# Patient Record
Sex: FEMALE | Race: WHITE | Employment: OTHER | URBAN - METROPOLITAN AREA
[De-identification: names, ages, dates, MRNs, and addresses within clinical notes are randomized per-mention and may not be internally consistent; named-entity substitution may affect disease eponyms.]

---

## 2017-01-04 ENCOUNTER — OFFICE VISIT (OUTPATIENT)
Dept: CARDIOLOGY CLINIC | Age: 82
End: 2017-01-04

## 2017-01-04 VITALS
BODY MASS INDEX: 25.75 KG/M2 | DIASTOLIC BLOOD PRESSURE: 70 MMHG | WEIGHT: 150 LBS | SYSTOLIC BLOOD PRESSURE: 124 MMHG | HEART RATE: 70 BPM

## 2017-01-04 DIAGNOSIS — I42.9 CARDIOMYOPATHY (HCC): ICD-10-CM

## 2017-01-04 DIAGNOSIS — I25.10 CAD IN NATIVE ARTERY: ICD-10-CM

## 2017-01-04 DIAGNOSIS — I10 ESSENTIAL HYPERTENSION: ICD-10-CM

## 2017-01-04 DIAGNOSIS — I34.0 NON-RHEUMATIC MITRAL REGURGITATION: ICD-10-CM

## 2017-01-04 DIAGNOSIS — I50.22 CHRONIC SYSTOLIC CONGESTIVE HEART FAILURE (HCC): Primary | ICD-10-CM

## 2017-01-04 PROCEDURE — 99214 OFFICE O/P EST MOD 30 MIN: CPT | Performed by: INTERNAL MEDICINE

## 2017-01-04 RX ORDER — LOSARTAN POTASSIUM AND HYDROCHLOROTHIAZIDE 12.5; 5 MG/1; MG/1
TABLET ORAL
Qty: 30 TABLET | Refills: 4 | Status: SHIPPED | OUTPATIENT
Start: 2017-01-04 | End: 2017-04-04 | Stop reason: SDUPTHER

## 2017-01-10 ENCOUNTER — OFFICE VISIT (OUTPATIENT)
Dept: FAMILY MEDICINE CLINIC | Age: 82
End: 2017-01-10

## 2017-01-10 VITALS
WEIGHT: 155 LBS | SYSTOLIC BLOOD PRESSURE: 126 MMHG | HEIGHT: 64 IN | DIASTOLIC BLOOD PRESSURE: 80 MMHG | TEMPERATURE: 97.8 F | RESPIRATION RATE: 16 BRPM | HEART RATE: 68 BPM | OXYGEN SATURATION: 97 % | BODY MASS INDEX: 26.46 KG/M2

## 2017-01-10 DIAGNOSIS — N28.9 RENAL INSUFFICIENCY: ICD-10-CM

## 2017-01-10 DIAGNOSIS — F51.01 PRIMARY INSOMNIA: ICD-10-CM

## 2017-01-10 DIAGNOSIS — I10 ESSENTIAL HYPERTENSION: Primary | ICD-10-CM

## 2017-01-10 DIAGNOSIS — K21.9 GASTROESOPHAGEAL REFLUX DISEASE, ESOPHAGITIS PRESENCE NOT SPECIFIED: ICD-10-CM

## 2017-01-10 DIAGNOSIS — E78.5 HYPERLIPIDEMIA, UNSPECIFIED HYPERLIPIDEMIA TYPE: ICD-10-CM

## 2017-01-10 PROCEDURE — 99214 OFFICE O/P EST MOD 30 MIN: CPT | Performed by: FAMILY MEDICINE

## 2017-01-10 PROCEDURE — 36415 COLL VENOUS BLD VENIPUNCTURE: CPT | Performed by: FAMILY MEDICINE

## 2017-01-10 RX ORDER — ZOLPIDEM TARTRATE 5 MG/1
5 TABLET ORAL NIGHTLY PRN
Qty: 30 TABLET | Refills: 2 | Status: SHIPPED | OUTPATIENT
Start: 2017-01-10 | End: 2017-04-04 | Stop reason: SDUPTHER

## 2017-01-10 RX ORDER — HYDROCODONE BITARTRATE AND ACETAMINOPHEN 10; 325 MG/1; MG/1
1 TABLET ORAL EVERY 6 HOURS PRN
COMMUNITY
Start: 2017-01-03

## 2017-01-10 RX ORDER — ATORVASTATIN CALCIUM 80 MG/1
TABLET, FILM COATED ORAL
Qty: 30 TABLET | Refills: 5 | Status: SHIPPED | OUTPATIENT
Start: 2017-01-10 | End: 2017-04-04 | Stop reason: SDUPTHER

## 2017-01-10 RX ORDER — FAMOTIDINE 20 MG/1
20 TABLET, FILM COATED ORAL 2 TIMES DAILY
Qty: 60 TABLET | Refills: 5 | Status: SHIPPED | OUTPATIENT
Start: 2017-01-10 | End: 2017-04-04 | Stop reason: SDUPTHER

## 2017-01-11 ENCOUNTER — NURSE ONLY (OUTPATIENT)
Dept: FAMILY MEDICINE CLINIC | Age: 82
End: 2017-01-11

## 2017-01-11 DIAGNOSIS — Z01.89 VISIT FOR LABORATORY TEST: Primary | ICD-10-CM

## 2017-01-11 LAB
ALBUMIN SERPL-MCNC: 3.8 G/DL (ref 3.4–5)
ALP BLD-CCNC: 47 U/L (ref 40–129)
ALT SERPL-CCNC: 14 U/L (ref 10–40)
ANION GAP SERPL CALCULATED.3IONS-SCNC: 12 MMOL/L (ref 3–16)
AST SERPL-CCNC: 21 U/L (ref 15–37)
BILIRUB SERPL-MCNC: 0.3 MG/DL (ref 0–1)
BILIRUBIN DIRECT: <0.2 MG/DL (ref 0–0.3)
BILIRUBIN, INDIRECT: ABNORMAL MG/DL (ref 0–1)
BUN BLDV-MCNC: 19 MG/DL (ref 7–20)
CALCIUM SERPL-MCNC: 9.4 MG/DL (ref 8.3–10.6)
CHLORIDE BLD-SCNC: 102 MMOL/L (ref 99–110)
CHOLESTEROL, TOTAL: 139 MG/DL (ref 0–199)
CO2: 28 MMOL/L (ref 21–32)
CREAT SERPL-MCNC: 1.3 MG/DL (ref 0.6–1.2)
GFR AFRICAN AMERICAN: 48
GFR NON-AFRICAN AMERICAN: 39
GLUCOSE BLD-MCNC: 82 MG/DL (ref 70–99)
HDLC SERPL-MCNC: 76 MG/DL (ref 40–60)
LDL CHOLESTEROL CALCULATED: 38 MG/DL
POTASSIUM SERPL-SCNC: 4.6 MMOL/L (ref 3.5–5.1)
SODIUM BLD-SCNC: 142 MMOL/L (ref 136–145)
TOTAL PROTEIN: 6.3 G/DL (ref 6.4–8.2)
TRIGL SERPL-MCNC: 123 MG/DL (ref 0–150)
VLDLC SERPL CALC-MCNC: 25 MG/DL

## 2017-02-03 RX ORDER — RANITIDINE 150 MG/1
TABLET ORAL
Qty: 60 TABLET | Refills: 5 | Status: SHIPPED | OUTPATIENT
Start: 2017-02-03 | End: 2017-04-04

## 2017-03-17 RX ORDER — CARVEDILOL 12.5 MG/1
TABLET ORAL
Qty: 60 TABLET | Refills: 3 | Status: SHIPPED | OUTPATIENT
Start: 2017-03-17 | End: 2017-04-04 | Stop reason: SDUPTHER

## 2017-04-03 ENCOUNTER — TELEPHONE (OUTPATIENT)
Dept: FAMILY MEDICINE CLINIC | Age: 82
End: 2017-04-03

## 2017-04-04 ENCOUNTER — OFFICE VISIT (OUTPATIENT)
Dept: FAMILY MEDICINE CLINIC | Age: 82
End: 2017-04-04

## 2017-04-04 VITALS
HEART RATE: 70 BPM | WEIGHT: 155 LBS | OXYGEN SATURATION: 96 % | DIASTOLIC BLOOD PRESSURE: 54 MMHG | HEIGHT: 64 IN | BODY MASS INDEX: 26.46 KG/M2 | RESPIRATION RATE: 16 BRPM | SYSTOLIC BLOOD PRESSURE: 118 MMHG | TEMPERATURE: 97.5 F

## 2017-04-04 DIAGNOSIS — K21.9 GASTROESOPHAGEAL REFLUX DISEASE, ESOPHAGITIS PRESENCE NOT SPECIFIED: ICD-10-CM

## 2017-04-04 DIAGNOSIS — F51.01 PRIMARY INSOMNIA: Primary | ICD-10-CM

## 2017-04-04 DIAGNOSIS — E78.5 HYPERLIPIDEMIA, UNSPECIFIED HYPERLIPIDEMIA TYPE: ICD-10-CM

## 2017-04-04 DIAGNOSIS — I10 ESSENTIAL HYPERTENSION: ICD-10-CM

## 2017-04-04 PROCEDURE — 99214 OFFICE O/P EST MOD 30 MIN: CPT | Performed by: FAMILY MEDICINE

## 2017-04-04 PROCEDURE — 1090F PRES/ABSN URINE INCON ASSESS: CPT | Performed by: FAMILY MEDICINE

## 2017-04-04 PROCEDURE — 4040F PNEUMOC VAC/ADMIN/RCVD: CPT | Performed by: FAMILY MEDICINE

## 2017-04-04 PROCEDURE — 1123F ACP DISCUSS/DSCN MKR DOCD: CPT | Performed by: FAMILY MEDICINE

## 2017-04-04 PROCEDURE — 1036F TOBACCO NON-USER: CPT | Performed by: FAMILY MEDICINE

## 2017-04-04 PROCEDURE — G8599 NO ASA/ANTIPLAT THER USE RNG: HCPCS | Performed by: FAMILY MEDICINE

## 2017-04-04 PROCEDURE — G8427 DOCREV CUR MEDS BY ELIG CLIN: HCPCS | Performed by: FAMILY MEDICINE

## 2017-04-04 PROCEDURE — G8420 CALC BMI NORM PARAMETERS: HCPCS | Performed by: FAMILY MEDICINE

## 2017-04-04 PROCEDURE — G8400 PT W/DXA NO RESULTS DOC: HCPCS | Performed by: FAMILY MEDICINE

## 2017-04-04 RX ORDER — ATORVASTATIN CALCIUM 80 MG/1
TABLET, FILM COATED ORAL
Qty: 30 TABLET | Refills: 5 | Status: SHIPPED | OUTPATIENT
Start: 2017-04-04 | End: 2017-07-21 | Stop reason: SDUPTHER

## 2017-04-04 RX ORDER — FAMOTIDINE 20 MG/1
20 TABLET, FILM COATED ORAL 2 TIMES DAILY
Qty: 60 TABLET | Refills: 5 | Status: ON HOLD | OUTPATIENT
Start: 2017-04-04 | End: 2022-03-22 | Stop reason: HOSPADM

## 2017-04-04 RX ORDER — PROMETHAZINE HYDROCHLORIDE 25 MG/1
TABLET ORAL
Qty: 60 TABLET | Refills: 2 | Status: SHIPPED | OUTPATIENT
Start: 2017-04-04 | End: 2017-04-11

## 2017-04-04 RX ORDER — LOSARTAN POTASSIUM AND HYDROCHLOROTHIAZIDE 12.5; 5 MG/1; MG/1
TABLET ORAL
Qty: 30 TABLET | Refills: 5 | Status: SHIPPED | OUTPATIENT
Start: 2017-04-04 | End: 2017-07-21 | Stop reason: SDUPTHER

## 2017-04-04 RX ORDER — ZOLPIDEM TARTRATE 5 MG/1
5 TABLET ORAL NIGHTLY PRN
Qty: 30 TABLET | Refills: 2 | Status: SHIPPED | OUTPATIENT
Start: 2017-04-04 | End: 2017-06-29 | Stop reason: SDUPTHER

## 2017-04-04 RX ORDER — CARVEDILOL 12.5 MG/1
TABLET ORAL
Qty: 60 TABLET | Refills: 5 | Status: SHIPPED | OUTPATIENT
Start: 2017-04-04 | End: 2017-07-21 | Stop reason: SDUPTHER

## 2017-06-29 ENCOUNTER — OFFICE VISIT (OUTPATIENT)
Dept: FAMILY MEDICINE CLINIC | Age: 82
End: 2017-06-29

## 2017-06-29 VITALS
DIASTOLIC BLOOD PRESSURE: 68 MMHG | HEART RATE: 70 BPM | SYSTOLIC BLOOD PRESSURE: 124 MMHG | TEMPERATURE: 97.4 F | OXYGEN SATURATION: 97 % | HEIGHT: 64 IN | RESPIRATION RATE: 16 BRPM | BODY MASS INDEX: 24.41 KG/M2 | WEIGHT: 143 LBS

## 2017-06-29 DIAGNOSIS — F51.01 PRIMARY INSOMNIA: Primary | ICD-10-CM

## 2017-06-29 PROCEDURE — 4040F PNEUMOC VAC/ADMIN/RCVD: CPT | Performed by: FAMILY MEDICINE

## 2017-06-29 PROCEDURE — G8400 PT W/DXA NO RESULTS DOC: HCPCS | Performed by: FAMILY MEDICINE

## 2017-06-29 PROCEDURE — 99213 OFFICE O/P EST LOW 20 MIN: CPT | Performed by: FAMILY MEDICINE

## 2017-06-29 PROCEDURE — 1036F TOBACCO NON-USER: CPT | Performed by: FAMILY MEDICINE

## 2017-06-29 PROCEDURE — G8427 DOCREV CUR MEDS BY ELIG CLIN: HCPCS | Performed by: FAMILY MEDICINE

## 2017-06-29 PROCEDURE — 1090F PRES/ABSN URINE INCON ASSESS: CPT | Performed by: FAMILY MEDICINE

## 2017-06-29 PROCEDURE — 1123F ACP DISCUSS/DSCN MKR DOCD: CPT | Performed by: FAMILY MEDICINE

## 2017-06-29 PROCEDURE — G8420 CALC BMI NORM PARAMETERS: HCPCS | Performed by: FAMILY MEDICINE

## 2017-06-29 PROCEDURE — G8599 NO ASA/ANTIPLAT THER USE RNG: HCPCS | Performed by: FAMILY MEDICINE

## 2017-06-29 RX ORDER — LUBIPROSTONE 8 UG/1
CAPSULE, GELATIN COATED ORAL
COMMUNITY
Start: 2017-06-20 | End: 2018-06-19 | Stop reason: SDUPTHER

## 2017-06-29 RX ORDER — ZOLPIDEM TARTRATE 5 MG/1
5 TABLET ORAL NIGHTLY PRN
Qty: 30 TABLET | Refills: 2 | Status: SHIPPED | OUTPATIENT
Start: 2017-06-29 | End: 2017-07-25

## 2017-07-21 ENCOUNTER — OFFICE VISIT (OUTPATIENT)
Dept: CARDIOLOGY CLINIC | Age: 82
End: 2017-07-21

## 2017-07-21 VITALS
BODY MASS INDEX: 25.58 KG/M2 | SYSTOLIC BLOOD PRESSURE: 158 MMHG | WEIGHT: 149 LBS | HEART RATE: 72 BPM | DIASTOLIC BLOOD PRESSURE: 60 MMHG

## 2017-07-21 DIAGNOSIS — I42.9 CARDIOMYOPATHY (HCC): ICD-10-CM

## 2017-07-21 DIAGNOSIS — I25.10 CAD IN NATIVE ARTERY: ICD-10-CM

## 2017-07-21 DIAGNOSIS — I10 ESSENTIAL HYPERTENSION: ICD-10-CM

## 2017-07-21 DIAGNOSIS — I50.22 CHRONIC SYSTOLIC CONGESTIVE HEART FAILURE (HCC): Primary | ICD-10-CM

## 2017-07-21 DIAGNOSIS — I34.0 NON-RHEUMATIC MITRAL REGURGITATION: ICD-10-CM

## 2017-07-21 PROCEDURE — G8428 CUR MEDS NOT DOCUMENT: HCPCS | Performed by: INTERNAL MEDICINE

## 2017-07-21 PROCEDURE — G8419 CALC BMI OUT NRM PARAM NOF/U: HCPCS | Performed by: INTERNAL MEDICINE

## 2017-07-21 PROCEDURE — 1036F TOBACCO NON-USER: CPT | Performed by: INTERNAL MEDICINE

## 2017-07-21 PROCEDURE — G8400 PT W/DXA NO RESULTS DOC: HCPCS | Performed by: INTERNAL MEDICINE

## 2017-07-21 PROCEDURE — 99214 OFFICE O/P EST MOD 30 MIN: CPT | Performed by: INTERNAL MEDICINE

## 2017-07-21 PROCEDURE — 1123F ACP DISCUSS/DSCN MKR DOCD: CPT | Performed by: INTERNAL MEDICINE

## 2017-07-21 PROCEDURE — G8599 NO ASA/ANTIPLAT THER USE RNG: HCPCS | Performed by: INTERNAL MEDICINE

## 2017-07-21 PROCEDURE — 4040F PNEUMOC VAC/ADMIN/RCVD: CPT | Performed by: INTERNAL MEDICINE

## 2017-07-21 PROCEDURE — 1090F PRES/ABSN URINE INCON ASSESS: CPT | Performed by: INTERNAL MEDICINE

## 2017-07-21 RX ORDER — CARVEDILOL 12.5 MG/1
TABLET ORAL
Qty: 60 TABLET | Refills: 5 | Status: SHIPPED | OUTPATIENT
Start: 2017-07-21 | End: 2018-02-19 | Stop reason: SDUPTHER

## 2017-07-21 RX ORDER — LOSARTAN POTASSIUM AND HYDROCHLOROTHIAZIDE 12.5; 5 MG/1; MG/1
TABLET ORAL
Qty: 30 TABLET | Refills: 5 | Status: SHIPPED | OUTPATIENT
Start: 2017-07-21 | End: 2018-02-20 | Stop reason: SDUPTHER

## 2017-07-21 RX ORDER — ATORVASTATIN CALCIUM 80 MG/1
TABLET, FILM COATED ORAL
Qty: 30 TABLET | Refills: 5 | Status: SHIPPED | OUTPATIENT
Start: 2017-07-21 | End: 2018-03-09 | Stop reason: SDUPTHER

## 2017-07-25 ENCOUNTER — OFFICE VISIT (OUTPATIENT)
Dept: FAMILY MEDICINE CLINIC | Age: 82
End: 2017-07-25

## 2017-07-25 VITALS
OXYGEN SATURATION: 97 % | TEMPERATURE: 97.6 F | HEART RATE: 66 BPM | DIASTOLIC BLOOD PRESSURE: 74 MMHG | HEIGHT: 64 IN | WEIGHT: 149 LBS | BODY MASS INDEX: 25.44 KG/M2 | RESPIRATION RATE: 18 BRPM | SYSTOLIC BLOOD PRESSURE: 130 MMHG

## 2017-07-25 DIAGNOSIS — F51.01 PRIMARY INSOMNIA: ICD-10-CM

## 2017-07-25 DIAGNOSIS — J06.9 UPPER RESPIRATORY INFECTION, ACUTE: Primary | ICD-10-CM

## 2017-07-25 DIAGNOSIS — N28.9 RENAL INSUFFICIENCY: ICD-10-CM

## 2017-07-25 LAB
ANION GAP SERPL CALCULATED.3IONS-SCNC: 14 MMOL/L (ref 3–16)
BUN BLDV-MCNC: 29 MG/DL (ref 7–20)
CALCIUM SERPL-MCNC: 9 MG/DL (ref 8.3–10.6)
CHLORIDE BLD-SCNC: 95 MMOL/L (ref 99–110)
CO2: 28 MMOL/L (ref 21–32)
CREAT SERPL-MCNC: 1.5 MG/DL (ref 0.6–1.2)
GFR AFRICAN AMERICAN: 40
GFR NON-AFRICAN AMERICAN: 33
GLUCOSE BLD-MCNC: 81 MG/DL (ref 70–99)
POTASSIUM SERPL-SCNC: 3.8 MMOL/L (ref 3.5–5.1)
SODIUM BLD-SCNC: 137 MMOL/L (ref 136–145)

## 2017-07-25 PROCEDURE — G8427 DOCREV CUR MEDS BY ELIG CLIN: HCPCS | Performed by: FAMILY MEDICINE

## 2017-07-25 PROCEDURE — 1036F TOBACCO NON-USER: CPT | Performed by: FAMILY MEDICINE

## 2017-07-25 PROCEDURE — 1123F ACP DISCUSS/DSCN MKR DOCD: CPT | Performed by: FAMILY MEDICINE

## 2017-07-25 PROCEDURE — 99213 OFFICE O/P EST LOW 20 MIN: CPT | Performed by: FAMILY MEDICINE

## 2017-07-25 PROCEDURE — G8599 NO ASA/ANTIPLAT THER USE RNG: HCPCS | Performed by: FAMILY MEDICINE

## 2017-07-25 PROCEDURE — 36415 COLL VENOUS BLD VENIPUNCTURE: CPT | Performed by: FAMILY MEDICINE

## 2017-07-25 PROCEDURE — 4040F PNEUMOC VAC/ADMIN/RCVD: CPT | Performed by: FAMILY MEDICINE

## 2017-07-25 PROCEDURE — G8419 CALC BMI OUT NRM PARAM NOF/U: HCPCS | Performed by: FAMILY MEDICINE

## 2017-07-25 PROCEDURE — 1090F PRES/ABSN URINE INCON ASSESS: CPT | Performed by: FAMILY MEDICINE

## 2017-07-25 PROCEDURE — G8400 PT W/DXA NO RESULTS DOC: HCPCS | Performed by: FAMILY MEDICINE

## 2017-07-25 RX ORDER — AZITHROMYCIN 250 MG/1
TABLET, FILM COATED ORAL
Qty: 1 PACKET | Refills: 0 | Status: SHIPPED | OUTPATIENT
Start: 2017-07-25 | End: 2017-08-04

## 2017-07-25 RX ORDER — TRAZODONE HYDROCHLORIDE 100 MG/1
100 TABLET ORAL NIGHTLY
Qty: 30 TABLET | Refills: 5 | Status: SHIPPED | OUTPATIENT
Start: 2017-07-25 | End: 2017-08-02

## 2017-08-02 ENCOUNTER — OFFICE VISIT (OUTPATIENT)
Dept: FAMILY MEDICINE CLINIC | Age: 82
End: 2017-08-02

## 2017-08-02 VITALS
TEMPERATURE: 97.8 F | WEIGHT: 149 LBS | BODY MASS INDEX: 25.44 KG/M2 | HEIGHT: 64 IN | SYSTOLIC BLOOD PRESSURE: 120 MMHG | DIASTOLIC BLOOD PRESSURE: 60 MMHG | HEART RATE: 78 BPM | RESPIRATION RATE: 16 BRPM | OXYGEN SATURATION: 97 %

## 2017-08-02 DIAGNOSIS — J40 BRONCHITIS: Primary | ICD-10-CM

## 2017-08-02 DIAGNOSIS — F51.01 PRIMARY INSOMNIA: ICD-10-CM

## 2017-08-02 PROCEDURE — 99213 OFFICE O/P EST LOW 20 MIN: CPT | Performed by: FAMILY MEDICINE

## 2017-08-02 PROCEDURE — 4040F PNEUMOC VAC/ADMIN/RCVD: CPT | Performed by: FAMILY MEDICINE

## 2017-08-02 PROCEDURE — 1090F PRES/ABSN URINE INCON ASSESS: CPT | Performed by: FAMILY MEDICINE

## 2017-08-02 PROCEDURE — G8400 PT W/DXA NO RESULTS DOC: HCPCS | Performed by: FAMILY MEDICINE

## 2017-08-02 PROCEDURE — 1123F ACP DISCUSS/DSCN MKR DOCD: CPT | Performed by: FAMILY MEDICINE

## 2017-08-02 PROCEDURE — 1036F TOBACCO NON-USER: CPT | Performed by: FAMILY MEDICINE

## 2017-08-02 PROCEDURE — G8419 CALC BMI OUT NRM PARAM NOF/U: HCPCS | Performed by: FAMILY MEDICINE

## 2017-08-02 PROCEDURE — G8599 NO ASA/ANTIPLAT THER USE RNG: HCPCS | Performed by: FAMILY MEDICINE

## 2017-08-02 PROCEDURE — G8427 DOCREV CUR MEDS BY ELIG CLIN: HCPCS | Performed by: FAMILY MEDICINE

## 2017-08-02 RX ORDER — ZOLPIDEM TARTRATE 5 MG/1
5 TABLET ORAL NIGHTLY PRN
Qty: 30 TABLET | Refills: 2 | Status: SHIPPED | OUTPATIENT
Start: 2017-08-02 | End: 2017-10-23 | Stop reason: SDUPTHER

## 2017-08-02 RX ORDER — CIPROFLOXACIN 500 MG/1
500 TABLET, FILM COATED ORAL 2 TIMES DAILY
Qty: 20 TABLET | Refills: 0 | Status: SHIPPED | OUTPATIENT
Start: 2017-08-02 | End: 2017-08-12

## 2017-10-23 ENCOUNTER — OFFICE VISIT (OUTPATIENT)
Dept: FAMILY MEDICINE CLINIC | Age: 82
End: 2017-10-23

## 2017-10-23 VITALS
WEIGHT: 149 LBS | SYSTOLIC BLOOD PRESSURE: 130 MMHG | BODY MASS INDEX: 25.44 KG/M2 | HEIGHT: 64 IN | RESPIRATION RATE: 18 BRPM | DIASTOLIC BLOOD PRESSURE: 74 MMHG | TEMPERATURE: 98.2 F | OXYGEN SATURATION: 97 % | HEART RATE: 74 BPM

## 2017-10-23 DIAGNOSIS — M54.42 CHRONIC LEFT-SIDED LOW BACK PAIN WITH LEFT-SIDED SCIATICA: Primary | ICD-10-CM

## 2017-10-23 DIAGNOSIS — F51.01 PRIMARY INSOMNIA: ICD-10-CM

## 2017-10-23 DIAGNOSIS — G89.29 CHRONIC LEFT-SIDED LOW BACK PAIN WITH LEFT-SIDED SCIATICA: Primary | ICD-10-CM

## 2017-10-23 PROCEDURE — 1090F PRES/ABSN URINE INCON ASSESS: CPT | Performed by: FAMILY MEDICINE

## 2017-10-23 PROCEDURE — G8599 NO ASA/ANTIPLAT THER USE RNG: HCPCS | Performed by: FAMILY MEDICINE

## 2017-10-23 PROCEDURE — G8417 CALC BMI ABV UP PARAM F/U: HCPCS | Performed by: FAMILY MEDICINE

## 2017-10-23 PROCEDURE — G8400 PT W/DXA NO RESULTS DOC: HCPCS | Performed by: FAMILY MEDICINE

## 2017-10-23 PROCEDURE — 4040F PNEUMOC VAC/ADMIN/RCVD: CPT | Performed by: FAMILY MEDICINE

## 2017-10-23 PROCEDURE — G8428 CUR MEDS NOT DOCUMENT: HCPCS | Performed by: FAMILY MEDICINE

## 2017-10-23 PROCEDURE — 1036F TOBACCO NON-USER: CPT | Performed by: FAMILY MEDICINE

## 2017-10-23 PROCEDURE — G8484 FLU IMMUNIZE NO ADMIN: HCPCS | Performed by: FAMILY MEDICINE

## 2017-10-23 PROCEDURE — 99213 OFFICE O/P EST LOW 20 MIN: CPT | Performed by: FAMILY MEDICINE

## 2017-10-23 PROCEDURE — 1123F ACP DISCUSS/DSCN MKR DOCD: CPT | Performed by: FAMILY MEDICINE

## 2017-10-23 RX ORDER — METAXALONE 800 MG/1
800 TABLET ORAL 3 TIMES DAILY
Qty: 90 TABLET | Refills: 3 | Status: SHIPPED | OUTPATIENT
Start: 2017-10-23 | End: 2018-06-19 | Stop reason: ALTCHOICE

## 2017-10-23 RX ORDER — ZOLPIDEM TARTRATE 5 MG/1
5 TABLET ORAL NIGHTLY PRN
Qty: 30 TABLET | Refills: 2 | Status: SHIPPED | OUTPATIENT
Start: 2017-10-23 | End: 2018-06-19 | Stop reason: SDUPTHER

## 2017-10-23 NOTE — PROGRESS NOTES
SUBJECTIVE:  F/U Chronic low back pain. She is now seeing Dr. Fern Murcia for pain management but also desires a refill of Skelaxin for muscle spasm, also helps with the pain, with no side effects. F/U Chronic insomnia - desires refill Ambien, still helps with insomnia, with no side effects or daytime sedation. Current tobacco use: None. OBJECTIVE:  /74 (Site: Left Arm, Position: Sitting, Cuff Size: Medium Adult)   Pulse 74   Temp 98.2 °F (36.8 °C) (Oral)   Resp 18   Ht 5' 4\" (1.626 m)   Wt 149 lb (67.6 kg)   SpO2 97%   Breastfeeding? No   BMI 25.58 kg/m²     Normal mood and affect. Walks with cane. ASSESSMENT:  1. Chronic left-sided low back pain with left-sided sciatica    2. Primary insomnia        PLAN:  Orders Placed This Encounter   Medications    zolpidem (AMBIEN) 5 MG tablet     Sig: Take 1 tablet by mouth nightly as needed for Sleep     Dispense:  30 tablet     Refill:  2    metaxalone (SKELAXIN) 800 MG tablet     Sig: Take 1 tablet by mouth 3 times daily     Dispense:  90 tablet     Refill:  3       Controlled Substances Monitoring: Attestation: The Prescription Monitoring Report for this patient was reviewed today. Gisselle Luciano MD)  Documentation: Possible medication side effects, risk of tolerance and/or dependence, and alternative treatments discussed., No signs of potential drug abuse or diversion identified., Existing medication contract. Gisselle Luciano MD)    Insomnia and pain management discussed. 15 minutes were spent with patient . Greater than 50% continuity of care or counseling.

## 2017-10-25 ENCOUNTER — TELEPHONE (OUTPATIENT)
Dept: FAMILY MEDICINE CLINIC | Age: 82
End: 2017-10-25

## 2017-11-17 ENCOUNTER — TELEPHONE (OUTPATIENT)
Dept: FAMILY MEDICINE CLINIC | Age: 82
End: 2017-11-17

## 2018-02-19 RX ORDER — CARVEDILOL 12.5 MG/1
TABLET ORAL
Qty: 60 TABLET | Refills: 5 | Status: SHIPPED | OUTPATIENT
Start: 2018-02-19 | End: 2018-09-04 | Stop reason: SDUPTHER

## 2018-02-19 NOTE — TELEPHONE ENCOUNTER
Requested Prescriptions     Pending Prescriptions Disp Refills    carvedilol (COREG) 12.5 MG tablet [Pharmacy Med Name: Carvedilol Oral Tablet 12.5 MG] 60 tablet 5     Sig: TAKE 1 TABLET BY MOUTH TWO TIMES A DAY WITH MEALS         Last ov:7/21/17    Next ov:3/27/18    Last fill:7/21/17  \    Please sign in Dr Florina Gibson absence.

## 2018-02-20 ENCOUNTER — OFFICE VISIT (OUTPATIENT)
Dept: CARDIOLOGY CLINIC | Age: 83
End: 2018-02-20

## 2018-02-20 VITALS
BODY MASS INDEX: 24.55 KG/M2 | DIASTOLIC BLOOD PRESSURE: 78 MMHG | WEIGHT: 143 LBS | SYSTOLIC BLOOD PRESSURE: 170 MMHG | HEART RATE: 67 BPM

## 2018-02-20 DIAGNOSIS — I25.10 ATHEROSCLEROSIS OF NATIVE CORONARY ARTERY OF NATIVE HEART WITHOUT ANGINA PECTORIS: ICD-10-CM

## 2018-02-20 DIAGNOSIS — I10 ESSENTIAL HYPERTENSION: ICD-10-CM

## 2018-02-20 DIAGNOSIS — I11.9 MALIGNANT HYPERTENSIVE HEART DISEASE WITHOUT HEART FAILURE: Primary | ICD-10-CM

## 2018-02-20 PROCEDURE — G8484 FLU IMMUNIZE NO ADMIN: HCPCS | Performed by: NURSE PRACTITIONER

## 2018-02-20 PROCEDURE — G8599 NO ASA/ANTIPLAT THER USE RNG: HCPCS | Performed by: NURSE PRACTITIONER

## 2018-02-20 PROCEDURE — 1090F PRES/ABSN URINE INCON ASSESS: CPT | Performed by: NURSE PRACTITIONER

## 2018-02-20 PROCEDURE — G8400 PT W/DXA NO RESULTS DOC: HCPCS | Performed by: NURSE PRACTITIONER

## 2018-02-20 PROCEDURE — G8427 DOCREV CUR MEDS BY ELIG CLIN: HCPCS | Performed by: NURSE PRACTITIONER

## 2018-02-20 PROCEDURE — 1036F TOBACCO NON-USER: CPT | Performed by: NURSE PRACTITIONER

## 2018-02-20 PROCEDURE — 4040F PNEUMOC VAC/ADMIN/RCVD: CPT | Performed by: NURSE PRACTITIONER

## 2018-02-20 PROCEDURE — G8420 CALC BMI NORM PARAMETERS: HCPCS | Performed by: NURSE PRACTITIONER

## 2018-02-20 PROCEDURE — 99213 OFFICE O/P EST LOW 20 MIN: CPT | Performed by: NURSE PRACTITIONER

## 2018-02-20 PROCEDURE — 1123F ACP DISCUSS/DSCN MKR DOCD: CPT | Performed by: NURSE PRACTITIONER

## 2018-02-20 RX ORDER — LOSARTAN POTASSIUM AND HYDROCHLOROTHIAZIDE 12.5; 5 MG/1; MG/1
TABLET ORAL
Qty: 90 TABLET | Refills: 3 | Status: SHIPPED | OUTPATIENT
Start: 2018-02-20 | End: 2018-12-22 | Stop reason: SDUPTHER

## 2018-02-20 ASSESSMENT — ENCOUNTER SYMPTOMS
RESPIRATORY NEGATIVE: 1
GASTROINTESTINAL NEGATIVE: 1

## 2018-02-20 NOTE — PROGRESS NOTES
[facility]      Primary Care Doctor:  No primary care provider on file. Chief Complaint:  CAD    History of Present Illness:  Purvi Landry is a 80 y.o. female with PMH  CAD, HTN, HLD who presents today for an interval exam. Historical sx with first MI were fatigue, back pain, acid reflux. She c/o orthopnea that is at baseline for her, fatigue, and occasional dizziness. She is currently taking care of her  who is severely demented and whom she has brought in for an appt today. she denies chest pain, dyspnea, exertional chest pressure/discomfort, syncope. Cardiac Risk Factors  Family Hx:  Yes  Tobacco:  No  HTN:  Yes, elevated today  Lipids 17 LDL:38  goal< 100   HDL:  76 T   DM:  No  Metabolic Syndrome: No    Fasting BG>100  No HDL<40/50  No TG>150  No BP>130/85  Yes Abd Circ>35in/40in  No      EF: 50%    NYHA Class:   II   Class I   Patients with no limitation of activities; they suffer no symptoms from ordinary activities. Class II   Patients with slight, mild limitation of activity; they are comfortable with rest or with mild exertion. Class III   Patients with marked limitation of activity; they are comfortable only at rest.   Class IV   Patients who should be at complete rest, confined to bed or chair; any physical activity brings on discomfort and symptoms occur at rest.      Activity: at baseline  Can you walk 1-2 blocks or do a moderate amount of house/yard work? Yes          Past Medical History:   has a past medical history of Arthritis; Blood circulation, collateral; CAD (coronary artery disease); CHF (congestive heart failure) ([address] GERD (gastroesophageal reflux disease); Hyperlipidemia; Hypertension; Mitral valve prolapse; and Thyroid disease. Surgical History:   has a past surgical history that includes Hysterectomy; Thyroidectomy; and Colonoscopy. Social History:   reports that she has never smoked.  She has never used smokeless tobacco. She reports that she does not drink alcohol or use drugs. Family History:   Family History   Problem Relation Age of Onset    Heart Disease Mother     Heart Disease Father     Cancer Father     Cancer Sister     Diabetes Brother     Stroke Brother        Home Medications:  Prior to Admission medications    Medication Sig Start Date End Date Taking? Authorizing Provider   carvedilol (COREG) 12.5 MG tablet TAKE 1 TABLET BY MOUTH TWO TIMES A DAY WITH MEALS 2/19/18   Nuno Blackmon NP   zolpidem [facility]) 5 MG tablet Take 1 tablet by mouth nightly as needed for Sleep 10/23/17   Katia Nicole MD   metaxalone [facility]) 800 MG tablet Take 1 tablet by mouth 3 times daily 10/23/17   Katia Nicole MD   atorvastatin (LIPITOR) 80 MG tablet TAKE 1 TABLET BY MOUTH ONE TIME A DAY 7/21/17   Bairon Shen MD   losartan-hydrochlorothiazide [facility]) 50-12.5 MG per tablet TAKE 1 TABLET BY MOUTH ONE TIME A DAY 7/21/17   Bairon Shen MD   AMITIZA 8 MCG CAPS capsule  6/20/17   Historical Provider, MD   famotidine (PEPCID) 20 MG tablet Take 1 tablet by mouth 2 times daily 4/4/17   Katia Nicole MD   HYDROcodone-acetaminophen [facility]) 7.5-325 MG per tablet  1/3/17   Historical Provider, MD   Coenzyme Q10 (CO Q 10) 100 MG CAPS Take  by mouth daily. Historical Provider, MD   Cholecalciferol (VITAMIN D) 2000 UNITS CAPS capsule Take  by mouth. Historical Provider, MD   aspirin 81 MG chewable tablet Take 1 tablet by mouth daily. 1/16/14   Rosalinda Velasquez MD        Allergies:  Benazepril hcl; Feldene [piroxicam]; Hytrin [terazosin hcl]; Iv contrast [iodides]; Celebrex [celecoxib]; Cephalexin; Iodinated casein; Monopril [fosinopril sodium]; Protonix [pantoprazole sodium]; Quinapril hcl; Toprol xl [metoprolol succinate]; Ultracet [tramadol-acetaminophen]; and Ziac [bisoprolol-hydrochlorothiazide]     ROS:   Review of Systems   Constitutional: Negative. HENT: Negative. Respiratory: Negative.     Cardiovascular: Negative. Gastrointestinal: Negative. Neurological: Negative. Hematological: Negative. Psychiatric/Behavioral: Negative. Physical Examination:    Vitals:    02/20/18 1357   BP: (!) 170/78   Pulse: 67   Weight: 143 lb (64.9 kg)           Physical Exam   Constitutional: She is oriented to person, place, and time. She appears well-developed and well-nourished. HENT:   Head: Normocephalic and atraumatic. Eyes: Conjunctivae are normal. Pupils are equal, round, and reactive to light. Neck: Normal range of motion. Neck supple. Cardiovascular: Normal rate, normal heart sounds and intact distal pulses. Pulmonary/Chest: Effort normal and breath sounds normal.   Abdominal: Soft. Musculoskeletal: Normal range of motion. Neurological: She is alert and oriented to person, place, and time. Skin: Skin is warm and dry. Psychiatric: She has a normal mood and affect. Her behavior is normal. Judgment and thought content normal.   Vitals reviewed.       Lab Data:    CBC:   Lab Results   Component Value Date    WBC 5.8 06/03/2016    WBC 6.5 01/19/2016    WBC 5.9 10/05/2015    RBC 3.65 06/03/2016    RBC 3.97 01/19/2016    RBC 3.61 10/05/2015    HGB 11.2 06/03/2016    HGB 11.8 01/19/2016    HGB 10.9 10/05/2015    HCT 33.5 06/03/2016    HCT 36.6 01/19/2016    HCT 32.8 10/05/2015    MCV 91.7 06/03/2016    MCV 92.3 01/19/2016    MCV 90.8 10/05/2015    RDW 13.7 06/03/2016    RDW 13.7 01/19/2016    RDW 13.4 10/05/2015     06/03/2016     01/19/2016     10/05/2015     BMP:  Lab Results   Component Value Date     07/25/2017     01/11/2017     06/03/2016    K 3.8 07/25/2017    K 4.6 01/11/2017    K 4.2 06/03/2016    CL 95 07/25/2017     01/11/2017     06/03/2016    CO2 28 07/25/2017    CO2 28 01/11/2017    CO2 26 06/03/2016    PHOS 4.0 01/19/2016    PHOS 4.4 10/06/2015    PHOS 3.5 01/16/2014    BUN 29 07/25/2017    BUN 19 01/11/2017    BUN 20 06/03/2016

## 2018-02-20 NOTE — PATIENT INSTRUCTIONS
chest.  Sweating. Shortness of breath. Nausea or vomiting. Pain, pressure, or a strange feeling in the back, neck, jaw, or upper belly or in one or both shoulders or arms. Lightheadedness or sudden weakness. A fast or irregular heartbeat. If you have symptoms of a heart attack: After you call 911, the  may tell you to chew 1 adult-strength or 2 to 4 low-dose aspirin. Wait for an ambulance. Do not try to drive yourself. Follow-up care is a key part of your treatment and safety. Be sure to make and go to all appointments, and call your doctor if you are having problems. It's also a good idea to know your test results and keep a list of the medicines you take. Where can you learn more? Go to [email or web address]://[email or web address]. org and sign in to your [employer] account. Enter T174 in the [employer] box to learn more about \"Learning About Heart Failure Zones. \"     If you do not have an account, please click on the \"Sign Up Now\" link. Current as of: September 21, 2016  Content Version: 11.5  © 4645-0334 Healthwise, Incorporated. Care instructions adapted under license by [facility] ([facility]). If you have questions about a medical condition or this instruction, always ask your healthcare professional. [address] any warranty or liability for your use of this information.

## 2018-03-09 DIAGNOSIS — E78.5 HYPERLIPIDEMIA, UNSPECIFIED HYPERLIPIDEMIA TYPE: Primary | ICD-10-CM

## 2018-03-09 RX ORDER — ATORVASTATIN CALCIUM 80 MG/1
TABLET, FILM COATED ORAL
Qty: 90 TABLET | Refills: 3 | Status: SHIPPED | OUTPATIENT
Start: 2018-03-09 | End: 2019-03-07 | Stop reason: SDUPTHER

## 2018-03-29 ENCOUNTER — HOSPITAL ENCOUNTER (OUTPATIENT)
Dept: OTHER | Age: 83
Discharge: HOME | End: 2018-03-29
Attending: INTERNAL MEDICINE | Admitting: INTERNAL MEDICINE

## 2018-03-29 LAB
CHOLESTEROL, FASTING: 146 MG/DL (ref 0–199)
HDLC SERPL-MCNC: 65 MG/DL (ref 40–60)
LDL CHOLESTEROL CALCULATED: 46 MG/DL
TRIGLYCERIDE, FASTING: 173 MG/DL (ref 0–150)
VLDLC SERPL CALC-MCNC: 35 MG/DL

## 2018-06-19 ENCOUNTER — OFFICE VISIT (OUTPATIENT)
Dept: FAMILY MEDICINE CLINIC | Age: 83
End: 2018-06-19

## 2018-06-19 ENCOUNTER — TELEPHONE (OUTPATIENT)
Dept: FAMILY MEDICINE CLINIC | Age: 83
End: 2018-06-19

## 2018-06-19 VITALS
DIASTOLIC BLOOD PRESSURE: 88 MMHG | SYSTOLIC BLOOD PRESSURE: 130 MMHG | OXYGEN SATURATION: 98 % | HEART RATE: 65 BPM | BODY MASS INDEX: 24.07 KG/M2 | WEIGHT: 141 LBS | HEIGHT: 64 IN

## 2018-06-19 DIAGNOSIS — N18.9 CHRONIC KIDNEY DISEASE, UNSPECIFIED CKD STAGE: ICD-10-CM

## 2018-06-19 DIAGNOSIS — K59.09 CHRONIC CONSTIPATION: ICD-10-CM

## 2018-06-19 DIAGNOSIS — Z23 NEED FOR PNEUMOCOCCAL VACCINATION: ICD-10-CM

## 2018-06-19 DIAGNOSIS — I10 ESSENTIAL HYPERTENSION: Primary | ICD-10-CM

## 2018-06-19 DIAGNOSIS — E78.2 MIXED HYPERLIPIDEMIA: ICD-10-CM

## 2018-06-19 DIAGNOSIS — F51.01 PRIMARY INSOMNIA: ICD-10-CM

## 2018-06-19 DIAGNOSIS — Z78.0 ASYMPTOMATIC MENOPAUSE: ICD-10-CM

## 2018-06-19 PROCEDURE — 1123F ACP DISCUSS/DSCN MKR DOCD: CPT | Performed by: FAMILY MEDICINE

## 2018-06-19 PROCEDURE — G0009 ADMIN PNEUMOCOCCAL VACCINE: HCPCS | Performed by: FAMILY MEDICINE

## 2018-06-19 PROCEDURE — 36415 COLL VENOUS BLD VENIPUNCTURE: CPT | Performed by: FAMILY MEDICINE

## 2018-06-19 PROCEDURE — G8599 NO ASA/ANTIPLAT THER USE RNG: HCPCS | Performed by: FAMILY MEDICINE

## 2018-06-19 PROCEDURE — 1090F PRES/ABSN URINE INCON ASSESS: CPT | Performed by: FAMILY MEDICINE

## 2018-06-19 PROCEDURE — G8420 CALC BMI NORM PARAMETERS: HCPCS | Performed by: FAMILY MEDICINE

## 2018-06-19 PROCEDURE — G8427 DOCREV CUR MEDS BY ELIG CLIN: HCPCS | Performed by: FAMILY MEDICINE

## 2018-06-19 PROCEDURE — 99214 OFFICE O/P EST MOD 30 MIN: CPT | Performed by: FAMILY MEDICINE

## 2018-06-19 PROCEDURE — G8400 PT W/DXA NO RESULTS DOC: HCPCS | Performed by: FAMILY MEDICINE

## 2018-06-19 PROCEDURE — 90670 PCV13 VACCINE IM: CPT | Performed by: FAMILY MEDICINE

## 2018-06-19 PROCEDURE — 1036F TOBACCO NON-USER: CPT | Performed by: FAMILY MEDICINE

## 2018-06-19 PROCEDURE — 4040F PNEUMOC VAC/ADMIN/RCVD: CPT | Performed by: FAMILY MEDICINE

## 2018-06-19 RX ORDER — ZOLPIDEM TARTRATE 5 MG/1
5 TABLET ORAL NIGHTLY PRN
Qty: 30 TABLET | Refills: 2 | Status: SHIPPED | OUTPATIENT
Start: 2018-06-19 | End: 2018-06-22 | Stop reason: SDUPTHER

## 2018-06-19 RX ORDER — LUBIPROSTONE 8 UG/1
8 CAPSULE, GELATIN COATED ORAL 2 TIMES DAILY WITH MEALS
Qty: 30 CAPSULE | Refills: 5 | Status: SHIPPED | OUTPATIENT
Start: 2018-06-19 | End: 2018-06-19 | Stop reason: SDUPTHER

## 2018-06-19 RX ORDER — LUBIPROSTONE 8 UG/1
8 CAPSULE, GELATIN COATED ORAL 2 TIMES DAILY WITH MEALS
Qty: 60 CAPSULE | Refills: 5 | Status: SHIPPED | OUTPATIENT
Start: 2018-06-19 | End: 2020-03-05

## 2018-06-19 RX ORDER — ZOLPIDEM TARTRATE 5 MG/1
5 TABLET ORAL NIGHTLY PRN
Qty: 30 TABLET | Refills: 2 | Status: CANCELLED | OUTPATIENT
Start: 2018-06-19

## 2018-06-19 ASSESSMENT — PATIENT HEALTH QUESTIONNAIRE - PHQ9
1. LITTLE INTEREST OR PLEASURE IN DOING THINGS: 0
2. FEELING DOWN, DEPRESSED OR HOPELESS: 0
SUM OF ALL RESPONSES TO PHQ9 QUESTIONS 1 & 2: 0
SUM OF ALL RESPONSES TO PHQ QUESTIONS 1-9: 0

## 2018-06-19 ASSESSMENT — ENCOUNTER SYMPTOMS: CONSTIPATION: 1

## 2018-06-20 LAB
A/G RATIO: 1.9 (ref 1.1–2.2)
ALBUMIN SERPL-MCNC: 4.1 G/DL (ref 3.4–5)
ALP BLD-CCNC: 57 U/L (ref 40–129)
ALT SERPL-CCNC: 9 U/L (ref 10–40)
ANION GAP SERPL CALCULATED.3IONS-SCNC: 15 MMOL/L (ref 3–16)
AST SERPL-CCNC: 18 U/L (ref 15–37)
BASOPHILS ABSOLUTE: 0.1 K/UL (ref 0–0.2)
BASOPHILS RELATIVE PERCENT: 1.2 %
BILIRUB SERPL-MCNC: <0.2 MG/DL (ref 0–1)
BUN BLDV-MCNC: 19 MG/DL (ref 7–20)
CALCIUM SERPL-MCNC: 9.4 MG/DL (ref 8.3–10.6)
CHLORIDE BLD-SCNC: 99 MMOL/L (ref 99–110)
CO2: 26 MMOL/L (ref 21–32)
CREAT SERPL-MCNC: 1.1 MG/DL (ref 0.6–1.2)
EOSINOPHILS ABSOLUTE: 0.3 K/UL (ref 0–0.6)
EOSINOPHILS RELATIVE PERCENT: 4.5 %
GFR AFRICAN AMERICAN: 57
GFR NON-AFRICAN AMERICAN: 47
GLOBULIN: 2.2 G/DL
GLUCOSE BLD-MCNC: 92 MG/DL (ref 70–99)
HCT VFR BLD CALC: 32.1 % (ref 36–48)
HEMOGLOBIN: 10.8 G/DL (ref 12–16)
LYMPHOCYTES ABSOLUTE: 1.9 K/UL (ref 1–5.1)
LYMPHOCYTES RELATIVE PERCENT: 33 %
MCH RBC QN AUTO: 31 PG (ref 26–34)
MCHC RBC AUTO-ENTMCNC: 33.7 G/DL (ref 31–36)
MCV RBC AUTO: 92 FL (ref 80–100)
MONOCYTES ABSOLUTE: 0.4 K/UL (ref 0–1.3)
MONOCYTES RELATIVE PERCENT: 7.7 %
NEUTROPHILS ABSOLUTE: 3 K/UL (ref 1.7–7.7)
NEUTROPHILS RELATIVE PERCENT: 53.6 %
PDW BLD-RTO: 14.8 % (ref 12.4–15.4)
PLATELET # BLD: 208 K/UL (ref 135–450)
PMV BLD AUTO: 8.7 FL (ref 5–10.5)
POTASSIUM SERPL-SCNC: 4.6 MMOL/L (ref 3.5–5.1)
RBC # BLD: 3.49 M/UL (ref 4–5.2)
SODIUM BLD-SCNC: 140 MMOL/L (ref 136–145)
TOTAL PROTEIN: 6.3 G/DL (ref 6.4–8.2)
WBC # BLD: 5.7 K/UL (ref 4–11)

## 2018-06-22 DIAGNOSIS — F51.01 PRIMARY INSOMNIA: ICD-10-CM

## 2018-06-22 RX ORDER — ZOLPIDEM TARTRATE 5 MG/1
5 TABLET ORAL NIGHTLY PRN
Qty: 30 TABLET | Refills: 0 | Status: SHIPPED | OUTPATIENT
Start: 2018-06-22 | End: 2018-08-06 | Stop reason: SDUPTHER

## 2018-06-24 LAB
6-ACETYLMORPHINE: NOT DETECTED
7-AMINOCLONAZEPAM: NOT DETECTED
ALPHA-OH-ALPRAZOLAM: NOT DETECTED
ALPRAZOLAM: NOT DETECTED
AMPHETAMINE: NOT DETECTED
BARBITURATES: NOT DETECTED
BENZOYLECGONINE: NOT DETECTED
BUPRENORPHINE: NOT DETECTED
CARISOPRODOL: NOT DETECTED
CLONAZEPAM: NOT DETECTED
CODEINE: NOT DETECTED
CREATININE URINE: 67.4 MG/DL (ref 20–400)
DIAZEPAM: NOT DETECTED
DRUGS EXPECTED: NORMAL
EER PAIN MGT DRUG PANEL, HIGH RES/EMIT U: NORMAL
ETHYL GLUCURONIDE: NOT DETECTED
FENTANYL: NOT DETECTED
HYDROCODONE: PRESENT
HYDROMORPHONE: PRESENT
LORAZEPAM: NOT DETECTED
MARIJUANA METABOLITE: NOT DETECTED
MDA: NOT DETECTED
MDEA: NOT DETECTED
MDMA URINE: NOT DETECTED
MEPERIDINE: NOT DETECTED
METHADONE: NOT DETECTED
METHAMPHETAMINE: NOT DETECTED
METHYLPHENIDATE: NOT DETECTED
MIDAZOLAM: NOT DETECTED
MORPHINE: NOT DETECTED
NORBUPRENORPHINE, FREE: NOT DETECTED
NORDIAZEPAM: NOT DETECTED
NORFENTANYL: NOT DETECTED
NORHYDROCODONE, URINE: PRESENT
NOROXYCODONE: NOT DETECTED
NOROXYMORPHONE, URINE: NOT DETECTED
OXAZEPAM: NOT DETECTED
OXYCODONE: NOT DETECTED
OXYMORPHONE: NOT DETECTED
PAIN MANAGEMENT DRUG PANEL: NORMAL
PAIN MANAGEMENT DRUG PANEL: NORMAL
PCP: NOT DETECTED
PHENTERMINE: NOT DETECTED
PROPOXYPHENE: NOT DETECTED
TAPENTADOL, URINE: NOT DETECTED
TAPENTADOL-O-SULFATE, URINE: NOT DETECTED
TEMAZEPAM: NOT DETECTED
TRAMADOL: NOT DETECTED
ZOLPIDEM: NOT DETECTED

## 2018-07-30 ENCOUNTER — TELEPHONE (OUTPATIENT)
Dept: CARDIOLOGY CLINIC | Age: 83
End: 2018-07-30

## 2018-07-30 NOTE — TELEPHONE ENCOUNTER
Per Dr. Lissette Townsend ok for twilight as long as she's not having any new symptoms.  Pt informed

## 2018-07-30 NOTE — TELEPHONE ENCOUNTER
Patient needs to speak with  or Court Valadez about what she is allowed to take.  Was telling her he was going to put her in a \"twilight state\" she said she needs to know what is okay to take.  [phone]

## 2018-08-06 DIAGNOSIS — F51.01 PRIMARY INSOMNIA: ICD-10-CM

## 2018-08-07 RX ORDER — ZOLPIDEM TARTRATE 5 MG/1
5 TABLET ORAL NIGHTLY PRN
Qty: 30 TABLET | Refills: 0 | Status: SHIPPED | OUTPATIENT
Start: 2018-08-07 | End: 2018-10-24 | Stop reason: SDUPTHER

## 2018-08-20 ENCOUNTER — TELEPHONE (OUTPATIENT)
Dept: CARDIOLOGY CLINIC | Age: 83
End: 2018-08-20

## 2018-08-20 ENCOUNTER — OFFICE VISIT (OUTPATIENT)
Dept: FAMILY MEDICINE CLINIC | Age: 83
End: 2018-08-20

## 2018-08-20 VITALS
DIASTOLIC BLOOD PRESSURE: 70 MMHG | OXYGEN SATURATION: 98 % | BODY MASS INDEX: 24.72 KG/M2 | HEART RATE: 68 BPM | SYSTOLIC BLOOD PRESSURE: 124 MMHG | WEIGHT: 144 LBS

## 2018-08-20 DIAGNOSIS — Z01.818 PREOP EXAMINATION: Primary | ICD-10-CM

## 2018-08-20 PROCEDURE — G8427 DOCREV CUR MEDS BY ELIG CLIN: HCPCS | Performed by: FAMILY MEDICINE

## 2018-08-20 PROCEDURE — 1090F PRES/ABSN URINE INCON ASSESS: CPT | Performed by: FAMILY MEDICINE

## 2018-08-20 PROCEDURE — G8599 NO ASA/ANTIPLAT THER USE RNG: HCPCS | Performed by: FAMILY MEDICINE

## 2018-08-20 PROCEDURE — G8400 PT W/DXA NO RESULTS DOC: HCPCS | Performed by: FAMILY MEDICINE

## 2018-08-20 PROCEDURE — 4040F PNEUMOC VAC/ADMIN/RCVD: CPT | Performed by: FAMILY MEDICINE

## 2018-08-20 PROCEDURE — 99214 OFFICE O/P EST MOD 30 MIN: CPT | Performed by: FAMILY MEDICINE

## 2018-08-20 PROCEDURE — 1123F ACP DISCUSS/DSCN MKR DOCD: CPT | Performed by: FAMILY MEDICINE

## 2018-08-20 PROCEDURE — 1101F PT FALLS ASSESS-DOCD LE1/YR: CPT | Performed by: FAMILY MEDICINE

## 2018-08-20 PROCEDURE — G8420 CALC BMI NORM PARAMETERS: HCPCS | Performed by: FAMILY MEDICINE

## 2018-08-20 PROCEDURE — 1036F TOBACCO NON-USER: CPT | Performed by: FAMILY MEDICINE

## 2018-08-20 NOTE — PROGRESS NOTES
Preoperative Consultation      Bryan Becerra  YOB: 1935    Date of Service:  8/20/2018    Vitals:    08/20/18 1431   BP: 124/70   Site: Left Arm   Position: Sitting   Cuff Size: Medium Adult   Pulse: 68   SpO2: 98%   Weight: 144 lb (65.3 kg)      Wt Readings from Last 2 Encounters:   08/20/18 144 lb (65.3 kg)   06/19/18 141 lb (64 kg)     BP Readings from Last 3 Encounters:   08/20/18 124/70   06/19/18 130/88   02/20/18 (!) 170/78        Chief Complaint   Patient presents with    Pre-op Exam     Cataract right 8/27/18 Dr. Harley Rather     Allergies   Allergen Reactions    Benazepril Hcl Shortness Of Breath and Swelling    Feldene [Piroxicam] Shortness Of Breath    Hytrin [Terazosin Hcl] Shortness Of Breath    Iv Contrast [Iodides] Anaphylaxis    Celebrex [Celecoxib]      GI upset    Cephalexin      Nausea    Iodinated Casein     Monopril [Fosinopril Sodium] Other (See Comments)     Pt states makes her weak    Protonix [Pantoprazole Sodium] Other (See Comments)     Kidney failure      Quinapril Hcl     Toprol Xl [Metoprolol Succinate] Other (See Comments)     C/o leg weakness with this med    Ultracet [Tramadol-Acetaminophen]      Rash    Ziac [Bisoprolol-Hydrochlorothiazide] Other (See Comments)     Pt does not remember reaction to med ? Weak      Outpatient Prescriptions Marked as Taking for the 8/20/18 encounter (Office Visit) with [address] [place] [address], DO   Medication Sig Dispense Refill    zolpidem (AMBIEN) 5 MG tablet Take 1 tablet by mouth nightly as needed for Sleep for up to 30 days. . 30 tablet 0    AMITIZA 8 MCG CAPS capsule Take 1 capsule by mouth 2 times daily (with meals) 60 capsule 5    atorvastatin (LIPITOR) 80 MG tablet TAKE 1 TABLET BY MOUTH ONE TIME A DAY  90 tablet 3    losartan-hydrochlorothiazide (HYZAAR) 50-12.5 MG per tablet TAKE 1 TABLET BY MOUTH ONE TIME A DAY 90 tablet 3    carvedilol (COREG) 12.5 MG tablet TAKE 1 TABLET BY MOUTH TWO TIMES A DAY WITH MEALS 60 tablet 5  famotidine (PEPCID) 20 MG tablet Take 1 tablet by mouth 2 times daily (Patient taking differently: Take 20 mg by mouth 2 times daily as needed ) 60 tablet 5    HYDROcodone-acetaminophen (NORCO) 7.5-325 MG per tablet Take 1 tablet by mouth every 6 hours as needed. Conny Reges Coenzyme Q10 (CO Q 10) 100 MG CAPS Take  by mouth daily.  Cholecalciferol (VITAMIN D) 2000 UNITS CAPS capsule Take 2,000 Units by mouth daily       aspirin 81 MG chewable tablet Take 1 tablet by mouth daily. 30 tablet        This patient presents to the office today for a preoperative consultation at the request of surgeon, Dr. Juan Watkins, who plans on performing right and left cataract surgery on August 27 at [facility].  The current problem began 3 years ago, and symptoms have been worsening with time. Conservative therapy: N/A.     Planned anesthesia: Local   Known anesthesia problems: None   Bleeding risk: No recent or remote history of abnormal bleeding  Personal or FH of DVT/PE: No    Patient objection to receiving blood products: No    Patient Active Problem List   Diagnosis    Malignant hypertensive heart disease without heart failure    Other and unspecified angina pectoris    Coronary atherosclerosis of native coronary artery    Other chronic pulmonary heart diseases    Mitral valve disorder    Primary cardiomyopathy ([address]    Acute combined systolic and diastolic heart failure (HCC)    Chronic low back pain    Essential hypertension    AGUILA (acute kidney injury) ([address]    Renal insufficiency    Gout    Pain in joint, hand    Mixed hyperlipidemia    Primary insomnia    Closed stable burst fracture of first lumbar vertebra ([address]    GERD (gastroesophageal reflux disease)       Past Medical History:   Diagnosis Date    Arthritis     Blood circulation, collateral     CAD (coronary artery disease)     CHF (congestive heart failure) (HCC)     GERD (gastroesophageal reflux disease)     Hyperlipidemia     Hypertension     Mitral valve prolapse     Thyroid disease      Past Surgical History:   Procedure Laterality Date    COLONOSCOPY      HYSTERECTOMY      THYROIDECTOMY       Family History   Problem Relation Age of Onset    Heart Disease Mother     Heart Disease Father     Cancer Father     Cancer Sister     Diabetes Brother     Stroke Brother      Social History     Social History    Marital status:      Spouse name: Anderson Medel Number of children: N/A    Years of education: 15     Occupational History    retired      Social History Main Topics    Smoking status: Never Smoker    Smokeless tobacco: Never Used    Alcohol use No    Drug use: No    Sexual activity: No      Comment:  living with spouse. Other Topics Concern    Not on file     Social History Narrative    No narrative on file       Review of Systems  A comprehensive review of systems was negative except for what was noted in the HPI. Physical Exam   Constitutional: She is oriented to person, place, and time. She appears well-developed and well-nourished. No distress. HENT:   Head: Normocephalic and atraumatic. Mouth/Throat: Uvula is midline, oropharynx is clear and moist and mucous membranes are normal.   Eyes: Conjunctivae and EOM are normal. Pupils are equal, round, and reactive to light. Neck: Trachea normal and normal range of motion. Neck supple. No JVD present. No mass and no thyromegaly present. Cardiovascular: Normal rate, regular rhythm, normal heart sounds and intact distal pulses. Exam reveals no gallop and no friction rub. Systolic murmur heard. Pulmonary/Chest: Effort normal and breath sounds normal. No respiratory distress. She has no wheezes. She has no rales. Abdominal: Soft. Normal aorta and bowel sounds are normal. She exhibits no distension and no mass. There is no hepatosplenomegaly. No tenderness. Musculoskeletal: She exhibits no edema and no tenderness. prior to surgery and titrated to pulse < 70.  4. Deep vein thrombosis prophylaxis: regimen to be chosen by surgical team   5. CKD. Overall stable. Increase water hydration. 6.  Essential hypertension. Stable. Continue current medications. 7. No contraindications to planned surgery but patient needs to ask her cardiologist regarding stopping the aspirin.      Dr. Tino Campoverde

## 2018-08-21 LAB
A/G RATIO: 1.8 (ref 1.1–2.2)
ALBUMIN SERPL-MCNC: 4.3 G/DL (ref 3.4–5)
ALP BLD-CCNC: 55 U/L (ref 40–129)
ALT SERPL-CCNC: 12 U/L (ref 10–40)
ANION GAP SERPL CALCULATED.3IONS-SCNC: 15 MMOL/L (ref 3–16)
AST SERPL-CCNC: 20 U/L (ref 15–37)
BASOPHILS ABSOLUTE: 0.1 K/UL (ref 0–0.2)
BASOPHILS RELATIVE PERCENT: 1.2 %
BILIRUB SERPL-MCNC: <0.2 MG/DL (ref 0–1)
BUN BLDV-MCNC: 17 MG/DL (ref 7–20)
CALCIUM SERPL-MCNC: 9.6 MG/DL (ref 8.3–10.6)
CHLORIDE BLD-SCNC: 93 MMOL/L (ref 99–110)
CO2: 27 MMOL/L (ref 21–32)
CREAT SERPL-MCNC: 1.3 MG/DL (ref 0.6–1.2)
EOSINOPHILS ABSOLUTE: 0.2 K/UL (ref 0–0.6)
EOSINOPHILS RELATIVE PERCENT: 3.4 %
GFR AFRICAN AMERICAN: 47
GFR NON-AFRICAN AMERICAN: 39
GLOBULIN: 2.4 G/DL
GLUCOSE BLD-MCNC: 87 MG/DL (ref 70–99)
HCT VFR BLD CALC: 34.3 % (ref 36–48)
HEMOGLOBIN: 11.5 G/DL (ref 12–16)
LYMPHOCYTES ABSOLUTE: 1.7 K/UL (ref 1–5.1)
LYMPHOCYTES RELATIVE PERCENT: 29.8 %
MCH RBC QN AUTO: 30.3 PG (ref 26–34)
MCHC RBC AUTO-ENTMCNC: 33.5 G/DL (ref 31–36)
MCV RBC AUTO: 90.5 FL (ref 80–100)
MONOCYTES ABSOLUTE: 0.6 K/UL (ref 0–1.3)
MONOCYTES RELATIVE PERCENT: 9.9 %
NEUTROPHILS ABSOLUTE: 3.1 K/UL (ref 1.7–7.7)
NEUTROPHILS RELATIVE PERCENT: 55.7 %
PDW BLD-RTO: 13.8 % (ref 12.4–15.4)
PLATELET # BLD: 217 K/UL (ref 135–450)
PMV BLD AUTO: 9.1 FL (ref 5–10.5)
POTASSIUM SERPL-SCNC: 4.3 MMOL/L (ref 3.5–5.1)
RBC # BLD: 3.79 M/UL (ref 4–5.2)
SODIUM BLD-SCNC: 135 MMOL/L (ref 136–145)
TOTAL PROTEIN: 6.7 G/DL (ref 6.4–8.2)
WBC # BLD: 5.7 K/UL (ref 4–11)

## 2018-08-21 RX ORDER — PREDNISOLONE ACETATE 10 MG/ML
1 SUSPENSION/ DROPS OPHTHALMIC SEE ADMIN INSTRUCTIONS
Status: CANCELLED | OUTPATIENT
Start: 2018-08-27

## 2018-08-21 NOTE — PROGRESS NOTES
Spoke with office to verify with  that he wants eye gel given due to patient having allergy to piroxicam.Cindy Garcia

## 2018-08-25 NOTE — H&P
The H&P was reviewed, the patient was examined, and no change has occurred in the patient's condition since the H&P was completed.     Paradise Sylvester MD, PhD, FACS

## 2018-08-25 NOTE — OP NOTE
solutions placed on the eye. The eye was covered with a metal shield. The patient went to the PACU in excellent condition, having tolerated the procedure extremely well, and will follow up with me tomorrow for postop day 1 care.     Julieta Durbin MD, PhD, FACS

## 2018-08-26 ENCOUNTER — ANESTHESIA EVENT (OUTPATIENT)
Dept: OPERATING ROOM | Age: 83
End: 2018-08-26
Payer: MEDICARE

## 2018-08-26 NOTE — H&P
The H&P was reviewed, the patient was examined, and no change has occurred in the patient's condition since the H&P was completed.     Traci Leonardo MD, PhD, FACS

## 2018-08-27 ENCOUNTER — ANESTHESIA (OUTPATIENT)
Dept: OPERATING ROOM | Age: 83
End: 2018-08-27
Payer: MEDICARE

## 2018-08-27 ENCOUNTER — HOSPITAL ENCOUNTER (OUTPATIENT)
Age: 83
Setting detail: OUTPATIENT SURGERY
Discharge: HOME | End: 2018-08-27
Attending: OPHTHALMOLOGY | Admitting: OPHTHALMOLOGY
Payer: MEDICARE

## 2018-08-27 VITALS — DIASTOLIC BLOOD PRESSURE: 72 MMHG | OXYGEN SATURATION: 100 % | SYSTOLIC BLOOD PRESSURE: 152 MMHG

## 2018-08-27 VITALS
RESPIRATION RATE: 16 BRPM | HEIGHT: 63 IN | HEART RATE: 75 BPM | WEIGHT: 142 LBS | OXYGEN SATURATION: 97 % | BODY MASS INDEX: 25.16 KG/M2 | DIASTOLIC BLOOD PRESSURE: 84 MMHG | TEMPERATURE: 97.5 F | SYSTOLIC BLOOD PRESSURE: 180 MMHG

## 2018-08-27 PROCEDURE — 2500000003 HC RX 250 WO HCPCS: Performed by: ANESTHESIOLOGY

## 2018-08-27 PROCEDURE — 6370000000 HC RX 637 (ALT 250 FOR IP): Performed by: OPHTHALMOLOGY

## 2018-08-27 PROCEDURE — 7100000011 HC PHASE II RECOVERY - ADDTL 15 MIN: Performed by: OPHTHALMOLOGY

## 2018-08-27 PROCEDURE — 3700000001 HC ADD 15 MINUTES (ANESTHESIA): Performed by: OPHTHALMOLOGY

## 2018-08-27 PROCEDURE — 2580000003 HC RX 258

## 2018-08-27 PROCEDURE — 7100000010 HC PHASE II RECOVERY - FIRST 15 MIN: Performed by: OPHTHALMOLOGY

## 2018-08-27 PROCEDURE — 2709999900 HC NON-CHARGEABLE SUPPLY: Performed by: OPHTHALMOLOGY

## 2018-08-27 PROCEDURE — 2500000003 HC RX 250 WO HCPCS: Performed by: OPHTHALMOLOGY

## 2018-08-27 PROCEDURE — 6370000000 HC RX 637 (ALT 250 FOR IP)

## 2018-08-27 PROCEDURE — 3600000003 HC SURGERY LEVEL 3 BASE: Performed by: OPHTHALMOLOGY

## 2018-08-27 PROCEDURE — 3600000013 HC SURGERY LEVEL 3 ADDTL 15MIN: Performed by: OPHTHALMOLOGY

## 2018-08-27 PROCEDURE — V2632 POST CHMBR INTRAOCULAR LENS: HCPCS | Performed by: OPHTHALMOLOGY

## 2018-08-27 PROCEDURE — 3700000000 HC ANESTHESIA ATTENDED CARE: Performed by: OPHTHALMOLOGY

## 2018-08-27 PROCEDURE — 6360000002 HC RX W HCPCS: Performed by: NURSE ANESTHETIST, CERTIFIED REGISTERED

## 2018-08-27 DEVICE — ACRYSOF(R) IQ ASPHERIC IOL SP ACRYLIC FOLDABLELENS WULTRASERT(TM) DELIVERY SYSTEM UV WBLUE LIGHT FILTER. 13.0MM LENGTH 6.0MM ANTERIORASYMMETRIC BICONVEX OPTIC PLANAR HAPTICS.
Type: IMPLANTABLE DEVICE | Site: EYE | Status: FUNCTIONAL
Brand: ACRYSOF ULTRASERT

## 2018-08-27 RX ORDER — TETRACAINE HYDROCHLORIDE 5 MG/ML
SOLUTION OPHTHALMIC PRN
Status: DISCONTINUED | OUTPATIENT
Start: 2018-08-27 | End: 2018-08-27 | Stop reason: HOSPADM

## 2018-08-27 RX ORDER — SODIUM CHLORIDE, SODIUM LACTATE, POTASSIUM CHLORIDE, CALCIUM CHLORIDE 600; 310; 30; 20 MG/100ML; MG/100ML; MG/100ML; MG/100ML
INJECTION, SOLUTION INTRAVENOUS
Status: COMPLETED
Start: 2018-08-27 | End: 2018-08-27

## 2018-08-27 RX ORDER — MIDAZOLAM HYDROCHLORIDE 1 MG/ML
INJECTION INTRAMUSCULAR; INTRAVENOUS PRN
Status: DISCONTINUED | OUTPATIENT
Start: 2018-08-27 | End: 2018-08-27 | Stop reason: SDUPTHER

## 2018-08-27 RX ORDER — MOXIFLOXACIN 5 MG/ML
1 SOLUTION/ DROPS OPHTHALMIC SEE ADMIN INSTRUCTIONS
Status: DISCONTINUED | OUTPATIENT
Start: 2018-08-27 | End: 2018-08-27 | Stop reason: HOSPADM

## 2018-08-27 RX ORDER — ACETAMINOPHEN 650 MG
TABLET, EXTENDED RELEASE ORAL PRN
Status: DISCONTINUED | OUTPATIENT
Start: 2018-08-27 | End: 2018-08-27 | Stop reason: HOSPADM

## 2018-08-27 RX ORDER — LIDOCAINE HYDROCHLORIDE 10 MG/ML
1 INJECTION, SOLUTION EPIDURAL; INFILTRATION; INTRACAUDAL; PERINEURAL
Status: COMPLETED | OUTPATIENT
Start: 2018-08-27 | End: 2018-08-27

## 2018-08-27 RX ORDER — SODIUM CHLORIDE 0.9 % (FLUSH) 0.9 %
10 SYRINGE (ML) INJECTION PRN
Status: DISCONTINUED | OUTPATIENT
Start: 2018-08-27 | End: 2018-08-27 | Stop reason: HOSPADM

## 2018-08-27 RX ORDER — FENTANYL CITRATE 50 UG/ML
INJECTION, SOLUTION INTRAMUSCULAR; INTRAVENOUS PRN
Status: DISCONTINUED | OUTPATIENT
Start: 2018-08-27 | End: 2018-08-27 | Stop reason: SDUPTHER

## 2018-08-27 RX ORDER — CYCLOPENTOLATE HYDROCHLORIDE 10 MG/ML
1 SOLUTION/ DROPS OPHTHALMIC
Status: COMPLETED | OUTPATIENT
Start: 2018-08-27 | End: 2018-08-27

## 2018-08-27 RX ORDER — SODIUM CHLORIDE, SODIUM LACTATE, POTASSIUM CHLORIDE, CALCIUM CHLORIDE 600; 310; 30; 20 MG/100ML; MG/100ML; MG/100ML; MG/100ML
INJECTION, SOLUTION INTRAVENOUS CONTINUOUS
Status: DISCONTINUED | OUTPATIENT
Start: 2018-08-27 | End: 2018-08-27 | Stop reason: HOSPADM

## 2018-08-27 RX ORDER — PHENYLEPHRINE HCL 2.5 %
DROPS OPHTHALMIC (EYE)
Status: COMPLETED
Start: 2018-08-27 | End: 2018-08-27

## 2018-08-27 RX ORDER — SODIUM CHLORIDE, POTASSIUM CHLORIDE, CALCIUM CHLORIDE, MAGNESIUM CHLORIDE, SODIUM ACETATE, AND SODIUM CITRATE 6.4; .75; .48; .3; 3.9; 1.7 MG/ML; MG/ML; MG/ML; MG/ML; MG/ML; MG/ML
SOLUTION IRRIGATION PRN
Status: DISCONTINUED | OUTPATIENT
Start: 2018-08-27 | End: 2018-08-27 | Stop reason: HOSPADM

## 2018-08-27 RX ORDER — TROPICAMIDE 10 MG/ML
1 SOLUTION/ DROPS OPHTHALMIC
Status: COMPLETED | OUTPATIENT
Start: 2018-08-27 | End: 2018-08-27

## 2018-08-27 RX ORDER — PHENYLEPHRINE HCL 2.5 %
1 DROPS OPHTHALMIC (EYE)
Status: COMPLETED | OUTPATIENT
Start: 2018-08-27 | End: 2018-08-27

## 2018-08-27 RX ORDER — SODIUM CHLORIDE 0.9 % (FLUSH) 0.9 %
10 SYRINGE (ML) INJECTION EVERY 12 HOURS SCHEDULED
Status: DISCONTINUED | OUTPATIENT
Start: 2018-08-27 | End: 2018-08-27 | Stop reason: HOSPADM

## 2018-08-27 RX ORDER — MOXIFLOXACIN 5 MG/ML
SOLUTION/ DROPS OPHTHALMIC PRN
Status: DISCONTINUED | OUTPATIENT
Start: 2018-08-27 | End: 2018-08-27 | Stop reason: HOSPADM

## 2018-08-27 RX ADMIN — FENTANYL CITRATE 25 MCG: 50 INJECTION INTRAMUSCULAR; INTRAVENOUS at 08:32

## 2018-08-27 RX ADMIN — Medication 1 DROP: at 07:55

## 2018-08-27 RX ADMIN — CYCLOPENTOLATE HYDROCHLORIDE 1 DROP: 10 SOLUTION/ DROPS OPHTHALMIC at 08:03

## 2018-08-27 RX ADMIN — TROPICAMIDE 1 DROP: 10 SOLUTION/ DROPS OPHTHALMIC at 07:41

## 2018-08-27 RX ADMIN — PHENYLEPHRINE HYDROCHLORIDE 1 DROP: 25 SOLUTION/ DROPS OPHTHALMIC at 07:41

## 2018-08-27 RX ADMIN — CYCLOPENTOLATE HYDROCHLORIDE 1 DROP: 10 SOLUTION/ DROPS OPHTHALMIC at 07:50

## 2018-08-27 RX ADMIN — CYCLOPENTOLATE HYDROCHLORIDE 1 DROP: 10 SOLUTION/ DROPS OPHTHALMIC at 07:55

## 2018-08-27 RX ADMIN — TROPICAMIDE 1 DROP: 10 SOLUTION/ DROPS OPHTHALMIC at 07:50

## 2018-08-27 RX ADMIN — SODIUM CHLORIDE, SODIUM LACTATE, POTASSIUM CHLORIDE, CALCIUM CHLORIDE: 600; 310; 30; 20 INJECTION, SOLUTION INTRAVENOUS at 07:41

## 2018-08-27 RX ADMIN — Medication 1 DROP: at 07:41

## 2018-08-27 RX ADMIN — TROPICAMIDE 1 DROP: 10 SOLUTION/ DROPS OPHTHALMIC at 08:03

## 2018-08-27 RX ADMIN — Medication 1 DROP: at 08:03

## 2018-08-27 RX ADMIN — MIDAZOLAM 1 MG: 1 INJECTION INTRAMUSCULAR; INTRAVENOUS at 08:32

## 2018-08-27 RX ADMIN — CYCLOPENTOLATE HYDROCHLORIDE 1 DROP: 10 SOLUTION/ DROPS OPHTHALMIC at 07:41

## 2018-08-27 RX ADMIN — Medication 1 DROP: at 07:50

## 2018-08-27 RX ADMIN — LIDOCAINE HYDROCHLORIDE 1 ML: 10 INJECTION, SOLUTION EPIDURAL; INFILTRATION; INTRACAUDAL; PERINEURAL at 07:43

## 2018-08-27 RX ADMIN — SODIUM CHLORIDE, POTASSIUM CHLORIDE, SODIUM LACTATE AND CALCIUM CHLORIDE: 600; 310; 30; 20 INJECTION, SOLUTION INTRAVENOUS at 07:41

## 2018-08-27 RX ADMIN — TROPICAMIDE 1 DROP: 10 SOLUTION/ DROPS OPHTHALMIC at 07:55

## 2018-08-27 ASSESSMENT — PULMONARY FUNCTION TESTS
PIF_VALUE: 0

## 2018-08-27 ASSESSMENT — PAIN SCALES - GENERAL
PAINLEVEL_OUTOF10: 0
PAINLEVEL_OUTOF10: 0

## 2018-08-27 ASSESSMENT — PAIN - FUNCTIONAL ASSESSMENT: PAIN_FUNCTIONAL_ASSESSMENT: 0-10

## 2018-08-27 NOTE — ANESTHESIA PRE PROCEDURE
Department of Anesthesiology  Preprocedure Note       Name:  Romelle Cogan   Age:  80 y.o.  :  1935                                          MRN:  [id number]         Date:  2018      Surgeon: Joanna Garcia):  Carola Barriga MD    Procedure: Procedure(s):  PHACO EMULSIFICATION OF CATARACT WITH INTRAOCULAR LENS IMPLANT RIGHT EYE    Medications prior to admission:   Prior to Admission medications    Medication Sig Start Date End Date Taking? Authorizing Provider   zolpidem (AMBIEN) 5 MG tablet Take 1 tablet by mouth nightly as needed for Sleep for up to 30 days. . 18  Debra Shahid DO   AMITIZA 8 MCG CAPS capsule Take 1 capsule by mouth 2 times daily (with meals) 18   Debra Shahid DO   atorvastatin (LIPITOR) 80 MG tablet TAKE 1 TABLET BY MOUTH ONE TIME A DAY  3/9/18   DAVE Luis CNP   losartan-hydrochlorothiazide (HYZAAR) 50-12.5 MG per tablet TAKE 1 TABLET BY MOUTH ONE TIME A DAY 18   DAVE Luis CNP   carvedilol (COREG) 12.5 MG tablet TAKE 1 TABLET BY MOUTH TWO TIMES A DAY WITH MEALS 18   DAVE Luis CNP   famotidine (PEPCID) 20 MG tablet Take 1 tablet by mouth 2 times daily  Patient taking differently: Take 20 mg by mouth 2 times daily as needed  17   Gino Mtez MD   HYDROcodone-acetaminophen ([address]) 7.5-325 MG per tablet Take 1 tablet by mouth every 6 hours as needed. . 1/3/17   Historical Provider, MD   Coenzyme Q10 (CO Q 10) 100 MG CAPS Take  by mouth daily. Historical Provider, MD   Cholecalciferol (VITAMIN D) 2000 UNITS CAPS capsule Take 2,000 Units by mouth daily     Historical Provider, MD   aspirin 81 MG chewable tablet Take 1 tablet by mouth daily.  14   Tone Samayoa MD       Current medications:    Current Facility-Administered Medications   Medication Dose Route Frequency Provider Last Rate Last Dose    moxifloxacin (VIGAMOX) 0.5 % ophthalmic solution 1 drop  1 drop Right Eye See Admin Instructions Nan Smith MD        cyclopentolate (CYCLOGYL) 1 % ophthalmic solution 1 drop  1 drop Right Eye Q5 Min Nan Smith MD        tropicamide (MYDRIACYL) 1 % ophthalmic solution 1 drop  1 drop Right Eye Q5 Min Nan Smith MD        phenylephrine (MYDFRIN) 2.5 % ophthalmic solution 1 drop  1 drop Right Eye Q5 Min Nan Smith MD        epinephrine and lidocaine 2% PF in BSS injection (1 mL)   Intraocular Once Nan Smith MD        lactated ringers infusion   Intravenous Continuous Jacqueline Rojas MD        sodium chloride flush 0.9 % injection 10 mL  10 mL Intravenous 2 times per day Jacqueline Rojas MD        sodium chloride flush 0.9 % injection 10 mL  10 mL Intravenous PRN Jacqueline Rojas MD        lidocaine PF 1 % injection 1 mL  1 mL Intradermal Once PRN Jacqueline Rojas MD           Allergies: Allergies   Allergen Reactions    Benazepril Hcl Shortness Of Breath and Swelling    Feldene [Piroxicam] Shortness Of Breath    Hytrin [Terazosin Hcl] Shortness Of Breath    Iv Contrast [Iodides] Anaphylaxis    Celebrex [Celecoxib]      GI upset    Cephalexin      Nausea    Iodinated Casein     Monopril [Fosinopril Sodium] Other (See Comments)     Pt states makes her weak    Protonix [Pantoprazole Sodium] Other (See Comments)     Kidney failure      Quinapril Hcl     Toprol Xl [Metoprolol Succinate] Other (See Comments)     C/o leg weakness with this med    Ultracet [Tramadol-Acetaminophen]      Rash    Ziac [Bisoprolol-Hydrochlorothiazide] Other (See Comments)     Pt does not remember reaction to med ?  Weak        Problem List:    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    Other and unspecified angina pectoris I20.9    Coronary atherosclerosis of native coronary artery I25.10    Other chronic pulmonary heart diseases I27.89    Mitral valve disorder I05.9    Primary cardiomyopathy ([facility] [address] I42.8    Acute combined systolic and diastolic heart failure (HCC) I50.41    Chronic low back pain M54.5, G89.29    Essential hypertension I10    AGUILA (acute kidney injury) ([facility] [address] N17.9    Renal insufficiency N28.9    Gout M10.9    Pain in joint, hand M25.549    Mixed hyperlipidemia E78.2    Primary insomnia F51.01    Closed stable burst fracture of first lumbar vertebra ([facility]) S32.011A    GERD (gastroesophageal reflux disease) K21.9       Past Medical History:        Diagnosis Date    Arthritis     Blood circulation, collateral     CAD (coronary artery disease)     CHF (congestive heart failure) ([facility])     GERD (gastroesophageal reflux disease)     Hyperlipidemia     Hypertension     Mitral valve prolapse     Thyroid disease        Past Surgical History:        Procedure Laterality Date    COLONOSCOPY      HYSTERECTOMY      THYROIDECTOMY         Social History:    Social History   Substance Use Topics    Smoking status: Never Smoker    Smokeless tobacco: Never Used    Alcohol use No                                Counseling given: Not Answered      Vital Signs (Current):   Vitals:    08/21/18 1230   Weight: 144 lb (65.3 kg)   Height: 5' 3\" (1.6 m)                                              BP Readings from Last 3 Encounters:   08/20/18 124/70   06/19/18 130/88   02/20/18 (!) 170/78       NPO Status:                                                                                 BMI:   Wt Readings from Last 3 Encounters:   08/21/18 144 lb (65.3 kg)   08/20/18 144 lb (65.3 kg)   06/19/18 141 lb (64 kg)     Body mass index is 25.51 kg/m².     CBC:   Lab Results   Component Value Date    WBC 5.7 08/20/2018    RBC 3.79 08/20/2018    HGB 11.5 08/20/2018    HCT 34.3 08/20/2018    MCV 90.5 08/20/2018    RDW 13.8 08/20/2018     08/20/2018       CMP:   Lab Results   Component Value Date     08/20/2018    K 4.3 08/20/2018    CL 93 08/20/2018    CO2 27 08/20/2018    BUN 17 08/20/2018 CREATININE 1.3 08/20/2018    GFRAA 47 08/20/2018    AGRATIO 1.8 08/20/2018    LABGLOM 39 08/20/2018    GLUCOSE 87 08/20/2018    PROT 6.7 08/20/2018    CALCIUM 9.6 08/20/2018    BILITOT <0.2 08/20/2018    ALKPHOS 55 08/20/2018    AST 20 08/20/2018    ALT 12 08/20/2018       POC Tests: No results for input(s): POCGLU, POCNA, POCK, POCCL, POCBUN, POCHEMO, POCHCT in the last 72 hours. Coags:   Lab Results   Component Value Date    PROTIME 12.8 01/12/2014    INR 1.15 01/12/2014    APTT 28.4 01/12/2014       HCG (If Applicable): No results found for: PREGTESTUR, PREGSERUM, HCG, HCGQUANT     ABGs: No results found for: PHART, PO2ART, ZWH6XTW, HNQ5ZVK, BEART, L1ZXYLZN     Type & Screen (If Applicable):  No results found for: LABABO, [address]    Anesthesia Evaluation  Patient summary reviewed no history of anesthetic complications:   Airway: Mallampati: II  TM distance: >3 FB   Neck ROM: full  Mouth opening: > = 3 FB Dental: normal exam         Pulmonary:Negative Pulmonary ROS                              Cardiovascular:    (+) hypertension:, valvular problems/murmurs: MVP, angina:, CAD:, CHF:,                   Neuro/Psych:   Negative Neuro/Psych ROS              GI/Hepatic/Renal:   (+) GERD: well controlled, renal disease: CRI,      (-) liver disease       Endo/Other: Negative Endo/Other ROS       (-) diabetes mellitus               Abdominal:           Vascular: negative vascular ROS. Anesthesia Plan      MAC     ASA 3       Induction: intravenous. Anesthetic plan and risks discussed with patient. Plan discussed with CRNA. All questions answered and agrees with plan.         Charla Pennington MD   8/27/2018

## 2018-08-27 NOTE — PROGRESS NOTES
Reviewed pt's b/p and medical history with dr Yvonne Guerrier, he did not want to order any tests at this time ie ekg

## 2018-08-27 NOTE — PROGRESS NOTES
Pt is alert and oriented. Verbalized understanding of procedure, consent form signed, iv started, pt tolerated well. Will call family to bedside, call light within reach.    Eye drops started

## 2018-08-30 ENCOUNTER — ANESTHESIA EVENT (OUTPATIENT)
Dept: OPERATING ROOM | Age: 83
End: 2018-08-30
Payer: MEDICARE

## 2018-09-04 ENCOUNTER — OFFICE VISIT (OUTPATIENT)
Dept: CARDIOLOGY CLINIC | Age: 83
End: 2018-09-04

## 2018-09-04 ENCOUNTER — HOSPITAL ENCOUNTER (OUTPATIENT)
Age: 83
Setting detail: OUTPATIENT SURGERY
Discharge: HOME | End: 2018-09-04
Attending: OPHTHALMOLOGY | Admitting: OPHTHALMOLOGY
Payer: MEDICARE

## 2018-09-04 ENCOUNTER — ANESTHESIA (OUTPATIENT)
Dept: OPERATING ROOM | Age: 83
End: 2018-09-04
Payer: MEDICARE

## 2018-09-04 VITALS — DIASTOLIC BLOOD PRESSURE: 66 MMHG | SYSTOLIC BLOOD PRESSURE: 142 MMHG | OXYGEN SATURATION: 99 %

## 2018-09-04 VITALS
DIASTOLIC BLOOD PRESSURE: 90 MMHG | HEART RATE: 68 BPM | BODY MASS INDEX: 25.93 KG/M2 | WEIGHT: 146.4 LBS | SYSTOLIC BLOOD PRESSURE: 169 MMHG

## 2018-09-04 VITALS
TEMPERATURE: 98 F | HEART RATE: 54 BPM | OXYGEN SATURATION: 94 % | BODY MASS INDEX: 25.16 KG/M2 | DIASTOLIC BLOOD PRESSURE: 71 MMHG | SYSTOLIC BLOOD PRESSURE: 150 MMHG | RESPIRATION RATE: 14 BRPM | HEIGHT: 63 IN | WEIGHT: 142 LBS

## 2018-09-04 DIAGNOSIS — I42.9 PRIMARY CARDIOMYOPATHY (HCC): ICD-10-CM

## 2018-09-04 DIAGNOSIS — I11.9 MALIGNANT HYPERTENSIVE HEART DISEASE WITHOUT HEART FAILURE: ICD-10-CM

## 2018-09-04 DIAGNOSIS — I47.20 V-TACH: Primary | ICD-10-CM

## 2018-09-04 DIAGNOSIS — I25.10 ATHEROSCLEROSIS OF NATIVE CORONARY ARTERY OF NATIVE HEART WITHOUT ANGINA PECTORIS: ICD-10-CM

## 2018-09-04 DIAGNOSIS — R00.2 PALPITATIONS: ICD-10-CM

## 2018-09-04 DIAGNOSIS — I10 ESSENTIAL HYPERTENSION: ICD-10-CM

## 2018-09-04 DIAGNOSIS — I50.41 ACUTE COMBINED SYSTOLIC AND DIASTOLIC HEART FAILURE (HCC): ICD-10-CM

## 2018-09-04 LAB
ANION GAP SERPL CALCULATED.3IONS-SCNC: 11 MMOL/L (ref 3–16)
BUN BLDV-MCNC: 16 MG/DL (ref 7–20)
CALCIUM SERPL-MCNC: 9.6 MG/DL (ref 8.3–10.6)
CHLORIDE BLD-SCNC: 101 MMOL/L (ref 99–110)
CO2: 27 MMOL/L (ref 21–32)
CREAT SERPL-MCNC: 1.2 MG/DL (ref 0.6–1.2)
GFR AFRICAN AMERICAN: 52
GFR NON-AFRICAN AMERICAN: 43
GLUCOSE BLD-MCNC: 84 MG/DL (ref 70–99)
HCT VFR BLD CALC: 33.2 % (ref 36–48)
HEMOGLOBIN: 11.3 G/DL (ref 12–16)
MAGNESIUM: 1.8 MG/DL (ref 1.8–2.4)
MCH RBC QN AUTO: 31.2 PG (ref 26–34)
MCHC RBC AUTO-ENTMCNC: 34.1 G/DL (ref 31–36)
MCV RBC AUTO: 91.5 FL (ref 80–100)
PDW BLD-RTO: 13.3 % (ref 12.4–15.4)
PLATELET # BLD: 221 K/UL (ref 135–450)
PMV BLD AUTO: 8.7 FL (ref 5–10.5)
POTASSIUM SERPL-SCNC: 3.9 MMOL/L (ref 3.5–5.1)
PRO-BNP: 2510 PG/ML (ref 0–449)
RBC # BLD: 3.63 M/UL (ref 4–5.2)
SODIUM BLD-SCNC: 139 MMOL/L (ref 136–145)
WBC # BLD: 4.9 K/UL (ref 4–11)

## 2018-09-04 PROCEDURE — 6360000002 HC RX W HCPCS: Performed by: OPHTHALMOLOGY

## 2018-09-04 PROCEDURE — 1123F ACP DISCUSS/DSCN MKR DOCD: CPT | Performed by: NURSE PRACTITIONER

## 2018-09-04 PROCEDURE — 3600000003 HC SURGERY LEVEL 3 BASE: Performed by: OPHTHALMOLOGY

## 2018-09-04 PROCEDURE — G8427 DOCREV CUR MEDS BY ELIG CLIN: HCPCS | Performed by: NURSE PRACTITIONER

## 2018-09-04 PROCEDURE — 4040F PNEUMOC VAC/ADMIN/RCVD: CPT | Performed by: NURSE PRACTITIONER

## 2018-09-04 PROCEDURE — 2580000003 HC RX 258: Performed by: ANESTHESIOLOGY

## 2018-09-04 PROCEDURE — G8599 NO ASA/ANTIPLAT THER USE RNG: HCPCS | Performed by: NURSE PRACTITIONER

## 2018-09-04 PROCEDURE — 6370000000 HC RX 637 (ALT 250 FOR IP): Performed by: OPHTHALMOLOGY

## 2018-09-04 PROCEDURE — 2500000003 HC RX 250 WO HCPCS: Performed by: OPHTHALMOLOGY

## 2018-09-04 PROCEDURE — 1101F PT FALLS ASSESS-DOCD LE1/YR: CPT | Performed by: NURSE PRACTITIONER

## 2018-09-04 PROCEDURE — 2500000003 HC RX 250 WO HCPCS: Performed by: ANESTHESIOLOGY

## 2018-09-04 PROCEDURE — 99214 OFFICE O/P EST MOD 30 MIN: CPT | Performed by: NURSE PRACTITIONER

## 2018-09-04 PROCEDURE — 7100000011 HC PHASE II RECOVERY - ADDTL 15 MIN: Performed by: OPHTHALMOLOGY

## 2018-09-04 PROCEDURE — V2632 POST CHMBR INTRAOCULAR LENS: HCPCS | Performed by: OPHTHALMOLOGY

## 2018-09-04 PROCEDURE — G8400 PT W/DXA NO RESULTS DOC: HCPCS | Performed by: NURSE PRACTITIONER

## 2018-09-04 PROCEDURE — G8417 CALC BMI ABV UP PARAM F/U: HCPCS | Performed by: NURSE PRACTITIONER

## 2018-09-04 PROCEDURE — 2709999900 HC NON-CHARGEABLE SUPPLY: Performed by: OPHTHALMOLOGY

## 2018-09-04 PROCEDURE — 1090F PRES/ABSN URINE INCON ASSESS: CPT | Performed by: NURSE PRACTITIONER

## 2018-09-04 PROCEDURE — 3700000000 HC ANESTHESIA ATTENDED CARE: Performed by: OPHTHALMOLOGY

## 2018-09-04 PROCEDURE — 7100000010 HC PHASE II RECOVERY - FIRST 15 MIN: Performed by: OPHTHALMOLOGY

## 2018-09-04 PROCEDURE — 6360000002 HC RX W HCPCS: Performed by: NURSE ANESTHETIST, CERTIFIED REGISTERED

## 2018-09-04 PROCEDURE — 1036F TOBACCO NON-USER: CPT | Performed by: NURSE PRACTITIONER

## 2018-09-04 DEVICE — ACRYSOF(R) IQ ASPHERIC IOL SP ACRYLIC FOLDABLELENS WULTRASERT(TM) DELIVERY SYSTEM UV WBLUE LIGHT FILTER. 13.0MM LENGTH 6.0MM ANTERIORASYMMETRIC BICONVEX OPTIC PLANAR HAPTICS.
Type: IMPLANTABLE DEVICE | Site: EYE | Status: FUNCTIONAL
Brand: ACRYSOF ULTRASERT

## 2018-09-04 RX ORDER — MOXIFLOXACIN 5 MG/ML
1 SOLUTION/ DROPS OPHTHALMIC SEE ADMIN INSTRUCTIONS
Status: DISCONTINUED | OUTPATIENT
Start: 2018-09-04 | End: 2018-09-04 | Stop reason: HOSPADM

## 2018-09-04 RX ORDER — MOXIFLOXACIN 5 MG/ML
SOLUTION/ DROPS OPHTHALMIC PRN
Status: DISCONTINUED | OUTPATIENT
Start: 2018-09-04 | End: 2018-09-04 | Stop reason: HOSPADM

## 2018-09-04 RX ORDER — DIPHENHYDRAMINE HYDROCHLORIDE 50 MG/ML
12.5 INJECTION INTRAMUSCULAR; INTRAVENOUS
Status: DISCONTINUED | OUTPATIENT
Start: 2018-09-04 | End: 2018-09-04 | Stop reason: HOSPADM

## 2018-09-04 RX ORDER — MORPHINE SULFATE 10 MG/ML
1 INJECTION, SOLUTION INTRAMUSCULAR; INTRAVENOUS EVERY 5 MIN PRN
Status: DISCONTINUED | OUTPATIENT
Start: 2018-09-04 | End: 2018-09-04 | Stop reason: HOSPADM

## 2018-09-04 RX ORDER — MORPHINE SULFATE 10 MG/ML
2 INJECTION, SOLUTION INTRAMUSCULAR; INTRAVENOUS EVERY 5 MIN PRN
Status: DISCONTINUED | OUTPATIENT
Start: 2018-09-04 | End: 2018-09-04 | Stop reason: HOSPADM

## 2018-09-04 RX ORDER — ACETAMINOPHEN 650 MG
TABLET, EXTENDED RELEASE ORAL PRN
Status: DISCONTINUED | OUTPATIENT
Start: 2018-09-04 | End: 2018-09-04 | Stop reason: HOSPADM

## 2018-09-04 RX ORDER — SODIUM CHLORIDE, SODIUM LACTATE, POTASSIUM CHLORIDE, CALCIUM CHLORIDE 600; 310; 30; 20 MG/100ML; MG/100ML; MG/100ML; MG/100ML
INJECTION, SOLUTION INTRAVENOUS CONTINUOUS
Status: DISCONTINUED | OUTPATIENT
Start: 2018-09-04 | End: 2018-09-04 | Stop reason: HOSPADM

## 2018-09-04 RX ORDER — TROPICAMIDE 10 MG/ML
1 SOLUTION/ DROPS OPHTHALMIC
Status: COMPLETED | OUTPATIENT
Start: 2018-09-04 | End: 2018-09-04

## 2018-09-04 RX ORDER — MEPERIDINE HYDROCHLORIDE 25 MG/ML
12.5 INJECTION INTRAMUSCULAR; INTRAVENOUS; SUBCUTANEOUS EVERY 5 MIN PRN
Status: DISCONTINUED | OUTPATIENT
Start: 2018-09-04 | End: 2018-09-04 | Stop reason: HOSPADM

## 2018-09-04 RX ORDER — PHENYLEPHRINE HCL 2.5 %
1 DROPS OPHTHALMIC (EYE)
Status: COMPLETED | OUTPATIENT
Start: 2018-09-04 | End: 2018-09-04

## 2018-09-04 RX ORDER — PROMETHAZINE HYDROCHLORIDE 25 MG/ML
6.25 INJECTION, SOLUTION INTRAMUSCULAR; INTRAVENOUS
Status: DISCONTINUED | OUTPATIENT
Start: 2018-09-04 | End: 2018-09-04 | Stop reason: HOSPADM

## 2018-09-04 RX ORDER — FENTANYL CITRATE 50 UG/ML
INJECTION, SOLUTION INTRAMUSCULAR; INTRAVENOUS PRN
Status: DISCONTINUED | OUTPATIENT
Start: 2018-09-04 | End: 2018-09-04 | Stop reason: SDUPTHER

## 2018-09-04 RX ORDER — TETRACAINE HYDROCHLORIDE 5 MG/ML
SOLUTION OPHTHALMIC PRN
Status: DISCONTINUED | OUTPATIENT
Start: 2018-09-04 | End: 2018-09-04 | Stop reason: HOSPADM

## 2018-09-04 RX ORDER — SODIUM CHLORIDE 0.9 % (FLUSH) 0.9 %
10 SYRINGE (ML) INJECTION EVERY 12 HOURS SCHEDULED
Status: DISCONTINUED | OUTPATIENT
Start: 2018-09-04 | End: 2018-09-04 | Stop reason: HOSPADM

## 2018-09-04 RX ORDER — PREDNISOLONE ACETATE 10 MG/ML
1 SUSPENSION/ DROPS OPHTHALMIC SEE ADMIN INSTRUCTIONS
Status: DISCONTINUED | OUTPATIENT
Start: 2018-09-04 | End: 2018-09-04 | Stop reason: HOSPADM

## 2018-09-04 RX ORDER — OXYCODONE HYDROCHLORIDE AND ACETAMINOPHEN 5; 325 MG/1; MG/1
1 TABLET ORAL PRN
Status: DISCONTINUED | OUTPATIENT
Start: 2018-09-04 | End: 2018-09-04 | Stop reason: HOSPADM

## 2018-09-04 RX ORDER — SODIUM CHLORIDE, POTASSIUM CHLORIDE, CALCIUM CHLORIDE, MAGNESIUM CHLORIDE, SODIUM ACETATE, AND SODIUM CITRATE 6.4; .75; .48; .3; 3.9; 1.7 MG/ML; MG/ML; MG/ML; MG/ML; MG/ML; MG/ML
SOLUTION IRRIGATION PRN
Status: DISCONTINUED | OUTPATIENT
Start: 2018-09-04 | End: 2018-09-04 | Stop reason: HOSPADM

## 2018-09-04 RX ORDER — OXYCODONE HYDROCHLORIDE AND ACETAMINOPHEN 5; 325 MG/1; MG/1
2 TABLET ORAL PRN
Status: DISCONTINUED | OUTPATIENT
Start: 2018-09-04 | End: 2018-09-04 | Stop reason: HOSPADM

## 2018-09-04 RX ORDER — CARVEDILOL 12.5 MG/1
18.75 TABLET ORAL 2 TIMES DAILY
Qty: 60 TABLET | Refills: 5 | Status: SHIPPED | OUTPATIENT
Start: 2018-09-04 | End: 2018-11-28 | Stop reason: DRUGHIGH

## 2018-09-04 RX ORDER — HYDROMORPHONE HCL 110MG/55ML
0.5 PATIENT CONTROLLED ANALGESIA SYRINGE INTRAVENOUS EVERY 5 MIN PRN
Status: DISCONTINUED | OUTPATIENT
Start: 2018-09-04 | End: 2018-09-04 | Stop reason: HOSPADM

## 2018-09-04 RX ORDER — LIDOCAINE HYDROCHLORIDE 10 MG/ML
1 INJECTION, SOLUTION EPIDURAL; INFILTRATION; INTRACAUDAL; PERINEURAL
Status: DISCONTINUED | OUTPATIENT
Start: 2018-09-04 | End: 2018-09-04 | Stop reason: HOSPADM

## 2018-09-04 RX ORDER — NITROGLYCERIN 0.3 MG/1
TABLET SUBLINGUAL
Qty: 30 TABLET | Refills: 3 | Status: SHIPPED | OUTPATIENT
Start: 2018-09-04 | End: 2019-04-30 | Stop reason: CLARIF

## 2018-09-04 RX ORDER — MIDAZOLAM HYDROCHLORIDE 1 MG/ML
INJECTION INTRAMUSCULAR; INTRAVENOUS PRN
Status: DISCONTINUED | OUTPATIENT
Start: 2018-09-04 | End: 2018-09-04 | Stop reason: SDUPTHER

## 2018-09-04 RX ORDER — HYDRALAZINE HYDROCHLORIDE 20 MG/ML
5 INJECTION INTRAMUSCULAR; INTRAVENOUS EVERY 10 MIN PRN
Status: DISCONTINUED | OUTPATIENT
Start: 2018-09-04 | End: 2018-09-04 | Stop reason: HOSPADM

## 2018-09-04 RX ORDER — ONDANSETRON 2 MG/ML
4 INJECTION INTRAMUSCULAR; INTRAVENOUS
Status: DISCONTINUED | OUTPATIENT
Start: 2018-09-04 | End: 2018-09-04 | Stop reason: HOSPADM

## 2018-09-04 RX ORDER — LIDOCAINE HYDROCHLORIDE 10 MG/ML
0.3 INJECTION, SOLUTION EPIDURAL; INFILTRATION; INTRACAUDAL; PERINEURAL
Status: COMPLETED | OUTPATIENT
Start: 2018-09-04 | End: 2018-09-04

## 2018-09-04 RX ORDER — HYDROMORPHONE HCL 110MG/55ML
0.25 PATIENT CONTROLLED ANALGESIA SYRINGE INTRAVENOUS EVERY 5 MIN PRN
Status: DISCONTINUED | OUTPATIENT
Start: 2018-09-04 | End: 2018-09-04 | Stop reason: HOSPADM

## 2018-09-04 RX ORDER — CYCLOPENTOLATE HYDROCHLORIDE 10 MG/ML
1 SOLUTION/ DROPS OPHTHALMIC
Status: COMPLETED | OUTPATIENT
Start: 2018-09-04 | End: 2018-09-04

## 2018-09-04 RX ORDER — CYCLOPENTOLATE HYDROCHLORIDE 10 MG/ML
1 SOLUTION/ DROPS OPHTHALMIC PRN
Status: DISCONTINUED | OUTPATIENT
Start: 2018-09-04 | End: 2018-09-04 | Stop reason: HOSPADM

## 2018-09-04 RX ORDER — SODIUM CHLORIDE 0.9 % (FLUSH) 0.9 %
10 SYRINGE (ML) INJECTION PRN
Status: DISCONTINUED | OUTPATIENT
Start: 2018-09-04 | End: 2018-09-04 | Stop reason: HOSPADM

## 2018-09-04 RX ORDER — TROPICAMIDE 10 MG/ML
1 SOLUTION/ DROPS OPHTHALMIC PRN
Status: DISCONTINUED | OUTPATIENT
Start: 2018-09-04 | End: 2018-09-04 | Stop reason: HOSPADM

## 2018-09-04 RX ORDER — PHENYLEPHRINE HCL 2.5 %
1 DROPS OPHTHALMIC (EYE) PRN
Status: DISCONTINUED | OUTPATIENT
Start: 2018-09-04 | End: 2018-09-04 | Stop reason: HOSPADM

## 2018-09-04 RX ADMIN — CYCLOPENTOLATE HYDROCHLORIDE 1 DROP: 10 SOLUTION/ DROPS OPHTHALMIC at 06:43

## 2018-09-04 RX ADMIN — LIDOCAINE HYDROCHLORIDE 0.3 ML: 10 INJECTION, SOLUTION EPIDURAL; INFILTRATION; INTRACAUDAL; PERINEURAL at 06:31

## 2018-09-04 RX ADMIN — CYCLOPENTOLATE HYDROCHLORIDE 1 DROP: 10 SOLUTION/ DROPS OPHTHALMIC at 06:16

## 2018-09-04 RX ADMIN — TROPICAMIDE 1 DROP: 10 SOLUTION/ DROPS OPHTHALMIC at 06:16

## 2018-09-04 RX ADMIN — TROPICAMIDE 1 DROP: 10 SOLUTION/ DROPS OPHTHALMIC at 06:44

## 2018-09-04 RX ADMIN — CYCLOPENTOLATE HYDROCHLORIDE 1 DROP: 10 SOLUTION/ DROPS OPHTHALMIC at 06:26

## 2018-09-04 RX ADMIN — PHENYLEPHRINE HYDROCHLORIDE 1 DROP: 25 SOLUTION/ DROPS OPHTHALMIC at 06:43

## 2018-09-04 RX ADMIN — CYCLOPENTOLATE HYDROCHLORIDE 1 DROP: 10 SOLUTION/ DROPS OPHTHALMIC at 06:32

## 2018-09-04 RX ADMIN — SODIUM CHLORIDE, POTASSIUM CHLORIDE, SODIUM LACTATE AND CALCIUM CHLORIDE: 600; 310; 30; 20 INJECTION, SOLUTION INTRAVENOUS at 07:32

## 2018-09-04 RX ADMIN — PHENYLEPHRINE HYDROCHLORIDE 1 DROP: 25 SOLUTION/ DROPS OPHTHALMIC at 06:32

## 2018-09-04 RX ADMIN — TROPICAMIDE 1 DROP: 10 SOLUTION/ DROPS OPHTHALMIC at 06:32

## 2018-09-04 RX ADMIN — SODIUM CHLORIDE, POTASSIUM CHLORIDE, SODIUM LACTATE AND CALCIUM CHLORIDE: 600; 310; 30; 20 INJECTION, SOLUTION INTRAVENOUS at 06:31

## 2018-09-04 RX ADMIN — PHENYLEPHRINE HYDROCHLORIDE 1 DROP: 25 SOLUTION/ DROPS OPHTHALMIC at 06:26

## 2018-09-04 RX ADMIN — TROPICAMIDE 1 DROP: 10 SOLUTION/ DROPS OPHTHALMIC at 06:27

## 2018-09-04 RX ADMIN — MIDAZOLAM 1 MG: 1 INJECTION INTRAMUSCULAR; INTRAVENOUS at 07:34

## 2018-09-04 RX ADMIN — PHENYLEPHRINE HYDROCHLORIDE 1 DROP: 25 SOLUTION/ DROPS OPHTHALMIC at 06:16

## 2018-09-04 RX ADMIN — FENTANYL CITRATE 25 MCG: 50 INJECTION INTRAMUSCULAR; INTRAVENOUS at 07:34

## 2018-09-04 ASSESSMENT — ENCOUNTER SYMPTOMS
GASTROINTESTINAL NEGATIVE: 1
RESPIRATORY NEGATIVE: 1

## 2018-09-04 ASSESSMENT — PULMONARY FUNCTION TESTS
PIF_VALUE: 0

## 2018-09-04 NOTE — PROGRESS NOTES
[address]      Primary Care Doctor:  Eduard Aragon DO    Chief Complaint:  Darvin Bonner    History of Present Illness:  Adelita Loya is a 80 y.o. female with PMH  CAD, HTN, HLD who presents today from outpt surgery She had  cataract surgery today was told to f/u with cardio because she had a run of Darvin Bonner. She denies loss of consciousness or any sx but does c/o palpitations for the past month. She also c/o continued fatigue, occasional chest discomfort and dizziness but not assoc with palpitations. She denies dyspnea, exertional chest pressure/discomfort, syncope. EKG today without acute changes, SR with frequent PVC's     Cardiac Risk Factors  Family Hx:  Yes  Tobacco:  No  HTN:  Yes, elevated today  Lipids 3/29/18 LDL:46  goal< 100   HDL: 65 T   DM:  No  Metabolic Syndrome: No    Fasting BG>100  No HDL<40/50  No TG>150  yes BP>130/85  Yes Abd Circ>35in/40in  No    EF: 50%    NYHA Class:   II   Class I   Patients with no limitation of activities; they suffer no symptoms from ordinary activities. Class II   Patients with slight, mild limitation of activity; they are comfortable with rest or with mild exertion. Class III   Patients with marked limitation of activity; they are comfortable only at rest.   Class IV   Patients who should be at complete rest, confined to bed or chair; any physical activity brings on discomfort and symptoms occur at rest.      Activity: at baseline  Can you walk 1-2 blocks or do a moderate amount of house/yard work? Yes        Past Medical History:   has a past medical history of Arthritis; Blood circulation, collateral; CAD (coronary artery disease); CHF (congestive heart failure) ([address] GERD (gastroesophageal reflux disease); Hyperlipidemia; Hypertension; Mitral valve prolapse; Myocardial infarct, old; and Thyroid disease. Surgical History:   has a past surgical history that includes Hysterectomy;  Thyroidectomy; Colonoscopy; other surgical history (2018); pr  06/19/2018     06/03/2016     BMP:  Lab Results   Component Value Date     08/20/2018     06/19/2018     07/25/2017    K 4.3 08/20/2018    K 4.6 06/19/2018    K 3.8 07/25/2017    CL 93 08/20/2018    CL 99 06/19/2018    CL 95 07/25/2017    CO2 27 08/20/2018    CO2 26 06/19/2018    CO2 28 07/25/2017    PHOS 4.0 01/19/2016    PHOS 4.4 10/06/2015    PHOS 3.5 01/16/2014    BUN 17 08/20/2018    BUN 19 06/19/2018    BUN 29 07/25/2017    CREATININE 1.3 08/20/2018    CREATININE 1.1 06/19/2018    CREATININE 1.5 07/25/2017     BNP: No results found for: PROBNP     Cardiac Imaging: Echo 3/20/14  Normal left ventricle size and wall thickness. Normal left ventricular   systolic function with an estimated ejection fraction of 50%. No regional   wall motion abnormalities are seen. There is reversal of E/A inflow   velocities across the mitral valve suggesting impaired left ventricular   relaxation. Frequent isolated PVC's noted. The left atrium appears dilated. Moderate mitral regurgitation. Trace aortic insufficiency. Mild pulmonic and mild-moderate tricuspid regurgitation. RVSP 30 mmHg+JVP. Increase in mitral regurgitation from prior echo on 09/11/12. Pt Education: The patient has received education on the following topics: dietary guidelines, heart medications, the importance of physical activity, symptom management and reduction of cardiac risk factors. 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 Medications: increase carvedilol to 1 and 1/2 tabs twice a day  2. Labs: today  3. Referrals: echo, ST  4.  Lifestyle Recommendations: cut out processed foods and eat a plant based diet with lean sources of protein, exercise at least 3 times a week for at least 20 minutes. Add a fish oil supplement if you are not taking one. Monitor blood pressure  5. Follow up: with Dr. Maribel Guidry          I appreciate the opportunity of cooperating in the care of this individual.    Bernadette Pinto CNP, 9/4/2018, 10:06 AM    QUALITY MEASURES  1. Tobacco Cessation Counseling: NA  2. Retake of BP if >140/90:   Yes  3. Documentation to PCP/referring for new patient:  Sent to PCP at close of office visit  4. CAD patient on anti-platelet: Yes  5. CAD patient on STATIN therapy:  Yes  6. Patient with CHF and aFib on anticoagulation:  NA   7. Patient Education:  Yes   8. BB for LVSD :  NA   9. ACE/ARB for LVSD:  NA   10.  Left Ventricular Ejection Fraction (LVEF) Assessment:  Yes

## 2018-09-04 NOTE — PATIENT INSTRUCTIONS
Plan:   1. Medications: increase carvedilol to 1 and 1/2 tabs twice a day  2. Labs: today  3. Referrals: none  4. Lifestyle Recommendations: cut out processed foods and eat a plant based diet with lean sources of protein, exercise at least 3 times a week for at least 20 minutes. Add a fish oil supplement if you are not taking one. Monitor blood pressure  5.  Follow up: with Dr. Leonora Zepeda

## 2018-09-04 NOTE — ANESTHESIA PRE PROCEDURE
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    Other and unspecified angina pectoris I20.9    Coronary atherosclerosis of native coronary artery I25.10    Other chronic pulmonary heart diseases I27.89    Mitral valve disorder I05.9    Primary cardiomyopathy ([facility] [address] I42.8    Acute combined systolic and diastolic heart failure (HCC) I50.41    Chronic low back pain M54.5, G89.29    Essential hypertension I10    AGUILA (acute kidney injury) ([facility] [address] N17.9    Renal insufficiency N28.9    Gout M10.9    Pain in joint, hand M25.549    Mixed hyperlipidemia E78.2    Primary insomnia F51.01    Closed stable burst fracture of first lumbar vertebra ([facility] [address] S32.011A    GERD (gastroesophageal reflux disease) K21.9       Past Medical History:        Diagnosis Date    Arthritis     Blood circulation, collateral     CAD (coronary artery disease)     CHF (congestive heart failure) (HCC)     GERD (gastroesophageal reflux disease)     Hyperlipidemia     Hypertension     Mitral valve prolapse     Myocardial infarct, old     Thyroid disease        Past Surgical History:        Procedure Laterality Date    COLONOSCOPY      HYSTERECTOMY      OTHER SURGICAL HISTORY  08/27/2018    phacoemulsification of cataract with intraocular lens implant right eye    NV OFFICE/OUTPT VISIT,PROCEDURE ONLY Right 8/27/2018    PHACO EMULSIFICATION OF CATARACT WITH INTRAOCULAR LENS IMPLANT RIGHT EYE performed by Son Frank MD at [address] [place] [address]          Social History:    Social History   Substance Use Topics    Smoking status: Never Smoker    Smokeless tobacco: Never Used    Alcohol use No                                Counseling given: Not Answered      Vital Signs (Current):   Vitals:    08/29/18 1423 09/04/18 0613   BP:  (!) 163/97   Pulse:  65   Resp:  14   Temp:  97.4 °F (36.3 °C)   TempSrc:  Temporal   SpO2:  93%   Weight: 142 lb (64.4 kg) Cardiovascular:    (+) hypertension: severe, past MI: no interval change and > 6 months, CAD:, CHF:,     (-)  angina                Neuro/Psych:   Negative Neuro/Psych ROS              GI/Hepatic/Renal:   (+) GERD: well controlled,           Endo/Other:    (+) : arthritis: OA., .                 Abdominal:           Vascular: negative vascular ROS. Pre-Operative Diagnosis: CATARACT LEFT EYE    80 y.o.   BMI:  Body mass index is 25.15 kg/m². Vitals:    08/29/18 1423 09/04/18 0613   BP:  (!) 163/97   Pulse:  65   Resp:  14   Temp:  97.4 °F (36.3 °C)   TempSrc:  Temporal   SpO2:  93%   Weight: 142 lb (64.4 kg)    Height: 5' 3\" (1.6 m)        Allergies   Allergen Reactions    Benazepril Hcl Shortness Of Breath and Swelling    Feldene [Piroxicam] Shortness Of Breath    Hytrin [Terazosin Hcl] Shortness Of Breath    Iv Contrast [Iodides] Anaphylaxis    Celebrex [Celecoxib]      GI upset    Cephalexin      Nausea    Iodinated Casein     Monopril [Fosinopril Sodium] Other (See Comments)     Pt states makes her weak    Protonix [Pantoprazole Sodium] Other (See Comments)     Kidney failure      Quinapril Hcl     Toprol Xl [Metoprolol Succinate] Other (See Comments)     C/o leg weakness with this med    Ultracet [Tramadol-Acetaminophen]      Rash    Ziac [Bisoprolol-Hydrochlorothiazide] Other (See Comments)     Pt does not remember reaction to med ?  Weak        Social History   Substance Use Topics    Smoking status: Never Smoker    Smokeless tobacco: Never Used    Alcohol use No       LABS:    CBC  Lab Results   Component Value Date/Time    WBC 5.7 08/20/2018 03:02 PM    HGB 11.5 (L) 08/20/2018 03:02 PM    HCT 34.3 (L) 08/20/2018 03:02 PM     08/20/2018 03:02 PM     RENAL  Lab Results   Component Value Date/Time     (L) 08/20/2018 03:02 PM    K 4.3 08/20/2018 03:02 PM    CL 93 (L) 08/20/2018 03:02 PM    CO2 27 08/20/2018 03:02 PM    BUN 17 08/20/2018 03:02 PM CREATININE 1.3 (H) 08/20/2018 03:02 PM    GLUCOSE 87 08/20/2018 03:02 PM     COAGS  Lab Results   Component Value Date/Time    PROTIME 12.8 01/12/2014 10:50 PM    INR 1.15 01/12/2014 10:50 PM    APTT 28.4 01/12/2014 10:50 PM        Anesthesia Plan      MAC     ASA 3     (Risks, benefits and alternatives of MAC anesthesia discussed with pt. Questions answered. Willing to proceed.)  Induction: intravenous. Anesthetic plan and risks discussed with patient.                       Ranjeet Hendrickson MD   9/4/2018

## 2018-09-05 ENCOUNTER — TELEPHONE (OUTPATIENT)
Dept: CARDIOLOGY CLINIC | Age: 83
End: 2018-09-05

## 2018-09-05 RX ORDER — SPIRONOLACTONE 25 MG/1
12.5 TABLET ORAL DAILY
Qty: 30 TABLET | Refills: 3 | Status: SHIPPED | OUTPATIENT
Start: 2018-09-05 | End: 2019-04-30 | Stop reason: SDUPTHER

## 2018-10-01 ENCOUNTER — TELEPHONE (OUTPATIENT)
Dept: CARDIOLOGY CLINIC | Age: 83
End: 2018-10-01

## 2018-10-04 ENCOUNTER — HOSPITAL ENCOUNTER (OUTPATIENT)
Dept: CARDIOLOGY | Age: 83
Discharge: HOME | End: 2018-10-04
Payer: MEDICARE

## 2018-10-04 DIAGNOSIS — I25.10 ATHEROSCLEROSIS OF NATIVE CORONARY ARTERY OF NATIVE HEART WITHOUT ANGINA PECTORIS: ICD-10-CM

## 2018-10-04 LAB
LV EF: 38 %
LV EF: 47 %
LVEF MODALITY: NORMAL
LVEF MODALITY: NORMAL

## 2018-10-04 PROCEDURE — 78452 HT MUSCLE IMAGE SPECT MULT: CPT

## 2018-10-04 PROCEDURE — A9502 TC99M TETROFOSMIN: HCPCS | Performed by: NURSE PRACTITIONER

## 2018-10-04 PROCEDURE — 3430000000 HC RX DIAGNOSTIC RADIOPHARMACEUTICAL: Performed by: NURSE PRACTITIONER

## 2018-10-04 PROCEDURE — 6360000002 HC RX W HCPCS: Performed by: NURSE PRACTITIONER

## 2018-10-04 PROCEDURE — 93017 CV STRESS TEST TRACING ONLY: CPT

## 2018-10-04 PROCEDURE — 93306 TTE W/DOPPLER COMPLETE: CPT

## 2018-10-04 RX ADMIN — TETROFOSMIN 10.6 MILLICURIE: 0.23 INJECTION, POWDER, LYOPHILIZED, FOR SOLUTION INTRAVENOUS at 10:25

## 2018-10-04 RX ADMIN — REGADENOSON 0.4 MG: 0.08 INJECTION, SOLUTION INTRAVENOUS at 11:40

## 2018-10-04 RX ADMIN — TETROFOSMIN 33.4 MILLICURIE: 0.23 INJECTION, POWDER, LYOPHILIZED, FOR SOLUTION INTRAVENOUS at 11:45

## 2018-10-23 ENCOUNTER — OFFICE VISIT (OUTPATIENT)
Dept: CARDIOLOGY CLINIC | Age: 83
End: 2018-10-23
Payer: MEDICARE

## 2018-10-23 VITALS
HEART RATE: 60 BPM | WEIGHT: 146 LBS | DIASTOLIC BLOOD PRESSURE: 80 MMHG | BODY MASS INDEX: 25.86 KG/M2 | SYSTOLIC BLOOD PRESSURE: 138 MMHG

## 2018-10-23 DIAGNOSIS — I47.20 VT (VENTRICULAR TACHYCARDIA): ICD-10-CM

## 2018-10-23 DIAGNOSIS — R94.39 ABNORMAL MYOCARDIAL PERFUSION STUDY: Primary | ICD-10-CM

## 2018-10-23 DIAGNOSIS — I20.9 OTHER AND UNSPECIFIED ANGINA PECTORIS: ICD-10-CM

## 2018-10-23 DIAGNOSIS — I34.0 NON-RHEUMATIC MITRAL REGURGITATION: ICD-10-CM

## 2018-10-23 DIAGNOSIS — I10 ESSENTIAL HYPERTENSION: ICD-10-CM

## 2018-10-23 DIAGNOSIS — I50.22 CHRONIC SYSTOLIC CONGESTIVE HEART FAILURE (HCC): ICD-10-CM

## 2018-10-23 DIAGNOSIS — I42.0 DILATED CARDIOMYOPATHY (HCC): ICD-10-CM

## 2018-10-23 DIAGNOSIS — I25.10 CAD IN NATIVE ARTERY: ICD-10-CM

## 2018-10-23 PROCEDURE — G8484 FLU IMMUNIZE NO ADMIN: HCPCS | Performed by: INTERNAL MEDICINE

## 2018-10-23 PROCEDURE — 1036F TOBACCO NON-USER: CPT | Performed by: INTERNAL MEDICINE

## 2018-10-23 PROCEDURE — G8400 PT W/DXA NO RESULTS DOC: HCPCS | Performed by: INTERNAL MEDICINE

## 2018-10-23 PROCEDURE — G8599 NO ASA/ANTIPLAT THER USE RNG: HCPCS | Performed by: INTERNAL MEDICINE

## 2018-10-23 PROCEDURE — G8427 DOCREV CUR MEDS BY ELIG CLIN: HCPCS | Performed by: INTERNAL MEDICINE

## 2018-10-23 PROCEDURE — 1090F PRES/ABSN URINE INCON ASSESS: CPT | Performed by: INTERNAL MEDICINE

## 2018-10-23 PROCEDURE — 1123F ACP DISCUSS/DSCN MKR DOCD: CPT | Performed by: INTERNAL MEDICINE

## 2018-10-23 PROCEDURE — G8417 CALC BMI ABV UP PARAM F/U: HCPCS | Performed by: INTERNAL MEDICINE

## 2018-10-23 PROCEDURE — 1101F PT FALLS ASSESS-DOCD LE1/YR: CPT | Performed by: INTERNAL MEDICINE

## 2018-10-23 PROCEDURE — 99214 OFFICE O/P EST MOD 30 MIN: CPT | Performed by: INTERNAL MEDICINE

## 2018-10-23 PROCEDURE — 4040F PNEUMOC VAC/ADMIN/RCVD: CPT | Performed by: INTERNAL MEDICINE

## 2018-10-23 NOTE — PROGRESS NOTES
does not remember reaction to med ? Weak         Social History     Social History    Marital status:      Spouse name: Marianne Vega Number of children: N/A    Years of education: 15     Occupational History    retired      Social History Main Topics    Smoking status: Never Smoker    Smokeless tobacco: Never Used    Alcohol use No    Drug use: No    Sexual activity: No      Comment:  living with spouse. Other Topics Concern    Not on file     Social History Narrative    No narrative on file        Patient has a family history includes Cancer in her father and sister; Diabetes in her brother; Heart Disease in her father and mother; Stroke in her brother. Patient  has a past medical history of Arthritis; Blood circulation, collateral; CAD (coronary artery disease); CHF (congestive heart failure) ([facility] [address] GERD (gastroesophageal reflux disease); Hyperlipidemia; Hypertension; Mitral valve prolapse; Myocardial infarct, old; and Thyroid disease. Current Outpatient Prescriptions   Medication Sig Dispense Refill    spironolactone (ALDACTONE) 25 MG tablet Take 0.5 tablets by mouth daily 30 tablet 3    carvedilol (COREG) 12.5 MG tablet Take 1.5 tablets by mouth 2 times daily 60 tablet 5    nitroGLYCERIN (NITROSTAT) 0.3 MG SL tablet up to max of 3 total doses. If no relief after 1 dose, call 911. 30 tablet 3    atorvastatin (LIPITOR) 80 MG tablet TAKE 1 TABLET BY MOUTH ONE TIME A DAY  90 tablet 3    losartan-hydrochlorothiazide (HYZAAR) 50-12.5 MG per tablet TAKE 1 TABLET BY MOUTH ONE TIME A DAY 90 tablet 3    famotidine (PEPCID) 20 MG tablet Take 1 tablet by mouth 2 times daily (Patient taking differently: Take 20 mg by mouth 2 times daily as needed ) 60 tablet 5    HYDROcodone-acetaminophen (NORCO) 7.5-325 MG per tablet Take 1 tablet by mouth every 6 hours as needed. Kurtis November Coenzyme Q10 (CO Q 10) 100 MG CAPS Take  by mouth daily.       Cholecalciferol (VITAMIN D) 2000 UNITS CAPS capsule

## 2018-10-24 ENCOUNTER — TELEPHONE (OUTPATIENT)
Dept: CARDIOLOGY CLINIC | Age: 83
End: 2018-10-24

## 2018-11-05 ENCOUNTER — TELEPHONE (OUTPATIENT)
Dept: CARDIOLOGY CLINIC | Age: 83
End: 2018-11-05

## 2018-11-05 RX ORDER — PREDNISONE 20 MG/1
20 TABLET ORAL
COMMUNITY
End: 2018-11-05 | Stop reason: SDUPTHER

## 2018-11-05 RX ORDER — PREDNISONE 20 MG/1
20 TABLET ORAL 2 TIMES DAILY
Qty: 2 TABLET | Refills: 0 | Status: ON HOLD | OUTPATIENT
Start: 2018-11-05 | End: 2018-11-07 | Stop reason: HOSPADM

## 2018-11-06 ENCOUNTER — HOSPITAL ENCOUNTER (INPATIENT)
Dept: CARDIAC CATH/INVASIVE PROCEDURES | Age: 83
LOS: 1 days | Discharge: HOME | DRG: 249 | End: 2018-11-07
Attending: INTERNAL MEDICINE | Admitting: INTERNAL MEDICINE
Payer: MEDICARE

## 2018-11-06 DIAGNOSIS — R94.39 ABNORMAL MYOCARDIAL PERFUSION STUDY: ICD-10-CM

## 2018-11-06 DIAGNOSIS — I25.5 ISCHEMIC CARDIOMYOPATHY: ICD-10-CM

## 2018-11-06 PROBLEM — I42.9 CARDIOMYOPATHY (HCC): Status: ACTIVE | Noted: 2018-11-06

## 2018-11-06 LAB
A/G RATIO: 1.4 (ref 1.1–2.2)
ALBUMIN SERPL-MCNC: 3.6 G/DL (ref 3.4–5)
ALBUMIN SERPL-MCNC: 4.2 G/DL (ref 3.4–5)
ALP BLD-CCNC: 57 U/L (ref 40–129)
ALT SERPL-CCNC: 10 U/L (ref 10–40)
ANION GAP SERPL CALCULATED.3IONS-SCNC: 14 MMOL/L (ref 3–16)
ANION GAP SERPL CALCULATED.3IONS-SCNC: 14 MMOL/L (ref 3–16)
AST SERPL-CCNC: 21 U/L (ref 15–37)
BILIRUB SERPL-MCNC: 0.3 MG/DL (ref 0–1)
BUN BLDV-MCNC: 20 MG/DL (ref 7–20)
BUN BLDV-MCNC: 22 MG/DL (ref 7–20)
CALCIUM SERPL-MCNC: 9.5 MG/DL (ref 8.3–10.6)
CALCIUM SERPL-MCNC: 9.9 MG/DL (ref 8.3–10.6)
CHLORIDE BLD-SCNC: 99 MMOL/L (ref 99–110)
CHLORIDE BLD-SCNC: 99 MMOL/L (ref 99–110)
CO2: 22 MMOL/L (ref 21–32)
CO2: 25 MMOL/L (ref 21–32)
CREAT SERPL-MCNC: 1.2 MG/DL (ref 0.6–1.2)
CREAT SERPL-MCNC: 1.3 MG/DL (ref 0.6–1.2)
EKG ATRIAL RATE: 63 BPM
EKG DIAGNOSIS: NORMAL
EKG P AXIS: 42 DEGREES
EKG P-R INTERVAL: 180 MS
EKG Q-T INTERVAL: 476 MS
EKG QRS DURATION: 120 MS
EKG QTC CALCULATION (BAZETT): 487 MS
EKG R AXIS: -5 DEGREES
EKG T AXIS: 69 DEGREES
EKG VENTRICULAR RATE: 63 BPM
GFR AFRICAN AMERICAN: 47
GFR AFRICAN AMERICAN: 52
GFR NON-AFRICAN AMERICAN: 39
GFR NON-AFRICAN AMERICAN: 43
GLOBULIN: 3 G/DL
GLUCOSE BLD-MCNC: 123 MG/DL (ref 70–99)
GLUCOSE BLD-MCNC: 158 MG/DL (ref 70–99)
HCT VFR BLD CALC: 35.7 % (ref 36–48)
HEMOGLOBIN: 11.7 G/DL (ref 12–16)
INR BLD: 0.98 (ref 0.86–1.14)
LEFT VENTRICULAR EJECTION FRACTION MODE: NORMAL
LV EF: 40 %
MAGNESIUM: 1.8 MG/DL (ref 1.8–2.4)
MCH RBC QN AUTO: 29.9 PG (ref 26–34)
MCHC RBC AUTO-ENTMCNC: 32.9 G/DL (ref 31–36)
MCV RBC AUTO: 91 FL (ref 80–100)
PDW BLD-RTO: 13.4 % (ref 12.4–15.4)
PHOSPHORUS: 4.2 MG/DL (ref 2.5–4.9)
PLATELET # BLD: 228 K/UL (ref 135–450)
PMV BLD AUTO: 8.4 FL (ref 5–10.5)
POC ACT LR: 188 SEC (ref 113–149)
POC ACT LR: 269 SEC (ref 113–149)
POTASSIUM SERPL-SCNC: 3.8 MMOL/L (ref 3.5–5.1)
POTASSIUM SERPL-SCNC: 4.5 MMOL/L (ref 3.5–5.1)
PROTHROMBIN TIME: 11.2 SEC (ref 9.8–13)
RBC # BLD: 3.92 M/UL (ref 4–5.2)
SODIUM BLD-SCNC: 135 MMOL/L (ref 136–145)
SODIUM BLD-SCNC: 138 MMOL/L (ref 136–145)
TOTAL PROTEIN: 7.2 G/DL (ref 6.4–8.2)
WBC # BLD: 3.6 K/UL (ref 4–11)

## 2018-11-06 PROCEDURE — 6360000002 HC RX W HCPCS: Performed by: INTERNAL MEDICINE

## 2018-11-06 PROCEDURE — C1876 STENT, NON-COA/NON-COV W/DEL: HCPCS

## 2018-11-06 PROCEDURE — 99024 POSTOP FOLLOW-UP VISIT: CPT | Performed by: INTERNAL MEDICINE

## 2018-11-06 PROCEDURE — 1200000000 HC SEMI PRIVATE

## 2018-11-06 PROCEDURE — 85610 PROTHROMBIN TIME: CPT

## 2018-11-06 PROCEDURE — 85027 COMPLETE CBC AUTOMATED: CPT

## 2018-11-06 PROCEDURE — 6360000004 HC RX CONTRAST MEDICATION: Performed by: INTERNAL MEDICINE

## 2018-11-06 PROCEDURE — 6370000000 HC RX 637 (ALT 250 FOR IP): Performed by: INTERNAL MEDICINE

## 2018-11-06 PROCEDURE — C1887 CATHETER, GUIDING: HCPCS

## 2018-11-06 PROCEDURE — 93005 ELECTROCARDIOGRAM TRACING: CPT | Performed by: INTERNAL MEDICINE

## 2018-11-06 PROCEDURE — C1894 INTRO/SHEATH, NON-LASER: HCPCS

## 2018-11-06 PROCEDURE — 83735 ASSAY OF MAGNESIUM: CPT

## 2018-11-06 PROCEDURE — 02703DZ DILATION OF CORONARY ARTERY, ONE ARTERY WITH INTRALUMINAL DEVICE, PERCUTANEOUS APPROACH: ICD-10-PCS | Performed by: INTERNAL MEDICINE

## 2018-11-06 PROCEDURE — B2111ZZ FLUOROSCOPY OF MULTIPLE CORONARY ARTERIES USING LOW OSMOLAR CONTRAST: ICD-10-PCS | Performed by: INTERNAL MEDICINE

## 2018-11-06 PROCEDURE — 92928 PRQ TCAT PLMT NTRAC ST 1 LES: CPT

## 2018-11-06 PROCEDURE — 93010 ELECTROCARDIOGRAM REPORT: CPT | Performed by: INTERNAL MEDICINE

## 2018-11-06 PROCEDURE — B2151ZZ FLUOROSCOPY OF LEFT HEART USING LOW OSMOLAR CONTRAST: ICD-10-PCS | Performed by: INTERNAL MEDICINE

## 2018-11-06 PROCEDURE — 85347 COAGULATION TIME ACTIVATED: CPT

## 2018-11-06 PROCEDURE — 36415 COLL VENOUS BLD VENIPUNCTURE: CPT

## 2018-11-06 PROCEDURE — 92928 PRQ TCAT PLMT NTRAC ST 1 LES: CPT | Performed by: INTERNAL MEDICINE

## 2018-11-06 PROCEDURE — 2709999900 HC NON-CHARGEABLE SUPPLY

## 2018-11-06 PROCEDURE — 99153 MOD SED SAME PHYS/QHP EA: CPT

## 2018-11-06 PROCEDURE — 6370000000 HC RX 637 (ALT 250 FOR IP)

## 2018-11-06 PROCEDURE — 93458 L HRT ARTERY/VENTRICLE ANGIO: CPT | Performed by: INTERNAL MEDICINE

## 2018-11-06 PROCEDURE — 80053 COMPREHEN METABOLIC PANEL: CPT

## 2018-11-06 PROCEDURE — 2580000003 HC RX 258: Performed by: INTERNAL MEDICINE

## 2018-11-06 PROCEDURE — 6360000002 HC RX W HCPCS

## 2018-11-06 PROCEDURE — C1769 GUIDE WIRE: HCPCS

## 2018-11-06 PROCEDURE — 93458 L HRT ARTERY/VENTRICLE ANGIO: CPT

## 2018-11-06 PROCEDURE — 4A023N7 MEASUREMENT OF CARDIAC SAMPLING AND PRESSURE, LEFT HEART, PERCUTANEOUS APPROACH: ICD-10-PCS | Performed by: INTERNAL MEDICINE

## 2018-11-06 PROCEDURE — 99152 MOD SED SAME PHYS/QHP 5/>YRS: CPT

## 2018-11-06 RX ORDER — ASPIRIN 81 MG/1
81 TABLET, CHEWABLE ORAL DAILY
Status: DISCONTINUED | OUTPATIENT
Start: 2018-11-07 | End: 2018-11-07 | Stop reason: HOSPADM

## 2018-11-06 RX ORDER — HYDROCODONE BITARTRATE AND ACETAMINOPHEN 5; 325 MG/1; MG/1
2 TABLET ORAL EVERY 6 HOURS PRN
Status: DISCONTINUED | OUTPATIENT
Start: 2018-11-06 | End: 2018-11-07 | Stop reason: HOSPADM

## 2018-11-06 RX ORDER — ZOLPIDEM TARTRATE 5 MG/1
5 TABLET ORAL NIGHTLY PRN
Status: DISCONTINUED | OUTPATIENT
Start: 2018-11-06 | End: 2018-11-07 | Stop reason: HOSPADM

## 2018-11-06 RX ORDER — NITROGLYCERIN 0.4 MG/1
0.4 TABLET SUBLINGUAL EVERY 5 MIN PRN
Status: DISCONTINUED | OUTPATIENT
Start: 2018-11-06 | End: 2018-11-07 | Stop reason: HOSPADM

## 2018-11-06 RX ORDER — ATORVASTATIN CALCIUM 80 MG/1
80 TABLET, FILM COATED ORAL DAILY
Status: DISCONTINUED | OUTPATIENT
Start: 2018-11-06 | End: 2018-11-07 | Stop reason: HOSPADM

## 2018-11-06 RX ORDER — LOSARTAN POTASSIUM AND HYDROCHLOROTHIAZIDE 12.5; 5 MG/1; MG/1
1 TABLET ORAL DAILY
Status: DISCONTINUED | OUTPATIENT
Start: 2018-11-06 | End: 2018-11-07 | Stop reason: HOSPADM

## 2018-11-06 RX ORDER — SODIUM CHLORIDE 9 MG/ML
INJECTION, SOLUTION INTRAVENOUS CONTINUOUS
Status: ACTIVE | OUTPATIENT
Start: 2018-11-06 | End: 2018-11-06

## 2018-11-06 RX ORDER — SPIRONOLACTONE 25 MG/1
12.5 TABLET ORAL DAILY
Status: DISCONTINUED | OUTPATIENT
Start: 2018-11-06 | End: 2018-11-07 | Stop reason: HOSPADM

## 2018-11-06 RX ORDER — SODIUM CHLORIDE 9 MG/ML
INJECTION, SOLUTION INTRAVENOUS CONTINUOUS
Status: DISCONTINUED | OUTPATIENT
Start: 2018-11-06 | End: 2018-11-06 | Stop reason: DRUGHIGH

## 2018-11-06 RX ORDER — ASPIRIN 325 MG
325 TABLET ORAL ONCE
Status: DISCONTINUED | OUTPATIENT
Start: 2018-11-06 | End: 2018-11-07 | Stop reason: HOSPADM

## 2018-11-06 RX ORDER — HYDROCODONE BITARTRATE AND ACETAMINOPHEN 7.5; 325 MG/1; MG/1
1 TABLET ORAL EVERY 6 HOURS PRN
Status: DISCONTINUED | OUTPATIENT
Start: 2018-11-06 | End: 2018-11-06

## 2018-11-06 RX ORDER — SODIUM CHLORIDE 0.9 % (FLUSH) 0.9 %
10 SYRINGE (ML) INJECTION PRN
Status: DISCONTINUED | OUTPATIENT
Start: 2018-11-06 | End: 2018-11-07 | Stop reason: HOSPADM

## 2018-11-06 RX ORDER — SODIUM CHLORIDE 0.9 % (FLUSH) 0.9 %
10 SYRINGE (ML) INJECTION EVERY 12 HOURS SCHEDULED
Status: DISCONTINUED | OUTPATIENT
Start: 2018-11-06 | End: 2018-11-07 | Stop reason: HOSPADM

## 2018-11-06 RX ORDER — LUBIPROSTONE 8 UG/1
8 CAPSULE, GELATIN COATED ORAL 2 TIMES DAILY WITH MEALS
Status: DISCONTINUED | OUTPATIENT
Start: 2018-11-06 | End: 2018-11-07 | Stop reason: HOSPADM

## 2018-11-06 RX ORDER — CLOPIDOGREL BISULFATE 75 MG/1
75 TABLET ORAL DAILY
Status: DISCONTINUED | OUTPATIENT
Start: 2018-11-07 | End: 2018-11-07 | Stop reason: HOSPADM

## 2018-11-06 RX ORDER — EPTIFIBATIDE 0.75 MG/ML
2 INJECTION, SOLUTION INTRAVENOUS CONTINUOUS
Status: DISCONTINUED | OUTPATIENT
Start: 2018-11-06 | End: 2018-11-06

## 2018-11-06 RX ORDER — PREDNISONE 20 MG/1
20 TABLET ORAL 2 TIMES DAILY
Status: DISCONTINUED | OUTPATIENT
Start: 2018-11-06 | End: 2018-11-06

## 2018-11-06 RX ORDER — EPTIFIBATIDE 0.75 MG/ML
2 INJECTION, SOLUTION INTRAVENOUS CONTINUOUS
Status: DISPENSED | OUTPATIENT
Start: 2018-11-06 | End: 2018-11-06

## 2018-11-06 RX ORDER — ACETAMINOPHEN 325 MG/1
650 TABLET ORAL EVERY 4 HOURS PRN
Status: DISCONTINUED | OUTPATIENT
Start: 2018-11-06 | End: 2018-11-07 | Stop reason: HOSPADM

## 2018-11-06 RX ADMIN — HYDROCODONE BITARTRATE AND ACETAMINOPHEN 2 TABLET: 5; 325 TABLET ORAL at 23:11

## 2018-11-06 RX ADMIN — HYDROCODONE BITARTRATE AND ACETAMINOPHEN 1 TABLET: 7.5; 325 TABLET ORAL at 11:44

## 2018-11-06 RX ADMIN — CARVEDILOL 18.75 MG: 12.5 TABLET, FILM COATED ORAL at 21:52

## 2018-11-06 RX ADMIN — HYDROCODONE BITARTRATE AND ACETAMINOPHEN 2 TABLET: 5; 325 TABLET ORAL at 17:11

## 2018-11-06 RX ADMIN — LOSARTAN POTASSIUM AND HYDROCHLOROTHIAZIDE 1 TABLET: 12.5; 5 TABLET ORAL at 12:05

## 2018-11-06 RX ADMIN — Medication 10 ML: at 21:53

## 2018-11-06 RX ADMIN — IOPAMIDOL 115 ML: 755 INJECTION, SOLUTION INTRAVENOUS at 09:19

## 2018-11-06 RX ADMIN — SPIRONOLACTONE 12.5 MG: 25 TABLET ORAL at 17:04

## 2018-11-06 RX ADMIN — EPTIFIBATIDE 2 MCG/KG/MIN: 0.75 INJECTION, SOLUTION INTRAVENOUS at 17:04

## 2018-11-06 RX ADMIN — ATORVASTATIN CALCIUM 80 MG: 80 TABLET, FILM COATED ORAL at 21:52

## 2018-11-06 ASSESSMENT — PAIN DESCRIPTION - DESCRIPTORS
DESCRIPTORS: CONSTANT

## 2018-11-06 ASSESSMENT — PAIN SCALES - GENERAL
PAINLEVEL_OUTOF10: 7
PAINLEVEL_OUTOF10: 7
PAINLEVEL_OUTOF10: 9
PAINLEVEL_OUTOF10: 8
PAINLEVEL_OUTOF10: 7

## 2018-11-06 ASSESSMENT — PAIN DESCRIPTION - FREQUENCY
FREQUENCY: CONTINUOUS

## 2018-11-06 ASSESSMENT — PAIN DESCRIPTION - PAIN TYPE
TYPE: CHRONIC PAIN

## 2018-11-06 ASSESSMENT — PAIN DESCRIPTION - PROGRESSION
CLINICAL_PROGRESSION: NOT CHANGED
CLINICAL_PROGRESSION: NOT CHANGED

## 2018-11-06 ASSESSMENT — PAIN DESCRIPTION - ONSET
ONSET: ON-GOING

## 2018-11-06 ASSESSMENT — PAIN DESCRIPTION - LOCATION
LOCATION: BACK

## 2018-11-06 ASSESSMENT — PAIN DESCRIPTION - ORIENTATION
ORIENTATION: MID;LEFT

## 2018-11-06 NOTE — CONSULTS
HYDROcodone-acetaminophen (NORCO) 7.5-325 MG per tablet Take 1 tablet by mouth every 6 hours as needed. .        Coenzyme Q10 (CO Q 10) 100 MG CAPS Take  by mouth daily.        Cholecalciferol (VITAMIN D) 2000 UNITS CAPS capsule Take 2,000 Units by mouth daily         aspirin 81 MG chewable tablet Take 1 tablet by mouth daily. 30 tablet      AMITIZA 8 MCG CAPS capsule Take 1 capsule by mouth 2 times daily (with meals) 60 capsule 5      No current facility-administered medications for this visit.             Vitals  Weight: 146 lb (66.2 kg)      Vitals:     10/23/18 1508   BP: 138/80   Pulse: 60                     Review of Systems   Constitutional: Negative for activity change, appetite change and fatigue. Respiratory: Negative for cough, choking, chest tightness and shortness of breath. Cardiovascular: Negative for chest pain, palpitations and leg swelling. Denies PND or orthopnea. No tachycardia or syncope. Neurological: Negative for dizziness, syncope and light-headedness. Psychiatric/Behavioral: Negative for behavioral problems, confusion and agitation. Other systems reviewed negative as done.      Objective:   Physical Exam   Constitutional: She is oriented to person, place, and time. She appears well-developed and well-nourished. No distress. HENT:   Head: Normocephalic and atraumatic. Eyes: Conjunctivae and EOM are normal. Right eye exhibits no discharge. Left eye exhibits no discharge. Neck: Normal range of motion. No JVD present. Cardiovascular: Normal rate, regular rhythm, S1 normal, S2 normal and normal heart sounds. Exam reveals no gallop. No murmur heard. Pulses:       Radial pulses are 2+ on the right side, and 2+ on the left side. Pulmonary/Chest: Effort normal and breath sounds normal. No respiratory distress. She has no wheezes. She has no rales. Abdominal: Soft. Bowel sounds are normal. There is no tenderness. Musculoskeletal: Normal range of motion.  She exhibits no edema. Neurological: She is alert and oriented to person, place, and time. Skin: Skin is warm and dry. Psychiatric: She has a normal mood and affect. Her behavior is normal. Thought content normal.         Assessment:     Diagnosis Orders   1. Abnormal myocardial perfusion study      2. Chronic systolic congestive heart failure (HCC)      3. Dilated cardiomyopathy (HCC)      4. CAD in native artery      5. Essential hypertension      6. Non-rheumatic mitral regurgitation      7. VT (ventricular tachycardia) ([facility])                             Plan:   CV stable. No angina. Remains compensated. Some palps. myoview abn. Echo showing LV dysfunction and WMA. Reviewed previous records and testing including myoview  and echo 10/18. Have recommended cath. Risk and bene explained. Had contrast reaction. Will premedicate prednisone 40 mg night prior.

## 2018-11-06 NOTE — CONSULTS
Brief Pre-Op Note/Sedation Assessment      Mitch Chopra  1935  Room/bed info not found  [id number]  8:47 AM    Planned Procedure: Cardiac Catheterization Procedure    Post Procedure Plan: Return to same level of care    Consent: I have discussed with the patient and/or the patient representative the indication, alternatives, and the possible risks and/or complications of the planned procedure and the anesthesia methods. The patient and/or patient representative appear to understand and agree to proceed. Chief Complaint: Dyspnea      Indications for the Procedure:   CAD Presentation:  Cardiomyopathy  Anginal Classification within 2 weeks:  No symptoms  NYHA Heart Failure Class within 2 weeks: No symptoms      Anti- Anginal Meds within 2 weeks:   ANTI-ANGINAL MEDS: Yes: Beta Blockers and Aspirin      Stress or Imaging Studies Performed:  Stress Test with SPECT Result: Positive:  anteroseptal Risk/Extent of Ischemia:  Intermediate    Vital Signs:  Temp 98 °F (36.7 °C) (Oral)   Ht 5' 1\" (1.549 m)   Wt 142 lb (64.4 kg)   BMI 26.83 kg/m²     Allergies: Allergies   Allergen Reactions    Benazepril Hcl Shortness Of Breath and Swelling    Feldene [Piroxicam] Shortness Of Breath    Hytrin [Terazosin Hcl] Shortness Of Breath    Iv Contrast [Iodides] Anaphylaxis    Celebrex [Celecoxib]      GI upset    Cephalexin      Nausea    Iodinated Casein     Monopril [Fosinopril Sodium] Other (See Comments)     Pt states makes her weak    Protonix [Pantoprazole Sodium] Other (See Comments)     Kidney failure      Quinapril Hcl     Toprol Xl [Metoprolol Succinate] Other (See Comments)     C/o leg weakness with this med    Ultracet [Tramadol-Acetaminophen]      Rash    Ziac [Bisoprolol-Hydrochlorothiazide] Other (See Comments)     Pt does not remember reaction to med ?  Weak        Past Medical History:  Past Medical History:   Diagnosis Date    Arthritis     Blood circulation, collateral     CAD (coronary

## 2018-11-06 NOTE — PROCEDURES
cineangiography of both left and right  coronaries as well as left heart catheterization and left  ventriculography was performed using 5-Rwandan JL4 and JR4 and pigtail. It was noted that she had a critical lesion in her LAD. Because of her  VT, her exertional chest pain as well as cardiomyopathy, it was felt she  would benefit from intervention. A 6-[place]an sheath was exchanged over a  guidewire for the existing 5-Rwandan sheath. A 50 units/kg of heparin  was given intravenously. Integrilin was bolused, and then drip was  initiated. ACT was monitored throughout the procedure and adjusted  appropriately. A 0.014 BMW guidewire was placed on the LAD under  fluoroscopic guidance without difficulty. A 3.0 x 12 mm Vision stent  (bare metal stent) was used to primarily stent the vessel. After  careful positioning, it was deployed at 14 atmospheres for 30 seconds. This did achieve an excellent angiographic result. The patient remained  stable throughout the procedure. She had no evidence of respiratory  distress. No hives, no rash, no itching. She had no chest discomfort,  and she was hemodynamically stable throughout the procedure. No  complications encountered. RESULTS:  HEMODYNAMICS:  Left ventricular end-diastolic pressure equals 20. There  is no significant gradient across the aortic valve by pullback post  cineangiography. LEFT VENTRICULOGRAM:  Left ventriculogram demonstrates global  hypokinesis. Of note,  there was no area of akinesis. The anterior  wall did contract, but again was hypokinetic. Estimated ejection  fraction 40%. LEFT MAIN:  Left main is a short vessel that gave almost immediate rise  to LAD and circumflex. It is free of significant obstructive disease. LEFT ANTERIOR DESCENDING:  The LAD courses to and wraps around the apex. It is a large vessel. It gives off multiple small diagonal branches. In the proximal portion of vessel, there is a ruptured plaque.   There

## 2018-11-07 VITALS
SYSTOLIC BLOOD PRESSURE: 149 MMHG | WEIGHT: 142 LBS | TEMPERATURE: 98.1 F | RESPIRATION RATE: 16 BRPM | DIASTOLIC BLOOD PRESSURE: 68 MMHG | BODY MASS INDEX: 26.81 KG/M2 | OXYGEN SATURATION: 98 % | HEART RATE: 63 BPM | HEIGHT: 61 IN

## 2018-11-07 LAB
ANION GAP SERPL CALCULATED.3IONS-SCNC: 12 MMOL/L (ref 3–16)
BUN BLDV-MCNC: 29 MG/DL (ref 7–20)
CALCIUM SERPL-MCNC: 9.3 MG/DL (ref 8.3–10.6)
CHLORIDE BLD-SCNC: 101 MMOL/L (ref 99–110)
CO2: 24 MMOL/L (ref 21–32)
CREAT SERPL-MCNC: 1.3 MG/DL (ref 0.6–1.2)
EKG ATRIAL RATE: 72 BPM
EKG DIAGNOSIS: NORMAL
EKG P AXIS: 44 DEGREES
EKG P-R INTERVAL: 172 MS
EKG Q-T INTERVAL: 436 MS
EKG QRS DURATION: 126 MS
EKG QTC CALCULATION (BAZETT): 477 MS
EKG R AXIS: 14 DEGREES
EKG T AXIS: 83 DEGREES
EKG VENTRICULAR RATE: 72 BPM
GFR AFRICAN AMERICAN: 47
GFR NON-AFRICAN AMERICAN: 39
GLUCOSE BLD-MCNC: 144 MG/DL (ref 70–99)
POTASSIUM REFLEX MAGNESIUM: 3.7 MMOL/L (ref 3.5–5.1)
SODIUM BLD-SCNC: 137 MMOL/L (ref 136–145)

## 2018-11-07 PROCEDURE — 6370000000 HC RX 637 (ALT 250 FOR IP): Performed by: INTERNAL MEDICINE

## 2018-11-07 PROCEDURE — 2580000003 HC RX 258: Performed by: INTERNAL MEDICINE

## 2018-11-07 PROCEDURE — 6360000002 HC RX W HCPCS: Performed by: INTERNAL MEDICINE

## 2018-11-07 PROCEDURE — G0008 ADMIN INFLUENZA VIRUS VAC: HCPCS | Performed by: INTERNAL MEDICINE

## 2018-11-07 PROCEDURE — G0238 OTH RESP PROC, INDIV: HCPCS

## 2018-11-07 PROCEDURE — 80048 BASIC METABOLIC PNL TOTAL CA: CPT

## 2018-11-07 PROCEDURE — 93005 ELECTROCARDIOGRAM TRACING: CPT | Performed by: INTERNAL MEDICINE

## 2018-11-07 PROCEDURE — 99238 HOSP IP/OBS DSCHRG MGMT 30/<: CPT | Performed by: INTERNAL MEDICINE

## 2018-11-07 PROCEDURE — 36415 COLL VENOUS BLD VENIPUNCTURE: CPT

## 2018-11-07 PROCEDURE — 90686 IIV4 VACC NO PRSV 0.5 ML IM: CPT | Performed by: INTERNAL MEDICINE

## 2018-11-07 RX ORDER — CLOPIDOGREL BISULFATE 75 MG/1
75 TABLET ORAL DAILY
Qty: 30 TABLET | Refills: 3 | Status: SHIPPED | OUTPATIENT
Start: 2018-11-08 | End: 2019-02-25 | Stop reason: SDUPTHER

## 2018-11-07 RX ADMIN — Medication 10 ML: at 08:05

## 2018-11-07 RX ADMIN — CLOPIDOGREL BISULFATE 75 MG: 75 TABLET ORAL at 08:05

## 2018-11-07 RX ADMIN — ASPIRIN 81 MG 81 MG: 81 TABLET ORAL at 08:04

## 2018-11-07 RX ADMIN — CHOLECALCIFEROL TAB 25 MCG (1000 UNIT) 2000 UNITS: 25 TAB at 08:05

## 2018-11-07 RX ADMIN — LOSARTAN POTASSIUM AND HYDROCHLOROTHIAZIDE 1 TABLET: 12.5; 5 TABLET ORAL at 08:04

## 2018-11-07 RX ADMIN — INFLUENZA A VIRUS A/MICHIGAN/45/2015 X-275 (H1N1) ANTIGEN (FORMALDEHYDE INACTIVATED), INFLUENZA A VIRUS A/SINGAPORE/INFIMH-16-0019/2016 IVR-186 (H3N2) ANTIGEN (FORMALDEHYDE INACTIVATED), INFLUENZA B VIRUS B/PHUKET/3073/2013 ANTIGEN (FORMALDEHYDE INACTIVATED), AND INFLUENZA B VIRUS B/MARYLAND/15/2016 BX-69A ANTIGEN (FORMALDEHYDE INACTIVATED) 0.5 ML: 15; 15; 15; 15 INJECTION, SUSPENSION INTRAMUSCULAR at 12:18

## 2018-11-07 RX ADMIN — ZOLPIDEM TARTRATE 5 MG: 5 TABLET ORAL at 01:05

## 2018-11-07 RX ADMIN — SPIRONOLACTONE 12.5 MG: 25 TABLET ORAL at 08:05

## 2018-11-07 RX ADMIN — CARVEDILOL 18.75 MG: 12.5 TABLET, FILM COATED ORAL at 08:04

## 2018-11-07 RX ADMIN — HYDROCODONE BITARTRATE AND ACETAMINOPHEN 2 TABLET: 5; 325 TABLET ORAL at 08:05

## 2018-11-07 ASSESSMENT — PAIN DESCRIPTION - PROGRESSION
CLINICAL_PROGRESSION: NOT CHANGED
CLINICAL_PROGRESSION: GRADUALLY WORSENING
CLINICAL_PROGRESSION: NOT CHANGED
CLINICAL_PROGRESSION: NOT CHANGED

## 2018-11-07 ASSESSMENT — PAIN DESCRIPTION - ONSET: ONSET: ON-GOING

## 2018-11-07 ASSESSMENT — PAIN DESCRIPTION - PAIN TYPE: TYPE: CHRONIC PAIN

## 2018-11-07 ASSESSMENT — PAIN SCALES - GENERAL
PAINLEVEL_OUTOF10: 7
PAINLEVEL_OUTOF10: 7
PAINLEVEL_OUTOF10: 0

## 2018-11-07 ASSESSMENT — PAIN DESCRIPTION - DESCRIPTORS: DESCRIPTORS: ACHING

## 2018-11-07 ASSESSMENT — PAIN DESCRIPTION - LOCATION: LOCATION: BACK

## 2018-11-07 ASSESSMENT — PAIN DESCRIPTION - FREQUENCY: FREQUENCY: CONTINUOUS

## 2018-11-07 ASSESSMENT — PAIN DESCRIPTION - ORIENTATION: ORIENTATION: MID;LEFT

## 2018-11-07 NOTE — PROGRESS NOTES
Patient off bedrest now after 12 hours, walked to the bathroom well. Groin site looks good, no redness, swelling, pain. Patient ambulated back to bed and groin site intact.

## 2018-11-07 NOTE — PROGRESS NOTES
[address] Discharge Note      Patient ID: Carolyn Montano 80 y.o. 1935    Admission Date: 11/6/2018     Discharge Date:      Reason for Admission:  Principal Diagnosis: abn myoview,cardiomyopathy,VT  Secondary Diagnosis: HTN,Lipids,Old MI,CHF    Procedures:   Cath: LV with EF 40%. 80% LAD,  Successful primary stent LAD. See op report. Brief Summary of Patient's Course:  Admitted for OP cath. Premedicated night prior and before procedure of dye allergy. Cath showing EF 40% and 80% LAD. Successful primary stent LAD. Had brief, asx NS VT that night but only PVC since. Doing well day of discharge. Groin stable. Discharge Instructions: Activity Limitations:  No heavy lifting. Diet:  cardiac    Follow Up Instructions:   To office in: in 2-3 weeks  Instructed Re: F/U PTCA/Arrhythmia    Discharge Medications: ASA/Plavix,coreg,hyzaar,                                   Attending Physician:   Yenifer Calvo MD, 11/7/2018, 11:20 AM

## 2018-11-07 NOTE — PROGRESS NOTES
Patient discharge home from PCU accompanied by family members. AVS reviewed using teach-back, IV removed, tele box disconnected, and all personal belongings returned. No complications.  Electronically signed by Dallas Cole RN on 11/7/2018 at 12:30 PM

## 2018-11-08 ENCOUNTER — TELEPHONE (OUTPATIENT)
Dept: CARDIOLOGY CLINIC | Age: 83
End: 2018-11-08

## 2018-11-08 LAB — POC ACT LR: 170 SEC (ref 113–149)

## 2018-11-28 ENCOUNTER — OFFICE VISIT (OUTPATIENT)
Dept: CARDIOLOGY CLINIC | Age: 83
End: 2018-11-28
Payer: MEDICARE

## 2018-11-28 VITALS
HEART RATE: 64 BPM | OXYGEN SATURATION: 95 % | DIASTOLIC BLOOD PRESSURE: 72 MMHG | SYSTOLIC BLOOD PRESSURE: 120 MMHG | WEIGHT: 146 LBS | BODY MASS INDEX: 27.56 KG/M2 | HEIGHT: 61 IN | RESPIRATION RATE: 16 BRPM

## 2018-11-28 DIAGNOSIS — I34.0 NON-RHEUMATIC MITRAL REGURGITATION: ICD-10-CM

## 2018-11-28 DIAGNOSIS — I10 ESSENTIAL HYPERTENSION: ICD-10-CM

## 2018-11-28 DIAGNOSIS — I50.22 CHRONIC SYSTOLIC CONGESTIVE HEART FAILURE (HCC): Primary | ICD-10-CM

## 2018-11-28 DIAGNOSIS — Z98.61 HISTORY OF PTCA: ICD-10-CM

## 2018-11-28 DIAGNOSIS — I47.20 VT (VENTRICULAR TACHYCARDIA): ICD-10-CM

## 2018-11-28 DIAGNOSIS — I42.0 DILATED CARDIOMYOPATHY (HCC): ICD-10-CM

## 2018-11-28 DIAGNOSIS — I25.10 CAD IN NATIVE ARTERY: ICD-10-CM

## 2018-11-28 PROCEDURE — 1036F TOBACCO NON-USER: CPT | Performed by: INTERNAL MEDICINE

## 2018-11-28 PROCEDURE — 1101F PT FALLS ASSESS-DOCD LE1/YR: CPT | Performed by: INTERNAL MEDICINE

## 2018-11-28 PROCEDURE — 1111F DSCHRG MED/CURRENT MED MERGE: CPT | Performed by: INTERNAL MEDICINE

## 2018-11-28 PROCEDURE — G8598 ASA/ANTIPLAT THER USED: HCPCS | Performed by: INTERNAL MEDICINE

## 2018-11-28 PROCEDURE — 99214 OFFICE O/P EST MOD 30 MIN: CPT | Performed by: INTERNAL MEDICINE

## 2018-11-28 PROCEDURE — 1090F PRES/ABSN URINE INCON ASSESS: CPT | Performed by: INTERNAL MEDICINE

## 2018-11-28 PROCEDURE — 1123F ACP DISCUSS/DSCN MKR DOCD: CPT | Performed by: INTERNAL MEDICINE

## 2018-11-28 PROCEDURE — G8482 FLU IMMUNIZE ORDER/ADMIN: HCPCS | Performed by: INTERNAL MEDICINE

## 2018-11-28 PROCEDURE — G8427 DOCREV CUR MEDS BY ELIG CLIN: HCPCS | Performed by: INTERNAL MEDICINE

## 2018-11-28 PROCEDURE — G8417 CALC BMI ABV UP PARAM F/U: HCPCS | Performed by: INTERNAL MEDICINE

## 2018-11-28 PROCEDURE — 4040F PNEUMOC VAC/ADMIN/RCVD: CPT | Performed by: INTERNAL MEDICINE

## 2018-11-28 PROCEDURE — G8400 PT W/DXA NO RESULTS DOC: HCPCS | Performed by: INTERNAL MEDICINE

## 2018-11-28 RX ORDER — NITROGLYCERIN 0.4 MG/1
TABLET SUBLINGUAL
Refills: 3 | COMMUNITY
Start: 2018-09-06 | End: 2019-04-30 | Stop reason: SDUPTHER

## 2018-11-28 NOTE — PROGRESS NOTES
distress. She has no wheezes. She has no rales. Abdominal: Soft. Bowel sounds are normal. There is no tenderness. Musculoskeletal: Normal range of motion. She exhibits no edema. Neurological: She is alert and oriented to person, place, and time. Skin: Skin is warm and dry. Psychiatric: She has a normal mood and affect. Her behavior is normal. Thought content normal.       Assessment:       Diagnosis Orders   1. Chronic systolic congestive heart failure ([address]     2. Dilated cardiomyopathy ([address]     3. CAD in native artery     4. History of PTCA     5. VT (ventricular tachycardia) ([address]     6. Essential hypertension     7. Non-rheumatic mitral regurgitation               Plan:      CV stable. No angina. Remains compensated. Some palps. Continue coreg but change to 25 mg bid (she is on 18.75)  Will have holter to quantitate Ventricular ectopy. Consider EP eval if still significant though sx much better. Reviewed previous records and testing including myoview, echo 10/18 and cath 11/18. Follow up 6-8 wks.

## 2018-11-29 RX ORDER — CARVEDILOL 25 MG/1
25 TABLET ORAL 2 TIMES DAILY
Qty: 180 TABLET | Refills: 3 | Status: SHIPPED | OUTPATIENT
Start: 2018-11-29 | End: 2020-01-02

## 2018-12-22 DIAGNOSIS — I10 ESSENTIAL HYPERTENSION: ICD-10-CM

## 2019-01-03 RX ORDER — LOSARTAN POTASSIUM AND HYDROCHLOROTHIAZIDE 12.5; 5 MG/1; MG/1
TABLET ORAL
Qty: 90 TABLET | Refills: 3 | Status: SHIPPED | OUTPATIENT
Start: 2019-01-03 | End: 2020-01-14

## 2019-01-14 ENCOUNTER — OFFICE VISIT (OUTPATIENT)
Dept: CARDIOLOGY CLINIC | Age: 84
End: 2019-01-14
Payer: MEDICARE

## 2019-01-14 VITALS
SYSTOLIC BLOOD PRESSURE: 134 MMHG | DIASTOLIC BLOOD PRESSURE: 80 MMHG | WEIGHT: 143 LBS | BODY MASS INDEX: 27.02 KG/M2 | HEART RATE: 72 BPM

## 2019-01-14 DIAGNOSIS — I50.22 CHRONIC SYSTOLIC CONGESTIVE HEART FAILURE (HCC): Primary | ICD-10-CM

## 2019-01-14 DIAGNOSIS — Z98.61 HISTORY OF PTCA: ICD-10-CM

## 2019-01-14 DIAGNOSIS — I42.0 DILATED CARDIOMYOPATHY (HCC): ICD-10-CM

## 2019-01-14 DIAGNOSIS — I25.10 CAD IN NATIVE ARTERY: ICD-10-CM

## 2019-01-14 DIAGNOSIS — I47.20 VT (VENTRICULAR TACHYCARDIA): ICD-10-CM

## 2019-01-14 DIAGNOSIS — I10 ESSENTIAL HYPERTENSION: ICD-10-CM

## 2019-01-14 DIAGNOSIS — I34.0 NON-RHEUMATIC MITRAL REGURGITATION: ICD-10-CM

## 2019-01-14 DIAGNOSIS — I25.5 ISCHEMIC CARDIOMYOPATHY: ICD-10-CM

## 2019-01-14 PROCEDURE — G8417 CALC BMI ABV UP PARAM F/U: HCPCS | Performed by: INTERNAL MEDICINE

## 2019-01-14 PROCEDURE — 1123F ACP DISCUSS/DSCN MKR DOCD: CPT | Performed by: INTERNAL MEDICINE

## 2019-01-14 PROCEDURE — 1090F PRES/ABSN URINE INCON ASSESS: CPT | Performed by: INTERNAL MEDICINE

## 2019-01-14 PROCEDURE — G8400 PT W/DXA NO RESULTS DOC: HCPCS | Performed by: INTERNAL MEDICINE

## 2019-01-14 PROCEDURE — 1036F TOBACCO NON-USER: CPT | Performed by: INTERNAL MEDICINE

## 2019-01-14 PROCEDURE — 99214 OFFICE O/P EST MOD 30 MIN: CPT | Performed by: INTERNAL MEDICINE

## 2019-01-14 PROCEDURE — 4040F PNEUMOC VAC/ADMIN/RCVD: CPT | Performed by: INTERNAL MEDICINE

## 2019-01-14 PROCEDURE — G8427 DOCREV CUR MEDS BY ELIG CLIN: HCPCS | Performed by: INTERNAL MEDICINE

## 2019-01-14 PROCEDURE — 1101F PT FALLS ASSESS-DOCD LE1/YR: CPT | Performed by: INTERNAL MEDICINE

## 2019-01-14 PROCEDURE — G8482 FLU IMMUNIZE ORDER/ADMIN: HCPCS | Performed by: INTERNAL MEDICINE

## 2019-01-14 PROCEDURE — G8598 ASA/ANTIPLAT THER USED: HCPCS | Performed by: INTERNAL MEDICINE

## 2019-01-22 PROCEDURE — 93228 REMOTE 30 DAY ECG REV/REPORT: CPT | Performed by: INTERNAL MEDICINE

## 2019-01-23 DIAGNOSIS — R00.2 PALPITATIONS: ICD-10-CM

## 2019-02-04 ENCOUNTER — OFFICE VISIT (OUTPATIENT)
Dept: FAMILY MEDICINE CLINIC | Age: 84
End: 2019-02-04
Payer: MEDICARE

## 2019-02-04 VITALS
OXYGEN SATURATION: 97 % | TEMPERATURE: 97.8 F | DIASTOLIC BLOOD PRESSURE: 74 MMHG | WEIGHT: 145.6 LBS | BODY MASS INDEX: 25.8 KG/M2 | HEART RATE: 67 BPM | SYSTOLIC BLOOD PRESSURE: 130 MMHG | HEIGHT: 63 IN

## 2019-02-04 DIAGNOSIS — Z78.0 ASYMPTOMATIC MENOPAUSAL STATE: ICD-10-CM

## 2019-02-04 DIAGNOSIS — Z00.00 ROUTINE GENERAL MEDICAL EXAMINATION AT A HEALTH CARE FACILITY: ICD-10-CM

## 2019-02-04 PROCEDURE — 4040F PNEUMOC VAC/ADMIN/RCVD: CPT | Performed by: INTERNAL MEDICINE

## 2019-02-04 PROCEDURE — G8482 FLU IMMUNIZE ORDER/ADMIN: HCPCS | Performed by: INTERNAL MEDICINE

## 2019-02-04 PROCEDURE — G8598 ASA/ANTIPLAT THER USED: HCPCS | Performed by: INTERNAL MEDICINE

## 2019-02-04 PROCEDURE — G0439 PPPS, SUBSEQ VISIT: HCPCS | Performed by: INTERNAL MEDICINE

## 2019-02-04 ASSESSMENT — PATIENT HEALTH QUESTIONNAIRE - PHQ9
SUM OF ALL RESPONSES TO PHQ QUESTIONS 1-9: 0
SUM OF ALL RESPONSES TO PHQ QUESTIONS 1-9: 0

## 2019-02-04 ASSESSMENT — LIFESTYLE VARIABLES: HOW OFTEN DO YOU HAVE A DRINK CONTAINING ALCOHOL: 0

## 2019-02-04 ASSESSMENT — ANXIETY QUESTIONNAIRES: GAD7 TOTAL SCORE: 0

## 2019-02-28 RX ORDER — CLOPIDOGREL BISULFATE 75 MG/1
TABLET ORAL
Qty: 90 TABLET | Refills: 1 | Status: SHIPPED | OUTPATIENT
Start: 2019-02-28 | End: 2019-06-07 | Stop reason: SDUPTHER

## 2019-03-08 ENCOUNTER — TELEPHONE (OUTPATIENT)
Dept: CARDIOLOGY CLINIC | Age: 84
End: 2019-03-08

## 2019-03-08 RX ORDER — ATORVASTATIN CALCIUM 80 MG/1
TABLET, FILM COATED ORAL
Qty: 58 TABLET | Refills: 2 | Status: SHIPPED | OUTPATIENT
Start: 2019-03-08 | End: 2019-12-10 | Stop reason: SDUPTHER

## 2019-04-30 ENCOUNTER — OFFICE VISIT (OUTPATIENT)
Dept: CARDIOLOGY CLINIC | Age: 84
End: 2019-04-30
Payer: MEDICARE

## 2019-04-30 VITALS
BODY MASS INDEX: 26.22 KG/M2 | WEIGHT: 148 LBS | SYSTOLIC BLOOD PRESSURE: 140 MMHG | HEART RATE: 60 BPM | DIASTOLIC BLOOD PRESSURE: 80 MMHG

## 2019-04-30 DIAGNOSIS — I42.0 DILATED CARDIOMYOPATHY (HCC): ICD-10-CM

## 2019-04-30 DIAGNOSIS — I25.10 CAD IN NATIVE ARTERY: ICD-10-CM

## 2019-04-30 DIAGNOSIS — I47.20 VT (VENTRICULAR TACHYCARDIA): ICD-10-CM

## 2019-04-30 DIAGNOSIS — I10 ESSENTIAL HYPERTENSION: ICD-10-CM

## 2019-04-30 DIAGNOSIS — Z98.61 HISTORY OF PTCA: ICD-10-CM

## 2019-04-30 DIAGNOSIS — I34.0 NON-RHEUMATIC MITRAL REGURGITATION: ICD-10-CM

## 2019-04-30 DIAGNOSIS — I50.22 CHRONIC SYSTOLIC CONGESTIVE HEART FAILURE (HCC): Primary | ICD-10-CM

## 2019-04-30 PROCEDURE — G8417 CALC BMI ABV UP PARAM F/U: HCPCS | Performed by: INTERNAL MEDICINE

## 2019-04-30 PROCEDURE — 1036F TOBACCO NON-USER: CPT | Performed by: INTERNAL MEDICINE

## 2019-04-30 PROCEDURE — 1090F PRES/ABSN URINE INCON ASSESS: CPT | Performed by: INTERNAL MEDICINE

## 2019-04-30 PROCEDURE — 4040F PNEUMOC VAC/ADMIN/RCVD: CPT | Performed by: INTERNAL MEDICINE

## 2019-04-30 PROCEDURE — 1123F ACP DISCUSS/DSCN MKR DOCD: CPT | Performed by: INTERNAL MEDICINE

## 2019-04-30 PROCEDURE — G8400 PT W/DXA NO RESULTS DOC: HCPCS | Performed by: INTERNAL MEDICINE

## 2019-04-30 PROCEDURE — G8427 DOCREV CUR MEDS BY ELIG CLIN: HCPCS | Performed by: INTERNAL MEDICINE

## 2019-04-30 PROCEDURE — G8598 ASA/ANTIPLAT THER USED: HCPCS | Performed by: INTERNAL MEDICINE

## 2019-04-30 PROCEDURE — 99214 OFFICE O/P EST MOD 30 MIN: CPT | Performed by: INTERNAL MEDICINE

## 2019-04-30 RX ORDER — SPIRONOLACTONE 25 MG/1
12.5 TABLET ORAL DAILY
Qty: 30 TABLET | Refills: 3 | Status: SHIPPED | OUTPATIENT
Start: 2019-04-30 | End: 2020-01-02

## 2019-04-30 RX ORDER — NITROGLYCERIN 0.4 MG/1
0.4 TABLET SUBLINGUAL EVERY 5 MIN PRN
Qty: 25 TABLET | Refills: 3 | Status: SHIPPED | OUTPATIENT
Start: 2019-04-30 | End: 2021-02-08 | Stop reason: SDUPTHER

## 2019-04-30 NOTE — PROGRESS NOTES
Subjective:      Patient ID: Hemant Lopez is a 80 y.o. female. HPI:  Ms. Lawrence Pollock is here today for a 6 month follow up CHF/cardiomyopathy/CAD/HTN/MR/VT. Notes palpitation remains reasonable. No tachycardia. No syncope. Now some light headedness. No chest pain/sob. No pnd or orthopnea. No edema.         Past Medical History:   Diagnosis Date    Arthritis     Blood circulation, collateral     CAD (coronary artery disease)     CHF (congestive heart failure) (HCC)     GERD (gastroesophageal reflux disease)     History of heart artery stent 12/2018    Hyperlipidemia     Hypertension     Mitral valve prolapse     Myocardial infarct, old     Thyroid disease      Past Surgical History:   Procedure Laterality Date    COLONOSCOPY      EYE SURGERY Left 09/04/2018    [facility]O EMULSIFICATION OF CATARACT WITH INTRAOCULAR LENS IMPLANT LEFT EYE     HYSTERECTOMY      OTHER SURGICAL HISTORY  08/27/2018    phacoemulsification of cataract with intraocular lens implant right eye    [facility] OFFICE/OUTPT VISIT,PROCEDURE ONLY Right 8/27/2018    [facility]O EMULSIFICATION OF CATARACT WITH INTRAOCULAR LENS IMPLANT RIGHT EYE performed by Nikolay Greenwood MD at [address] [facility] OFFICE/OUTPT [address] [place] [address] Left 9/4/2018    [facility]ACO EMULSIFICATION OF CATARACT WITH INTRAOCULAR LENS IMPLANT LEFT EYE performed by Nikolay Greenwood MD at [facility]   Allergen Reactions    Benazepril Hcl Shortness Of Breath and Swelling    Feldene [Piroxicam] Shortness Of Breath    Hytrin [Terazosin Hcl] Shortness Of Breath    Iv Contrast [Iodides] Anaphylaxis    Celebrex [Celecoxib]      GI upset    Cephalexin      Nausea    Iodinated Casein     Monopril [Fosinopril Sodium] Other (See Comments)     Pt states makes her weak    Protonix [Pantoprazole Sodium] Other (See Comments)     Kidney failure      Quinapril Hcl     Toprol Xl [Metoprolol Succinate] Other (See Comments)     C/o leg weakness with this med   [facility] Ultracet [Tramadol-Acetaminophen]      Rash    Ziac [Bisoprolol-Hydrochlorothiazide] Other (See Comments)     Pt does not remember reaction to med ? Weak         Social History     Socioeconomic History    Marital status:      Spouse name: William Vera Number of children: Not on file    Years of education: 15    Highest education level: Not on file   Occupational History    Occupation: retired   Social Needs    Financial resource strain: Not on file    Food insecurity:     Worry: Not on file     Inability: Not on file   [employer] needs:     Medical: Not on file     Non-medical: Not on file   Tobacco Use    Smoking status: Never Smoker    Smokeless tobacco: Never Used   Substance and Sexual Activity    Alcohol use: No     Alcohol/week: 0.0 oz    Drug use: No    Sexual activity: Never     Comment:  living with spouse. Lifestyle    Physical activity:     Days per week: Not on file     Minutes per session: Not on file    Stress: Not on file   Relationships    Social connections:     Talks on phone: Not on file     Gets together: Not on file     Attends [employer] service: Not on file     Active member of club or organization: Not on file     Attends meetings of clubs or organizations: Not on file     Relationship status: Not on file    Intimate partner violence:     Fear of current or ex partner: Not on file     Emotionally abused: Not on file     Physically abused: Not on file     Forced sexual activity: Not on file   Other Topics Concern    Not on file   Social History Narrative    Not on file        Patient has a family history includes Cancer in her father and sister; Diabetes in her brother; Heart Disease in her father and mother; Stroke in her brother.     Patient  has a past medical history of Arthritis, Blood circulation, collateral, CAD (coronary artery disease), CHF (congestive heart failure) ([facility] [address] GERD (gastroesophageal reflux disease), History of heart artery stent, Hyperlipidemia, Hypertension, Mitral valve prolapse, Myocardial infarct, old, and Thyroid disease. Current Outpatient Medications   Medication Sig Dispense Refill    atorvastatin (LIPITOR) 80 MG tablet TAKE 1 TABLET BY MOUTH ONE TIME A DAY  58 tablet 2    clopidogrel (PLAVIX) 75 MG tablet TAKE 1 TABLET BY MOUTH ONE TIME A DAY  90 tablet 1    losartan-hydrochlorothiazide (HYZAAR) 50-12.5 MG per tablet TAKE 1 TABLET BY MOUTH ONE TIME A DAY  90 tablet 3    carvedilol (COREG) 25 MG tablet Take 1 tablet by mouth 2 times daily 180 tablet 3    nitroGLYCERIN (NITROSTAT) 0.4 MG SL tablet PLACE ONE TABLET UNDER TONGUE as needed FOR CHEST PAIN, MAY REPEAT EVERY 5 MINUTES, max of 3 DOSES. if no relief after 1 dose CALL 911  3    vitamin D (CHOLECALCIFEROL) 1000 UNIT TABS tablet Take 2 tablets by mouth daily 60 tablet 3    spironolactone (ALDACTONE) 25 MG tablet Take 0.5 tablets by mouth daily 30 tablet 3    AMITIZA 8 MCG CAPS capsule Take 1 capsule by mouth 2 times daily (with meals) 60 capsule 5    famotidine (PEPCID) 20 MG tablet Take 1 tablet by mouth 2 times daily (Patient taking differently: Take 20 mg by mouth 2 times daily as needed ) 60 tablet 5    HYDROcodone-acetaminophen (NORCO)  MG per tablet Take 1 tablet by mouth every 6 hours as needed. Nikolas Horvath Coenzyme Q10 (CO Q 10) 100 MG CAPS Take  by mouth daily.  aspirin 81 MG chewable tablet Take 1 tablet by mouth daily. 30 tablet      No current facility-administered medications for this visit. Vitals:    04/30/19 1359   BP: (!) 140/80   Pulse: 60     Wt 148    Review of Systems   Constitutional: Negative for activity change, appetite change and fatigue. Respiratory: Negative for cough, choking, chest tightness and shortness of breath. Cardiovascular: Negative for chest pain, palpitations and leg swelling. Denies PND or orthopnea. No tachycardia or syncope. Neurological: Negative for dizziness, syncope and light-headedness. Psychiatric/Behavioral: Negative for behavioral problems, confusion and agitation. Other systems reviewed negative as done. Objective:   Physical Exam   Constitutional: She is oriented to person, place, and time. She appears well-developed and well-nourished. No distress. HENT:   Head: Normocephalic and atraumatic. Eyes: Conjunctivae and EOM are normal. Right eye exhibits no discharge. Left eye exhibits no discharge. Neck: Normal range of motion. No JVD present. Cardiovascular: Normal rate, regular rhythm, S1 normal, S2 normal and normal heart sounds. Exam reveals no gallop. No murmur heard. Pulses:       Radial pulses are 2+ on the right side, and 2+ on the left side. Pulmonary/Chest: Effort normal and breath sounds normal. No respiratory distress. She has no wheezes. She has no rales. Abdominal: Soft. Bowel sounds are normal. There is no tenderness. Musculoskeletal: Normal range of motion. She exhibits no edema. Neurological: She is alert and oriented to person, place, and time. Skin: Skin is warm and dry. Psychiatric: She has a normal mood and affect. Her behavior is normal. Thought content normal.       Assessment:       Diagnosis Orders   1. Chronic systolic congestive heart failure ([address]     2. Dilated cardiomyopathy ([address]     3. CAD in native artery     4. History of PTCA     5. VT (ventricular tachycardia) ([address]     6. Essential hypertension     7. Non-rheumatic mitral regurgitation               Plan:      CV stable. Palps better. No angina/CP-stable. Remains compensated. Some palps. Continue coreg 25 mg bid  Reviewed previous records and testing including myoview, echo 10/18, cath 11/18 and holter 1/19, showing vent ectopy of 7%. Does not watch salt. Advised to avoid salt. Follow up 3 months.

## 2019-05-06 ENCOUNTER — OFFICE VISIT (OUTPATIENT)
Dept: FAMILY MEDICINE CLINIC | Age: 84
End: 2019-05-06
Payer: MEDICARE

## 2019-05-06 VITALS
DIASTOLIC BLOOD PRESSURE: 82 MMHG | OXYGEN SATURATION: 98 % | WEIGHT: 147 LBS | TEMPERATURE: 97.8 F | RESPIRATION RATE: 20 BRPM | HEART RATE: 68 BPM | BODY MASS INDEX: 26.04 KG/M2 | SYSTOLIC BLOOD PRESSURE: 134 MMHG

## 2019-05-06 DIAGNOSIS — I10 ESSENTIAL HYPERTENSION: Primary | ICD-10-CM

## 2019-05-06 DIAGNOSIS — Z79.899 CHRONIC USE OF BENZODIAZEPINE FOR THERAPEUTIC PURPOSE: ICD-10-CM

## 2019-05-06 DIAGNOSIS — F51.01 PRIMARY INSOMNIA: ICD-10-CM

## 2019-05-06 DIAGNOSIS — M72.2 PLANTAR FASCIITIS: ICD-10-CM

## 2019-05-06 DIAGNOSIS — E78.2 MIXED HYPERLIPIDEMIA: ICD-10-CM

## 2019-05-06 DIAGNOSIS — I20.9 OTHER AND UNSPECIFIED ANGINA PECTORIS: ICD-10-CM

## 2019-05-06 DIAGNOSIS — N18.30 CHRONIC KIDNEY DISEASE, STAGE III (MODERATE) (HCC): ICD-10-CM

## 2019-05-06 LAB
A/G RATIO: 1.6 (ref 1.1–2.2)
ALBUMIN SERPL-MCNC: 4.1 G/DL (ref 3.4–5)
ALP BLD-CCNC: 74 U/L (ref 40–129)
ALT SERPL-CCNC: 11 U/L (ref 10–40)
ANION GAP SERPL CALCULATED.3IONS-SCNC: 16 MMOL/L (ref 3–16)
AST SERPL-CCNC: 18 U/L (ref 15–37)
BASOPHILS ABSOLUTE: 0.1 K/UL (ref 0–0.2)
BASOPHILS RELATIVE PERCENT: 1 %
BILIRUB SERPL-MCNC: 0.3 MG/DL (ref 0–1)
BUN BLDV-MCNC: 26 MG/DL (ref 7–20)
CALCIUM SERPL-MCNC: 9.3 MG/DL (ref 8.3–10.6)
CHLORIDE BLD-SCNC: 95 MMOL/L (ref 99–110)
CO2: 26 MMOL/L (ref 21–32)
CREAT SERPL-MCNC: 1.6 MG/DL (ref 0.6–1.2)
EOSINOPHILS ABSOLUTE: 0.3 K/UL (ref 0–0.6)
EOSINOPHILS RELATIVE PERCENT: 3.7 %
GFR AFRICAN AMERICAN: 37
GFR NON-AFRICAN AMERICAN: 31
GLOBULIN: 2.5 G/DL
GLUCOSE BLD-MCNC: 75 MG/DL (ref 70–99)
HCT VFR BLD CALC: 32.2 % (ref 36–48)
HEMOGLOBIN: 10.7 G/DL (ref 12–16)
LYMPHOCYTES ABSOLUTE: 1.6 K/UL (ref 1–5.1)
LYMPHOCYTES RELATIVE PERCENT: 20.3 %
MCH RBC QN AUTO: 30.4 PG (ref 26–34)
MCHC RBC AUTO-ENTMCNC: 33.1 G/DL (ref 31–36)
MCV RBC AUTO: 91.8 FL (ref 80–100)
MONOCYTES ABSOLUTE: 0.8 K/UL (ref 0–1.3)
MONOCYTES RELATIVE PERCENT: 10.7 %
NEUTROPHILS ABSOLUTE: 5 K/UL (ref 1.7–7.7)
NEUTROPHILS RELATIVE PERCENT: 64.3 %
PDW BLD-RTO: 14.3 % (ref 12.4–15.4)
PLATELET # BLD: 249 K/UL (ref 135–450)
PMV BLD AUTO: 9.1 FL (ref 5–10.5)
POTASSIUM SERPL-SCNC: 4.9 MMOL/L (ref 3.5–5.1)
RBC # BLD: 3.51 M/UL (ref 4–5.2)
SODIUM BLD-SCNC: 137 MMOL/L (ref 136–145)
TOTAL PROTEIN: 6.6 G/DL (ref 6.4–8.2)
WBC # BLD: 7.8 K/UL (ref 4–11)

## 2019-05-06 PROCEDURE — G8427 DOCREV CUR MEDS BY ELIG CLIN: HCPCS | Performed by: FAMILY MEDICINE

## 2019-05-06 PROCEDURE — G8598 ASA/ANTIPLAT THER USED: HCPCS | Performed by: FAMILY MEDICINE

## 2019-05-06 PROCEDURE — 1123F ACP DISCUSS/DSCN MKR DOCD: CPT | Performed by: FAMILY MEDICINE

## 2019-05-06 PROCEDURE — 99214 OFFICE O/P EST MOD 30 MIN: CPT | Performed by: FAMILY MEDICINE

## 2019-05-06 PROCEDURE — 1090F PRES/ABSN URINE INCON ASSESS: CPT | Performed by: FAMILY MEDICINE

## 2019-05-06 PROCEDURE — G8400 PT W/DXA NO RESULTS DOC: HCPCS | Performed by: FAMILY MEDICINE

## 2019-05-06 PROCEDURE — 4040F PNEUMOC VAC/ADMIN/RCVD: CPT | Performed by: FAMILY MEDICINE

## 2019-05-06 PROCEDURE — G8417 CALC BMI ABV UP PARAM F/U: HCPCS | Performed by: FAMILY MEDICINE

## 2019-05-06 PROCEDURE — 1036F TOBACCO NON-USER: CPT | Performed by: FAMILY MEDICINE

## 2019-05-06 RX ORDER — ZOLPIDEM TARTRATE 5 MG/1
5 TABLET ORAL NIGHTLY PRN
Qty: 30 TABLET | Refills: 2 | Status: SHIPPED | OUTPATIENT
Start: 2019-05-06 | End: 2019-11-21 | Stop reason: SDUPTHER

## 2019-05-06 RX ORDER — ZOLPIDEM TARTRATE 5 MG/1
5 TABLET ORAL NIGHTLY PRN
Qty: 30 TABLET | Refills: 2 | Status: SHIPPED | OUTPATIENT
Start: 2019-05-06 | End: 2019-05-06 | Stop reason: SDUPTHER

## 2019-05-06 NOTE — PROGRESS NOTES
Curt So is a 80 y.o. female    Chief Complaint   Patient presents with    Hypertension    Hyperlipidemia    Foot Pain    Insomnia       HPI:    Hypertension   This is a chronic problem. The current episode started more than 1 year ago. The problem is unchanged. The problem is controlled. Pertinent negatives include no chest pain. Past treatments include angiotensin blockers, diuretics and beta blockers. The current treatment provides significant improvement. There are no compliance problems. Hyperlipidemia   This is a chronic problem. The current episode started more than 1 year ago. The problem is controlled. Recent lipid tests were reviewed and are low. Pertinent negatives include no chest pain or myalgias. Current antihyperlipidemic treatment includes statins. The current treatment provides significant improvement of lipids. There are no compliance problems. Risk factors for coronary artery disease include hypertension. Foot Pain   This is a new problem. The current episode started 1 to 4 weeks ago. The problem occurs intermittently. The problem has been unchanged. Pertinent negatives include no chest pain or myalgias. The symptoms are aggravated by walking. She has tried rest and ice for the symptoms. The treatment provided significant relief. Primary insomnia. This is a chronic condition. She has trouble going and staying asleep. She typically takes 15 Ambien tablets per month. It works well. She does have chronic pain. Angina is stable. No chest pain. CKD is stable. ROS:    Review of Systems   Cardiovascular: Negative for chest pain and leg swelling. Musculoskeletal: Negative for myalgias. /82   Pulse 68   Temp 97.8 °F (36.6 °C) (Oral)   Resp 20   Wt 147 lb (66.7 kg)   SpO2 98%   BMI 26.04 kg/m²     Physical Exam:    Physical Exam   Constitutional: She appears well-developed. No distress.    Musculoskeletal: She exhibits tenderness (on the bottom of the foot anterior to heel). Skin: She is not diaphoretic. Psychiatric: She has a normal mood and affect. Her behavior is normal.       Current Outpatient Medications   Medication Sig Dispense Refill    zolpidem (AMBIEN) 5 MG tablet Take 1 tablet by mouth nightly as needed for Sleep for up to 180 days. 30 tablet 2    nitroGLYCERIN (NITROSTAT) 0.4 MG SL tablet Place 1 tablet under the tongue every 5 minutes as needed for Chest pain up to max of 3 total doses. If no relief after 1 dose, call 911. 25 tablet 3    spironolactone (ALDACTONE) 25 MG tablet Take 0.5 tablets by mouth daily 30 tablet 3    atorvastatin (LIPITOR) 80 MG tablet TAKE 1 TABLET BY MOUTH ONE TIME A DAY  58 tablet 2    clopidogrel (PLAVIX) 75 MG tablet TAKE 1 TABLET BY MOUTH ONE TIME A DAY  90 tablet 1    losartan-hydrochlorothiazide (HYZAAR) 50-12.5 MG per tablet TAKE 1 TABLET BY MOUTH ONE TIME A DAY  90 tablet 3    carvedilol (COREG) 25 MG tablet Take 1 tablet by mouth 2 times daily 180 tablet 3    vitamin D (CHOLECALCIFEROL) 1000 UNIT TABS tablet Take 2 tablets by mouth daily 60 tablet 3    AMITIZA 8 MCG CAPS capsule Take 1 capsule by mouth 2 times daily (with meals) 60 capsule 5    famotidine (PEPCID) 20 MG tablet Take 1 tablet by mouth 2 times daily (Patient taking differently: Take 20 mg by mouth 2 times daily as needed ) 60 tablet 5    HYDROcodone-acetaminophen (NORCO)  MG per tablet Take 1 tablet by mouth every 6 hours as needed. Nikolas Hook Coenzyme Q10 (CO Q 10) 100 MG CAPS Take  by mouth daily.  aspirin 81 MG chewable tablet Take 1 tablet by mouth daily. 30 tablet      No current facility-administered medications for this visit. Assessment:    1. Essential hypertension    2. Mixed hyperlipidemia    3. Plantar fasciitis    4. Other and unspecified angina pectoris    5. Chronic kidney disease, stage III (moderate) (HCC)    6. Primary insomnia        Plan:    1. Essential hypertension  Stable. Continue current medications. - CBC Auto Differential  - Comprehensive Metabolic Panel    2. Mixed hyperlipidemia  Stable. Continue current medications. - CBC Auto Differential  - Comprehensive Metabolic Panel    3. Plantar fasciitis  Likely cause of symptoms. Try ice and exercises. 4. Other and unspecified angina pectoris    5. Chronic kidney disease, stage III (moderate) ([facility])    6. Primary insomnia  Stable. Continue current medications. - Drug Panel-PM-HI Res-UR Interp-A  - zolpidem (AMBIEN) 5 MG tablet; Take 1 tablet by mouth nightly as needed for Sleep for up to 180 days. Dispense: 30 tablet; Refill: 2    Quality & Risk Score Accuracy    Visit Dx:  I20.9 - Other and unspecified angina pectoris  Assessment and plan:  Stable based upon symptoms and exam. Continue current treatment plan and follow up at least yearly. Visit Dx:  N18.3 - Chronic kidney disease, stage III (moderate) ([facility] [address]  Assessment and plan:  Stable based upon symptoms and exam. Continue current treatment plan and follow up at least yearly. Last edited 05/06/19 17:20 EDT by Jhon Sheth, DO             Return in about 6 months (around 11/6/2019) for medicare annual (htn, hld, insomnia) copay.

## 2019-05-06 NOTE — PATIENT INSTRUCTIONS
Patient Education        Plantar Fasciitis: Exercises  Your Care Instructions  Here are some examples of typical rehabilitation exercises for your condition. Start each exercise slowly. Ease off the exercise if you start to have pain. Your doctor or physical therapist will tell you when you can start these exercises and which ones will work best for you. How to do the exercises  Towel stretch    1. Sit with your legs extended and knees straight. 2. Place a towel around your foot just under the toes. 3. Hold each end of the towel in each hand, with your hands above your knees. 4. Pull back with the towel so that your foot stretches toward you. 5. Hold the position for at least 15 to 30 seconds. 6. Repeat 2 to 4 times a session, up to 5 sessions a day. Calf stretch    1. Stand facing a wall with your hands on the wall at about eye level. Put the leg you want to stretch about a step behind your other leg. 2. Keeping your back heel on the floor, bend your front knee until you feel a stretch in the back leg. 3. Hold the stretch for 15 to 30 seconds. Repeat 2 to 4 times. Plantar fascia and calf stretch    1. Stand on a step as shown above. Be sure to hold on to the banister. 2. Slowly let your heels down over the edge of the step as you relax your calf muscles. You should feel a gentle stretch across the bottom of your foot and up the back of your leg to your knee. 3. Hold the stretch about 15 to 30 seconds, and then tighten your calf muscle a little to bring your heel back up to the level of the step. Repeat 2 to 4 times. Towel curls    1. While sitting, place your foot on a towel on the floor and scrunch the towel toward you with your toes. 2. Then, also using your toes, push the towel away from you. [place] pickups    1. Put marbles on the floor next to a cup.  2. Using your toes, try to lift the marbles up from the floor and put them in the cup.     Follow-up care is a key part of your treatment and safety. Be sure to make and go to all appointments, and call your doctor if you are having problems. It's also a good idea to know your test results and keep a list of the medicines you take. Where can you learn more? Go to [email or web address] org and sign in to your [employer] account. Enter W632 in the [employer] box to learn more about \"Plantar Fasciitis: Exercises. \"     If you do not have an account, please click on the \"Sign Up Now\" link. Current as of: September 20, 2018  Content Version: 11.9  © 7231-9238 [employer]. Care instructions adapted under license by [facility] ([facility]). If you have questions about a medical condition or this instruction, always ask your healthcare professional. [address] any warranty or liability for your use of this information.

## 2019-05-07 ENCOUNTER — TELEPHONE (OUTPATIENT)
Dept: FAMILY MEDICINE CLINIC | Age: 84
End: 2019-05-07

## 2019-05-07 DIAGNOSIS — I50.41 ACUTE COMBINED SYSTOLIC AND DIASTOLIC HEART FAILURE (HCC): Primary | ICD-10-CM

## 2019-05-10 LAB
6-ACETYLMORPHINE: NOT DETECTED
7-AMINOCLONAZEPAM: NOT DETECTED
ALPHA-OH-ALPRAZOLAM: NOT DETECTED
ALPRAZOLAM: NOT DETECTED
AMPHETAMINE: NOT DETECTED
BARBITURATES: NOT DETECTED
BENZOYLECGONINE: NOT DETECTED
BUPRENORPHINE: NOT DETECTED
CARISOPRODOL: NOT DETECTED
CLONAZEPAM: NOT DETECTED
CODEINE: NOT DETECTED
CREATININE URINE: 78.2 MG/DL (ref 20–400)
DIAZEPAM: NOT DETECTED
DRUGS EXPECTED: NORMAL
EER PAIN MGT DRUG PANEL, HIGH RES/EMIT U: NORMAL
ETHYL GLUCURONIDE: NOT DETECTED
FENTANYL: NOT DETECTED
HYDROCODONE: PRESENT
HYDROMORPHONE: NOT DETECTED
LORAZEPAM: NOT DETECTED
MARIJUANA METABOLITE: NOT DETECTED
MDA: NOT DETECTED
MDEA: NOT DETECTED
MDMA URINE: NOT DETECTED
MEPERIDINE: NOT DETECTED
METHADONE: NOT DETECTED
METHAMPHETAMINE: NOT DETECTED
METHYLPHENIDATE: NOT DETECTED
MIDAZOLAM: NOT DETECTED
MORPHINE: NOT DETECTED
NORBUPRENORPHINE, FREE: NOT DETECTED
NORDIAZEPAM: NOT DETECTED
NORFENTANYL: NOT DETECTED
NORHYDROCODONE, URINE: PRESENT
NOROXYCODONE: NOT DETECTED
NOROXYMORPHONE, URINE: NOT DETECTED
OXAZEPAM: NOT DETECTED
OXYCODONE: NOT DETECTED
OXYMORPHONE: NOT DETECTED
PAIN MANAGEMENT DRUG PANEL: NORMAL
PAIN MANAGEMENT DRUG PANEL: NORMAL
PCP: NOT DETECTED
PHENTERMINE: NOT DETECTED
PROPOXYPHENE: NOT DETECTED
TAPENTADOL, URINE: NOT DETECTED
TAPENTADOL-O-SULFATE, URINE: NOT DETECTED
TEMAZEPAM: NOT DETECTED
TRAMADOL: NOT DETECTED
ZOLPIDEM: NOT DETECTED

## 2019-06-07 RX ORDER — CLOPIDOGREL BISULFATE 75 MG/1
TABLET ORAL
Qty: 90 TABLET | Refills: 1 | Status: SHIPPED | OUTPATIENT
Start: 2019-06-07 | End: 2020-02-24

## 2019-06-07 NOTE — TELEPHONE ENCOUNTER
Requested Prescriptions     Pending Prescriptions Disp Refills    clopidogrel (PLAVIX) 75 MG tablet [Pharmacy Med Name: Clopidogrel Bisulfate Oral Tablet 75 MG] 90 tablet 1     Sig: TAKE 1 TABLET BY MOUTH ONE TIME A DAY         Last ov:4/30/19    Next ov:7/30/19    Last fill:2/28/19    Last labs:5/6/19    Please sign in Dr Juan Manuel Vieira absence.

## 2019-07-01 ENCOUNTER — NURSE TRIAGE (OUTPATIENT)
Dept: OTHER | Facility: CLINIC | Age: 84
End: 2019-07-01

## 2019-07-02 ENCOUNTER — OFFICE VISIT (OUTPATIENT)
Dept: FAMILY MEDICINE CLINIC | Age: 84
End: 2019-07-02
Payer: MEDICARE

## 2019-07-02 ENCOUNTER — HOSPITAL ENCOUNTER (OUTPATIENT)
Age: 84
Discharge: HOME | End: 2019-07-02
Payer: MEDICARE

## 2019-07-02 ENCOUNTER — HOSPITAL ENCOUNTER (OUTPATIENT)
Dept: GENERAL RADIOLOGY | Age: 84
Discharge: HOME | End: 2019-07-02
Payer: MEDICARE

## 2019-07-02 VITALS
SYSTOLIC BLOOD PRESSURE: 132 MMHG | TEMPERATURE: 98.4 F | HEART RATE: 68 BPM | RESPIRATION RATE: 21 BRPM | DIASTOLIC BLOOD PRESSURE: 72 MMHG | OXYGEN SATURATION: 98 %

## 2019-07-02 DIAGNOSIS — R05.9 COUGH: ICD-10-CM

## 2019-07-02 DIAGNOSIS — R05.9 COUGH: Primary | ICD-10-CM

## 2019-07-02 PROBLEM — H25.043 POSTERIOR SUBCAPSULAR POLAR AGE-RELATED CATARACT OF BOTH EYES: Status: ACTIVE | Noted: 2019-07-02

## 2019-07-02 PROBLEM — H43.813 PVD (POSTERIOR VITREOUS DETACHMENT), BOTH EYES: Status: ACTIVE | Noted: 2019-07-02

## 2019-07-02 PROCEDURE — 1090F PRES/ABSN URINE INCON ASSESS: CPT | Performed by: FAMILY MEDICINE

## 2019-07-02 PROCEDURE — 4040F PNEUMOC VAC/ADMIN/RCVD: CPT | Performed by: FAMILY MEDICINE

## 2019-07-02 PROCEDURE — 1036F TOBACCO NON-USER: CPT | Performed by: FAMILY MEDICINE

## 2019-07-02 PROCEDURE — 1123F ACP DISCUSS/DSCN MKR DOCD: CPT | Performed by: FAMILY MEDICINE

## 2019-07-02 PROCEDURE — G8427 DOCREV CUR MEDS BY ELIG CLIN: HCPCS | Performed by: FAMILY MEDICINE

## 2019-07-02 PROCEDURE — G8598 ASA/ANTIPLAT THER USED: HCPCS | Performed by: FAMILY MEDICINE

## 2019-07-02 PROCEDURE — 99213 OFFICE O/P EST LOW 20 MIN: CPT | Performed by: FAMILY MEDICINE

## 2019-07-02 PROCEDURE — G8417 CALC BMI ABV UP PARAM F/U: HCPCS | Performed by: FAMILY MEDICINE

## 2019-07-02 PROCEDURE — G8400 PT W/DXA NO RESULTS DOC: HCPCS | Performed by: FAMILY MEDICINE

## 2019-07-02 PROCEDURE — 71046 X-RAY EXAM CHEST 2 VIEWS: CPT

## 2019-07-02 RX ORDER — FLUTICASONE PROPIONATE 50 MCG
2 SPRAY, SUSPENSION (ML) NASAL DAILY
Qty: 1 BOTTLE | Refills: 5 | Status: SHIPPED | OUTPATIENT
Start: 2019-07-02

## 2019-07-02 RX ORDER — BENZONATATE 200 MG/1
200 CAPSULE ORAL 3 TIMES DAILY PRN
Qty: 30 CAPSULE | Refills: 1 | Status: SHIPPED | OUTPATIENT
Start: 2019-07-02 | End: 2020-03-05

## 2019-07-02 ASSESSMENT — ENCOUNTER SYMPTOMS
WHEEZING: 0
COUGH: 1

## 2019-07-02 NOTE — PROGRESS NOTES
Cindy Galvez is a 80 y.o. female    Chief Complaint   Patient presents with    Cough     productive x 2 months    Congestion       HPI:    Cough   This is a new problem. The current episode started more than 1 month ago. The problem has been unchanged. The cough is productive of sputum. Pertinent negatives include no wheezing. Nothing aggravates the symptoms. She has tried OTC cough suppressant for the symptoms. The treatment provided no relief. ROS:    Review of Systems   Respiratory: Positive for cough. Negative for wheezing. /72   Pulse 68   Temp 98.4 °F (36.9 °C) (Oral)   Resp 21   SpO2 98%     Physical Exam:    Physical Exam   Constitutional: She appears well-developed and well-nourished. HENT:   Head: Normocephalic. Nose: No rhinorrhea. Mouth/Throat: No posterior oropharyngeal edema or posterior oropharyngeal erythema. Cardiovascular: Normal rate and regular rhythm. No murmur heard. Pulmonary/Chest: Effort normal and breath sounds normal. No respiratory distress. Abdominal: Soft. Bowel sounds are normal.   Neurological: She is alert. Current Outpatient Medications   Medication Sig Dispense Refill    fluticasone (FLONASE) 50 MCG/ACT nasal spray 2 sprays by Each Nostril route daily 1 Bottle 5    benzonatate (TESSALON) 200 MG capsule Take 1 capsule by mouth 3 times daily as needed for Cough 30 capsule 1    clopidogrel (PLAVIX) 75 MG tablet TAKE 1 TABLET BY MOUTH ONE TIME A DAY  90 tablet 1    Handicap Placard MISC by Does not apply route Length: 5 years 1 each 0    zolpidem (AMBIEN) 5 MG tablet Take 1 tablet by mouth nightly as needed for Sleep for up to 180 days. 30 tablet 2    nitroGLYCERIN (NITROSTAT) 0.4 MG SL tablet Place 1 tablet under the tongue every 5 minutes as needed for Chest pain up to max of 3 total doses.  If no relief after 1 dose, call 911. 25 tablet 3    spironolactone (ALDACTONE) 25 MG tablet Take 0.5 tablets by mouth daily 30 tablet 3   

## 2019-07-30 ENCOUNTER — OFFICE VISIT (OUTPATIENT)
Dept: CARDIOLOGY CLINIC | Age: 84
End: 2019-07-30
Payer: MEDICARE

## 2019-07-30 VITALS
DIASTOLIC BLOOD PRESSURE: 72 MMHG | BODY MASS INDEX: 25.69 KG/M2 | WEIGHT: 145 LBS | SYSTOLIC BLOOD PRESSURE: 128 MMHG | HEART RATE: 74 BPM

## 2019-07-30 DIAGNOSIS — I34.0 NON-RHEUMATIC MITRAL REGURGITATION: ICD-10-CM

## 2019-07-30 DIAGNOSIS — I10 ESSENTIAL HYPERTENSION: ICD-10-CM

## 2019-07-30 DIAGNOSIS — I50.22 CHRONIC SYSTOLIC CONGESTIVE HEART FAILURE (HCC): Primary | ICD-10-CM

## 2019-07-30 DIAGNOSIS — Z98.61 HISTORY OF PTCA: ICD-10-CM

## 2019-07-30 DIAGNOSIS — I47.20 VT (VENTRICULAR TACHYCARDIA): ICD-10-CM

## 2019-07-30 DIAGNOSIS — I42.0 DILATED CARDIOMYOPATHY (HCC): ICD-10-CM

## 2019-07-30 DIAGNOSIS — I25.10 CAD IN NATIVE ARTERY: ICD-10-CM

## 2019-07-30 PROCEDURE — G8400 PT W/DXA NO RESULTS DOC: HCPCS | Performed by: INTERNAL MEDICINE

## 2019-07-30 PROCEDURE — G8598 ASA/ANTIPLAT THER USED: HCPCS | Performed by: INTERNAL MEDICINE

## 2019-07-30 PROCEDURE — 1036F TOBACCO NON-USER: CPT | Performed by: INTERNAL MEDICINE

## 2019-07-30 PROCEDURE — G8417 CALC BMI ABV UP PARAM F/U: HCPCS | Performed by: INTERNAL MEDICINE

## 2019-07-30 PROCEDURE — 4040F PNEUMOC VAC/ADMIN/RCVD: CPT | Performed by: INTERNAL MEDICINE

## 2019-07-30 PROCEDURE — 1123F ACP DISCUSS/DSCN MKR DOCD: CPT | Performed by: INTERNAL MEDICINE

## 2019-07-30 PROCEDURE — 99214 OFFICE O/P EST MOD 30 MIN: CPT | Performed by: INTERNAL MEDICINE

## 2019-07-30 PROCEDURE — 1090F PRES/ABSN URINE INCON ASSESS: CPT | Performed by: INTERNAL MEDICINE

## 2019-07-30 PROCEDURE — G8427 DOCREV CUR MEDS BY ELIG CLIN: HCPCS | Performed by: INTERNAL MEDICINE

## 2019-07-30 NOTE — PROGRESS NOTES
this med    Ultracet [Tramadol-Acetaminophen]      Rash    Ziac [Bisoprolol-Hydrochlorothiazide] Other (See Comments)     Pt does not remember reaction to med ? Weak         Social History     Socioeconomic History    Marital status:      Spouse name: Chris Kitchung Number of children: Not on file    Years of education: 15    Highest education level: Not on file   Occupational History    Occupation: retired   Social Needs    Financial resource strain: Not on file    Food insecurity:     Worry: Not on file     Inability: Not on file   Zuldi needs:     Medical: Not on file     Non-medical: Not on file   Tobacco Use    Smoking status: Never Smoker    Smokeless tobacco: Never Used   Substance and Sexual Activity    Alcohol use: No     Alcohol/week: 0.0 standard drinks    Drug use: No    Sexual activity: Never     Comment:  living with spouse. Lifestyle    Physical activity:     Days per week: Not on file     Minutes per session: Not on file    Stress: Not on file   Relationships    Social connections:     Talks on phone: Not on file     Gets together: Not on file     Attends Hinduism service: Not on file     Active member of club or organization: Not on file     Attends meetings of clubs or organizations: Not on file     Relationship status: Not on file    Intimate partner violence:     Fear of current or ex partner: Not on file     Emotionally abused: Not on file     Physically abused: Not on file     Forced sexual activity: Not on file   Other Topics Concern    Not on file   Social History Narrative    Not on file        Patient has a family history includes Cancer in her father and sister; Diabetes in her brother; Heart Disease in her father and mother; Stroke in her brother.     Patient  has a past medical history of Arthritis, Blood circulation, collateral, CAD (coronary artery disease), CHF (congestive heart failure) ([facility] [address] GERD (gastroesophageal reflux disease), History

## 2019-10-02 ENCOUNTER — TELEPHONE (OUTPATIENT)
Dept: FAMILY MEDICINE CLINIC | Age: 84
End: 2019-10-02

## 2019-11-20 ENCOUNTER — TELEPHONE (OUTPATIENT)
Dept: FAMILY MEDICINE CLINIC | Age: 84
End: 2019-11-20

## 2019-11-20 DIAGNOSIS — F51.01 PRIMARY INSOMNIA: ICD-10-CM

## 2019-11-21 RX ORDER — ZOLPIDEM TARTRATE 5 MG/1
5 TABLET ORAL NIGHTLY PRN
Qty: 30 TABLET | Refills: 2 | Status: SHIPPED | OUTPATIENT
Start: 2019-11-21 | End: 2020-04-28 | Stop reason: SDUPTHER

## 2019-12-02 ENCOUNTER — OFFICE VISIT (OUTPATIENT)
Dept: CARDIOLOGY CLINIC | Age: 84
End: 2019-12-02
Payer: MEDICARE

## 2019-12-02 VITALS
BODY MASS INDEX: 24.62 KG/M2 | HEART RATE: 68 BPM | SYSTOLIC BLOOD PRESSURE: 140 MMHG | WEIGHT: 139 LBS | DIASTOLIC BLOOD PRESSURE: 70 MMHG

## 2019-12-02 DIAGNOSIS — I47.20 VT (VENTRICULAR TACHYCARDIA): ICD-10-CM

## 2019-12-02 DIAGNOSIS — I42.0 DILATED CARDIOMYOPATHY (HCC): ICD-10-CM

## 2019-12-02 DIAGNOSIS — Z98.61 HISTORY OF PTCA: ICD-10-CM

## 2019-12-02 DIAGNOSIS — I25.10 CAD IN NATIVE ARTERY: ICD-10-CM

## 2019-12-02 DIAGNOSIS — I34.0 NONRHEUMATIC MITRAL VALVE REGURGITATION: ICD-10-CM

## 2019-12-02 DIAGNOSIS — I10 ESSENTIAL HYPERTENSION: ICD-10-CM

## 2019-12-02 DIAGNOSIS — I50.32 CHRONIC DIASTOLIC CONGESTIVE HEART FAILURE (HCC): Primary | ICD-10-CM

## 2019-12-02 PROCEDURE — G8428 CUR MEDS NOT DOCUMENT: HCPCS | Performed by: INTERNAL MEDICINE

## 2019-12-02 PROCEDURE — G8420 CALC BMI NORM PARAMETERS: HCPCS | Performed by: INTERNAL MEDICINE

## 2019-12-02 PROCEDURE — G8598 ASA/ANTIPLAT THER USED: HCPCS | Performed by: INTERNAL MEDICINE

## 2019-12-02 PROCEDURE — G8484 FLU IMMUNIZE NO ADMIN: HCPCS | Performed by: INTERNAL MEDICINE

## 2019-12-02 PROCEDURE — 1090F PRES/ABSN URINE INCON ASSESS: CPT | Performed by: INTERNAL MEDICINE

## 2019-12-02 PROCEDURE — G8400 PT W/DXA NO RESULTS DOC: HCPCS | Performed by: INTERNAL MEDICINE

## 2019-12-02 PROCEDURE — 99214 OFFICE O/P EST MOD 30 MIN: CPT | Performed by: INTERNAL MEDICINE

## 2019-12-02 PROCEDURE — 4040F PNEUMOC VAC/ADMIN/RCVD: CPT | Performed by: INTERNAL MEDICINE

## 2019-12-02 PROCEDURE — 1123F ACP DISCUSS/DSCN MKR DOCD: CPT | Performed by: INTERNAL MEDICINE

## 2019-12-02 PROCEDURE — 1036F TOBACCO NON-USER: CPT | Performed by: INTERNAL MEDICINE

## 2019-12-11 RX ORDER — ATORVASTATIN CALCIUM 80 MG/1
TABLET, FILM COATED ORAL
Qty: 90 TABLET | Refills: 3 | Status: SHIPPED | OUTPATIENT
Start: 2019-12-11 | End: 2020-12-21

## 2019-12-30 NOTE — TELEPHONE ENCOUNTER
Requested Prescriptions     Pending Prescriptions Disp Refills    spironolactone (ALDACTONE) 25 MG tablet [Pharmacy Med Name: Spironolactone Oral Tablet 25 MG] 15 tablet 2     Sig: TAKE 1/2 TABLET BY MOUTH ONE TIME A DAY    carvedilol (COREG) 25 MG tablet [Pharmacy Med Name: Carvedilol Oral Tablet 25 MG] 180 tablet 2     Sig: TAKE 1 TABLET BY MOUTH TWO TIMES A DAY     Last Office Visit: 12/2/19    Next Office Visit: 6/8/20

## 2020-01-02 RX ORDER — SPIRONOLACTONE 25 MG/1
TABLET ORAL
Qty: 15 TABLET | Refills: 2 | Status: SHIPPED | OUTPATIENT
Start: 2020-01-02 | End: 2020-03-30

## 2020-01-02 RX ORDER — CARVEDILOL 25 MG/1
TABLET ORAL
Qty: 180 TABLET | Refills: 2 | Status: SHIPPED | OUTPATIENT
Start: 2020-01-02 | End: 2020-10-01

## 2020-01-14 RX ORDER — LOSARTAN POTASSIUM AND HYDROCHLOROTHIAZIDE 12.5; 5 MG/1; MG/1
TABLET ORAL
Qty: 90 TABLET | Refills: 3 | Status: SHIPPED | OUTPATIENT
Start: 2020-01-14 | End: 2020-03-05

## 2020-02-26 RX ORDER — CLOPIDOGREL BISULFATE 75 MG/1
TABLET ORAL
Qty: 90 TABLET | Refills: 3 | Status: SHIPPED | OUTPATIENT
Start: 2020-02-26 | End: 2021-03-05

## 2020-03-05 ENCOUNTER — OFFICE VISIT (OUTPATIENT)
Dept: FAMILY MEDICINE CLINIC | Age: 85
End: 2020-03-05
Payer: MEDICARE

## 2020-03-05 VITALS
HEART RATE: 67 BPM | HEIGHT: 60 IN | BODY MASS INDEX: 27.84 KG/M2 | OXYGEN SATURATION: 97 % | DIASTOLIC BLOOD PRESSURE: 64 MMHG | WEIGHT: 141.8 LBS | SYSTOLIC BLOOD PRESSURE: 130 MMHG | RESPIRATION RATE: 14 BRPM

## 2020-03-05 LAB
A/G RATIO: 1.8 (ref 1.1–2.2)
ALBUMIN SERPL-MCNC: 3.9 G/DL (ref 3.4–5)
ALP BLD-CCNC: 58 U/L (ref 40–129)
ALT SERPL-CCNC: 7 U/L (ref 10–40)
ANION GAP SERPL CALCULATED.3IONS-SCNC: 13 MMOL/L (ref 3–16)
AST SERPL-CCNC: 17 U/L (ref 15–37)
BASOPHILS ABSOLUTE: 0.1 K/UL (ref 0–0.2)
BASOPHILS RELATIVE PERCENT: 1.2 %
BILIRUB SERPL-MCNC: <0.2 MG/DL (ref 0–1)
BUN BLDV-MCNC: 25 MG/DL (ref 7–20)
CALCIUM SERPL-MCNC: 9 MG/DL (ref 8.3–10.6)
CHLORIDE BLD-SCNC: 99 MMOL/L (ref 99–110)
CHOLESTEROL, TOTAL: 131 MG/DL (ref 0–199)
CO2: 25 MMOL/L (ref 21–32)
CREAT SERPL-MCNC: 1.7 MG/DL (ref 0.6–1.2)
EOSINOPHILS ABSOLUTE: 0.3 K/UL (ref 0–0.6)
EOSINOPHILS RELATIVE PERCENT: 4.5 %
GFR AFRICAN AMERICAN: 35
GFR NON-AFRICAN AMERICAN: 29
GLOBULIN: 2.2 G/DL
GLUCOSE BLD-MCNC: 68 MG/DL (ref 70–99)
HCT VFR BLD CALC: 31.5 % (ref 36–48)
HDLC SERPL-MCNC: 57 MG/DL (ref 40–60)
HEMOGLOBIN: 10.4 G/DL (ref 12–16)
LDL CHOLESTEROL CALCULATED: 54 MG/DL
LYMPHOCYTES ABSOLUTE: 1.7 K/UL (ref 1–5.1)
LYMPHOCYTES RELATIVE PERCENT: 28.5 %
MCH RBC QN AUTO: 30.7 PG (ref 26–34)
MCHC RBC AUTO-ENTMCNC: 32.9 G/DL (ref 31–36)
MCV RBC AUTO: 93.2 FL (ref 80–100)
MONOCYTES ABSOLUTE: 0.6 K/UL (ref 0–1.3)
MONOCYTES RELATIVE PERCENT: 9.8 %
NEUTROPHILS ABSOLUTE: 3.3 K/UL (ref 1.7–7.7)
NEUTROPHILS RELATIVE PERCENT: 56 %
PDW BLD-RTO: 13.4 % (ref 12.4–15.4)
PLATELET # BLD: 207 K/UL (ref 135–450)
PMV BLD AUTO: 9.1 FL (ref 5–10.5)
POTASSIUM SERPL-SCNC: 4.5 MMOL/L (ref 3.5–5.1)
RBC # BLD: 3.38 M/UL (ref 4–5.2)
SODIUM BLD-SCNC: 137 MMOL/L (ref 136–145)
TOTAL PROTEIN: 6.1 G/DL (ref 6.4–8.2)
TRIGL SERPL-MCNC: 99 MG/DL (ref 0–150)
TSH REFLEX: 1.9 UIU/ML (ref 0.27–4.2)
VLDLC SERPL CALC-MCNC: 20 MG/DL
WBC # BLD: 5.9 K/UL (ref 4–11)

## 2020-03-05 PROCEDURE — G8427 DOCREV CUR MEDS BY ELIG CLIN: HCPCS | Performed by: FAMILY MEDICINE

## 2020-03-05 PROCEDURE — 1123F ACP DISCUSS/DSCN MKR DOCD: CPT | Performed by: FAMILY MEDICINE

## 2020-03-05 PROCEDURE — G8417 CALC BMI ABV UP PARAM F/U: HCPCS | Performed by: FAMILY MEDICINE

## 2020-03-05 PROCEDURE — 1090F PRES/ABSN URINE INCON ASSESS: CPT | Performed by: FAMILY MEDICINE

## 2020-03-05 PROCEDURE — G8484 FLU IMMUNIZE NO ADMIN: HCPCS | Performed by: FAMILY MEDICINE

## 2020-03-05 PROCEDURE — 99214 OFFICE O/P EST MOD 30 MIN: CPT | Performed by: FAMILY MEDICINE

## 2020-03-05 PROCEDURE — G8400 PT W/DXA NO RESULTS DOC: HCPCS | Performed by: FAMILY MEDICINE

## 2020-03-05 PROCEDURE — 4040F PNEUMOC VAC/ADMIN/RCVD: CPT | Performed by: FAMILY MEDICINE

## 2020-03-05 PROCEDURE — G0439 PPPS, SUBSEQ VISIT: HCPCS | Performed by: FAMILY MEDICINE

## 2020-03-05 PROCEDURE — 1036F TOBACCO NON-USER: CPT | Performed by: FAMILY MEDICINE

## 2020-03-05 RX ORDER — LOSARTAN POTASSIUM 50 MG/1
TABLET ORAL
COMMUNITY
Start: 2020-01-15 | End: 2020-10-09 | Stop reason: SDUPTHER

## 2020-03-05 RX ORDER — HYDROCHLOROTHIAZIDE 12.5 MG/1
TABLET ORAL
COMMUNITY
Start: 2020-01-15 | End: 2020-10-12

## 2020-03-05 ASSESSMENT — PATIENT HEALTH QUESTIONNAIRE - PHQ9
SUM OF ALL RESPONSES TO PHQ QUESTIONS 1-9: 0
SUM OF ALL RESPONSES TO PHQ QUESTIONS 1-9: 0

## 2020-03-05 ASSESSMENT — LIFESTYLE VARIABLES: HOW OFTEN DO YOU HAVE A DRINK CONTAINING ALCOHOL: 0

## 2020-03-05 NOTE — PATIENT INSTRUCTIONS
Personalized Preventive Plan for Veto Miller - 3/5/2020  Medicare offers a range of preventive health benefits. Some of the tests and screenings are paid in full while other may be subject to a deductible, co-insurance, and/or copay. Some of these benefits include a comprehensive review of your medical history including lifestyle, illnesses that may run in your family, and various assessments and screenings as appropriate. After reviewing your medical record and screening and assessments performed today your provider may have ordered immunizations, labs, imaging, and/or referrals for you. A list of these orders (if applicable) as well as your Preventive Care list are included within your After Visit Summary for your review. Other Preventive Recommendations:    · A preventive eye exam performed by an eye specialist is recommended every 1-2 years to screen for glaucoma; cataracts, macular degeneration, and other eye disorders. · A preventive dental visit is recommended every 6 months. · Try to get at least 150 minutes of exercise per week or 10,000 steps per day on a pedometer . · Order or download the FREE \"Exercise & Physical Activity: Your Everyday Guide\" from [employer]. Call 4-764.650.9439 or search [employer] online. · You need 3379-6980 mg of calcium and 5346-6805 IU of vitamin D per day. It is possible to meet your calcium requirement with diet alone, but a vitamin D supplement is usually necessary to meet this goal.  · When exposed to the sun, use a sunscreen that protects against both UVA and UVB radiation with an SPF of 30 or greater. Reapply every 2 to 3 hours or after sweating, drying off with a towel, or swimming. · Always wear a seat belt when traveling in a car. Always wear a helmet when riding a bicycle or motorcycle.     Preventing Osteoporosis: After Your Visit  Your Care Instructions  Osteoporosis means the bones are weak and thin enough that they can break easily. The older you are, the more likely you are to get osteoporosis. But with plenty of calcium, vitamin D, and exercise, you can help prevent osteoporosis. The preteen and teen years are a key time for bone building. With the help of calcium, vitamin D, and exercise in those early years and beyond, the bones reach their peak density and strength by age 27. After age 27, your bones naturally start to thin and weaken. The stronger your bones are at around age 27, the lower your risk for osteoporosis. But no matter what your age and risk are, your bones still need calcium, vitamin D, and exercise to stay strong. Also avoid smoking, and limit alcohol. Smoking and heavy alcohol use can make your bones thinner. Talk to your doctor about any special risks you might have, such as having a close relative with osteoporosis or taking a medicine that can weaken bones. Your doctor can tell you the best ways to protect your bones from thinning. Follow-up care is a key part of your treatment and safety. Be sure to make and go to all appointments, and call your doctor if you are having problems. It's also a good idea to know your test results and keep a list of the medicines you take. How can you care for yourself at home? Get enough calcium and vitamin D. The [place] [employer] recommends adults younger than age 46 need 1,000 mg of calcium and 600 IU of vitamin D each day. Women ages 46 to 79 need 1,200 mg of calcium and 600 IU of vitamin D each day. Men ages 46 to 79 need 1,000 mg of calcium and 600 IU of vitamin D each day. Adults 71 and older need 1,200 mg of calcium and 800 IU of vitamin D each day. Eat foods rich in calcium, like yogurt, cheese, milk, and dark green vegetables. Eat foods rich in vitamin D, like eggs, fatty fish, cereal, and fortified milk. Get some sunshine. Your body uses sunshine to make its own vitamin D.  The safest time to be out in the sun is before 10 a.m. or after 3 p.m. Avoid getting sunburned. Sunburn can increase your risk of skin cancer. Talk to your doctor about taking a calcium plus vitamin D supplement. Ask about what type of calcium is right for you, and how much to take at a time. Adults ages 23 to 48 should not get more than 2,500 mg of calcium and 4,000 IU of vitamin D each day, whether it is from supplements and/or food. Adults ages 46 and older should not get more than 2,000 mg of calcium and 4,000 IU of vitamin D each day from supplements and/or food. Get regular bone-building exercise. Weight-bearing and resistance exercises keep bones healthy by working the muscles and bones against gravity. Start out at an exercise level that feels right for you. Add a little at a time until you can do the following:  Do 30 minutes of weight-bearing exercise on most days of the week. Walking, jogging, stair climbing, and dancing are good choices. Do resistance exercises with weights or elastic bands 2 to 3 days a week. Limit alcohol. Drink no more than 1 alcohol drink a day if you are a woman. Drink no more than 2 alcohol drinks a day if you are a man. Do not smoke. Smoking can make bones thin faster. If you need help quitting, talk to your doctor about stop-smoking programs and medicines. These can increase your chances of quitting for good. When should you call for help? Watch closely for changes in your health, and be sure to contact your doctor if:  You need help with a healthy eating plan. You need help with an exercise plan    © 8072-1428 [employer]. Care instructions adapted under license by [facility]. This care instruction is for use with your licensed healthcare professional. If you have questions about a medical condition or this instruction, always ask your healthcare professional. [place] [address] any warranty or liability for your use of this information. Content Version: 9.4.76957;  Last Revised: June 20, impair your mental function. For example, vitamin B12 deficiency can cause a range of symptoms, including confusion. But, what if your nutritional needs are being met? Can herbs and supplements still offer a benefit? Researchers have investigated a range of natural remedies, such as ginkgo , ginseng , and the supplement phosphatidylserine (PS). So far, though, the evidence is inconsistent as to whether these products can improve memory or thinking. If you are interested in taking herbs and supplements, talk to your doctor first because they may interact with other medicines that you are taking. Exercise Regularly    Among the many benefits of regular exercise are increased blood flow to the brain and decreased risk of certain diseases that can interfere with memory function. One study found that even moderate exercise has a beneficial effect. Examples of \"moderate\" exercise include:   Playing 18 holes of golf once a week, without a cart   Playing tennis twice a week   Walking one mile per day   Manage Stress    It can be tough to remember what is important when your mind is cluttered. Make time for relaxation. Choose activities that calm you down, and make it routine. Manage Chronic Conditions    Side effects of high blood pressure , diabetes, and heart disease can interfere with mental function. Many of the lifestyle steps discussed here can help manage these conditions. Strive to eat a healthy diet, exercise regularly, get stress under control, and follow your doctor's advice for your condition. Minimize Medications    Talk to your doctor about the medicines that you take. Some may be unnecessary. Also, healthy lifestyle habits may lower the need for certain drugs. Last Reviewed: April 2010 Franklin Parks MD   Updated: 4/13/2010   ·   Personalized Preventive Plan for Margie Dodd - 3/5/2020  Medicare offers a range of preventive health benefits.  Some of the tests and screenings are paid in full while

## 2020-03-05 NOTE — PROGRESS NOTES
Jeannette Wellington is a 80 y.o. female    Chief Complaint   Patient presents with    Medicare AWV    Hypertension    Hyperlipidemia    Insomnia       HPI:    Hypertension   This is a chronic problem. The current episode started more than 1 year ago. The problem is unchanged. The problem is controlled. Risk factors for coronary artery disease include post-menopausal state. Past treatments include angiotensin blockers, diuretics and beta blockers. The current treatment provides significant improvement. There are no compliance problems. Hypertensive end-organ damage includes CAD/MI. Hyperlipidemia   This is a chronic problem. The current episode started more than 1 year ago. The problem is controlled. She has no history of diabetes. Pertinent negatives include no myalgias. Current antihyperlipidemic treatment includes statins. The current treatment provides moderate improvement of lipids. There are no compliance problems. Risk factors for coronary artery disease include hypertension and post-menopausal.     Primary insomnia. This is a chronic condition. She has trouble going and staying asleep. She typically takes 15 Ambien tablets per month. It works well. She does have chronic pain.     Angina is stable. No chest pain. CKD is stable. ROS:    Review of Systems   Musculoskeletal: Negative for myalgias. Psychiatric/Behavioral: Positive for sleep disturbance. /64   Pulse 67   Resp 14   Ht 5' (1.524 m)   Wt 141 lb 12.8 oz (64.3 kg)   SpO2 97%   BMI 27.69 kg/m²     Physical Exam:    Physical Exam  Constitutional:       General: She is not in acute distress. Appearance: Normal appearance. She is not ill-appearing. Neurological:      Mental Status: She is alert. Psychiatric:         Mood and Affect: Mood normal.         Behavior: Behavior normal.         Thought Content:  Thought content normal.         Current Outpatient Medications   Medication Sig Dispense Refill    hydrochlorothiazide MOUTH ONE TIME A DAY   José Zeng MD   carvedilol (COREG) 25 MG tablet TAKE 1 TABLET BY MOUTH TWO TIMES A DAY   José Zeng MD   atorvastatin (LIPITOR) 80 MG tablet TAKE 1 TABLET BY MOUTH ONE TIME A DAY   José Zeng MD   zolpidem (AMBIEN) 5 MG tablet Take 1 tablet by mouth nightly as needed for Sleep for up to 180 days. Debra Shahid DO   fluticasone (FLONASE) 50 MCG/ACT nasal spray 2 sprays by Each Nostril route daily  Debra Shahid, DO   Handicap Placard MISC by Does not apply route Length: 5 years  Debra Shahid DO   nitroGLYCERIN (NITROSTAT) 0.4 MG SL tablet Place 1 tablet under the tongue every 5 minutes as needed for Chest pain up to max of 3 total doses. If no relief after 1 dose, call 911. José Zeng MD   vitamin D (CHOLECALCIFEROL) 1000 UNIT TABS tablet Take 2 tablets by mouth daily  José Zeng MD   famotidine (PEPCID) 20 MG tablet Take 1 tablet by mouth 2 times daily  Patient taking differently: Take 20 mg by mouth 2 times daily as needed   Maurice Gauthier MD   HYDROcodone-acetaminophen (NORCO)  MG per tablet Take 1 tablet by mouth every 6 hours as needed. Caroline Sam Historical Provider, MD   Coenzyme Q10 (CO Q 10) 100 MG CAPS Take  by mouth daily. Historical Provider, MD   aspirin 81 MG chewable tablet Take 1 tablet by mouth daily.   Manny Maxwell MD         Past Medical History:   Diagnosis Date    Arthritis     Blood circulation, collateral     CAD (coronary artery disease)     CHF (congestive heart failure) (HCC)     GERD (gastroesophageal reflux disease)     History of heart artery stent 12/2018    Hyperlipidemia     Hypertension     Mitral valve prolapse     Myocardial infarct, old     Thyroid disease        Past Surgical History:   Procedure Laterality Date    COLONOSCOPY      EYE SURGERY Left 09/04/2018    PHACO EMULSIFICATION OF CATARACT WITH INTRAOCULAR LENS IMPLANT LEFT EYE     HYSTERECTOMY      OTHER SURGICAL HISTORY  08/27/2018 minutes 2-3 times per week?: (!) No  Have you lost any weight without trying in the past 3 months?: No  Do you eat fewer than 2 meals per day?: No  Have you seen a dentist within the past year?: (!) No  Body mass index is 27.69 kg/m². Health Habits/Nutrition Interventions:  · Inadequate physical activity:  educational materials provided to promote increased physical activity  · Dental exam overdue:  patient declines dental evaluation    ADL:  ADLs  In the past 7 days, did you need help from others to perform any of the following everyday activities? Eating, dressing, grooming, bathing, toileting, or walking/balance?: (!) Walking/Balance  In the past 7 days, did you need help from others to take care of any of the following? Laundry, housekeeping, banking/finances, shopping, telephone use, food preparation, transportation, or taking medications?: (!) Laundry  ADL Interventions:  · Patient declines any further evaluation/treatment for this issue daughter and granddaughter help.     Personalized Preventive Plan   Current Health Maintenance Status  Immunization History   Administered Date(s) Administered    Influenza Vaccine, unspecified formulation 12/09/2016    Influenza Virus Vaccine 10/13/2013, 10/02/2015    Influenza, Quadv, 6 mo and older, IM, PF (Flulaval, Fluarix) 11/07/2018    Pneumococcal Conjugate 13-valent ([id number]) 06/19/2018    Pneumococcal Polysaccharide ([id number]) 01/15/2014        Health Maintenance   Topic Date Due    DTaP/Tdap/Td vaccine (1 - Tdap) 11/10/1946    Shingles Vaccine (1 of 2) 11/10/1985    DEXA (modify frequency per FRAX score)  11/10/2000    Lipid screen  03/29/2019    Annual Wellness Visit (AWV)  05/29/2019    Potassium monitoring  05/06/2020    Creatinine monitoring  05/06/2020    Flu vaccine  Completed    Pneumococcal 65+ years Vaccine  Completed    Hepatitis A vaccine  Aged Out    Hepatitis B vaccine  Aged Out    Hib vaccine  Aged Out    Meningococcal (ACWY) vaccine  Aged Out       Denies dexa scan today. Recommendations for Preventive Services Due: see orders and patient instructions/AVS.  . Recommended screening schedule for the next 5-10 years is provided to the patient in written form: see Patient Instructions/AVS.    Kenton GAUTHIER LPN, [birth year], performed the documented evaluation under the direct supervision of the attending physician. This encounter was performed under Yousif fernando DOs, direct supervision, 3/5/2020.

## 2020-03-25 PROBLEM — R94.39 ABNORMAL MYOCARDIAL PERFUSION STUDY: Status: RESOLVED | Noted: 2018-11-06 | Resolved: 2020-03-24

## 2020-04-02 RX ORDER — SPIRONOLACTONE 25 MG/1
TABLET ORAL
Qty: 45 TABLET | Refills: 3 | Status: SHIPPED | OUTPATIENT
Start: 2020-04-02 | End: 2020-04-10

## 2020-04-28 ENCOUNTER — TELEPHONE (OUTPATIENT)
Dept: FAMILY MEDICINE CLINIC | Age: 85
End: 2020-04-28

## 2020-04-28 RX ORDER — ZOLPIDEM TARTRATE 5 MG/1
5 TABLET ORAL NIGHTLY PRN
Qty: 30 TABLET | Refills: 2 | Status: SHIPPED | OUTPATIENT
Start: 2020-04-28 | End: 2020-09-10

## 2020-04-28 NOTE — TELEPHONE ENCOUNTER
Called to r/s pt's appt in Sept.  She said she needs a refill on her zolpidem. [facility].    [phone]

## 2020-08-31 ENCOUNTER — APPOINTMENT (OUTPATIENT)
Dept: GENERAL RADIOLOGY | Age: 85
DRG: 689 | End: 2020-08-31
Payer: MEDICARE

## 2020-08-31 ENCOUNTER — HOSPITAL ENCOUNTER (INPATIENT)
Age: 85
LOS: 2 days | Discharge: HOME | DRG: 689 | End: 2020-09-02
Attending: EMERGENCY MEDICINE | Admitting: PEDIATRICS
Payer: MEDICARE

## 2020-08-31 ENCOUNTER — APPOINTMENT (OUTPATIENT)
Dept: CT IMAGING | Age: 85
DRG: 689 | End: 2020-08-31
Payer: MEDICARE

## 2020-08-31 PROBLEM — N39.0 UTI (URINARY TRACT INFECTION): Status: ACTIVE | Noted: 2020-08-31

## 2020-08-31 PROBLEM — R41.0 CONFUSION: Status: ACTIVE | Noted: 2020-08-31

## 2020-08-31 LAB
A/G RATIO: 1 (ref 1.1–2.2)
ALBUMIN SERPL-MCNC: 3.6 G/DL (ref 3.4–5)
ALP BLD-CCNC: 89 U/L (ref 40–129)
ALT SERPL-CCNC: 15 U/L (ref 10–40)
ANION GAP SERPL CALCULATED.3IONS-SCNC: 16 MMOL/L (ref 3–16)
ANISOCYTOSIS: ABNORMAL
AST SERPL-CCNC: 33 U/L (ref 15–37)
BACTERIA: ABNORMAL /HPF
BANDED NEUTROPHILS RELATIVE PERCENT: 42 % (ref 0–7)
BASOPHILS ABSOLUTE: 0 K/UL (ref 0–0.2)
BASOPHILS RELATIVE PERCENT: 0 %
BILIRUB SERPL-MCNC: 0.6 MG/DL (ref 0–1)
BILIRUBIN URINE: NEGATIVE
BLOOD, URINE: ABNORMAL
BUN BLDV-MCNC: 44 MG/DL (ref 7–20)
CALCIUM SERPL-MCNC: 9.6 MG/DL (ref 8.3–10.6)
CHLORIDE BLD-SCNC: 96 MMOL/L (ref 99–110)
CLARITY: ABNORMAL
CO2: 19 MMOL/L (ref 21–32)
COLOR: ABNORMAL
CREAT SERPL-MCNC: 2.2 MG/DL (ref 0.6–1.2)
EOSINOPHILS ABSOLUTE: 0 K/UL (ref 0–0.6)
EOSINOPHILS RELATIVE PERCENT: 0 %
EPITHELIAL CELLS, UA: ABNORMAL /HPF (ref 0–5)
GFR AFRICAN AMERICAN: 26
GFR NON-AFRICAN AMERICAN: 21
GLOBULIN: 3.5 G/DL
GLUCOSE BLD-MCNC: 129 MG/DL (ref 70–99)
GLUCOSE URINE: NEGATIVE MG/DL
HCT VFR BLD CALC: 31.8 % (ref 36–48)
HEMOGLOBIN: 10.4 G/DL (ref 12–16)
KETONES, URINE: NEGATIVE MG/DL
LEUKOCYTE ESTERASE, URINE: ABNORMAL
LYMPHOCYTES ABSOLUTE: 0.8 K/UL (ref 1–5.1)
LYMPHOCYTES RELATIVE PERCENT: 6 %
MCH RBC QN AUTO: 30.4 PG (ref 26–34)
MCHC RBC AUTO-ENTMCNC: 32.8 G/DL (ref 31–36)
MCV RBC AUTO: 92.7 FL (ref 80–100)
MICROSCOPIC EXAMINATION: YES
MONOCYTES ABSOLUTE: 0.6 K/UL (ref 0–1.3)
MONOCYTES RELATIVE PERCENT: 5 %
NEUTROPHILS ABSOLUTE: 11.4 K/UL (ref 1.7–7.7)
NEUTROPHILS RELATIVE PERCENT: 47 %
NITRITE, URINE: POSITIVE
PDW BLD-RTO: 13.9 % (ref 12.4–15.4)
PH UA: 6 (ref 5–8)
PLATELET # BLD: 187 K/UL (ref 135–450)
PLATELET SLIDE REVIEW: ADEQUATE
PMV BLD AUTO: 9.5 FL (ref 5–10.5)
POIKILOCYTES: ABNORMAL
POLYCHROMASIA: ABNORMAL
POTASSIUM REFLEX MAGNESIUM: 4.6 MMOL/L (ref 3.5–5.1)
PROTEIN UA: 100 MG/DL
RBC # BLD: 3.43 M/UL (ref 4–5.2)
RBC UA: ABNORMAL /HPF (ref 0–4)
SLIDE REVIEW: ABNORMAL
SODIUM BLD-SCNC: 131 MMOL/L (ref 136–145)
SPECIFIC GRAVITY UA: 1.02 (ref 1–1.03)
TOTAL PROTEIN: 7.1 G/DL (ref 6.4–8.2)
URINE REFLEX TO CULTURE: YES
URINE TYPE: ABNORMAL
UROBILINOGEN, URINE: 1 E.U./DL
WBC # BLD: 12.8 K/UL (ref 4–11)
WBC UA: >100 /HPF (ref 0–5)

## 2020-08-31 PROCEDURE — 87186 SC STD MICRODIL/AGAR DIL: CPT

## 2020-08-31 PROCEDURE — 71045 X-RAY EXAM CHEST 1 VIEW: CPT

## 2020-08-31 PROCEDURE — 99285 EMERGENCY DEPT VISIT HI MDM: CPT

## 2020-08-31 PROCEDURE — 70450 CT HEAD/BRAIN W/O DYE: CPT

## 2020-08-31 PROCEDURE — 1200000000 HC SEMI PRIVATE

## 2020-08-31 PROCEDURE — 6360000002 HC RX W HCPCS

## 2020-08-31 PROCEDURE — 87086 URINE CULTURE/COLONY COUNT: CPT

## 2020-08-31 PROCEDURE — 87077 CULTURE AEROBIC IDENTIFY: CPT

## 2020-08-31 PROCEDURE — 96365 THER/PROPH/DIAG IV INF INIT: CPT

## 2020-08-31 PROCEDURE — 93005 ELECTROCARDIOGRAM TRACING: CPT | Performed by: EMERGENCY MEDICINE

## 2020-08-31 PROCEDURE — 85025 COMPLETE CBC W/AUTO DIFF WBC: CPT

## 2020-08-31 PROCEDURE — 80053 COMPREHEN METABOLIC PANEL: CPT

## 2020-08-31 PROCEDURE — 6360000002 HC RX W HCPCS: Performed by: PHYSICIAN ASSISTANT

## 2020-08-31 PROCEDURE — 2580000003 HC RX 258: Performed by: PHYSICIAN ASSISTANT

## 2020-08-31 PROCEDURE — 81001 URINALYSIS AUTO W/SCOPE: CPT

## 2020-08-31 RX ORDER — SODIUM CHLORIDE 9 MG/ML
1000 INJECTION, SOLUTION INTRAVENOUS CONTINUOUS
Status: DISCONTINUED | OUTPATIENT
Start: 2020-08-31 | End: 2020-09-02 | Stop reason: HOSPADM

## 2020-08-31 RX ORDER — ONDANSETRON 2 MG/ML
INJECTION INTRAMUSCULAR; INTRAVENOUS
Status: COMPLETED
Start: 2020-08-31 | End: 2020-08-31

## 2020-08-31 RX ORDER — MORPHINE SULFATE 2 MG/ML
2 INJECTION, SOLUTION INTRAMUSCULAR; INTRAVENOUS ONCE
Status: COMPLETED | OUTPATIENT
Start: 2020-08-31 | End: 2020-08-31

## 2020-08-31 RX ORDER — ONDANSETRON 2 MG/ML
4 INJECTION INTRAMUSCULAR; INTRAVENOUS ONCE
Status: COMPLETED | OUTPATIENT
Start: 2020-08-31 | End: 2020-08-31

## 2020-08-31 RX ORDER — LEVOFLOXACIN 5 MG/ML
500 INJECTION, SOLUTION INTRAVENOUS ONCE
Status: COMPLETED | OUTPATIENT
Start: 2020-08-31 | End: 2020-08-31

## 2020-08-31 RX ADMIN — SODIUM CHLORIDE 1000 ML: 9 INJECTION, SOLUTION INTRAVENOUS at 21:23

## 2020-08-31 RX ADMIN — ONDANSETRON 4 MG: 2 INJECTION INTRAMUSCULAR; INTRAVENOUS at 23:45

## 2020-08-31 RX ADMIN — LEVOFLOXACIN 500 MG: 5 INJECTION, SOLUTION INTRAVENOUS at 21:34

## 2020-08-31 RX ADMIN — MORPHINE SULFATE 2 MG: 2 INJECTION, SOLUTION INTRAMUSCULAR; INTRAVENOUS at 23:33

## 2020-08-31 ASSESSMENT — ENCOUNTER SYMPTOMS
EYE REDNESS: 0
BACK PAIN: 0
ABDOMINAL PAIN: 0
COUGH: 0
CHEST TIGHTNESS: 0
NAUSEA: 1
CONSTIPATION: 0
SHORTNESS OF BREATH: 0
RHINORRHEA: 0
DIARRHEA: 0
EYE PAIN: 0
SORE THROAT: 0
FACIAL SWELLING: 0
VOMITING: 1

## 2020-08-31 ASSESSMENT — PAIN SCALES - GENERAL: PAINLEVEL_OUTOF10: 10

## 2020-08-31 NOTE — ED NOTES
Bed: 01  Expected date: 8/31/20  Expected time:   Means of arrival:   Comments:  [facility] [id number]  08/31/20 2357

## 2020-08-31 NOTE — ED NOTES

## 2020-09-01 LAB
ANION GAP SERPL CALCULATED.3IONS-SCNC: 13 MMOL/L (ref 3–16)
ANISOCYTOSIS: ABNORMAL
BANDED NEUTROPHILS RELATIVE PERCENT: 54 % (ref 0–7)
BASOPHILS ABSOLUTE: 0 K/UL (ref 0–0.2)
BASOPHILS RELATIVE PERCENT: 0 %
BUN BLDV-MCNC: 48 MG/DL (ref 7–20)
CALCIUM SERPL-MCNC: 8.7 MG/DL (ref 8.3–10.6)
CHLORIDE BLD-SCNC: 99 MMOL/L (ref 99–110)
CO2: 19 MMOL/L (ref 21–32)
CREAT SERPL-MCNC: 2.2 MG/DL (ref 0.6–1.2)
EKG ATRIAL RATE: 71 BPM
EKG DIAGNOSIS: NORMAL
EKG P AXIS: 48 DEGREES
EKG P-R INTERVAL: 132 MS
EKG Q-T INTERVAL: 404 MS
EKG QRS DURATION: 114 MS
EKG QTC CALCULATION (BAZETT): 439 MS
EKG R AXIS: 21 DEGREES
EKG T AXIS: 62 DEGREES
EKG VENTRICULAR RATE: 71 BPM
EOSINOPHILS ABSOLUTE: 0 K/UL (ref 0–0.6)
EOSINOPHILS RELATIVE PERCENT: 0 %
GFR AFRICAN AMERICAN: 26
GFR NON-AFRICAN AMERICAN: 21
GLUCOSE BLD-MCNC: 112 MG/DL (ref 70–99)
HCT VFR BLD CALC: 27.9 % (ref 36–48)
HEMOGLOBIN: 9.2 G/DL (ref 12–16)
LYMPHOCYTES ABSOLUTE: 0.7 K/UL (ref 1–5.1)
LYMPHOCYTES RELATIVE PERCENT: 5 %
MCH RBC QN AUTO: 30.8 PG (ref 26–34)
MCHC RBC AUTO-ENTMCNC: 33 G/DL (ref 31–36)
MCV RBC AUTO: 93.5 FL (ref 80–100)
MONOCYTES ABSOLUTE: 0.7 K/UL (ref 0–1.3)
MONOCYTES RELATIVE PERCENT: 5 %
NEUTROPHILS ABSOLUTE: 12.3 K/UL (ref 1.7–7.7)
NEUTROPHILS RELATIVE PERCENT: 36 %
PDW BLD-RTO: 13.5 % (ref 12.4–15.4)
PLATELET # BLD: 159 K/UL (ref 135–450)
PLATELET SLIDE REVIEW: ADEQUATE
PMV BLD AUTO: 8.5 FL (ref 5–10.5)
POIKILOCYTES: ABNORMAL
POLYCHROMASIA: ABNORMAL
POTASSIUM REFLEX MAGNESIUM: 4.2 MMOL/L (ref 3.5–5.1)
RBC # BLD: 2.99 M/UL (ref 4–5.2)
SLIDE REVIEW: ABNORMAL
SODIUM BLD-SCNC: 131 MMOL/L (ref 136–145)
WBC # BLD: 13.7 K/UL (ref 4–11)

## 2020-09-01 PROCEDURE — 97530 THERAPEUTIC ACTIVITIES: CPT

## 2020-09-01 PROCEDURE — 6360000002 HC RX W HCPCS: Performed by: PEDIATRICS

## 2020-09-01 PROCEDURE — 97165 OT EVAL LOW COMPLEX 30 MIN: CPT

## 2020-09-01 PROCEDURE — 93010 ELECTROCARDIOGRAM REPORT: CPT | Performed by: INTERNAL MEDICINE

## 2020-09-01 PROCEDURE — 1200000000 HC SEMI PRIVATE

## 2020-09-01 PROCEDURE — 2580000003 HC RX 258: Performed by: PHYSICIAN ASSISTANT

## 2020-09-01 PROCEDURE — 36415 COLL VENOUS BLD VENIPUNCTURE: CPT

## 2020-09-01 PROCEDURE — 97161 PT EVAL LOW COMPLEX 20 MIN: CPT

## 2020-09-01 PROCEDURE — 97116 GAIT TRAINING THERAPY: CPT

## 2020-09-01 PROCEDURE — 85025 COMPLETE CBC W/AUTO DIFF WBC: CPT

## 2020-09-01 PROCEDURE — 6370000000 HC RX 637 (ALT 250 FOR IP): Performed by: PEDIATRICS

## 2020-09-01 PROCEDURE — 80048 BASIC METABOLIC PNL TOTAL CA: CPT

## 2020-09-01 RX ORDER — SODIUM CHLORIDE 0.9 % (FLUSH) 0.9 %
10 SYRINGE (ML) INJECTION EVERY 12 HOURS SCHEDULED
Status: DISCONTINUED | OUTPATIENT
Start: 2020-09-01 | End: 2020-09-02 | Stop reason: HOSPADM

## 2020-09-01 RX ORDER — ACETAMINOPHEN 325 MG/1
650 TABLET ORAL EVERY 6 HOURS PRN
Status: DISCONTINUED | OUTPATIENT
Start: 2020-09-01 | End: 2020-09-02 | Stop reason: HOSPADM

## 2020-09-01 RX ORDER — ACETAMINOPHEN 650 MG/1
650 SUPPOSITORY RECTAL EVERY 6 HOURS PRN
Status: DISCONTINUED | OUTPATIENT
Start: 2020-09-01 | End: 2020-09-02 | Stop reason: HOSPADM

## 2020-09-01 RX ORDER — FAMOTIDINE 20 MG/1
20 TABLET, FILM COATED ORAL 2 TIMES DAILY
Status: DISCONTINUED | OUTPATIENT
Start: 2020-09-01 | End: 2020-09-02 | Stop reason: HOSPADM

## 2020-09-01 RX ORDER — POLYETHYLENE GLYCOL 3350 17 G/17G
17 POWDER, FOR SOLUTION ORAL DAILY PRN
Status: DISCONTINUED | OUTPATIENT
Start: 2020-09-01 | End: 2020-09-02 | Stop reason: HOSPADM

## 2020-09-01 RX ORDER — LEVOFLOXACIN 5 MG/ML
250 INJECTION, SOLUTION INTRAVENOUS DAILY
Status: DISCONTINUED | OUTPATIENT
Start: 2020-09-02 | End: 2020-09-01

## 2020-09-01 RX ORDER — HYDROCODONE BITARTRATE AND ACETAMINOPHEN 10; 325 MG/1; MG/1
1 TABLET ORAL EVERY 6 HOURS PRN
Status: DISCONTINUED | OUTPATIENT
Start: 2020-09-01 | End: 2020-09-02 | Stop reason: HOSPADM

## 2020-09-01 RX ORDER — LEVOFLOXACIN 5 MG/ML
250 INJECTION, SOLUTION INTRAVENOUS EVERY 24 HOURS
Status: DISCONTINUED | OUTPATIENT
Start: 2020-09-01 | End: 2020-09-01

## 2020-09-01 RX ORDER — ONDANSETRON 2 MG/ML
4 INJECTION INTRAMUSCULAR; INTRAVENOUS EVERY 6 HOURS PRN
Status: DISCONTINUED | OUTPATIENT
Start: 2020-09-01 | End: 2020-09-02 | Stop reason: HOSPADM

## 2020-09-01 RX ORDER — HEPARIN SODIUM 5000 [USP'U]/ML
5000 INJECTION, SOLUTION INTRAVENOUS; SUBCUTANEOUS EVERY 8 HOURS SCHEDULED
Status: DISCONTINUED | OUTPATIENT
Start: 2020-09-01 | End: 2020-09-02 | Stop reason: HOSPADM

## 2020-09-01 RX ORDER — ASPIRIN 81 MG/1
81 TABLET, CHEWABLE ORAL DAILY
Status: DISCONTINUED | OUTPATIENT
Start: 2020-09-01 | End: 2020-09-02 | Stop reason: HOSPADM

## 2020-09-01 RX ORDER — CLOPIDOGREL BISULFATE 75 MG/1
75 TABLET ORAL DAILY
Status: DISCONTINUED | OUTPATIENT
Start: 2020-09-01 | End: 2020-09-02 | Stop reason: HOSPADM

## 2020-09-01 RX ORDER — LEVOFLOXACIN 5 MG/ML
500 INJECTION, SOLUTION INTRAVENOUS EVERY 24 HOURS
Status: DISCONTINUED | OUTPATIENT
Start: 2020-09-01 | End: 2020-09-01 | Stop reason: DRUGHIGH

## 2020-09-01 RX ORDER — CARVEDILOL 25 MG/1
25 TABLET ORAL 2 TIMES DAILY
Status: DISCONTINUED | OUTPATIENT
Start: 2020-09-01 | End: 2020-09-02 | Stop reason: HOSPADM

## 2020-09-01 RX ORDER — ATORVASTATIN CALCIUM 80 MG/1
80 TABLET, FILM COATED ORAL DAILY
Status: DISCONTINUED | OUTPATIENT
Start: 2020-09-01 | End: 2020-09-02 | Stop reason: HOSPADM

## 2020-09-01 RX ORDER — ZOLPIDEM TARTRATE 5 MG/1
5 TABLET ORAL NIGHTLY PRN
Status: DISCONTINUED | OUTPATIENT
Start: 2020-09-01 | End: 2020-09-02 | Stop reason: HOSPADM

## 2020-09-01 RX ORDER — SODIUM CHLORIDE 0.9 % (FLUSH) 0.9 %
10 SYRINGE (ML) INJECTION PRN
Status: DISCONTINUED | OUTPATIENT
Start: 2020-09-01 | End: 2020-09-02 | Stop reason: HOSPADM

## 2020-09-01 RX ORDER — PROMETHAZINE HYDROCHLORIDE 25 MG/1
12.5 TABLET ORAL EVERY 6 HOURS PRN
Status: DISCONTINUED | OUTPATIENT
Start: 2020-09-01 | End: 2020-09-02 | Stop reason: HOSPADM

## 2020-09-01 RX ORDER — LEVOFLOXACIN 5 MG/ML
250 INJECTION, SOLUTION INTRAVENOUS
Status: DISCONTINUED | OUTPATIENT
Start: 2020-09-02 | End: 2020-09-02 | Stop reason: HOSPADM

## 2020-09-01 RX ADMIN — HEPARIN SODIUM 5000 UNITS: 5000 INJECTION INTRAVENOUS; SUBCUTANEOUS at 07:03

## 2020-09-01 RX ADMIN — CARVEDILOL 25 MG: 25 TABLET, FILM COATED ORAL at 09:23

## 2020-09-01 RX ADMIN — ATORVASTATIN CALCIUM 80 MG: 80 TABLET, FILM COATED ORAL at 09:23

## 2020-09-01 RX ADMIN — CLOPIDOGREL BISULFATE 75 MG: 75 TABLET ORAL at 09:23

## 2020-09-01 RX ADMIN — ASPIRIN 81 MG: 81 TABLET, CHEWABLE ORAL at 09:23

## 2020-09-01 RX ADMIN — HEPARIN SODIUM 5000 UNITS: 5000 INJECTION INTRAVENOUS; SUBCUTANEOUS at 00:38

## 2020-09-01 RX ADMIN — FAMOTIDINE 20 MG: 20 TABLET, FILM COATED ORAL at 20:46

## 2020-09-01 RX ADMIN — FAMOTIDINE 20 MG: 20 TABLET, FILM COATED ORAL at 09:23

## 2020-09-01 RX ADMIN — FAMOTIDINE 20 MG: 20 TABLET, FILM COATED ORAL at 00:38

## 2020-09-01 RX ADMIN — SODIUM CHLORIDE 1000 ML: 9 INJECTION, SOLUTION INTRAVENOUS at 14:47

## 2020-09-01 RX ADMIN — HEPARIN SODIUM 5000 UNITS: 5000 INJECTION INTRAVENOUS; SUBCUTANEOUS at 14:47

## 2020-09-01 RX ADMIN — HYDROCODONE BITARTRATE AND ACETAMINOPHEN 1 TABLET: 10; 325 TABLET ORAL at 18:18

## 2020-09-01 RX ADMIN — HEPARIN SODIUM 5000 UNITS: 5000 INJECTION INTRAVENOUS; SUBCUTANEOUS at 20:46

## 2020-09-01 RX ADMIN — CARVEDILOL 25 MG: 25 TABLET, FILM COATED ORAL at 00:37

## 2020-09-01 ASSESSMENT — PAIN SCALES - GENERAL
PAINLEVEL_OUTOF10: 9
PAINLEVEL_OUTOF10: 0
PAINLEVEL_OUTOF10: 9

## 2020-09-01 ASSESSMENT — PAIN DESCRIPTION - PAIN TYPE: TYPE: CHRONIC PAIN

## 2020-09-01 ASSESSMENT — PAIN DESCRIPTION - LOCATION: LOCATION: BACK

## 2020-09-01 NOTE — ED PROVIDER NOTES
for pallor and rash. Neurological: Negative for dizziness, numbness and headaches. Psychiatric/Behavioral: Positive for confusion. Negative for agitation and behavioral problems. Positives and Pertinent negatives as per HPI. Except as noted above in the ROS, all other systems were reviewed and negative. PAST MEDICAL HISTORY     Past Medical History:   Diagnosis Date    Arthritis     Blood circulation, collateral     CAD (coronary artery disease)     CHF (congestive heart failure) (HCC)     GERD (gastroesophageal reflux disease)     History of heart artery stent 12/2018    Hyperlipidemia     Hypertension     Mitral valve prolapse     Myocardial infarct, old     Thyroid disease          SURGICAL HISTORY     Past Surgical History:   Procedure Laterality Date    COLONOSCOPY      EYE SURGERY Left 09/04/2018    PHACO EMULSIFICATION OF CATARACT WITH INTRAOCULAR LENS IMPLANT LEFT EYE     HYSTERECTOMY      OTHER SURGICAL HISTORY  08/27/2018    phacoemulsification of cataract with intraocular lens implant right eye    OR OFFICE/OUTPT VISIT,PROCEDURE ONLY Right 8/27/2018    PHACO EMULSIFICATION OF CATARACT WITH INTRAOCULAR LENS IMPLANT RIGHT EYE performed by Ton Schwartz MD at [address] [facility] OFFICE/OUTPT VISIT,PROCEDURE ONLY Left 9/4/2018    PHACO EMULSIFICATION OF CATARACT WITH INTRAOCULAR LENS IMPLANT LEFT EYE performed by Ton Schwartz MD at [address] [place]       Previous Medications    ASPIRIN 81 MG CHEWABLE TABLET    Take 1 tablet by mouth daily. ATORVASTATIN (LIPITOR) 80 MG TABLET    TAKE 1 TABLET BY MOUTH ONE TIME A DAY     CARVEDILOL (COREG) 25 MG TABLET    TAKE 1 TABLET BY MOUTH TWO TIMES A DAY     CLOPIDOGREL (PLAVIX) 75 MG TABLET    TAKE 1 TABLET BY MOUTH ONE TIME A DAY     COENZYME Q10 (CO Q 10) 100 MG CAPS    Take  by mouth daily.     FAMOTIDINE (PEPCID) 20 MG TABLET    Take 1 tablet by mouth 2 times daily    FLUTICASONE (FLONASE) 50 MCG/ACT NASAL SPRAY    2 sprays by Each Nostril route daily    HANDICAP PLACARD MISC    by Does not apply route Length: 5 years    HYDROCHLOROTHIAZIDE (HYDRODIURIL) 12.5 MG TABLET        HYDROCODONE-ACETAMINOPHEN (NORCO)  MG PER TABLET    Take 1 tablet by mouth every 6 hours as needed. Rene Kahn LOSARTAN (COZAAR) 50 MG TABLET        NITROGLYCERIN (NITROSTAT) 0.4 MG SL TABLET    Place 1 tablet under the tongue every 5 minutes as needed for Chest pain up to max of 3 total doses. If no relief after 1 dose, call 911. SPIRONOLACTONE (ALDACTONE) 25 MG TABLET    TAKE 1/2 TABLET BY MOUTH ONE TIME A DAY     VITAMIN D (CHOLECALCIFEROL) 1000 UNIT TABS TABLET    Take 2 tablets by mouth daily    ZOLPIDEM (AMBIEN) 5 MG TABLET    Take 1 tablet by mouth nightly as needed for Sleep for up to 180 days. ALLERGIES     Benazepril hcl; Feldene [piroxicam]; Hytrin [terazosin hcl]; Iv contrast [iodides]; Acetaminophen; Celebrex [celecoxib]; Cephalexin; Hydrochlorothiazide; Iodinated casein; Monopril [fosinopril sodium]; Protonix [pantoprazole sodium]; Quinapril hcl; Toprol xl [metoprolol succinate];  Ultracet [tramadol-acetaminophen]; and Ziac [bisoprolol-hydrochlorothiazide]    FAMILYHISTORY       Family History   Problem Relation Age of Onset    Heart Disease Mother     Heart Disease Father     Cancer Father     Cancer Sister     Diabetes Brother     Stroke Brother           SOCIAL HISTORY       Social History     Tobacco Use    Smoking status: Never Smoker    Smokeless tobacco: Never Used   Substance Use Topics    Alcohol use: No     Alcohol/week: 0.0 standard drinks    Drug use: No       SCREENINGS             PHYSICAL EXAM    (up to 7 for level 4, 8 or more for level 5)     ED Triage Vitals   BP Temp Temp Source Pulse Resp SpO2 Height Weight   08/31/20 1920 08/31/20 1920 08/31/20 1920 08/31/20 1920 08/31/20 1920 08/31/20 2027 -- 08/31/20 1916   (!) 166/68 98.1 °F (36.7 °C) Oral 73 14 98 %  140 lb (63.5 kg) Physical Exam  Vitals signs and nursing note reviewed. Constitutional:       Appearance: She is normal weight. HENT:      Head: Normocephalic and atraumatic. Eyes:      General: No scleral icterus. Extraocular Movements: Extraocular movements intact. Neck:      Musculoskeletal: Normal range of motion and neck supple. Cardiovascular:      Rate and Rhythm: Normal rate and regular rhythm. Heart sounds: Normal heart sounds. No murmur. Pulmonary:      Effort: Pulmonary effort is normal. No respiratory distress. Breath sounds: Normal breath sounds. Abdominal:      General: Bowel sounds are normal. There is no distension. Palpations: Abdomen is soft. Musculoskeletal: Normal range of motion. General: No swelling. Skin:     General: Skin is warm and dry. Coloration: Skin is not cyanotic. Findings: No erythema. Neurological:      Mental Status: She is alert and oriented to person, place, and time. Mental status is at baseline. GCS: GCS eye subscore is 4. GCS verbal subscore is 5. GCS motor subscore is 6. Cranial Nerves: No cranial nerve deficit. Sensory: No sensory deficit. Coordination: Romberg sign negative.    Psychiatric:         Mood and Affect: Mood normal.         Speech: Speech normal.         Behavior: Behavior normal.         DIAGNOSTIC RESULTS   LABS:    Labs Reviewed   CBC WITH AUTO DIFFERENTIAL - Abnormal; Notable for the following components:       Result Value    WBC 12.8 (*)     RBC 3.43 (*)     Hemoglobin 10.4 (*)     Hematocrit 31.8 (*)     Neutrophils Absolute 11.4 (*)     Lymphocytes Absolute 0.8 (*)     Bands Relative 42 (*)     Anisocytosis Occasional (*)     Polychromasia Occasional (*)     Poikilocytes Occasional (*)     All other components within normal limits    Narrative:     Performed at:  [facility]) [address], [address] [place] [address]   Phone [phone] Result   Persistent mild cardiomegaly. No acute pulmonary process. Ct Head Wo Contrast    Result Date: 8/31/2020  EXAMINATION: CT OF THE HEAD WITHOUT CONTRAST  8/31/2020 8:47 pm TECHNIQUE: CT of the head was performed without the administration of intravenous contrast. Dose modulation, iterative reconstruction, and/or weight based adjustment of the mA/kV was utilized to reduce the radiation dose to as low as reasonably achievable. COMPARISON: None. HISTORY: ORDERING SYSTEM PROVIDED HISTORY: AMS TECHNOLOGIST PROVIDED HISTORY: If patient is on cardiac monitor and/or pulse ox, they may be taken off cardiac monitor and pulse ox, left on O2 if currently on. All monitors reattached when patient returns to room. Has a \"code stroke\" or \"stroke alert\" been called? ->No Reason for exam:->AMS Reason for Exam: diarrhea  ams Acuity: Unknown Type of Exam: Unknown Relevant Medical/Surgical History: prolapsed heart valve FINDINGS: BRAIN/VENTRICLES: There is no acute intracranial hemorrhage, mass effect or midline shift. No abnormal extra-axial fluid collection. The gray-white differentiation is maintained without evidence of an acute infarct. There is prominence of the ventricles and sulci due to global parenchymal volume loss. There are nonspecific areas of hypoattenuation within the periventricular and subcortical white matter, which likely represent chronic microvascular ischemic change. ORBITS: The visualized portion of the orbits demonstrate no acute abnormality. SINUSES: The visualized paranasal sinuses and mastoid air cells demonstrate no acute abnormality. SOFT TISSUES/SKULL: No acute abnormality of the visualized skull or soft tissues. No acute intracranial abnormality.      Xr Chest Portable    Result Date: 8/31/2020  EXAMINATION: ONE XRAY VIEW OF THE CHEST 8/31/2020 7:59 pm COMPARISON: 07/02/2019 HISTORY: ORDERING SYSTEM PROVIDED HISTORY: AMS TECHNOLOGIST PROVIDED HISTORY: Reason for exam:->AMS Reason for Exam: AMS Acuity: Acute Type of Exam: Initial Relevant Medical/Surgical History: SEE EPIC FINDINGS: The cardiac silhouette remains mildly enlarged. Tiny calcified granuloma is seen in the right lung base. Lungs are otherwise grossly clear. Costophrenic angles are sharp. There is no pneumothorax. Degenerative changes and levoscoliosis of the thoracolumbar spine are noted. Persistent mild cardiomegaly. No acute pulmonary process. PROCEDURES   Unless otherwise noted below, none     Procedures    CRITICAL CARE TIME   N/A    CONSULTS:  IP CONSULT TO HOSPITALIST      EMERGENCY DEPARTMENT COURSE and DIFFERENTIAL DIAGNOSIS/MDM:   Vitals:    Vitals:    08/31/20 1920 08/31/20 2027 08/31/20 2139 08/31/20 2140   BP: (!) 166/68 (!) 151/49 (!) 139/57    Pulse: 73 88 85 89   Resp: 14 20 22 21   Temp: 98.1 °F (36.7 °C)      TempSrc: Oral      SpO2:  98%     Weight:           Patient was given the following medications:  Medications   0.9 % sodium chloride infusion (1,000 mLs Intravenous New Bag 8/31/20 2123)   levoFLOXacin (LEVAQUIN) 500 MG/100ML infusion 500 mg (500 mg Intravenous New Bag 8/31/20 2134)           Davonte Herr is an 80 y.o. female who presented for confusion after vomiting several times over the past few days. She was more oriented and did know who she was, where she was, and who the current president is. CT head and CXR are reassuring. However, she does have a Nitrate + UTI with left shift on CBC. She is also dehydrated with AGUILA. , mild hyponatremia. I believe she warrants inpatient workup and treatment. I discussed with hospitalist and they will admit to hospitalist service. She was started on and IV NS at 75/hr. Due to keflex allergy she was started on IV levaquin . FINAL IMPRESSION      1. Acute cystitis with hematuria    2. AGUILA (acute kidney injury) ([facility] [address]    3. Bandemia    4. Acute encephalopathy    5.  Hypokalemia          DISPOSITION/PLAN   DISPOSITION Decision To Admit 08/31/2020 09:31:07 PM      PATIENT REFERREDTO:  No follow-up provider specified.     DISCHARGE MEDICATIONS:  New Prescriptions    No medications on file       DISCONTINUED MEDICATIONS:  Discontinued Medications    No medications on file              (Please note that portions of this note were completed with a voice recognition program.  Efforts were made to edit the dictations but occasionally words are mis-transcribed.)    Genia Sidhu PA-C (electronically signed)            Wilfredo Cai PA-C  08/31/20 0174

## 2020-09-01 NOTE — PROGRESS NOTES
Peripheral Pulses: +2 palpable, equal bilaterally       Labs:   Recent Labs     08/31/20 1920 09/01/20  0713   WBC 12.8* 13.7*   HGB 10.4* 9.2*   HCT 31.8* 27.9*    159     Recent Labs     08/31/20 1920 09/01/20  0713   * 131*   K 4.6 4.2   CL 96* 99   CO2 19* 19*   BUN 44* 48*   CREATININE 2.2* 2.2*   CALCIUM 9.6 8.7     Recent Labs     08/31/20 1920   AST 33   ALT 15   BILITOT 0.6   ALKPHOS 89     No results for input(s): INR in the last 72 hours. No results for input(s): Scarlet Hodgkins in the last 72 hours. Urinalysis:      Lab Results   Component Value Date    NITRU POSITIVE 08/31/2020    WBCUA >100 08/31/2020    BACTERIA 4+ 08/31/2020    RBCUA see below 08/31/2020    BLOODU MODERATE 08/31/2020    SPECGRAV 1.020 08/31/2020    GLUCOSEU Negative 08/31/2020       Consults:    IP CONSULT TO HOSPITALIST      Assessment/Plan:    Active Hospital Problems    Diagnosis    AGUILA (acute kidney injury) ([facility] [address] [N17.9]     Priority: High    UTI (urinary tract infection) [N39.0]    Confusion [R41.0]       UTI - admission U/A c/w acute cystitis. Started on empiric Levaquin in ED on 31 August pending Cx results. Changed to DAILY dosing. ARF/CKD - baseline stage 3 w/out elevated BUN/Cr ratio but hx c/w pre-renal azotemia. Given IVF hydration and follow serial labs. Reviewed and documented as above. HypoNatremia - etiology clinically unable to determine but likely hypovolemic. Follow serial labs on IVF. Reviewed and documented as above. Anemia - etiology clinically unable to determine, w/out evidence of active bleeding/hemolysis. Asymptomatic w/out indication for transfusion. Follow serial labs. Reviewed and documented as above. Encephalopathy - acute metabolic, 2nd to above. Will continue to follow clinical response w/ supportive care PRN. HTN/CAD - w/ known CAD but no evidence of active signs/sxs of ischemia/failure.  Currently controlled on home meds w/ vitals reviewed and documented as above. CHF - chronic systolic failure w/ reduced EF 35-40% by Echo dated October 2018. No evidence of acute decompensation. Continue current medical mgt. Chronic back pain treated with as needed [address] - admitting to inpatient [facility] anticipate greater than 2 midnight stay for medical necessity     DVT Prophylaxis: SQ Heparin  Diet: DIET GENERAL;  Code Status: Full Code      PT/OT Eval Status: ordered and pending.      Dispo - Likely to home Wed/Thurs 2/3 Sept pending clinical course and PT/OT eval/recs    Hank Disla MD         Surrogate: Daughter, Nazario Tijerina, [phone]

## 2020-09-01 NOTE — H&P
Hospital Medicine History & Physical      PCP: Herlinda Jackson DO    Date of Admission: 8/31/2020    Date of Service: Pt seen/examined on 8/31/2020 at [facility]    Chief Complaint: Confusion      History Of Present Illness:    80 y.o. female with medical history including heart disease, heart failure, Hypertension presents with patient's daughter who called EMS after finding her confused at home. On interview with patient's daughter at bedside is reported to be improved with her mental status. The patient states that she has had dysuria and urinary frequency for the past day or so. She states she has been eating and drinking less due to general malaise. She denies fevers or chills. She denies flank pain. Past Medical History:        Diagnosis Date    Arthritis     Blood circulation, collateral     CAD (coronary artery disease)     CHF (congestive heart failure) (HCC)     Confusion 8/31/2020    GERD (gastroesophageal reflux disease)     History of heart artery stent 12/2018    Hyperlipidemia     Hypertension     Mitral valve prolapse     Myocardial infarct, old     Thyroid disease        Past Surgical History:        Procedure Laterality Date    COLONOSCOPY      EYE SURGERY Left 09/04/2018    PHACO EMULSIFICATION OF CATARACT WITH INTRAOCULAR LENS IMPLANT LEFT EYE     HYSTERECTOMY      OTHER SURGICAL HISTORY  08/27/2018    phacoemulsification of cataract with intraocular lens implant right eye    CO OFFICE/OUTPT VISIT,PROCEDURE ONLY Right 8/27/2018    PHACO EMULSIFICATION OF CATARACT WITH INTRAOCULAR LENS IMPLANT RIGHT EYE performed by Theron Carlos MD at [address] [facility] OFFICE/OUTPT [facility] Left 9/4/2018    PHACO EMULSIFICATION OF CATARACT WITH INTRAOCULAR LENS IMPLANT LEFT EYE performed by Theron Carlos MD at [address] [place] [address]         Medications Prior to Admission:   Prior to Admission medications    Medication Sig Start Date End Date Taking? Authorizing Provider   zolpidem (AMBIEN) 5 MG tablet Take 1 tablet by mouth nightly as needed for Sleep for up to 180 days. 4/28/20 10/25/20  Debra Shahid DO   spironolactone (ALDACTONE) 25 MG tablet TAKE 1/2 TABLET BY MOUTH ONE TIME A DAY  4/10/20   DAVE Paul CNP   hydrochlorothiazide (HYDRODIURIL) 12.5 MG tablet  1/15/20   Historical Provider, MD   losartan (COZAAR) 50 MG tablet  1/15/20   Historical Provider, MD   clopidogrel (PLAVIX) 75 MG tablet TAKE 1 TABLET BY MOUTH ONE TIME A DAY  2/26/20   Ximena Naik MD   carvedilol (COREG) 25 MG tablet TAKE 1 TABLET BY MOUTH TWO TIMES A DAY  1/2/20   Ximena Naik MD   atorvastatin (LIPITOR) 80 MG tablet TAKE 1 TABLET BY MOUTH ONE TIME A DAY  12/11/19   Ximena Naik MD   fluticasone [facility]) 50 MCG/ACT nasal spray 2 sprays by Each Nostril route daily 7/2/19   Debra Shahid DO   Handicap Placard MISC by Does not apply route Length: 5 years 5/7/19   Debra Shahid DO   nitroGLYCERIN (NITROSTAT) 0.4 MG SL tablet Place 1 tablet under the tongue every 5 minutes as needed for Chest pain up to max of 3 total doses. If no relief after 1 dose, call 911. 4/30/19   Ximena Naik MD   vitamin D (CHOLECALCIFEROL) 1000 UNIT TABS tablet Take 2 tablets by mouth daily 11/8/18   Ximena Naik MD   famotidine (PEPCID) 20 MG tablet Take 1 tablet by mouth 2 times daily  Patient taking differently: Take 20 mg by mouth 2 times daily as needed  4/4/17   Cathy Merlos MD   HYDROcodone-acetaminophen [facility])  MG per tablet Take 1 tablet by mouth every 6 hours as needed. . 1/3/17   Historical Provider, MD   Coenzyme Q10 (CO Q 10) 100 MG CAPS Take  by mouth daily. Historical Provider, MD   aspirin 81 MG chewable tablet Take 1 tablet by mouth daily. 1/16/14   Geraldo Sylvester MD       Allergies:  Benazepril hcl; Feldene [piroxicam]; Hytrin [terazosin hcl]; Iv contrast [iodides]; Acetaminophen; Celebrex [celecoxib]; Cephalexin; Hydrochlorothiazide;  Iodinated 10.4*   HCT 31.8*        Recent Labs     08/31/20 1920   *   K 4.6   CL 96*   CO2 19*   BUN 44*   CREATININE 2.2*   CALCIUM 9.6     Recent Labs     08/31/20 1920   AST 33   ALT 15   BILITOT 0.6   ALKPHOS 89     No results for input(s): INR in the last 72 hours. No results for input(s): Monique Raymon in the last 72 hours. Urinalysis:      Lab Results   Component Value Date    NITRU POSITIVE 08/31/2020    WBCUA >100 08/31/2020    BACTERIA 4+ 08/31/2020    RBCUA see below 08/31/2020    BLOODU MODERATE 08/31/2020    SPECGRAV 1.020 08/31/2020    GLUCOSEU Negative 08/31/2020       EKG:    I have reviewed the EKG with the following interpretation: Not available for review    Radiology:     CT HEAD WO CONTRAST   Final Result   No acute intracranial abnormality. XR CHEST PORTABLE   Final Result   Persistent mild cardiomegaly. No acute pulmonary process. ASSESSMENT  1. Urinary tract infection. Leukocytosis, positive nitrite and leukocyte. No previous cultures for review. 2. Confusion. Likely secondary to infection improved with fluids  3. Acute on chronic kidney disease. Current creatinine 12.2 with GFR of 21. Baseline is approximately 1.7 with GFR of 29. Likely prerenal secondary to poor p.o. intake  4. Mild hyponatremia with sodium of 131  5. Chronic normocytic anemia stable at 10.4  6. Coronary artery disease on aspirin, Plavix, Coreg, atorvastatin. No evidence of ischemia  7. Heart failure with reduced ejection fraction, 35 to 40% in October 2018. Moderate MR. No evidence of exacerbation. Continue beta-blocker, spironolactone, losartan. 8. Hypertension adequately controlled  9. Chronic back pain treated with as needed Norco 10/3/2025    PLAN  1. Admit for observation and IV antibiotics. Levofloxacin to be used given Keflex allergy. 2. IV hydration and repeat BMP for evaluation of renal function and mild hyponatremia  3.  Continue home medications for chronic comorbid's    DVT Prophylaxis: Heparin subcutaneous  Diet: Regular  Code Status: Full code  Surrogate: Daughter, Larisa Rachel, [phone]    Dispo - admitting to inpatient [facility] anticipate greater than 2 midnight stay for medical necessity        Deandra Broderick MD    Thank you Drew Bañuelos DO for the opportunity to be involved in this patient's care. If you have any questions or concerns please feel free to contact me at [phone].

## 2020-09-01 NOTE — CARE COORDINATION
CASE MANAGEMENT INITIAL ASSESSMENT      Reviewed chart and completed assessment with: Humera Kinney, pt daughter  Explained Case Management role/services. Primary contact information: Javier Cruz [phone] and Alessia July [phone], children    Admit date/status: 8/31/2020  Diagnosis: UTI  Is this a Readmission?: No  Insurance: Medicare  Precert required for SNF -  N        3 night stay required - waived    Living arrangements, Adls, care needs, prior to admission: Pt lives w/ Jeff, w/dementia, and Rissa Box, son who is caretaker. Pt is IPTA, son drives to appointments. Home is a ranch w/2 1\" steps that have been modified for the pt and spouse. Transportation: private    [address] at home: Walker__Cane_x_RTS__ BSC__Shower Chair_x_  02__ HHN__ CPAP__  BiPap__  Hospital Bed__ W/C___ Other__________    Services in the home and/or outpatient, prior to admission: none      PT/OT recs: home w/24 hr asst    Hospital Exemption Notification (HEN):  Not initiated at this time    Barriers to discharge: none    Plan/comments: Family plans to take pt home when she medically ready for discharge;  Family is quite close and there is always someone there with pt and spouse. Spouse has dementia at baseline and son, Rissa Box lives there full time. Family will transport home and are open to home PT/OT should it be needed. DCP will follow for needs should they arise.   Xiang Benz RN      ECOC on chart for MD signature

## 2020-09-01 NOTE — PROGRESS NOTES
Comprehensive Nutrition Assessment    Type and Reason for Visit:  Initial, Positive Nutrition Screen(weight loss, decreased appetite)    Nutrition Recommendations/Plan:   1. Continue general diet as tolerated  2. Offer Ensure with meals due to history of weight loss and decreased po intake  3. Will monitor nutritional adequacy, nutrition-related labs, weights, BMs, and clinical progress     Nutrition Assessment:  Patient admitted for acute kidney injury, hyponatremic possibly from hypovolemia per MD.  Normal saline at 75 ml/hr. Currently on a general diet. No po recorded yet. Patient resting quietly at this time. Will continue to monitor. Malnutrition Assessment:  Malnutrition Status: At risk for malnutrition (Comment)      Estimated Daily Nutrient Needs:  Energy (kcal):  9364-6982; Weight Used for Energy Requirements:  Current(62.6 kg)     Protein (g):  82-94; Weight Used for Protein Requirements:  (1.3-1.5)        Fluid (ml/day):   ; Weight Used for Fluid Requirements:    per MD     Nutrition Related Findings:  BM on 8/31/20      Wounds:  None       Current Nutrition Therapies:    DIET GENERAL;  Dietary Nutrition Supplements: Standard High Calorie Oral Supplement    Anthropometric Measures:  · Height: 5' 2\" (157.5 cm)  · Current Body Weight: 138 lb (62.6 kg)    · Ideal Body Weight: 110 lbs; % Ideal Body Weight     · BMI: 25.2  · BMI Categories: Overweight (BMI 25.0-29. 9)       Nutrition Diagnosis:   · Increased nutrient needs related to increase demand for energy/nutrients as evidenced by weight loss(hyponatremic; reported decreased po intake prior to admission)      Nutrition Interventions:   Food and/or Nutrient Delivery:  Continue Current Diet, Start Oral Nutrition Supplement  Coordination of Nutrition Care:  Continued Inpatient Monitoring    Goals:  Patient will eat 50% or greater of meals and supplements.        Nutrition Monitoring and Evaluation:   Food/Nutrient Intake Outcomes:  Food and

## 2020-09-01 NOTE — PROGRESS NOTES
Occupational Therapy   Occupational Therapy Initial Assessment/Treatment   Date: 2020   Patient Name: Tisha Case  MRN: [id number]     : 1935    Date of Service: 2020    Discharge Recommendations:  24 hour supervision or assist       Assessment   Performance deficits / Impairments: Decreased functional mobility ; Decreased endurance;Decreased ADL status; Decreased strength  Assessment: Pt 79 yo female functioning with deficits in the areas listed above following increased confusion at home. Pt lives at home with family and reports IND PLOF. Pt is currently at a SBA-CGA level for functional mobility/transfers. Pt would benefit from skilled OT services while in acute care to return to baseline functioning. Prognosis: Good  Decision Making: Low Complexity  OT Education: OT Role;Plan of Care;ADL Adaptive Strategies  Patient Education: disease specific: importance of OOB activity  REQUIRES OT FOLLOW UP: Yes  Activity Tolerance  Activity Tolerance: Patient Tolerated treatment well  Safety Devices  Safety Devices in place: Yes  Type of devices: Call light within reach; Chair alarm in place;Gait belt;Nurse notified; Left in chair           Patient Diagnosis(es): The primary encounter diagnosis was Acute cystitis with hematuria. Diagnoses of AGUILA (acute kidney injury) ([address] Bandemia, Acute encephalopathy, and Hypokalemia were also pertinent to this visit. has a past medical history of Arthritis, Blood circulation, collateral, CAD (coronary artery disease), CHF (congestive heart failure) ([address] Confusion, GERD (gastroesophageal reflux disease), History of heart artery stent, Hyperlipidemia, Hypertension, Mitral valve prolapse, Myocardial infarct, old, and Thyroid disease. has a past surgical history that includes Hysterectomy;  Thyroidectomy; Colonoscopy; other surgical history (2018); pr office/outpt visit,procedure only (Right, 2018); eye surgery (Left, 2018); and pr office/outpt visit,procedure only (Left, 9/4/2018). Restrictions  Restrictions/Precautions  Restrictions/Precautions: General Precautions, Fall Risk    Subjective   General  Chart Reviewed: Yes  Patient assessed for rehabilitation services?: Yes  Family / Caregiver Present: No  Referring Practitioner: Hank Disla MD  Diagnosis: confusion  Subjective  Subjective: Pt agreeable to therapy  General Comment  Comments: RN approved therapy   Pain: denies      Social/Functional History  Social/Functional History  Lives With: Spouse(son)  Type of Home: House  Home Layout: One level  Home Access: Stairs to enter with rails  Entrance Stairs - Number of Steps: 4 platform  Bathroom Shower/Tub: Tub/Shower unit, Shower chair with back  Bathroom Toilet: Standard  Bathroom Equipment: Grab bars in [address]: Cane, Rolling walker  ADL Assistance: Independent  Homemaking Responsibilities: No  Ambulation Assistance: Independent(cane)  Transfer Assistance: Independent  Active : Yes       Objective   Vision: Within Functional Limits  Hearing: Within functional limits    Orientation  Overall Orientation Status: Within Functional Limits  Observation/Palpation  Posture: Fair  Balance  Sitting Balance: Stand by assistance  Standing Balance: Contact guard assistance(no AD)  Functional Mobility  Functional - Mobility Device: No device(gait belt)  Activity: Other(around bed to chair)  Assist Level: Contact guard assistance  ADL  Feeding: Setup  Tone RUE  RUE Tone: Normotonic  Tone LUE  LUE Tone: Normotonic  Coordination  Movements Are Fluid And Coordinated: Yes     Bed mobility  Supine to Sit: Supervision  Sit to Supine: Unable to assess(up in chair)  Transfers  Sit to stand: Stand by assistance  Stand to sit: Stand by assistance     Cognition  Overall Cognitive Status: Exceptions  Arousal/Alertness: Appropriate responses to stimuli  Following Commands:  Follows multistep commands with repitition  Problem Solving: Assistance

## 2020-09-01 NOTE — PROGRESS NOTES
Physical Therapy    Facility/Department: [place] [id number] - MED SURG/ORTHO  Initial Assessment    NAME: Dwaine Castro  : 1935  MRN: [id number]    Date of Service: 2020    Discharge Recommendations:  24 hour supervision or assist, Home with Home health PT   PT Equipment Recommendations  Equipment Needed: No    Assessment   Body structures, Functions, Activity limitations: Decreased functional mobility ; Decreased balance;Decreased endurance;Decreased strength  Assessment: Pt is 79 yo female who presents with diagnosis of UTI. Pt indep to mod I with mobility at home. Grossly CGA for gait training within room this date. Pt would benefit from continued skilled therapy to address deficits including BLE strength, activity tolerance, and balance. Recommend home with initial 24-hr supervision and home PT at d/c. Treatment Diagnosis: impaired functional mobility  Specific instructions for Next Treatment: progress mobility as tolerated  Prognosis: Good  Decision Making: Low Complexity  PT Education: Goals; General Safety;Gait Training;PT Role;Disease Specific Education;Plan of Care; Functional Mobility Training;Transfer Training  Patient Education: Pt educated on importance of OOB mobility to improve strength and activity tolerance -- pt verbalized understanding  Barriers to Learning: none  REQUIRES PT FOLLOW UP: Yes  Activity Tolerance  Activity Tolerance: Patient Tolerated treatment well       Patient Diagnosis(es): The primary encounter diagnosis was Acute cystitis with hematuria. Diagnoses of AGUILA (acute kidney injury) ([address] Bandemia, Acute encephalopathy, and Hypokalemia were also pertinent to this visit.      has a past medical history of Arthritis, Blood circulation, collateral, CAD (coronary artery disease), CHF (congestive heart failure) ([address] Confusion, GERD (gastroesophageal reflux disease), History of heart artery stent, Hyperlipidemia, Hypertension, Mitral valve prolapse, Myocardial infarct, old, and Thyroid disease. has a past surgical history that includes Hysterectomy; Thyroidectomy; Colonoscopy; other surgical history (08/27/2018); pr office/outpt visit,procedure only (Right, 8/27/2018); eye surgery (Left, 09/04/2018); and pr office/outpt visit,procedure only (Left, 9/4/2018).     Restrictions  Restrictions/Precautions  Restrictions/Precautions: General Precautions, Fall Risk  Vision/Hearing  Vision: Within Functional Limits  Hearing: Within functional limits     Subjective  General  Chart Reviewed: Yes  Patient assessed for rehabilitation services?: Yes  Response To Previous Treatment: Not applicable  Family / Caregiver Present: No  Referring Practitioner: Dr. Morro Monteiro MD  Referral Date : 09/01/20  Diagnosis: UTI  Follows Commands: Within Functional Limits  General Comment  Comments: Pt resting in bed on approach; RN cleared pt for therapy  Subjective  Subjective: Pt agreeable to therapy, wanting to sit up in chair to eat breakfast  Pain Screening  Patient Currently in Pain: Denies       Orientation  Orientation  Overall Orientation Status: Within Functional Limits  Social/Functional History  Social/Functional History  Lives With: Spouse(son)  Type of Home: House  Home Layout: One level  Home Access: Stairs to enter with rails  Entrance Stairs - Number of Steps: 4 platform  Bathroom Shower/Tub: Tub/Shower unit, Shower chair with back  Bathroom Toilet: Standard  Bathroom Equipment: Grab bars in [address]: Cane, Rolling walker  ADL Assistance: Independent  Homemaking Responsibilities: No  Ambulation Assistance: Independent(cane)  Transfer Assistance: Independent  Active : Yes    Objective     RLE AROM: WFL  LLE AROM : WFL  Strength RLE  Comment: Grossly 4/5 throughout  Strength LLE  Comment: Grossly 4/5 throughout     Sensation  Overall Sensation Status: WFL     Bed mobility  Supine to Sit: Supervision  Sit to Supine: Unable to assess(pt up in chair at end of session)     Transfers  Sit to Stand: Stand by assistance  Stand to sit: Stand by assistance     Ambulation  Ambulation?: Yes  Ambulation 1  Surface: level tile  Device: No Device  Assistance: Contact guard assistance  Quality of Gait: Increased M/L sway. Mild unsteadiness with no overt LOB. Narrow AURELIO  Gait Deviations: Decreased step length;Decreased step height;Decreased arm swing  Distance: 15 ft  Comments: pt declined further gait training d/t wanting to eat breakfast     Balance  Sitting - Static: Good  Sitting - Dynamic: Good  Standing - Static: Fair;+  Standing - Dynamic: [address]  Times per week: 3-5x/wk  Times per day: Daily  Specific instructions for Next Treatment: progress mobility as tolerated  Current Treatment Recommendations: Strengthening, Neuromuscular Re-education, Home Exercise Program, Safety Education & Training, Balance Training, Endurance Training, Patient/Caregiver Education & Training, Functional Mobility Training, Transfer Training, Gait Training, Stair training  Safety Devices  Type of devices: All fall risk precautions in place, Call light within reach, Chair alarm in place, Gait belt, Patient at risk for falls, Nurse notified, Left in chair                                   AM-PAC Score  AM-PAC Inpatient Mobility Raw Score : 21 (09/01/20 1433)  AM-PAC Inpatient T-Scale Score : 50.25 (09/01/20 1433)  Mobility Inpatient CMS 0-100% Score: 28.97 (09/01/20 1433)  Mobility Inpatient CMS G-Code Modifier : Aryan Reina (09/01/20 1433)          Goals  Short term goals  Time Frame for Short term goals: 1 week (9/08) unless otherwise specified  Short term goal 1: Pt will be indep with bed mobility. Short term goal 2: Pt will be supervision for transfers with LRAD. Short term goal 3: Pt will ambulate 150 ft with supervision and LRAD. Short term goal 4: Pt will negotiate 4 stairs with supervision. Short term goal 5: 9/05: Pt will participate in 12-15 reps of BLE exercises to promote strength and activity tolerance.   Patient Goals   Patient goals : \"to go home\"       Therapy Time   Individual Concurrent Group Co-treatment   Time In 0910         Time Out 0930         Minutes 20         Timed Code Treatment Minutes: [facility], PT, DPT #[id number]  If pt is unable to be seen after this session, please let this note serve as discharge summary. Please see case management note for discharge disposition. Thank you.

## 2020-09-01 NOTE — PROGRESS NOTES
4 Eyes Skin Assessment     The patient is being assess for   Admission    I agree that 2 RN's have performed a thorough Head to Toe Skin Assessment on the patient. ALL assessment sites listed below have been assessed. Areas assessed by both nurses:   [x]   Head, Face, and Ears   [x]   Shoulders, Back, and Chest, Abdomen  [x]   Arms, Elbows, and Hands   [x]   Coccyx, Sacrum, and Ischium  [x]   Legs, Feet, and Heels            **SHARE this note so that the co-signing nurse is able to place an eSignature**    Co-signer eSignature: Electronically signed by Kris Patel RN on 9/1/20 at 3:26 AM EDT    Does the Patient have Skin Breakdown?   No          Michel Prevention initiated:  Yes   Wound Care Orders initiated:  NA      [facility] nurse consulted for Pressure Injury (Stage 3,4, Unstageable, DTI, NWPT, Complex wounds)and New or Established Ostomies:  NA      Primary Nurse eSignature: Electronically signed by Mila Dial RN on 9/1/20 at 2:58 AM EDT

## 2020-09-02 VITALS
TEMPERATURE: 97.9 F | DIASTOLIC BLOOD PRESSURE: 57 MMHG | OXYGEN SATURATION: 93 % | WEIGHT: 138 LBS | RESPIRATION RATE: 14 BRPM | SYSTOLIC BLOOD PRESSURE: 121 MMHG | BODY MASS INDEX: 25.4 KG/M2 | HEART RATE: 62 BPM | HEIGHT: 62 IN

## 2020-09-02 LAB
ALBUMIN SERPL-MCNC: 2.7 G/DL (ref 3.4–5)
ANION GAP SERPL CALCULATED.3IONS-SCNC: 13 MMOL/L (ref 3–16)
BUN BLDV-MCNC: 53 MG/DL (ref 7–20)
CALCIUM SERPL-MCNC: 8.5 MG/DL (ref 8.3–10.6)
CHLORIDE BLD-SCNC: 99 MMOL/L (ref 99–110)
CO2: 19 MMOL/L (ref 21–32)
CREAT SERPL-MCNC: 2.5 MG/DL (ref 0.6–1.2)
GFR AFRICAN AMERICAN: 22
GFR NON-AFRICAN AMERICAN: 18
GLUCOSE BLD-MCNC: 125 MG/DL (ref 70–99)
HCT VFR BLD CALC: 27.4 % (ref 36–48)
HEMOGLOBIN: 9 G/DL (ref 12–16)
MCH RBC QN AUTO: 30.5 PG (ref 26–34)
MCHC RBC AUTO-ENTMCNC: 32.9 G/DL (ref 31–36)
MCV RBC AUTO: 92.6 FL (ref 80–100)
ORGANISM: ABNORMAL
PDW BLD-RTO: 14.3 % (ref 12.4–15.4)
PHOSPHORUS: 2.2 MG/DL (ref 2.5–4.9)
PLATELET # BLD: 156 K/UL (ref 135–450)
PMV BLD AUTO: 8.8 FL (ref 5–10.5)
POTASSIUM SERPL-SCNC: 3.9 MMOL/L (ref 3.5–5.1)
RBC # BLD: 2.96 M/UL (ref 4–5.2)
SODIUM BLD-SCNC: 131 MMOL/L (ref 136–145)
URINE CULTURE, ROUTINE: ABNORMAL
WBC # BLD: 10.2 K/UL (ref 4–11)

## 2020-09-02 PROCEDURE — 80069 RENAL FUNCTION PANEL: CPT

## 2020-09-02 PROCEDURE — 85027 COMPLETE CBC AUTOMATED: CPT

## 2020-09-02 PROCEDURE — 2580000003 HC RX 258: Performed by: PHYSICIAN ASSISTANT

## 2020-09-02 PROCEDURE — 6360000002 HC RX W HCPCS: Performed by: PEDIATRICS

## 2020-09-02 PROCEDURE — 2580000003 HC RX 258: Performed by: PEDIATRICS

## 2020-09-02 PROCEDURE — 6370000000 HC RX 637 (ALT 250 FOR IP): Performed by: PEDIATRICS

## 2020-09-02 PROCEDURE — 36415 COLL VENOUS BLD VENIPUNCTURE: CPT

## 2020-09-02 RX ORDER — LEVOFLOXACIN 250 MG/1
250 TABLET ORAL DAILY
Qty: 5 TABLET | Refills: 0 | Status: SHIPPED | OUTPATIENT
Start: 2020-09-02 | End: 2020-09-07

## 2020-09-02 RX ADMIN — HYDROCODONE BITARTRATE AND ACETAMINOPHEN 1 TABLET: 10; 325 TABLET ORAL at 06:19

## 2020-09-02 RX ADMIN — CARVEDILOL 25 MG: 25 TABLET, FILM COATED ORAL at 08:29

## 2020-09-02 RX ADMIN — CLOPIDOGREL BISULFATE 75 MG: 75 TABLET ORAL at 08:30

## 2020-09-02 RX ADMIN — ASPIRIN 81 MG: 81 TABLET, CHEWABLE ORAL at 08:29

## 2020-09-02 RX ADMIN — HYDROCODONE BITARTRATE AND ACETAMINOPHEN 1 TABLET: 10; 325 TABLET ORAL at 00:14

## 2020-09-02 RX ADMIN — SODIUM CHLORIDE 1000 ML: 9 INJECTION, SOLUTION INTRAVENOUS at 00:15

## 2020-09-02 RX ADMIN — HEPARIN SODIUM 5000 UNITS: 5000 INJECTION INTRAVENOUS; SUBCUTANEOUS at 06:19

## 2020-09-02 RX ADMIN — FAMOTIDINE 20 MG: 20 TABLET, FILM COATED ORAL at 08:30

## 2020-09-02 RX ADMIN — Medication 10 ML: at 08:29

## 2020-09-02 RX ADMIN — ATORVASTATIN CALCIUM 80 MG: 80 TABLET, FILM COATED ORAL at 08:29

## 2020-09-02 ASSESSMENT — PAIN SCALES - GENERAL
PAINLEVEL_OUTOF10: 7
PAINLEVEL_OUTOF10: 8

## 2020-09-02 NOTE — PROGRESS NOTES
Alert and oriented, thought content appropriate, normal insight  Capillary Refill: Brisk,< 3 seconds   Peripheral Pulses: +2 palpable, equal bilaterally       Labs:   Recent Labs     08/31/20 1920 09/01/20  0713   WBC 12.8* 13.7*   HGB 10.4* 9.2*   HCT 31.8* 27.9*    159     Recent Labs     08/31/20 1920 09/01/20  0713   * 131*   K 4.6 4.2   CL 96* 99   CO2 19* 19*   BUN 44* 48*   CREATININE 2.2* 2.2*   CALCIUM 9.6 8.7     Recent Labs     08/31/20 1920   AST 33   ALT 15   BILITOT 0.6   ALKPHOS 89     No results for input(s): INR in the last 72 hours. No results for input(s): Charlynandae Kurtker in the last 72 hours. Urinalysis:      Lab Results   Component Value Date    NITRU POSITIVE 08/31/2020    WBCUA >100 08/31/2020    BACTERIA 4+ 08/31/2020    RBCUA see below 08/31/2020    BLOODU MODERATE 08/31/2020    SPECGRAV 1.020 08/31/2020    GLUCOSEU Negative 08/31/2020       Consults:    IP CONSULT TO HOSPITALIST      Assessment/Plan:    Active Hospital Problems    Diagnosis    AGUILA (acute kidney injury) ([facility] [N17.9]     Priority: High    UTI (urinary tract infection) [N39.0]    Confusion [R41.0]       UTI - admission U/A c/w acute cystitis. Started on empiric Levaquin in ED on 31 August pending Cx results - demnstrating >100K nearly pan-sensitive E Coli. Changed to DAILY dosing. ARF/CKD - baseline stage 3 w/out elevated BUN/Cr ratio but hx c/w pre-renal azotemia. Given IVF hydration and followed serial labs. Reviewed and documented as above. HypoNatremia - etiology clinically unable to determine but likely hypovolemic. Follow serial labs on IVF, now d/c'd and taking adequate PO. Reviewed and documented as above. Anemia - etiology clinically unable to determine, w/out evidence of active bleeding/hemolysis. Stable and asymptomatic w/out indication for transfusion. Follow serial labs. Reviewed and documented as above. Encephalopathy - acute metabolic, 2nd to above - resolved.   Will

## 2020-09-02 NOTE — CARE COORDINATION
[facility]    DC order noted, all docs needed have been faxed to [facility] for home care services.     Home care to see patient within 24-48 hrs    Michelle Looney RN, BSN CTN  [facility] [phone]

## 2020-09-02 NOTE — CARE COORDINATION
[facility]    Referral received from  to follow for home care services. I will follow for needs, and speak with patient to verify demos.     Nazia Carranza RN, BSN CTN  [facility] [phone]

## 2020-09-02 NOTE — DISCHARGE INSTR - COC
Continuity of Care Form    Patient Name: Candice Lange   :  1935  MRN:  [id number]    Admit date:  2020  Discharge date:  2020    Code Status Order: Full Code   Advance Directives:   [address] Directive Type of Healthcare Directive Copy in [address] Agent's Name Healthcare Agent's Phone Number    20 0023  Yes, patient has an advance directive for healthcare treatment  --  --  --  --  --          Admitting Physician:  Emily Trimble MD  PCP: Alina Parkinson DO    Discharging Nurse: [facility] Unit/Room#: 1433/1524-48  Discharging Unit Phone Number: ***    Emergency Contact:   Extended Emergency Contact Information  Primary Emergency Contact: Vic Dykes  Address: [address] [place] [address] [address] [facility] Phone: [phone]  Work Phone: [phone]  Mobile Phone: [phone]  Relation: Spouse  Secondary Emergency Contact: Hernando Rayo [address] Phone: [phone]  Mobile Phone: [phone]  Relation: Child    Past Surgical History:  Past Surgical History:   Procedure Laterality Date    COLONOSCOPY      EYE SURGERY Left 2018    PHACO EMULSIFICATION OF CATARACT WITH INTRAOCULAR LENS IMPLANT LEFT EYE     HYSTERECTOMY      OTHER SURGICAL HISTORY  2018    phacoemulsification of cataract with intraocular lens implant right eye    [place] OFFICE/OUTPT VISIT,PROCEDURE ONLY Right 2018    PHACO EMULSIFICATION OF CATARACT WITH INTRAOCULAR LENS IMPLANT RIGHT EYE performed by Divine Garcia MD at [address] [facility] OFFICE/OUTPT [facility] Left 2018    PHACO EMULSIFICATION OF CATARACT WITH INTRAOCULAR LENS IMPLANT LEFT EYE performed by Divine Garcia MD at [address] [place] [address]         Immunization History:   Immunization History   Administered Date(s) Administered    Influenza Vaccine, unspecified formulation 12/09/2016    Influenza Virus Vaccine 10/13/2013, 10/02/2015    Influenza, Michael Carmen, 6 mo and older, IM, PF (Flulaval, Fluarix) 11/07/2018    Pneumococcal Conjugate 13-valent ([id number]) 06/19/2018    Pneumococcal Polysaccharide ([id number]) 01/15/2014       Active Problems:  Patient Active Problem List   Diagnosis Code    Other and unspecified angina pectoris I20.9    Coronary atherosclerosis of native coronary artery I25.10    Other chronic pulmonary heart diseases I27.89    Mitral valve disorder I05.9    Acute combined systolic and diastolic heart failure (HCC) I50.41    Chronic low back pain M54.5, G89.29    Essential hypertension I10    AGUILA (acute kidney injury) ([facility] N17.9    Renal insufficiency N28.9    Gout M10.9    Pain in joint, hand M25.549    Mixed hyperlipidemia E78.2    Primary insomnia F51.01    Closed stable burst fracture of first lumbar vertebra ([facility]) S32.011A    GERD (gastroesophageal reflux disease) K21.9    Palpitations R00.2    V-tach ([facility]) I47.2    Abnormal myocardial perfusion study R94.39    Ischemic cardiomyopathy I25.5    Cardiomyopathy ([facility]) I42.9    PVD (posterior vitreous detachment), both eyes H43.813    Posterior subcapsular polar age-related cataract of both eyes H25.043    UTI (urinary tract infection) N39.0    Confusion R41.0       Isolation/Infection:   Isolation          No Isolation        Patient Infection Status     None to display          Nurse Assessment:  Last Vital Signs: BP (!) 121/57   Pulse 62   Temp 97.9 °F (36.6 °C) (Oral)   Resp 14   Ht 5' 2\" (1.575 m)   Wt 138 lb (62.6 kg)   SpO2 93%   BMI 25.24 kg/m²     Last documented pain score (0-10 scale): Pain Level: 7  Last Weight:   Wt Readings from Last 1 Encounters:   09/01/20 138 lb (62.6 kg)     Mental Status:  oriented and alert    IV Access:  - None    Nursing Mobility/ADLs:  Walking   Independent  Transfer  Independent  Bathing  Independent  Dressing  Independent  Toileting & insurance for potential AL, [place], LTC, and Medicaid options   Palliative Care referral within 5 days of hospital discharge   PCP Visit scheduled within 3 - 7 days of hospital discharge [address] (If patient is agreeable and meets guidelines)      Patient's personal belongings (please select all that are sent with patient):  None    RN SIGNATURE:  Electronically signed by Argelia Gomez RN on 9/2/20 at 2:14 PM EDT    CASE MANAGEMENT/SOCIAL WORK SECTION    Inpatient Status Date: ***    Readmission Risk Assessment Score:  Readmission Risk              Risk of Unplanned Readmission:        18           Discharging to Facility/ [address]   [address] [facility]   [address] [place] [id number]      / signature: Electronically signed by Amos Lara RN on 9/2/20 at 10:30 AM EDT    PHYSICIAN SECTION    Prognosis: Good    Condition at Discharge: Stable    Rehab Potential (if transferring to Rehab): Good    Recommended Labs or Other Treatments After Discharge: None    Physician Certification: I certify the above information and transfer of Brody Sanchez  is necessary for the continuing treatment of the diagnosis listed and that she requires Home Care for less 30 days.      Update Admission H&P: No change in H&P    PHYSICIAN SIGNATURE:  Electronically signed by Bo Gilliland MD on 9/2/20 at 2:26 PM EDT

## 2020-09-02 NOTE — CARE COORDINATION
ZURDO notes therapy recs for home PT/OT. Met with pt at bedside and she is in agreement. Spoke to daughter Helen Paredes and updated her. No agency preference, OK for referral to [facility] [employer]. Spoke to Suze Ko with [facility] and spoke to Dr. Dunia Pool to [facility]. orders placed. No other needs identified. Pt will return back home with current support system in place.  Matthew Obregon RN

## 2020-09-03 ENCOUNTER — CARE COORDINATION (OUTPATIENT)
Dept: CASE MANAGEMENT | Age: 85
End: 2020-09-03

## 2020-09-03 PROCEDURE — 1111F DSCHRG MED/CURRENT MED MERGE: CPT | Performed by: FAMILY MEDICINE

## 2020-09-03 NOTE — DISCHARGE SUMMARY
Hospital Medicine Discharge Summary    Patient ID: Davonte Herr      Patient's PCP: Leandrew Dakins, DO    Admit Date: 8/31/2020     Discharge Date: 9/2/2020      Admitting Physician: Wendy Matthews MD     Discharge Physician: Kenji Huitron MD     Discharge Diagnoses: Active Hospital Problems    Diagnosis    AGUILA (acute kidney injury) ([facility] [N17.9]     Priority: High    UTI (urinary tract infection) [N39.0]    Confusion [R41.0]       The patient was seen and examined on day of discharge and this discharge summary is in conjunction with any daily progress note from day of discharge. Hospital Course:       UTI - admission U/A c/w acute cystitis. Started on empiric Levaquin in ED on 31 August pending Cx results. Changed to DAILY dosing.     ARF/CKD - baseline stage 3 w/out elevated BUN/Cr ratio but hx c/w pre-renal azotemia. Given IVF hydration and followed serial labs.      HypoNatremia - etiology clinically unable to determine but likely hypovolemic. Followed serial labs on IVF.       Anemia - etiology clinically unable to determine, w/out evidence of active bleeding/hemolysis. Stable and asymptomatic w/out indication for transfusion.      Encephalopathy - acute metabolic, 2nd to above. Will continue to follow clinical response w/ supportive care PRN.      HTN/CAD - w/ known CAD but no evidence of active signs/sxs of ischemia/failure. Currently controlled on home meds      CHF - chronic systolic failure w/ reduced EF 35-40% by Echo dated October 2018. No evidence of acute decompensation. Continue current medical mgt.      Chronic back pain treated with as needed [place]          Labs:  For convenience and continuity at follow-up the following most recent labs are provided:      CBC:    Lab Results   Component Value Date    WBC 10.2 09/02/2020    HGB 9.0 09/02/2020    HCT 27.4 09/02/2020     09/02/2020       Renal:    Lab Results   Component Value Date     09/02/2020    K 3.9 after 1 dose, call 911., Disp-25 tablet, R-3Normal      vitamin D (CHOLECALCIFEROL) 1000 UNIT TABS tablet Take 2 tablets by mouth daily, Disp-60 tablet, R-3Normal      famotidine (PEPCID) 20 MG tablet Take 1 tablet by mouth 2 times daily, Disp-60 tablet, R-5Normal      HYDROcodone-acetaminophen (NORCO)  MG per tablet Take 1 tablet by mouth every 6 hours as needed. Berto Whitlock [facility]      Coenzyme Q10 (CO Q 10) 100 MG CAPS Take  by mouth daily. aspirin 81 MG chewable tablet Take 1 tablet by mouth daily. , Disp-30 tablet             Time Spent on discharge is more than 30 minutes in the examination, evaluation, counseling and review of medications and discharge plan. Signed:    Hank Disla MD   9/3/2020      Thank you Odette Galvan DO for the opportunity to be involved in this patient's care. If you have any questions or concerns please feel free to contact me at [phone].

## 2020-09-03 NOTE — CARE COORDINATION
[facility] Transitions Initial Follow Up Call    Call within 2 business days of discharge: Yes    Patient: Tisha Case Patient : 1935   MRN: [id number]  Reason for Admission: Acute cystitis  Discharge Date: 20 RARS: Readmission Risk Score: 18      Last Discharge [facility]       Complaint Diagnosis Description Type Department Provider    20 Altered Mental Status Acute cystitis with hematuria . .. ED to Hosp-Admission (Discharged) ([facility] Innocent) Colt Vidal MD; Santosh Snider. .. Spoke with: patient's daughter     Facility: [facility]     Patient's daughter answered the phone and stated it would be better to try patient later this afternoon because patient wasn't feeling very well right now. CTN contacted [facility] and spoke with liaison TIAN Arreola who [facility] and VM was left for patient to call back. CTN contacted patient back who stated she is doing good. Patient denied any fevers, weakness, or difficulty with urination. Patient did report some dizziness at times upon standing. CTN encouraged patient to go from sitting to standing slowly and if continues to contact her doctor. CTN also encouraged patient to drink plenty of fluids. CTN reviewed all medications with patient and she reported she is taking all as prescribed. Patient has ap with PCP on 9/10. CTN provided patient with [facility] number. Denies any acute needs at present time. Agreeable to f/u calls. Educated on the use of urgent care or physicians 24 hr access line if assistance is needed after hours & that they can always contact their home care provider to request a nurse visit even if it isn't their regularly scheduled day for their nurse to visit.          Care Transitions 24 Hour Call    Care Transitions Interventions         Follow Up  Future Appointments   Date Time Provider Jerome Mahmood   9/10/2020  3:00 PM Debra Shahid DO [facility]   2020  1:15 PM Keke Beach MD Gia Ron, RN

## 2020-09-09 NOTE — PROGRESS NOTES
Physician Progress Note      PATIENT:               Catracho Edwards  CSN #:                  [id number]  :                       1935  ADMIT DATE:       2020 7:14 PM  [address] DATE:        2020 1:40 PM  RESPONDING  PROVIDER #:        Kranthi Wylie MD          QUERY TEXT:    Pt admitted with UTI. If possible, please document in the progress notes and   discharge summary if you are evaluating and /or treating any of the following: The medical record reflects the following:  Risk Factors: UTI  Clinical Indicators: AGUILA, RR 14-24 on , metabolic encephalopathy,   WBC 12.8  Treatment: Levaquin inpatient and continued at discharge, serial labs,   supportive care    Thank you,  Timmy Rizo RN, CDS  [phone]  Options provided:  -- No Sepsis, localized infection only  -- Sepsis, present on admission  -- Sepsis, now resolved  -- Sepsis was ruled out  -- Other - I will add my own diagnosis  -- Disagree - Not applicable / Not valid  -- Disagree - Clinically unable to determine / Unknown  -- Refer to Clinical Documentation Reviewer    PROVIDER RESPONSE TEXT:    This patient has localized infection only, patient is not septic.     Query created by: Roni Mcclain on 2020 11:41 AM      Electronically signed by:  Kranthi Wylie MD 2020 11:49 AM

## 2020-09-10 RX ORDER — ZOLPIDEM TARTRATE 5 MG/1
5 TABLET ORAL NIGHTLY PRN
Qty: 30 TABLET | Refills: 2 | Status: SHIPPED | OUTPATIENT
Start: 2020-09-10 | End: 2021-01-29

## 2020-09-11 ENCOUNTER — CARE COORDINATION (OUTPATIENT)
Dept: CASE MANAGEMENT | Age: 85
End: 2020-09-11

## 2020-09-14 ENCOUNTER — OFFICE VISIT (OUTPATIENT)
Dept: CARDIOLOGY CLINIC | Age: 85
End: 2020-09-14
Payer: MEDICARE

## 2020-09-14 VITALS
TEMPERATURE: 96.9 F | BODY MASS INDEX: 26.06 KG/M2 | WEIGHT: 138 LBS | SYSTOLIC BLOOD PRESSURE: 135 MMHG | HEIGHT: 61 IN | HEART RATE: 97 BPM | DIASTOLIC BLOOD PRESSURE: 78 MMHG

## 2020-09-14 PROCEDURE — 1123F ACP DISCUSS/DSCN MKR DOCD: CPT | Performed by: INTERNAL MEDICINE

## 2020-09-14 PROCEDURE — G8427 DOCREV CUR MEDS BY ELIG CLIN: HCPCS | Performed by: INTERNAL MEDICINE

## 2020-09-14 PROCEDURE — 1111F DSCHRG MED/CURRENT MED MERGE: CPT | Performed by: INTERNAL MEDICINE

## 2020-09-14 PROCEDURE — G8417 CALC BMI ABV UP PARAM F/U: HCPCS | Performed by: INTERNAL MEDICINE

## 2020-09-14 PROCEDURE — 99214 OFFICE O/P EST MOD 30 MIN: CPT | Performed by: INTERNAL MEDICINE

## 2020-09-14 PROCEDURE — 1036F TOBACCO NON-USER: CPT | Performed by: INTERNAL MEDICINE

## 2020-09-14 PROCEDURE — 4040F PNEUMOC VAC/ADMIN/RCVD: CPT | Performed by: INTERNAL MEDICINE

## 2020-09-14 PROCEDURE — 1090F PRES/ABSN URINE INCON ASSESS: CPT | Performed by: INTERNAL MEDICINE

## 2020-09-14 PROCEDURE — G8400 PT W/DXA NO RESULTS DOC: HCPCS | Performed by: INTERNAL MEDICINE

## 2020-09-14 NOTE — PROGRESS NOTES
Subjective:      Patient ID: Minh Lantigua is a 80 y.o. female. HPI:  Ms. Pilo Mayes is here today for  follow up CHF/cardiomyopathy/CAD/HTN/MR/VT. Hosp with kidney issues. Has been off HCTZ. No sob/edema. No further palp. No tachycardia. No syncope. No chest pain. No pnd or orthopnea. Bp good. Edema in feet. Watches salt.  Following with renal.        Past Medical History:   Diagnosis Date    Arthritis     Blood circulation, collateral     CAD (coronary artery disease)     [facility]     Confusion 8/31/2020    GERD (gastroesophageal reflux disease)     History of heart artery stent 12/2018    Hyperlipidemia     Hypertension     Mitral valve prolapse     Myocardial infarct, old     Thyroid disease      Past Surgical History:   Procedure Laterality Date    COLONOSCOPY      EYE SURGERY Left 09/04/2018    PHACO EMULSIFICATION OF CATARACT WITH INTRAOCULAR LENS IMPLANT LEFT EYE     HYSTERECTOMY      OTHER SURGICAL HISTORY  08/27/2018    phacoemulsification of cataract with intraocular lens implant right eye    [place] OFFICE/OUTPT VISIT,PROCEDURE ONLY Right 8/27/2018    PHACO EMULSIFICATION OF CATARACT WITH INTRAOCULAR LENS IMPLANT RIGHT EYE performed by Ace Duran MD at [address] OFFICE/OUTPT [address] Left 9/4/2018    PHACO EMULSIFICATION OF CATARACT WITH INTRAOCULAR LENS IMPLANT LEFT EYE performed by Ace Duran MD at [address] THYROIDECTOMY           Allergies   Allergen Reactions    Benazepril Hcl Shortness Of Breath and Swelling    Feldene [Piroxicam] Shortness Of Breath    Hytrin [Terazosin Hcl] Shortness Of Breath    Iv Contrast [Iodides] Anaphylaxis    Acetaminophen     Celebrex [Celecoxib]      GI upset    Cephalexin      Nausea    Hydrochlorothiazide     Iodinated Casein     Monopril [Fosinopril Sodium] Other (See Comments)     Pt states makes her weak    Protonix [Pantoprazole Sodium] Other (See Comments)     Kidney failure      choking, chest tightness and shortness of breath. Cardiovascular: Negative for chest pain, palpitations and leg swelling. Denies PND or orthopnea. No tachycardia or syncope. Neurological: Negative for dizziness, syncope and light-headedness. Psychiatric/Behavioral: Negative for behavioral problems, confusion and agitation. All other systems reviewed negative as done. Objective:   Physical Exam   Constitutional: She is oriented to person, place, and time. She appears well-developed and well-nourished. No distress. HENT:   Head: Normocephalic and atraumatic. Eyes: Conjunctivae and EOM are normal. Right eye exhibits no discharge. Left eye exhibits no discharge. Neck: Normal range of motion. No JVD present. Cardiovascular: Normal rate, regular rhythm, S1 normal, S2 normal and normal heart sounds. Exam reveals no gallop. No murmur heard. Pulses:       Radial pulses are 2+ on the right side, and 2+ on the left side. Pulmonary/Chest: Effort normal and breath sounds normal. No respiratory distress. She has no wheezes. She has no rales. Abdominal: Soft. Bowel sounds are normal. There is no tenderness. Musculoskeletal: Normal range of motion. She exhibits no edema. Neurological: She is alert and oriented to person, place, and time. Skin: Skin is warm and dry. Psychiatric: She has a normal mood and affect. Her behavior is normal. Thought content normal.       Assessment:       Diagnosis Orders   1. Acute on chronic systolic congestive heart failure ([address]     2. Dilated cardiomyopathy ([address]     3. CAD in native artery     4. History of PTCA     5. VT (ventricular tachycardia) ([address]     6. Essential hypertension     7. Nonrheumatic mitral valve regurgitation               Plan:      CV stable. Edema stable. palps controlled. No angina/CP-stable. Remains compensated. No  palps. Continue coreg 25 mg bid  BP ok. Continue off HCTZ due to CKD.  Should follow with renal.  Reviewed previous records and testing including myoview, echo 10/18, cath 11/18 and holter 1/19, showing vent ectopy of 7%. Tries to watch salt. Follow up 6 months.

## 2020-09-17 ENCOUNTER — CARE COORDINATION (OUTPATIENT)
Dept: CASE MANAGEMENT | Age: 85
End: 2020-09-17

## 2020-09-25 ENCOUNTER — CARE COORDINATION (OUTPATIENT)
Dept: CASE MANAGEMENT | Age: 85
End: 2020-09-25

## 2020-09-25 NOTE — CARE COORDINATION
[facility] Transitions Follow Up Call    2020    Patient: Richardson Cooks  Patient : 1935   MRN: [id number]  Reason for Admission: Acute cystitis hematuria  Discharge Date: 20 RARS: Readmission Risk Score: 18         Spoke with: Trev Hickey with patient who reported she is doing ok. Patient stated she continues to have intermittent nausea and thinks it may be related to heartburn. Patient stated she will reach out to her doctor to discuss if she should take a stronger heartburn medicine. Patient denied any further issues or concerns at this time and was agreeable to follow up calls. CTN advised patient of use of urgent care or physicians 24 hr access line if assistance is needed after hours. Care Transitions Subsequent and Final Call    Subsequent and Final Calls  Do you have any ongoing symptoms?:  No  Have your medications changed?:  No  Do you have any questions related to your medications?:  No  Do you currently have any active services?:  Yes  Are you currently active with any services?:  Home Health  Do you have any needs or concerns that I can assist you with?:  No  Identified Barriers:  None  Care Transitions Interventions  Other Interventions:             Follow Up  Future Appointments   Date Time Provider Jerome Mahmood   3/15/2021  1:00 PM MD Willa Ho RN

## 2020-09-30 PROBLEM — N39.0 UTI (URINARY TRACT INFECTION): Status: RESOLVED | Noted: 2020-08-31 | Resolved: 2020-09-30

## 2020-10-01 NOTE — TELEPHONE ENCOUNTER
Requested Prescriptions     Pending Prescriptions Disp Refills    carvedilol (COREG) 25 MG tablet [Pharmacy Med Name: CARVEDILOL 25 MG TABLET] 180 tablet 3     Sig: TAKE 1 TABLET BY MOUTH TWICE A DAY                Last Office Visit: 9/14/2020     Next Office Visit: 3/15/2021

## 2020-10-02 ENCOUNTER — CARE COORDINATION (OUTPATIENT)
Dept: CASE MANAGEMENT | Age: 85
End: 2020-10-02

## 2020-10-02 RX ORDER — CARVEDILOL 25 MG/1
TABLET ORAL
Qty: 180 TABLET | Refills: 3 | Status: SHIPPED | OUTPATIENT
Start: 2020-10-02 | End: 2021-10-18

## 2020-10-02 NOTE — CARE COORDINATION
[facility] Transitions Follow Up Call    10/2/2020    Patient: Radha Spring  Patient : 1935   MRN: [id number]  Reason for Admission: Acute Cystitis Hematuria   Discharge Date: 20 RARS: Readmission Risk Score: 18         Spoke with: Trev Hickey with patient who reported she is doing the same. Patient stated she still has the nausea on and off and is taking phenergan to help. Patient in the process of getting established with a new PCP and has a recommendation. Patient reported she still has some bladder spasms but nothing as bad as before. Patient reported she is drinking plenty of fluids and denied any further issues at this time. CTN offered information for a visiting physician and provided to patient. Patient denied any further needs or concerns at this time. CTN advised patient of use of urgent care or physicians 24 hr access line if assistance is needed after hours. Also advised that they can always contact their home care provider to request a nurse visit even if it isn't their regularly scheduled day for their nurse to visit. Care Transitions Subsequent and Final Call    Subsequent and Final Calls  Do you have any ongoing symptoms?:  No  Have your medications changed?:  No  Do you have any questions related to your medications?:  No  Do you currently have any active services?:  Yes  Are you currently active with any services?:  Home Health  Do you have any needs or concerns that I can assist you with?:  No  Identified Barriers:  None  Care Transitions Interventions  Other Interventions:             Follow Up  Future Appointments   Date Time Provider Jerome Mahmood   3/15/2021  1:00 PM MD Willa Sanchez [facility] SYDNEY [place] RN

## 2020-10-09 ENCOUNTER — TELEPHONE (OUTPATIENT)
Dept: CARDIOLOGY CLINIC | Age: 85
End: 2020-10-09

## 2020-10-09 RX ORDER — LOSARTAN POTASSIUM 50 MG/1
50 TABLET ORAL DAILY
Qty: 30 TABLET | Refills: 3 | Status: SHIPPED | OUTPATIENT
Start: 2020-10-09 | End: 2021-01-27

## 2020-10-12 RX ORDER — HYDROCHLOROTHIAZIDE 12.5 MG/1
TABLET ORAL
Qty: 90 TABLET | Refills: 1 | Status: SHIPPED | OUTPATIENT
Start: 2020-10-12 | End: 2021-06-15 | Stop reason: SDUPTHER

## 2020-11-18 ENCOUNTER — TELEPHONE (OUTPATIENT)
Dept: FAMILY MEDICINE CLINIC | Age: 85
End: 2020-11-18

## 2020-11-18 NOTE — TELEPHONE ENCOUNTER
Pt's granddaughter, Nadeenta Specking, calling reporting concern for pt exposure. Pt's daughter was visiting pt last week, left Monday. Daughter was tested yesterday for Covid and was positive. Daughter's s/s began Sunday. Pt began with sinus congestion Sunday, worsening. ST, myalgia, and fatigue developed yesterday. Afebrile. Unsure of cough. States she is not SOB. Granddaughter is concerned. Dr. Florence Ramirez, please advise.

## 2020-11-25 ENCOUNTER — OFFICE VISIT (OUTPATIENT)
Dept: PRIMARY CARE CLINIC | Age: 85
End: 2020-11-25
Payer: MEDICARE

## 2020-11-25 PROCEDURE — G8428 CUR MEDS NOT DOCUMENT: HCPCS | Performed by: NURSE PRACTITIONER

## 2020-11-25 PROCEDURE — G8417 CALC BMI ABV UP PARAM F/U: HCPCS | Performed by: NURSE PRACTITIONER

## 2020-11-25 PROCEDURE — 99211 OFF/OP EST MAY X REQ PHY/QHP: CPT | Performed by: NURSE PRACTITIONER

## 2020-11-25 NOTE — PATIENT INSTRUCTIONS

## 2020-11-28 LAB — SARS-COV-2, NAA: DETECTED

## 2020-11-30 ENCOUNTER — TELEPHONE (OUTPATIENT)
Dept: FAMILY MEDICINE CLINIC | Age: 85
End: 2020-11-30

## 2020-11-30 RX ORDER — ONDANSETRON 4 MG/1
4 TABLET, FILM COATED ORAL 3 TIMES DAILY PRN
Qty: 15 TABLET | Refills: 0 | Status: SHIPPED | OUTPATIENT
Start: 2020-11-30 | End: 2022-03-07 | Stop reason: SDUPTHER

## 2020-11-30 NOTE — TELEPHONE ENCOUNTER
----- Message from [facility] sent at 11/27/2020  1:09 PM EST -----  Subject: Message to Provider    QUESTIONS  Information for Provider? Patient is waiting for test result. Patient is   still having nausea and is requesting something to help with discomfort.   ---------------------------------------------------------------------------  --------------  CALL BACK INFO  What is the best way for the office to contact you? OK to leave message on   voicemail  Preferred Call Back Phone Number? [phone]  ---------------------------------------------------------------------------  --------------  SCRIPT ANSWERS  Relationship to Patient?  Self

## 2020-11-30 NOTE — TELEPHONE ENCOUNTER
Pt. Reporting that her CoVid test has come back positive. Her main complaint is nausea and she is asking for anti nausea meds. Slight cough and congestion. Pt. Aware to quarantine, rest and drink fluids. To report to ER is SOB or Chest pain.

## 2020-11-30 NOTE — TELEPHONE ENCOUNTER
zofran sent in for nausea. Your test came back detected/positive for Covid-19. What happens if I have a positive test?   If you have symptoms:   Isolate until all three of these things are true: 1) your symptoms are better, 2) it has been 10 days since you first felt sick, and 3) you have had no fever for at least 24 hours without using medicine that lowers fever. Drink plenty of fluids and eat when you can. You may take medicine for pain or fever if you need to. Rest as much as you can. If you do not have symptoms:   Stay home for 10 days after the date you were tested. If you develop symptoms during those 10 days, stay home until all three of these things are true: 1) your symptoms are better, 2) it has been 10 days since you first felt sick, and 3) you have had no fever for at least 24 hours without using medicine that lowers fever.       Follow care instructions from your doctor or other healthcare provider. Seek emergency medical care immediately if you have trouble breathing, persistent pain or pressure in the chest, new confusion, inability to wake or stay awake, or bluish lips or face. Someone from the health department (case investigator or contact tracer) may reach out to you to check on your health and ask about other people you have been around or where you've spent time while you may have been able to spread COVID-19 to others. This person's role is strictly to map the virus to help identify people who may have been exposed to the virus and prevent its spread. The local health department will also provide guidance on how to stay safely at home to avoid spreading illness. Detected results can happen for months and you are not considered contagious. No retest is necessary.

## 2020-12-21 RX ORDER — ATORVASTATIN CALCIUM 80 MG/1
TABLET, FILM COATED ORAL
Qty: 90 TABLET | Refills: 3 | Status: SHIPPED | OUTPATIENT
Start: 2020-12-21 | End: 2021-10-13

## 2020-12-21 NOTE — TELEPHONE ENCOUNTER
Requested Prescriptions     Pending Prescriptions Disp Refills    atorvastatin (LIPITOR) 80 MG tablet [Pharmacy Med Name: ATORVASTATIN 80 MG TABLET] 90 tablet 3     Sig: TAKE 1 TABLET BY MOUTH ONE TIME A DAY          Last Office Visit: 9/14/2020     Next Office Visit: 3/15/2021

## 2021-01-22 NOTE — TELEPHONE ENCOUNTER
Requested Prescriptions     Pending Prescriptions Disp Refills    losartan (COZAAR) 50 MG tablet [Pharmacy Med Name: LOSARTAN POTASSIUM 50 MG TAB] 30 tablet 3     Sig: TAKE 1 TABLET BY MOUTH EVERY DAY          Number: 30    Refills: 3    Last Office Visit: 9/14/2020     Next Office Visit: 3/15/2021

## 2021-01-27 RX ORDER — LOSARTAN POTASSIUM 50 MG/1
TABLET ORAL
Qty: 30 TABLET | Refills: 3 | Status: SHIPPED | OUTPATIENT
Start: 2021-01-27 | End: 2021-06-14

## 2021-01-28 DIAGNOSIS — F51.01 PRIMARY INSOMNIA: ICD-10-CM

## 2021-01-28 NOTE — TELEPHONE ENCOUNTER
.  Last office visit 9/10/2020     Last written 9- 30 with 2      Next office visit scheduled Visit date not found    Requested Prescriptions     Pending Prescriptions Disp Refills    zolpidem (AMBIEN) 5 MG tablet [Pharmacy Med Name: ZOLPIDEM TARTRATE 5 MG TABLET] 30 tablet 2     Sig: TAKE 1 TABLET BY MOUTH NIGHTLY AS NEEDED FOR SLEEP FOR UP  DAYS.

## 2021-01-29 RX ORDER — ZOLPIDEM TARTRATE 5 MG/1
5 TABLET ORAL NIGHTLY PRN
Qty: 30 TABLET | Refills: 2 | Status: SHIPPED | OUTPATIENT
Start: 2021-01-29 | End: 2021-08-20

## 2021-02-08 ENCOUNTER — TELEPHONE (OUTPATIENT)
Dept: CARDIOLOGY CLINIC | Age: 86
End: 2021-02-08

## 2021-02-08 RX ORDER — NITROGLYCERIN 0.4 MG/1
0.4 TABLET SUBLINGUAL EVERY 5 MIN PRN
Qty: 25 TABLET | Refills: 3 | Status: SHIPPED | OUTPATIENT
Start: 2021-02-08 | End: 2022-04-25

## 2021-02-08 NOTE — TELEPHONE ENCOUNTER
Patient calling wanting a refill on nitroGLYCERIN (NITROSTAT) 0.4 MG SL tablet to be sent to [facility]. Labs done 9. 1.20, 9.2.20 last OV 9.14.20 next OV 3.17.21

## 2021-03-05 RX ORDER — CLOPIDOGREL BISULFATE 75 MG/1
TABLET ORAL
Qty: 90 TABLET | Refills: 1 | Status: SHIPPED | OUTPATIENT
Start: 2021-03-05 | End: 2021-08-13

## 2021-03-24 ENCOUNTER — OFFICE VISIT (OUTPATIENT)
Dept: CARDIOLOGY CLINIC | Age: 86
End: 2021-03-24
Payer: MEDICARE

## 2021-03-24 VITALS
SYSTOLIC BLOOD PRESSURE: 90 MMHG | WEIGHT: 129 LBS | DIASTOLIC BLOOD PRESSURE: 60 MMHG | HEART RATE: 60 BPM | BODY MASS INDEX: 24.37 KG/M2 | TEMPERATURE: 98 F

## 2021-03-24 DIAGNOSIS — I10 ESSENTIAL HYPERTENSION: ICD-10-CM

## 2021-03-24 DIAGNOSIS — I47.20 VT (VENTRICULAR TACHYCARDIA): ICD-10-CM

## 2021-03-24 DIAGNOSIS — I42.0 DILATED CARDIOMYOPATHY (HCC): ICD-10-CM

## 2021-03-24 DIAGNOSIS — I25.10 CAD IN NATIVE ARTERY: ICD-10-CM

## 2021-03-24 DIAGNOSIS — I50.22 CHRONIC SYSTOLIC CONGESTIVE HEART FAILURE (HCC): Primary | ICD-10-CM

## 2021-03-24 DIAGNOSIS — Z98.61 HISTORY OF PTCA: ICD-10-CM

## 2021-03-24 DIAGNOSIS — I34.0 NONRHEUMATIC MITRAL VALVE REGURGITATION: ICD-10-CM

## 2021-03-24 PROCEDURE — 1036F TOBACCO NON-USER: CPT | Performed by: INTERNAL MEDICINE

## 2021-03-24 PROCEDURE — 1090F PRES/ABSN URINE INCON ASSESS: CPT | Performed by: INTERNAL MEDICINE

## 2021-03-24 PROCEDURE — G8400 PT W/DXA NO RESULTS DOC: HCPCS | Performed by: INTERNAL MEDICINE

## 2021-03-24 PROCEDURE — 1123F ACP DISCUSS/DSCN MKR DOCD: CPT | Performed by: INTERNAL MEDICINE

## 2021-03-24 PROCEDURE — G8484 FLU IMMUNIZE NO ADMIN: HCPCS | Performed by: INTERNAL MEDICINE

## 2021-03-24 PROCEDURE — G8420 CALC BMI NORM PARAMETERS: HCPCS | Performed by: INTERNAL MEDICINE

## 2021-03-24 PROCEDURE — G8427 DOCREV CUR MEDS BY ELIG CLIN: HCPCS | Performed by: INTERNAL MEDICINE

## 2021-03-24 PROCEDURE — 4040F PNEUMOC VAC/ADMIN/RCVD: CPT | Performed by: INTERNAL MEDICINE

## 2021-03-24 PROCEDURE — 99214 OFFICE O/P EST MOD 30 MIN: CPT | Performed by: INTERNAL MEDICINE

## 2021-03-24 NOTE — PROGRESS NOTES
Subjective:      Patient ID: Mercy Mcdaniels is a 80 y.o. female. HPI:  Ms. Marv Cervantes is here today for  follow up CHF/cardiomyopathy/CAD/HTN/MR/VT. Had palps early month. Better now. No sob/edema. .  No tachycardia. No syncope. No chest pain. No pnd or orthopnea. Bp good. Edema good. . Watches salt.  Following with renal.        Past Medical History:   Diagnosis Date    Arthritis     Blood circulation, collateral     CAD (coronary artery disease)     [facility]     Confusion 8/31/2020    GERD (gastroesophageal reflux disease)     History of heart artery stent 12/2018    Hyperlipidemia     Hypertension     Mitral valve prolapse     Myocardial infarct, old     Thyroid disease      Past Surgical History:   Procedure Laterality Date    COLONOSCOPY      EYE SURGERY Left 09/04/2018    PHACO EMULSIFICATION OF CATARACT WITH INTRAOCULAR LENS IMPLANT LEFT EYE     HYSTERECTOMY      OTHER SURGICAL HISTORY  08/27/2018    phacoemulsification of cataract with intraocular lens implant right eye    [place] OFFICE/OUTPT VISIT,PROCEDURE ONLY Right 8/27/2018    PHACO EMULSIFICATION OF CATARACT WITH INTRAOCULAR LENS IMPLANT RIGHT EYE performed by Zander Moreira MD at [address] OFFICE/OUTPT VISIT,PROCEDURE ONLY Left 9/4/2018    PHACO EMULSIFICATION OF CATARACT WITH INTRAOCULAR LENS IMPLANT LEFT EYE performed by Zander Moreira MD at [address] THYROIDECTOMY           Allergies   Allergen Reactions    Benazepril Hcl Shortness Of Breath and Swelling    Feldene [Piroxicam] Shortness Of Breath    Hytrin [Terazosin Hcl] Shortness Of Breath    Iv Contrast [Iodides] Anaphylaxis    Acetaminophen     Celebrex [Celecoxib]      GI upset    Cephalexin      Nausea    Hydrochlorothiazide     Iodinated Casein     Monopril [Fosinopril Sodium] Other (See Comments)     Pt states makes her weak    Protonix [Pantoprazole Sodium] Other (See Comments)     Kidney failure      Quinapril Hcl     Toprol Xl [Metoprolol Succinate] Other (See Comments)     C/o leg weakness with this med    Ultracet [Tramadol-Acetaminophen]      Rash    Ziac [Bisoprolol-Hydrochlorothiazide] Other (See Comments)     Pt does not remember reaction to med ? Weak         Social History     Socioeconomic History    Marital status:      Spouse name: Janice Rader Number of children: Not on file    Years of education: 15    Highest education level: Not on file   Occupational History    Occupation: retired   Social Needs    Financial resource strain: Not on file    Food insecurity     Worry: Not on file     Inability: Not on file   [employer] needs     Medical: Not on file     Non-medical: Not on file   Tobacco Use    Smoking status: Never Smoker    Smokeless tobacco: Never Used   Substance and Sexual Activity    Alcohol use: No     Alcohol/week: 0.0 standard drinks    Drug use: No    Sexual activity: Never     Comment:  living with spouse. Lifestyle    Physical activity     Days per week: Not on file     Minutes per session: Not on file    Stress: Not on file   Relationships    Social connections     Talks on phone: Not on file     Gets together: Not on file     Attends Latter-day service: Not on file     Active member of club or organization: Not on file     Attends meetings of clubs or organizations: Not on file     Relationship status: Not on file    Intimate partner violence     Fear of current or ex partner: Not on file     Emotionally abused: Not on file     Physically abused: Not on file     Forced sexual activity: Not on file   Other Topics Concern    Not on file   Social History Narrative    Not on file        Patient has a family history includes Cancer in her father and sister; Diabetes in her brother; Heart Disease in her father and mother; Stroke in her brother.     Patient  has a past medical history of Arthritis, Blood circulation, collateral, CAD (coronary artery disease), [facility]F [facility]congestive heart failure) ([facility] Confusion, GERD (gastroesophageal reflux disease), History of heart artery stent, Hyperlipidemia, Hypertension, Mitral valve prolapse, Myocardial infarct, old, and Thyroid disease. Current Outpatient Medications   Medication Sig Dispense Refill    clopidogrel (PLAVIX) 75 MG tablet TAKE 1 TABLET BY MOUTH DAILY 90 tablet 1    nitroGLYCERIN (NITROSTAT) 0.4 MG SL tablet Place 1 tablet under the tongue every 5 minutes as needed for Chest pain up to max of 3 total doses. If no relief after 1 dose, call 911. 25 tablet 3    zolpidem (AMBIEN) 5 MG tablet TAKE 1 TABLET BY MOUTH NIGHTLY AS NEEDED FOR SLEEP FOR UP  DAYS. 30 tablet 2    losartan (COZAAR) 50 MG tablet TAKE 1 TABLET BY MOUTH EVERY DAY 30 tablet 3    atorvastatin (LIPITOR) 80 MG tablet TAKE 1 TABLET BY MOUTH ONE TIME A DAY 90 tablet 3    ondansetron (ZOFRAN) 4 MG tablet Take 1 tablet by mouth 3 times daily as needed for Nausea or Vomiting 15 tablet 0    hydroCHLOROthiazide (HYDRODIURIL) 12.5 MG tablet TAKE 1 TABLET BY MOUTH ONE TIME A DAY' 90 tablet 1    carvedilol (COREG) 25 MG tablet TAKE 1 TABLET BY MOUTH TWICE A  tablet 3    fluticasone (FLONASE) 50 MCG/ACT nasal spray 2 sprays by Each Nostril route daily 1 Bottle 5    Handicap Placard MISC by Does not apply route Length: 5 years 1 each 0    vitamin D (CHOLECALCIFEROL) 1000 UNIT TABS tablet Take 2 tablets by mouth daily 60 tablet 3    famotidine (PEPCID) 20 MG tablet Take 1 tablet by mouth 2 times daily (Patient taking differently: Take 20 mg by mouth 2 times daily as needed ) 60 tablet 5    HYDROcodone-acetaminophen (NORCO)  MG per tablet Take 1 tablet by mouth every 6 hours as needed. Juancarlos Forth Coenzyme Q10 (CO Q 10) 100 MG CAPS Take  by mouth daily.  aspirin 81 MG chewable tablet Take 1 tablet by mouth daily. 30 tablet      No current facility-administered medications for this visit.          Vitals:    03/24/21 1428   BP: 90/60   Pulse: 60   Temp: 98 °F (36.7 °C)       Wt 129    Review of Systems   Constitutional: Negative for activity change, appetite change. Some fatigue. Respiratory: Negative for cough, choking, chest tightness and shortness of breath. Cardiovascular: Negative for chest pain, palpitations and leg swelling. Denies PND or orthopnea. No tachycardia or syncope. Neurological: Negative for dizziness, syncope and light-headedness. Psychiatric/Behavioral: Negative for behavioral problems, confusion and agitation. All other systems reviewed negative as done. Objective:   Physical Exam   Constitutional: She is oriented to person, place, and time. She appears well-developed and well-nourished. No distress. HENT:   Head: Normocephalic and atraumatic. Eyes: Conjunctivae and EOM are normal. Right eye exhibits no discharge. Left eye exhibits no discharge. Neck: Normal range of motion. No JVD present. Cardiovascular: Normal rate, regular rhythm, S1 normal, S2 normal and normal heart sounds. Exam reveals no gallop. No murmur heard. Pulses:       Radial pulses are 2+ on the right side, and 2+ on the left side. Pulmonary/Chest: Effort normal and breath sounds normal. No respiratory distress. She has no wheezes. She has no rales. Abdominal: Soft. Bowel sounds are normal. There is no tenderness. Musculoskeletal: Normal range of motion. She exhibits no edema. Neurological: She is alert and oriented to person, place, and time. Skin: Skin is warm and dry. Psychiatric: She has a normal mood and affect. Her behavior is normal. Thought content normal.       Assessment:       Diagnosis Orders   1. Chronic systolic congestive heart failure ([address]     2. Dilated cardiomyopathy ([address]     3. CAD in native artery     4. History of PTCA     5. VT (ventricular tachycardia) ([address]     6. Essential hypertension     7. Nonrheumatic mitral valve regurgitation               Plan:      CV stable. Edema stable. palps controlled. No angina/CP-stable. Remains compensated. No  palps. Continue coreg 25 mg bid  BP ok. Off cozaar due to CKD. Renal following. Reviewed previous records and testing including myoview, echo 10/18, cath 11/18 and holter 1/19, showing vent ectopy of 7%. Tries to watch salt. Follow up 6 months.

## 2021-06-14 NOTE — TELEPHONE ENCOUNTER
Requested Prescriptions     Pending Prescriptions Disp Refills    losartan (COZAAR) 50 MG tablet [Pharmacy Med Name: LOSARTAN POTASSIUM 50 MG TAB] 90 tablet 3     Sig: TAKE 1 TABLET BY MOUTH EVERY DAY                Last Office Visit: 3/24/2021     Next Office Visit: 9/29/2021

## 2021-06-15 RX ORDER — HYDROCHLOROTHIAZIDE 12.5 MG/1
TABLET ORAL
Qty: 90 TABLET | Refills: 3 | Status: ON HOLD | OUTPATIENT
Start: 2021-06-15 | End: 2022-03-22 | Stop reason: HOSPADM

## 2021-06-16 RX ORDER — LOSARTAN POTASSIUM 50 MG/1
TABLET ORAL
Qty: 90 TABLET | Refills: 3 | Status: SHIPPED | OUTPATIENT
Start: 2021-06-16 | End: 2022-06-06

## 2021-08-13 RX ORDER — CLOPIDOGREL BISULFATE 75 MG/1
TABLET ORAL
Qty: 90 TABLET | Refills: 3 | Status: ON HOLD | OUTPATIENT
Start: 2021-08-13 | End: 2022-03-22 | Stop reason: HOSPADM

## 2021-08-13 NOTE — TELEPHONE ENCOUNTER
Requested Prescriptions     Pending Prescriptions Disp Refills    clopidogrel (PLAVIX) 75 MG tablet [Pharmacy Med Name: CLOPIDOGREL 75 MG TABLET] 90 tablet 3     Sig: TAKE 1 TABLET BY MOUTH DAILY                Last Office Visit: 3/24/2021     Next Office Visit: 9/29/2021

## 2021-08-20 ENCOUNTER — OFFICE VISIT (OUTPATIENT)
Dept: FAMILY MEDICINE CLINIC | Age: 86
End: 2021-08-20
Payer: MEDICARE

## 2021-08-20 VITALS
WEIGHT: 132 LBS | BODY MASS INDEX: 24.94 KG/M2 | DIASTOLIC BLOOD PRESSURE: 70 MMHG | SYSTOLIC BLOOD PRESSURE: 90 MMHG | HEART RATE: 72 BPM | OXYGEN SATURATION: 97 %

## 2021-08-20 DIAGNOSIS — I10 ESSENTIAL HYPERTENSION: Primary | ICD-10-CM

## 2021-08-20 DIAGNOSIS — Z91.81 AT HIGH RISK FOR FALLS: ICD-10-CM

## 2021-08-20 DIAGNOSIS — I20.9 ANGINA PECTORIS, UNSPECIFIED (HCC): ICD-10-CM

## 2021-08-20 DIAGNOSIS — E78.2 MIXED HYPERLIPIDEMIA: ICD-10-CM

## 2021-08-20 DIAGNOSIS — F51.01 PRIMARY INSOMNIA: ICD-10-CM

## 2021-08-20 DIAGNOSIS — N18.4 CHRONIC KIDNEY DISEASE, STAGE IV (SEVERE) (HCC): ICD-10-CM

## 2021-08-20 PROCEDURE — 4040F PNEUMOC VAC/ADMIN/RCVD: CPT | Performed by: FAMILY MEDICINE

## 2021-08-20 PROCEDURE — G8420 CALC BMI NORM PARAMETERS: HCPCS | Performed by: FAMILY MEDICINE

## 2021-08-20 PROCEDURE — 99214 OFFICE O/P EST MOD 30 MIN: CPT | Performed by: FAMILY MEDICINE

## 2021-08-20 PROCEDURE — 1123F ACP DISCUSS/DSCN MKR DOCD: CPT | Performed by: FAMILY MEDICINE

## 2021-08-20 PROCEDURE — G8400 PT W/DXA NO RESULTS DOC: HCPCS | Performed by: FAMILY MEDICINE

## 2021-08-20 PROCEDURE — G8428 CUR MEDS NOT DOCUMENT: HCPCS | Performed by: FAMILY MEDICINE

## 2021-08-20 PROCEDURE — 1090F PRES/ABSN URINE INCON ASSESS: CPT | Performed by: FAMILY MEDICINE

## 2021-08-20 PROCEDURE — 1036F TOBACCO NON-USER: CPT | Performed by: FAMILY MEDICINE

## 2021-08-20 SDOH — ECONOMIC STABILITY: FOOD INSECURITY: WITHIN THE PAST 12 MONTHS, YOU WORRIED THAT YOUR FOOD WOULD RUN OUT BEFORE YOU GOT MONEY TO BUY MORE.: NEVER TRUE

## 2021-08-20 SDOH — ECONOMIC STABILITY: FOOD INSECURITY: WITHIN THE PAST 12 MONTHS, THE FOOD YOU BOUGHT JUST DIDN'T LAST AND YOU DIDN'T HAVE MONEY TO GET MORE.: NEVER TRUE

## 2021-08-20 ASSESSMENT — PATIENT HEALTH QUESTIONNAIRE - PHQ9
SUM OF ALL RESPONSES TO PHQ QUESTIONS 1-9: 0
SUM OF ALL RESPONSES TO PHQ QUESTIONS 1-9: 0
2. FEELING DOWN, DEPRESSED OR HOPELESS: 0
SUM OF ALL RESPONSES TO PHQ QUESTIONS 1-9: 0
SUM OF ALL RESPONSES TO PHQ9 QUESTIONS 1 & 2: 0
1. LITTLE INTEREST OR PLEASURE IN DOING THINGS: 0

## 2021-08-20 ASSESSMENT — SOCIAL DETERMINANTS OF HEALTH (SDOH): HOW HARD IS IT FOR YOU TO PAY FOR THE VERY BASICS LIKE FOOD, HOUSING, MEDICAL CARE, AND HEATING?: SOMEWHAT HARD

## 2021-08-20 NOTE — PROGRESS NOTES
Wyatt Velasco is a 80 y.o. female    Chief Complaint   Patient presents with    Hypertension    Hyperlipidemia    Insomnia       HPI:    Hypertension  This is a chronic problem. The current episode started more than 1 year ago. The problem is unchanged. The problem is controlled. Pertinent negatives include no chest pain. Past treatments include angiotensin blockers, diuretics and beta blockers. The current treatment provides significant improvement. There are no compliance problems. Hyperlipidemia  This is a chronic problem. The current episode started more than 1 year ago. The problem is controlled. Recent lipid tests were reviewed and are low. Pertinent negatives include no chest pain or myalgias. Current antihyperlipidemic treatment includes statins. The current treatment provides significant improvement of lipids. There are no compliance problems. Risk factors for coronary artery disease include hypertension. Primary insomnia. This is a chronic condition. She has trouble going and staying asleep. She typically takes 15 Ambien tablets per month. It works well. She does have chronic pain. Angina is stable. No chest pain. CKD is stable. ROS:    Review of Systems   Cardiovascular: Negative for chest pain and leg swelling. Musculoskeletal: Negative for myalgias. BP 90/70   Pulse 72   Wt 132 lb (59.9 kg)   SpO2 97%   BMI 24.94 kg/m²     Physical Exam:    Physical Exam  Constitutional:       General: She is not in acute distress. Appearance: Normal appearance. She is well-developed and normal weight. She is not ill-appearing, toxic-appearing or diaphoretic. Neurological:      Mental Status: She is alert. Psychiatric:         Mood and Affect: Mood normal.         Behavior: Behavior normal.         Thought Content:  Thought content normal.         Current Outpatient Medications   Medication Sig Dispense Refill    zolpidem (AMBIEN) 5 MG tablet TAKE 1 TABLET BY MOUTH NIGHTLY AS NEEDED FOR SLEEP FOR UP  DAYS. 30 tablet 2    clopidogrel (PLAVIX) 75 MG tablet TAKE 1 TABLET BY MOUTH DAILY 90 tablet 3    losartan (COZAAR) 50 MG tablet TAKE 1 TABLET BY MOUTH EVERY DAY 90 tablet 3    hydroCHLOROthiazide (HYDRODIURIL) 12.5 MG tablet TAKE 1 TABLET BY MOUTH ONE TIME A DAY' 90 tablet 3    nitroGLYCERIN (NITROSTAT) 0.4 MG SL tablet Place 1 tablet under the tongue every 5 minutes as needed for Chest pain up to max of 3 total doses. If no relief after 1 dose, call 911. 25 tablet 3    atorvastatin (LIPITOR) 80 MG tablet TAKE 1 TABLET BY MOUTH ONE TIME A DAY 90 tablet 3    ondansetron (ZOFRAN) 4 MG tablet Take 1 tablet by mouth 3 times daily as needed for Nausea or Vomiting 15 tablet 0    carvedilol (COREG) 25 MG tablet TAKE 1 TABLET BY MOUTH TWICE A  tablet 3    fluticasone (FLONASE) 50 MCG/ACT nasal spray 2 sprays by Each Nostril route daily 1 Bottle 5    Handicap Placard MISC by Does not apply route Length: 5 years 1 each 0    vitamin D (CHOLECALCIFEROL) 1000 UNIT TABS tablet Take 2 tablets by mouth daily 60 tablet 3    famotidine (PEPCID) 20 MG tablet Take 1 tablet by mouth 2 times daily (Patient taking differently: Take 20 mg by mouth 2 times daily as needed ) 60 tablet 5    HYDROcodone-acetaminophen (NORCO)  MG per tablet Take 1 tablet by mouth every 6 hours as needed. [place] Coenzyme Q10 (CO Q 10) 100 MG CAPS Take  by mouth daily.  aspirin 81 MG chewable tablet Take 1 tablet by mouth daily. 30 tablet      No current facility-administered medications for this visit. Assessment:    1. Essential hypertension    2. Mixed hyperlipidemia    3. Primary insomnia    4. Chronic kidney disease, stage IV (severe) ([address]    5. Angina pectoris, unspecified ([address]    6. At high risk for falls        Plan:    1. Essential hypertension  Stable. Continue current medications. It runs normally at home per patient.   - CBC Auto Differential  - Comprehensive Metabolic Panel    2. Mixed hyperlipidemia  Stable. Continue current medications. - CBC Auto Differential  - Comprehensive Metabolic Panel    3. Primary insomnia  Stable. Continue current medications. 4. Other and unspecified angina pectoris    5. Chronic kidney disease, stage IV (severe) (HCC)        Return in about 6 months (around 2/20/2022) for Hypertension, Hyperlipidemia, insomnia + AWV. On the basis of positive falls risk screening, assessment and plan is as follows: patient declines any further evaluation/treatment for increased falls risk.

## 2021-09-29 ENCOUNTER — OFFICE VISIT (OUTPATIENT)
Dept: CARDIOLOGY CLINIC | Age: 86
End: 2021-09-29
Payer: MEDICARE

## 2021-09-29 VITALS
DIASTOLIC BLOOD PRESSURE: 60 MMHG | BODY MASS INDEX: 25.13 KG/M2 | SYSTOLIC BLOOD PRESSURE: 122 MMHG | HEART RATE: 76 BPM | WEIGHT: 133 LBS

## 2021-09-29 DIAGNOSIS — I47.20 VT (VENTRICULAR TACHYCARDIA): ICD-10-CM

## 2021-09-29 DIAGNOSIS — Z98.61 HISTORY OF PTCA: ICD-10-CM

## 2021-09-29 DIAGNOSIS — I25.10 CAD IN NATIVE ARTERY: ICD-10-CM

## 2021-09-29 DIAGNOSIS — I34.0 NONRHEUMATIC MITRAL VALVE REGURGITATION: ICD-10-CM

## 2021-09-29 DIAGNOSIS — I10 ESSENTIAL HYPERTENSION: ICD-10-CM

## 2021-09-29 DIAGNOSIS — I50.22 CHRONIC SYSTOLIC CONGESTIVE HEART FAILURE (HCC): Primary | ICD-10-CM

## 2021-09-29 DIAGNOSIS — I42.0 DILATED CARDIOMYOPATHY (HCC): ICD-10-CM

## 2021-09-29 PROCEDURE — 1036F TOBACCO NON-USER: CPT | Performed by: INTERNAL MEDICINE

## 2021-09-29 PROCEDURE — 1123F ACP DISCUSS/DSCN MKR DOCD: CPT | Performed by: INTERNAL MEDICINE

## 2021-09-29 PROCEDURE — 4040F PNEUMOC VAC/ADMIN/RCVD: CPT | Performed by: INTERNAL MEDICINE

## 2021-09-29 PROCEDURE — 1090F PRES/ABSN URINE INCON ASSESS: CPT | Performed by: INTERNAL MEDICINE

## 2021-09-29 PROCEDURE — G8417 CALC BMI ABV UP PARAM F/U: HCPCS | Performed by: INTERNAL MEDICINE

## 2021-09-29 PROCEDURE — 99214 OFFICE O/P EST MOD 30 MIN: CPT | Performed by: INTERNAL MEDICINE

## 2021-09-29 PROCEDURE — G8428 CUR MEDS NOT DOCUMENT: HCPCS | Performed by: INTERNAL MEDICINE

## 2021-09-29 PROCEDURE — G8400 PT W/DXA NO RESULTS DOC: HCPCS | Performed by: INTERNAL MEDICINE

## 2021-09-29 RX ORDER — LANOLIN ALCOHOL/MO/W.PET/CERES
1000 CREAM (GRAM) TOPICAL DAILY
COMMUNITY

## 2021-09-29 NOTE — PROGRESS NOTES
Subjective:      Patient ID: Gonzalez Tavarez is a 80 y.o. female. HPI:  Ms. Jessica Regalado is here today for  follow up CHF/cardiomyopathy/CAD/HTN/MR/VT. Fatigue but no other complaints. Having problems with kidneys. Had palps. Some increase. No sob/edema. .  No tachycardia. No syncope. No chest pain. No pnd or orthopnea. Bp good. Edema good. Watches salt.  Following with renal.        Past Medical History:   Diagnosis Date    Arthritis     Blood circulation, collateral     CAD (coronary artery disease)     CHF (congestive heart failure) (HCC)     Confusion 8/31/2020    GERD (gastroesophageal reflux disease)     History of heart artery stent 12/2018    Hyperlipidemia     Hypertension     Mitral valve prolapse     Myocardial infarct, old     Thyroid disease      Past Surgical History:   Procedure Laterality Date    COLONOSCOPY      EYE SURGERY Left 09/04/2018    [facility] EMULSIFICATION OF CATARACT WITH INTRAOCULAR LENS IMPLANT LEFT EYE     HYSTERECTOMY      OTHER SURGICAL HISTORY  08/27/2018    phacoemulsification of cataract with intraocular lens implant right eye    [place] OFFICE/OUTPT VISIT,PROCEDURE ONLY Right 8/27/2018    [facility] EMULSIFICATION OF CATARACT WITH INTRAOCULAR LENS IMPLANT RIGHT EYE performed by Quincy Fernando MD at [address] OFFICE/OUTPT VISIT,PROCEDURE ONLY Left 9/4/2018    [facility] EMULSIFICATION OF CATARACT WITH INTRAOCULAR LENS IMPLANT LEFT EYE performed by Quincy Fernando MD at [address] THYROIDECTOMY           Allergies   Allergen Reactions    Benazepril Hcl Shortness Of Breath and Swelling    Feldene [Piroxicam] Shortness Of Breath    Hytrin [Terazosin Hcl] Shortness Of Breath    Iv Contrast [Iodides] Anaphylaxis    Acetaminophen     Celebrex [Celecoxib]      GI upset    Cephalexin      Nausea    Hydrochlorothiazide     Iodinated Casein     Monopril [Fosinopril Sodium] Other (See Comments)     Pt states makes her weak    Protonix [Pantoprazole Sodium] Other (See Comments)     Kidney failure      Quinapril Hcl     Toprol Xl [Metoprolol Succinate] Other (See Comments)     C/o leg weakness with this med    Ultracet [Tramadol-Acetaminophen]      Rash    Ziac [Bisoprolol-Hydrochlorothiazide] Other (See Comments)     Pt does not remember reaction to med ? Weak         Social History     Socioeconomic History    Marital status:      Spouse name: Jessica Garner Number of children: Not on file    Years of education: 15    Highest education level: Not on file   Occupational History    Occupation: retired   Tobacco Use    Smoking status: Never Smoker    Smokeless tobacco: Never Used   Vaping Use    Vaping Use: Never used   Substance and Sexual Activity    Alcohol use: No     Alcohol/week: 0.0 standard drinks    Drug use: No    Sexual activity: Never     Comment:  living with spouse. Other Topics Concern    Not on file   Social History Narrative    Not on file     Social Determinants of Health     Financial Resource Strain: Medium Risk    Difficulty of Paying Living Expenses: Somewhat hard   Food Insecurity: No Food Insecurity    Worried About Running Out of Food in the Last Year: Never true    Ramona of Food in the Last Year: Never true   Transportation Needs:     Lack of Transportation (Medical):      Lack of Transportation (Non-Medical):    Physical Activity:     Days of Exercise per Week:     Minutes of Exercise per Session:    Stress:     Feeling of Stress :    Social Connections:     Frequency of Communication with Friends and Family:     Frequency of Social Gatherings with Friends and Family:     Attends Orthodoxy Services:     Active Member of Clubs or Organizations:     Attends Club or Organization Meetings:     Marital Status:    Intimate Partner Violence:     Fear of Current or Ex-Partner:     Emotionally Abused:     Physically Abused:     Sexually Abused:         Patient has a family history includes Cancer in her father and sister; Diabetes in her brother; Heart Disease in her father and mother; Stroke in her brother. Patient  has a past medical history of Arthritis, Blood circulation, collateral, CAD (coronary artery disease), CHF (congestive heart failure) ([address] Confusion, GERD (gastroesophageal reflux disease), History of heart artery stent, Hyperlipidemia, Hypertension, Mitral valve prolapse, Myocardial infarct, old, and Thyroid disease. Current Outpatient Medications   Medication Sig Dispense Refill    zolpidem (AMBIEN) 5 MG tablet TAKE 1 TABLET BY MOUTH NIGHTLY AS NEEDED FOR SLEEP FOR UP  DAYS. 30 tablet 2    clopidogrel (PLAVIX) 75 MG tablet TAKE 1 TABLET BY MOUTH DAILY 90 tablet 3    losartan (COZAAR) 50 MG tablet TAKE 1 TABLET BY MOUTH EVERY DAY 90 tablet 3    hydroCHLOROthiazide (HYDRODIURIL) 12.5 MG tablet TAKE 1 TABLET BY MOUTH ONE TIME A DAY' 90 tablet 3    nitroGLYCERIN (NITROSTAT) 0.4 MG SL tablet Place 1 tablet under the tongue every 5 minutes as needed for Chest pain up to max of 3 total doses. If no relief after 1 dose, call 911. 25 tablet 3    atorvastatin (LIPITOR) 80 MG tablet TAKE 1 TABLET BY MOUTH ONE TIME A DAY 90 tablet 3    ondansetron (ZOFRAN) 4 MG tablet Take 1 tablet by mouth 3 times daily as needed for Nausea or Vomiting 15 tablet 0    carvedilol (COREG) 25 MG tablet TAKE 1 TABLET BY MOUTH TWICE A  tablet 3    fluticasone (FLONASE) 50 MCG/ACT nasal spray 2 sprays by Each Nostril route daily 1 Bottle 5    Handicap Placard MISC by Does not apply route Length: 5 years 1 each 0    vitamin D (CHOLECALCIFEROL) 1000 UNIT TABS tablet Take 2 tablets by mouth daily 60 tablet 3    famotidine (PEPCID) 20 MG tablet Take 1 tablet by mouth 2 times daily (Patient taking differently: Take 20 mg by mouth 2 times daily as needed ) 60 tablet 5    HYDROcodone-acetaminophen (NORCO)  MG per tablet Take 1 tablet by mouth every 6 hours as needed.  [place] Coenzyme Q10 (CO Q 10) 100 MG CAPS Take  by mouth daily.  aspirin 81 MG chewable tablet Take 1 tablet by mouth daily. 30 tablet      No current facility-administered medications for this visit. Vitals:    09/29/21 1431   BP: 122/60   Pulse: 76     Wt 133    Review of Systems   Constitutional: Negative for activity change, appetite change. Some fatigue. Respiratory: Negative for cough, choking, chest tightness and shortness of breath. Cardiovascular: Negative for chest pain, palpitations and leg swelling. Denies PND or orthopnea. No tachycardia or syncope. Neurological: Negative for dizziness, syncope and light-headedness. Psychiatric/Behavioral: Negative for behavioral problems, confusion and agitation. All other systems reviewed negative as done. Objective:   Physical Exam   Constitutional: She is oriented to person, place, and time. She appears well-developed and well-nourished. No distress. HENT:   Head: Normocephalic and atraumatic. Eyes: Conjunctivae and EOM are normal. Right eye exhibits no discharge. Left eye exhibits no discharge. Neck: Normal range of motion. No JVD present. Cardiovascular: Normal rate, regular rhythm, S1 normal, S2 normal and normal heart sounds. Exam reveals no gallop. No murmur heard. Pulses:       Radial pulses are 2+ on the right side, and 2+ on the left side. Pulmonary/Chest: Effort normal and breath sounds normal. No respiratory distress. She has no wheezes. She has no rales. Abdominal: Soft. Bowel sounds are normal. There is no tenderness. Musculoskeletal: Normal range of motion. She exhibits no edema. Neurological: She is alert and oriented to person, place, and time. Skin: Skin is warm and dry. Psychiatric: She has a normal mood and affect. Her behavior is normal. Thought content normal.       Assessment:       Diagnosis Orders   1. Chronic systolic congestive heart failure ([address]     2. Dilated cardiomyopathy ([address]     3. CAD in native artery     4. History of PTCA     5. VT (ventricular tachycardia) ([address]     6. Essential hypertension     7. Nonrheumatic mitral valve regurgitation             Plan:      CV stable. Edema stable. palps controlled. No angina/CP-stable. Remains compensated. Continue coreg/losartan/HCTZ for CHF. No  palps. Continue coreg 25 mg bid and losartan 50 mg qd  BP ok. Renal following. Reviewed previous records and testing including myoview, echo 10/18, cath 11/18 and holter 1/19, showing vent ectopy of 7%. Tries to watch salt. Follow up 6 months.

## 2021-10-13 RX ORDER — ATORVASTATIN CALCIUM 80 MG/1
TABLET, FILM COATED ORAL
Qty: 90 TABLET | Refills: 3 | Status: SHIPPED | OUTPATIENT
Start: 2021-10-13 | End: 2022-07-19

## 2021-10-13 NOTE — TELEPHONE ENCOUNTER
Requested Prescriptions     Pending Prescriptions Disp Refills    atorvastatin (LIPITOR) 80 MG tablet [Pharmacy Med Name: ATORVASTATIN 80 MG TABLET] 90 tablet 3     Sig: TAKE 1 TABLET BY MOUTH ONE TIME A DAY          Number: 90    Refills: 3    Last Office Visit: 9/29/2021     Next Office Visit: 3/30/2022     Last Labs: [phone]

## 2021-10-17 DIAGNOSIS — I42.0 DILATED CARDIOMYOPATHY (HCC): ICD-10-CM

## 2021-10-17 DIAGNOSIS — I10 ESSENTIAL HYPERTENSION: ICD-10-CM

## 2021-10-17 DIAGNOSIS — I50.22 CHRONIC SYSTOLIC CONGESTIVE HEART FAILURE (HCC): ICD-10-CM

## 2021-10-17 DIAGNOSIS — I25.10 CAD IN NATIVE ARTERY: ICD-10-CM

## 2021-10-17 DIAGNOSIS — I34.0 NON-RHEUMATIC MITRAL REGURGITATION: ICD-10-CM

## 2021-10-17 DIAGNOSIS — Z98.61 HISTORY OF PTCA: ICD-10-CM

## 2021-10-17 DIAGNOSIS — I47.20 VT (VENTRICULAR TACHYCARDIA): ICD-10-CM

## 2021-10-18 NOTE — TELEPHONE ENCOUNTER
Requested Prescriptions     Pending Prescriptions Disp Refills    carvedilol (COREG) 25 MG tablet [Pharmacy Med Name: CARVEDILOL 25 MG TABLET] 180 tablet 3     Sig: TAKE 1 TABLET BY MOUTH TWICE A DAY                Last Office Visit: 9/29/2021     Next Office Visit: 3/30/2022
Sirisha Holloway

## 2021-10-20 RX ORDER — CARVEDILOL 25 MG/1
TABLET ORAL
Qty: 180 TABLET | Refills: 3 | Status: ON HOLD | OUTPATIENT
Start: 2021-10-20 | End: 2022-03-22 | Stop reason: HOSPADM

## 2021-11-20 ENCOUNTER — APPOINTMENT (OUTPATIENT)
Dept: CT IMAGING | Age: 86
End: 2021-11-20
Payer: MEDICARE

## 2021-11-20 ENCOUNTER — HOSPITAL ENCOUNTER (EMERGENCY)
Age: 86
Discharge: HOME | End: 2021-11-20
Payer: MEDICARE

## 2021-11-20 VITALS
RESPIRATION RATE: 16 BRPM | DIASTOLIC BLOOD PRESSURE: 80 MMHG | SYSTOLIC BLOOD PRESSURE: 131 MMHG | HEIGHT: 62 IN | HEART RATE: 73 BPM | BODY MASS INDEX: 23.74 KG/M2 | OXYGEN SATURATION: 96 % | TEMPERATURE: 96.8 F | WEIGHT: 129 LBS
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
## 2021-11-20 DIAGNOSIS — S00.03XA CONTUSION OF SCALP, INITIAL ENCOUNTER: ICD-10-CM

## 2021-11-20 DIAGNOSIS — S01.01XA LACERATION OF SCALP, INITIAL ENCOUNTER: Primary | ICD-10-CM

## 2021-11-20 LAB
A/G RATIO: 1.4 (ref 1.1–2.2)
ALBUMIN SERPL-MCNC: 3.7 G/DL (ref 3.4–5)
ALP BLD-CCNC: 69 U/L (ref 40–129)
ALT SERPL-CCNC: 8 U/L (ref 10–40)
ANION GAP SERPL CALCULATED.3IONS-SCNC: 8 MMOL/L (ref 3–16)
AST SERPL-CCNC: 16 U/L (ref 15–37)
BASOPHILS ABSOLUTE: 0.1 K/UL (ref 0–0.2)
BASOPHILS RELATIVE PERCENT: 0.9 %
BILIRUB SERPL-MCNC: <0.2 MG/DL (ref 0–1)
BUN BLDV-MCNC: 25 MG/DL (ref 7–20)
CALCIUM SERPL-MCNC: 9.2 MG/DL (ref 8.3–10.6)
CHLORIDE BLD-SCNC: 101 MMOL/L (ref 99–110)
CO2: 29 MMOL/L (ref 21–32)
CREAT SERPL-MCNC: 1.7 MG/DL (ref 0.6–1.2)
EOSINOPHILS ABSOLUTE: 0.3 K/UL (ref 0–0.6)
EOSINOPHILS RELATIVE PERCENT: 4.6 %
GFR AFRICAN AMERICAN: 34
GFR NON-AFRICAN AMERICAN: 28
GLUCOSE BLD-MCNC: 94 MG/DL (ref 70–99)
HCT VFR BLD CALC: 29.8 % (ref 36–48)
HEMOGLOBIN: 10 G/DL (ref 12–16)
INR BLD: 1.02 (ref 0.88–1.12)
LYMPHOCYTES ABSOLUTE: 1.5 K/UL (ref 1–5.1)
LYMPHOCYTES RELATIVE PERCENT: 25.4 %
MCH RBC QN AUTO: 31.2 PG (ref 26–34)
MCHC RBC AUTO-ENTMCNC: 33.7 G/DL (ref 31–36)
MCV RBC AUTO: 92.5 FL (ref 80–100)
MONOCYTES ABSOLUTE: 0.5 K/UL (ref 0–1.3)
MONOCYTES RELATIVE PERCENT: 8 %
NEUTROPHILS ABSOLUTE: 3.7 K/UL (ref 1.7–7.7)
NEUTROPHILS RELATIVE PERCENT: 61.1 %
PDW BLD-RTO: 13.7 % (ref 12.4–15.4)
PLATELET # BLD: 177 K/UL (ref 135–450)
PMV BLD AUTO: 8.6 FL (ref 5–10.5)
POTASSIUM REFLEX MAGNESIUM: 4.3 MMOL/L (ref 3.5–5.1)
PROTHROMBIN TIME: 11.5 SEC (ref 9.9–12.7)
RBC # BLD: 3.22 M/UL (ref 4–5.2)
SODIUM BLD-SCNC: 138 MMOL/L (ref 136–145)
TOTAL PROTEIN: 6.4 G/DL (ref 6.4–8.2)
WBC # BLD: 6.1 K/UL (ref 4–11)

## 2021-11-20 PROCEDURE — 80053 COMPREHEN METABOLIC PANEL: CPT

## 2021-11-20 PROCEDURE — 72125 CT NECK SPINE W/O DYE: CPT

## 2021-11-20 PROCEDURE — 90471 IMMUNIZATION ADMIN: CPT | Performed by: PHYSICIAN ASSISTANT

## 2021-11-20 PROCEDURE — 99283 EMERGENCY DEPT VISIT LOW MDM: CPT

## 2021-11-20 PROCEDURE — 90714 TD VACC NO PRESV 7 YRS+ IM: CPT | Performed by: PHYSICIAN ASSISTANT

## 2021-11-20 PROCEDURE — 6360000002 HC RX W HCPCS: Performed by: PHYSICIAN ASSISTANT

## 2021-11-20 PROCEDURE — 85025 COMPLETE CBC W/AUTO DIFF WBC: CPT

## 2021-11-20 PROCEDURE — 85610 PROTHROMBIN TIME: CPT

## 2021-11-20 PROCEDURE — 72131 CT LUMBAR SPINE W/O DYE: CPT

## 2021-11-20 PROCEDURE — 70450 CT HEAD/BRAIN W/O DYE: CPT

## 2021-11-20 RX ORDER — BACITRACIN ZINC AND POLYMYXIN B SULFATE 500; 1000 [USP'U]/G; [USP'U]/G
OINTMENT TOPICAL ONCE
Status: DISCONTINUED | OUTPATIENT
Start: 2021-11-20 | End: 2021-11-20 | Stop reason: HOSPADM

## 2021-11-20 RX ADMIN — CLOSTRIDIUM TETANI TOXOID ANTIGEN (FORMALDEHYDE INACTIVATED) AND CORYNEBACTERIUM DIPHTHERIAE TOXOID ANTIGEN (FORMALDEHYDE INACTIVATED) 0.5 ML: 5; 2 INJECTION, SUSPENSION INTRAMUSCULAR at 16:33

## 2021-11-20 ASSESSMENT — ENCOUNTER SYMPTOMS
BACK PAIN: 1
NAUSEA: 0
VOMITING: 0
SHORTNESS OF BREATH: 0
ABDOMINAL PAIN: 0

## 2021-11-20 ASSESSMENT — PAIN SCALES - GENERAL: PAINLEVEL_OUTOF10: 5

## 2021-11-20 ASSESSMENT — PAIN DESCRIPTION - PAIN TYPE: TYPE: ACUTE PAIN

## 2021-11-20 ASSESSMENT — PAIN DESCRIPTION - DESCRIPTORS: DESCRIPTORS: SORE

## 2021-11-20 ASSESSMENT — PAIN DESCRIPTION - LOCATION: LOCATION: HEAD

## 2021-11-20 NOTE — ED PROVIDER NOTES
level 4, 10 or more for level 5)     Review of Systems   Constitutional: Negative for fever. Eyes: Negative for visual disturbance. Respiratory: Negative for shortness of breath. Cardiovascular: Negative for chest pain. Gastrointestinal: Negative for abdominal pain, nausea and vomiting. Musculoskeletal: Positive for back pain. Negative for neck pain. Skin: Positive for wound. Neurological: Positive for headaches. Negative for dizziness, loss of consciousness, syncope, light-headedness and numbness. Psychiatric/Behavioral: Negative for confusion. All other systems reviewed and are negative. Positives and Pertinent negatives as per HPI. PAST MEDICAL HISTORY     Past Medical History:   Diagnosis Date    Arthritis     Blood circulation, collateral     CAD (coronary artery disease)     CHF (congestive heart failure) (HCC)     Confusion 8/31/2020    GERD (gastroesophageal reflux disease)     History of heart artery stent 12/2018    Hyperlipidemia     Hypertension     Mitral valve prolapse     Myocardial infarct, old     Thyroid disease          SURGICAL HISTORY     Past Surgical History:   Procedure Laterality Date    COLONOSCOPY      EYE SURGERY Left 09/04/2018    PHACO EMULSIFICATION OF CATARACT WITH INTRAOCULAR LENS IMPLANT LEFT EYE     HYSTERECTOMY      OTHER SURGICAL HISTORY  08/27/2018    phacoemulsification of cataract with intraocular lens implant right eye    [place] OFFICE/OUTPT VISIT,PROCEDURE ONLY Right 8/27/2018    PHACO EMULSIFICATION OF CATARACT WITH INTRAOCULAR LENS IMPLANT RIGHT EYE performed by Oneyda Stockton MD at [address] [facility] OFFICE/OUTPT VISIT,PROCEDURE ONLY Left 9/4/2018    PHACO EMULSIFICATION OF CATARACT WITH INTRAOCULAR LENS IMPLANT LEFT EYE performed by Oneyda Stockton MD at [address]       Previous Medications    ASPIRIN 81 MG CHEWABLE TABLET    Take 1 tablet by mouth daily.     ATORVASTATIN (LIPITOR) 80 MG TABLET    TAKE 1 TABLET BY MOUTH ONE TIME A DAY    CARVEDILOL (COREG) 25 MG TABLET    TAKE 1 TABLET BY MOUTH TWICE A DAY    CLOPIDOGREL (PLAVIX) 75 MG TABLET    TAKE 1 TABLET BY MOUTH DAILY    COENZYME Q10 (CO Q 10) 100 MG CAPS    Take  by mouth daily. FAMOTIDINE (PEPCID) 20 MG TABLET    Take 1 tablet by mouth 2 times daily    FLUTICASONE (FLONASE) 50 MCG/ACT NASAL SPRAY    2 sprays by Each Nostril route daily    HANDICAP PLACARD MISC    by Does not apply route Length: 5 years    HYDROCHLOROTHIAZIDE (HYDRODIURIL) 12.5 MG TABLET    TAKE 1 TABLET BY MOUTH ONE TIME A DAY'    HYDROCODONE-ACETAMINOPHEN (NORCO)  MG PER TABLET    Take 1 tablet by mouth every 6 hours as needed. Pinky Seltzer LOSARTAN (COZAAR) 50 MG TABLET    TAKE 1 TABLET BY MOUTH EVERY DAY    NITROGLYCERIN (NITROSTAT) 0.4 MG SL TABLET    Place 1 tablet under the tongue every 5 minutes as needed for Chest pain up to max of 3 total doses. If no relief after 1 dose, call 911. ONDANSETRON (ZOFRAN) 4 MG TABLET    Take 1 tablet by mouth 3 times daily as needed for Nausea or Vomiting    VITAMIN B-12 (CYANOCOBALAMIN) 1000 MCG TABLET    Take 1,000 mcg by mouth daily    VITAMIN D (CHOLECALCIFEROL) 1000 UNIT TABS TABLET    Take 2 tablets by mouth daily    ZOLPIDEM (AMBIEN) 5 MG TABLET    TAKE 1 TABLET BY MOUTH NIGHTLY AS NEEDED FOR SLEEP FOR UP  DAYS.          ALLERGIES     Benazepril hcl, Feldene [piroxicam], Hytrin [terazosin hcl], Iv contrast [iodides], Acetaminophen, Celebrex [celecoxib], Cephalexin, Hydrochlorothiazide, Iodinated casein, Monopril [fosinopril sodium], Protonix [pantoprazole sodium], Quinapril hcl, Toprol xl [metoprolol succinate], Ultracet [tramadol-acetaminophen], and Ziac [bisoprolol-hydrochlorothiazide]    FAMILYHISTORY       Family History   Problem Relation Age of Onset    Heart Disease Mother     Heart Disease Father     Cancer Father     Cancer Sister     Diabetes Brother     Stroke Brother           SOCIAL HISTORY nerve deficit. Sensory: Sensation is intact. No sensory deficit. Motor: Motor function is intact. No abnormal muscle tone. Coordination: Coordination normal.   Psychiatric:         Behavior: Behavior normal. Behavior is cooperative.          DIAGNOSTIC RESULTS   LABS:    Labs Reviewed   CBC WITH AUTO DIFFERENTIAL - Abnormal; Notable for the following components:       Result Value    RBC 3.22 (*)     Hemoglobin 10.0 (*)     Hematocrit 29.8 (*)     All other components within normal limits    Narrative:     Performed at:  [place] [address],  [facility]   Phone [phone]   COMPREHENSIVE METABOLIC PANEL W/ REFLEX TO MG FOR LOW K - Abnormal; Notable for the following components:    BUN 25 (*)     CREATININE 1.7 (*)     GFR Non- 28 (*)     GFR  34 (*)     ALT 8 (*)     All other components within normal limits    Narrative:     Performed at:  [place] [address],  [facility]   Phone [phone]   PROTIME-INR    Narrative:     Performed at:  [place] [address],  [facility]   Phone [phone]     Results for orders placed or performed during the hospital encounter of 11/20/21   CBC Auto Differential   Result Value Ref Range    WBC 6.1 4.0 - 11.0 K/uL    RBC 3.22 (L) 4.00 - 5.20 M/uL    Hemoglobin 10.0 (L) 12.0 - 16.0 g/dL    Hematocrit 29.8 (L) 36.0 - 48.0 %    MCV 92.5 80.0 - 100.0 fL    MCH 31.2 26.0 - 34.0 pg    MCHC 33.7 31.0 - 36.0 g/dL    RDW 13.7 12.4 - 15.4 %    Platelets 095 353 - 994 K/uL    MPV 8.6 5.0 - 10.5 fL    Neutrophils % 61.1 %    Lymphocytes % 25.4 %    Monocytes % 8.0 %    Eosinophils % 4.6 %    Basophils % 0.9 %    Neutrophils Absolute 3.7 1.7 - 7.7 K/uL    Lymphocytes Absolute 1.5 1.0 - 5.1 K/uL    Monocytes Absolute 0.5 0.0 - 1.3 K/uL    Eosinophils Absolute 0.3 0.0 - 0.6 K/uL Basophils Absolute 0.1 0.0 - 0.2 K/uL   Comprehensive Metabolic Panel w/ Reflex to MG   Result Value Ref Range    Sodium 138 136 - 145 mmol/L    Potassium reflex Magnesium 4.3 3.5 - 5.1 mmol/L    Chloride 101 99 - 110 mmol/L    CO2 29 21 - 32 mmol/L    Anion Gap 8 3 - 16    Glucose 94 70 - 99 mg/dL    BUN 25 (H) 7 - 20 mg/dL    CREATININE 1.7 (H) 0.6 - 1.2 mg/dL    GFR Non-African American 28 (A) >60    GFR  34 (A) >60    Calcium 9.2 8.3 - 10.6 mg/dL    Total Protein 6.4 6.4 - 8.2 g/dL    Albumin 3.7 3.4 - 5.0 g/dL    Albumin/Globulin Ratio 1.4 1.1 - 2.2    Total Bilirubin <0.2 0.0 - 1.0 mg/dL    Alkaline Phosphatase 69 40 - 129 U/L    ALT 8 (L) 10 - 40 U/L    AST 16 15 - 37 U/L   Protime-INR   Result Value Ref Range    Protime 11.5 9.9 - 12.7 sec    INR 1.02 0.88 - 1.12       All other labs were within normal range or not returned as of this dictation. EKG: All EKG's are interpreted by the Emergency Department Physician in the absence of a cardiologist.  Please see their note for interpretation of EKG. RADIOLOGY:   Non-plain film images such as CT, Ultrasound and MRI are read by the radiologist. Plain radiographic images are visualized andpreliminarily interpreted by the  ED Provider with the below findings:        Interpretation [facility] Radiologist below, if available at the time of this note:    [facility]   Final Result   1. No definite acute lumbar traumatic abnormality. 2. Chronic T12 and L1 compression fractures. 3. Bones appear osteoporotic. 4. Mild-to-moderate severity degenerative changes lumbar spine without   definite lumbar spinal canal stenosis evident. 5.  Mild bilaterally symmetrical SI joint osteoarthritis. CT CERVICAL SPINE WO CONTRAST   Final Result   No acute abnormality of the cervical spine. CT HEAD WO CONTRAST   Final Result   No acute intracranial abnormality. Chronic small-vessel white matter ischemic changes.            CT HEAD WO CONTRAST    Result Date: 11/20/2021  EXAMINATION: CT OF THE HEAD WITHOUT CONTRAST  11/20/2021 5:32 pm TECHNIQUE: CT of the head was performed without the administration of intravenous contrast. Dose modulation, iterative reconstruction, and/or weight based adjustment of the mA/kV was utilized to reduce the radiation dose to as low as reasonably achievable. COMPARISON: 08/31/2020. HISTORY: ORDERING SYSTEM PROVIDED HISTORY: trauma TECHNOLOGIST PROVIDED HISTORY: Has a \"code stroke\" or \"stroke alert\" been called? ->No Reason for exam:->trauma Decision Support Exception - unselect if not a suspected or confirmed emergency medical condition->Emergency Medical Condition (MA) Reason for Exam: head trauma Acuity: Acute Type of Exam: Initial Additional signs and symptoms: head trama, fall FINDINGS: BRAIN/VENTRICLES: There is no acute intracranial hemorrhage, mass effect or midline shift. No abnormal extra-axial fluid collection. The gray-white differentiation is maintained without evidence of an acute infarct. There is no evidence of hydrocephalus. Mild generalized prominence of the ventricular and cisternal spaces. Decreased attenuation in the periventricular and subcortical white matter most consistent with small vessel ischemic change. Intracranial atherosclerotic vascular calcification. ORBITS: The visualized portion of the orbits demonstrate no acute abnormality. SINUSES: The visualized paranasal sinuses and mastoid air cells demonstrate no acute abnormality. SOFT TISSUES/SKULL:  Scalp contusion near the vertex to the left of midline. No acute calvarial abnormality. No acute intracranial abnormality. Chronic small-vessel white matter ischemic changes.      CT CERVICAL SPINE WO CONTRAST    Result Date: 11/20/2021  EXAMINATION: CT OF THE CERVICAL SPINE WITHOUT CONTRAST 11/20/2021 5:33 pm TECHNIQUE: CT of the cervical spine was performed without the administration of intravenous contrast. Multiplanar reformatted images are provided for review. Dose modulation, iterative reconstruction, and/or weight based adjustment of the mA/kV was utilized to reduce the radiation dose to as low as reasonably achievable. COMPARISON: None. HISTORY: ORDERING SYSTEM PROVIDED HISTORY: trauma TECHNOLOGIST PROVIDED HISTORY: Reason for exam:->trauma Decision Support Exception - unselect if not a suspected or confirmed emergency medical condition->Emergency Medical Condition (MA) Reason for Exam: trauma, neck pain Acuity: Acute Type of Exam: Initial Mechanism of Injury: neck pain FINDINGS: BONES/ALIGNMENT: There is no acute fracture or traumatic malalignment. DEGENERATIVE CHANGES: No significant degenerative changes. SOFT TISSUES: There is no prevertebral soft tissue swelling. No acute abnormality of the cervical spine. CT LUMBAR SPINE WO CONTRAST    Result Date: 11/20/2021  EXAMINATION: CT OF THE LUMBAR SPINE WITHOUT CONTRAST  11/20/2021 TECHNIQUE: CT of the lumbar spine was performed without the administration of intravenous contrast. Multiplanar reformatted images are provided for review. Adjustment of mA and/or kV according to patient size was utilized. Dose modulation, iterative reconstruction, and/or weight based adjustment of the mA/kV was utilized to reduce the radiation dose to as low as reasonably achievable. COMPARISON: CT lumbar spine 09/30/2015 HISTORY: ORDERING SYSTEM PROVIDED HISTORY: FALLING TECHNOLOGIST PROVIDED HISTORY: Reason for exam:-> trauma Decision Support Exception - unselect if not a suspected or confirmed emergency medical condition->Emergency Medical Condition (MA) Reason for Exam: Pt fell, c/o pain Acuity: Acute Type of Exam: Initial Previously described L1 compression fracture. FINDINGS: BONES/ALIGNMENT: There is normal alignment of the spine. Stable L1 compression fracture. Chronic mild compression inferior endplate C13 is not present 2015. Bone mineralization appears abnormally low.   The other vertebral body heights are maintained. No osseous destructive lesion is seen. DEGENERATIVE CHANGES: Diffuse mild-to-moderate severity degenerative changes of the lumbar spine. No acquired lumbar spinal canal stenosis evident. Mild lumbar sigmoid scoliosis, leftward curvature upper lumbar spine and rightward curvature lower lumbar spine. Mild bilaterally symmetrical SI joint osteoarthritis. SOFT TISSUES/RETROPERITONEUM: No paraspinal mass is seen. 1. No definite acute lumbar traumatic abnormality. 2. Chronic T12 and L1 compression fractures. 3. Bones appear osteoporotic. 4. Mild-to-moderate severity degenerative changes lumbar spine without definite lumbar spinal canal stenosis evident. 5.  Mild bilaterally symmetrical SI joint osteoarthritis. PROCEDURES   Unless otherwise noted below, none     Lac Repair    Date/Time: 11/20/2021 6:33 PM  Performed by: Jloanta Mccarthy PA-C  Authorized by: Jolanta Mccarthy PA-C     Consent:     Consent obtained:  Verbal    Consent given by:  Patient    Risks discussed:  Pain and infection  Universal protocol:     Procedure explained and questions answered to patient or proxy's satisfaction: yes      Patient identity confirmed:  Verbally with patient  Anesthesia (see MAR for exact dosages):      Anesthesia method:  Local infiltration    Local anesthetic:  Lidocaine 1% WITH epi (4cc)  Laceration details:     Location:  Scalp    Scalp location:  Crown    Length (cm):  5    Depth (mm):  4  Repair type:     Repair type:  Simple  Pre-procedure details:     Preparation:  Patient was prepped and draped in usual sterile fashion and imaging obtained to evaluate for foreign bodies  Exploration:     Wound exploration: entire depth of wound probed and visualized      Wound extent: no foreign bodies/material noted, no underlying fracture noted and no vascular damage noted      Contaminated: no    Treatment:     Area cleansed with:  Hibiclens and saline    Amount of cleaning: Standard  Skin repair:     Repair method:  Staples (3-0 vicryl x3)    Number of staples:  11  Approximation:     Approximation:  Close  Post-procedure details:     Dressing:  Antibiotic ointment    Patient tolerance of procedure: Tolerated well, no immediate complications        CRITICAL CARE TIME   N/A    CONSULTS:  None      EMERGENCY DEPARTMENT COURSE and DIFFERENTIAL DIAGNOSIS/MDM:   Vitals:    Vitals:    11/20/21 1605 11/20/21 1900   BP: (!) 152/83 131/80   Pulse: 69 73   Resp: 16 16   Temp: 96.8 °F (36 °C)    TempSrc: Oral    SpO2: 96%    Weight: 129 lb (58.5 kg)    Height: 5' 2\" (1.575 m)        Patient was given thefollowing medications:  Medications   bacitracin-polymyxin b (POLYSPORIN) ointment (has no administration in time range)   tetanus & diphtheria toxoids (adult) 5-2 LFU injection 0.5 mL (0.5 mLs IntraMUSCular Given 11/20/21 1633)       ED course  Patient presented to the ER for evaluation after head injury she stumbled lost her balance fell back hitting her head on the table and then fell onto the floor on her bottom. Presented due to wound to her scalp that continues to bleed. She is on Plavix. No loss of consciousness. This was mechanical fall. No syncope or near syncope. Pressure dressing was immediately applied upon arrival with hemostasis. CT scans ordered of brain, cervical and lumbar spine. Basic labs will be obtained. Patient is stable. 7:08 PM EST  CT brain no acute intracranial abnormality. No hemorrhage. No fracture. Stable chronic ischemic white matter changes. Cervical spine no fracture. Lumbar spine no acute fracture, has old T12-L1 compression fractures. Basic labs checked blood counts are stable hemoglobin 10.0, previous was 10.2 on 8/20/2021. Chronic kidney disease creatinine 1.7 previous was 2.0 on 8/21/2021. Wound was anesthetized with 1% lidocaine with epinephrine. Wound cleaned with Hibiclens and saline.   Three 3-0 Vicryl sutures placed and 11 staples placed to close the skin. Patient tolerated well. No active bleeding. Polysporin ointment will be applied. Head injury instructions were given. Family will be with her tonight. She has contusion to the scalp. Advise use ice packs. Tylenol as needed. Discussed strict return precautions return to emergency department for any worsening symptoms worsening headache, if she develops dizziness, vomiting or any change in mental status or behavior return to the ER for evaluation. Patient and family understand and agree. I estimate there is LOW risk for SKULL FRACTURE, SUBARACHNOID HEMORRHAGE, INTRACRANIAL HEMORRHAGE, CERVICAL SPINE INJURY, SUBDURAL OR EPIDURAL HEMATOMA,  thus I consider the discharge disposition reasonable. FINAL IMPRESSION      1. Laceration of scalp, initial encounter    2. Contusion of scalp, initial encounter    3.  Fall, initial encounter          DISPOSITION/PLAN   DISPOSITION     PATIENT REFERREDTO:  [address] [place] [address] Drive, DO  [facility] Corey Sidney [address] [facility] [address]  [phone]    In 2 days  For wound re-check    [facility]  [address] [facility] [address]  [phone]  In 7 days  for staple removal    [facility]  [address] [facility]  [phone]    If symptoms worsen, worsening headache, any vomiting, dizziness or change in mental status return immediately to the ER for evaluation      DISCHARGE MEDICATIONS:  New Prescriptions    No medications on file       DISCONTINUED MEDICATIONS:  Discontinued Medications    No medications on file              (Please note that portions ofthis note were completed with a voice recognition program.  Efforts were made to edit the dictations but occasionally words are mis-transcribed.)    Gabrielle Rucker PA-C (electronically signed)            Nydia Funk PA-C  11/20/21 1912

## 2021-11-20 NOTE — ED NOTES
Bleeding controlled. surgifoam placed to head and wrapped. IV placed.  Tetanus injection given to left deltiod     Lew Puckett RN  11/20/21 3352

## 2022-01-19 DIAGNOSIS — I10 ESSENTIAL HYPERTENSION: Primary | ICD-10-CM

## 2022-01-19 DIAGNOSIS — E78.2 MIXED HYPERLIPIDEMIA: ICD-10-CM

## 2022-01-19 DIAGNOSIS — R00.2 PALPITATIONS: ICD-10-CM

## 2022-01-19 NOTE — TELEPHONE ENCOUNTER
The patient called requesting to be put back on Lasix because she is swollen.      [facility] [address] [facility]      Last Office Visit: 09/29/21    Next Office Visit: 03/30/22    Last Refill: unknown     Last Labs: 11/20/21

## 2022-01-20 RX ORDER — FUROSEMIDE 20 MG/1
20 TABLET ORAL DAILY
COMMUNITY
End: 2022-01-20 | Stop reason: SDUPTHER

## 2022-01-20 RX ORDER — FUROSEMIDE 20 MG/1
20 TABLET ORAL DAILY
Qty: 30 TABLET | Refills: 5 | Status: ON HOLD | OUTPATIENT
Start: 2022-01-20 | End: 2022-03-22 | Stop reason: SDUPTHER

## 2022-01-25 ENCOUNTER — HOSPITAL ENCOUNTER (OUTPATIENT)
Age: 87
Discharge: HOME | End: 2022-01-25
Payer: MEDICARE

## 2022-01-25 DIAGNOSIS — I10 ESSENTIAL HYPERTENSION: ICD-10-CM

## 2022-01-25 DIAGNOSIS — E78.2 MIXED HYPERLIPIDEMIA: ICD-10-CM

## 2022-01-25 DIAGNOSIS — R00.2 PALPITATIONS: ICD-10-CM

## 2022-01-25 PROCEDURE — 80048 BASIC METABOLIC PNL TOTAL CA: CPT

## 2022-01-25 PROCEDURE — 36415 COLL VENOUS BLD VENIPUNCTURE: CPT

## 2022-01-26 LAB
ANION GAP SERPL CALCULATED.3IONS-SCNC: 14 MMOL/L (ref 3–16)
BUN BLDV-MCNC: 22 MG/DL (ref 7–20)
CALCIUM SERPL-MCNC: 9 MG/DL (ref 8.3–10.6)
CHLORIDE BLD-SCNC: 96 MMOL/L (ref 99–110)
CO2: 24 MMOL/L (ref 21–32)
CREAT SERPL-MCNC: 1.5 MG/DL (ref 0.6–1.2)
GFR AFRICAN AMERICAN: 40
GFR NON-AFRICAN AMERICAN: 33
GLUCOSE BLD-MCNC: 97 MG/DL (ref 70–99)
POTASSIUM SERPL-SCNC: 3.8 MMOL/L (ref 3.5–5.1)
SODIUM BLD-SCNC: 134 MMOL/L (ref 136–145)

## 2022-03-07 ENCOUNTER — OFFICE VISIT (OUTPATIENT)
Dept: FAMILY MEDICINE CLINIC | Age: 87
End: 2022-03-07
Payer: MEDICARE

## 2022-03-07 VITALS
DIASTOLIC BLOOD PRESSURE: 70 MMHG | OXYGEN SATURATION: 97 % | HEART RATE: 81 BPM | BODY MASS INDEX: 23.59 KG/M2 | SYSTOLIC BLOOD PRESSURE: 130 MMHG | WEIGHT: 129 LBS

## 2022-03-07 DIAGNOSIS — I47.20 VT (VENTRICULAR TACHYCARDIA): ICD-10-CM

## 2022-03-07 DIAGNOSIS — N18.4 CHRONIC KIDNEY DISEASE, STAGE IV (SEVERE) (HCC): ICD-10-CM

## 2022-03-07 DIAGNOSIS — I42.0 DILATED CARDIOMYOPATHY (HCC): ICD-10-CM

## 2022-03-07 DIAGNOSIS — I10 ESSENTIAL HYPERTENSION: Primary | ICD-10-CM

## 2022-03-07 DIAGNOSIS — E78.2 MIXED HYPERLIPIDEMIA: ICD-10-CM

## 2022-03-07 DIAGNOSIS — F51.01 PRIMARY INSOMNIA: ICD-10-CM

## 2022-03-07 DIAGNOSIS — E55.9 VITAMIN D DEFICIENCY: ICD-10-CM

## 2022-03-07 DIAGNOSIS — I20.9 ANGINA PECTORIS, UNSPECIFIED (HCC): ICD-10-CM

## 2022-03-07 PROCEDURE — 1036F TOBACCO NON-USER: CPT | Performed by: FAMILY MEDICINE

## 2022-03-07 PROCEDURE — 1123F ACP DISCUSS/DSCN MKR DOCD: CPT | Performed by: FAMILY MEDICINE

## 2022-03-07 PROCEDURE — 4040F PNEUMOC VAC/ADMIN/RCVD: CPT | Performed by: FAMILY MEDICINE

## 2022-03-07 PROCEDURE — 1090F PRES/ABSN URINE INCON ASSESS: CPT | Performed by: FAMILY MEDICINE

## 2022-03-07 PROCEDURE — G8420 CALC BMI NORM PARAMETERS: HCPCS | Performed by: FAMILY MEDICINE

## 2022-03-07 PROCEDURE — G8428 CUR MEDS NOT DOCUMENT: HCPCS | Performed by: FAMILY MEDICINE

## 2022-03-07 PROCEDURE — G8484 FLU IMMUNIZE NO ADMIN: HCPCS | Performed by: FAMILY MEDICINE

## 2022-03-07 PROCEDURE — 99214 OFFICE O/P EST MOD 30 MIN: CPT | Performed by: FAMILY MEDICINE

## 2022-03-07 RX ORDER — ZOLPIDEM TARTRATE 5 MG/1
5 TABLET ORAL NIGHTLY PRN
Qty: 30 TABLET | Refills: 2 | Status: SHIPPED | OUTPATIENT
Start: 2022-03-07 | End: 2022-09-03

## 2022-03-07 RX ORDER — ONDANSETRON 4 MG/1
4 TABLET, FILM COATED ORAL 3 TIMES DAILY PRN
Qty: 30 TABLET | Refills: 3 | Status: SHIPPED | OUTPATIENT
Start: 2022-03-07 | End: 2022-06-30 | Stop reason: SDUPTHER

## 2022-03-07 NOTE — PROGRESS NOTES
Anhsul Boyd is a 80 y.o. female    Chief Complaint   Patient presents with    Hypertension    Hyperlipidemia    Insomnia       HPI:    Hypertension  This is a chronic problem. The current episode started more than 1 year ago. The problem is unchanged. The problem is controlled. Pertinent negatives include no chest pain. Past treatments include angiotensin blockers, diuretics and beta blockers. The current treatment provides significant improvement. There are no compliance problems. Hyperlipidemia  This is a chronic problem. The current episode started more than 1 year ago. The problem is controlled. Recent lipid tests were reviewed and are low. Pertinent negatives include no chest pain or myalgias. Current antihyperlipidemic treatment includes statins. The current treatment provides significant improvement of lipids. There are no compliance problems. Risk factors for coronary artery disease include hypertension. Primary insomnia. This is a chronic condition. She has trouble going and staying asleep. She typically takes 15 Ambien tablets per month. It works well. She does have chronic pain. Angina is stable. No chest pain. CKD is stable. ROS:    Review of Systems   Cardiovascular: Negative for chest pain and leg swelling. Musculoskeletal: Negative for myalgias. /70   Pulse 81   Wt 129 lb (58.5 kg)   SpO2 97%   BMI 23.59 kg/m²     Physical Exam:    Physical Exam  Constitutional:       General: She is not in acute distress. Appearance: Normal appearance. She is well-developed and normal weight. She is not ill-appearing, toxic-appearing or diaphoretic. Neurological:      Mental Status: She is alert. Psychiatric:         Mood and Affect: Mood normal.         Behavior: Behavior normal.         Thought Content:  Thought content normal.         Current Outpatient Medications   Medication Sig Dispense Refill    ondansetron (ZOFRAN) 4 MG tablet Take 1 tablet by mouth 3 times daily as needed for Nausea or Vomiting 30 tablet 3    zolpidem (AMBIEN) 5 MG tablet Take 1 tablet by mouth nightly as needed for Sleep for up to 180 days. 30 tablet 2    furosemide (LASIX) 20 MG tablet Take 1 tablet by mouth daily 30 tablet 5    carvedilol (COREG) 25 MG tablet TAKE 1 TABLET BY MOUTH TWICE A  tablet 3    atorvastatin (LIPITOR) 80 MG tablet TAKE 1 TABLET BY MOUTH ONE TIME A DAY 90 tablet 3    vitamin B-12 (CYANOCOBALAMIN) 1000 MCG tablet Take 1,000 mcg by mouth daily      clopidogrel (PLAVIX) 75 MG tablet TAKE 1 TABLET BY MOUTH DAILY 90 tablet 3    losartan (COZAAR) 50 MG tablet TAKE 1 TABLET BY MOUTH EVERY DAY 90 tablet 3    hydroCHLOROthiazide (HYDRODIURIL) 12.5 MG tablet TAKE 1 TABLET BY MOUTH ONE TIME A DAY' 90 tablet 3    nitroGLYCERIN (NITROSTAT) 0.4 MG SL tablet Place 1 tablet under the tongue every 5 minutes as needed for Chest pain up to max of 3 total doses. If no relief after 1 dose, call 911. 25 tablet 3    fluticasone (FLONASE) 50 MCG/ACT nasal spray 2 sprays by Each Nostril route daily 1 Bottle 5    Handicap Placard MISC by Does not apply route Length: 5 years 1 each 0    vitamin D (CHOLECALCIFEROL) 1000 UNIT TABS tablet Take 2 tablets by mouth daily 60 tablet 3    famotidine (PEPCID) 20 MG tablet Take 1 tablet by mouth 2 times daily (Patient taking differently: Take 20 mg by mouth 2 times daily as needed ) 60 tablet 5    HYDROcodone-acetaminophen (NORCO)  MG per tablet Take 1 tablet by mouth every 6 hours as needed. Renell Duran Coenzyme Q10 (CO Q 10) 100 MG CAPS Take  by mouth daily.  aspirin 81 MG chewable tablet Take 1 tablet by mouth daily. 30 tablet      No current facility-administered medications for this visit. Assessment:    1. Essential hypertension    2. Mixed hyperlipidemia    3. Primary insomnia    4. Dilated cardiomyopathy ([address]    5. Chronic kidney disease, stage IV (severe) ([address]    6. VT (ventricular tachycardia) ([address]    7.  Angina pectoris, unspecified ([facility]    8. Vitamin D deficiency        Plan:    1. Essential hypertension  Stable. Continue current medications. - CBC Auto Differential  - Comprehensive Metabolic Panel    2. Mixed hyperlipidemia  Stable. Continue current medications. - CBC Auto Differential  - Comprehensive Metabolic Panel    3. Primary insomnia  Stable. Continue current medications. 4. Angina pectoris, unspecified     5. Chronic kidney disease, stage IV (severe) (HCC)    Dilated cardiomyopathy, CKD, VT and angina pectoris are stable. She sees cardiology. Return in about 6 months (around 9/7/2022) for Hypertension, Hyperlipidemia, insomnia.

## 2022-03-10 LAB
6-ACETYLMORPHINE: NOT DETECTED
7-AMINOCLONAZEPAM: NOT DETECTED
ALPHA-OH-ALPRAZOLAM: NOT DETECTED
ALPHA-OH-MIDAZOLAM, URINE: NOT DETECTED
ALPRAZOLAM: NOT DETECTED
AMPHETAMINE: NOT DETECTED
BARBITURATES: NOT DETECTED
BENZOYLECGONINE: NOT DETECTED
BUPRENORPHINE: NOT DETECTED
CARISOPRODOL: NOT DETECTED
CLONAZEPAM: NOT DETECTED
CODEINE: NOT DETECTED
CREATININE URINE: 95.9 MG/DL (ref 20–400)
DIAZEPAM: NOT DETECTED
DRUGS EXPECTED: NORMAL
EER PAIN MGT DRUG PANEL, HIGH RES/EMIT U: NORMAL
ETHYL GLUCURONIDE: NOT DETECTED
FENTANYL: NOT DETECTED
GABAPENTIN: NOT DETECTED
HYDROCODONE: PRESENT
HYDROMORPHONE: PRESENT
LORAZEPAM: NOT DETECTED
MARIJUANA METABOLITE: NOT DETECTED
MDA: NOT DETECTED
MDEA: NOT DETECTED
MDMA URINE: NOT DETECTED
MEPERIDINE: NOT DETECTED
METHADONE: NOT DETECTED
METHAMPHETAMINE: NOT DETECTED
METHYLPHENIDATE: NOT DETECTED
MIDAZOLAM: NOT DETECTED
MORPHINE: NOT DETECTED
NALOXONE: NOT DETECTED
NORBUPRENORPHINE, FREE: NOT DETECTED
NORDIAZEPAM: NOT DETECTED
NORFENTANYL: NOT DETECTED
NORHYDROCODONE, URINE: PRESENT
NOROXYCODONE: NOT DETECTED
NOROXYMORPHONE, URINE: NOT DETECTED
OXAZEPAM: NOT DETECTED
OXYCODONE: NOT DETECTED
OXYMORPHONE: NOT DETECTED
PAIN MANAGEMENT DRUG PANEL: NORMAL
PAIN MANAGEMENT DRUG PANEL: NORMAL
PCP: NOT DETECTED
PHENTERMINE: NOT DETECTED
PREGABALIN: NOT DETECTED
TAPENTADOL, URINE: NOT DETECTED
TAPENTADOL-O-SULFATE, URINE: NOT DETECTED
TEMAZEPAM: NOT DETECTED
TRAMADOL: NOT DETECTED
ZOLPIDEM: NOT DETECTED

## 2022-03-17 ENCOUNTER — NURSE TRIAGE (OUTPATIENT)
Dept: OTHER | Facility: CLINIC | Age: 87
End: 2022-03-17

## 2022-03-17 ENCOUNTER — TELEPHONE (OUTPATIENT)
Dept: FAMILY MEDICINE CLINIC | Age: 87
End: 2022-03-17

## 2022-03-17 DIAGNOSIS — H54.7 LOSS OF VISION: Primary | ICD-10-CM

## 2022-03-17 NOTE — TELEPHONE ENCOUNTER
Loss of vision is something ideally she should see an ophthalmologist for. I placed an urgent referral to Dr. Ming Angeles upstairs. Can someone call them to see if they can get her in today or tomorrow? If she has any signs of a stroke such as weakness on one side of the body, numbness or tingling on one side of the body or slurred speech/facial droop, then she should be in the ER. We do not have any ophthalmoscope's to look deep in the eye so I do not think a visit at the primary care office would help her much.

## 2022-03-17 NOTE — TELEPHONE ENCOUNTER
Received call from [facility] at [facility] with Red Flag Complaint. Subjective: Caller states \" I noticed some blurring yesterday, then the right eye got more blurry, today when I cover my L eye I cant see out of the right one and I havent, it is also swollen and bloodshot \"     Current Symptoms: Loss of vision, blood shot, swelling, sinus feel full     Onset: 2 days ago; worsening    Associated Symptoms: NA    Pain Severity: Denies pain but 'uncomfortable\"     Temperature: Denies, but feels warm and cold     What has been tried: Nothing    LMP: NA Pregnant: NA    Recommended disposition: Go to ED/UCC Now (Or to Office with PCP Approval)    Care advice provided, patient verbalizes understanding; denies any other questions or concerns; instructed to call back for any new or worsening symptoms. Writer provided warm transfer to [facility] at [address] [place] [address] for Vision Changes     Attention Provider: Thank you for allowing me to participate in the care of your patient. The patient was connected to triage in response to information provided to the ECC/PSC. Please do not respond through this encounter as the response is not directed to a shared pool.       Reason for Disposition   Blurred vision or visual changes and present now and sudden onset or new (e.g., minutes, hours, days) (Exception: seeing floaters / black specks OR previously diagnosed migraine headaches with same symptoms)    Protocols used: VISION LOSS OR CHANGE-ADULT-OH

## 2022-03-17 NOTE — TELEPHONE ENCOUNTER
Please see nurse triage. Pt needs to be seen for blurred vision ASAP. Please advise on where to schedule. There are openings tomorrow but I dont think this can safely wait.  Pt does not have an ophthalmologist. Please advise Yes

## 2022-03-18 ENCOUNTER — APPOINTMENT (OUTPATIENT)
Dept: MRI IMAGING | Age: 87
DRG: 040 | End: 2022-03-18
Attending: INTERNAL MEDICINE
Payer: MEDICARE

## 2022-03-18 ENCOUNTER — HOSPITAL ENCOUNTER (EMERGENCY)
Age: 87
Discharge: ANOTHER HOSPITAL | End: 2022-03-18
Attending: EMERGENCY MEDICINE
Payer: MEDICARE

## 2022-03-18 ENCOUNTER — HOSPITAL ENCOUNTER (INPATIENT)
Age: 87
LOS: 4 days | Discharge: HOME | DRG: 040 | End: 2022-03-22
Attending: INTERNAL MEDICINE | Admitting: INTERNAL MEDICINE
Payer: MEDICARE

## 2022-03-18 ENCOUNTER — APPOINTMENT (OUTPATIENT)
Dept: CT IMAGING | Age: 87
End: 2022-03-18
Payer: MEDICARE

## 2022-03-18 ENCOUNTER — APPOINTMENT (OUTPATIENT)
Dept: GENERAL RADIOLOGY | Age: 87
End: 2022-03-18
Payer: MEDICARE

## 2022-03-18 VITALS
WEIGHT: 129 LBS | HEART RATE: 71 BPM | SYSTOLIC BLOOD PRESSURE: 155 MMHG | HEIGHT: 62 IN | DIASTOLIC BLOOD PRESSURE: 93 MMHG | BODY MASS INDEX: 23.74 KG/M2 | RESPIRATION RATE: 13 BRPM | TEMPERATURE: 96.7 F | OXYGEN SATURATION: 96 %

## 2022-03-18 DIAGNOSIS — R77.8 ELEVATED TROPONIN: ICD-10-CM

## 2022-03-18 DIAGNOSIS — H53.131 SUDDEN VISUAL LOSS OF RIGHT EYE: Primary | ICD-10-CM

## 2022-03-18 PROBLEM — H53.40 VISUAL FIELD DEFECT: Status: ACTIVE | Noted: 2022-03-18

## 2022-03-18 LAB
A/G RATIO: 1.3 (ref 1.1–2.2)
ALBUMIN SERPL-MCNC: 3.9 G/DL (ref 3.4–5)
ALP BLD-CCNC: 66 U/L (ref 40–129)
ALT SERPL-CCNC: 9 U/L (ref 10–40)
ANION GAP SERPL CALCULATED.3IONS-SCNC: 9 MMOL/L (ref 3–16)
AST SERPL-CCNC: 25 U/L (ref 15–37)
BASOPHILS ABSOLUTE: 0.1 K/UL (ref 0–0.2)
BASOPHILS RELATIVE PERCENT: 1.4 %
BILIRUB SERPL-MCNC: 0.3 MG/DL (ref 0–1)
BUN BLDV-MCNC: 20 MG/DL (ref 7–20)
CALCIUM SERPL-MCNC: 9.5 MG/DL (ref 8.3–10.6)
CHLORIDE BLD-SCNC: 98 MMOL/L (ref 99–110)
CHP ED QC CHECK: YES
CO2: 27 MMOL/L (ref 21–32)
CREAT SERPL-MCNC: 1.5 MG/DL (ref 0.6–1.2)
EKG ATRIAL RATE: 68 BPM
EKG DIAGNOSIS: NORMAL
EKG P AXIS: 36 DEGREES
EKG P-R INTERVAL: 188 MS
EKG Q-T INTERVAL: 444 MS
EKG QRS DURATION: 130 MS
EKG QTC CALCULATION (BAZETT): 472 MS
EKG R AXIS: 3 DEGREES
EKG T AXIS: 72 DEGREES
EKG VENTRICULAR RATE: 68 BPM
EOSINOPHILS ABSOLUTE: 0.1 K/UL (ref 0–0.6)
EOSINOPHILS RELATIVE PERCENT: 1.3 %
GFR AFRICAN AMERICAN: 40
GFR NON-AFRICAN AMERICAN: 33
GLUCOSE BLD-MCNC: 112 MG/DL (ref 70–99)
GLUCOSE BLD-MCNC: 116 MG/DL
GLUCOSE BLD-MCNC: 116 MG/DL (ref 70–99)
HCT VFR BLD CALC: 31.8 % (ref 36–48)
HEMOGLOBIN: 10.7 G/DL (ref 12–16)
INFLUENZA A: NOT DETECTED
INFLUENZA B: NOT DETECTED
INR BLD: 1.02 (ref 0.88–1.12)
LYMPHOCYTES ABSOLUTE: 1.4 K/UL (ref 1–5.1)
LYMPHOCYTES RELATIVE PERCENT: 24.3 %
MCH RBC QN AUTO: 29.7 PG (ref 26–34)
MCHC RBC AUTO-ENTMCNC: 33.7 G/DL (ref 31–36)
MCV RBC AUTO: 88.1 FL (ref 80–100)
MONOCYTES ABSOLUTE: 0.4 K/UL (ref 0–1.3)
MONOCYTES RELATIVE PERCENT: 6.9 %
NEUTROPHILS ABSOLUTE: 3.8 K/UL (ref 1.7–7.7)
NEUTROPHILS RELATIVE PERCENT: 66.1 %
PDW BLD-RTO: 15.4 % (ref 12.4–15.4)
PERFORMED ON: ABNORMAL
PLATELET # BLD: 223 K/UL (ref 135–450)
PMV BLD AUTO: 8.2 FL (ref 5–10.5)
POTASSIUM REFLEX MAGNESIUM: 3.7 MMOL/L (ref 3.5–5.1)
PROTHROMBIN TIME: 11.5 SEC (ref 9.9–12.7)
RBC # BLD: 3.61 M/UL (ref 4–5.2)
SARS-COV-2 RNA, RT PCR: NOT DETECTED
SEDIMENTATION RATE, ERYTHROCYTE: 39 MM/HR (ref 0–30)
SODIUM BLD-SCNC: 134 MMOL/L (ref 136–145)
TOTAL PROTEIN: 7 G/DL (ref 6.4–8.2)
TROPONIN: 1.04 NG/ML
TROPONIN: 1.16 NG/ML
WBC # BLD: 5.8 K/UL (ref 4–11)

## 2022-03-18 PROCEDURE — 99285 EMERGENCY DEPT VISIT HI MDM: CPT

## 2022-03-18 PROCEDURE — 6370000000 HC RX 637 (ALT 250 FOR IP): Performed by: PHYSICIAN ASSISTANT

## 2022-03-18 PROCEDURE — 6360000002 HC RX W HCPCS: Performed by: STUDENT IN AN ORGANIZED HEALTH CARE EDUCATION/TRAINING PROGRAM

## 2022-03-18 PROCEDURE — 70551 MRI BRAIN STEM W/O DYE: CPT

## 2022-03-18 PROCEDURE — 84484 ASSAY OF TROPONIN QUANT: CPT

## 2022-03-18 PROCEDURE — 1200000000 HC SEMI PRIVATE

## 2022-03-18 PROCEDURE — 93005 ELECTROCARDIOGRAM TRACING: CPT | Performed by: PHYSICIAN ASSISTANT

## 2022-03-18 PROCEDURE — 85025 COMPLETE CBC W/AUTO DIFF WBC: CPT

## 2022-03-18 PROCEDURE — 87636 SARSCOV2 & INF A&B AMP PRB: CPT

## 2022-03-18 PROCEDURE — 93010 ELECTROCARDIOGRAM REPORT: CPT | Performed by: INTERNAL MEDICINE

## 2022-03-18 PROCEDURE — 85652 RBC SED RATE AUTOMATED: CPT

## 2022-03-18 PROCEDURE — 70450 CT HEAD/BRAIN W/O DYE: CPT

## 2022-03-18 PROCEDURE — 70547 MR ANGIOGRAPHY NECK W/O DYE: CPT

## 2022-03-18 PROCEDURE — 36415 COLL VENOUS BLD VENIPUNCTURE: CPT

## 2022-03-18 PROCEDURE — 71045 X-RAY EXAM CHEST 1 VIEW: CPT

## 2022-03-18 PROCEDURE — 6370000000 HC RX 637 (ALT 250 FOR IP): Performed by: STUDENT IN AN ORGANIZED HEALTH CARE EDUCATION/TRAINING PROGRAM

## 2022-03-18 PROCEDURE — 86140 C-REACTIVE PROTEIN: CPT

## 2022-03-18 PROCEDURE — 80053 COMPREHEN METABOLIC PANEL: CPT

## 2022-03-18 PROCEDURE — 85610 PROTHROMBIN TIME: CPT

## 2022-03-18 RX ORDER — POTASSIUM CHLORIDE 20 MEQ/1
40 TABLET, EXTENDED RELEASE ORAL ONCE
Status: COMPLETED | OUTPATIENT
Start: 2022-03-18 | End: 2022-03-18

## 2022-03-18 RX ORDER — ASPIRIN 81 MG/1
81 TABLET ORAL DAILY
Status: DISCONTINUED | OUTPATIENT
Start: 2022-03-19 | End: 2022-03-22 | Stop reason: HOSPADM

## 2022-03-18 RX ORDER — FUROSEMIDE 20 MG/1
20 TABLET ORAL DAILY
Status: DISCONTINUED | OUTPATIENT
Start: 2022-03-19 | End: 2022-03-22 | Stop reason: HOSPADM

## 2022-03-18 RX ORDER — ASPIRIN 300 MG/1
300 SUPPOSITORY RECTAL DAILY
Status: DISCONTINUED | OUTPATIENT
Start: 2022-03-19 | End: 2022-03-22

## 2022-03-18 RX ORDER — ATORVASTATIN CALCIUM 80 MG/1
80 TABLET, FILM COATED ORAL NIGHTLY
Status: DISCONTINUED | OUTPATIENT
Start: 2022-03-18 | End: 2022-03-22 | Stop reason: HOSPADM

## 2022-03-18 RX ORDER — ONDANSETRON 4 MG/1
4 TABLET, ORALLY DISINTEGRATING ORAL EVERY 8 HOURS PRN
Status: DISCONTINUED | OUTPATIENT
Start: 2022-03-18 | End: 2022-03-22 | Stop reason: HOSPADM

## 2022-03-18 RX ORDER — ASPIRIN 325 MG
325 TABLET ORAL ONCE
Status: COMPLETED | OUTPATIENT
Start: 2022-03-18 | End: 2022-03-18

## 2022-03-18 RX ORDER — HYDROCODONE BITARTRATE AND ACETAMINOPHEN 10; 325 MG/1; MG/1
1 TABLET ORAL EVERY 6 HOURS PRN
Status: DISCONTINUED | OUTPATIENT
Start: 2022-03-18 | End: 2022-03-22 | Stop reason: HOSPADM

## 2022-03-18 RX ORDER — LABETALOL 20 MG/4 ML (5 MG/ML) INTRAVENOUS SYRINGE
10 EVERY 10 MIN PRN
Status: DISCONTINUED | OUTPATIENT
Start: 2022-03-18 | End: 2022-03-19

## 2022-03-18 RX ORDER — POLYETHYLENE GLYCOL 3350 17 G/17G
17 POWDER, FOR SOLUTION ORAL DAILY PRN
Status: DISCONTINUED | OUTPATIENT
Start: 2022-03-18 | End: 2022-03-22 | Stop reason: HOSPADM

## 2022-03-18 RX ORDER — ONDANSETRON 2 MG/ML
4 INJECTION INTRAMUSCULAR; INTRAVENOUS EVERY 6 HOURS PRN
Status: DISCONTINUED | OUTPATIENT
Start: 2022-03-18 | End: 2022-03-22 | Stop reason: HOSPADM

## 2022-03-18 RX ORDER — HEPARIN SODIUM 5000 [USP'U]/ML
5000 INJECTION, SOLUTION INTRAVENOUS; SUBCUTANEOUS EVERY 8 HOURS SCHEDULED
Status: DISCONTINUED | OUTPATIENT
Start: 2022-03-18 | End: 2022-03-19

## 2022-03-18 RX ADMIN — POTASSIUM CHLORIDE 40 MEQ: 1500 TABLET, EXTENDED RELEASE ORAL at 23:03

## 2022-03-18 RX ADMIN — HEPARIN SODIUM 5000 UNITS: 5000 INJECTION INTRAVENOUS; SUBCUTANEOUS at 23:03

## 2022-03-18 RX ADMIN — HYDROCODONE BITARTRATE AND ACETAMINOPHEN 1 TABLET: 10; 325 TABLET ORAL at 23:04

## 2022-03-18 RX ADMIN — ASPIRIN 325 MG: 325 TABLET ORAL at 15:30

## 2022-03-18 RX ADMIN — ATORVASTATIN CALCIUM 80 MG: 80 TABLET, FILM COATED ORAL at 23:03

## 2022-03-18 ASSESSMENT — ENCOUNTER SYMPTOMS
CHOKING: 0
VOMITING: 0
SINUS PAIN: 0
COUGH: 0
SHORTNESS OF BREATH: 0
SINUS PRESSURE: 0
CHEST TIGHTNESS: 0
EYE REDNESS: 0
CONSTIPATION: 0
ABDOMINAL PAIN: 0
VOMITING: 0
COUGH: 0
SHORTNESS OF BREATH: 0
NAUSEA: 0
CONSTIPATION: 0
DIARRHEA: 0
SORE THROAT: 0
NAUSEA: 0
EYE DISCHARGE: 0
DIARRHEA: 0
RHINORRHEA: 0
ABDOMINAL PAIN: 0

## 2022-03-18 ASSESSMENT — PAIN SCALES - GENERAL
PAINLEVEL_OUTOF10: 8
PAINLEVEL_OUTOF10: 8
PAINLEVEL_OUTOF10: 6

## 2022-03-18 ASSESSMENT — PAIN DESCRIPTION - PAIN TYPE: TYPE: CHRONIC PAIN

## 2022-03-18 ASSESSMENT — PAIN DESCRIPTION - LOCATION: LOCATION: BACK

## 2022-03-18 NOTE — ED NOTES
[address] [place]) start new case. Dx: Elevated troponin, Anopsia  Requested: [facility]/Cardiology Dr. Cherelle Nguyen. Neeta Marley  03/18/22 7410    1726 - Dr. Abdelrahman Velazquez called back via [employer]  03/18/22 [phone] - Dr. Edith Lo called [facility] via [facility]  Transport Needs: Medic - no oxygen, + cardiac monitor, hep lock.      Neeta Marley  03/18/22 4980

## 2022-03-18 NOTE — TELEPHONE ENCOUNTER
Pt calling in again stating Dr. Gurvinder braun had never called.    I reached out to Dr. Gurvinder branu today and they will reach out to pt to get more information to schedule

## 2022-03-18 NOTE — ED PROVIDER NOTES
Psychiatric/Behavioral: Negative. All other systems reviewed and are negative. Positivesand Pertinent negatives as per HPI. Except as noted above in the ROS, all other systems were reviewed and negative. PAST MEDICAL HISTORY     Past Medical History:   Diagnosis Date    Arthritis     Blood circulation, collateral     CAD (coronary artery disease)     CHF (congestive heart failure) (HCC)     Confusion 8/31/2020    GERD (gastroesophageal reflux disease)     History of heart artery stent 12/2018    Hyperlipidemia     Hypertension     Mitral valve prolapse     Myocardial infarct, old     Thyroid disease          SURGICAL HISTORY       Past Surgical History:   Procedure Laterality Date    COLONOSCOPY      EYE SURGERY Left 09/04/2018    PHACO EMULSIFICATION OF CATARACT WITH INTRAOCULAR LENS IMPLANT LEFT EYE     HYSTERECTOMY      OTHER SURGICAL HISTORY  08/27/2018    phacoemulsification of cataract with intraocular lens implant right eye    ME OFFICE/OUTPT VISIT,PROCEDURE ONLY Right 8/27/2018    PHACO EMULSIFICATION OF CATARACT WITH INTRAOCULAR LENS IMPLANT RIGHT EYE performed by Oniel Serrano MD at [address] [facility] OFFICE/OUTPT [facility] Left 9/4/2018    PHACO EMULSIFICATION OF CATARACT WITH INTRAOCULAR LENS IMPLANT LEFT EYE performed by Oniel Serrano MD at [facility]       Previous Medications    ASPIRIN 81 MG CHEWABLE TABLET    Take 1 tablet by mouth daily. ATORVASTATIN (LIPITOR) 80 MG TABLET    TAKE 1 TABLET BY MOUTH ONE TIME A DAY    CARVEDILOL (COREG) 25 MG TABLET    TAKE 1 TABLET BY MOUTH TWICE A DAY    CLOPIDOGREL (PLAVIX) 75 MG TABLET    TAKE 1 TABLET BY MOUTH DAILY    COENZYME Q10 (CO Q 10) 100 MG CAPS    Take  by mouth daily.     FAMOTIDINE (PEPCID) 20 MG TABLET    Take 1 tablet by mouth 2 times daily    FLUTICASONE (FLONASE) 50 MCG/ACT NASAL SPRAY    2 sprays by Each Nostril route daily    FUROSEMIDE (LASIX) 20 MG TABLET Take 1 tablet by mouth daily    HANDICAP PLACARD MISC    by Does not apply route Length: 5 years    HYDROCHLOROTHIAZIDE (HYDRODIURIL) 12.5 MG TABLET    TAKE 1 TABLET BY MOUTH ONE TIME A DAY'    HYDROCODONE-ACETAMINOPHEN (NORCO)  MG PER TABLET    Take 1 tablet by mouth every 6 hours as needed. Mary Posada LOSARTAN (COZAAR) 50 MG TABLET    TAKE 1 TABLET BY MOUTH EVERY DAY    NITROGLYCERIN (NITROSTAT) 0.4 MG SL TABLET    Place 1 tablet under the tongue every 5 minutes as needed for Chest pain up to max of 3 total doses. If no relief after 1 dose, call 911. ONDANSETRON (ZOFRAN) 4 MG TABLET    Take 1 tablet by mouth 3 times daily as needed for Nausea or Vomiting    VITAMIN B-12 (CYANOCOBALAMIN) 1000 MCG TABLET    Take 1,000 mcg by mouth daily    VITAMIN D (CHOLECALCIFEROL) 1000 UNIT TABS TABLET    Take 2 tablets by mouth daily    ZOLPIDEM (AMBIEN) 5 MG TABLET    Take 1 tablet by mouth nightly as needed for Sleep for up to 180 days.          ALLERGIES     Benazepril hcl, Feldene [piroxicam], Hytrin [terazosin hcl], Iv contrast [iodides], Acetaminophen, Celebrex [celecoxib], Cephalexin, Hydrochlorothiazide, Iodinated casein, Monopril [fosinopril sodium], Protonix [pantoprazole sodium], Quinapril hcl, Toprol xl [metoprolol succinate], Ultracet [tramadol-acetaminophen], and Ziac [bisoprolol-hydrochlorothiazide]    FAMILY HISTORY       Family History   Problem Relation Age of Onset    Heart Disease Mother     Heart Disease Father     Cancer Father     Cancer Sister     Diabetes Brother     Stroke Brother          SOCIAL HISTORY       Social History     Socioeconomic History    Marital status:      Spouse name: Lc Brunson Number of children: Not on file    Years of education: 15    Highest education level: Not on file   Occupational History    Occupation: retired   Tobacco Use    Smoking status: Never Smoker    Smokeless tobacco: Never Used   Vaping Use    Vaping Use: Never used   Substance and Sexual Activity    Alcohol use: No     Alcohol/week: 0.0 standard drinks    Drug use: No    Sexual activity: Never     Comment:  living with spouse. Other Topics Concern    Not on file   Social History Narrative    Not on file     Social Determinants of Health     Financial Resource Strain: Medium Risk    Difficulty of Paying Living Expenses: Somewhat hard   Food Insecurity: No Food Insecurity    Worried About Running Out of Food in the Last Year: Never true    Ramona of Food in the Last Year: Never true   Transportation Needs:     Lack of Transportation (Medical): Not on file    Lack of Transportation (Non-Medical): Not on file   Physical Activity:     Days of Exercise per Week: Not on file    Minutes of Exercise per Session: Not on file   Stress:     Feeling of Stress : Not on file   Social Connections:     Frequency of Communication with Friends and Family: Not on file    Frequency of Social Gatherings with Friends and Family: Not on file    Attends Protestant Services: Not on file    Active Member of [address] [place] [address] or Organizations: Not on file    Attends Club or Organization Meetings: Not on file    Marital Status: Not on file   Intimate Partner Violence:     Fear of Current or Ex-Partner: Not on file    Emotionally Abused: Not on file    Physically Abused: Not on file    Sexually Abused: Not on file   Housing Stability:     Unable to Pay for Housing in the Last Year: Not on file    Number of [place] in the Last Year: Not on file    Unstable Housing in the Last Year: Not on file       SCREENINGS     NIH Score  NIH Stroke Scale  Interval: Baseline  Level of Consciousness (1a): Alert  LOC Questions (1b): Answers both correctly  LOC Commands (1c): Performs both tasks correctly  Best Gaze (2): Normal  Visual (3): No visual loss  Facial Palsy (4): Normal symmetrical movement  Motor Arm, Left (5a): No drift  Motor Arm, Right (5b):  No drift  Motor Leg, Left (6a): No drift  Motor Leg, Right (6b): No drift  Limb Ataxia (7): Absent  Best Language (9): No aphasia  Dysarthria (10): Normal  Extinction and Inattention (11): No abnormality    Gl[place]ow [place] Coma Scale  Eye Opening: Spontaneous  Best Verbal Response: Oriented  Best Motor Response: Obeys commands  [place] Coma Scale Score: 15    [facility]     Heart Score         PHYSICAL EXAM    (up to 7 for level 4, 8 ormore for level 5)     ED Triage Vitals [03/18/22 1342]   BP Temp Temp Source Pulse Resp SpO2 Height Weight   (!) 145/86 96.7 °F (35.9 °C) Oral 69 16 97 % 5' 2\" (1.575 m) 129 lb (58.5 kg)       Physical Exam  Vitals and nursing note reviewed. Constitutional:       Appearance: She is well-developed. She is not diaphoretic. HENT:      Head: Normocephalic. Nose: Nose normal.      Mouth/Throat:      Pharynx: No oropharyngeal exudate. Eyes:      General:         Right eye: No discharge. Left eye: No discharge. Conjunctiva/sclera: Conjunctivae normal.      Pupils: Pupils are equal, round, and reactive to light. Cardiovascular:      Rate and Rhythm: Normal rate and regular rhythm. Heart sounds: Normal heart sounds. No murmur heard. No friction rub. No gallop. Pulmonary:      Effort: Pulmonary effort is normal. No respiratory distress. Breath sounds: Normal breath sounds. No wheezing. Abdominal:      General: Bowel sounds are normal. There is no distension. Palpations: Abdomen is soft. Tenderness: There is no abdominal tenderness. Musculoskeletal:         General: Normal range of motion. Cervical back: Normal range of motion. Skin:     General: Skin is warm and dry. Neurological:      Mental Status: She is alert.    Psychiatric:         Behavior: Behavior normal.         DIAGNOSTIC RESULTS   LABS:    Labs Reviewed   CBC WITH AUTO DIFFERENTIAL - Abnormal; Notable for the following components:       Result Value    RBC 3.61 (*)     Hemoglobin 10.7 (*)     Hematocrit 31.8 (*)     All other components within normal limits   COMPREHENSIVE METABOLIC PANEL W/ REFLEX TO MG FOR LOW K - Abnormal; Notable for the following components:    Sodium 134 (*)     Chloride 98 (*)     Glucose 112 (*)     CREATININE 1.5 (*)     GFR Non- 33 (*)     GFR  40 (*)     ALT 9 (*)     All other components within normal limits    Narrative:     Miguel Haro  [facility] tel. [phone],  Chemistry results called to and read back by Blake Epley RN, 03/18/2022  14:44, by [facility]   TROPONIN - Abnormal; Notable for the following components:    Troponin 1.16 (*)     All other components within normal limits    Narrative:     [address] [phone],  Chemistry results called to and read back by Blake Epley RN, 03/18/2022  14:44, by Aixa Ta - Abnormal; Notable for the following components:    Sed Rate 39 (*)     All other components within normal limits   TROPONIN - Abnormal; Notable for the following components:    Troponin 1.04 (*)     All other components within normal limits    Narrative:     [address] [phone],  Chemistry results called to and read back by Blake Epley RN, 03/18/2022  16:54, by ILYA   POCT GLUCOSE - Abnormal; Notable for the following components:    POC Glucose 116 (*)     All other components within normal limits   POCT GLUCOSE - Normal   COVID-19 & INFLUENZA COMBO   PROTIME-INR       All other labs were within normal range or notreturned as of this dictation. EKG: All EKG's are interpreted by the Emergency Department Physician who either signs or Co-signs this chart in the absence of a cardiologist.  Please see their note for interpretation of EKG. RADIOLOGY:         Interpretation per the Radiologist below, if available at the time of this note:    CT HEAD WO CONTRAST   Final Result   No acute intracranial abnormality. XR CHEST PORTABLE   Final Result   No acute abnormality.              CRITICAL CARE TIME   Total critical care time today provided was 40 minutes. This excludes seperately billable procedures and family discussion time. Provided for anopsia, elevated troponin requiring intervention with concern for potential decompensation. CONSULTS:  [address] [place] TO CHANGE BASE FLUIDS  IP CONSULT TO HOSPITALIST  IP CONSULT TO CARDIOLOGY      EMERGENCY DEPARTMENT COURSE and DIFFERENTIAL DIAGNOSIS/MDM:   Vitals:    Vitals:    03/18/22 1342 03/18/22 1415 03/18/22 1430   BP: (!) 145/86 (!) 151/80 (!) 151/68   Pulse: 69 75 67   Resp: 16 17 20   Temp: 96.7 °F (35.9 °C)     TempSrc: Oral     SpO2: 97% 95% 95%   Weight: 129 lb (58.5 kg)     Height: 5' 2\" (1.575 m)         Patient was given the following medications:  Medications   aspirin tablet 325 mg (325 mg Oral Given 3/18/22 1530)         Afebrile, stable, patient presents to the ED for evaluation. Seen in conjunction with attending ED provider who agrees with assessment and plan. Patients SPO2 is 95% on room air they are not hypoxic. ED Course as of 03/18/22 1750   Fri Mar 18, 2022   1456 Radiology called and informed me patient is refusing CT with contrast [AR]   1611 Stent to LAD 2018; CM with ef 40% [MP]   4342 Accepted by Dr. Ras Lara, hospitalist at [facility] [AR]      ED Course User Index  [AR] Keyanna Meneses PA-C  [MP] [employer], DO       Labs evaluated reveal no leukocytosis acute on chronic anemia with a hemoglobin of 10.7 hematocrit of 31.8. Sodium of 134 chloride of 98. Glucose of 112 creatinine of 1.5 GFR of 33. Elevated ESR of 39. Patient's troponin is elevated at 1.16, EKG shows NSR with LBBB. Pt denies any chest pain. Provided with ASA. Negative Covid negative influenza. CXR shows no acute findings. CT of the head without contrast shows no acute abn. CTA is refused due to contrast allergy and patient advised she will not be convinced to have imaging.    Discussion about admission for serial troponins MRI to rule out CVA initially patient does not

## 2022-03-18 NOTE — PROGRESS NOTES
Cardiology consult received. EKG reviewed. No STEMI. Recommend evaluation for stroke given vision loss. Check echo with contrast to evaluate for LV thrombus.

## 2022-03-18 NOTE — ED NOTES
Transfer center called to give room number 6326  Report can be called to [phone]   Transfer center called to give ETA 2030 with [facility]   Myna Lennox  03/18/22 [address]  03/18/22 2007

## 2022-03-19 ENCOUNTER — APPOINTMENT (OUTPATIENT)
Dept: MRI IMAGING | Age: 87
DRG: 040 | End: 2022-03-19
Attending: INTERNAL MEDICINE
Payer: MEDICARE

## 2022-03-19 PROBLEM — I63.9 STROKE OF UNKNOWN ETIOLOGY (HCC): Status: ACTIVE | Noted: 2022-03-19

## 2022-03-19 LAB
ANION GAP SERPL CALCULATED.3IONS-SCNC: 13 MMOL/L (ref 3–16)
BASOPHILS ABSOLUTE: 0.1 K/UL (ref 0–0.2)
BASOPHILS RELATIVE PERCENT: 1.3 %
BUN BLDV-MCNC: 18 MG/DL (ref 7–20)
C-REACTIVE PROTEIN: <3 MG/L (ref 0–5.1)
CALCIUM SERPL-MCNC: 9.4 MG/DL (ref 8.3–10.6)
CHLORIDE BLD-SCNC: 96 MMOL/L (ref 99–110)
CHOLESTEROL, FASTING: 133 MG/DL (ref 0–199)
CHOLESTEROL, TOTAL: 150 MG/DL (ref 0–199)
CO2: 25 MMOL/L (ref 21–32)
CREAT SERPL-MCNC: 1.4 MG/DL (ref 0.6–1.2)
EOSINOPHILS ABSOLUTE: 0.1 K/UL (ref 0–0.6)
EOSINOPHILS RELATIVE PERCENT: 2 %
GFR AFRICAN AMERICAN: 43
GFR NON-AFRICAN AMERICAN: 36
GLUCOSE BLD-MCNC: 119 MG/DL (ref 70–99)
HCT VFR BLD CALC: 29.5 % (ref 36–48)
HDLC SERPL-MCNC: 75 MG/DL (ref 40–60)
HDLC SERPL-MCNC: 84 MG/DL (ref 40–60)
HEMOGLOBIN: 9.8 G/DL (ref 12–16)
LDL CHOLESTEROL CALCULATED: 46 MG/DL
LDL CHOLESTEROL CALCULATED: 53 MG/DL
LV EF: 28 %
LVEF MODALITY: NORMAL
LYMPHOCYTES ABSOLUTE: 1.6 K/UL (ref 1–5.1)
LYMPHOCYTES RELATIVE PERCENT: 25.6 %
MAGNESIUM: 1.7 MG/DL (ref 1.8–2.4)
MCH RBC QN AUTO: 29.5 PG (ref 26–34)
MCHC RBC AUTO-ENTMCNC: 33.1 G/DL (ref 31–36)
MCV RBC AUTO: 89.1 FL (ref 80–100)
MONOCYTES ABSOLUTE: 0.5 K/UL (ref 0–1.3)
MONOCYTES RELATIVE PERCENT: 8.2 %
NEUTROPHILS ABSOLUTE: 3.8 K/UL (ref 1.7–7.7)
NEUTROPHILS RELATIVE PERCENT: 62.9 %
PDW BLD-RTO: 15.4 % (ref 12.4–15.4)
PLATELET # BLD: 211 K/UL (ref 135–450)
PMV BLD AUTO: 9.1 FL (ref 5–10.5)
POTASSIUM SERPL-SCNC: 3.9 MMOL/L (ref 3.5–5.1)
RBC # BLD: 3.31 M/UL (ref 4–5.2)
SODIUM BLD-SCNC: 134 MMOL/L (ref 136–145)
TRIGL SERPL-MCNC: 65 MG/DL (ref 0–150)
TRIGLYCERIDE, FASTING: 62 MG/DL (ref 0–150)
TROPONIN: 0.93 NG/ML
TROPONIN: 1.03 NG/ML
VLDLC SERPL CALC-MCNC: 12 MG/DL
VLDLC SERPL CALC-MCNC: 13 MG/DL
WBC # BLD: 6.1 K/UL (ref 4–11)

## 2022-03-19 PROCEDURE — 83735 ASSAY OF MAGNESIUM: CPT

## 2022-03-19 PROCEDURE — 6360000004 HC RX CONTRAST MEDICATION: Performed by: INTERNAL MEDICINE

## 2022-03-19 PROCEDURE — 84484 ASSAY OF TROPONIN QUANT: CPT

## 2022-03-19 PROCEDURE — 6360000002 HC RX W HCPCS: Performed by: STUDENT IN AN ORGANIZED HEALTH CARE EDUCATION/TRAINING PROGRAM

## 2022-03-19 PROCEDURE — 6370000000 HC RX 637 (ALT 250 FOR IP): Performed by: STUDENT IN AN ORGANIZED HEALTH CARE EDUCATION/TRAINING PROGRAM

## 2022-03-19 PROCEDURE — 92610 EVALUATE SWALLOWING FUNCTION: CPT

## 2022-03-19 PROCEDURE — C8929 TTE W OR WO FOL WCON,DOPPLER: HCPCS

## 2022-03-19 PROCEDURE — 70544 MR ANGIOGRAPHY HEAD W/O DYE: CPT

## 2022-03-19 PROCEDURE — APPNB60 APP NON BILLABLE TIME 46-60 MINS: Performed by: NURSE PRACTITIONER

## 2022-03-19 PROCEDURE — 80061 LIPID PANEL: CPT

## 2022-03-19 PROCEDURE — 83036 HEMOGLOBIN GLYCOSYLATED A1C: CPT

## 2022-03-19 PROCEDURE — 80048 BASIC METABOLIC PNL TOTAL CA: CPT

## 2022-03-19 PROCEDURE — 6370000000 HC RX 637 (ALT 250 FOR IP): Performed by: INTERNAL MEDICINE

## 2022-03-19 PROCEDURE — 99223 1ST HOSP IP/OBS HIGH 75: CPT | Performed by: INTERNAL MEDICINE

## 2022-03-19 PROCEDURE — 85025 COMPLETE CBC W/AUTO DIFF WBC: CPT

## 2022-03-19 PROCEDURE — 1200000000 HC SEMI PRIVATE

## 2022-03-19 PROCEDURE — 36415 COLL VENOUS BLD VENIPUNCTURE: CPT

## 2022-03-19 RX ORDER — CLOPIDOGREL BISULFATE 75 MG/1
75 TABLET ORAL DAILY
Status: DISCONTINUED | OUTPATIENT
Start: 2022-03-19 | End: 2022-03-22

## 2022-03-19 RX ORDER — MAGNESIUM SULFATE IN WATER 40 MG/ML
2000 INJECTION, SOLUTION INTRAVENOUS ONCE
Status: COMPLETED | OUTPATIENT
Start: 2022-03-19 | End: 2022-03-19

## 2022-03-19 RX ORDER — CARVEDILOL 6.25 MG/1
6.25 TABLET ORAL 2 TIMES DAILY WITH MEALS
Status: DISCONTINUED | OUTPATIENT
Start: 2022-03-19 | End: 2022-03-20

## 2022-03-19 RX ADMIN — FUROSEMIDE 20 MG: 20 TABLET ORAL at 10:37

## 2022-03-19 RX ADMIN — PERFLUTREN 1.65 MG: 6.52 INJECTION, SUSPENSION INTRAVENOUS at 11:44

## 2022-03-19 RX ADMIN — ASPIRIN 81 MG: 81 TABLET, COATED ORAL at 10:37

## 2022-03-19 RX ADMIN — CLOPIDOGREL BISULFATE 75 MG: 75 TABLET ORAL at 10:37

## 2022-03-19 RX ADMIN — MAGNESIUM SULFATE HEPTAHYDRATE 2000 MG: 2 INJECTION, SOLUTION INTRAVENOUS at 10:42

## 2022-03-19 RX ADMIN — HEPARIN SODIUM 5000 UNITS: 5000 INJECTION INTRAVENOUS; SUBCUTANEOUS at 05:06

## 2022-03-19 RX ADMIN — HYDROCODONE BITARTRATE AND ACETAMINOPHEN 1 TABLET: 10; 325 TABLET ORAL at 05:15

## 2022-03-19 RX ADMIN — ATORVASTATIN CALCIUM 80 MG: 80 TABLET, FILM COATED ORAL at 21:55

## 2022-03-19 RX ADMIN — HYDROCODONE BITARTRATE AND ACETAMINOPHEN 1 TABLET: 10; 325 TABLET ORAL at 17:46

## 2022-03-19 RX ADMIN — CARVEDILOL 6.25 MG: 6.25 TABLET, FILM COATED ORAL at 17:47

## 2022-03-19 RX ADMIN — HYDROCODONE BITARTRATE AND ACETAMINOPHEN 1 TABLET: 10; 325 TABLET ORAL at 11:23

## 2022-03-19 RX ADMIN — CARVEDILOL 6.25 MG: 6.25 TABLET, FILM COATED ORAL at 11:34

## 2022-03-19 ASSESSMENT — PAIN SCALES - GENERAL
PAINLEVEL_OUTOF10: 5
PAINLEVEL_OUTOF10: 6
PAINLEVEL_OUTOF10: 0
PAINLEVEL_OUTOF10: 9
PAINLEVEL_OUTOF10: 8
PAINLEVEL_OUTOF10: 2
PAINLEVEL_OUTOF10: 0

## 2022-03-19 ASSESSMENT — PAIN DESCRIPTION - FREQUENCY
FREQUENCY: CONTINUOUS

## 2022-03-19 ASSESSMENT — PAIN DESCRIPTION - LOCATION
LOCATION: BACK

## 2022-03-19 ASSESSMENT — PAIN DESCRIPTION - PROGRESSION
CLINICAL_PROGRESSION: NOT CHANGED
CLINICAL_PROGRESSION: NOT CHANGED

## 2022-03-19 ASSESSMENT — PAIN DESCRIPTION - PAIN TYPE
TYPE: CHRONIC PAIN

## 2022-03-19 ASSESSMENT — PAIN - FUNCTIONAL ASSESSMENT
PAIN_FUNCTIONAL_ASSESSMENT: ACTIVITIES ARE NOT PREVENTED

## 2022-03-19 ASSESSMENT — PAIN DESCRIPTION - ORIENTATION
ORIENTATION: UPPER

## 2022-03-19 ASSESSMENT — PAIN DESCRIPTION - ONSET
ONSET: ON-GOING
ONSET: ON-GOING

## 2022-03-19 ASSESSMENT — PAIN DESCRIPTION - DIRECTION
RADIATING_TOWARDS: BUTTOCK

## 2022-03-19 ASSESSMENT — PAIN DESCRIPTION - DESCRIPTORS
DESCRIPTORS: ACHING
DESCRIPTORS: ACHING;CONSTANT
DESCRIPTORS: ACHING

## 2022-03-19 NOTE — PROGRESS NOTES
4 Eyes Admission Assessment     I agree as the admission nurse that 2 RN's have performed a thorough Head to Toe Skin Assessment on the patient. ALL assessment sites listed below have been assessed on admission. Areas assessed by both nurses:   [x]   Head, Face, and Ears   [x]   Shoulders, Back, and Chest  [x]   Arms, Elbows, and Hands   [x]   Coccyx, Sacrum, and Ischium  [x]   Legs, Feet, and Heels        Does the Patient have Skin Breakdown?   No         Michel Prevention initiated:  Yes   Wound Care Orders initiated:  NA      [facility] nurse consulted for Pressure Injury (Stage 3,4, Unstageable, DTI, NWPT, and Complex wounds) or Michel score 18 or lower:  NA      Nurse 1 eSignature: Electronically signed by Carrie Bates RN on 3/19/22 at 5:53 AM EDT    SHARE this note so that the co-signing nurse is able to place an eSignature    Nurse 2 eSignature: Electronically signed by Nolvia Meyers RN on 3/19/22 at 6:04 AM EDT

## 2022-03-19 NOTE — PROGRESS NOTES
Progress Note    Admit Date: 3/18/2022  Diet: ADULT DIET; Easy to Chew; Low Fat/Low Chol/High Fiber/JAYNA    CC: Vision loss    Interval history:   NAEON. Pt was seen at bedside today AM, resting comfortably. Does not have any new complaints. Vision returned to baseline today AM   Pt lives at home with her  and son, feels like she is falling a lot. Ambulates with cane. Reports that she has occasional palpitations, no hx of afib, previously on cardiac monitor. Denies F/C, N/V, CP, SOB, HA, vision changes, weakness in arms and feet. VSS  Trop downtrending  Cr 1.5    HPI: 81 yo F PMH CAD s/p stent, HTN, HLD, CKD4, Vtach, HFrEF, MVP who presented to [facility] ED for vision loss. Pt states that she woke up yesterday with partial vision loss of R eye that has been intermittent since. States the deficit was in upper visual field of R eye. Currently she can see but it is still somewhat blurry in R eye upper field. Noted that she had a mild achey temporal headache as well which has now resolved. States that this is not new for her and she gets similar headaches related to here TMJ. She called her ophthalmologist this morning who recommended she go to the ED. Pt denies any chest pain, SOB, weakness, fever, chills, recent sicknesses. She has been compliant with all her medications.      Upon arival, pt was hypertensive 170s/80s. ESR 39, troponin elevated to 1.16 and trending down. CXR negative. EKG without new ischemic changes. COVID and flu negative. Pt has hx of anaphylactic shock with contrast. She was transferred to [facility] for further neurological workup.     Medications:     Scheduled Meds:   clopidogrel  75 mg Oral Daily    magnesium sulfate  2,000 mg IntraVENous Once    atorvastatin  80 mg Oral Nightly    aspirin  81 mg Oral Daily    Or    aspirin  300 mg Rectal Daily    heparin (porcine)  5,000 Units SubCUTAneous 3 times per day    furosemide  20 mg Oral Daily     Continuous Infusions:  PRN Meds:ondansetron **OR** ondansetron, polyethylene glycol, perflutren lipid microspheres, HYDROcodone-acetaminophen    Objective:   Vitals:   T-max:  Patient Vitals for the past 8 hrs:   BP Temp Temp src Pulse Resp SpO2   03/19/22 0828 (!) 142/81 97.3 °F (36.3 °C) Oral 68 15 93 %   03/19/22 0400 131/82 97.1 °F (36.2 °C) Oral 70 18 --       Intake/Output Summary (Last 24 hours) at 3/19/2022 1017  Last data filed at 3/19/2022 0400  Gross per 24 hour   Intake --   Output 500 ml   Net -500 ml     Review of Systems  As above    Physical Exam  Constitutional:       Appearance: Normal appearance. HENT:      Head: Normocephalic and atraumatic. Comments: R temporal region without tenderness to palpation  Cardiovascular:      Rate and Rhythm: Normal rate and regular rhythm. Pulmonary:      Effort: Pulmonary effort is normal.      Breath sounds: No wheezing, rhonchi or rales. Abdominal:      Palpations: Abdomen is soft. Tenderness: There is no abdominal tenderness. There is no guarding or rebound. Musculoskeletal:         General: No swelling. Cervical back: Neck supple. Skin:     General: Skin is warm and dry. Neurological:      Mental Status: She is alert and oriented to person, place, and time. Sensory: No sensory deficit. Motor: No weakness.      LABS:    CBC:   Recent Labs     03/18/22  1401 03/19/22  0215   WBC 5.8 6.1   HGB 10.7* 9.8*   HCT 31.8* 29.5*    211   MCV 88.1 89.1     Renal:    Recent Labs     03/18/22  1357 03/18/22  1401 03/19/22  0215   NA  --  134* 134*   K  --  3.7 3.9   CL  --  98* 96*   CO2  --  27 25   BUN  --  20 18   CREATININE  --  1.5* 1.4*   GLUCOSE 116 112* 119*   CALCIUM  --  9.5 9.4   MG  --   --  1.70*   ANIONGAP  --  9 13     Hepatic:   Recent Labs     03/18/22  1401   AST 25   ALT 9*   BILITOT 0.3   PROT 7.0   LABALBU 3.9   ALKPHOS 66     Troponin:   Recent Labs     03/18/22  1626 03/18/22  2330 03/19/22  0212   TROPONINI 1.04* 1.03* 0.93*     BNP: No results for input(s): BNP in the last 72 hours. Lipids: No results for input(s): CHOL, HDL in the last 72 hours. Invalid input(s): LDLCALCU, TRIGLYCERIDE  ABGs:  No results for input(s): PHART, GUW7QIB, PO2ART, LNK9XVV, BEART, THGBART, M5FIPQJP, UNA2SYE in the last 72 hours. INR:   Recent Labs     03/18/22  1401   INR 1.02     Lactate: No results for input(s): LACTATE in the last 72 hours. Cultures:  -----------------------------------------------------------------  RAD:   MRA NECK WO CONTRAST   Final Result      1. 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 No acute hemorrhage. 2.  Moderate cerebral atrophy and moderate chronic small vessel ischemic disease. 3.  No significant stenosis in the extracranial vertebral or carotid arteries on noncontrast MRA neck. Note that MRA head was not performed. MRI brain without contrast   Final Result      1. 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 No acute hemorrhage. 2.  Moderate cerebral atrophy and moderate chronic small vessel ischemic disease. 3.  No significant stenosis in the extracranial vertebral or carotid arteries on noncontrast MRA neck. Note that MRA head was not performed. Assessment/Plan:     Suspected TIA   Intermittent R vision disturbance. CT head without contrast negative. Pt is allergic to contrast  - GCA r/o with minimally elevated ESR and CRP  - MRI head with scattered punctate areas of acute ischemia in the bilateral cerebral hemispheres, left basal ganglia, and right cerebellum, likely embolic  - MRA neck without significant stenosis   - Neurology consulted  - continue ASA, plavix  and statin  - echo with bubble     Chronic issues  CAD s/p stent- continue ASA and plavix. Last stent in Nov 2018. Will hold betablocker in the context of permissive htn  Cardiology consulted  HFrEF, Vtach, MVP- follows with Dr. Carla Pace.  Holding home lasix  HTN- holding home BP meds  CKD4- Cr baseline 1.5  Chronic Anemia- Hb at baseline 10     Will discuss with attending physician Dr. Radha Becker     Code Status: Full code  FEN: cardiac diet  PPX: subq heparin  DISPO: Linda Villegas MD, PGY-2  03/19/22  10:17 AM    This patient has been staffed and discussed with Kellee Alegria MD.

## 2022-03-19 NOTE — CONSULTS
Clinical Pharmacy Consult Note    80 y.o. female admitted with TIA and stroke-like symptoms. Pharmacy has been asked by Dr. Kimmy Pollack to adjust all drips to normal saline as appropriate based on compatibility to avoid fluid shifts since D5 is osmotically active. The following intermittent IV drips/infusions have been adjusted to saline:  None at this time    Please be aware that patient has D5W ordered as part of hypoglycemia orderset. Total IV fluid delivered to patient over last 24h: will assess tomorrow    RPh will follow daily to ensure all new IVPBs + drips are in NS. Please call with questions.   Kitty Pratt PharmD, BCPS  Wireless: [id number]   3/19/2022 10:39 AM

## 2022-03-19 NOTE — PROGRESS NOTES
Physician Progress Note      John Marshall  CSN #:                  [id number]  :                       1935  ADMIT DATE:       3/18/2022 9:37 PM  [address] DATE:  RESPONDING  PROVIDER #:        Leona Gamez MD          QUERY TEXT:    Pt admitted with suspected TIA. 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 If possible,   please document in progress notes and discharge summary if you are evaluating   and /or treating any of the following: The medical record reflects the following:  Risk Factors: 80 y.o. 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     Query created by: Azalea Call on 3/19/2022 5:47 PM      Electronically signed by:  Leona Gamez MD 3/19/2022 6:12 PM

## 2022-03-19 NOTE — CONSULTS
[facility]   Cardiac Electrophysiology Consultation   Date: 3/19/2022  Admit Date:  3/18/2022  Reason for Consultation: ?  Stroke  Consult Requesting Physician: Rachel Du MD     No chief complaint on file. HPI: Elvi Gary is a 80 y.o. the past medical history significant for CAD, status post PCI 2018 by Dr. Jackie Beltran, hypertension, hyperlipidemia, CKD stage IV, hiatal mitral continue to PVCs, cardiomyopathy with LV ejection fraction of 35 to 40%, was admitted to the hospitals with the strokelike symptoms. She have partial visual loss on the right eye. Currently, she states that her vision on the right side is much better. Her brain imaging showed multiple cerebral emboli and involvement of the cerebellum and thalamus consistent with embolic stroke. She was noted to be hypertensive. Troponin is positive.       Past Medical History:   Diagnosis Date    Arthritis     Blood circulation, collateral     CAD (coronary artery disease)     CHF (congestive heart failure) (HCC)     Confusion 8/31/2020    GERD (gastroesophageal reflux disease)     History of heart artery stent 12/2018    Hyperlipidemia     Hypertension     Mitral valve prolapse     Myocardial infarct, old     Thyroid disease         Past Surgical History:   Procedure Laterality Date    COLONOSCOPY      EYE SURGERY Left 09/04/2018    PHACO EMULSIFICATION OF CATARACT WITH INTRAOCULAR LENS IMPLANT LEFT EYE     HYSTERECTOMY      OTHER SURGICAL HISTORY  08/27/2018    phacoemulsification of cataract with intraocular lens implant right eye    [place] OFFICE/OUTPT VISIT,PROCEDURE ONLY Right 8/27/2018    PHACO EMULSIFICATION OF CATARACT WITH INTRAOCULAR LENS IMPLANT RIGHT EYE performed by Justin Moss MD at [address] [facility] OFFICE/OUTPT [address] [facility] Left 9/4/2018    PHACO EMULSIFICATION OF CATARACT WITH INTRAOCULAR LENS IMPLANT LEFT EYE performed by Justin Moss MD at [address] [facility]  Reactions    Benazepril Hcl Shortness Of Breath and Swelling    Feldene [Piroxicam] Shortness Of Breath    Hytrin [Terazosin Hcl] Shortness Of Breath    Iv Contrast [Iodides] Anaphylaxis    Acetaminophen     Celebrex [Celecoxib]      GI upset    Cephalexin      Nausea    Hydrochlorothiazide     Iodinated Casein     Monopril [Fosinopril Sodium] Other (See Comments)     Pt states makes her weak    Protonix [Pantoprazole Sodium] Other (See Comments)     Kidney failure      Quinapril Hcl     Toprol Xl [Metoprolol Succinate] Other (See Comments)     C/o leg weakness with this med    Ultracet [Tramadol-Acetaminophen]      Rash    Ziac [Bisoprolol-Hydrochlorothiazide] Other (See Comments)     Pt does not remember reaction to med ? Weak        Social History:  Reviewed. reports that she has never smoked. She has never used smokeless tobacco. She reports that she does not drink alcohol and does not use drugs. Family History:  Reviewed. family history includes Cancer in her father and sister; Diabetes in her brother; Heart Disease in her father and mother; Stroke in her brother. No premature CAD. Review of System:  All other systems reviewed except for that noted above. Pertinent negatives and positives are:     Objective      · General: negative for fever, chills   · Ophthalmic ROS: negative for - eye pain or loss of vision  · ENT ROS: negative for - headaches, sore throat   · Respiratory: negative for - cough, sputum  · Cardiovascular: Reviewed in HPI  · Gastrointestinal: negative for - abdominal pain, diarrhea, N/V  · Hematology: negative for - bleeding, blood clots, bruising or jaundice  · Genito-Urinary:  negative for - Dysuria or incontinence  · Musculoskeletal: negative for - Joint swelling, muscle pain  · Neurological: negative for - confusion, dizziness, headaches   · Psychiatric: No anxiety, no depression.   · Dermatological: negative for - rash    Physical Examination:  Vitals:    03/19/22 Date    PROTIME 11.5 03/18/2022    PROTIME 11.5 11/20/2021    PROTIME 11.2 11/06/2018    INR 1.02 03/18/2022    INR 1.02 11/20/2021    INR 0.98 11/06/2018     Lab Results   Component Value Date    CHOL 129 08/20/2021    HDL 63 08/20/2021    TRIG 104 08/20/2021       Diagnostic and imaging results reviewed. ECG: Normal sinus rhythm, left bundle branch block, diffuse ST segment changes; secondary to baseline left bundle branch block, they are difficult interpret. Echo: pending      I independently reviewed the ECG and telemetry.      Scheduled Meds:   clopidogrel  75 mg Oral Daily    magnesium sulfate  2,000 mg IntraVENous Once    atorvastatin  80 mg Oral Nightly    aspirin  81 mg Oral Daily    Or    aspirin  300 mg Rectal Daily    heparin (porcine)  5,000 Units SubCUTAneous 3 times per day    furosemide  20 mg Oral Daily     Continuous Infusions:  PRN Meds:.ondansetron **OR** ondansetron, polyethylene glycol, perflutren lipid microspheres, HYDROcodone-acetaminophen     Assessment:   Patient Active Problem List    Diagnosis Date Noted    AGUILA (acute kidney injury) ([address] 01/13/2014    Stroke of unknown etiology ([address] 03/19/2022    Visual field defect 03/18/2022    Chronic kidney disease, stage IV (severe) ([address] 08/20/2021    Confusion 08/31/2020    PVD (posterior vitreous detachment), both eyes 07/02/2019    Posterior subcapsular polar age-related cataract of both eyes 07/02/2019    Abnormal myocardial perfusion study 11/06/2018    Ischemic cardiomyopathy 11/06/2018    Cardiomyopathy ([address] 11/06/2018    Palpitations 09/04/2018    V-tach ([address] 09/04/2018    GERD (gastroesophageal reflux disease) 01/10/2017    Closed stable burst fracture of first lumbar vertebra ([address] 09/30/2015    Primary insomnia 11/24/2014    Mixed hyperlipidemia 10/29/2014    Gout 04/18/2014    Pain in joint, hand 04/18/2014    Renal insufficiency 01/22/2014    Essential hypertension 04/26/2013    Chronic low back pain 02/22/2013    Other and unspecified angina pectoris 06/21/2011    Coronary atherosclerosis of native coronary artery 06/21/2011    Other chronic pulmonary heart diseases 06/21/2011    Mitral valve disorder 06/21/2011    Acute combined systolic and diastolic heart failure ([facility] 06/21/2011      Active Hospital Problems    Diagnosis Date Noted    Stroke of unknown etiology ([facility] [I63.9] 03/19/2022    Visual field defect [H53.40] 03/18/2022     Recommendation (s):  1. Stroke like symptoms  2. Type II NSTEMI (troponin downtrending now)   3. CAD, status post PCI in 2018  4. Ischemic cardiomyopathy, LV ejection fraction 40%, last known    She presented with stroke like symptoms  Her CT is consistent with embolic stroke  She does have positive troponin, down-trending; no associated chest pain; secondary to baseline left bundle, EKG is not helpful  Let us get a transthoracic echocardiogram  No heparinization secondary to acute embolic stroke  Continue on aspirin, statins, Plavix  Restarted her home beta-blocker, Coreg, at a lower dose  Continue to monitor in telemetry  Would be benefited by long-term cardiac monitoring    Thank you for allowing me to participate in the care of Annabelle Pollack . If you have any questions/comments, please do not hesitate to contact us. Maryan Campbell MD   Cardiac Electrophysiology  [address]    For any EP related issues after 5 PM, contact [facility] on call cardiology through .

## 2022-03-19 NOTE — PLAN OF CARE
Problem: Pain:  Goal: Pain level will decrease  Description: Pain level will decrease  Outcome: Ongoing     Problem: Pain:  Goal: Control of acute pain  Description: Control of acute pain  Outcome: Ongoing     Problem: Pain:  Goal: Control of chronic pain  Description: Control of chronic pain  Outcome: Ongoing     Patient with c/o chronic pain to back, requested and received PRN Norco 10/325  twice this shift see eMar. Reports PRN pain medication effective for pain control. Patient resting in bed at this time.  Able to make needs known, call light remains within reach

## 2022-03-19 NOTE — CONSULTS
Neurology / Neurocritical Care Consult Note    Philly Ward MD is requesting this consult. Reason for Consult: stroke  Admission Chief Complaint: vision loss  History of Present Illness     Aliyah Herrera is a 80 y.o. y/o female with a history of CHF, CAD (on DAPT with history of MINH) who presents with acute vision changes found to have multifocal ischemic stroke. Her symptoms began on 3/17 in the morning and have improved but not resolved. She has never had this happen before. She denies any associated symptoms. She has been on DAPT for the last several years following coronary stent placement. Her troponins have been elevated this admission but are trending down. No history of DVT/PE. REVIEW OF SYSTEMS:   Constitutional- No weight loss or fevers   Eyes- No diplopia. No photophobia. Ears/nose/throat- No dysphagia. No Dysarthria   Cardiovascular- No palpitations. No chest pain   Respiratory- No dyspnea. No Cough   Gastrointestinal- No Abdominal pain. No Vomiting. Genitourinary- No incontinence. No urinary retention   Musculoskeletal- No myalgia. No arthralgia   Skin- No rash. No easy bruising. Psychiatric- No depression. No anxiety   Endocrine- No diabetes. No thyroid issues. Hematologic- No bleeding difficulty.  No fatigue   Neurologic- No weakness, no aphasia     Past Medical, Surgical, Family, and Social History   PAST MEDICAL HISTORY:  Past Medical History:   Diagnosis Date    Arthritis     Blood circulation, collateral     CAD (coronary artery disease)     CHF (congestive heart failure) (HCC)     Confusion 8/31/2020    GERD (gastroesophageal reflux disease)     History of heart artery stent 12/2018    Hyperlipidemia     Hypertension     Mitral valve prolapse     Myocardial infarct, old     Thyroid disease      SURGICAL HISTORY:  Past Surgical History:   Procedure Laterality Date    COLONOSCOPY      EYE SURGERY Left 09/04/2018    PHACO EMULSIFICATION OF CATARACT WITH INTRAOCULAR LENS IMPLANT LEFT EYE     HYSTERECTOMY      OTHER SURGICAL HISTORY  08/27/2018    phacoemulsification of cataract with intraocular lens implant right eye    [place] OFFICE/OUTPT VISIT,PROCEDURE ONLY Right 8/27/2018    PHACO EMULSIFICATION OF CATARACT WITH INTRAOCULAR LENS IMPLANT RIGHT EYE performed by Latonia Knapp MD at [address] OFFICE/OUTPT [address] [facility] [address] Left 9/4/2018    PHACO EMULSIFICATION OF CATARACT WITH INTRAOCULAR LENS IMPLANT LEFT EYE performed by Latonia Knapp MD at [facility]:  Family history non-contributory  Family History   Problem Relation Age of Onset    Heart Disease Mother     Heart Disease Father     Cancer Father     Cancer Sister     Diabetes Brother     Stroke Brother      Social History     Tobacco History     Smoking Status  Never Smoker    Smokeless Tobacco Use  Never Used          Alcohol History     Alcohol Use Status  No          Drug Use     Drug Use Status  No          Sexual Activity     Sexually Active  Never Comment   living with spouse. Allergies & Outpatient Medications   ALLERGIES:  Allergies   Allergen Reactions    Benazepril Hcl Shortness Of Breath and Swelling    Feldene [Piroxicam] Shortness Of Breath    Hytrin [Terazosin Hcl] Shortness Of Breath    Iv Contrast [Iodides] Anaphylaxis    Acetaminophen     Celebrex [Celecoxib]      GI upset    Cephalexin      Nausea    Hydrochlorothiazide     Iodinated Casein     Monopril [Fosinopril Sodium] Other (See Comments)     Pt states makes her weak    Protonix [Pantoprazole Sodium] Other (See Comments)     Kidney failure      Quinapril Hcl     Toprol Xl [Metoprolol Succinate] Other (See Comments)     C/o leg weakness with this med    Ultracet [Tramadol-Acetaminophen]      Rash    Ziac [Bisoprolol-Hydrochlorothiazide] Other (See Comments)     Pt does not remember reaction to med ?  Weak      HOME MEDICATIONS:  Current Discharge Medication List      CONTINUE these medications which have NOT CHANGED    Details   ondansetron (ZOFRAN) 4 MG tablet Take 1 tablet by mouth 3 times daily as needed for Nausea or Vomiting  Qty: 30 tablet, Refills: 3      zolpidem (AMBIEN) 5 MG tablet Take 1 tablet by mouth nightly as needed for Sleep for up to 180 days. Qty: 30 tablet, Refills: 2    Comments: This request is for a new prescription for a controlled substance as required by Federal/State law. DX Code Needed  . Associated Diagnoses: Primary insomnia      furosemide (LASIX) 20 MG tablet Take 1 tablet by mouth daily  Qty: 30 tablet, Refills: 5      carvedilol (COREG) 25 MG tablet TAKE 1 TABLET BY MOUTH TWICE A DAY  Qty: 180 tablet, Refills: 3    Associated Diagnoses: Chronic systolic congestive heart failure ([facility] Dilated cardiomyopathy ([facility] CAD in native artery; History of PTCA; VT (ventricular tachycardia) ([facility] Essential hypertension; Non-rheumatic mitral regurgitation      atorvastatin (LIPITOR) 80 MG tablet TAKE 1 TABLET BY MOUTH ONE TIME A DAY  Qty: 90 tablet, Refills: 3      vitamin B-12 (CYANOCOBALAMIN) 1000 MCG tablet Take 1,000 mcg by mouth daily      clopidogrel (PLAVIX) 75 MG tablet TAKE 1 TABLET BY MOUTH DAILY  Qty: 90 tablet, Refills: 3      losartan (COZAAR) 50 MG tablet TAKE 1 TABLET BY MOUTH EVERY DAY  Qty: 90 tablet, Refills: 3      hydroCHLOROthiazide (HYDRODIURIL) 12.5 MG tablet TAKE 1 TABLET BY MOUTH ONE TIME A DAY'  Qty: 90 tablet, Refills: 3      nitroGLYCERIN (NITROSTAT) 0.4 MG SL tablet Place 1 tablet under the tongue every 5 minutes as needed for Chest pain up to max of 3 total doses. If no relief after 1 dose, call 911.   Qty: 25 tablet, Refills: 3      fluticasone (FLONASE) 50 MCG/ACT nasal spray 2 sprays by Each Nostril route daily  Qty: 1 Bottle, Refills: 5    Associated Diagnoses: Cough      Handicap Placard MISC by Does not apply route Length: 5 years  Qty: 1 each, Refills: 0    Associated Diagnoses: Acute combined systolic and diastolic heart failure (HCC)      vitamin D (CHOLECALCIFEROL) 1000 UNIT TABS tablet Take 2 tablets by mouth daily  Qty: 60 tablet, Refills: 3      famotidine (PEPCID) 20 MG tablet Take 1 tablet by mouth 2 times daily  Qty: 60 tablet, Refills: 5      HYDROcodone-acetaminophen (NORCO)  MG per tablet Take 1 tablet by mouth every 6 hours as needed. .      Coenzyme Q10 (CO Q 10) 100 MG CAPS Take  by mouth daily. aspirin 81 MG chewable tablet Take 1 tablet by mouth daily. Qty: 30 tablet               Physical Exam   PHYSICAL EXAM:  Vitals:    03/19/22 0000 03/19/22 0400 03/19/22 0828 03/19/22 1327   BP: (!) 141/64 131/82 (!) 142/81 115/76   Pulse: 66 70 68 67   Resp: 16 18 15 17   Temp: 98.4 °F (36.9 °C) 97.1 °F (36.2 °C) 97.3 °F (36.3 °C) 97 °F (36.1 °C)   TempSrc: Oral Oral Oral Oral   SpO2: 94%  93%    Weight:       Height:             General: Alert, no distress, well-nourished  Neurologic  Mental status:   orientation to person, place, time, situation   Attention intact as able to attend well to the exam     Language fluent in conversation   Comprehension intact; follows simple commands    Cranial nerves:   CN2: left inferior quadrantanopsia   CN 3,4,6: Pupils equal and reactive to light, extraocular muscles intact  CN5: Facial sensation symmetric   CN7: Face symmetric without dysarthria  CN8: Hearing symmetric to spoken voice  CN9: Palate elevated symmetrically  CN11: Traps full strength on shoulder shrug  CN12: Tongue midline with protrusion    Motor Exam: good and symmetric strength throughout    Deep tendon reflexes: +2 and symmetric    Sensory: light touch intact and symmetric in all 4 extremities.   No sensory extinction on bilateral simultaneous stimulation  Cerebellar/coordination: finger nose finger normal without ataxia  Tone: normal in all 4 extremities  Gait: deferred for safety     OTHER SYSTEMS:  Cardiovascular: Warm, appears well perfused   Respiratory: Easy, non-labored respiratory pattern   Abdominal: Abdomen is without distention   Extremities: Upper and lower extremities are atraumatic in appearance without deformity. No swelling or erythema. Diagnostic Testing Results   IMAGES:  Images personally reviewed and agree w/ radiology interpretation. MRI Brain w/o Contrast and MR-A neck  1.  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 No acute hemorrhage. 2.  Moderate cerebral atrophy and moderate chronic small vessel ischemic disease. 3.  No significant stenosis in the extracranial vertebral or carotid arteries on noncontrast MRA neck. Note that MRA head was not performed. ECHO with Bubble:   Pending    LABS:  All results below personally reviewed. Pertinent positives & negatives are addressed in Impression & Recommendations below.      LABS   Metabolic Panel Recent Labs     03/18/22  1357 03/18/22  1401 03/19/22  0215   NA  --  134* 134*   K  --  3.7 3.9   CL  --  98* 96*   CO2  --  27 25   BUN  --  20 18   CREATININE  --  1.5* 1.4*   GLUCOSE 116 112* 119*   CALCIUM  --  9.5 9.4   LABALBU  --  3.9  --    MG  --   --  1.70*   ALKPHOS  --  66  --    ALT  --  9*  --    AST  --  25  --       CBC / Coags Recent Labs     03/18/22  1401 03/19/22  0215   WBC 5.8 6.1   RBC 3.61* 3.31*   HGB 10.7* 9.8*   HCT 31.8* 29.5*    211   INR 1.02  --       Other Recent Labs     03/18/22  1508   COVID19 NOT DETECTED        CURRENT SCHEDULED MEDICATIONS   Inpatient Medications     clopidogrel, 75 mg, Oral, Daily    carvedilol, 6.25 mg, Oral, BID WC    atorvastatin, 80 mg, Oral, Nightly    aspirin, 81 mg, Oral, Daily **OR** aspirin, 300 mg, Rectal, Daily    heparin (porcine), 5,000 Units, SubCUTAneous, 3 times per day    furosemide, 20 mg, Oral, Daily     IMPRESSION & RECOMMENDATIONS     IMPRESSION:  Ms. Andi Arroyo is an 80year old lady with a history of CAD (on DAPT), HTN, HLD, and CHF (with EF 35-40%) who presents with two days of vision changes found to have multifocal ischemic stroke. Suspect occult paroxysmal atrial fibrillation as stroke source but would not anticoagulate until this is confirmed. RECOMMENDATIONS:  - MR-A head (ordered)  - Monitor for atrial fibrillation. On telemetry. EP following. If a need to anticoagulate arises (for example, a diagnosis of atrial fibrillation), ok from neurologic perspective to anticoagulate but would NOT use triple therapy (DAPT + anticoagulant). Continuing a single antiplatelet in this instance would be okay  - She will need clearance from OT before she can drive again given left inferior quadrantanopsia, discussed with patient and her family at bedside.   - Continue asa and Plavix for now given remote MINH.   - F/U echocardiogram.  - Long term cardiac monitoring if no need to anticoagulate is discovered this admission.   - F/U with neurology as an outpatient    DAVE Miller - CNP   Neurology & Neurocritical Care   Neurology Line: [phone]  Perfect[employer]: [facility] Neurology & Neuro Critical [facility] NPs  3/19/2022 1:48 PM    I spent 60 minutes in the care of this patient. Over 50% of that time was in face-to-face counseling regarding disease process, diagnostic testing, preventative measures, and answering patient and family questions.

## 2022-03-19 NOTE — H&P
Internal Medicine  PGY-1  History & Physical      CC vision loss    History Obtained From:  Patient, emr    HISTORY OF PRESENT ILLNESS:  Monica Molina is an 81 yo F PMH CAD s/p stent, HTN, HLD, CKD4, Vtach, HFrEF, MVP who presented to [facility] ED for vision loss. Pt states that she woke up yesterday with partial vision loss of R eye that has been intermittent since. States the deficit was in upper visual field of R eye. Currently she can see but it is still somewhat blurry in R eye upper field. Noted that she had a mild achey temporal headache as well which has now resolved. States that this is not new for her and she gets similar headaches related to here TMJ. She called her ophthalmologist this morning who recommended she go to the ED. Pt denies any chest pain, SOB, weakness, fever, chills, recent sicknesses. She has been compliant with all her medications. Upon arival, pt was hypertensive 170s/80s. ESR 39, troponin elevated to 1.16 and trending down. CXR negative. EKG without new ischemic changes. COVID and flu negative. Pt has hx of anaphylactic shock with contrast. She was transferred to [facility] for further neurological workup.     Past Medical History:        Diagnosis Date    Arthritis     Blood circulation, collateral     CAD (coronary artery disease)     CHF (congestive heart failure) (HCC)     Confusion 8/31/2020    GERD (gastroesophageal reflux disease)     History of heart artery stent 12/2018    Hyperlipidemia     Hypertension     Mitral valve prolapse     Myocardial infarct, old     Thyroid disease    ·     Past Surgical History:        Procedure Laterality Date    COLONOSCOPY      EYE SURGERY Left 09/04/2018    PHACO EMULSIFICATION OF CATARACT WITH INTRAOCULAR LENS IMPLANT LEFT EYE     HYSTERECTOMY      OTHER SURGICAL HISTORY  08/27/2018    phacoemulsification of cataract with intraocular lens implant right eye    RI OFFICE/OUTPT VISIT,PROCEDURE ONLY Right 8/27/2018    PHACO EMULSIFICATION OF CATARACT WITH INTRAOCULAR LENS IMPLANT RIGHT EYE performed by Tamara Rivero MD at [facility] OFFICE/OUTPT [address] Left 9/4/2018    PHACO EMULSIFICATION OF CATARACT WITH INTRAOCULAR LENS IMPLANT LEFT EYE performed by Tamara Rivero MD at [address] [place] [address]     ·     Medications Priorto Admission:    · Medications Prior to Admission: ondansetron (ZOFRAN) 4 MG tablet, Take 1 tablet by mouth 3 times daily as needed for Nausea or Vomiting  · zolpidem (AMBIEN) 5 MG tablet, Take 1 tablet by mouth nightly as needed for Sleep for up to 180 days. · furosemide (LASIX) 20 MG tablet, Take 1 tablet by mouth daily  · carvedilol (COREG) 25 MG tablet, TAKE 1 TABLET BY MOUTH TWICE A DAY  · atorvastatin (LIPITOR) 80 MG tablet, TAKE 1 TABLET BY MOUTH ONE TIME A DAY  · vitamin B-12 (CYANOCOBALAMIN) 1000 MCG tablet, Take 1,000 mcg by mouth daily  · clopidogrel (PLAVIX) 75 MG tablet, TAKE 1 TABLET BY MOUTH DAILY  · losartan (COZAAR) 50 MG tablet, TAKE 1 TABLET BY MOUTH EVERY DAY  · hydroCHLOROthiazide (HYDRODIURIL) 12.5 MG tablet, TAKE 1 TABLET BY MOUTH ONE TIME A DAY'  · nitroGLYCERIN (NITROSTAT) 0.4 MG SL tablet, Place 1 tablet under the tongue every 5 minutes as needed for Chest pain up to max of 3 total doses. If no relief after 1 dose, call 911. · fluticasone (FLONASE) 50 MCG/ACT nasal spray, 2 sprays by Each Nostril route daily (Patient not taking: Reported on 3/18/2022)  · Handicap Placard MISC, by Does not apply route Length: 5 years  · vitamin D (CHOLECALCIFEROL) 1000 UNIT TABS tablet, Take 2 tablets by mouth daily  · famotidine (PEPCID) 20 MG tablet, Take 1 tablet by mouth 2 times daily (Patient taking differently: Take 20 mg by mouth 2 times daily as needed )  · HYDROcodone-acetaminophen (NORCO)  MG per tablet, Take 1 tablet by mouth every 6 hours as needed. .  · Coenzyme Q10 (CO Q 10) 100 MG CAPS, Take  by mouth daily.   · aspirin 81 MG chewable tablet, Take 1 tablet by mouth daily. Allergies:  Benazepril hcl, Feldene [piroxicam], Hytrin [terazosin hcl], Iv contrast [iodides], Acetaminophen, Celebrex [celecoxib], Cephalexin, Hydrochlorothiazide, Iodinated casein, Monopril [fosinopril sodium], Protonix [pantoprazole sodium], Quinapril hcl, Toprol xl [metoprolol succinate], Ultracet [tramadol-acetaminophen], and Ziac [bisoprolol-hydrochlorothiazide]    Social History:   · TOBACCO:   reports that she has never smoked. She has never used smokeless tobacco.  · ETOH:   reports no history of alcohol use. · DRUGS : none  · Patient currently lives at home with   ·   Family History:       Problem Relation Age of Onset    Heart Disease Mother     Heart Disease Father     Cancer Father     Cancer Sister     Diabetes Brother     Stroke Brother    ·     Review of Systems   Constitutional: Negative for chills, fatigue and fever. Respiratory: Negative for cough, choking and shortness of breath. Cardiovascular: Negative for chest pain, palpitations and leg swelling. Gastrointestinal: Negative for abdominal pain, constipation, diarrhea, nausea and vomiting. Genitourinary: Negative for dysuria and frequency. Neurological: Negative for light-headedness and headaches. Psychiatric/Behavioral: Negative for agitation and confusion. ROS: A 10 point review of systems was conducted, significant findings as noted in HPI. Physical Exam  Constitutional:       Appearance: Normal appearance. HENT:      Head: Normocephalic and atraumatic. Comments: R temporal region without tenderness to palpation  Cardiovascular:      Rate and Rhythm: Normal rate and regular rhythm. Pulmonary:      Effort: Pulmonary effort is normal.      Breath sounds: No wheezing, rhonchi or rales. Abdominal:      Palpations: Abdomen is soft. Tenderness: There is no abdominal tenderness. There is no guarding or rebound. Musculoskeletal:         General: No swelling.       Cervical back: Neck supple. Skin:     General: Skin is warm and dry. Neurological:      Mental Status: She is alert and oriented to person, place, and time. Sensory: No sensory deficit. Motor: No weakness. Comments: R eye upper visual field impairment       Physical exam:       Vitals:    03/18/22 2157   BP: (!) 172/89   Pulse: 70   Resp: 16   Temp: 97.3 °F (36.3 °C)   SpO2: 97%       DATA:    Labs:  CBC:   Recent Labs     03/18/22  1401   WBC 5.8   HGB 10.7*   HCT 31.8*          BMP:   Recent Labs     03/18/22  1357 03/18/22  1401   NA  --  134*   K  --  3.7   CL  --  98*   CO2  --  27   BUN  --  20   CREATININE  --  1.5*   GLUCOSE 116 112*     LFT's:   Recent Labs     03/18/22  1401   AST 25   ALT 9*   BILITOT 0.3   ALKPHOS 66     Troponin:   Recent Labs     03/18/22  1401 03/18/22  1626   TROPONINI 1.16* 1.04*     BNP:No results for input(s): BNP in the last 72 hours. ABGs: No results for input(s): PHART, DBD0BHL, PO2ART in the last 72 hours. INR:   Recent Labs     03/18/22  1401   INR 1.02       U/A:No results for input(s): NITRITE, COLORU, PHUR, LABCAST, WBCUA, RBCUA, MUCUS, TRICHOMONAS, YEAST, BACTERIA, CLARITYU, SPECGRAV, LEUKOCYTESUR, UROBILINOGEN, BILIRUBINUR, BLOODU, GLUCOSEU, AMORPHOUS in the last 72 hours. Invalid input(s): [place] [address]    MRI brain without contrast    (Results Pending)   MRA NECK WO CONTRAST    (Results Pending)   VL DUP CAROTID BILATERAL    (Results Pending)       Cardiac cath 11/6/2018  IMPRESSION:  1.  LV dysfunction consistent with cardiomyopathy. 2.  Ventricular tachycardia. 3.  Abnormal Myoview. 4.  LV dysfunction with estimated ejection fraction of 40% with global  hypokinesis. 5.  Successful primary stent to LAD. Echo 10/4/2018   Summary   Compared to last echo on 3/20/2014, 5/20/2016, EF has decreased. Normal left ventricle size and wall thickness. The left ventricular systolic function is moderately reduced with an   ejection fraction of 35-40%.    There is hypokinesis of the basal inferior, basal inferolateral and basal   inferoseptal walls. Left ventricular diastolic filling pressure are elevated. Frequent premature beats throughout the study. Moderate posterior mitral annulus calcification. Mild mitral stenosis. Moderate mitral regurgitation. The left atrium is severely dilated. Aortic valve appears sclerotic but opens adequately. Trace aortic regurgitation. ASSESSMENT AND PLAN:    Suspected TIA   Intermittent R vision disturbance. CT head without contrast negative. Pt is allergic to contrast  - differential includes GCA with elevated ESR. Check CRP and consider temporal biopsy   - continue ASA  and statin  - neuro consulted  - prn labetalol to keep SBP < 160  - MRI head neck  - carotid US  - echo with bubble    Chronic issues  CAD s/p stent- continue ASA, holding plavix for now pending imaging  HFrEF, Vtach, MVP- follows with Dr. Dustin Quevedo.  Holding home lasix  HTN- holding home BP meds  CKD4- Cr baseline 1.5  Chronic Anemia- Hb at baseline 10    Will discuss with attending physician Dr. Annabel Bamberger    Code Status: Full code  FEN: NPO  PPX: subq heparin  DISPO: Verito Marsh DO  3/18/2022,  10:10 PM

## 2022-03-19 NOTE — PROGRESS NOTES
Speech Language Pathology  Facility/Department: [address]   CLINICAL BEDSIDE SWALLOW EVALUATION  Speech, language, cognitive screen  Discharge     NAME: Teressa Randhawa  : 1935  MRN: [id number]    ADMISSION DATE: 3/18/2022  ADMITTING DIAGNOSIS: has Other and unspecified angina pectoris; Coronary atherosclerosis of native coronary artery; Other chronic pulmonary heart diseases; Mitral valve disorder; Acute combined systolic and diastolic heart failure ([address] Chronic low back pain; Essential hypertension; AGUILA (acute kidney injury) ([address] Renal insufficiency; Gout; Pain in joint, hand; Mixed hyperlipidemia; Primary insomnia; Closed stable burst fracture of first lumbar vertebra (HCC); GERD (gastroesophageal reflux disease); Palpitations; V-tach ([address] Abnormal myocardial perfusion study; Ischemic cardiomyopathy; Cardiomyopathy ([address] PVD (posterior vitreous detachment), both eyes; Posterior subcapsular polar age-related cataract of both eyes; Confusion; Chronic kidney disease, stage IV (severe) ([address] Visual field defect; and Stroke of unknown etiology ([address] on their problem list.  ONSET DATE: 3/18/22    Recent Chest Xray 3/18/22  Impression   No acute abnormality. MRI brain 3/19/22  Impression       1.  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 No acute hemorrhage. 2.  Moderate cerebral atrophy and moderate chronic small vessel ischemic disease. 3.  No significant stenosis in the extracranial vertebral or carotid arteries on noncontrast MRA neck.  Note that MRA head was not performed.             Date of Eval: 3/19/2022  Evaluating Therapist: Roshni Lyle, SLP    Current Diet level:  Current Diet :  (Easy to Chew)  Current Liquid Diet : Thin      Primary Complaint  Patient Complaint: pt is without complaints    Pain:  Denied pain     HISTORY OF PRESENT ILLNESS (Location/Symptom, Timing/Onset, Context/Setting, Quality, Duration, Modifying Factors, Severity)  Note limiting factors.      Malik Mendez is a 80 y.o. female LKW yesterday on waking up. Pt complains of partial loss of vision, to her right eye, waxing and weining symptoms. She saw ophthalmology this am, advised to come to ED. Denies weakness or numbness or tingling. Denies CP or SOB. Reason for Referral  Malik Mendez was referred for a bedside swallow evaluation to assess the efficiency of her swallow function, identify signs and symptoms of aspiration and make recommendations regarding safe dietary consistencies, effective compensatory strategies, and safe eating environment. Impression  Pt was alert and oriented x3. Speech is clear with no instances of word finding difficulty. Pt able to follow commands and respond to questions appropriately. ROM of oral structures was [place]. Pt had no difficulty closing lips around spoon or drawing liquid up a straw. Pt had mild difficulty with mastication with solids due to limited dentition. Pt reports eating soft foods at home. There was no anterior spillage or oral residue noted with any consistency. Pt demonstrated no s/s aspiration with any consistency presented. Pt able to initiate swallow without difficulty with laryngeal movement observed. Vocal quality remained clear throughout. No coughing, throat clearing or choking observed with any consistency. Pt consumed 3oz water uninterrupted by cup/straw without difficulty. Dysphagia Diagnosis: Swallow function appears grossly intact  Dysphagia Outcome Severity Scale: Level 6: Within functional limits/Modified independence     Treatment Plan  Requires SLP Intervention: No  Duration/Frequency of Treatment: n/a  D/C Recommendations: To be determined       Recommended Diet and Intervention  Diet Solids Recommendation: Easy to Chew  Liquid Consistency Recommendation:  Thin  Recommended Form of Meds: PO Therapeutic Interventions: Patient/Family education    Compensatory Swallowing Strategies  Compensatory Swallowing Strategies: Upright as possible for all oral intake;Remain upright for 30-45 minutes after meals    Treatment/Goals   n/a    General  Chart Reviewed: Yes  Behavior/Cognition: Alert; Cooperative;Pleasant mood  Respiratory Status: Room air  Communication Observation: Functional  Follows Directions: Complex  Dentition: Some missing teeth  Patient Positioning: Upright in bed  Baseline Vocal Quality: Normal  Volitional Cough: Strong  Prior Dysphagia History: no history of dysphagia  Consistencies Administered: Reg solid; Dysphagia Pureed (Dysphagia I); Thin - cup; Thin - straw; Ice Chips           Vision/Hearing  Vision  Vision: Impaired  Vision Exceptions: Wears glasses at all times  Hearing  Hearing: Within functional limits    Oral Motor Deficits  Oral/Motor  Oral Motor: Within functional limits    Oral Phase Dysfunction  Oral Phase  Oral Phase: WFL     Indicators of Pharyngeal Phase Dysfunction   Pharyngeal Phase  Pharyngeal Phase: WFL    Prognosis  Prognosis  Prognosis for safe diet advancement: excellent  Individuals consulted  Consulted and agree with results and recommendations: Patient; Family member;RN  Family member consulted: daughter    Education  Patient /family Education:The pt and daughter were  educated to purpose of the visit, anatomy and physiology of the swallow, concerns for aspiration, swallowing strategies, diet recommendations, plan to discharge from Speech Therapy and were encouraged to alert staff if difficulty with swallowing or communication emerges.   Both stated comprehension and agreement     Patient Education Response: Verbalizes understanding  Safety Devices in place: Yes  Type of devices: Call light within reach       Therapy Time  SLP Individual Minutes  Time In: 0740  Time Out: 0801  Minutes: 21            Pt's goal:pt denied difficulty with communication and swallowing so did not state goal       Plan:  Communication and swallowing appear to be [facility]. Pt discharged from Speech Therapy services  Recommended diet: Easy to Chew diet with thin liquids   Total treatment time:21  Pt's discharge plan:to home   Discharge Plan: No further follow up indicated unless difficulty with swallowing or communication emerge, then please re-refer  Discussed with RN, Ahmet Grimes   Needs within reach.        Loren Schaumann, [place], [facility]- [address] [place] [address]  Pg # [phone]  This document will serve as a discharge summary if pt discharge before next treatment   session

## 2022-03-20 LAB
ANION GAP SERPL CALCULATED.3IONS-SCNC: 13 MMOL/L (ref 3–16)
BASOPHILS ABSOLUTE: 0.1 K/UL (ref 0–0.2)
BASOPHILS RELATIVE PERCENT: 1.4 %
BUN BLDV-MCNC: 21 MG/DL (ref 7–20)
CALCIUM SERPL-MCNC: 9.4 MG/DL (ref 8.3–10.6)
CHLORIDE BLD-SCNC: 97 MMOL/L (ref 99–110)
CO2: 25 MMOL/L (ref 21–32)
CREAT SERPL-MCNC: 1.5 MG/DL (ref 0.6–1.2)
EOSINOPHILS ABSOLUTE: 0.2 K/UL (ref 0–0.6)
EOSINOPHILS RELATIVE PERCENT: 3.1 %
ESTIMATED AVERAGE GLUCOSE: 111.2 MG/DL
GFR AFRICAN AMERICAN: 40
GFR NON-AFRICAN AMERICAN: 33
GLUCOSE BLD-MCNC: 95 MG/DL (ref 70–99)
HBA1C MFR BLD: 5.5 %
HCT VFR BLD CALC: 32 % (ref 36–48)
HEMOGLOBIN: 10.5 G/DL (ref 12–16)
LYMPHOCYTES ABSOLUTE: 2.6 K/UL (ref 1–5.1)
LYMPHOCYTES RELATIVE PERCENT: 31.6 %
MAGNESIUM: 1.9 MG/DL (ref 1.8–2.4)
MCH RBC QN AUTO: 29.1 PG (ref 26–34)
MCHC RBC AUTO-ENTMCNC: 32.7 G/DL (ref 31–36)
MCV RBC AUTO: 89.2 FL (ref 80–100)
MONOCYTES ABSOLUTE: 0.8 K/UL (ref 0–1.3)
MONOCYTES RELATIVE PERCENT: 9.4 %
NEUTROPHILS ABSOLUTE: 4.4 K/UL (ref 1.7–7.7)
NEUTROPHILS RELATIVE PERCENT: 54.5 %
PDW BLD-RTO: 15.6 % (ref 12.4–15.4)
PLATELET # BLD: 234 K/UL (ref 135–450)
PMV BLD AUTO: 9.2 FL (ref 5–10.5)
POTASSIUM SERPL-SCNC: 3.9 MMOL/L (ref 3.5–5.1)
RBC # BLD: 3.59 M/UL (ref 4–5.2)
SODIUM BLD-SCNC: 135 MMOL/L (ref 136–145)
WBC # BLD: 8.1 K/UL (ref 4–11)

## 2022-03-20 PROCEDURE — 6370000000 HC RX 637 (ALT 250 FOR IP): Performed by: STUDENT IN AN ORGANIZED HEALTH CARE EDUCATION/TRAINING PROGRAM

## 2022-03-20 PROCEDURE — 97165 OT EVAL LOW COMPLEX 30 MIN: CPT

## 2022-03-20 PROCEDURE — 97530 THERAPEUTIC ACTIVITIES: CPT

## 2022-03-20 PROCEDURE — 80048 BASIC METABOLIC PNL TOTAL CA: CPT

## 2022-03-20 PROCEDURE — 97535 SELF CARE MNGMENT TRAINING: CPT

## 2022-03-20 PROCEDURE — 97161 PT EVAL LOW COMPLEX 20 MIN: CPT

## 2022-03-20 PROCEDURE — 1200000000 HC SEMI PRIVATE

## 2022-03-20 PROCEDURE — 83735 ASSAY OF MAGNESIUM: CPT

## 2022-03-20 PROCEDURE — 36415 COLL VENOUS BLD VENIPUNCTURE: CPT

## 2022-03-20 PROCEDURE — 97116 GAIT TRAINING THERAPY: CPT

## 2022-03-20 PROCEDURE — 99232 SBSQ HOSP IP/OBS MODERATE 35: CPT | Performed by: NURSE PRACTITIONER

## 2022-03-20 PROCEDURE — 99233 SBSQ HOSP IP/OBS HIGH 50: CPT | Performed by: INTERNAL MEDICINE

## 2022-03-20 PROCEDURE — 85025 COMPLETE CBC W/AUTO DIFF WBC: CPT

## 2022-03-20 PROCEDURE — 6370000000 HC RX 637 (ALT 250 FOR IP): Performed by: INTERNAL MEDICINE

## 2022-03-20 RX ORDER — SENNA PLUS 8.6 MG/1
4 TABLET ORAL 2 TIMES DAILY
Status: DISCONTINUED | OUTPATIENT
Start: 2022-03-20 | End: 2022-03-22 | Stop reason: HOSPADM

## 2022-03-20 RX ORDER — CARVEDILOL 12.5 MG/1
12.5 TABLET ORAL 2 TIMES DAILY WITH MEALS
Status: DISCONTINUED | OUTPATIENT
Start: 2022-03-20 | End: 2022-03-22 | Stop reason: HOSPADM

## 2022-03-20 RX ORDER — SENNA PLUS 8.6 MG/1
2 TABLET ORAL 2 TIMES DAILY
Status: DISCONTINUED | OUTPATIENT
Start: 2022-03-20 | End: 2022-03-20

## 2022-03-20 RX ORDER — SENNA PLUS 8.6 MG/1
1 TABLET ORAL ONCE
Status: COMPLETED | OUTPATIENT
Start: 2022-03-20 | End: 2022-03-20

## 2022-03-20 RX ORDER — SENNA PLUS 8.6 MG/1
2 TABLET ORAL ONCE
Status: COMPLETED | OUTPATIENT
Start: 2022-03-20 | End: 2022-03-20

## 2022-03-20 RX ADMIN — CLOPIDOGREL BISULFATE 75 MG: 75 TABLET ORAL at 08:58

## 2022-03-20 RX ADMIN — HYDROCODONE BITARTRATE AND ACETAMINOPHEN 1 TABLET: 10; 325 TABLET ORAL at 08:58

## 2022-03-20 RX ADMIN — HYDROCODONE BITARTRATE AND ACETAMINOPHEN 1 TABLET: 10; 325 TABLET ORAL at 00:10

## 2022-03-20 RX ADMIN — ASPIRIN 81 MG: 81 TABLET, COATED ORAL at 08:58

## 2022-03-20 RX ADMIN — HYDROCODONE BITARTRATE AND ACETAMINOPHEN 1 TABLET: 10; 325 TABLET ORAL at 23:44

## 2022-03-20 RX ADMIN — CARVEDILOL 6.25 MG: 6.25 TABLET, FILM COATED ORAL at 08:58

## 2022-03-20 RX ADMIN — CARVEDILOL 12.5 MG: 12.5 TABLET, FILM COATED ORAL at 16:58

## 2022-03-20 RX ADMIN — SENNOSIDES 17.2 MG: 8.6 TABLET, COATED ORAL at 12:57

## 2022-03-20 RX ADMIN — HYDROCODONE BITARTRATE AND ACETAMINOPHEN 1 TABLET: 10; 325 TABLET ORAL at 16:58

## 2022-03-20 RX ADMIN — FUROSEMIDE 20 MG: 20 TABLET ORAL at 08:58

## 2022-03-20 RX ADMIN — SENNOSIDES 17.2 MG: 8.6 TABLET, COATED ORAL at 16:58

## 2022-03-20 RX ADMIN — SENNOSIDES 34.4 MG: 8.6 TABLET, COATED ORAL at 23:44

## 2022-03-20 RX ADMIN — ATORVASTATIN CALCIUM 80 MG: 80 TABLET, FILM COATED ORAL at 23:44

## 2022-03-20 RX ADMIN — SENNOSIDES 8.6 MG: 8.6 TABLET, COATED ORAL at 04:17

## 2022-03-20 ASSESSMENT — PAIN DESCRIPTION - DIRECTION
RADIATING_TOWARDS: BUTTOCK

## 2022-03-20 ASSESSMENT — PAIN DESCRIPTION - DESCRIPTORS
DESCRIPTORS: ACHING;CONSTANT
DESCRIPTORS: ACHING;CONSTANT
DESCRIPTORS: ACHING
DESCRIPTORS: ACHING;CONSTANT
DESCRIPTORS: ACHING;CONSTANT
DESCRIPTORS: CONSTANT;ACHING

## 2022-03-20 ASSESSMENT — PAIN DESCRIPTION - LOCATION
LOCATION: BACK

## 2022-03-20 ASSESSMENT — PAIN DESCRIPTION - ORIENTATION
ORIENTATION: UPPER

## 2022-03-20 ASSESSMENT — PAIN DESCRIPTION - ONSET
ONSET: ON-GOING

## 2022-03-20 ASSESSMENT — PAIN DESCRIPTION - FREQUENCY
FREQUENCY: CONTINUOUS

## 2022-03-20 ASSESSMENT — PAIN SCALES - GENERAL
PAINLEVEL_OUTOF10: 3
PAINLEVEL_OUTOF10: 8
PAINLEVEL_OUTOF10: 9
PAINLEVEL_OUTOF10: 2
PAINLEVEL_OUTOF10: 7
PAINLEVEL_OUTOF10: 8
PAINLEVEL_OUTOF10: 9
PAINLEVEL_OUTOF10: 2
PAINLEVEL_OUTOF10: 4

## 2022-03-20 ASSESSMENT — PAIN DESCRIPTION - PROGRESSION
CLINICAL_PROGRESSION: NOT CHANGED

## 2022-03-20 ASSESSMENT — PAIN DESCRIPTION - PAIN TYPE
TYPE: CHRONIC PAIN

## 2022-03-20 ASSESSMENT — PAIN - FUNCTIONAL ASSESSMENT
PAIN_FUNCTIONAL_ASSESSMENT: ACTIVITIES ARE NOT PREVENTED

## 2022-03-20 NOTE — PROGRESS NOTES
Physical Therapy    Facility/Department: [address]  Initial Assessment, treatment, and discharge    NAME: Elvi Gary  : 1935  MRN: [id number]    Date of Service: 3/20/2022    Discharge Recommendations: Elvi Gary scored a 20/24 on the AM-PAC short mobility form. At this time, no further PT is recommended upon discharge due to the patient being at her baseline level of function. Recommend patient returns to prior setting with prior services. PT Equipment Recommendations  Equipment Needed: No    Assessment   Assessment: Pt is an 81 y/o female who presents following CVA affecting her vision. Pt noting R eye is blurry in periphery but states that otherwise she does not notice any deficits physically from her CVA. She was able to navigate fairly well with her change in vision. The pt was slightly unsteady with use of cane and reaching out to furniture. PT talked with pt about using RW instead but pt resistant and family stating that they have tried to get her to use one as well. The pt has had a few falls in the last year. The pt appears to otherwise be at her baseline though states she would like for home care to work with her. She has 24/ supervision available at discharge. At this time PT to discharge from acute caseload. Treatment Diagnosis: impaired mobility 2/2 CVA  Prognosis: Good  Decision Making: Low Complexity  PT Education: PT Role;Plan of Care;General Safety;Goals;Transfer Training;Functional Mobility Training;Gait Training  Patient Education: pt verbalized understanding  REQUIRES PT FOLLOW UP: No  Activity Tolerance  Activity Tolerance: Patient limited by fatigue;Patient Tolerated treatment well;Patient limited by pain       Patient Diagnosis(es): There were no encounter diagnoses.      has a past medical history of Arthritis, Blood circulation, collateral, CAD (coronary artery disease), CHF (congestive heart failure) [address] Confusion, GERD (gastroesophageal reflux disease), History of heart artery stent, Hyperlipidemia, Hypertension, Mitral valve prolapse, Myocardial infarct, old, and Thyroid disease. has a past surgical history that includes Hysterectomy; Thyroidectomy; Colonoscopy; other surgical history (08/27/2018); pr office/outpt visit,procedure only (Right, 8/27/2018); eye surgery (Left, 09/04/2018); and pr office/outpt visit,procedure only (Left, 9/4/2018). Restrictions  Position Activity Restriction  Other position/activity restrictions: up as tolerated  Vision/Hearing  Vision: Impaired  Vision Exceptions: Wears glasses for reading  Hearing: Within functional limits     Subjective  General  Chart Reviewed: Yes  Patient assessed for rehabilitation services?: Yes  Additional Pertinent Hx: Pt admitted with suspected TIA. Per MRI noted to have scattered punctate areas of acute ischemia in the bilateral cerebral hemispheres, left basal ganglia, and right cerebellum which likely represent embolic infarcts. Pt with vision loss of R eye 2/2 CVA. Family / Caregiver Present: Yes (daughter)  Referring Practitioner: Colt Sagastume DO  Diagnosis: CVA  Follows Commands: Within Functional Limits  General Comment  Comments: The pt presents supine in bed and willing to work with therapy.   Subjective  Subjective: \"I am doing the same as normal.\"  Pain Screening  Patient Currently in Pain: Denies  Vital Signs  Patient Currently in Pain: Denies       Orientation  Orientation  Overall Orientation Status: Within Functional Limits  Social/Functional History  Social/Functional History  Lives With: Jason Carrasco (son works during the day)  Type of Home: House  Home Layout: One level,Laundry in basement  Home Access: Ramped entrance  Bathroom Shower/Tub: Tub/Shower unit  Bathroom Toilet: Handicap height (windowsill and sink for leverage)  Bathroom Equipment: Tub transfer bench  Home Equipment: [address] walker,Reacher,Long-handled [address] (transport w/c)  [facility] Help From: Family  ADL Assistance: Independent  Homemaking Assistance: Needs assistance (daughter does laundry, daughter and son do majority of the cleaning and cooking)  Ambulation Assistance: Independent (w/ spc)  Transfer Assistance: Independent  Active : Yes  Patient's  Info: family plans to arrange transport now d/t visual deficits  Occupation: Retired  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 family can provide 24 hr spvn at d/c  Cognition   Cognition  Overall Cognitive Status: WFL    Objective  Strength RLE  Strength RLE: WFL  Strength LLE  Strength LLE: WFL     Sensation  Overall Sensation Status: WFL (denies N/T)     Transfers  Sit to Stand: Stand by assistance (from toilet, 2 different chairs)  Stand to sit: Stand by assistance  Comment: use of SPC with mobility  Ambulation  Ambulation?: Yes  Ambulation 1  Surface: level tile  Device: Single point cane  Assistance: Contact guard assistance  Quality of Gait: slow agnieszka, increased lateral lean, antalgic gait, endorsing back pain, LE pain with mobility 2/2 arthritis, reaching out for furniture  Distance: 12'+ 200'  Comments: pt slightly unsteady with mobility but resistive to use of RW; family states that they are trying to convince her to use one.   Stairs/Curb  Stairs?: No     Balance  Sitting - Static: Good  Sitting - Dynamic: Fair;+  Standing - Static: Fair  Standing - Dynamic: Fair;-  Comments: Pt supervision sitting balance and SBA-CGA with mobility with SPC    Treatment:  Functional mobility training and pt education    Plan   Safety Devices  Type of devices: Nurse notified,Call light within reach,Left in chair,Chair alarm in place (chair alarm not on as RN letting daughter assist her up)      AM-PAC Score  AM-PAC Inpatient Mobility Raw Score : 20 (03/20/22 1318)  AM-PAC Inpatient T-Scale Score : 47.67 (03/20/22 1318)  Mobility Inpatient CMS 0-100% Score: 35.83 (03/20/22 1318)  Mobility Inpatient CMS G-Code Modifier : CJ (03/20/22 1318)       Therapy Time   Individual Concurrent Group Co-treatment   Time In 1055         Time Out 1134         Minutes 39         Timed Code Treatment Minutes: 24 Minutes    Timed Code Treatment Minutes:  24 Minutes    Total Treatment Minutes:  39 minutes      Aurelia Alvarado PT

## 2022-03-20 NOTE — PLAN OF CARE
Problem: Pain:  Goal: Control of chronic pain  Description: Control of chronic pain  Outcome: Ongoing     Problem: Falls - Risk of:  Goal: Absence of physical injury  Description: Absence of physical injury  Outcome: Ongoing

## 2022-03-20 NOTE — PROGRESS NOTES
[facility]   Cardiac Electrophysiology Progress Note     Admit Date: 3/18/2022     Reason for follow up: Stroke    HPI and Interval History:   Patient seen and examined. Clinical notes reviewed. Telemetry reviewed. No new complaint today. No major events overnight. Denies having chest pain, shortness of breath, dyspnea on exertion, Orthopnea, PND at the time of this visit. Review of System:  All other systems reviewed except for that noted above. Pertinent negatives and positives are:     · General: negative for fever, chills   · Ophthalmic ROS: negative for - eye pain or loss of vision  · ENT ROS: negative for - headaches, sore throat   · Respiratory: negative for - cough, sputum  · Cardiovascular: Reviewed in HPI  · Gastrointestinal: negative for - abdominal pain, diarrhea, N/V  · Hematology: negative for - bleeding, blood clots, bruising or jaundice  · Genito-Urinary:  negative for - Dysuria or incontinence  · Musculoskeletal: negative for - Joint swelling, muscle pain  · Neurological: negative for - confusion, dizziness, headaches   · Psychiatric: No anxiety, no depression. · Dermatological: negative for - rash      Physical Examination:  Vitals:    22 1300   BP: 128/77   Pulse: 70   Resp: 16   Temp: 97.3 °F (36.3 °C)   SpO2: 96%        Intake/Output Summary (Last 24 hours) at 3/20/2022 1333  Last data filed at 3/20/2022 0413  Gross per 24 hour   Intake 130 ml   Output --   Net 130 ml     In: 130 [P.O.:120; I.V.:10]  Out: -    Wt Readings from Last 3 Encounters:   22 129 lb (58.5 kg)   22 129 lb (58.5 kg)   22 129 lb (58.5 kg)     Temp  Av.6 °F (36.4 °C)  Min: 97.3 °F (36.3 °C)  Max: 97.9 °F (36.6 °C)  Pulse  Av.7  Min: 61  Max: 74  BP  Min: 128/77  Max: 161/94  SpO2  Av.7 %  Min: 94 %  Max: 100 %    · Telemetry: Sinus rhythm   · Constitutional: Alert. Oriented to person, place, and time. No distress. · Head: Normocephalic and atraumatic. · Mouth/Throat: Lips appear moist. Oropharynx is clear and moist.  · Eyes: Conjunctivae normal. EOM are normal.   · Neck: Neck supple. No lymphadenopathy. No rigidity. No JVD present. · Cardiovascular: Normal rate, regular rhythm. Normal S1&S2. Carotid pulse 2+ bilaterally. · Pulmonary/Chest: Bilateral respiratory sounds present. No respiratory accessory muscle use. No wheezes, No rhonchi. · Abdominal: Soft. Normal bowel sounds present. No distension, No tenderness. No splenomegaly. No hernia. · Musculoskeletal: No tenderness. No edema    · Lymphadenopathy: Has no cervical adenopathy. · Neurological: Alert and oriented. Cranial nerve II-XII grossly intact, No gross deficit to touch. · Skin: Skin is warm and dry. No rash, lesions, ulcerations noted. · Psychiatric: No anxiety nor agitation. Labs, diagnostic and imaging results reviewed. Reviewed. Recent Labs     03/18/22  1401 03/19/22  0215 03/20/22  0745   * 134* 135*   K 3.7 3.9 3.9   CL 98* 96* 97*   CO2 27 25 25   BUN 20 18 21*   CREATININE 1.5* 1.4* 1.5*     Recent Labs     03/18/22  1401 03/19/22  0215 03/20/22  0745   WBC 5.8 6.1 8.1   HGB 10.7* 9.8* 10.5*   HCT 31.8* 29.5* 32.0*   MCV 88.1 89.1 89.2    211 234     Lab Results   Component Value Date    TROPONINI 0.93 03/19/2022     Estimated Creatinine Clearance: 21 mL/min (A) (based on SCr of 1.5 mg/dL (H)).    No results found for: BNP  Lab Results   Component Value Date    PROTIME 11.5 03/18/2022    PROTIME 11.5 11/20/2021    PROTIME 11.2 11/06/2018    INR 1.02 03/18/2022    INR 1.02 11/20/2021    INR 0.98 11/06/2018     Lab Results   Component Value Date    CHOL 150 03/19/2022    HDL 84 03/19/2022    TRIG 65 03/19/2022       Scheduled Meds:   senna  2 tablet Oral Once    senna  4 tablet Oral BID    clopidogrel  75 mg Oral Daily    carvedilol  6.25 mg Oral BID WC    atorvastatin  80 mg Oral Nightly    aspirin  81 mg Oral Daily    Or    aspirin  300 mg Rectal Daily  furosemide  20 mg Oral Daily     Continuous Infusions:  PRN Meds:ondansetron **OR** ondansetron, polyethylene glycol, perflutren lipid microspheres, HYDROcodone-acetaminophen     Patient Active Problem List    Diagnosis Date Noted    AGUILA (acute kidney injury) ([facility] 01/13/2014    Stroke of unknown etiology ([facility] 03/19/2022    Visual field defect 03/18/2022    Chronic kidney disease, stage IV (severe) ([address] 08/20/2021    Confusion 08/31/2020    PVD (posterior vitreous detachment), both eyes 07/02/2019    Posterior subcapsular polar age-related cataract of both eyes 07/02/2019    Abnormal myocardial perfusion study 11/06/2018    Ischemic cardiomyopathy 11/06/2018    Cardiomyopathy ([address] 11/06/2018    Palpitations 09/04/2018    V-tach ([address] 09/04/2018    GERD (gastroesophageal reflux disease) 01/10/2017    Closed stable burst fracture of first lumbar vertebra ([facility] 09/30/2015    Primary insomnia 11/24/2014    Mixed hyperlipidemia 10/29/2014    Gout 04/18/2014    Pain in joint, hand 04/18/2014    Renal insufficiency 01/22/2014    Essential hypertension 04/26/2013    Chronic low back pain 02/22/2013    Other and unspecified angina pectoris 06/21/2011    Coronary atherosclerosis of native coronary artery 06/21/2011    Other chronic pulmonary heart diseases 06/21/2011    Mitral valve disorder 06/21/2011    Acute combined systolic and diastolic heart failure ([address] 06/21/2011      Active Hospital Problems    Diagnosis Date Noted    Stroke of unknown etiology ([facility] [I63.9] 03/19/2022    Visual field defect [H53.40] 03/18/2022       Assessment and Plan:   1. Stroke like symptoms  2. Type II NSTEMI (troponin downtrending now)   3. CAD, status post PCI in 2018  4.   Ischemic cardiomyopathy, LV ejection fraction 25 to 30% #5 grade 3 diastolic dysfunction, severe left atrial enlargement     She presented with stroke like symptoms  Her CT is consistent with embolic stroke  She does have positive troponin, down-trending; no associated chest pain; secondary to baseline left bundle, EKG is not helpful  Continue on aspirin, statins, Plavix, Coreg; increased the dose of Coreg  Currently, there is no indication for oral anticoagulation from cardiology standpoint    Secondary to her renal function, with a creatinine clearance of 21 mL/min, or options for ACE/ARB/ARN I is very limited    Implanting an implantable loop recorder for long-term cardiac dysrhythmia monitoring in order to evaluate for possible atrial fibrillation/atrial flutter as an etiology for the patient's CVA. The benefits and risks of implanting an implantable loop recorder has been discussed with the patient. The potential risks include, but are not limited to, bleeding, trauma, infection. The patient is aware and understands the risks. If she is willing to proceed with ILR implantation, this can be performed by tomorrow noon time. Thank you for allowing me to participate in the care of this patient. If you have any questions, please do not hesitate to contact me. Genie Latif MD   Cardiac Electrophysiology  [address]    For any EP related issues after 5 PM, contact [facility] on call cardiology through .

## 2022-03-20 NOTE — ED PROVIDER NOTES
I personally saw Teressa Randhawa and performed a substantive portion of the visit including all aspects of the medical decision making. .    In brief, this is an 59-year-old female presenting for evaluation of loss of vision. The patient states that yesterday she woke up and the upper half of her right vision was black. She states that it persisted throughout the day, and then improved for little bit and returned. She did not want to come to the ED yesterday, but she told her granddaughter about her symptoms and I was brought here today. She states that the vision has essentially resolved she still has a bit of blurriness however it has improved significantly she states. She called [facility] for an appointment they recommended she come here for further evaluation. She denies any eye pain or headache. She denies any other associated speech changes, focal numbness or weakness or dizziness. She denies chest pain or shortness of breath. On exam, she is chronically ill-appearing. Heart is regular rate and rhythm with 4/6 murmur. Lungs diminished diffusely no wheezes or rales. Strength 5/5 in UE and LE bilaterally. Sensation intact x 4. No aphasia or dysarthria. CN 2-12 intact. No facial droop. No limb ataxia. No visual field loss. The Ekg interpreted by me shows  normal sinus rhythm with a rate of 68  Axis is   Normal  QTc is  within an acceptable range  Intervals and Durations show LBBB  ST Segments: normal pacemaker ST segments   No significant change from prior EKG dated 8/31/20    ED course: This is an 59-year-old female presenting with vision loss. Vital signs are within normal limits. She is chronically ill but acutely nontoxic appearing on exam.  Her NIH is 0 on my assessment, and she states that her vision is essentially back to normal she does have a bit of blurriness in her right upper eye.   It does sound concerning that her vision loss was neurological in etiology as opposed to a primary ophthalmology issue.  Code stroke was not called since the patient symptoms have largely resolved and onset was yesterday morning greater than 24 hours. CT head was obtained and unremarkable. The patient refused CTA and I did not push for this since I doubt she is a large vessel occlusion and her GFR is 33. Otherwise her lab work does show an elevated troponin at 1.04, EKG shows no ischemic changes and the patient denies chest pain. She was given aspirin. She does have a history of cardiomyopathy with EF of 40% and she had a stent to her LAD in 2018. I have no prior troponins for comparison. The patient will require transfer to [facility] for further neurological evaluation as well as cardiology. She request to [facility] for transfer as she states her cardiologist is there. She remained hemodynamically stable no recurrence of vision loss here in the ED. All diagnostic, treatment, and disposition decisions were made by myself in conjunction with the advanced practice provider. For all further details of the patient's emergency department visit, please see the advanced practice provider's documentation. Comment: Please note this report has been produced using speech recognition software and may contain errors related to that system including errors in grammar, punctuation, and spelling, as well as words and phrases that may be inappropriate. If there are any questions or concerns please feel free to contact the dictating provider for clarification.           [facility], [place]  03/23/22 2001

## 2022-03-20 NOTE — PROGRESS NOTES
Occupational Therapy   Occupational Therapy Initial Assessment / Discharge  Date: 3/20/2022   Patient Name: Kaley Whittington  MRN: [id number]     : 1935    Date of Service: 3/20/2022    Discharge Recommendations: Kaley Whittington scored a 21/24 on the AM-PAC ADL Inpatient form. Current research shows that an AM-PAC score of 18 or greater is typically associated with a discharge to the patient's home setting. Based on the patient's AM-PAC score, and their current ADL deficits, it is recommended that the patient have 2-3 sessions per week of Occupational Therapy at d/c to increase the patient's independence. At this time, this patient demonstrates the endurance and safety to discharge home with home services and a follow up treatment frequency of 2-3x/wk. Please see assessment section for further patient specific details. If patient discharges prior to next session this note will serve as a discharge summary. Please see below for the latest assessment towards goals. OT Equipment Recommendations  Equipment Needed: No    Assessment   Assessment: Pt is an 80 y.o. F who presented to [facility] w/ visual deficits and diagnosed w/ TIA. Pt is typically independent with ADLs and lives with family who assists with IADLs. Pt presents near baseline status and completed grooming mod I, sponge bath w/ mod I-spvn, and LE dressing w/ SBA. Pt completed transfers w/ SBA and functional mobility with SPC w/ CGA. Pt verbalizes feeling at baseline besides remaining minor vision deficits in R visual field. Pt's family will provide 24 hr spvn at d/c, pt has all needed equipment, and is requesting [address]. No further acute OT needs identified, d/c from acute OT.   Prognosis: Good  Decision Making: Low Complexity  OT Education: OT Role;Plan of Care;ADL Adaptive Strategies;Transfer Training  Patient Education: pt verb understanding  REQUIRES OT FOLLOW UP: No  Activity Tolerance  Activity Tolerance: Patient Tolerated treatment well  Safety Devices  Safety Devices in place: Yes  Type of devices: Nurse notified; Left in chair;Chair alarm in place;Call light within reach (daughter present)           Patient Diagnosis(es): There were no encounter diagnoses. has a past medical history of Arthritis, Blood circulation, collateral, CAD (coronary artery disease), CHF (congestive heart failure) ([facility] Confusion, GERD (gastroesophageal reflux disease), History of heart artery stent, Hyperlipidemia, Hypertension, Mitral valve prolapse, Myocardial infarct, old, and Thyroid disease. has a past surgical history that includes Hysterectomy; Thyroidectomy; Colonoscopy; other surgical history (08/27/2018); pr office/outpt visit,procedure only (Right, 8/27/2018); eye surgery (Left, 09/04/2018); and pr office/outpt visit,procedure only (Left, 9/4/2018). Restrictions  Position Activity Restriction  Other position/activity restrictions: up as tolerated    Subjective   General  Chart Reviewed: Yes  Patient assessed for rehabilitation services?: Yes  Additional Pertinent Hx: Solomon Jaeger is an 79 yo F PMH CAD s/p stent, HTN, HLD, CKD4, Vtach, HFrEF, MVP who presented to [facility] ED for vision loss. Upon arival, pt was hypertensive 170s/80s. ESR 39, troponin elevated to 1.16 and trending down. CXR negative. EKG without new ischemic changes. TIA. Per MRI noted to have scattered punctate areas of acute ischemia in the bilateral cerebral hemispheres, left basal ganglia, and right cerebellum which likely represent embolic infarcts. Admitted for TIA  Family / Caregiver Present: Yes (daughter)  Referring Practitioner: Cristine Foley DO  Diagnosis: visual field defect  Subjective  Subjective: Pt seated in chair in bathroom w/ daughter upon arrival, pleasant and agreeable to OT eval / tx.   Patient Currently in Pain: Denies  Pain Assessment  Pain Assessment: 0-10  Pain Level: 2  Response to Pain Intervention: Patient Satisfied  Vital Signs  Temp: stability (doorframe, carts in hallway, etc.), pt stated her walking feels like baseline  Toilet Transfers  Toilet - Technique: Ambulating  Equipment Used: Standard toilet  Toilet Transfer: Stand by assistance  Toilet Transfers Comments: +L GB  ADL  Grooming: Supervision (oral care, washed face, combed and styled hair in stance at sink)  UE Bathing: Modified independent  (seated sponge bath at sink)  LE Bathing: Supervision (LE sponge bath seated and in standing)  UE Dressing: Setup (doff/don gown and don sweater)  LE Dressing: Stand by assistance (threaded underwear seated, completed pants mgmt over hips w/ SBA)  Additional Comments: doffed socks i'ly, donned w/ setup  Tone RUE  RUE Tone: Normotonic  Tone LUE  LUE Tone: Normotonic  Coordination  Movements Are Fluid And Coordinated: Yes        Transfers  Sit to stand: Stand by assistance  Stand to sit: Stand by assistance  Vision - Basic Assessment  Prior Vision: Wears glasses only for reading  Vision Comments: Vision loss in upper R peripheral field noted by decreased accuracy w/ identifying number of fingers held up by therapist in that quadrant. Pt stating \"It keeps changing but it's getting better. I see more now than I did yesterday. \"  Cognition  Overall Cognitive Status: WFL        Sensation  Overall Sensation Status: WFL (denies N/T)        LUE AROM (degrees)  LUE AROM : WFL  Left Hand AROM (degrees)  Left Hand AROM: WFL  RUE AROM (degrees)  RUE AROM : WFL  Right Hand AROM (degrees)  Right Hand AROM: WFL  LUE Strength  Gross LUE Strength: WFL  RUE Strength  Gross RUE Strength: WFL                   Plan   Plan  Times per week: d/c from acute OT    G-Code     OutComes Score                                                  AM-PAC Score             Goals          Therapy Time   Individual Concurrent Group Co-treatment   Time In 1055         Time Out 1134         Minutes 39         Timed Code Treatment Minutes: 39 Minutes (15 min eval, 24 min tx)       [place] Willam Zacarias

## 2022-03-20 NOTE — PROGRESS NOTES
NEUROLOGY / NEUROCRITICAL CARE PROGRESS NOTE       Patient Name: Saadia Ivory YOB: 1935   Sex: Female Age: 80 yrs     CC / Reason for Consult: stroke    Interval Hx / Changes over last 24 hours:   No major changes  Planning for ILR tomorrow  EF 25-30% on Echo    ROS: no headache; no aphasia    HISTORY   Admission HPI:   Saadia Ivory is a 80 y.o. y/o female with a history of CHF, CAD (on DAPT with history of MINH) who presents with acute vision changes found to have multifocal ischemic stroke. Her symptoms began on 3/17 in the morning and have improved but not resolved. She has never had this happen before. She denies any associated symptoms.      She has been on DAPT for the last several years following coronary stent placement. Her troponins have been elevated this admission but are trending down. No history of DVT/PE.      PMH Past Medical History:   Diagnosis Date    Arthritis     Blood circulation, collateral     CAD (coronary artery disease)     [facility]est[facility]) [facility])     Confusion 8/31/2020    GERD (gastroesophageal reflux disease)     History of heart artery stent 12/2018    Hyperlipidemia     Hypertension     Mitral valve prolapse     Myocardial infarct, old     Thyroid disease       Allergies Allergies   Allergen Reactions    Benazepril Hcl Shortness Of Breath and Swelling    Feldene [Piroxicam] Shortness Of Breath    Hytrin [Terazosin Hcl] Shortness Of Breath    Iv Contrast [Iodides] Anaphylaxis    Acetaminophen     Celebrex [Celecoxib]      GI upset    Cephalexin      Nausea    Hydrochlorothiazide     Iodinated Casein     Monopril [Fosinopril Sodium] Other (See Comments)     Pt states makes her weak    Protonix [Pantoprazole Sodium] Other (See Comments)     Kidney failure      Quinapril Hcl     Toprol Xl [Metoprolol Succinate] Other (See Comments)     C/o leg weakness with this med    Ultracet [Tramadol-Acetaminophen]      Rash    Ziac [Bisoprolol-Hydrochlorothiazide] Other (See Comments)     Pt does not remember reaction to med ? Weak       Diet ADULT DIET; Easy to Chew; Low Fat/Low Chol/High Fiber/JAYNA  Diet NPO   Isolation No active isolations     LABS   Metabolic Panel Recent Labs     03/18/22  1401 03/19/22  0215 03/20/22  0745   * 134* 135*   K 3.7 3.9 3.9   CL 98* 96* 97*   CO2 27 25 25   BUN 20 18 21*   CREATININE 1.5* 1.4* 1.5*   GLUCOSE 112* 119* 95   CALCIUM 9.5 9.4 9.4   LABALBU 3.9  --   --    MG  --  1.70* 1.90   ALKPHOS 66  --   --    ALT 9*  --   --    AST 25  --   --       CBC / Coags Recent Labs     03/18/22  1401 03/19/22  0215 03/20/22  0745   WBC 5.8 6.1 8.1   RBC 3.61* 3.31* 3.59*   HGB 10.7* 9.8* 10.5*   HCT 31.8* 29.5* 32.0*    211 234   INR 1.02  --   --       Other Recent Labs     03/18/22  1508 03/19/22  0212 03/19/22  0212 03/19/22  0215   LABA1C  --  5.5  --   --    LDLCALC  --  46   < > 53   TRIG  --   --   --  65   COVID19 NOT DETECTED  --   --   --     < > = values in this interval not displayed. CURRENT SCHEDULED MEDICATIONS   Inpatient Medications     senna, 2 tablet, Oral, BID    clopidogrel, 75 mg, Oral, Daily    carvedilol, 6.25 mg, Oral, BID WC    atorvastatin, 80 mg, Oral, Nightly    aspirin, 81 mg, Oral, Daily **OR** aspirin, 300 mg, Rectal, Daily    furosemide, 20 mg, Oral, Daily     DIAGNOSTICS   IMAGES:  Images personally reviewed and agree w/ radiology interpretation. MRI Brain w/o Contrast:  MRI Brain w/o Contrast and MR-A neck  1.  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 No acute hemorrhage. 2.  Moderate cerebral atrophy and moderate chronic small vessel ischemic disease. 3.  No significant stenosis in the extracranial vertebral or carotid arteries on noncontrast MRA neck. Note that MRA head was not performed.      MR-A head  No aneurysms, vascular

## 2022-03-20 NOTE — CONSULTS
Clinical Pharmacy Consult Note    80 y.o. female admitted with TIA and stroke-like symptoms. Pharmacy has been asked by Dr. Tammi Sanchez to adjust all drips to normal saline as appropriate based on compatibility to avoid fluid shifts since D5 is osmotically active. The following intermittent IV drips/infusions have been adjusted to saline:  None at this time    Please be aware that patient has D5W ordered as part of hypoglycemia orderset. Total IV fluid delivered to patient over last 24h: 50mL    RPh will follow daily to ensure all new IVPBs + drips are in NS. Thanks for consulting pharmacy!   Rekha Has PharmD  Pharmacy Resident   Please call with questions [phone]  3/20/2022 8:18 AM

## 2022-03-20 NOTE — PROGRESS NOTES
Progress Note    Admit Date: 3/18/2022  Diet: ADULT DIET; Easy to Chew; Low Fat/Low Chol/High Fiber/JAYNA    CC: Vision loss    Interval history:   NAEON. Pt was seen at bedside today AM, resting comfortably. Does not have any new complaints. Vision returned to baseline today AM   Pt lives at home with her  and son, feels like she is falling a lot. Ambulates with cane. Denies F/C, N/V, CP, SOB, HA, vision changes, weakness in arms and feet. VSS  Trop downtrending  Cr 1.5    HPI: 79 yo F PMH CAD s/p stent, HTN, HLD, CKD4, Vtach, HFrEF, MVP who presented to [facility] ED for vision loss. Pt states that she woke up yesterday with partial vision loss of R eye that has been intermittent since. States the deficit was in upper visual field of R eye. Currently she can see but it is still somewhat blurry in R eye upper field. Noted that she had a mild achey temporal headache as well which has now resolved. States that this is not new for her and she gets similar headaches related to here TMJ. She called her ophthalmologist this morning who recommended she go to the ED. Pt denies any chest pain, SOB, weakness, fever, chills, recent sicknesses. She has been compliant with all her medications.      Upon arival, pt was hypertensive 170s/80s. ESR 39, troponin elevated to 1.16 and trending down. CXR negative. EKG without new ischemic changes. COVID and flu negative. Pt has hx of anaphylactic shock with contrast. She was transferred to [facility] for further neurological workup.     Medications:     Scheduled Meds:   senna  2 tablet Oral BID    clopidogrel  75 mg Oral Daily    carvedilol  6.25 mg Oral BID WC    atorvastatin  80 mg Oral Nightly    aspirin  81 mg Oral Daily    Or    aspirin  300 mg Rectal Daily    furosemide  20 mg Oral Daily     Continuous Infusions:  PRN Meds:ondansetron **OR** ondansetron, polyethylene glycol, perflutren lipid microspheres, HYDROcodone-acetaminophen    Objective:   Vitals: T-max:  Patient Vitals for the past 8 hrs:   BP Temp Temp src Pulse Resp SpO2   03/20/22 0823 (!) 161/94 97.5 °F (36.4 °C) Oral 74 17 97 %   03/20/22 0413 128/67 97.9 °F (36.6 °C) Oral 61 16 99 %       Intake/Output Summary (Last 24 hours) at 3/20/2022 1121  Last data filed at 3/20/2022 0413  Gross per 24 hour   Intake 130 ml   Output --   Net 130 ml     Review of Systems  As above    Physical Exam  Constitutional:       Appearance: Normal appearance. HENT:      Head: Normocephalic and atraumatic. Comments: R temporal region without tenderness to palpation  Cardiovascular:      Rate and Rhythm: Normal rate and regular rhythm. Pulmonary:      Effort: Pulmonary effort is normal.      Breath sounds: No wheezing, rhonchi or rales. Abdominal:      Palpations: Abdomen is soft. Tenderness: There is no abdominal tenderness. There is no guarding or rebound. Musculoskeletal:         General: No swelling. Cervical back: Neck supple. Skin:     General: Skin is warm and dry. Neurological:      Mental Status: She is alert and oriented to person, place, and time. Sensory: No sensory deficit. Motor: No weakness. LABS:    CBC:   Recent Labs     03/18/22  1401 03/19/22  0215 03/20/22  0745   WBC 5.8 6.1 8.1   HGB 10.7* 9.8* 10.5*   HCT 31.8* 29.5* 32.0*    211 234   MCV 88.1 89.1 89.2     Renal:    Recent Labs     03/18/22  1401 03/19/22  0215 03/20/22  0745   * 134* 135*   K 3.7 3.9 3.9   CL 98* 96* 97*   CO2 27 25 25   BUN 20 18 21*   CREATININE 1.5* 1.4* 1.5*   GLUCOSE 112* 119* 95   CALCIUM 9.5 9.4 9.4   MG  --  1.70* 1.90   ANIONGAP 9 13 13     Hepatic:   Recent Labs     03/18/22  1401   AST 25   ALT 9*   BILITOT 0.3   PROT 7.0   LABALBU 3.9   ALKPHOS 66     Troponin:   Recent Labs     03/18/22  1626 03/18/22  2330 03/19/22  0212   TROPONINI 1.04* 1.03* 0.93*     BNP: No results for input(s): BNP in the last 72 hours.   Lipids:   Recent Labs     03/19/22 0215   CHOL 150 HDL 84*     ABGs:  No results for input(s): PHART, JEW2OMK, PO2ART, AEO7XLP, BEART, THGBART, L3XFNYCL, URJ3XPS in the last 72 hours. INR:   Recent Labs     03/18/22  1401   INR 1.02     Lactate: No results for input(s): LACTATE in the last 72 hours. Cultures:  -----------------------------------------------------------------  RAD:   MRA HEAD WO CONTRAST   Final Result      1. No aneurysms, vascular occlusions, or intracranial stenoses identified. MRA NECK WO CONTRAST   Final Result      1. 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 No acute hemorrhage. 2.  Moderate cerebral atrophy and moderate chronic small vessel ischemic disease. 3.  No significant stenosis in the extracranial vertebral or carotid arteries on noncontrast MRA neck. Note that MRA head was not performed. MRI brain without contrast   Final Result      1. 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 No acute hemorrhage. 2.  Moderate cerebral atrophy and moderate chronic small vessel ischemic disease. 3.  No significant stenosis in the extracranial vertebral or carotid arteries on noncontrast MRA neck. Note that MRA head was not performed. Assessment/Plan:     Embolic Stroke  Intermittent R vision disturbance. CT head without contrast negative.  Pt is allergic to contrast  - GCA r/o with minimally elevated ESR and CRP  - MRI head with scattered punctate areas of acute ischemia in the bilateral cerebral hemispheres, left basal ganglia, and right cerebellum, likely embolic  - MRA neck without significant stenosis   - Neurology consulted  - continue ASA, plavix  and statin  - echo with bubble, results pending  - fasting lipid WNL  - neurology consulted  - EPS consulted, plan for loop recorder placement tomorrow     Chronic issues  CAD s/p stent- continue ASA and plavix. Last stent in Nov 2018. Will hold betablocker in the context of permissive htn  Cardiology consulted  HFrEF, Vtach, MVP- follows with Dr. Nell Luque.  Holding home lasix  HTN- holding home BP meds  CKD4- Cr baseline 1.5  Chronic Anemia- Hb at baseline 10     Code Status: Full code  FEN: cardiac diet  PPX: subq heparin  DISPO: Althea Habermann, MD, PGY-1  03/20/22  11:21 AM    This patient has been staffed and discussed with Ken Perkins MD.

## 2022-03-21 ENCOUNTER — APPOINTMENT (OUTPATIENT)
Dept: CARDIAC CATH/INVASIVE PROCEDURES | Age: 87
DRG: 040 | End: 2022-03-21
Attending: INTERNAL MEDICINE
Payer: MEDICARE

## 2022-03-21 LAB
ANION GAP SERPL CALCULATED.3IONS-SCNC: 10 MMOL/L (ref 3–16)
BASOPHILS ABSOLUTE: 0.1 K/UL (ref 0–0.2)
BASOPHILS RELATIVE PERCENT: 1.1 %
BUN BLDV-MCNC: 21 MG/DL (ref 7–20)
CALCIUM SERPL-MCNC: 9.1 MG/DL (ref 8.3–10.6)
CHLORIDE BLD-SCNC: 98 MMOL/L (ref 99–110)
CO2: 28 MMOL/L (ref 21–32)
CREAT SERPL-MCNC: 1.4 MG/DL (ref 0.6–1.2)
EOSINOPHILS ABSOLUTE: 0.3 K/UL (ref 0–0.6)
EOSINOPHILS RELATIVE PERCENT: 4 %
ESTIMATED AVERAGE GLUCOSE: 111.2 MG/DL
GFR AFRICAN AMERICAN: 43
GFR NON-AFRICAN AMERICAN: 36
GLUCOSE BLD-MCNC: 87 MG/DL (ref 70–99)
HBA1C MFR BLD: 5.5 %
HCT VFR BLD CALC: 30.2 % (ref 36–48)
HEMOGLOBIN: 9.9 G/DL (ref 12–16)
LYMPHOCYTES ABSOLUTE: 1.8 K/UL (ref 1–5.1)
LYMPHOCYTES RELATIVE PERCENT: 29.3 %
MAGNESIUM: 1.9 MG/DL (ref 1.8–2.4)
MCH RBC QN AUTO: 29.3 PG (ref 26–34)
MCHC RBC AUTO-ENTMCNC: 32.7 G/DL (ref 31–36)
MCV RBC AUTO: 89.6 FL (ref 80–100)
MONOCYTES ABSOLUTE: 0.7 K/UL (ref 0–1.3)
MONOCYTES RELATIVE PERCENT: 10.9 %
NEUTROPHILS ABSOLUTE: 3.4 K/UL (ref 1.7–7.7)
NEUTROPHILS RELATIVE PERCENT: 54.7 %
PDW BLD-RTO: 15.1 % (ref 12.4–15.4)
PLATELET # BLD: 212 K/UL (ref 135–450)
PMV BLD AUTO: 8.7 FL (ref 5–10.5)
POTASSIUM SERPL-SCNC: 4.1 MMOL/L (ref 3.5–5.1)
RBC # BLD: 3.37 M/UL (ref 4–5.2)
SODIUM BLD-SCNC: 136 MMOL/L (ref 136–145)
WBC # BLD: 6.3 K/UL (ref 4–11)

## 2022-03-21 PROCEDURE — 6370000000 HC RX 637 (ALT 250 FOR IP): Performed by: STUDENT IN AN ORGANIZED HEALTH CARE EDUCATION/TRAINING PROGRAM

## 2022-03-21 PROCEDURE — 85025 COMPLETE CBC W/AUTO DIFF WBC: CPT

## 2022-03-21 PROCEDURE — 33285 INSJ SUBQ CAR RHYTHM MNTR: CPT | Performed by: INTERNAL MEDICINE

## 2022-03-21 PROCEDURE — C1764 EVENT RECORDER, CARDIAC: HCPCS

## 2022-03-21 PROCEDURE — 0JH602Z INSERTION OF MONITORING DEVICE INTO CHEST SUBCUTANEOUS TISSUE AND FASCIA, OPEN APPROACH: ICD-10-PCS | Performed by: INTERNAL MEDICINE

## 2022-03-21 PROCEDURE — 33285 INSJ SUBQ CAR RHYTHM MNTR: CPT

## 2022-03-21 PROCEDURE — 99223 1ST HOSP IP/OBS HIGH 75: CPT | Performed by: INTERNAL MEDICINE

## 2022-03-21 PROCEDURE — 36415 COLL VENOUS BLD VENIPUNCTURE: CPT

## 2022-03-21 PROCEDURE — 1200000000 HC SEMI PRIVATE

## 2022-03-21 PROCEDURE — 6370000000 HC RX 637 (ALT 250 FOR IP): Performed by: INTERNAL MEDICINE

## 2022-03-21 PROCEDURE — 83735 ASSAY OF MAGNESIUM: CPT

## 2022-03-21 PROCEDURE — 80048 BASIC METABOLIC PNL TOTAL CA: CPT

## 2022-03-21 PROCEDURE — 99233 SBSQ HOSP IP/OBS HIGH 50: CPT | Performed by: NURSE PRACTITIONER

## 2022-03-21 RX ADMIN — CARVEDILOL 12.5 MG: 12.5 TABLET, FILM COATED ORAL at 09:12

## 2022-03-21 RX ADMIN — SENNOSIDES 34.4 MG: 8.6 TABLET, COATED ORAL at 20:41

## 2022-03-21 RX ADMIN — CARVEDILOL 12.5 MG: 12.5 TABLET, FILM COATED ORAL at 17:06

## 2022-03-21 RX ADMIN — HYDROCODONE BITARTRATE AND ACETAMINOPHEN 1 TABLET: 10; 325 TABLET ORAL at 09:11

## 2022-03-21 RX ADMIN — RIVAROXABAN 15 MG: 15 TABLET, FILM COATED ORAL at 19:17

## 2022-03-21 RX ADMIN — HYDROCODONE BITARTRATE AND ACETAMINOPHEN 1 TABLET: 10; 325 TABLET ORAL at 22:54

## 2022-03-21 RX ADMIN — HYDROCODONE BITARTRATE AND ACETAMINOPHEN 1 TABLET: 10; 325 TABLET ORAL at 17:06

## 2022-03-21 RX ADMIN — ATORVASTATIN CALCIUM 80 MG: 80 TABLET, FILM COATED ORAL at 20:41

## 2022-03-21 ASSESSMENT — PAIN DESCRIPTION - LOCATION
LOCATION: BACK

## 2022-03-21 ASSESSMENT — PAIN DESCRIPTION - PAIN TYPE
TYPE: CHRONIC PAIN

## 2022-03-21 ASSESSMENT — PAIN DESCRIPTION - ORIENTATION
ORIENTATION: UPPER
ORIENTATION: UPPER
ORIENTATION: MID
ORIENTATION: UPPER

## 2022-03-21 ASSESSMENT — PAIN DESCRIPTION - DIRECTION
RADIATING_TOWARDS: BUTTOCK
RADIATING_TOWARDS: BUTTOCK

## 2022-03-21 ASSESSMENT — PAIN SCALES - GENERAL
PAINLEVEL_OUTOF10: 3
PAINLEVEL_OUTOF10: 7
PAINLEVEL_OUTOF10: 7
PAINLEVEL_OUTOF10: 6
PAINLEVEL_OUTOF10: 6
PAINLEVEL_OUTOF10: 7
PAINLEVEL_OUTOF10: 6
PAINLEVEL_OUTOF10: 8

## 2022-03-21 ASSESSMENT — PAIN DESCRIPTION - FREQUENCY
FREQUENCY: CONTINUOUS

## 2022-03-21 ASSESSMENT — PAIN DESCRIPTION - DESCRIPTORS
DESCRIPTORS: ACHING;CONSTANT
DESCRIPTORS: ACHING;DISCOMFORT
DESCRIPTORS: ACHING;CONSTANT

## 2022-03-21 ASSESSMENT — PAIN - FUNCTIONAL ASSESSMENT
PAIN_FUNCTIONAL_ASSESSMENT: ACTIVITIES ARE NOT PREVENTED

## 2022-03-21 ASSESSMENT — PAIN DESCRIPTION - PROGRESSION
CLINICAL_PROGRESSION: NOT CHANGED
CLINICAL_PROGRESSION: NOT CHANGED

## 2022-03-21 ASSESSMENT — PAIN DESCRIPTION - ONSET
ONSET: ON-GOING

## 2022-03-21 NOTE — CARE COORDINATION
Case Management Assessment           Initial Evaluation                Date / Time of Evaluation: 3/21/2022 4:45 PM                 Assessment Completed by: FANTASMA Braun, ARYW    Patient Name: Saadia Ivory     YOB: 1935  Diagnosis: Visual field defect [H53.40]  Stroke of unknown etiology [facility]) [I63.9]     Date / Time: 3/18/2022  9:37 PM    Patient Admission Status: Inpatient    If patient is discharged prior to next notation, then this note serves as note for discharge by case management. Current PCP: Cathy Armstrong DO  Clinic Patient: No    Chart Reviewed: Yes  Patient/ Family Interviewed: Yes    Initial assessment completed at bedside with: patient    Hospitalization in the last 30 days: No    Emergency Contacts:  Extended Emergency Contact Information  Primary Emergency Contact: Carlos Eduardo Michele [address] Phone: [phone]  Mobile Phone: [phone]  Relation: Child  Secondary Emergency Contact[facility] [address] Phone: [phone]  Work Phone: [phone]  Mobile Phone: [phone]  Relation: Child    Advance Directives:   Code Status: [address]: No    Financial  Payor: Cristina Cardenas / Plan: MEDICARE PART A AND B / Product Type: *No Product type* /     Pre-cert required for SNF: No    Pharmacy    [id number], [address]  [address] [id number]  Phone: [phone] Fax: [phone]      Potential assistance Purchasing Medications: Potential Assistance Purchasing Medications: No  Does Patient want to participate in local refill/ meds to beds program?:      Meds To Beds General Rules:  1. Can ONLY be done Monday- Friday between 8:30am-5pm  2. Prescription(s) must be in pharmacy by 3pm to be filled same day  3. Copy of patient's insurance/ prescription drug card and patient face sheet must be sent the patient's individualized plan of care/goals and shares the quality data associated with the providers.  Yes    Care Transition patient: No    FANTASMA Stone, [facility]  Case Management Department  Ph: [phone]   Fax: [phone]

## 2022-03-21 NOTE — PROCEDURES
[address]     Electrophysiology Procedure Note       Date of Procedure: 3/21/2022  Patient's Name: Beulah Bae  YOB: 1935   Medical Record Number: [id number]  Procedure Performed by: Genie Latif MD.,    Procedures performed:    · Loop recorder implantation    Indication of the procedure: Syncope, Palpitation   Beulah Bae is a 80 y.o. female with cerebrovascular accident. Because the patient is at risk of having atrial dysrhythmia, particularly atrial fibrillation, the decision was made to undergo a procedure that would afford long term rhythm detection. Therefore, the patient has been scheduled to undergo an ILR implantation. Details of procedure:   Procedure's risks, benefits and alternatives of procedure were explained to patient. All questions were answered. Patient understood and informed consent was obtained. The patient was brought to the holding bay in a fasting nonsedated state. Patient was prepped and draped in usual sterile fashion. No moderate sedation (conscious sedation) nor general anesthesia was administered or utilized for this procedure. The patient was monitored continuously with ECG, pulse oximetry, blood pressure monitoring, and direct observation. After injection of 0.5% bupivacaine/lidocaine mixture in the left 4th intercostal space, a 5 mm small incision was made using the Medtronic-provided scalpel, after which a #11 blade was used to expand the opening of this incision on both ends. Then a Medtronic-provided tunneling tool was inserted into this scar in a diagonal trajectory towards the L nipple which created the necessary space to insert the implantable loop recorder. This tool was used to deliver the implantable loop recorder into the created space. Both the plunger and the tunneling tool was then retracted. The surgical scar was taped with Steri-strips and manual pressure was held over the taped area to achieve hemostasis.      The patient tolerated the procedure well without any complications. Device information:   The device is a Medtronic Reveal LINQ [id number] with implant date: 3/21/2022  The device was programmed to detect:   VT: 380 ms 16 beats   Bradycardia: 2000 ms   Pause : 3 seconds    Impression:    Pre- and post-procedural diagnoses of CVA with successful implantation of a Medtronic Implantable Loop Recorder. Plan:   The patient will have usual post-implant care with a 1-week wound check with our nurse in the [facility] at [facility]). The patient can be discharged home if the patient remains stable. Follow-up also includes routine device interrogation with the Device Clinic. Thank you for allowing us to participate in the care of your patient. If you have any questions or comments, please feel free to contact us.     Maryan Campbell MD   Cardiac Electrophysiology  [facility]

## 2022-03-21 NOTE — PROGRESS NOTES
Electrophysiology - PROGRESS NOTE    Admit Date: 3/18/2022     Chief Complaint: CVA, ICMP, elevated trop     Interval History:   Patient seen and examined and notes reviewed. Patient is being followed for CVA, ICMP. Patient had p/w vision loss in her R eye. MRI showed acute strokes thought to be embolic. Trop elevated at 1/16, follows with Dr. Kristie Savage outpatient. EF has dropped to 25-30%. Currently c/o ongoing SOB but denies PND, orthopnea, CP or edema. For ILR today.      In: 18 [P.O.:570]  Out: 0    Wt Readings from Last 2 Encounters:   03/18/22 129 lb (58.5 kg)   03/18/22 129 lb (58.5 kg)       Data:   Scheduled Meds:   Scheduled Meds:   senna  4 tablet Oral BID    carvedilol  12.5 mg Oral BID WC    clopidogrel  75 mg Oral Daily    atorvastatin  80 mg Oral Nightly    aspirin  81 mg Oral Daily    Or    aspirin  300 mg Rectal Daily    furosemide  20 mg Oral Daily     Continuous Infusions:  PRN Meds:.ondansetron **OR** ondansetron, polyethylene glycol, perflutren lipid microspheres, HYDROcodone-acetaminophen  Continuous Infusions:    Intake/Output Summary (Last 24 hours) at 3/21/2022 0754  Last data filed at 3/21/2022 0603  Gross per 24 hour   Intake 570 ml   Output 0 ml   Net 570 ml       CBC:   Lab Results   Component Value Date    WBC 6.3 03/21/2022    HGB 9.9 03/21/2022     03/21/2022     BMP:  Lab Results   Component Value Date     03/21/2022    K 4.1 03/21/2022    K 3.7 03/18/2022    CL 98 03/21/2022    CO2 28 03/21/2022    BUN 21 03/21/2022    CREATININE 1.4 03/21/2022    GLUCOSE 87 03/21/2022     INR:   Lab Results   Component Value Date    INR 1.02 03/18/2022    INR 1.02 11/20/2021    INR 0.98 11/06/2018        CARDIAC LABS  ENZYMES:  Recent Labs     03/18/22  1626 03/18/22  2330 03/19/22  0212   TROPONINI 1.04* 1.03* 0.93*     FASTING LIPID PANEL:  Lab Results   Component Value Date    HDL 84 03/19/2022    LDLCALC 53 03/19/2022 TRIG 65 03/19/2022     LIVER PROFILE:  Lab Results   Component Value Date    AST 25 03/18/2022    AST 16 11/20/2021    ALT 9 03/18/2022    ALT 8 11/20/2021       -----------------------------------------------------------------  Telemetry: Personally reviewed  NSR, NSVT, PAC, PVCs    Objective:   Vitals: BP (!) 164/82   Pulse 69   Temp 97 °F (36.1 °C) (Oral)   Resp 16   Ht 5' 2\" (1.575 m)   Wt 129 lb (58.5 kg)   SpO2 94%   BMI 23.59 kg/m²   General appearance: alert, appears stated age and cooperative, No acute distress   Eyes: Conjunctiva and pupils normal and reactive  Skin: Skin color, texture, turgor normal. No rashes or ecchymosis. Neck: no JVD, supple, symmetrical, trachea midline   Lungs: , no accessory muscle use, no respiratory distress  Heart: RRR  Abdomen: soft, non-tender; bowel sounds normal  Extremities: No edema, DP +  Psychiatric: normal insight and affect    Patient Active Problem List:     Other and unspecified angina pectoris     Coronary atherosclerosis of native coronary artery     Other chronic pulmonary heart diseases     Mitral valve disorder     Acute combined systolic and diastolic heart failure (HCC)     Chronic low back pain     Essential hypertension     AGUILA (acute kidney injury) ([address]     Renal insufficiency     Gout     Pain in joint, hand     Mixed hyperlipidemia     Primary insomnia     Closed stable burst fracture of first lumbar vertebra (HCC)     GERD (gastroesophageal reflux disease)     Palpitations     V-tach (HCC)     Abnormal myocardial perfusion study     Ischemic cardiomyopathy     Cardiomyopathy ([address]     PVD (posterior vitreous detachment), both eyes     Posterior subcapsular polar age-related cataract of both eyes     Confusion     Chronic kidney disease, stage IV (severe) ([address]     Visual field defect     Stroke of unknown etiology ([address]        Assessment & Plan:      1. CVA  2. CAD  3. ICMP  4. Chronic sCHF  5.  HTN    79 y/o woman with a h/o HTN, HLD, CAD, s/p MI, s/p PCI (12/2018), ICMP, EF 25-30% who p/w vision loss, MRI of the head showed areas of acute ischemia thought to be embolic. CVA  - Thought to be embolic  - The risks, benefits and alternatives of the procedure were discussed with the patient. The risks including, but not limited to, the risks of vascular injury, bleeding, infection, device malfunction, lead dislodgement, radiation exposure, injury to cardiac and surrounding structures (including pneumothorax), stroke, myocardial infarction and death were discussed in detail. The patient opted to proceed with the device implantation.   - Hibiclens scrub to chest    ICMP  - EF 25-30%  - NYHA class II/III  -   - On carvedilol 12.5 mg BID  - No ACE/ARB/ARNI/SGL2 due to CKD.      CAD  - No s/s  - Trop 1.16  - S/p PCI 2018  - On ASA, plavix and statin  - Follows with Dr. Dea Perea  - Will have gen cards see      Anjel Gonzalez [address]

## 2022-03-21 NOTE — CONSULTS
Cardiology Consultation                                                                    Pt Name: Phoebe Verma  Age: 80 y.o. Sex: female  : 1935  Location: 6315/6315-01    Referring Physician: Uzma Gong MD  Primary cardiologist: Dr Shanice Martinez      Reason for Consult:     Reason for Consultation/Chief Complaint: HFrEF    HPI:      Phoebe Verma is a 80 y.o. female with a past medical history of CAD status post stent 2018, HTN, HLP, CKD stage IV, HFrEF, mitral valve prolapse, VT. LHC 2018: Proximal LAD 80% stenosed status post stent x1, otherwise normal coronaries, LVEF 40%. Echo : LVEF 50%. Echo 10/2018: LVEF 35-40%. Patient presented to the emergency room on 3/18 with right visual changes concerning for stroke. MRI brain consistent with multiple and small acute ischemic strokes consistent with emboli. Troponin elevated at 1.16 on 3/18, trending down. ECG consistent with NSR, LBBB ( ms), nonspecific ST and T wave abnormalities. Cardiology was consulted for further evaluation during the weekend and Dr. Alonso Gan assessed patient. Echo 2022: Normal LV, LVEF 91-75%, diastolic grade 3, severe LAE, normal RV, RVSP 51 (8), negative bubble study. Per personal review of images, there is no apical clot with IV contrast, posterior mitral valve leaflet with a large echodensity measuring 2.4 x 2.4 cm, cannot exclude clot versus mass (less likely calcification or vegetation in view of no fevers and normal WBC)    I was asked to see patient today in consultation for her worsening heart failure. Histories     Past Medical History:   has a past medical history of Arthritis, Blood circulation, collateral, CAD (coronary artery disease), CHF (congestive heart failure) ([address] Confusion, GERD (gastroesophageal reflux disease), History of heart artery stent, Hyperlipidemia, Hypertension, Mitral valve prolapse, Myocardial infarct, old, and Thyroid disease.     Surgical History:   has a past surgical history that includes Hysterectomy; Thyroidectomy; Colonoscopy; other surgical history (08/27/2018); pr office/outpt visit,procedure only (Right, 8/27/2018); eye surgery (Left, 09/04/2018); and pr office/outpt visit,procedure only (Left, 9/4/2018). Social History:   reports that she has never smoked. She has never used smokeless tobacco. She reports that she does not drink alcohol and does not use drugs. Family History:  No evidence for sudden cardiac death or premature CAD      Medications:       Home Medications  Were reviewed and are listed in nursing record. and/or listed below  Prior to Admission medications    Medication Sig Start Date End Date Taking? Authorizing Provider   ondansetron (ZOFRAN) 4 MG tablet Take 1 tablet by mouth 3 times daily as needed for Nausea or Vomiting 3/7/22   Debra Shahid DO   zolpidem (AMBIEN) 5 MG tablet Take 1 tablet by mouth nightly as needed for Sleep for up to 180 days. 3/7/22 9/3/22  Debra Shahid DO   furosemide (LASIX) 20 MG tablet Take 1 tablet by mouth daily 1/20/22   Iker Michael MD   carvedilol (COREG) 25 MG tablet TAKE 1 TABLET BY MOUTH TWICE A DAY 10/20/21   Iker Michael MD   atorvastatin (LIPITOR) 80 MG tablet TAKE 1 TABLET BY MOUTH ONE TIME A DAY 10/13/21   Iker Michael MD   vitamin B-12 (CYANOCOBALAMIN) 1000 MCG tablet Take 1,000 mcg by mouth daily    Historical Provider, MD   clopidogrel (PLAVIX) 75 MG tablet TAKE 1 TABLET BY MOUTH DAILY 8/13/21   DAVE Quiroga CNP   losartan (COZAAR) 50 MG tablet TAKE 1 TABLET BY MOUTH EVERY DAY 6/16/21   Iker Michael MD   hydroCHLOROthiazide (HYDRODIURIL) 12.5 MG tablet TAKE 1 TABLET BY MOUTH ONE TIME A DAY' 6/15/21   DAVE Frank CNP   nitroGLYCERIN (NITROSTAT) 0.4 MG SL tablet Place 1 tablet under the tongue every 5 minutes as needed for Chest pain up to max of 3 total doses.  If no relief after 1 dose, call 911. 2/8/21   Iker Michael MD   fluticasone (FLONASE) 50 MCG/ACT nasal spray 2 sprays by Each Nostril route daily  Patient not taking: Reported on 3/18/2022 7/2/19   Debra Shahid DO   Handicap Placard MISC by Does not apply route Length: 5 years 5/7/19   Debra Shahid DO   vitamin D (CHOLECALCIFEROL) 1000 UNIT TABS tablet Take 2 tablets by mouth daily 11/8/18   Charlotte Aj MD   famotidine (PEPCID) 20 MG tablet Take 1 tablet by mouth 2 times daily  Patient taking differently: Take 20 mg by mouth 2 times daily as needed  4/4/17   Arron Jennings MD   HYDROcodone-acetaminophen [facility])  MG per tablet Take 1 tablet by mouth every 6 hours as needed. . 1/3/17   Historical Provider, MD   Coenzyme Q10 (CO Q 10) 100 MG CAPS Take  by mouth daily. Historical Provider, MD   aspirin 81 MG chewable tablet Take 1 tablet by mouth daily. 1/16/14   Vj Shepherd MD          Inpatient Medications:   rivaroxaban  15 mg Oral Daily    senna  4 tablet Oral BID    carvedilol  12.5 mg Oral BID WC    [Held by provider] clopidogrel  75 mg Oral Daily    atorvastatin  80 mg Oral Nightly    [Held by provider] aspirin  81 mg Oral Daily    Or    [Held by provider] aspirin  300 mg Rectal Daily    furosemide  20 mg Oral Daily       IV drips:      PRN:  ondansetron **OR** ondansetron, polyethylene glycol, perflutren lipid microspheres, HYDROcodone-acetaminophen    Allergy:     Benazepril hcl, Feldene [piroxicam], Hytrin [terazosin hcl], Iv contrast [iodides], Acetaminophen, Celebrex [celecoxib], Cephalexin, Hydrochlorothiazide, Iodinated casein, Monopril [fosinopril sodium], Protonix [pantoprazole sodium], Quinapril hcl, Toprol xl [metoprolol succinate], Ultracet [tramadol-acetaminophen], and Ziac [bisoprolol-hydrochlorothiazide]       Review of Systems:     All 12 point review of symptoms completed. Pertinent positives identified in the HPI, all other review of symptoms negative as below. CONSTITUTIONAL: No fatigue  SKIN: No rash or pruritis.   EYES: No visual changes or diplopia. No scleral icterus. ENT: No Headaches, hearing loss or vertigo. No mouth sores or sore throat. CARDIOVASCULAR: No chest pain/chest pressure/chest discomfort. No palpitations. No edema. RESPIRATORY: No dyspnea. No cough or wheezing, no sputum production. GASTROINTESTINAL: No N/V/D. No abdominal pain, appetite loss, blood in stools. GENITOURINARY: No dysuria, trouble voiding, or hematuria. MUSCULOSKELETAL:  No gait disturbance, weakness or joint complaints. NEUROLOGICAL: No headache, diplopia, change in muscle strength, numbness or tingling. No change in gait, balance, coordination, mood, affect, memory, mentation, behavior. ENDOCRINE: No excessive thirst, fluid intake, or urination. No tremor. HEMATOLOGIC: No abnormal bruising or bleeding. ALLERGY: No nasal congestion or hives. Physical Examination:     Vitals:    03/21/22 0400 03/21/22 0853 03/21/22 1201 03/21/22 1659   BP: (!) 164/82 (!) 170/85 (!) 161/77 (!) 169/96   Pulse: 69 80 67 86   Resp: 16 16 18 18   Temp: 97 °F (36.1 °C) 96.8 °F (36 °C) 96.9 °F (36.1 °C) 96.4 °F (35.8 °C)   TempSrc: Oral Oral Oral Oral   SpO2: 94% 94% 97% 99%   Weight:       Height:           Wt Readings from Last 3 Encounters:   03/18/22 129 lb (58.5 kg)   03/18/22 129 lb (58.5 kg)   03/07/22 129 lb (58.5 kg)         General Appearance:  Alert, cooperative, no distress, appears stated age Appropriate weight   Head:  Normocephalic, without obvious abnormality, atraumatic   Eyes:  PERRL, conjunctiva/corneas clear EOM intact  Ears normal   Throat no lesions       Nose: Nares normal, no drainage or sinus tenderness   Throat: Lips, mucosa, and tongue normal   Neck: Supple, symmetrical, trachea midline, no adenopathy, thyroid: not enlarged, symmetric, no tenderness/mass/nodules, no carotid bruit. Lungs:   Respirations unlabored, clear to auscultation bilaterally, without any wheezes, rubs or ronchi.     Chest Wall:  No tenderness or deformity   Heart: Regular rhythm, rate is controlled, S1, S2 normal, there is no murmur, there is no rub or gallop, cannot assess jvd, no bilateral lower extremity edema   Abdomen:   Soft, non-tender, bowel sounds active all four quadrants,  no masses, no organomegaly       Extremities: Extremities normal, atraumatic, no cyanosis. Pulses: 2+ and symmetric   Skin: Skin color, texture, turgor normal, no rashes or lesions   Pysch: Normal mood and affect   Neurologic: Normal gross motor and sensory exam.  Cranial nerves intact        Labs:     Recent Labs     03/19/22 0215 03/20/22  0745 03/21/22  0652   * 135* 136   K 3.9 3.9 4.1   BUN 18 21* 21*   CREATININE 1.4* 1.5* 1.4*   CL 96* 97* 98*   CO2 25 25 28   GLUCOSE 119* 95 87   CALCIUM 9.4 9.4 9.1   MG 1.70* 1.90 1.90     Recent Labs     03/19/22 0215 03/19/22 0215 03/20/22  0745 03/20/22  0745 03/21/22  0652   WBC 6.1  --  8.1  --  6.3   HGB 9.8*  --  10.5*  --  9.9*   HCT 29.5*  --  32.0*  --  30.2*     --  234  --  212   MCV 89.1   < > 89.2   < > 89.6    < > = values in this interval not displayed. Recent Labs     03/19/22 0215   TRIG 65   HDL 84*     No results for input(s): PTT, INR in the last 72 hours. Invalid input(s): PT  Recent Labs     03/18/22  2330 03/19/22 0212   TROPONINI 1.03* 0.93*     No results for input(s): BNP in the last 72 hours. No results for input(s): TSH in the last 72 hours. Recent Labs     03/19/22 0212 03/19/22 0215   CHOL  --  150   HDL 75* 84*   LDLCALC 46 53   TRIG  --  65   ]    Lab Results   Component Value Date    TROPONINI 0.93 ([facility]) 03/19/2022         Imaging:     Telemetry personally reviewed:  NSR with frequent PACs. Assessment / Plan:     1. 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 I suspect underlying PAF and possible intracavitary clot per personal review of echocardiographic images this admission. 2.  CAD status post stent. 3.  NSTEMI.   Troponin up at 1.1, most likely due to demand ischemia (NSTEMI type II) from significant underlying CAD rather than NSTEMI type I (ACS). 4.  Chronic HFrEF. Patient is euvolemic and hemodynamically stable  5. HTN  6. Abnormal echo. Per personal review of images, I suspect the clot on the mitral valve. 7. CKD, Cr of 1.4.        -Patient will receive an ILR to assess for subclinical PAF. In view of frequent PACs on telemetry, it is very likely patient has underlying PAF. -I discussed case with Dr. Sarah Waldron, would strongly recommend switching Plavix to Xarelto 15 mg p.o. daily (renal dose) and continuing baby aspirin for likely severe underlying CAD. -Would recommend follow up limited echo to reassess mitral valve echodensity as an outpatient soon  -Patient will need to follow-up with her primary cardiologist Dr. Dinh Doing and decide if stress testing versus LHC would be recommended in the near future. - Continue with Coreg 12.5 twice daily  - Unable to start ACEI, ARB, spironolactone, Entresto or SGLT2 due to CKD and/or hypotension.   -Continue with Lipitor and Lasix 20 mg daily              I have personally reviewed the reports and images of labs, radiological studies, cardiac studies including ECG's and telemetry, current and old medical records. The note was completed using EMR and Dragon dictation system. Every effort was made to ensure accuracy; however, inadvertent computerized transcription errors may be present. All questions and concerns were addressed to the patient/family. Alternatives to my treatment were discussed. I would like to thank you for providing me the opportunity to participate in the care of your patient. If you have any questions, please do not hesitate to contact me.      Clara Muniz MD, [facility] [place], [address]  The [address]  [address] [address] [place] [address]  Ph: [phone]  Fax: [phone]

## 2022-03-21 NOTE — PROGRESS NOTES
hours) at 3/21/2022 1408  Last data filed at 3/21/2022 0603  Gross per 24 hour   Intake 120 ml   Output 0 ml   Net 120 ml     Review of Systems  As above    Physical Exam  Constitutional:       Appearance: Normal appearance. HENT:      Head: Normocephalic and atraumatic. Cardiovascular:      Rate and Rhythm: Normal rate and regular rhythm. Pulmonary:      Effort: Pulmonary effort is normal.      Breath sounds: No wheezing, rhonchi or rales. Abdominal:      Palpations: Abdomen is soft. Tenderness: There is no abdominal tenderness. There is no guarding or rebound. Musculoskeletal:         General: No swelling. Cervical back: Neck supple. Skin:     General: Skin is warm and dry. Neurological:      Mental Status: She is alert and oriented to person, place, and time. Sensory: No sensory deficit. Motor: No weakness. LABS:    CBC:   Recent Labs     03/19/22 0215 03/20/22  0745 03/21/22  0652   WBC 6.1 8.1 6.3   HGB 9.8* 10.5* 9.9*   HCT 29.5* 32.0* 30.2*    234 212   MCV 89.1 89.2 89.6     Renal:    Recent Labs     03/19/22  0215 03/20/22  0745 03/21/22  0652   * 135* 136   K 3.9 3.9 4.1   CL 96* 97* 98*   CO2 25 25 28   BUN 18 21* 21*   CREATININE 1.4* 1.5* 1.4*   GLUCOSE 119* 95 87   CALCIUM 9.4 9.4 9.1   MG 1.70* 1.90 1.90   ANIONGAP 13 13 10     Hepatic:   No results for input(s): AST, ALT, BILITOT, BILIDIR, PROT, LABALBU, ALKPHOS in the last 72 hours. Troponin:   Recent Labs     03/18/22  1626 03/18/22  2330 03/19/22  0212   TROPONINI 1.04* 1.03* 0.93*     BNP: No results for input(s): BNP in the last 72 hours. Lipids:   Recent Labs     03/19/22 0215   CHOL 150   HDL 84*     ABGs:  No results for input(s): PHART, INB0FMO, PO2ART, KXZ2FXJ, BEART, THGBART, H3DONAFI, ILI2OEI in the last 72 hours. INR:   No results for input(s): INR in the last 72 hours.   Lactate: No results for input(s): LACTATE in the last 72 hours.  Cultures:  -----------------------------------------------------------------  RAD:   MRA HEAD WO CONTRAST   Final Result      1. No aneurysms, vascular occlusions, or intracranial stenoses identified. MRA NECK WO CONTRAST   Final Result      1. 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 No acute hemorrhage. 2.  Moderate cerebral atrophy and moderate chronic small vessel ischemic disease. 3.  No significant stenosis in the extracranial vertebral or carotid arteries on noncontrast MRA neck. Note that MRA head was not performed. MRI brain without contrast   Final Result      1. 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 No acute hemorrhage. 2.  Moderate cerebral atrophy and moderate chronic small vessel ischemic disease. 3.  No significant stenosis in the extracranial vertebral or carotid arteries on noncontrast MRA neck. Note that MRA head was not performed. Assessment/Plan:     Embolic Stroke  Intermittent R vision disturbance. CT head without contrast negative. Pt is allergic to contrast  - GCA r/o with minimally elevated ESR and CRP  - MRI head with scattered punctate areas of acute ischemia in the bilateral cerebral hemispheres, left basal ganglia, and right cerebellum, likely embolic  - MRA neck without significant stenosis   - Neurology consulted  - continue ASA, plavix  and statin  - echo with bubble, results pending  - fasting lipid WNL  - neurology consulted  - Loop recorder placement today     Chronic issues  CAD s/p stent- continue ASA and plavix. Last stent in Nov 2018. Will hold betablocker in the context of permissive htn  Cardiology consulted  HFrEF, Vtach, MVP- follows with Dr. Jackie Beltran.  Holding home lasix  HTN- holding home BP meds  CKD4- Cr baseline 1.5  Chronic Anemia- Hb at baseline 10     Code Status: Full code  FEN: cardiac diet  PPX: subq heparin  DISPO: Brittni Mcelroy MD, PGY-1  03/21/22  2:08 PM    This patient has been staffed and discussed with Rolly Herring MD.     Patient seen and examined, labs and imaging studies reviewed, agree with assessment and plan as outlined above. Continue with current care and plan. Discussed case with patients nurse, discussed case with care team, discussed plan. Discussed risks benefits alternatives of current medications w patient and family at bedside.      MD Joshua Connors

## 2022-03-21 NOTE — CONSULTS
Clinical Pharmacy Consult Note    80 y.o. female admitted with TIA and stroke-like symptoms. Pharmacy has been asked by Dr. Evelyne Shearer to adjust all drips to normal saline as appropriate based on compatibility to avoid fluid shifts since D5 is osmotically active. The following intermittent IV drips/infusions have been adjusted to saline:  None at this time    Total IV fluid delivered to patient over last 24h:  Flushes only    As patient is not being treated for pituitary tumor resection or requiring hypertonic saline (defined as >0.9% NaCl), pharmacy will sign off of IV review consult, per protocol as it has been >48 hr since initial consult.       Please call with questions--  Thanks--  Juana Venegas, PharmD, [address]  [id number] ([facility])   3/21/2022 11:07 AM

## 2022-03-22 VITALS
DIASTOLIC BLOOD PRESSURE: 85 MMHG | SYSTOLIC BLOOD PRESSURE: 160 MMHG | HEART RATE: 81 BPM | BODY MASS INDEX: 23.94 KG/M2 | WEIGHT: 130.07 LBS | TEMPERATURE: 97 F | OXYGEN SATURATION: 96 % | RESPIRATION RATE: 18 BRPM | HEIGHT: 62 IN

## 2022-03-22 LAB
ANION GAP SERPL CALCULATED.3IONS-SCNC: 10 MMOL/L (ref 3–16)
BASOPHILS ABSOLUTE: 0.1 K/UL (ref 0–0.2)
BASOPHILS RELATIVE PERCENT: 1.1 %
BUN BLDV-MCNC: 20 MG/DL (ref 7–20)
CALCIUM SERPL-MCNC: 9.4 MG/DL (ref 8.3–10.6)
CHLORIDE BLD-SCNC: 98 MMOL/L (ref 99–110)
CO2: 27 MMOL/L (ref 21–32)
CREAT SERPL-MCNC: 1.5 MG/DL (ref 0.6–1.2)
EOSINOPHILS ABSOLUTE: 0.2 K/UL (ref 0–0.6)
EOSINOPHILS RELATIVE PERCENT: 3.3 %
GFR AFRICAN AMERICAN: 40
GFR NON-AFRICAN AMERICAN: 33
GLUCOSE BLD-MCNC: 88 MG/DL (ref 70–99)
HCT VFR BLD CALC: 31.4 % (ref 36–48)
HEMOGLOBIN: 10.5 G/DL (ref 12–16)
LYMPHOCYTES ABSOLUTE: 2.5 K/UL (ref 1–5.1)
LYMPHOCYTES RELATIVE PERCENT: 34.6 %
MAGNESIUM: 1.8 MG/DL (ref 1.8–2.4)
MCH RBC QN AUTO: 29.6 PG (ref 26–34)
MCHC RBC AUTO-ENTMCNC: 33.5 G/DL (ref 31–36)
MCV RBC AUTO: 88.5 FL (ref 80–100)
MONOCYTES ABSOLUTE: 0.8 K/UL (ref 0–1.3)
MONOCYTES RELATIVE PERCENT: 10.8 %
NEUTROPHILS ABSOLUTE: 3.6 K/UL (ref 1.7–7.7)
NEUTROPHILS RELATIVE PERCENT: 50.2 %
PDW BLD-RTO: 15.5 % (ref 12.4–15.4)
PLATELET # BLD: 237 K/UL (ref 135–450)
PMV BLD AUTO: 8.6 FL (ref 5–10.5)
POTASSIUM SERPL-SCNC: 3.6 MMOL/L (ref 3.5–5.1)
RBC # BLD: 3.55 M/UL (ref 4–5.2)
SODIUM BLD-SCNC: 135 MMOL/L (ref 136–145)
WBC # BLD: 7.2 K/UL (ref 4–11)

## 2022-03-22 PROCEDURE — 99233 SBSQ HOSP IP/OBS HIGH 50: CPT | Performed by: NURSE PRACTITIONER

## 2022-03-22 PROCEDURE — 80048 BASIC METABOLIC PNL TOTAL CA: CPT

## 2022-03-22 PROCEDURE — 6370000000 HC RX 637 (ALT 250 FOR IP): Performed by: INTERNAL MEDICINE

## 2022-03-22 PROCEDURE — 99233 SBSQ HOSP IP/OBS HIGH 50: CPT | Performed by: INTERNAL MEDICINE

## 2022-03-22 PROCEDURE — 36415 COLL VENOUS BLD VENIPUNCTURE: CPT

## 2022-03-22 PROCEDURE — 83735 ASSAY OF MAGNESIUM: CPT

## 2022-03-22 PROCEDURE — 6370000000 HC RX 637 (ALT 250 FOR IP): Performed by: STUDENT IN AN ORGANIZED HEALTH CARE EDUCATION/TRAINING PROGRAM

## 2022-03-22 PROCEDURE — 85025 COMPLETE CBC W/AUTO DIFF WBC: CPT

## 2022-03-22 RX ORDER — FAMOTIDINE 20 MG/1
20 TABLET, FILM COATED ORAL DAILY
Status: DISCONTINUED | OUTPATIENT
Start: 2022-03-22 | End: 2022-03-22 | Stop reason: HOSPADM

## 2022-03-22 RX ORDER — FUROSEMIDE 20 MG/1
20 TABLET ORAL DAILY PRN
Qty: 30 TABLET | Refills: 5 | Status: SHIPPED | OUTPATIENT
Start: 2022-03-22 | End: 2022-04-28 | Stop reason: SDUPTHER

## 2022-03-22 RX ORDER — FAMOTIDINE 20 MG/1
20 TABLET, FILM COATED ORAL DAILY
Qty: 60 TABLET | Refills: 3 | Status: SHIPPED | OUTPATIENT
Start: 2022-03-22

## 2022-03-22 RX ORDER — CARVEDILOL 12.5 MG/1
12.5 TABLET ORAL 2 TIMES DAILY WITH MEALS
Qty: 60 TABLET | Refills: 3 | Status: SHIPPED | OUTPATIENT
Start: 2022-03-22 | End: 2022-04-28

## 2022-03-22 RX ORDER — LOSARTAN POTASSIUM 50 MG/1
50 TABLET ORAL DAILY
Status: DISCONTINUED | OUTPATIENT
Start: 2022-03-22 | End: 2022-03-22 | Stop reason: HOSPADM

## 2022-03-22 RX ADMIN — FUROSEMIDE 20 MG: 20 TABLET ORAL at 09:00

## 2022-03-22 RX ADMIN — CARVEDILOL 12.5 MG: 12.5 TABLET, FILM COATED ORAL at 09:00

## 2022-03-22 RX ADMIN — SENNOSIDES 34.4 MG: 8.6 TABLET, COATED ORAL at 09:00

## 2022-03-22 RX ADMIN — APIXABAN 2.5 MG: 2.5 TABLET, FILM COATED ORAL at 10:45

## 2022-03-22 RX ADMIN — HYDROCODONE BITARTRATE AND ACETAMINOPHEN 1 TABLET: 10; 325 TABLET ORAL at 09:06

## 2022-03-22 ASSESSMENT — PAIN SCALES - GENERAL: PAINLEVEL_OUTOF10: 7

## 2022-03-22 NOTE — PROGRESS NOTES
Progress Note    Admit Date: 3/18/2022  Diet: ADULT DIET; Easy to Chew; Low Fat/Low Chol/High Fiber/JAYNA    CC: Vision loss    Interval history:   NAEON. Pt was seen at bedside today AM, resting comfortably. Feels ready to go home. Does not have any new complaints. Vision waxes and wanes, states that cannot see half or upper eye. HPI: 79 yo F PMH CAD s/p stent, HTN, HLD, CKD4, Vtach, HFrEF, MVP who presented to [facility] ED for vision loss. Pt states that she woke up yesterday with partial vision loss of R eye that has been intermittent since. States the deficit was in upper visual field of R eye. Currently she can see but it is still somewhat blurry in R eye upper field. Noted that she had a mild achey temporal headache as well which has now resolved. States that this is not new for her and she gets similar headaches related to here TMJ. She called her ophthalmologist this morning who recommended she go to the ED. Pt denies any chest pain, SOB, weakness, fever, chills, recent sicknesses. She has been compliant with all her medications.      Upon arival, pt was hypertensive 170s/80s. ESR 39, troponin elevated to 1.16 and trending down. CXR negative. EKG without new ischemic changes. COVID and flu negative. Pt has hx of anaphylactic shock with contrast. She was transferred to [facility] for further neurological workup.     Medications:     Scheduled Meds:   rivaroxaban  15 mg Oral Daily    senna  4 tablet Oral BID    carvedilol  12.5 mg Oral BID WC    [Held by provider] clopidogrel  75 mg Oral Daily    atorvastatin  80 mg Oral Nightly    [Held by provider] aspirin  81 mg Oral Daily    Or    [Held by provider] aspirin  300 mg Rectal Daily    furosemide  20 mg Oral Daily     Continuous Infusions:  PRN Meds:ondansetron **OR** ondansetron, polyethylene glycol, perflutren lipid microspheres, HYDROcodone-acetaminophen    Objective:   Vitals:   T-max:  Patient Vitals for the past 8 hrs:   BP Temp Temp src Pulse Resp SpO2 Weight   03/22/22 0300 106/69 96.8 °F (36 °C) Axillary 63 16 96 % 130 lb 1.1 oz (59 kg)     No intake or output data in the 24 hours ending 03/22/22 0800  Review of Systems  As above    Physical Exam  Constitutional:       Appearance: Normal appearance. HENT:      Head: Normocephalic and atraumatic. Cardiovascular:      Rate and Rhythm: Normal rate and regular rhythm. Pulmonary:      Effort: Pulmonary effort is normal.      Breath sounds: No wheezing, rhonchi or rales. Abdominal:      Palpations: Abdomen is soft. Tenderness: There is no abdominal tenderness. There is no guarding or rebound. Musculoskeletal:         General: No swelling. Cervical back: Neck supple. Skin:     General: Skin is warm and dry. Neurological:      Mental Status: She is alert and oriented to person, place, and time. Sensory: No sensory deficit. Motor: No weakness. LABS:    CBC:   Recent Labs     03/20/22  0745 03/21/22  0652   WBC 8.1 6.3   HGB 10.5* 9.9*   HCT 32.0* 30.2*    212   MCV 89.2 89.6     Renal:    Recent Labs     03/20/22  0745 03/21/22  0652   * 136   K 3.9 4.1   CL 97* 98*   CO2 25 28   BUN 21* 21*   CREATININE 1.5* 1.4*   GLUCOSE 95 87   CALCIUM 9.4 9.1   MG 1.90 1.90   ANIONGAP 13 10     Hepatic:   No results for input(s): AST, ALT, BILITOT, BILIDIR, PROT, LABALBU, ALKPHOS in the last 72 hours. Troponin:   No results for input(s): TROPONINI in the last 72 hours. BNP: No results for input(s): BNP in the last 72 hours. Lipids:   No results for input(s): CHOL, HDL in the last 72 hours. Invalid input(s): LDLCALCU, TRIGLYCERIDE  ABGs:  No results for input(s): PHART, TGO5LSE, PO2ART, ALA9XYV, BEART, THGBART, W2MDVMFL, ECM3JPK in the last 72 hours. INR:   No results for input(s): INR in the last 72 hours. Lactate: No results for input(s): LACTATE in the last 72 hours.   Cultures:  -----------------------------------------------------------------  RAD:   ERROL HEAD WO CONTRAST   Final Result      1. No aneurysms, vascular occlusions, or intracranial stenoses identified. MRA NECK WO CONTRAST   Final Result      1. 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 No acute hemorrhage. 2.  Moderate cerebral atrophy and moderate chronic small vessel ischemic disease. 3.  No significant stenosis in the extracranial vertebral or carotid arteries on noncontrast MRA neck. Note that MRA head was not performed. MRI brain without contrast   Final Result      1. 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 No acute hemorrhage. 2.  Moderate cerebral atrophy and moderate chronic small vessel ischemic disease. 3.  No significant stenosis in the extracranial vertebral or carotid arteries on noncontrast MRA neck. Note that MRA head was not performed. Assessment/Plan:     Embolic Stroke  Intermittent R vision disturbance. CT head without contrast negative. Pt is allergic to contrast  - GCA r/o with minimally elevated ESR and CRP  - MRI head with scattered punctate areas of acute ischemia in the bilateral cerebral hemispheres, left basal ganglia, and right cerebellum, likely embolic  - MRA neck without significant stenosis   - Neurology consulted  - continue ASA, plavix  and statin  - Echo with bubble, showed EF 25-30%, grade III diastolic dysfunction. Severe mitral calcification  - fasting lipid WNL  - neurology consulted  - Loop recorder placement today     Chronic issues  CAD s/p stent- continue ASA and plavix. Last stent in Nov 2018. Will hold betablocker in the context of permissive htn  Cardiology consulted  HFrEF, Vtach, MVP- follows with Dr. Jackie Beltran.  Holding home lasix  HTN- dec coreg, cont losartan  CKD4- Cr baseline

## 2022-03-22 NOTE — CARE COORDINATION
Case Management Assessment            Discharge Note                    Date / Time of Note: 3/22/2022 10:39 AM                  Discharge Note Completed by: Joycelyn Simpson RN    Patient Name: Anival Salcido   YOB: 1935  Diagnosis: Visual field defect [H53.40]  Stroke of unknown etiology [facility]) [I63.9]   Date / Time: 3/18/2022  9:37 PM    Current PCP: Wilmer Jeff DO  Clinic patient: No    Hospitalization in the last 30 days: No    Advance Directives:  Code Status: Full Code  [address] DNR form completed and on chart: Not Indicated    Financial:  Payor: MEDICARE / Plan: MEDICARE PART A AND B / Product Type: *No Product type* /      Pharmacy:    [address] [place] [address], [address] [place] [address]  [address]  Phone: [phone] Fax: [phone]      Assistance purchasing medications?: Potential Assistance Purchasing Medications: No  Assistance provided by Case Management: None at this time    Does patient want to participate in local refill/ meds to beds program?:      Meds To Beds General Rules:  1. Can ONLY be done Monday- Friday between 8:30am-5pm  2. Prescription(s) must be in pharmacy by 3pm to be filled same day  3. Copy of patient's insurance/ prescription drug card and patient face sheet must be sent along with the prescription(s)  4. Cost of Rx cannot be added to hospital bill. If financial assistance is needed, please contact unit  or ;  or  CANNOT provide pharmacy voucher for patients co-pays  5.  Patients can then  the prescription on their way out of the hospital at discharge, or pharmacy can deliver to the bedside if staff is available. (payment due at time of pick-up or delivery - cash, check, or card accepted)     Able to afford home medications/ co-pay costs: Yes    ADLS:  Current PT AM-PAC Score: 20 /24  Current OT AM-PAC Score: 21 /24      DISCHARGE Disposition: Home- No Services Needed    LOC at discharge: Not Applicable  ANNEMARIE Completed: Not Indicated    Notification completed in HENS/PAS?:  Not Applicable    IMM Completed:   Yes, Case management has presented and reviewed IMM letter #2 to the patient and/or family/ POA. Patient and/or family/POA verbalized understanding of their medicare rights and appeal process if needed. Patient and/or family/POA has signed, initialed and placed today's date (3/22/22) and time ([phone]) on IMM letter #2 on the the appropriate lines. Patient and/or family/POA, copy of letter offered and they are aware that this original copy of IMM letter #2 is available prior to discharge from the paper chart on the unit. Electronic documentation has been entered into epic for IMM letter #2 and original paper copy has been added to the paper chart at the nurses station. Transportation:  Transportation PLAN for discharge: family   Mode of Transport: [address] ordered at discharge: Not [address]: Not Applicable  Orders faxed: No    Durable Medical Equipment:  DME Provider:    Equipment obtained during hospitalization: none    Home Oxygen and Respiratory Equipment:  Oxygen needed at discharge?: Not [address]: Not Applicable  Portable tank available for discharge?: Not Indicated    Dialysis:  Dialysis patient: No    Dialysis Center:  Not Applicable      Additional CM Notes:  Cm met with pt and daughter at bedside. Plan to return home. No needs for dc.      The Plan for Transition of Care is related to the following treatment goals of Visual field defect [H53.40]  Stroke of unknown etiology [facility]) [I63.9]    The Patient and/or patient representative Reena Mcdonough and her family were provided with a choice of provider and agrees with the discharge plan Not Indicated    Freedom of choice list was provided with basic dialogue that supports the patient's individualized plan of care/goals and shares the quality data associated with the providers.  Not Indicated    Care Transitions patient: Flor Mtz RN  The [facility]  Case Management Department  Ph: [phone]

## 2022-03-22 NOTE — PROGRESS NOTES
Cardiology Consult Service  Daily Progress Note        Admit Date:  3/18/2022  Primary cardiologist: Dr Karissa Hernandez    Reason for Consultation/Chief Complaint: HFrEF    Subjective:      Bethanie Eldridge is a 80 y.o. female with a past medical history of CAD status post stent 2018, HTN, HLP, CKD stage IV, HFrEF, mitral valve prolapse, VT.     [facility] 11/6/2018: Proximal LAD 80% stenosed status post stent x1, otherwise normal coronaries, LVEF 40%.    Echo 2014: LVEF 50%.    Echo 10/2018: LVEF 35-40%.     Patient presented to the emergency room on 3/18 with right visual changes concerning for stroke. MRI brain consistent with multiple and small acute ischemic strokes consistent with emboli. Troponin elevated at 1.16 on 3/18, trending down. ECG consistent with NSR, LBBB ( ms), nonspecific ST and T wave abnormalities. Cardiology was consulted for further evaluation during the weekend and Dr. Gerry Lopez assessed patient.      Echo 03/19/2022: Normal LV, LVEF 33-23%, diastolic grade 3, severe LAE, normal RV, RVSP 51 (8), negative bubble study. Per personal review of images, there is no apical clot with IV contrast, posterior mitral valve leaflet with a large echodensity measuring 2.4 x 2.4 cm, cannot exclude clot versus mass (less likely calcification or vegetation in view of no fevers and normal WBC)     I was asked to see patient today in consultation for her worsening heart failure. Status post ILR ([employer] Reveal) by Dr. Gerry oLpez on 3/21/22. Interval history:  Patient reports no complaints. There were no overnight events.     Objective:     Medications:   famotidine  20 mg Oral Daily    losartan  50 mg Oral Daily    apixaban  2.5 mg Oral BID    senna  4 tablet Oral BID    carvedilol  12.5 mg Oral BID WC    atorvastatin  80 mg Oral Nightly    aspirin  81 mg Oral Daily    furosemide  20 mg Oral Daily       IV drips:      PRN:  ondansetron **OR** ondansetron, polyethylene glycol, perflutren lipid microspheres, HYDROcodone-acetaminophen    Vitals:    03/21/22 1659 03/21/22 1957 03/22/22 0300 03/22/22 0848   BP: (!) 169/96 115/67 106/69 (!) 160/85   Pulse: 86 63 63 81   Resp: 18 18 16 18   Temp: 96.4 °F (35.8 °C) 96.9 °F (36.1 °C) 96.8 °F (36 °C) 97 °F (36.1 °C)   TempSrc: Oral Oral Axillary Oral   SpO2: 99% 95% 96% 96%   Weight:   130 lb 1.1 oz (59 kg)    Height:         No intake or output data in the 24 hours ending 03/22/22 1156  I/O last 3 completed shifts: In: 120 [P.O.:120]  Out: 0   Wt Readings from Last 3 Encounters:   03/22/22 130 lb 1.1 oz (59 kg)   03/18/22 129 lb (58.5 kg)   03/07/22 129 lb (58.5 kg)       Admit Wt: Weight: 129 lb (58.5 kg)   Todays Wt: Weight: 130 lb 1.1 oz (59 kg)    TELEMETRY personally reviewed: Sinus     Physical Exam:         General Appearance:  Alert, cooperative, no distress, appears stated age Appropriate weight   Head:  Normocephalic, without obvious abnormality, atraumatic   Eyes:  PERRL, conjunctiva/corneas clear EOM intact  Ears normal   Throat no lesions       Nose: Nares normal, no drainage or sinus tenderness   Throat: Lips, mucosa, and tongue normal   Neck: Supple, symmetrical, trachea midline, no adenopathy, thyroid: not enlarged, symmetric, no tenderness/mass/nodules, no carotid bruit. Lungs:   Normal respiratory rate, lungs clear to auscultation without any wheezes, rubs or ronchi bilaterally. Chest Wall:  No tenderness or deformity   Heart:  Regular rhythm, rate is controlled, S1, S2 normal, there is no murmur, there is no rub or gallop, cannot assess jvd, no bilateral lower extremity edema   Abdomen:   Soft, non-tender, bowel sounds active all four quadrants,  no masses, no organomegaly       Extremities: Extremities normal, atraumatic, no cyanosis.     Pulses: 2+ and symmetric   Skin: Skin color, texture, turgor normal, no rashes or lesions   Pysch: Normal mood and affect   Neurologic: Normal gross motor and sensory exam.  Cranial nerves intact Labs:   Recent Labs     03/20/22  0745 03/21/22  0652 03/22/22  0736   * 136 135*   K 3.9 4.1 3.6   BUN 21* 21* 20   CREATININE 1.5* 1.4* 1.5*   CL 97* 98* 98*   CO2 25 28 27   GLUCOSE 95 87 88   CALCIUM 9.4 9.1 9.4   MG 1.90 1.90 1.80     Recent Labs     03/20/22  0745 03/20/22  0745 03/21/22  0652 03/21/22  0652 03/22/22  0737   WBC 8.1  --  6.3  --  7.2   HGB 10.5*  --  9.9*  --  10.5*   HCT 32.0*  --  30.2*  --  31.4*     --  212  --  237   MCV 89.2   < > 89.6   < > 88.5    < > = values in this interval not displayed. No results for input(s): CHOLTOT, TRIG, HDL, LDL in the last 72 hours. Invalid input(s): LIPIDCOMM, CHOLHDL, VLDCHOL  No results for input(s): PTT, INR in the last 72 hours. Invalid input(s): PT  No results for input(s): CKTOTAL, CKMB, CKMBINDEX, TROPONINI in the last 72 hours. No results for input(s): BNP in the last 72 hours. No results for input(s): NTPROBNP in the last 72 hours. No results for input(s): TSH in the last 72 hours. Imaging:       Assessment & Plan:     1. 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 I suspect underlying PAF and possible intracavitary clot per personal review of echocardiographic images this admission. 2.  CAD status post stent. 3.  NSTEMI. Troponin up at 1.1, most likely due to demand ischemia (NSTEMI type II) from significant underlying CAD rather than NSTEMI type I (ACS). 4.  Chronic HFrEF. Patient is euvolemic and hemodynamically stable  5. HTN  6. Abnormal echo. Per personal review of images, I suspect the clot on the mitral valve. 7. CKD, Cr of 1.4.           -Patient received an ILR to assess for subclinical PAF. In view of frequent PACs on telemetry, it is very likely patient has underlying PAF.   -I discussed case with Dr. Hank De, would strongly recommend switching Plavix to [facility]; team has started eliquis 2.5 mg bid (renal dose) this morning and continuing baby aspirin for likely severe underlying CAD. -Would recommend follow up limited echo to reassess mitral valve echodensity as an outpatient soon  -Patient will need to follow-up with her primary cardiologist Dr. Dsetinee Del Castillo and decide if stress testing versus LHC would be recommended in the near future. - Continue with Coreg 12.5 twice daily  - Unable to start ACEI, ARB, spironolactone, Entresto or SGLT2 due to CKD and/or hypotension.   -Continue with Lipitor and Lasix 20 mg daily    Patient may be discharged home today on the above meds. Please call me with any questions.            I have spent 35 minutes of face to face time with the patient with more than 50% spent counseling and coordinating care. I have personally reviewed the reports and images of labs, radiological studies, cardiac studies including ECG's and telemetry, current and old medical records. The note was completed using EMR and Dragon dictation system. Every effort was made to ensure accuracy; however, inadvertent computerized transcription errors may be present. All questions and concerns were addressed to the patient/family. Alternatives to my treatment were discussed. Thank you for allowing to us to participate in the care or Blease Given. Please call our service with questions.     Martin Chaudhari MD, [facility], [address]  The [address]  [address] [place] [address] [place] [address]  Ph: [phone]  Fax: [phone]

## 2022-03-22 NOTE — PROGRESS NOTES
Pt discharged home via wheelchair accompanied by daughter. Pt sent with after visit summary, follow up instructions, and educational materials. Pt sent with prescription for Eliquis filled in outpatient pharmacy and additional prescriptions were sent electronically to preferred pharmacy. Pt and family were instructed on use of loop recorder and demonstrated understanding. PIV removed with no complications. All questions answered.

## 2022-03-22 NOTE — DISCHARGE SUMMARY
INTERNAL MEDICINE DEPARTMENT AT [address] [place] [address]  DISCHARGE SUMMARY    Patient ID: Vera Rojas                                             Discharge Date: 3/22/2022   Patient's PCP: Juanita Castellano, DO                                          Discharge Physician: Carina Mari MD MD  Admit Date: 3/18/2022   Admitting Physician: [facility], MD    PROBLEMS DURING HOSPITALIZATION:  Present on Admission:   Visual field defect   Stroke of unknown etiology ([address]      DISCHARGE DIAGNOSES:    HPI:\"Naty Kim is an 79 yo F PMH CAD s/p stent, HTN, HLD, CKD4, Vtach, HFrEF, MVP who presented to [facility] ED for vision loss. Pt states that she woke up yesterday with partial vision loss of R eye that has been intermittent since. States the deficit was in upper visual field of R eye. Currently she can see but it is still somewhat blurry in R eye upper field. Noted that she had a mild achey temporal headache as well which has now resolved. States that this is not new for her and she gets similar headaches related to here TMJ. She called her ophthalmologist this morning who recommended she go to the ED. Pt denies any chest pain, SOB, weakness, fever, chills, recent sicknesses. She has been compliant with all her medications.      Upon arival, pt was hypertensive 170s/80s. ESR 39, troponin elevated to 1.16 and trending down. CXR negative. EKG without new ischemic changes. COVID and flu negative. Pt has hx of anaphylactic shock with contrast. She was transferred to [facility] for further neurological workup. \"    The following issues were addressed during hospitalization:  Acute Embolic stroke  GCA was ruled out. MRI head showed scattered punctate areas of acute ischemia in the bilateral cerebral hemispheres, left basal ganglia and and right cerebellum, likely embolic. MRA neck showed no sig stenosis. Neurology was consulted for acute stroke. Pt was placed on ASA, plavix, and statin.  Stroke workup was done, likely cardiac embolic stroke. Telemetry showed frequent PACs. EPS was consulted for placement of loop recorder. Cardiology saw patient ane rereviewed ECHO, and there was a suspected clot on mitral valve. It was decided to switch pt from Plavix to Eliquis at discharge and blood pressure medication was titrated as necessary. On the day of discharge patient had no complaints, she felt back to her baseline. Pt should follow-up with her PCP and EPS and cardiologist. She should consider seeing her doctor at [facility] if her vision problems persist    Physical Exam:  Physical Exam      Physical Exam  Constitutional:       Appearance: Normal appearance. HENT:      Head: Normocephalic and atraumatic.     Cardiovascular:      Rate and Rhythm: Normal rate and regular rhythm. Pulmonary:      Effort: Pulmonary effort is normal.      Breath sounds: No wheezing, rhonchi or rales. Abdominal:      Palpations: Abdomen is soft.      Tenderness: There is no abdominal tenderness. There is no guarding or rebound. Musculoskeletal:         General: No swelling.      Cervical back: Neck supple. Skin:     General: Skin is warm and dry. Neurological:      Mental Status: She is alert and oriented to person, place, and time.      Sensory: No sensory deficit.      Motor: No weakness.      Consults: cardiology, neurology  Significant Diagnostic Studies:  MRI head  Treatments: anticoagulation  Disposition: home  Discharged Condition: Stable  Follow Up: Primary Care Physician in one week    DISCHARGE MEDICATION:       Medication List      START taking these medications    apixaban 2.5 MG Tabs tablet  Commonly known as: ELIQUIS  Take 1 tablet by mouth 2 times daily     diclofenac sodium 1 % Gel  Commonly known as: VOLTAREN  Apply 2 g topically 4 times daily        CHANGE how you take these medications    carvedilol 12.5 MG tablet  Commonly known as: COREG  Take 1 tablet by mouth 2 times daily (with meals)  What changed:   · medication strength  · See the new instructions. famotidine 20 MG tablet  Commonly known as: PEPCID  Take 1 tablet by mouth daily  What changed: when to take this     furosemide 20 MG tablet  Commonly known as: LASIX  Take 1 tablet by mouth daily as needed (fluid overload)  What changed:   · when to take this  · reasons to take this        CONTINUE taking these medications    aspirin 81 MG chewable tablet     atorvastatin 80 MG tablet  Commonly known as: LIPITOR  TAKE 1 TABLET BY MOUTH ONE TIME A DAY     Co Q 10 100 MG Caps     fluticasone 50 MCG/ACT nasal spray  Commonly known as: FLONASE  2 sprays by Each Nostril route daily     Handicap Placard Misc  by Does not apply route Length: 5 years     HYDROcodone-acetaminophen  MG per tablet  Commonly known as: NORCO     losartan 50 MG tablet  Commonly known as: COZAAR  TAKE 1 TABLET BY MOUTH EVERY DAY     nitroGLYCERIN 0.4 MG SL tablet  Commonly known as: NITROSTAT  Place 1 tablet under the tongue every 5 minutes as needed for Chest pain up to max of 3 total doses. If no relief after 1 dose, call 911. ondansetron 4 MG tablet  Commonly known as: ZOFRAN  Take 1 tablet by mouth 3 times daily as needed for Nausea or Vomiting     vitamin B-12 1000 MCG tablet  Commonly known as: CYANOCOBALAMIN     vitamin D 1000 UNIT Tabs tablet  Commonly known as: CHOLECALCIFEROL  Take 2 tablets by mouth daily     zolpidem 5 MG tablet  Commonly known as: AMBIEN  Take 1 tablet by mouth nightly as needed for Sleep for up to 180 days.         STOP taking these medications    clopidogrel 75 MG tablet  Commonly known as: PLAVIX     hydroCHLOROthiazide 12.5 MG tablet  Commonly known as: HYDRODIURIL           Where to Get Your Medications      These medications were sent to [facility]/pharmacy [facility], [address] [address]    Phone: [phone]   · apixaban 2.5 MG Tabs tablet  · carvedilol 12.5 MG tablet  · diclofenac sodium 1 % Gel  · famotidine 20 MG tablet  · furosemide 20 MG tablet          Activity: activity as tolerated  Diet: regular diet  Wound Care: none needed    Time Spent on discharge is more than 20 minutes    Signed: Angie Mejia MD,  MD, PGY-1  3/22/2022     Patient seen and examined, labs and imaging reviewed, agree with assessment and plan as outlined above. patients condition continues to improve doing well, asked patient to monitor side effects of medications which i've discussed at length, recurrence of symptoms or new symptoms including but not limited to chest pain, shortness of breath, nausea, vomiting, fevers or chills and seek immediate medical attention or call 911. Greater than 30 minutes spent on case on day of discharge.      Isma Irvin MD FACP

## 2022-03-22 NOTE — PROGRESS NOTES
Electrophysiology - PROGRESS NOTE    Admit Date: 3/18/2022     Chief Complaint: CVA, ICMP, elevated trop     Interval History:   Patient seen and examined and notes reviewed. Patient is being followed for CVA, ICMP. Patient had p/w vision loss in her R eye. MRI showed acute strokes thought to be embolic. Trop elevated at 1.16, follows with Dr. Armin Daily outpatient. EF has dropped to 25-30%. Currently c/o ongoing SOB but denies PND, orthopnea, CP or edema. S/p ILR. R chest drsg CDI. Placed on Eliquis 2.5 mg BID for possible intracavitary clot. No intake/output data recorded. Wt Readings from Last 2 Encounters:   03/22/22 130 lb 1.1 oz (59 kg)   03/18/22 129 lb (58.5 kg)       Data:   Scheduled Meds:   Scheduled Meds:   rivaroxaban  15 mg Oral Daily    senna  4 tablet Oral BID    carvedilol  12.5 mg Oral BID WC    [Held by provider] clopidogrel  75 mg Oral Daily    atorvastatin  80 mg Oral Nightly    [Held by provider] aspirin  81 mg Oral Daily    Or    [Held by provider] aspirin  300 mg Rectal Daily    furosemide  20 mg Oral Daily     Continuous Infusions:  PRN Meds:.ondansetron **OR** ondansetron, polyethylene glycol, perflutren lipid microspheres, HYDROcodone-acetaminophen  Continuous Infusions:  No intake or output data in the 24 hours ending 03/22/22 0825    CBC:   Lab Results   Component Value Date    WBC 6.3 03/21/2022    HGB 9.9 03/21/2022     03/21/2022     BMP:  Lab Results   Component Value Date     03/21/2022    K 4.1 03/21/2022    K 3.7 03/18/2022    CL 98 03/21/2022    CO2 28 03/21/2022    BUN 21 03/21/2022    CREATININE 1.4 03/21/2022    GLUCOSE 87 03/21/2022     INR:   Lab Results   Component Value Date    INR 1.02 03/18/2022    INR 1.02 11/20/2021    INR 0.98 11/06/2018        CARDIAC LABS  ENZYMES:  No results for input(s): CKMB, CKMBINDEX, TROPONINI in the last 72 hours.     Invalid input(s): CKTOTAL;3  FASTING LIPID PANEL:  Lab Results   Component Value Date    HDL 84 03/19/2022    LDLCALC 53 03/19/2022    TRIG 65 03/19/2022     LIVER PROFILE:  Lab Results   Component Value Date    AST 25 03/18/2022    AST 16 11/20/2021    ALT 9 03/18/2022    ALT 8 11/20/2021       -----------------------------------------------------------------  Telemetry: Personally reviewed  NSR, NSVT, PAC, PVCs    Objective:   Vitals: /69   Pulse 63   Temp 96.8 °F (36 °C) (Axillary)   Resp 16   Ht 5' 2\" (1.575 m)   Wt 130 lb 1.1 oz (59 kg)   SpO2 96%   BMI 23.79 kg/m²   General appearance: alert, appears stated age and cooperative, No acute distress   Eyes: Conjunctiva and pupils normal and reactive  Skin: Skin color, texture, turgor normal. No rashes or ecchymosis.   Neck: no JVD, supple, symmetrical, trachea midline   Lungs: , no accessory muscle use, no respiratory distress  Heart: RRR  Abdomen: soft, non-tender; bowel sounds normal  Extremities: No edema, DP +  Psychiatric: normal insight and affect    Patient Active Problem List:     Other and unspecified angina pectoris     Coronary atherosclerosis of native coronary artery     Other chronic pulmonary heart diseases     Mitral valve disorder     Acute combined systolic and diastolic heart failure (HCC)     Chronic low back pain     Essential hypertension     AGUILA (acute kidney injury) ([address]     Renal insufficiency     Gout     Pain in joint, hand     Mixed hyperlipidemia     Primary insomnia     Closed stable burst fracture of first lumbar vertebra (HCC)     GERD (gastroesophageal reflux disease)     Palpitations     V-tach (HCC)     Abnormal myocardial perfusion study     Ischemic cardiomyopathy     Cardiomyopathy ([address]     PVD (posterior vitreous detachment), both eyes     Posterior subcapsular polar age-related cataract of both eyes     Confusion     Chronic kidney disease, stage IV (severe) ([address]     Visual field defect     Stroke of unknown etiology ([address]        Assessment & Plan: 1. CVA  2. CAD  3. ICMP  4. Chronic sCHF  5. HTN    81 y/o woman with a h/o HTN, HLD, CAD, s/p MI, s/p PCI (12/2018), ICMP, EF 25-30% who p/w vision loss, MRI of the head showed areas of acute ischemia thought to be embolic. CVA  - Thought to be embolic  - S/p ILR placement. - Winnie CDI  - Wound check next week    ICMP  - EF 25-30%  - NYHA class II/III  -   - On carvedilol 12.5 mg BID  - No ACE/ARB/ARNI/SGL2 due to CKD.   - Dr. Federico Muro following    CAD  - No s/s  - Trop 1.16  - S/p PCI 2018  - On ASA, plavix and statin  - Has appmt with Dr. Shanice Martinez next week - will need ischemic eval at some point    Possible intracavitary clot  - Placed on Eliquis 2.5 mg BID (age, weight)      Shu Haynes CNP  [facility]      I spent a total of 40 minutes in care of the patient and greater than 50% of the time was spent counseling with Phoebe Verma and coordinating care regarding their diagnosis, treatments and plan of care.

## 2022-03-23 ENCOUNTER — TELEPHONE (OUTPATIENT)
Dept: FAMILY MEDICINE CLINIC | Age: 87
End: 2022-03-23

## 2022-03-23 NOTE — TELEPHONE ENCOUNTER
Juan [facility] Transitions Initial Follow Up Call    Outreach made within 2 business days of discharge: Yes    Patient: Ken Cohen Patient : 1935   MRN: [id number]  Reason for Admission: There are no discharge diagnoses documented for the most recent discharge. Discharge Date: 3/22/22       Spoke with: patient    Discharge department/facility: [facility]    Non-face-to-face services provided:  Scheduled appointment with PCP-3/31  Obtained and reviewed discharge summary and/or continuity of care documents    TCM Interactive Patient Contact:  Was patient able to fill all prescriptions: Yes  Was patient instructed to bring all medications to the follow-up visit: Yes  Is patient taking all medications as directed in the discharge summary?  Yes  Does patient understand their discharge instructions: Yes  Does patient have questions or concerns that need addressed prior to 7-14 day follow up office visit: no    Scheduled appointment with PCP within 7-14 days    Follow Up  Future Appointments   Date Time Provider Jerome Mahmood   3/25/2022  2:15 PM DAVE Denton CNP Card [facility]   3/30/2022  2:00 PM Morelia Lambert 1778 Card [facility]   3/30/2022  2:15 PM MD Willa Sahu Card [facility]   3/31/2022  3:30 PM [address] [place] [address] [place] [address]   2022  2:00  [facility], [address] [place] [address] [address], RN

## 2022-03-30 ENCOUNTER — NURSE ONLY (OUTPATIENT)
Dept: CARDIOLOGY CLINIC | Age: 87
End: 2022-03-30

## 2022-03-30 ENCOUNTER — OFFICE VISIT (OUTPATIENT)
Dept: CARDIOLOGY CLINIC | Age: 87
End: 2022-03-30
Payer: MEDICARE

## 2022-03-30 VITALS
BODY MASS INDEX: 22.86 KG/M2 | WEIGHT: 125 LBS | HEART RATE: 72 BPM | DIASTOLIC BLOOD PRESSURE: 80 MMHG | SYSTOLIC BLOOD PRESSURE: 140 MMHG

## 2022-03-30 DIAGNOSIS — I25.10 CAD IN NATIVE ARTERY: ICD-10-CM

## 2022-03-30 DIAGNOSIS — R00.2 PALPITATIONS: ICD-10-CM

## 2022-03-30 DIAGNOSIS — Z98.61 HISTORY OF PTCA: ICD-10-CM

## 2022-03-30 DIAGNOSIS — I42.0 DILATED CARDIOMYOPATHY (HCC): ICD-10-CM

## 2022-03-30 DIAGNOSIS — R55 SYNCOPE AND COLLAPSE: ICD-10-CM

## 2022-03-30 DIAGNOSIS — I50.22 CHRONIC SYSTOLIC CONGESTIVE HEART FAILURE (HCC): Primary | ICD-10-CM

## 2022-03-30 DIAGNOSIS — I47.20 VT (VENTRICULAR TACHYCARDIA): ICD-10-CM

## 2022-03-30 DIAGNOSIS — I63.9 CEREBROVASCULAR ACCIDENT (CVA), UNSPECIFIED MECHANISM (HCC): ICD-10-CM

## 2022-03-30 DIAGNOSIS — Z45.09 ENCOUNTER FOR LOOP RECORDER CHECK: Primary | ICD-10-CM

## 2022-03-30 DIAGNOSIS — I34.0 NONRHEUMATIC MITRAL VALVE REGURGITATION: ICD-10-CM

## 2022-03-30 DIAGNOSIS — I10 ESSENTIAL HYPERTENSION: ICD-10-CM

## 2022-03-30 PROCEDURE — 4040F PNEUMOC VAC/ADMIN/RCVD: CPT | Performed by: INTERNAL MEDICINE

## 2022-03-30 PROCEDURE — 1036F TOBACCO NON-USER: CPT | Performed by: INTERNAL MEDICINE

## 2022-03-30 PROCEDURE — G8427 DOCREV CUR MEDS BY ELIG CLIN: HCPCS | Performed by: INTERNAL MEDICINE

## 2022-03-30 PROCEDURE — 1111F DSCHRG MED/CURRENT MED MERGE: CPT | Performed by: INTERNAL MEDICINE

## 2022-03-30 PROCEDURE — 1090F PRES/ABSN URINE INCON ASSESS: CPT | Performed by: INTERNAL MEDICINE

## 2022-03-30 PROCEDURE — 1123F ACP DISCUSS/DSCN MKR DOCD: CPT | Performed by: INTERNAL MEDICINE

## 2022-03-30 PROCEDURE — G8420 CALC BMI NORM PARAMETERS: HCPCS | Performed by: INTERNAL MEDICINE

## 2022-03-30 PROCEDURE — 99214 OFFICE O/P EST MOD 30 MIN: CPT | Performed by: INTERNAL MEDICINE

## 2022-03-30 PROCEDURE — G8484 FLU IMMUNIZE NO ADMIN: HCPCS | Performed by: INTERNAL MEDICINE

## 2022-03-30 NOTE — PROGRESS NOTES
1 week wound check for ILR  DOI 3.21.2022 by JERED  Hx: CVA, syncope, palpitations. Patient accompanied by spouse and daughter    Removed outer dressing from 4th ICS region. Steri strips remain. Old drainage noted and bruising. Discussed post op wound care. Pt informed to call office she develops any fever, increased swelling, redness, or drainage from site. Family voiced an understanding. Interrogation shows normal function of ILR. Presenting ECG displays NSR @ 70-75 bpm w/PVC. 1 symptom activated on day of implant 3.21.2022 revealing frequent PVCs. At times Quadrigeminy/couplets. PVCs 6.5%  Patient remains on carvedilol, eliquis, furosemide, asa 81 mg. Discussed patient symptom recorder. EP physician to review. See Paceart report under the Cardiology tab. Patient was seen by Dr. Frank Leija today. Patient will follow up in 1 month w/EP NP. We will continue to monitor remotely.

## 2022-03-30 NOTE — PROGRESS NOTES
Subjective:      Patient ID: Onel Figueroa is a 80 y.o. female. HPI:  Ms. Emilie Zelaya is here today for  follow up CHF/cardiomyopathy/CAD/HTN/MR/VT. Recent stroke. Having problems with kidneys. Had palps. Unchanged. No sob/edema. No tachycardia. No syncope. No chest pain. No pnd or orthopnea. Bp good. Edema good. Watches salt.  Following with renal.        Past Medical History:   Diagnosis Date    Arthritis     Blood circulation, collateral     CAD (coronary artery disease)     CHF (congestive heart failure) (HCC)     Confusion 8/31/2020    GERD (gastroesophageal reflux disease)     History of heart artery stent 12/2018    Hyperlipidemia     Hypertension     Mitral valve prolapse     Myocardial infarct, old     Thyroid disease      Past Surgical History:   Procedure Laterality Date    COLONOSCOPY      EYE SURGERY Left 09/04/2018    [facility]O EMULSIFICATION OF CATARACT WITH INTRAOCULAR LENS IMPLANT LEFT EYE     HYSTERECTOMY      OTHER SURGICAL HISTORY  08/27/2018    phacoemulsification of cataract with intraocular lens implant right eye    [place] OFFICE/OUTPT VISIT,PROCEDURE ONLY Right 8/27/2018    PHACO EMULSIFICATION OF CATARACT WITH INTRAOCULAR LENS IMPLANT RIGHT EYE performed by Ana Khan MD at [address] [facility] OFFICE/OUTPT [facility] Left 9/4/2018    PHACO EMULSIFICATION OF CATARACT WITH INTRAOCULAR LENS IMPLANT LEFT EYE performed by Ana Khan MD at [address] THYROIDECTOMY           Allergies   Allergen Reactions    Benazepril Hcl Shortness Of Breath and Swelling    Feldene [Piroxicam] Shortness Of Breath    Hytrin [Terazosin Hcl] Shortness Of Breath    Iv Contrast [Iodides] Anaphylaxis    Acetaminophen     Celebrex [Celecoxib]      GI upset    Cephalexin      Nausea    Hydrochlorothiazide     Iodinated Casein     Monopril [Fosinopril Sodium] Other (See Comments)     Pt states makes her weak    Protonix [Pantoprazole Sodium] Other (See Comments)     Kidney failure  Quinapril Hcl     Toprol Xl [Metoprolol Succinate] Other (See Comments)     C/o leg weakness with this med    Ultracet [Tramadol-Acetaminophen]      Rash    Ziac [Bisoprolol-Hydrochlorothiazide] Other (See Comments)     Pt does not remember reaction to med ? Weak         Social History     Socioeconomic History    Marital status:      Spouse name: Shweta Joseph Number of children: Not on file    Years of education: 15    Highest education level: Not on file   Occupational History    Occupation: retired   Tobacco Use    Smoking status: Never Smoker    Smokeless tobacco: Never Used   Vaping Use    Vaping Use: Never used   Substance and Sexual Activity    Alcohol use: No     Alcohol/week: 0.0 standard drinks    Drug use: No    Sexual activity: Never     Comment:  living with spouse. Other Topics Concern    Not on file   Social History Narrative    Not on file     Social Determinants of Health     Financial Resource Strain: Medium Risk    Difficulty of Paying Living Expenses: Somewhat hard   Food Insecurity: No Food Insecurity    Worried About Running Out of Food in the Last Year: Never true    Ramona of Food in the Last Year: Never true   Transportation Needs:     Lack of Transportation (Medical): Not on file    Lack of Transportation (Non-Medical):  Not on file   Physical Activity:     Days of Exercise per Week: Not on file    Minutes of Exercise per Session: Not on file   Stress:     Feeling of Stress : Not on file   Social Connections:     Frequency of Communication with Friends and Family: Not on file    Frequency of Social Gatherings with Friends and Family: Not on file    Attends Mandaen Services: Not on file    Active Member of Clubs or Organizations: Not on file    Attends Club or Organization Meetings: Not on file    Marital Status: Not on file   Intimate Partner Violence:     Fear of Current or Ex-Partner: Not on file    Emotionally Abused: Not on file   Eliane Physically Abused: Not on file    Sexually Abused: Not on file   Housing Stability:     Unable to Pay for Housing in the Last Year: Not on file    Number of Places Lived in the Last Year: Not on file    Unstable Housing in the Last Year: Not on file        Patient has a family history includes Cancer in her father and sister; Diabetes in her brother; Heart Disease in her father and mother; Stroke in her brother. Patient  has a past medical history of Arthritis, Blood circulation, collateral, CAD (coronary artery disease), CHF (congestive heart failure) ([facility] [address] Confusion, GERD (gastroesophageal reflux disease), History of heart artery stent, Hyperlipidemia, Hypertension, Mitral valve prolapse, Myocardial infarct, old, and Thyroid disease. Current Outpatient Medications   Medication Sig Dispense Refill    carvedilol (COREG) 12.5 MG tablet Take 1 tablet by mouth 2 times daily (with meals) 60 tablet 3    apixaban (ELIQUIS) 2.5 MG TABS tablet Take 1 tablet by mouth 2 times daily 60 tablet 5    furosemide (LASIX) 20 MG tablet Take 1 tablet by mouth daily as needed (fluid overload) 30 tablet 5    diclofenac sodium (VOLTAREN) 1 % GEL Apply 2 g topically 4 times daily 200 g 2    famotidine (PEPCID) 20 MG tablet Take 1 tablet by mouth daily 60 tablet 3    ondansetron (ZOFRAN) 4 MG tablet Take 1 tablet by mouth 3 times daily as needed for Nausea or Vomiting 30 tablet 3    zolpidem (AMBIEN) 5 MG tablet Take 1 tablet by mouth nightly as needed for Sleep for up to 180 days. 30 tablet 2    atorvastatin (LIPITOR) 80 MG tablet TAKE 1 TABLET BY MOUTH ONE TIME A DAY 90 tablet 3    vitamin B-12 (CYANOCOBALAMIN) 1000 MCG tablet Take 1,000 mcg by mouth daily      losartan (COZAAR) 50 MG tablet TAKE 1 TABLET BY MOUTH EVERY DAY 90 tablet 3    nitroGLYCERIN (NITROSTAT) 0.4 MG SL tablet Place 1 tablet under the tongue every 5 minutes as needed for Chest pain up to max of 3 total doses.  If no relief after 1 dose, call 911. 25 tablet 3    fluticasone (FLONASE) 50 MCG/ACT nasal spray 2 sprays by Each Nostril route daily 1 Bottle 5    Handicap Placard MISC by Does not apply route Length: 5 years 1 each 0    vitamin D (CHOLECALCIFEROL) 1000 UNIT TABS tablet Take 2 tablets by mouth daily 60 tablet 3    HYDROcodone-acetaminophen (NORCO)  MG per tablet Take 1 tablet by mouth every 6 hours as needed. Joanna Henedmar Coenzyme Q10 (CO Q 10) 100 MG CAPS Take  by mouth daily.  aspirin 81 MG chewable tablet Take 1 tablet by mouth daily. 30 tablet      No current facility-administered medications for this visit. Vitals:    03/30/22 1357   BP: (!) 140/80   Pulse: 72     Wt 125    Review of Systems   Constitutional: Negative for activity change, appetite change. Some fatigue. Respiratory: Negative for cough, choking, chest tightness and shortness of breath. Cardiovascular: Negative for chest pain, palpitations and leg swelling. Denies PND or orthopnea. No tachycardia or syncope. Neurological: Negative for dizziness, syncope and light-headedness. Psychiatric/Behavioral: Negative for behavioral problems, confusion and agitation. All other systems reviewed negative as done. Objective:   Physical Exam   Constitutional: She is oriented to person, place, and time. She appears well-developed and well-nourished. No distress. HENT:   Head: Normocephalic and atraumatic. Eyes: Conjunctivae and EOM are normal. Right eye exhibits no discharge. Left eye exhibits no discharge. Neck: Normal range of motion. No JVD present. Cardiovascular: Normal rate, regular rhythm, S1 normal, S2 normal and normal heart sounds. Exam reveals no gallop. No murmur heard. Pulses:       Radial pulses are 2+ on the right side, and 2+ on the left side. Pulmonary/Chest: Effort normal and breath sounds normal. No respiratory distress. She has no wheezes. She has no rales. Abdominal: Soft.  Bowel sounds are normal. There is no tenderness. Musculoskeletal: Normal range of motion. She exhibits no edema. Neurological: She is alert and oriented to person, place, and time. Skin: Skin is warm and dry. Psychiatric: She has a normal mood and affect. Her behavior is normal. Thought content normal.       Assessment:       Diagnosis Orders   1. Chronic systolic congestive heart failure ([address]     2. Dilated cardiomyopathy ([address]     3. CAD in native artery     4. History of PTCA     5. VT (ventricular tachycardia) ([address]     6. Essential hypertension     7. Nonrheumatic mitral valve regurgitation             Plan:      CV stable. Edema stable. Palps controlled. No angina/CP-stable. Remains compensated. Continue coreg/losartan/HCTZ for CHF. No  palps. Continue coreg 25 mg bid and losartan 50 mg qd  BP ok. Renal following. Reviewed previous records and testing including myoview, echo 10/18, cath 11/18 and holter 1/19, showing vent ectopy of 7%. Tries to watch salt. Follow up 6 months.

## 2022-04-04 ENCOUNTER — TELEPHONE (OUTPATIENT)
Dept: CARDIOLOGY CLINIC | Age: 87
End: 2022-04-04

## 2022-04-04 NOTE — TELEPHONE ENCOUNTER
Pt states she accidentally took some of her husbands medications in addition to her own. Coreg, took her 12.5 mg am dose + husbands 6.25 mg dose  Losartan, took her 50 mg dose + husbands 50 mg dose  ASA 81 mg took 2 tablets instead of ordered dose of 1 tablet  Also took double doses of Vit B and Vit D supplements. Advised pt to monitor BP and HR; encouraged adequate hydration. Advised not to take pm dose Coreg today and could also hold her losartan dose for tomorrow. Advised pt to call with concerning symptoms, ie, headache, drowsiness, low HR or BP, dizziness or other issues. Encouraged pt to get help with managing medications from family member.

## 2022-04-05 ENCOUNTER — TELEPHONE (OUTPATIENT)
Dept: CARDIOLOGY CLINIC | Age: 87
End: 2022-04-05

## 2022-04-13 ENCOUNTER — TELEPHONE (OUTPATIENT)
Dept: FAMILY MEDICINE CLINIC | Age: 87
End: 2022-04-13

## 2022-04-14 ENCOUNTER — NURSE ONLY (OUTPATIENT)
Dept: CARDIOLOGY CLINIC | Age: 87
End: 2022-04-14
Payer: MEDICARE

## 2022-04-14 DIAGNOSIS — R55 SYNCOPE, UNSPECIFIED SYNCOPE TYPE: ICD-10-CM

## 2022-04-14 DIAGNOSIS — R00.2 PALPITATIONS: ICD-10-CM

## 2022-04-14 DIAGNOSIS — I63.9 STROKE OF UNKNOWN ETIOLOGY (HCC): ICD-10-CM

## 2022-04-14 DIAGNOSIS — Z45.09 ENCOUNTER FOR LOOP RECORDER CHECK: ICD-10-CM

## 2022-04-14 PROCEDURE — G2066 INTER DEVC REMOTE 30D: HCPCS | Performed by: INTERNAL MEDICINE

## 2022-04-14 PROCEDURE — 93298 REM INTERROG DEV EVAL SCRMS: CPT | Performed by: INTERNAL MEDICINE

## 2022-04-22 NOTE — TELEPHONE ENCOUNTER
Requested Prescriptions     Pending Prescriptions Disp Refills    nitroGLYCERIN (NITROSTAT) 0.4 MG SL tablet [Pharmacy Med Name: NITROGLYCERIN 0.4 MG TABLET SL] 25 tablet 3     Sig: PUT 1 TAB UNDER TONGUE EVERY 5 MIN AS NEEDED CHEST PAIN UP TO 3. IF NO RELIEF AFTER 1 DOSE, CALL 911            Last Office Visit: 9/29/2021     Next Office Visit: 4/28/22

## 2022-04-25 RX ORDER — NITROGLYCERIN 0.4 MG/1
TABLET SUBLINGUAL
Qty: 25 TABLET | Refills: 3 | Status: SHIPPED | OUTPATIENT
Start: 2022-04-25

## 2022-04-26 ENCOUNTER — HOSPITAL ENCOUNTER (OUTPATIENT)
Age: 87
Discharge: HOME | End: 2022-04-26
Payer: MEDICARE

## 2022-04-26 ENCOUNTER — TELEPHONE (OUTPATIENT)
Dept: CARDIOLOGY CLINIC | Age: 87
End: 2022-04-26

## 2022-04-26 DIAGNOSIS — R00.2 PALPITATIONS: ICD-10-CM

## 2022-04-26 DIAGNOSIS — I47.20 V-TACH: ICD-10-CM

## 2022-04-26 DIAGNOSIS — I50.41 ACUTE COMBINED SYSTOLIC AND DIASTOLIC HEART FAILURE (HCC): ICD-10-CM

## 2022-04-26 DIAGNOSIS — R06.02 SOB (SHORTNESS OF BREATH): Primary | ICD-10-CM

## 2022-04-26 DIAGNOSIS — I25.5 ISCHEMIC CARDIOMYOPATHY: ICD-10-CM

## 2022-04-26 DIAGNOSIS — R94.39 ABNORMAL MYOCARDIAL PERFUSION STUDY: ICD-10-CM

## 2022-04-26 DIAGNOSIS — R06.02 SOB (SHORTNESS OF BREATH): ICD-10-CM

## 2022-04-26 LAB
ANION GAP SERPL CALCULATED.3IONS-SCNC: 13 MMOL/L (ref 3–16)
BUN BLDV-MCNC: 20 MG/DL (ref 7–20)
CALCIUM SERPL-MCNC: 9.6 MG/DL (ref 8.3–10.6)
CHLORIDE BLD-SCNC: 101 MMOL/L (ref 99–110)
CO2: 26 MMOL/L (ref 21–32)
CREAT SERPL-MCNC: 1.5 MG/DL (ref 0.6–1.2)
GFR AFRICAN AMERICAN: 40
GFR NON-AFRICAN AMERICAN: 33
GLUCOSE BLD-MCNC: 128 MG/DL (ref 70–99)
HCT VFR BLD CALC: 30.9 % (ref 36–48)
HEMOGLOBIN: 10.3 G/DL (ref 12–16)
MCH RBC QN AUTO: 30 PG (ref 26–34)
MCHC RBC AUTO-ENTMCNC: 33.3 G/DL (ref 31–36)
MCV RBC AUTO: 90.3 FL (ref 80–100)
PDW BLD-RTO: 15.8 % (ref 12.4–15.4)
PLATELET # BLD: 203 K/UL (ref 135–450)
PMV BLD AUTO: 8.2 FL (ref 5–10.5)
POTASSIUM SERPL-SCNC: 3.9 MMOL/L (ref 3.5–5.1)
PRO-BNP: ABNORMAL PG/ML (ref 0–449)
RBC # BLD: 3.42 M/UL (ref 4–5.2)
SODIUM BLD-SCNC: 140 MMOL/L (ref 136–145)
WBC # BLD: 9 K/UL (ref 4–11)

## 2022-04-26 PROCEDURE — 85027 COMPLETE CBC AUTOMATED: CPT

## 2022-04-26 PROCEDURE — 83880 ASSAY OF NATRIURETIC PEPTIDE: CPT

## 2022-04-26 PROCEDURE — 36415 COLL VENOUS BLD VENIPUNCTURE: CPT

## 2022-04-26 PROCEDURE — 80048 BASIC METABOLIC PNL TOTAL CA: CPT

## 2022-04-26 NOTE — TELEPHONE ENCOUNTER
Having a reaction about 2-3 hours after taking Eliquis    And having trouble breathing    Started this when in this hospital end of March    She had a lot of trouble last night     She read the pamphlet and said this could be a side     Effect    Last night she took a nitroglycerin and seemed to help a     Little    She hasn't taken this at all this morning, she wanted     To check with us first     She stated this is the only new medication added since the hospital stay    Please advise # [phone]

## 2022-04-26 NOTE — TELEPHONE ENCOUNTER
Unlike (not impossible) SOB is related to eliquis. More concerned acute SOB maybe related to acute HF. Please call and triage for s/s HF. (weight gain, edema, orthopnea etc)  Recommend CBC, BMP, BNP and CXR.  Follow up in office  I would continue eliquis for now and see if SOB reoccurs

## 2022-04-26 NOTE — TELEPHONE ENCOUNTER
Spoke to pt's  Keagan Scruggs he stated she wasn't home but was feeling ok. Left message for her to call us when she returns.

## 2022-04-27 NOTE — TELEPHONE ENCOUNTER
Spoke with patient and reviewed results.  Patient is taking Lasix and will follow up tomorrow with Jess Peck NP.

## 2022-04-27 NOTE — TELEPHONE ENCOUNTER
Labs reviewed:     BNP is elevated. Concern for CHF. Will follow up with CXR. Be sure to take the lasix. Monitor for s/s CHF. Follow with [facility] tomorrow as scheduled. Electrolytes stable. Kidney function stable. CBC stable.

## 2022-04-28 ENCOUNTER — OFFICE VISIT (OUTPATIENT)
Dept: CARDIOLOGY CLINIC | Age: 87
End: 2022-04-28
Payer: MEDICARE

## 2022-04-28 ENCOUNTER — NURSE ONLY (OUTPATIENT)
Dept: CARDIOLOGY CLINIC | Age: 87
End: 2022-04-28

## 2022-04-28 VITALS
DIASTOLIC BLOOD PRESSURE: 90 MMHG | BODY MASS INDEX: 23.08 KG/M2 | HEART RATE: 72 BPM | SYSTOLIC BLOOD PRESSURE: 140 MMHG | WEIGHT: 126.2 LBS

## 2022-04-28 DIAGNOSIS — I63.9 CEREBROVASCULAR ACCIDENT (CVA), UNSPECIFIED MECHANISM (HCC): ICD-10-CM

## 2022-04-28 DIAGNOSIS — I25.5 ISCHEMIC CARDIOMYOPATHY: Primary | ICD-10-CM

## 2022-04-28 DIAGNOSIS — I25.10 CAD IN NATIVE ARTERY: ICD-10-CM

## 2022-04-28 DIAGNOSIS — Z45.09 ENCOUNTER FOR LOOP RECORDER CHECK: ICD-10-CM

## 2022-04-28 PROCEDURE — 1123F ACP DISCUSS/DSCN MKR DOCD: CPT | Performed by: NURSE PRACTITIONER

## 2022-04-28 PROCEDURE — 1090F PRES/ABSN URINE INCON ASSESS: CPT | Performed by: NURSE PRACTITIONER

## 2022-04-28 PROCEDURE — 93000 ELECTROCARDIOGRAM COMPLETE: CPT | Performed by: NURSE PRACTITIONER

## 2022-04-28 PROCEDURE — 99214 OFFICE O/P EST MOD 30 MIN: CPT | Performed by: NURSE PRACTITIONER

## 2022-04-28 PROCEDURE — G8427 DOCREV CUR MEDS BY ELIG CLIN: HCPCS | Performed by: NURSE PRACTITIONER

## 2022-04-28 PROCEDURE — 1036F TOBACCO NON-USER: CPT | Performed by: NURSE PRACTITIONER

## 2022-04-28 PROCEDURE — 4040F PNEUMOC VAC/ADMIN/RCVD: CPT | Performed by: NURSE PRACTITIONER

## 2022-04-28 PROCEDURE — G8420 CALC BMI NORM PARAMETERS: HCPCS | Performed by: NURSE PRACTITIONER

## 2022-04-28 RX ORDER — CARVEDILOL 12.5 MG/1
18.75 TABLET ORAL 2 TIMES DAILY WITH MEALS
Qty: 90 TABLET | Refills: 5 | Status: SHIPPED | OUTPATIENT
Start: 2022-04-28 | End: 2022-05-23 | Stop reason: SDUPTHER

## 2022-04-28 RX ORDER — FUROSEMIDE 20 MG/1
20 TABLET ORAL DAILY PRN
Qty: 30 TABLET | Refills: 5 | Status: SHIPPED | OUTPATIENT
Start: 2022-04-28 | End: 2022-06-08 | Stop reason: SDUPTHER

## 2022-04-28 NOTE — PROGRESS NOTES
ILR2 for CVA  Last remote 4.14.2022    Since last remote, patient has had a total fo 13 additional symptom/pause/tachy episodes. 4.28.2022-Recent symptom. Frequent PVCs, couplet at times. 4.27.2022-Nocturnal pause that appears true @ 0145 x 03 secs. 4.26.2022- Tachy x 1 min 29 secs. 154 bpm.  Current ECG displays NSR w/ectopy. PVC 6.9%  Patient remains on carvedilol, eliquis, furosemide, asa  81 mg.  EP physician will review. See Paceart report under the Cardiology tab. Patient will see NPFW today. We will continue to monitor.

## 2022-04-28 NOTE — PROGRESS NOTES
[address]   Electrophysiology  Office Visit  Date: 4/28/2022    Chief Complaint   Patient presents with    Cerebrovascular Accident    Congestive Heart Failure    Cardiomyopathy    Coronary Artery Disease       Cardiac HX: Thelma Broderick is a 80 y.o. 79 y/o woman with a h/o HTN, HLD, CAD, s/p MI, s/p PCI (12/2018), ICMP, EF 25-30% who p/w vision loss, MRI of the head showed areas of acute ischemia thought to be embolic, S/p ILR    Interval History/HPI: Patient is here for follow-up for CVA, ICMP, CAD. She was recently admitted to the hospital with concerns of embolic stroke. ILR was placed. Device check has shown no AF to date, however she has 6.9% PVC burden. Her echo in the hospital showed reduced EF 25-30%, troponin was elevated however she recently saw Dr. Tosin Cronin with no plans for ischemic eval at this time. She has been complaining of increased shortness of breath this week with some increased edema and has started taking her as needed Lasix as advised by Abbie Almeida NP. She had labs drawn yesterday showing elevated BNP. States she is feeling a little bit better today. No CP, dizziness, lightheadedness, BLE edema.     Home medications:   Current Outpatient Medications on File Prior to Visit   Medication Sig Dispense Refill    nitroGLYCERIN (NITROSTAT) 0.4 MG SL tablet PUT 1 TAB UNDER TONGUE EVERY 5 MIN AS NEEDED CHEST PAIN UP TO 3. IF NO RELIEF AFTER 1 DOSE, CALL 911 25 tablet 3    carvedilol (COREG) 12.5 MG tablet Take 1 tablet by mouth 2 times daily (with meals) 60 tablet 3    apixaban (ELIQUIS) 2.5 MG TABS tablet Take 1 tablet by mouth 2 times daily 60 tablet 5    furosemide (LASIX) 20 MG tablet Take 1 tablet by mouth daily as needed (fluid overload) 30 tablet 5    diclofenac sodium (VOLTAREN) 1 % GEL Apply 2 g topically 4 times daily 200 g 2    famotidine (PEPCID) 20 MG tablet Take 1 tablet by mouth daily 60 tablet 3    ondansetron (ZOFRAN) 4 MG tablet Take 1 tablet by mouth 3 times daily as needed for Nausea or Vomiting 30 tablet 3    zolpidem (AMBIEN) 5 MG tablet Take 1 tablet by mouth nightly as needed for Sleep for up to 180 days. 30 tablet 2    atorvastatin (LIPITOR) 80 MG tablet TAKE 1 TABLET BY MOUTH ONE TIME A DAY 90 tablet 3    vitamin B-12 (CYANOCOBALAMIN) 1000 MCG tablet Take 1,000 mcg by mouth daily      losartan (COZAAR) 50 MG tablet TAKE 1 TABLET BY MOUTH EVERY DAY 90 tablet 3    fluticasone (FLONASE) 50 MCG/ACT nasal spray 2 sprays by Each Nostril route daily 1 Bottle 5    Handicap Placard MISC by Does not apply route Length: 5 years 1 each 0    vitamin D (CHOLECALCIFEROL) 1000 UNIT TABS tablet Take 2 tablets by mouth daily 60 tablet 3    HYDROcodone-acetaminophen (NORCO)  MG per tablet Take 1 tablet by mouth every 6 hours as needed. Kathleen Gourd Coenzyme Q10 (CO Q 10) 100 MG CAPS Take  by mouth daily.  aspirin 81 MG chewable tablet Take 1 tablet by mouth daily. 30 tablet      No current facility-administered medications on file prior to visit.        Past Medical History:   Diagnosis Date    Arthritis     Blood circulation, collateral     CAD (coronary artery disease)     CHF (congestive heart failure) (HCC)     Confusion 8/31/2020    GERD (gastroesophageal reflux disease)     History of heart artery stent 12/2018    Hyperlipidemia     Hypertension     Mitral valve prolapse     Myocardial infarct, old     Thyroid disease         Past Surgical History:   Procedure Laterality Date    COLONOSCOPY      EYE SURGERY Left 09/04/2018    PHACO EMULSIFICATION OF CATARACT WITH INTRAOCULAR LENS IMPLANT LEFT EYE     HYSTERECTOMY      OTHER SURGICAL HISTORY  08/27/2018    phacoemulsification of cataract with intraocular lens implant right eye    [place] OFFICE/OUTPT VISIT,PROCEDURE ONLY Right 8/27/2018    PHACO EMULSIFICATION OF CATARACT WITH INTRAOCULAR LENS IMPLANT RIGHT EYE performed by Chirag Mondragon MD at [address] [facility] OFFICE/OUTPT VISIT,PROCEDURE ONLY Left 9/4/2018    PHACO EMULSIFICATION OF CATARACT WITH INTRAOCULAR LENS IMPLANT LEFT EYE performed by Chirag Mondragon MD at [address] [place] [address]          Family History:  Reviewed. family history includes Cancer in her father and sister; Diabetes in her brother; Heart Disease in her father and mother; Stroke in her brother. Review of System:    · Constitutional: No fevers, chills. · Cardiovascular: No for chest pain, yes for dyspnea on exertion, No for palpitations or No for loss of consciousness. No cough, hemoptysis, No for pleuritic pain, or phlebitis. · Respiratory: No for cough or wheezing. No hematemesis. · Gastrointestinal: No blood in stools. · Genitourinary: No hematuria. · Musculoskeletal: No gait disturbance,    · Integumentary: No rash or pruritis. · Psychiatric: No anxiety, or depression. · Hem/Lymph: No abnormal bruising or bleeding. Physical Examination:  Vitals:    04/28/22 1527   BP: (!) 140/90   Pulse: Wt Readings from Last 3 Encounters:   04/28/22 126 lb 3.2 oz (57.2 kg)   03/30/22 125 lb (56.7 kg)   03/22/22 130 lb 1.1 oz (59 kg)       · Constitutional: Oriented. No distress. · Head: Normocephalic and atraumatic. · Neck: Neck supple. No rigidity. No JVD present. · Cardiovascular: Normal rate, regular rhythm, S1&S2. · Pulmonary/Chest: Bilateral respiratory sounds. No wheezes, No rhonchi. · Musculoskeletal: No tenderness. No edema    · Neurological: Alert and oriented. Cranial nerve appears intact, No Gross deficit   · Skin: Skin is warm and dry. No rash noted. · Psychiatric: Has a normal mood, affect and behavior     Labs:  Reviewed.    Recent Labs     04/26/22  1422      K 3.9      CO2 26   BUN 20   CREATININE 1.5*     Recent Labs     04/26/22  1422   WBC 9.0   HGB 10.3*   HCT 30.9*   MCV 90.3        Lab Results   Component Value Date    TROPONINI 0.93 03/19/2022     No results found for: BNP  Lab Results   Component Value Date    PROTIME 11.5 03/18/2022    PROTIME 11.5 11/20/2021    PROTIME 11.2 11/06/2018    INR 1.02 03/18/2022    INR 1.02 11/20/2021    INR 0.98 11/06/2018     Lab Results   Component Value Date    CHOL 150 03/19/2022    HDL 84 03/19/2022    TRIG 65 03/19/2022       ECG: Personally reviewed: NSR, HR 72, , , QTc 443    ECHO:  3/2022   Summary   Overall left ventricular systolic function is reduced with an estimated   ejection fraction of 25-30%. There is diffuse hypokinesis of the left ventricle. Diastolic filling parameters suggests grade III diastolic dysfunction. The left atrium is severely dilated. Normal right ventricular size and function. Estimated pulmonary artery systolic pressure is at 51 mmHg assuming a right   atrial pressure of 8 mmHg. A bubble study was performed and fails to show evidence of shunting. Stress Test: 10/2018  Summary    ABNORMAL HIGH RISK STRESS TEST    Normal LV size and mildly reduced systolic function. Left ventricular    ejection fraction of 47%. Hypokinesis of the apical segments. THere is a    defect in the anterior septal wall at stress and minimal reversal at rest    consistent with scar. There is also a mild defect in the anterior and    anterolateral wall at stress that completely reverses at rest consistent    with ischemia. Cardiac Angiography: 11/2018, Dr. Benz Peers:  1.  LV dysfunction consistent with cardiomyopathy. 2.  Ventricular tachycardia. 3.  Abnormal Myoview. 4.  LV dysfunction with estimated ejection fraction of 40% with global  hypokinesis. 5.  Successful primary stent to LAD.     Problem List:   Patient Active Problem List    Diagnosis Date Noted    AGUILA (acute kidney injury) ([facility] 01/13/2014    Encounter for loop recorder check 03/30/2022    Stroke of unknown etiology ([address] 03/19/2022    Visual field defect 03/18/2022    Chronic kidney disease, stage IV (severe) ([address] 08/20/2021    Confusion 08/31/2020    PVD (posterior vitreous detachment), both eyes 07/02/2019    Posterior subcapsular polar age-related cataract of both eyes 07/02/2019    Abnormal myocardial perfusion study 11/06/2018    Ischemic cardiomyopathy 11/06/2018    Cardiomyopathy ([address] 11/06/2018    Palpitations 09/04/2018    V-tach ([address] 09/04/2018    GERD (gastroesophageal reflux disease) 01/10/2017    Closed stable burst fracture of first lumbar vertebra ([address] 09/30/2015    Primary insomnia 11/24/2014    Mixed hyperlipidemia 10/29/2014    Gout 04/18/2014    Pain in joint, hand 04/18/2014    Renal insufficiency 01/22/2014    Essential hypertension 04/26/2013    Chronic low back pain 02/22/2013    Other and unspecified angina pectoris 06/21/2011    Coronary atherosclerosis of native coronary artery 06/21/2011    Other chronic pulmonary heart diseases 06/21/2011    Mitral valve disorder 06/21/2011    Acute combined systolic and diastolic heart failure ([address] 06/21/2011        Assessment:   1. Ischemic cardiomyopathy    2. Encounter for loop recorder check    3. CAD in native artery    4. Cerebrovascular accident (CVA), unspecified mechanism ([address]        CVA  - Thought to be embolic  - S/p ILR placement.   - Site well healed   - Device check shows 1 tacky episode lasting a minute 29 seconds appearing to be NSR with ectopy, 6.9% PVC burden, 1 nocturnal pause lasting 3 seconds, no AF  - F/u 4 months    ICMP  - EF 25-30%  - NYHA class II/III  -   - BNP 10,711, patient taking lasix 20 mg QD x 1 week  - On carvedilol 12.5 mg BID- will increase to 18.75 mg BID d/t elevated BP and inc PVC burden  - On losartan 50 mg .   - F/u with Yumiko Doe in 1 week     CAD  - No s/s  - Trop 1.16  - S/p PCI 2018  - On ASA, plavix and statin  - Had f/u appt with Dr. Hellen Saint 4/1/22- no plans for ischemic eval at this time     Possible intracavitary clot  - On Eliquis 2.5 mg BID (age, weight)    EF of 32-30%  No ARB systolic HF  ASA and Statin for CAD  No known AF  Tobacco use was discussed with the patient and education provided. All questions and concerns were addressed to the patient/family. Alternatives to my treatment were discussed. The note was completed using EMR. Every effort was made to ensure accuracy; however, inadvertent computerized transcription errors may be present. Patient received education regarding their diagnosis, treatment and medications while in the office today.       Jessica Smith, [facility]

## 2022-05-06 ENCOUNTER — OFFICE VISIT (OUTPATIENT)
Dept: CARDIOLOGY CLINIC | Age: 87
End: 2022-05-06
Payer: MEDICARE

## 2022-05-06 VITALS
DIASTOLIC BLOOD PRESSURE: 70 MMHG | OXYGEN SATURATION: 96 % | HEART RATE: 64 BPM | WEIGHT: 124.6 LBS | BODY MASS INDEX: 22.79 KG/M2 | SYSTOLIC BLOOD PRESSURE: 130 MMHG

## 2022-05-06 DIAGNOSIS — I50.41 ACUTE COMBINED SYSTOLIC AND DIASTOLIC HF (HEART FAILURE) (HCC): Primary | ICD-10-CM

## 2022-05-06 DIAGNOSIS — I10 PRIMARY HYPERTENSION: ICD-10-CM

## 2022-05-06 DIAGNOSIS — I25.10 CAD S/P PERCUTANEOUS CORONARY ANGIOPLASTY: ICD-10-CM

## 2022-05-06 DIAGNOSIS — E78.2 MIXED HYPERLIPIDEMIA: ICD-10-CM

## 2022-05-06 DIAGNOSIS — Z98.61 CAD S/P PERCUTANEOUS CORONARY ANGIOPLASTY: ICD-10-CM

## 2022-05-06 PROCEDURE — G8427 DOCREV CUR MEDS BY ELIG CLIN: HCPCS | Performed by: NURSE PRACTITIONER

## 2022-05-06 PROCEDURE — 4040F PNEUMOC VAC/ADMIN/RCVD: CPT | Performed by: NURSE PRACTITIONER

## 2022-05-06 PROCEDURE — 1036F TOBACCO NON-USER: CPT | Performed by: NURSE PRACTITIONER

## 2022-05-06 PROCEDURE — 1123F ACP DISCUSS/DSCN MKR DOCD: CPT | Performed by: NURSE PRACTITIONER

## 2022-05-06 PROCEDURE — G8420 CALC BMI NORM PARAMETERS: HCPCS | Performed by: NURSE PRACTITIONER

## 2022-05-06 PROCEDURE — 1090F PRES/ABSN URINE INCON ASSESS: CPT | Performed by: NURSE PRACTITIONER

## 2022-05-06 PROCEDURE — 99214 OFFICE O/P EST MOD 30 MIN: CPT | Performed by: NURSE PRACTITIONER

## 2022-05-06 NOTE — PROGRESS NOTES
CC SOB and edema     HPI:  80 y.o. patient of Sasha Frazier and Gregorio Pena with acute HFrEF (EF 25-30%; III-DD), CAD(PCI 2018), HTN, HLD. She had a CVA and an ILR placed. Recent ILR recording did not show any AF. SOB and mild LE edema improved after starting lasix 20 mg po daily     She has mild CASTRO. She always sleeps on 3 pillows. Struggles with chronic constipation and has lack of appetite. Weight is down. She denies cp, LH/dizziness, palpitations, syncope or falls. No n/v/d, fever or GI/ bleeding. C/o sinus drainage.      Past Medical History:   Diagnosis Date    Arthritis     Blood circulation, collateral     CAD (coronary artery disease)     CHF (congestive heart failure) (HCC)     Confusion 8/31/2020    GERD (gastroesophageal reflux disease)     History of heart artery stent 12/2018    Hyperlipidemia     Hypertension     Mitral valve prolapse     Myocardial infarct, old     Thyroid disease      Past Surgical History:   Procedure Laterality Date    COLONOSCOPY      EYE SURGERY Left 09/04/2018    PHACO EMULSIFICATION OF CATARACT WITH INTRAOCULAR LENS IMPLANT LEFT EYE     HYSTERECTOMY      OTHER SURGICAL HISTORY  08/27/2018    phacoemulsification of cataract with intraocular lens implant right eye    [place] OFFICE/OUTPT VISIT,PROCEDURE ONLY Right 8/27/2018    PHACO EMULSIFICATION OF CATARACT WITH INTRAOCULAR LENS IMPLANT RIGHT EYE performed by Jasvir Cheng MD at [place] [address] OFFICE/OUTPT [facility] Left 9/4/2018    PHACO EMULSIFICATION OF CATARACT WITH INTRAOCULAR LENS IMPLANT LEFT EYE performed by Jasvir Cheng MD at [address] [place] [address]        Family History   Problem Relation Age of Onset    Heart Disease Mother     Heart Disease Father     Cancer Father    [place] Cancer Sister     Diabetes Brother     Stroke Brother      Social History     Tobacco Use    Smoking status: Never Smoker    Smokeless tobacco: Never Used   Vaping Use    Vaping Use: Never used   Substance Use Topics    Alcohol use: No     Alcohol/week: 0.0 standard drinks    Drug use: No     Allergies:Benazepril hcl, Feldene [piroxicam], Hytrin [terazosin hcl], Iv contrast [iodides], Acetaminophen, Celebrex [celecoxib], Cephalexin, Hydrochlorothiazide, Iodinated casein, Monopril [fosinopril sodium], Protonix [pantoprazole sodium], Quinapril hcl, Toprol xl [metoprolol succinate], Ultracet [tramadol-acetaminophen], and Ziac [bisoprolol-hydrochlorothiazide]    Review of Systems  General: No changes in weight, fatigue, or night sweats. HEENT: No blurry or decreased vision. No changes in hearing, nasal discharge or sore throat. Cardiovascular:  See HPI. Respiratory: No cough, hemoptysis, or wheezing. Gastrointestinal:  No abdominal pain, hematochezia, melana, constipation, diarrhea, or history of GI ulcers. Genito-Urinary: No dysuria or hematuria. No urgency or polyuria. Musculoskeletal:  No complaints of joint pain, joint swelling or muscular weakness/soreness. Neurological:  No dizziness, headaches, numbness/tingling, speech problems or weakness. Psychological:  No anxiety or depression. Hematological and Lymphatic: No abnormal bleeding or bruising, blood clots, jaundice or swollen lymph nodes. Endocrine:   No malaise/lethargy, palpitations, polydipsia/polyuria, temperature intolerance or unexpected weight changes  Skin:  No rashes or non-healing ulcers. Physical Exam:  /70   Pulse 64   Wt 124 lb 9.6 oz (56.5 kg)   SpO2 96%   BMI 22.79 kg/m²    General (appearance):  No acute distress  Eyes: anicteric   Neck: soft, No JVD  Ears/Nose/Mouth/Thorat: No cyanosis  CV: RRR   Respiratory:  Clear, normal effort  GI: soft, non-tender, non-distended  Skin: Warm, dry. No rashes  Neuro/Psych: Alert and oriented x 3.  Appropriate behavior  Ext:  No c/c. 1+ BLE edema  Pulses:  2+ radial     Weight  Wt Readings from Last 3 Encounters:   04/28/22 126 lb 3.2 oz (57.2 kg)   03/30/22 125 lb (56.7 kg)   03/22/22 130 lb 1.1 oz (59 kg)          CBC:   Lab Results   Component Value Date    WBC 9.0 04/26/2022    HGB 10.3 (L) 04/26/2022    HCT 30.9 (L) 04/26/2022    MCV 90.3 04/26/2022     04/26/2022     BMP:  Lab Results   Component Value Date    CREATININE 1.5 (H) 04/26/2022    BUN 20 04/26/2022     04/26/2022    K 3.9 04/26/2022     04/26/2022    CO2 26 04/26/2022     Mag:   Lab Results   Component Value Date    MG 1.80 03/22/2022     LIVER PROFILE:   Lab Results   Component Value Date    ALT 9 (L) 03/18/2022    AST 25 03/18/2022    ALKPHOS 66 03/18/2022    BILITOT 0.3 03/18/2022     PT/INR:   Lab Results   Component Value Date    INR 1.02 03/18/2022    INR 1.02 11/20/2021    INR 0.98 11/06/2018    PROTIME 11.5 03/18/2022    PROTIME 11.5 11/20/2021    PROTIME 11.2 11/06/2018     Pro-BNP   Lab Results   Component Value Date    PROBNP 10,711 04/26/2022    PROBNP 2,510 09/04/2018     LIPIDS:  No components found for: CHLPL  Lab Results   Component Value Date    TRIG 65 03/19/2022    TRIG 104 08/20/2021    TRIG 99 03/05/2020     Lab Results   Component Value Date    HDL 84 (H) 03/19/2022    HDL 75 (H) 03/19/2022    HDL 63 (H) 08/20/2021     Lab Results   Component Value Date    LDLCALC 53 03/19/2022    LDLCALC 46 03/19/2022    LDLCALC 45 08/20/2021     Lab Results   Component Value Date    LABVLDL 13 03/19/2022    LABVLDL 12 03/19/2022    LABVLDL 21 08/20/2021     TSH:No results found for: TSH, Q2YGDCF, Z8NHMEH, THYROIDAB    IMAGING:     3/19/2022 Echo:     Overall left ventricular systolic function is reduced with an estimated   ejection fraction of 25-30%. There is diffuse hypokinesis of the left ventricle. Diastolic filling parameters suggests grade III diastolic dysfunction. The left atrium is severely dilated. Normal right ventricular size and function. Estimated pulmonary artery systolic pressure is at 51 mmHg assuming a right   atrial pressure of 8 mmHg.    A bubble study was performed and fails to show evidence of shunting. 2018 [facility]  HEMODYNAMICS:  Left ventricular end-diastolic pressure equals 20. There  is no significant gradient across the aortic valve by pullback post  cineangiography.     LEFT VENTRICULOGRAM:  Left ventriculogram demonstrates global  hypokinesis. Of note,  there was no area of akinesis. The anterior  wall did contract, but again was hypokinetic. Estimated ejection  fraction 40%.     LEFT MAIN:  Left main is a short vessel that gave almost immediate rise  to LAD and circumflex. It is free of significant obstructive disease.     LEFT ANTERIOR DESCENDING:  The LAD courses to and wraps around the apex. It is a large vessel. It gives off multiple small diagonal branches. In the proximal portion of vessel, there is a ruptured plaque. There is  80% narrowing in this area. There is diffuse disease, but no  significant focal obstructive disease.     LEFT CIRCUMFLEX:  Circumflex consists of high rising marginal branch, a  very large second marginal branch courses on the AV groove and then  gives off small posterolateral branch. The circumflex is free of  significant obstructive disease.     RIGHT CORONARY ARTERY:  Right coronary artery is a large dominant  vessel. It gives off a large PDA and several distal posterolateral  branches. No significant obstructive disease in the right coronary  artery.     LEFT ANTERIOR DESCENDING POST INTERVENTION:  There is an 80% stenosis of  the ruptured plaque. Post intervention, there was minimal if any  residual stenosis.     IMPRESSION:  1.  LV dysfunction consistent with cardiomyopathy. 2.  Ventricular tachycardia. 3.  Abnormal Myoview. 4.  LV dysfunction with estimated ejection fraction of 40% with global  hypokinesis. 5.  Successful primary stent to LAD. 2018 Nuc stress  ABNORMAL HIGH RISK STRESS TEST    Normal LV size and mildly reduced systolic function. Left ventricular    ejection fraction of 47%. Hypokinesis of the apical segments. THere is a    defect in the anterior septal wall at stress and minimal reversal at rest    consistent with scar. There is also a mild defect in the anterior and    anterolateral wall at stress that completely reverses at rest consistent    with ischemia. Medications:   Current Outpatient Medications   Medication Sig Dispense Refill    furosemide (LASIX) 20 MG tablet Take 1 tablet by mouth daily as needed (fluid overload) 30 tablet 5    carvedilol (COREG) 12.5 MG tablet Take 1.5 tablets by mouth 2 times daily (with meals) 90 tablet 5    nitroGLYCERIN (NITROSTAT) 0.4 MG SL tablet PUT 1 TAB UNDER TONGUE EVERY 5 MIN AS NEEDED CHEST PAIN UP TO 3. IF NO RELIEF AFTER 1 DOSE, CALL 911 25 tablet 3    apixaban (ELIQUIS) 2.5 MG TABS tablet Take 1 tablet by mouth 2 times daily 60 tablet 5    diclofenac sodium (VOLTAREN) 1 % GEL Apply 2 g topically 4 times daily 200 g 2    famotidine (PEPCID) 20 MG tablet Take 1 tablet by mouth daily 60 tablet 3    ondansetron (ZOFRAN) 4 MG tablet Take 1 tablet by mouth 3 times daily as needed for Nausea or Vomiting 30 tablet 3    zolpidem (AMBIEN) 5 MG tablet Take 1 tablet by mouth nightly as needed for Sleep for up to 180 days. 30 tablet 2    atorvastatin (LIPITOR) 80 MG tablet TAKE 1 TABLET BY MOUTH ONE TIME A DAY 90 tablet 3    vitamin B-12 (CYANOCOBALAMIN) 1000 MCG tablet Take 1,000 mcg by mouth daily      losartan (COZAAR) 50 MG tablet TAKE 1 TABLET BY MOUTH EVERY DAY 90 tablet 3    fluticasone (FLONASE) 50 MCG/ACT nasal spray 2 sprays by Each Nostril route daily 1 Bottle 5    Handicap Placard MISC by Does not apply route Length: 5 years 1 each 0    vitamin D (CHOLECALCIFEROL) 1000 UNIT TABS tablet Take 2 tablets by mouth daily 60 tablet 3    HYDROcodone-acetaminophen (NORCO)  MG per tablet Take 1 tablet by mouth every 6 hours as needed. Illene Soles Coenzyme Q10 (CO Q 10) 100 MG CAPS Take  by mouth daily.       aspirin 81 MG chewable tablet Take 1 tablet by mouth daily. 30 tablet      No current facility-administered medications for this visit. Assessment:  1. Acute combined systolic and diastolic HF (heart failure) ([address]    2. CAD S/P percutaneous coronary angioplasty    3. Primary hypertension    4.  Mixed hyperlipidemia        Plan:  Acute combined HF (EF 25-30% Grade III DD)   Discussed s/s decompensated HF   Daily weights, low salt diet, compression stockings   Lasix 20 mg po daily    Coreg 18.5 mg po bid   Losartan   CAD/PCI; stbale   Coreg   Losartan   ASA   Lipitor  HTN: stable   /70   Losartan, Coreg  HLD: stable   Lipitor    LDL 53  Hx CVA: stable    On eliquis     Follow up in 3 months       Reviewed most recent: CBC, BMP, LFT, Lipids, Mag, PT/INR, BNP, TSH  Reviewed most recent: ECG, Echo, Nuc stress test,  [facility]

## 2022-05-19 ENCOUNTER — NURSE ONLY (OUTPATIENT)
Dept: CARDIOLOGY CLINIC | Age: 87
End: 2022-05-19

## 2022-05-19 ENCOUNTER — TELEPHONE (OUTPATIENT)
Dept: CARDIOLOGY CLINIC | Age: 87
End: 2022-05-19

## 2022-05-19 NOTE — PROGRESS NOTES
ILR for CVA. Remote transmission received from patient's monitor at home. Remote LinqII report shows 6 Symptom events and 1 AF event, 2.3% AT/AF burden, w ECGs illustrating what appears to be some true AF w RVR and ectopy (AF appears new to pt) as well as some NSR w ectopy. PVC burden 10.8%. Pt on Coreg, Eliquis. EP physician to review. See PACEART report under Cardiology tab. Will continue to monitor remotely.

## 2022-05-20 ENCOUNTER — TELEPHONE (OUTPATIENT)
Dept: CARDIOLOGY CLINIC | Age: 87
End: 2022-05-20

## 2022-05-20 NOTE — TELEPHONE ENCOUNTER
Spoke with patient, some wheezing and shortness of breath she has had on going. No weight gain or swelling. Appointment made for Monday, patient will call back if she can get a ride today.

## 2022-05-20 NOTE — TELEPHONE ENCOUNTER
Started Eliquis 2 months ago and is having a lot of     Trouble With this     Cannot walk very far without leg pain    Feeling very anxious    No appetite    Cannot to do anything    She believes it is the Eliquis, she hasn't started anything else recently    Is there something else she can take, she cannot take care of her  who has Alzheimers    Please advise    #[phone]

## 2022-05-23 ENCOUNTER — OFFICE VISIT (OUTPATIENT)
Dept: CARDIOLOGY CLINIC | Age: 87
End: 2022-05-23
Payer: MEDICARE

## 2022-05-23 ENCOUNTER — NURSE ONLY (OUTPATIENT)
Dept: CARDIOLOGY CLINIC | Age: 87
End: 2022-05-23

## 2022-05-23 VITALS
WEIGHT: 130 LBS | HEIGHT: 62 IN | BODY MASS INDEX: 23.92 KG/M2 | HEART RATE: 72 BPM | SYSTOLIC BLOOD PRESSURE: 136 MMHG | DIASTOLIC BLOOD PRESSURE: 78 MMHG | OXYGEN SATURATION: 91 %

## 2022-05-23 DIAGNOSIS — I48.91 ATRIAL FIBRILLATION WITH RVR (HCC): ICD-10-CM

## 2022-05-23 DIAGNOSIS — Z98.61 CAD S/P PERCUTANEOUS CORONARY ANGIOPLASTY: ICD-10-CM

## 2022-05-23 DIAGNOSIS — I25.10 CAD S/P PERCUTANEOUS CORONARY ANGIOPLASTY: ICD-10-CM

## 2022-05-23 DIAGNOSIS — E78.2 MIXED HYPERLIPIDEMIA: ICD-10-CM

## 2022-05-23 DIAGNOSIS — I63.9 CEREBROVASCULAR ACCIDENT (CVA), UNSPECIFIED MECHANISM (HCC): ICD-10-CM

## 2022-05-23 DIAGNOSIS — R00.0 TACHYCARDIA: Primary | ICD-10-CM

## 2022-05-23 DIAGNOSIS — I50.41 ACUTE COMBINED SYSTOLIC AND DIASTOLIC HF (HEART FAILURE) (HCC): ICD-10-CM

## 2022-05-23 DIAGNOSIS — I47.1 ATRIAL TACHYCARDIA (HCC): ICD-10-CM

## 2022-05-23 DIAGNOSIS — I10 PRIMARY HYPERTENSION: ICD-10-CM

## 2022-05-23 PROCEDURE — 99214 OFFICE O/P EST MOD 30 MIN: CPT | Performed by: NURSE PRACTITIONER

## 2022-05-23 PROCEDURE — 1123F ACP DISCUSS/DSCN MKR DOCD: CPT | Performed by: NURSE PRACTITIONER

## 2022-05-23 PROCEDURE — G8427 DOCREV CUR MEDS BY ELIG CLIN: HCPCS | Performed by: NURSE PRACTITIONER

## 2022-05-23 PROCEDURE — 1090F PRES/ABSN URINE INCON ASSESS: CPT | Performed by: NURSE PRACTITIONER

## 2022-05-23 PROCEDURE — G8420 CALC BMI NORM PARAMETERS: HCPCS | Performed by: NURSE PRACTITIONER

## 2022-05-23 PROCEDURE — 1036F TOBACCO NON-USER: CPT | Performed by: NURSE PRACTITIONER

## 2022-05-23 RX ORDER — CARVEDILOL 25 MG/1
25 TABLET ORAL 2 TIMES DAILY
Qty: 180 TABLET | Refills: 3 | Status: ON HOLD | OUTPATIENT
Start: 2022-05-23 | End: 2022-06-29 | Stop reason: HOSPADM

## 2022-05-23 NOTE — PROGRESS NOTES
CC SOB and edema     HPI:  80 y.o. patient of Sasha Vasques and Jacek Brasher with acute HFrEF (EF 25-30%; III-DD), CAD(PCI 2018), HTN, HLD. She had a CVA and an ILR placed. Recent ILR showed tachycardia, 99% AT/AF, AF RVR and PVC    Seh c/o SOB rest and exertion, palpitations, dizziness and fatigue. Her legs and knees feel weak and give out. No syncope or falls. Nausea last week and constipation. No v/d, fever, or GI/ bleeding. LE edema is chronic and unchanged. No abdominal bloating or early satiety (her typical CHF symptom)     Does not like the way eliquis makes her feel. Interested in warfarin. Discussed R/B/A. Very stressful at home caring for her  with dementia.       Past Medical History:   Diagnosis Date    Arthritis     Blood circulation, collateral     CAD (coronary artery disease)     CHF (congestive heart failure) (HCC)     Confusion 8/31/2020    GERD (gastroesophageal reflux disease)     History of heart artery stent 12/2018    Hyperlipidemia     Hypertension     Mitral valve prolapse     Myocardial infarct, old     Thyroid disease      Past Surgical History:   Procedure Laterality Date    COLONOSCOPY      EYE SURGERY Left 09/04/2018    PHACO EMULSIFICATION OF CATARACT WITH INTRAOCULAR LENS IMPLANT LEFT EYE     HYSTERECTOMY      OTHER SURGICAL HISTORY  08/27/2018    phacoemulsification of cataract with intraocular lens implant right eye    ND OFFICE/OUTPT VISIT,PROCEDURE ONLY Right 8/27/2018    PHACO EMULSIFICATION OF CATARACT WITH INTRAOCULAR LENS IMPLANT RIGHT EYE performed by Edwin Andrade MD at [facility] OFFICE/OUTPT [facility] Left 9/4/2018    PHACO EMULSIFICATION OF CATARACT WITH INTRAOCULAR LENS IMPLANT LEFT EYE performed by Edwin Andrade MD at [address] [place] [address]        Family History   Problem Relation Age of Onset    Heart Disease Mother     Heart Disease Father     Cancer Father     Cancer Sister     Diabetes Brother     Stroke Brother      Social History     Tobacco Use    Smoking status: Never Smoker    Smokeless tobacco: Never Used   Vaping Use    Vaping Use: Never used   Substance Use Topics    Alcohol use: No     Alcohol/week: 0.0 standard drinks    Drug use: No     Allergies:Benazepril hcl, Feldene [piroxicam], Hytrin [terazosin hcl], Iv contrast [iodides], Acetaminophen, Celebrex [celecoxib], Cephalexin, Hydrochlorothiazide, Iodinated casein, Monopril [fosinopril sodium], Protonix [pantoprazole sodium], Quinapril hcl, Toprol xl [metoprolol succinate], Ultracet [tramadol-acetaminophen], and Ziac [bisoprolol-hydrochlorothiazide]    Review of Systems  General: No changes in weight, fatigue, or night sweats. HEENT: No blurry or decreased vision. No changes in hearing, nasal discharge or sore throat. Cardiovascular:  See HPI. Respiratory: No cough, hemoptysis, or wheezing. Gastrointestinal:  No abdominal pain, hematochezia, melana, constipation, diarrhea, or history of GI ulcers. Genito-Urinary: No dysuria or hematuria. No urgency or polyuria. Musculoskeletal:  No complaints of joint pain, joint swelling or muscular weakness/soreness. Neurological:  No dizziness, headaches, numbness/tingling, speech problems or weakness. Psychological:  No anxiety or depression. Hematological and Lymphatic: No abnormal bleeding or bruising, blood clots, jaundice or swollen lymph nodes. Endocrine:   No malaise/lethargy, palpitations, polydipsia/polyuria, temperature intolerance or unexpected weight changes  Skin:  No rashes or non-healing ulcers. Physical Exam:  /78   Pulse 72   Ht 5' 2\" (1.575 m)   Wt 130 lb (59 kg)   SpO2 91%   BMI 23.78 kg/m²    General (appearance):  No acute distress  Eyes: anicteric   Neck: soft, No JVD  Ears/Nose/Mouth/Thorat: No cyanosis  CV: Irreg, tachy  Respiratory:  Clear, normal effort  GI: soft, non-tender, non-distended  Skin: Warm, dry. No rashes  Neuro/Psych: Alert and oriented x 3.  Appropriate behavior  Ext:  No c/c. 1+ BLE edema  Pulses:  2+ radial     Weight  Wt Readings from Last 3 Encounters:   05/23/22 130 lb (59 kg)   05/06/22 124 lb 9.6 oz (56.5 kg)   04/28/22 126 lb 3.2 oz (57.2 kg)          CBC:   Lab Results   Component Value Date    WBC 9.0 04/26/2022    HGB 10.3 (L) 04/26/2022    HCT 30.9 (L) 04/26/2022    MCV 90.3 04/26/2022     04/26/2022     BMP:  Lab Results   Component Value Date    CREATININE 1.5 (H) 04/26/2022    BUN 20 04/26/2022     04/26/2022    K 3.9 04/26/2022     04/26/2022    CO2 26 04/26/2022     Mag:   Lab Results   Component Value Date    MG 1.80 03/22/2022     LIVER PROFILE:   Lab Results   Component Value Date    ALT 9 (L) 03/18/2022    AST 25 03/18/2022    ALKPHOS 66 03/18/2022    BILITOT 0.3 03/18/2022     PT/INR:   Lab Results   Component Value Date    INR 1.02 03/18/2022    INR 1.02 11/20/2021    INR 0.98 11/06/2018    PROTIME 11.5 03/18/2022    PROTIME 11.5 11/20/2021    PROTIME 11.2 11/06/2018     Pro-BNP   Lab Results   Component Value Date    PROBNP 10,711 04/26/2022    PROBNP 2,510 09/04/2018     LIPIDS:  No components found for: CHLPL  Lab Results   Component Value Date    TRIG 65 03/19/2022    TRIG 104 08/20/2021    TRIG 99 03/05/2020     Lab Results   Component Value Date    HDL 84 (H) 03/19/2022    HDL 75 (H) 03/19/2022    HDL 63 (H) 08/20/2021     Lab Results   Component Value Date    LDLCALC 53 03/19/2022    LDLCALC 46 03/19/2022    LDLCALC 45 08/20/2021     Lab Results   Component Value Date    LABVLDL 13 03/19/2022    LABVLDL 12 03/19/2022    LABVLDL 21 08/20/2021     TSH:No results found for: TSH, H2MOQQL, J6LMQQQ, THYROIDAB    IMAGING:     3/19/2022 Echo:     Overall left ventricular systolic function is reduced with an estimated   ejection fraction of 25-30%. There is diffuse hypokinesis of the left ventricle. Diastolic filling parameters suggests grade III diastolic dysfunction. The left atrium is severely dilated.    Normal right ventricular size and function. Estimated pulmonary artery systolic pressure is at 51 mmHg assuming a right   atrial pressure of 8 mmHg. A bubble study was performed and fails to show evidence of shunting. 2018 [facility]  HEMODYNAMICS:  Left ventricular end-diastolic pressure equals 20. There  is no significant gradient across the aortic valve by pullback post  cineangiography.     LEFT VENTRICULOGRAM:  Left ventriculogram demonstrates global  hypokinesis. Of note,  there was no area of akinesis. The anterior  wall did contract, but again was hypokinetic. Estimated ejection  fraction 40%.     LEFT MAIN:  Left main is a short vessel that gave almost immediate rise  to LAD and circumflex. It is free of significant obstructive disease.     LEFT ANTERIOR DESCENDING:  The LAD courses to and wraps around the apex. It is a large vessel. It gives off multiple small diagonal branches. In the proximal portion of vessel, there is a ruptured plaque. There is  80% narrowing in this area. There is diffuse disease, but no  significant focal obstructive disease.     LEFT CIRCUMFLEX:  Circumflex consists of high rising marginal branch, a  very large second marginal branch courses on the AV groove and then  gives off small posterolateral branch. The circumflex is free of  significant obstructive disease.     RIGHT CORONARY ARTERY:  Right coronary artery is a large dominant  vessel. It gives off a large PDA and several distal posterolateral  branches. No significant obstructive disease in the right coronary  artery.     LEFT ANTERIOR DESCENDING POST INTERVENTION:  There is an 80% stenosis of  the ruptured plaque. Post intervention, there was minimal if any  residual stenosis.     IMPRESSION:  1.  LV dysfunction consistent with cardiomyopathy. 2.  Ventricular tachycardia. 3.  Abnormal Myoview. 4.  LV dysfunction with estimated ejection fraction of 40% with global  hypokinesis.   5.  Successful primary stent to LAD.    2018 Nuc stress  ABNORMAL HIGH RISK STRESS TEST    Normal LV size and mildly reduced systolic function. Left ventricular    ejection fraction of 47%. Hypokinesis of the apical segments. THere is a    defect in the anterior septal wall at stress and minimal reversal at rest    consistent with scar. There is also a mild defect in the anterior and    anterolateral wall at stress that completely reverses at rest consistent    with ischemia. Medications:   Current Outpatient Medications   Medication Sig Dispense Refill    furosemide (LASIX) 20 MG tablet Take 1 tablet by mouth daily as needed (fluid overload) 30 tablet 5    carvedilol (COREG) 12.5 MG tablet Take 1.5 tablets by mouth 2 times daily (with meals) 90 tablet 5    nitroGLYCERIN (NITROSTAT) 0.4 MG SL tablet PUT 1 TAB UNDER TONGUE EVERY 5 MIN AS NEEDED CHEST PAIN UP TO 3. IF NO RELIEF AFTER 1 DOSE, CALL 911 25 tablet 3    apixaban (ELIQUIS) 2.5 MG TABS tablet Take 1 tablet by mouth 2 times daily 60 tablet 5    diclofenac sodium (VOLTAREN) 1 % GEL Apply 2 g topically 4 times daily 200 g 2    famotidine (PEPCID) 20 MG tablet Take 1 tablet by mouth daily 60 tablet 3    ondansetron (ZOFRAN) 4 MG tablet Take 1 tablet by mouth 3 times daily as needed for Nausea or Vomiting 30 tablet 3    atorvastatin (LIPITOR) 80 MG tablet TAKE 1 TABLET BY MOUTH ONE TIME A DAY 90 tablet 3    vitamin B-12 (CYANOCOBALAMIN) 1000 MCG tablet Take 1,000 mcg by mouth daily      losartan (COZAAR) 50 MG tablet TAKE 1 TABLET BY MOUTH EVERY DAY 90 tablet 3    Handicap Placard MISC by Does not apply route Length: 5 years 1 each 0    vitamin D (CHOLECALCIFEROL) 1000 UNIT TABS tablet Take 2 tablets by mouth daily 60 tablet 3    HYDROcodone-acetaminophen (NORCO)  MG per tablet Take 1 tablet by mouth every 6 hours as needed. Lisa All Coenzyme Q10 (CO Q 10) 100 MG CAPS Take  by mouth daily.  aspirin 81 MG chewable tablet Take 1 tablet by mouth daily.  30 tablet     zolpidem (AMBIEN) 5 MG tablet Take 1 tablet by mouth nightly as needed for Sleep for up to 180 days. (Patient not taking: Reported on 5/6/2022) 30 tablet 2    fluticasone (FLONASE) 50 MCG/ACT nasal spray 2 sprays by Each Nostril route daily (Patient not taking: Reported on 5/23/2022) 1 Bottle 5     No current facility-administered medications for this visit. Assessment:  1. Tachycardia    2. Atrial tachycardia ([address]    3. Atrial fibrillation with RVR ([address]    4. Acute combined systolic and diastolic HF (heart failure) ([address]    5. CAD S/P percutaneous coronary angioplasty    6. Primary hypertension    7. Mixed hyperlipidemia    8. Cerebrovascular accident (CVA), unspecified mechanism ([address]        Plan:  Tachycardia: acute   AT/AF RVR   Increase Coreg to 25 mg po bid. Eliquis. I only wanted to make changes to one medication at a time. Will wait and see how she feels on increased coreg. Call back in 1-2 weeks if she would still like to switch to warfarin. She would like a home INR monitor.      Acute combined HF (EF 25-30% Grade III DD)   Discussed s/s decompensated HF   Daily weights, low salt diet, compression stockings   Lasix 20 mg po daily    Coreg 25 mg po bid   Losartan   CAD/PCI; stbale   Coreg   Losartan   ASA   Lipitor  HTN: stable   /78   Losartan, Coreg  HLD: stable   Lipitor    LDL 53  Hx CVA: stable    On eliquis     Call in 1-2 weeks with update  See Rubi in August       Reviewed most recent: CBC, BMP, LFT, Lipids, Mag, PT/INR, BNP, TSH  Reviewed most recent: ECG, Echo, Nuc stress test,  [facility]

## 2022-05-26 ENCOUNTER — NURSE ONLY (OUTPATIENT)
Dept: CARDIOLOGY CLINIC | Age: 87
End: 2022-05-26
Payer: MEDICARE

## 2022-05-26 DIAGNOSIS — R00.2 PALPITATIONS: ICD-10-CM

## 2022-05-26 DIAGNOSIS — Z45.09 ENCOUNTER FOR LOOP RECORDER CHECK: ICD-10-CM

## 2022-05-26 DIAGNOSIS — I63.9 STROKE OF UNKNOWN ETIOLOGY (HCC): ICD-10-CM

## 2022-05-27 PROCEDURE — 93298 REM INTERROG DEV EVAL SCRMS: CPT | Performed by: INTERNAL MEDICINE

## 2022-05-27 PROCEDURE — G2066 INTER DEVC REMOTE 30D: HCPCS | Performed by: INTERNAL MEDICINE

## 2022-05-27 NOTE — PROGRESS NOTES
ILR for CVA. Remote transmission received from patient's monitor at home. Since 04.14.22:  26 Symptom events w available ECGs illustrating what appears to be some AF w RVR and some NSR w ectopy. 20 Tachy events w available ECGs illustrating what appears to be AF w RVR, SVT. 1 Pause event illustrating true 3.4sec pause, likely nocturnal.  3 AF events, 13.8% burden, w ECGs illustrating what appears to be AF w current event ongoing since 05.19.22. PVC burden 8.0%. Pt on Coreg, Eliquis. Pt recently saw [facility] 05.23.22 and Coreg was increased to 25mg BID. EP physician to review. See PACEART report under Cardiology tab. Will continue to monitor remotely.

## 2022-05-29 ENCOUNTER — HOSPITAL ENCOUNTER (INPATIENT)
Age: 87
LOS: 3 days | Discharge: HOME HEALTH | DRG: 309 | End: 2022-06-01
Attending: STUDENT IN AN ORGANIZED HEALTH CARE EDUCATION/TRAINING PROGRAM | Admitting: INTERNAL MEDICINE
Payer: MEDICARE

## 2022-05-29 ENCOUNTER — APPOINTMENT (OUTPATIENT)
Dept: GENERAL RADIOLOGY | Age: 87
DRG: 309 | End: 2022-05-29
Payer: MEDICARE

## 2022-05-29 DIAGNOSIS — J18.1 LUNG CONSOLIDATION (HCC): ICD-10-CM

## 2022-05-29 DIAGNOSIS — N17.9 AKI (ACUTE KIDNEY INJURY) (HCC): Primary | ICD-10-CM

## 2022-05-29 DIAGNOSIS — I50.41 ACUTE COMBINED SYSTOLIC AND DIASTOLIC HEART FAILURE (HCC): ICD-10-CM

## 2022-05-29 PROBLEM — I48.91 ATRIAL FIBRILLATION (HCC): Status: ACTIVE | Noted: 2022-05-29

## 2022-05-29 LAB
A/G RATIO: 1.4 (ref 1.1–2.2)
ALBUMIN SERPL-MCNC: 3.9 G/DL (ref 3.4–5)
ALP BLD-CCNC: 58 U/L (ref 40–129)
ALT SERPL-CCNC: 51 U/L (ref 10–40)
ANION GAP SERPL CALCULATED.3IONS-SCNC: 16 MMOL/L (ref 3–16)
APTT: 33.6 SEC (ref 23–34.3)
AST SERPL-CCNC: 44 U/L (ref 15–37)
BACTERIA: ABNORMAL /HPF
BASE EXCESS VENOUS: 1.6 MMOL/L (ref -2–3)
BASOPHILS ABSOLUTE: 0.1 K/UL (ref 0–0.2)
BASOPHILS RELATIVE PERCENT: 0.6 %
BILIRUB SERPL-MCNC: 0.3 MG/DL (ref 0–1)
BILIRUBIN URINE: NEGATIVE
BLOOD, URINE: ABNORMAL
BUN BLDV-MCNC: 45 MG/DL (ref 7–20)
CALCIUM SERPL-MCNC: 9.7 MG/DL (ref 8.3–10.6)
CARBOXYHEMOGLOBIN: 1.3 % (ref 0–1.5)
CHLORIDE BLD-SCNC: 94 MMOL/L (ref 99–110)
CLARITY: CLEAR
CO2: 22 MMOL/L (ref 21–32)
COLOR: YELLOW
CREAT SERPL-MCNC: 2.2 MG/DL (ref 0.6–1.2)
EOSINOPHILS ABSOLUTE: 0 K/UL (ref 0–0.6)
EOSINOPHILS RELATIVE PERCENT: 0.5 %
EPITHELIAL CELLS, UA: ABNORMAL /HPF (ref 0–5)
GFR AFRICAN AMERICAN: 26
GFR NON-AFRICAN AMERICAN: 21
GLUCOSE BLD-MCNC: 139 MG/DL (ref 70–99)
GLUCOSE URINE: NEGATIVE MG/DL
HCO3 VENOUS: 26.8 MMOL/L (ref 24–28)
HCT VFR BLD CALC: 30.3 % (ref 36–48)
HEMOGLOBIN, VEN, REDUCED: 40.8 %
HEMOGLOBIN: 10 G/DL (ref 12–16)
INR BLD: 1.35 (ref 0.87–1.14)
KETONES, URINE: NEGATIVE MG/DL
LACTIC ACID: 1.8 MMOL/L (ref 0.4–2)
LEUKOCYTE ESTERASE, URINE: ABNORMAL
LYMPHOCYTES ABSOLUTE: 1.5 K/UL (ref 1–5.1)
LYMPHOCYTES RELATIVE PERCENT: 15.6 %
MCH RBC QN AUTO: 30.2 PG (ref 26–34)
MCHC RBC AUTO-ENTMCNC: 33 G/DL (ref 31–36)
MCV RBC AUTO: 91.5 FL (ref 80–100)
METHEMOGLOBIN VENOUS: 0.5 % (ref 0–1.5)
MICROSCOPIC EXAMINATION: YES
MONOCYTES ABSOLUTE: 0.8 K/UL (ref 0–1.3)
MONOCYTES RELATIVE PERCENT: 8.5 %
NEUTROPHILS ABSOLUTE: 7.3 K/UL (ref 1.7–7.7)
NEUTROPHILS RELATIVE PERCENT: 74.8 %
NITRITE, URINE: NEGATIVE
O2 SAT, VEN: 59 %
PCO2, VEN: 44 MMHG (ref 41–51)
PDW BLD-RTO: 15.4 % (ref 12.4–15.4)
PH UA: 6 (ref 5–8)
PH VENOUS: 7.39 (ref 7.35–7.45)
PLATELET # BLD: 277 K/UL (ref 135–450)
PMV BLD AUTO: 9.8 FL (ref 5–10.5)
PO2, VEN: 33.2 MMHG (ref 25–40)
POTASSIUM REFLEX MAGNESIUM: 4.5 MMOL/L (ref 3.5–5.1)
PRO-BNP: ABNORMAL PG/ML (ref 0–449)
PROCALCITONIN: 0.17 NG/ML (ref 0–0.15)
PROTEIN UA: NEGATIVE MG/DL
PROTHROMBIN TIME: 16.7 SEC (ref 11.7–14.5)
RBC # BLD: 3.31 M/UL (ref 4–5.2)
RBC UA: ABNORMAL /HPF (ref 0–4)
SARS-COV-2, NAAT: NOT DETECTED
SODIUM BLD-SCNC: 132 MMOL/L (ref 136–145)
SPECIFIC GRAVITY UA: 1.01 (ref 1–1.03)
TCO2 CALC VENOUS: 28 MMOL/L
TOTAL PROTEIN: 6.6 G/DL (ref 6.4–8.2)
TROPONIN: 0.03 NG/ML
TROPONIN: 0.04 NG/ML
URINE REFLEX TO CULTURE: YES
URINE TYPE: ABNORMAL
UROBILINOGEN, URINE: 0.2 E.U./DL
WBC # BLD: 9.7 K/UL (ref 4–11)
WBC UA: ABNORMAL /HPF (ref 0–5)

## 2022-05-29 PROCEDURE — 87086 URINE CULTURE/COLONY COUNT: CPT

## 2022-05-29 PROCEDURE — 81001 URINALYSIS AUTO W/SCOPE: CPT

## 2022-05-29 PROCEDURE — 2500000003 HC RX 250 WO HCPCS: Performed by: STUDENT IN AN ORGANIZED HEALTH CARE EDUCATION/TRAINING PROGRAM

## 2022-05-29 PROCEDURE — 87088 URINE BACTERIA CULTURE: CPT

## 2022-05-29 PROCEDURE — 85025 COMPLETE CBC W/AUTO DIFF WBC: CPT

## 2022-05-29 PROCEDURE — 6360000002 HC RX W HCPCS: Performed by: STUDENT IN AN ORGANIZED HEALTH CARE EDUCATION/TRAINING PROGRAM

## 2022-05-29 PROCEDURE — 2580000003 HC RX 258: Performed by: STUDENT IN AN ORGANIZED HEALTH CARE EDUCATION/TRAINING PROGRAM

## 2022-05-29 PROCEDURE — 87040 BLOOD CULTURE FOR BACTERIA: CPT

## 2022-05-29 PROCEDURE — 36415 COLL VENOUS BLD VENIPUNCTURE: CPT

## 2022-05-29 PROCEDURE — 71046 X-RAY EXAM CHEST 2 VIEWS: CPT

## 2022-05-29 PROCEDURE — 96365 THER/PROPH/DIAG IV INF INIT: CPT

## 2022-05-29 PROCEDURE — 96367 TX/PROPH/DG ADDL SEQ IV INF: CPT

## 2022-05-29 PROCEDURE — 99285 EMERGENCY DEPT VISIT HI MDM: CPT

## 2022-05-29 PROCEDURE — 6370000000 HC RX 637 (ALT 250 FOR IP): Performed by: STUDENT IN AN ORGANIZED HEALTH CARE EDUCATION/TRAINING PROGRAM

## 2022-05-29 PROCEDURE — 87186 SC STD MICRODIL/AGAR DIL: CPT

## 2022-05-29 PROCEDURE — 80053 COMPREHEN METABOLIC PANEL: CPT

## 2022-05-29 PROCEDURE — 83880 ASSAY OF NATRIURETIC PEPTIDE: CPT

## 2022-05-29 PROCEDURE — 96361 HYDRATE IV INFUSION ADD-ON: CPT

## 2022-05-29 PROCEDURE — 93005 ELECTROCARDIOGRAM TRACING: CPT | Performed by: STUDENT IN AN ORGANIZED HEALTH CARE EDUCATION/TRAINING PROGRAM

## 2022-05-29 PROCEDURE — 84484 ASSAY OF TROPONIN QUANT: CPT

## 2022-05-29 PROCEDURE — 85610 PROTHROMBIN TIME: CPT

## 2022-05-29 PROCEDURE — 82803 BLOOD GASES ANY COMBINATION: CPT

## 2022-05-29 PROCEDURE — 83605 ASSAY OF LACTIC ACID: CPT

## 2022-05-29 PROCEDURE — 1200000000 HC SEMI PRIVATE

## 2022-05-29 PROCEDURE — 84145 PROCALCITONIN (PCT): CPT

## 2022-05-29 PROCEDURE — 87635 SARS-COV-2 COVID-19 AMP PRB: CPT

## 2022-05-29 PROCEDURE — 85730 THROMBOPLASTIN TIME PARTIAL: CPT

## 2022-05-29 RX ORDER — SODIUM CHLORIDE 0.9 % (FLUSH) 0.9 %
5-40 SYRINGE (ML) INJECTION EVERY 12 HOURS SCHEDULED
Status: DISCONTINUED | OUTPATIENT
Start: 2022-05-29 | End: 2022-06-01 | Stop reason: HOSPADM

## 2022-05-29 RX ORDER — SODIUM CHLORIDE 9 MG/ML
INJECTION, SOLUTION INTRAVENOUS PRN
Status: DISCONTINUED | OUTPATIENT
Start: 2022-05-29 | End: 2022-06-01 | Stop reason: HOSPADM

## 2022-05-29 RX ORDER — ONDANSETRON 2 MG/ML
4 INJECTION INTRAMUSCULAR; INTRAVENOUS EVERY 6 HOURS PRN
Status: DISCONTINUED | OUTPATIENT
Start: 2022-05-29 | End: 2022-06-01 | Stop reason: HOSPADM

## 2022-05-29 RX ORDER — CARVEDILOL 25 MG/1
25 TABLET ORAL ONCE
Status: COMPLETED | OUTPATIENT
Start: 2022-05-29 | End: 2022-05-29

## 2022-05-29 RX ORDER — CARVEDILOL 25 MG/1
25 TABLET ORAL 2 TIMES DAILY WITH MEALS
Status: DISCONTINUED | OUTPATIENT
Start: 2022-05-29 | End: 2022-05-29

## 2022-05-29 RX ORDER — ONDANSETRON 4 MG/1
4 TABLET, ORALLY DISINTEGRATING ORAL EVERY 8 HOURS PRN
Status: DISCONTINUED | OUTPATIENT
Start: 2022-05-29 | End: 2022-06-01 | Stop reason: HOSPADM

## 2022-05-29 RX ORDER — 0.9 % SODIUM CHLORIDE 0.9 %
500 INTRAVENOUS SOLUTION INTRAVENOUS ONCE
Status: COMPLETED | OUTPATIENT
Start: 2022-05-29 | End: 2022-05-29

## 2022-05-29 RX ORDER — POLYETHYLENE GLYCOL 3350 17 G/17G
17 POWDER, FOR SOLUTION ORAL DAILY PRN
Status: DISCONTINUED | OUTPATIENT
Start: 2022-05-29 | End: 2022-05-31

## 2022-05-29 RX ORDER — 0.9 % SODIUM CHLORIDE 0.9 %
250 INTRAVENOUS SOLUTION INTRAVENOUS ONCE
Status: COMPLETED | OUTPATIENT
Start: 2022-05-29 | End: 2022-05-30

## 2022-05-29 RX ORDER — SODIUM CHLORIDE 0.9 % (FLUSH) 0.9 %
5-40 SYRINGE (ML) INJECTION PRN
Status: DISCONTINUED | OUTPATIENT
Start: 2022-05-29 | End: 2022-06-01 | Stop reason: HOSPADM

## 2022-05-29 RX ORDER — HYDROCODONE BITARTRATE AND ACETAMINOPHEN 10; 325 MG/1; MG/1
1 TABLET ORAL EVERY 6 HOURS PRN
Status: DISCONTINUED | OUTPATIENT
Start: 2022-05-29 | End: 2022-06-01 | Stop reason: HOSPADM

## 2022-05-29 RX ORDER — CARVEDILOL 25 MG/1
25 TABLET ORAL 2 TIMES DAILY
Status: DISCONTINUED | OUTPATIENT
Start: 2022-05-29 | End: 2022-05-29

## 2022-05-29 RX ORDER — CARVEDILOL 25 MG/1
25 TABLET ORAL 2 TIMES DAILY
Status: DISCONTINUED | OUTPATIENT
Start: 2022-05-29 | End: 2022-06-01 | Stop reason: HOSPADM

## 2022-05-29 RX ADMIN — HYDROCODONE BITARTRATE AND ACETAMINOPHEN 1 TABLET: 10; 325 TABLET ORAL at 16:01

## 2022-05-29 RX ADMIN — CARVEDILOL 25 MG: 25 TABLET, FILM COATED ORAL at 13:08

## 2022-05-29 RX ADMIN — SODIUM CHLORIDE 3000 MG: 900 INJECTION INTRAVENOUS at 13:15

## 2022-05-29 RX ADMIN — HYDROCODONE BITARTRATE AND ACETAMINOPHEN 1 TABLET: 10; 325 TABLET ORAL at 23:38

## 2022-05-29 RX ADMIN — ONDANSETRON 4 MG: 2 INJECTION INTRAMUSCULAR; INTRAVENOUS at 19:00

## 2022-05-29 RX ADMIN — DOXYCYCLINE 100 MG: 100 INJECTION, POWDER, LYOPHILIZED, FOR SOLUTION INTRAVENOUS at 13:50

## 2022-05-29 RX ADMIN — SODIUM CHLORIDE, PRESERVATIVE FREE 10 ML: 5 INJECTION INTRAVENOUS at 23:11

## 2022-05-29 RX ADMIN — SODIUM CHLORIDE 500 ML: 9 INJECTION, SOLUTION INTRAVENOUS at 12:28

## 2022-05-29 RX ADMIN — SODIUM CHLORIDE 500 ML: 9 INJECTION, SOLUTION INTRAVENOUS at 15:49

## 2022-05-29 RX ADMIN — CARVEDILOL 25 MG: 25 TABLET, FILM COATED ORAL at 19:54

## 2022-05-29 RX ADMIN — SODIUM CHLORIDE 125 ML: 9 INJECTION, SOLUTION INTRAVENOUS at 22:24

## 2022-05-29 RX ADMIN — APIXABAN 2.5 MG: 2.5 TABLET, FILM COATED ORAL at 17:43

## 2022-05-29 RX ADMIN — POLYETHYLENE GLYCOL 3350 17 G: 17 POWDER, FOR SOLUTION ORAL at 20:43

## 2022-05-29 ASSESSMENT — PAIN DESCRIPTION - DESCRIPTORS
DESCRIPTORS: ACHING
DESCRIPTORS: SHARP

## 2022-05-29 ASSESSMENT — ENCOUNTER SYMPTOMS
SHORTNESS OF BREATH: 1
COUGH: 0
VOMITING: 0
DIARRHEA: 0
NAUSEA: 0

## 2022-05-29 ASSESSMENT — PAIN DESCRIPTION - PAIN TYPE
TYPE: CHRONIC PAIN
TYPE: ACUTE PAIN
TYPE: CHRONIC PAIN

## 2022-05-29 ASSESSMENT — PAIN DESCRIPTION - LOCATION
LOCATION: BACK

## 2022-05-29 ASSESSMENT — PAIN DESCRIPTION - ORIENTATION
ORIENTATION: LOWER

## 2022-05-29 ASSESSMENT — PAIN SCALES - GENERAL
PAINLEVEL_OUTOF10: 7
PAINLEVEL_OUTOF10: 5
PAINLEVEL_OUTOF10: 6
PAINLEVEL_OUTOF10: 4
PAINLEVEL_OUTOF10: 7

## 2022-05-29 ASSESSMENT — PAIN - FUNCTIONAL ASSESSMENT
PAIN_FUNCTIONAL_ASSESSMENT: PREVENTS OR INTERFERES SOME ACTIVE ACTIVITIES AND ADLS
PAIN_FUNCTIONAL_ASSESSMENT: 0-10

## 2022-05-29 ASSESSMENT — PAIN DESCRIPTION - FREQUENCY
FREQUENCY: INTERMITTENT
FREQUENCY: INTERMITTENT

## 2022-05-29 ASSESSMENT — PAIN DESCRIPTION - ONSET: ONSET: ON-GOING

## 2022-05-29 NOTE — PROGRESS NOTES
4 Eyes Admission Assessment     I agree as the admission nurse that 2 RN's have performed a thorough Head to Toe Skin Assessment on the patient. ALL assessment sites listed below have been assessed on admission.        Areas assessed by both nurses:   [x]   Head, Face, and Ears   [x]   Shoulders, Back, and Chest  [x]   Arms, Elbows, and Hands   [x]   Coccyx, Sacrum, and Ischium  [x]   Legs, Feet, and Heels        Does the Patient have Skin Breakdown?  no        Michel Prevention initiated:  No   Wound Care Orders initiated:  No      [facility] nurse consulted for Pressure Injury (Stage 3,4, Unstageable, DTI, NWPT, and Complex wounds) or Michel score 18 or lower:  No      Nurse 1 eSignature: Electronically signed by Cristofer Tillman RN on 5/29/22 at 4:28 PM EDT    **SHARE this note so that the co-signing nurse is able to place an eSignature**    Nurse 2 eSignature: Electronically signed by Estephanie Olguin RN on 5/29/22 at 4:29 PM EDT

## 2022-05-29 NOTE — PROGRESS NOTES
Patient admitted to the floor . Patient complaining of feeling weak and back pain. Patient was given her Norco for pain. Heat pack applied to back . IV fluid bolus infusing. Will recheck BP . Daughter at bedside.   Call light in reach

## 2022-05-29 NOTE — H&P
Internal Medicine  PGY 1  History & Physical      CC SOB     History Obtained From: patient, EMR     HISTORY OF PRESENT ILLNESS:  Patient is a 81 yo female with a PMHx of CAD, HFrEF (EF of 25-30%), HLD, HTN, and hx of MI, who presents with SOB. She endorses worsening SOB over the last few days, it is worse with exertion. She typically sleeps with 3 pillows at night. She talked to cardiology on-call yesterday and they told her to stop taking all of her medications except eliquis and her pain meds. She also endorses feeling more swollen in her B/L LE, intermittent palpitations, lack of appetite over past few days, and on/off chest pain that radiates to the back. She also has some back pain, but she attributes the chest and back pain to not taking her pain meds as of recent. Upon arrival to the ED, patient was tachycardic,  But otherwise hemodynamically stable. Cr of 2.2, which is elevated from baseline of 1.5. Troponin of 0.04. [place]BNP of [address], which is mildly elevated from prior. CXR indicated minimal bibasilar airspace disaese, atelectasis, or  Pneumonia. He was given a 500cc bolus, unasyn,and vibramycin. She wasin  A  Fib with  RVR and given  Coreg in ED. Urine cx and blood cx obtained. Patient admitted for further management of SOB, afib rvr, and AGUILA.      Past Medical History:        Diagnosis Date    Arthritis     Blood circulation, collateral     CAD (coronary artery disease)     CHF (congestive heart failure) (HCC)     Confusion 8/31/2020    GERD (gastroesophageal reflux disease)     History of heart artery stent 12/2018    Hyperlipidemia     Hypertension     Mitral valve prolapse     Myocardial infarct, old     Thyroid disease    ·     Past Surgical History:        Procedure Laterality Date    COLONOSCOPY      EYE SURGERY Left 09/04/2018    PHACO EMULSIFICATION OF CATARACT WITH INTRAOCULAR LENS IMPLANT LEFT EYE     HYSTERECTOMY      OTHER SURGICAL HISTORY  08/27/2018    phacoemulsification of cataract with intraocular lens implant right eye    DE OFFICE/OUTPT VISIT,PROCEDURE ONLY Right 8/27/2018    PHACO EMULSIFICATION OF CATARACT WITH INTRAOCULAR LENS IMPLANT RIGHT EYE performed by Abel Melchor MD at [address] OFFICE/OUTPT VISIT,PROCEDURE ONLY Left 9/4/2018    PHACO EMULSIFICATION OF CATARACT WITH INTRAOCULAR LENS IMPLANT LEFT EYE performed by Abel Melchor MD at [facility]     ·     Medications Priorto Admission:    · Not in a hospital admission. Allergies:  Benazepril hcl, Feldene [piroxicam], Hytrin [terazosin hcl], Iv contrast [iodides], Acetaminophen, Celebrex [celecoxib], Cephalexin, Hydrochlorothiazide, Iodinated casein, Monopril [fosinopril sodium], Protonix [pantoprazole sodium], Quinapril hcl, Toprol xl [metoprolol succinate], Ultracet [tramadol-acetaminophen], and Ziac [bisoprolol-hydrochlorothiazide]    Social History:   · TOBACCO:   reports that she has never smoked. She has never used smokeless tobacco.  · ETOH:   reports no history of alcohol use. · DRUGS : denies   · Patient currently lives at home with son, ambulates with cane. ·   Family History:       Problem Relation Age of Onset    Heart Disease Mother     Heart Disease Father     Cancer Father     Cancer Sister     Diabetes Brother     Stroke Brother    ·     Review of Systems   Constitutional: Positive for appetite change and fatigue. Negative for fever. Respiratory: Positive for shortness of breath. Negative for cough. Cardiovascular: Positive for palpitations. Negative for chest pain. Gastrointestinal: Negative for diarrhea, nausea and vomiting. Genitourinary: Negative for difficulty urinating. Musculoskeletal: Positive for arthralgias. Negative for myalgias. Neurological: Negative for dizziness and light-headedness. Psychiatric/Behavioral: Negative for confusion and hallucinations.        ROS: A 10 point review of systems was conducted, significant findings as noted in HPI.    Physical Exam  Constitutional:       General: She is not in acute distress. Appearance: Normal appearance. She is normal weight. HENT:      Head: Normocephalic and atraumatic. Right Ear: External ear normal.      Left Ear: External ear normal.      Nose: Nose normal.      Mouth/Throat:      Mouth: Mucous membranes are moist.      Pharynx: Oropharynx is clear. Eyes:      Extraocular Movements: Extraocular movements intact. Comments: R peripheral vision loss from prior stroke    Cardiovascular:      Rate and Rhythm: Tachycardia present. Rhythm irregular. Heart sounds: Murmur heard. Pulmonary:      Effort: Pulmonary effort is normal.      Breath sounds: Normal breath sounds. Abdominal:      General: Abdomen is flat. Bowel sounds are normal.      Palpations: Abdomen is soft. Musculoskeletal:      Right lower leg: Edema present. Left lower leg: Edema present. Comments: B/L LE edema above ankle  +2/5 strength in B/L LE,+5/5 strength in B/L UE    Neurological:      General: No focal deficit present. Mental Status: She is oriented to person, place, and time. Psychiatric:         Mood and Affect: Mood normal.         Behavior: Behavior normal.       Physical exam:       Vitals:    05/29/22 1308   BP: (!) 113/92   Pulse: (!) 134   Resp:    Temp:    SpO2:        DATA:    Labs:  CBC:   Recent Labs     05/29/22  1149   WBC 9.7   HGB 10.0*   HCT 30.3*          BMP:   Recent Labs     05/29/22  1149   *   K 4.5   CL 94*   CO2 22   BUN 45*   CREATININE 2.2*   GLUCOSE 139*     LFT's:   Recent Labs     05/29/22  1149   AST 44*   ALT 51*   BILITOT 0.3   ALKPHOS 58     Troponin:   Recent Labs     05/29/22  1149   TROPONINI 0.04*     BNP:No results for input(s): BNP in the last 72 hours. ABGs: No results for input(s): PHART, NNV5UGE, PO2ART in the last 72 hours.   INR:   Recent Labs     05/29/22  1149   INR 1.35*       U/A:  Recent Labs     05/29/22  [facility] [email or web address] [facility] Yellow   PHUR 6.0   WBCUA 10-20*   RBCUA 3-4   BACTERIA 2+*   CLARITYU Clear   SPECGRAV 1.015   LEUKOCYTESUR MODERATE*   UROBILINOGEN 0.2   BILIRUBINUR Negative   BLOODU TRACE-LYSED*   GLUCOSEU Negative       XR CHEST (2 VW)   Final Result      Minimal bibasilar airspace disease, atelectasis or pneumonia. ASSESSMENT AND PLAN:     79 yo female with a PMHx of CAD, HFrEF (EF of 25-30%), HLD, HTN, hx of MI, and thyroid disease, who presents with SOB. A fib w/ RVR  Tachy to the 120-140s, a fib RVR on arrival to EKG, not taking coreg since yesterday.   -continue eliquis 2.5mg BID    -coreg 25mg BID   -holding home meds until seen by cards   -tele   -EP cs- appreciate recs     AGUILA 2/2 pre-renal vs cardiorenal  Cr of 2.2, elevated from baseline of 1.5  -trend RFP  -strict I/O  -additional 500cc bolus     SOB 2/2 suspected Acute on Chronic HFrEF vs PNA, low suspicion    Increasing SOB at rest and w/ exertion, elevated procal 0.17. Probnp mildly elevated from previous. SOB likely from not taking medications, rather than HF or PNA.    -blood cx pending  -strict I/O  -holding home meds until seen by cards   -EP cs- appreciate recs     NSTEMI 2/2 demand ischemia   Trop elevated, but down from prior, having intermittent chest pain    -trend troponin q6h      UTI r/o  UA had 2+ bacteria and (+) leukocytes  -urine cx pending  -monitor for worsening signs/sx, treat if sx    Will discuss with attending physician Dr. Maria Elena Connell MD.     Code Status:Full code  FEN: Regular, lwo sodium, fluid restriction   PPX:  eliquis    DISPO: MYRTLE Diaz,   5/29/2022,  1:30 PM

## 2022-05-29 NOTE — ED PROVIDER NOTES
[address] [facility]          ATTENDING PHYSICIAN NOTE       Date of evaluation: 5/29/2022    Chief Complaint     Shortness of Breath (feels SOB like she is full of fluid. had some chest pain. Also feels bloated. )      History of Present Illness     Addie Scott is a 80 y.o. female who presents with a chief complaint of dyspnea, chest tightness and weakness. Patient states that she recently told by her GI doctor to stop taking all her medications except her Eliquis to \"help her clean out\". She is unsure further management at this time however states she is having dominance. States symptoms are primarily in her chest.  She is not taking any medications except Eliquis over the past 3 days including carvedilol. Denies any extremity new extremity weakness or numbness, denies any headache. Unsure what makes her symptoms better or worse. Review of Systems     REVIEW OF SYSTEMS  GENERAL: Negative for any fevers, chills, or weight loss. HEENT: Negative for any head trauma, neck trauma, neck stiffness, photophobia, phonophobia, sinusitis, rhinitis. CARDIAC: Per HPI  PULMONARY: Per HPI  GASTROINTESTINAL: Negative for any abdominal pain, nausea, vomiting,   GENITOURINARY: Negative for any dysuria, hematuria, incontinence. SKIN: Negative for any rashes, wounds  MSK: Negative for any joint pains, photosensitive rashes, history of vasculitis or kidney problems. HEMATOLOGIC: Negative for any abnormal bruising, frequent infections or bleeding.   NEUROLOGIC: Negative for any headache, dizziness, focal weakness  PSYCH: Negative for any agitation, confusion      Past Medical, Surgical, Family, and Social History     She has a past medical history of Arthritis, Blood circulation, collateral, CAD (coronary artery disease), CHF (congestive heart failure) ([facility] [address] Confusion, GERD (gastroesophageal reflux disease), History of heart artery stent, Hyperlipidemia, Hypertension, Mitral valve VITAMIN D (CHOLECALCIFEROL) 1000 UNIT TABS TABLET    Take 2 tablets by mouth daily    ZOLPIDEM (AMBIEN) 5 MG TABLET    Take 1 tablet by mouth nightly as needed for Sleep for up to 180 days. Allergies     She is allergic to benazepril hcl, feldene [piroxicam], hytrin [terazosin hcl], iv contrast [iodides], acetaminophen, celebrex [celecoxib], cephalexin, hydrochlorothiazide, iodinated casein, monopril [fosinopril sodium], protonix [pantoprazole sodium], quinapril hcl, toprol xl [metoprolol succinate], ultracet [tramadol-acetaminophen], and ziac [bisoprolol-hydrochlorothiazide]. Physical Exam     INITIAL VITALS: BP: (!) 116/59, Temp: 97.6 °F (36.4 °C), Heart Rate: (!) 124, Resp: 20, SpO2: 100 %   Physical Exam  Vitals and nursing note reviewed. Constitutional:       Appearance: Normal appearance. HENT:      Head: Normocephalic and atraumatic. Eyes:      Conjunctiva/sclera: Conjunctivae normal.      Pupils: Pupils are equal, round, and reactive to light. Cardiovascular:      Pulses: Normal pulses. Heart sounds: Normal heart sounds. Comments: Irregularly irregular rhythm  Pulmonary:      Effort: Pulmonary effort is normal.      Breath sounds: Normal breath sounds. Abdominal:      General: Abdomen is flat. Palpations: Abdomen is soft. Tenderness: There is no abdominal tenderness. Musculoskeletal:         General: No swelling or tenderness. Normal range of motion. Cervical back: Normal range of motion and neck supple. Skin:     General: Skin is warm and dry. Neurological:      General: No focal deficit present. Mental Status: She is alert and oriented to person, place, and time. Psychiatric:         Mood and Affect: Mood normal.         Thought Content: Thought content normal.         Diagnostic Results     EKG   Atrial fibrillation with RVR at a rate of 135  Nonspecific ST changes, leftward axis.     RADIOLOGY:  XR CHEST (2 VW)   Final Result      Minimal bibasilar airspace disease, atelectasis or pneumonia.           LABS:   Results for orders placed or performed during the hospital encounter of 05/29/22   COVID-19, Rapid    Specimen: Nasopharyngeal Swab   Result Value Ref Range    SARS-CoV-2, NAAT Not Detected Not Detected   CBC with Auto Differential   Result Value Ref Range    WBC 9.7 4.0 - 11.0 K/uL    RBC 3.31 (L) 4.00 - 5.20 M/uL    Hemoglobin 10.0 (L) 12.0 - 16.0 g/dL    Hematocrit 30.3 (L) 36.0 - 48.0 %    MCV 91.5 80.0 - 100.0 fL    MCH 30.2 26.0 - 34.0 pg    MCHC 33.0 31.0 - 36.0 g/dL    RDW 15.4 12.4 - 15.4 %    Platelets 162 196 - 776 K/uL    MPV 9.8 5.0 - 10.5 fL    Neutrophils % 74.8 %    Lymphocytes % 15.6 %    Monocytes % 8.5 %    Eosinophils % 0.5 %    Basophils % 0.6 %    Neutrophils Absolute 7.3 1.7 - 7.7 K/uL    Lymphocytes Absolute 1.5 1.0 - 5.1 K/uL    Monocytes Absolute 0.8 0.0 - 1.3 K/uL    Eosinophils Absolute 0.0 0.0 - 0.6 K/uL    Basophils Absolute 0.1 0.0 - 0.2 K/uL   Comprehensive Metabolic Panel w/ Reflex to MG   Result Value Ref Range    Sodium 132 (L) 136 - 145 mmol/L    Potassium reflex Magnesium 4.5 3.5 - 5.1 mmol/L    Chloride 94 (L) 99 - 110 mmol/L    CO2 22 21 - 32 mmol/L    Anion Gap 16 3 - 16    Glucose 139 (H) 70 - 99 mg/dL    BUN 45 (H) 7 - 20 mg/dL    CREATININE 2.2 (H) 0.6 - 1.2 mg/dL    GFR Non-African American 21 (A) >60    GFR  26 (A) >60    Calcium 9.7 8.3 - 10.6 mg/dL    Total Protein 6.6 6.4 - 8.2 g/dL    Albumin 3.9 3.4 - 5.0 g/dL    Albumin/Globulin Ratio 1.4 1.1 - 2.2    Total Bilirubin 0.3 0.0 - 1.0 mg/dL    Alkaline Phosphatase 58 40 - 129 U/L    ALT 51 (H) 10 - 40 U/L    AST 44 (H) 15 - 37 U/L   Troponin   Result Value Ref Range    Troponin 0.04 (H) <0.01 ng/mL   Protime-INR   Result Value Ref Range    Protime 16.7 (H) 11.7 - 14.5 sec    INR 1.35 (H) 0.87 - 1.14   APTT   Result Value Ref Range    aPTT 33.6 23.0 - 34.3 sec   Urinalysis with Reflex to Culture    Specimen: Urine   Result Value Ref Range    Color, UA Yellow Straw/Yellow    Clarity, UA Clear Clear    Glucose, Ur Negative Negative mg/dL    Bilirubin Urine Negative Negative    Ketones, Urine Negative Negative mg/dL    Specific Gravity, UA 1.015 1.005 - 1.030    Blood, Urine TRACE-LYSED (A) Negative    pH, UA 6.0 5.0 - 8.0    Protein, UA Negative Negative mg/dL    Urobilinogen, Urine 0.2 <2.0 E.U./dL    Nitrite, Urine Negative Negative    Leukocyte Esterase, Urine MODERATE (A) Negative    Microscopic Examination YES     Urine Type NotGiven     Urine Reflex to Culture Yes    Lactic Acid   Result Value Ref Range    Lactic Acid 1.8 0.4 - 2.0 mmol/L   Blood Gas, Venous   Result Value Ref Range    pH, Rigo 7.393 7.350 - 7.450    pCO2, Rigo 44.0 41.0 - 51.0 mmHg    pO2, Rigo 33.2 25.0 - 40.0 mmHg    HCO3, Venous 26.8 24.0 - 28.0 mmol/L    Base Excess, Rigo 1.6 -2.0 - 3.0 mmol/L    O2 Sat, Rigo 59 Not established %    Carboxyhemoglobin 1.3 0.0 - 1.5 %    MetHgb, Rigo 0.5 0.0 - 1.5 %    TC02 (Calc), Rigo 28 mmol/L    Hemoglobin, Rigo, Reduced 40.80 %   Brain Natriuretic Peptide   Result Value Ref Range    Pro-BNP 13,884 (H) 0 - 449 pg/mL   Microscopic Urinalysis   Result Value Ref Range    WBC, UA 10-20 (A) 0 - 5 /HPF    RBC, UA 3-4 0 - 4 /HPF    Epithelial Cells, UA 2-5 0 - 5 /HPF    Bacteria, UA 2+ (A) None Seen /HPF       ED BEDSIDE ULTRASOUND:      RECENT VITALS:  BP: (!) 116/59,Temp: 97.6 °F (36.4 °C), Heart Rate: (!) 124, Resp: 20, SpO2: 100 %     Procedures       ED Course     Nursing Notes, Past Medical Hx, Past Surgical Hx, Social Hx,Allergies, and Family Hx were reviewed.     patient was given the following medications:  Orders Placed This Encounter   Medications    0.9 % sodium chloride bolus    DISCONTD: carvedilol (COREG) tablet 25 mg    ampicillin-sulbactam (UNASYN) 3000 mg in 100 mL NS IVPB minibag     Order Specific Question:   Antimicrobial Indications     Answer:   Pneumonia (CAP)    doxycycline (VIBRAMYCIN) 100 mg in dextrose 5 % 100 mL IVPB Order Specific Question:   Antimicrobial Indications     Answer:   Pneumonia (CAP)    carvedilol (COREG) tablet 25 mg       CONSULTS:  IP CONSULT TO HOSPITALIST    MEDICAL DECISIONMAKING / ASSESSMENT / Donato Dotty is a 80 y.o. female presenting with a chief complaint of shortness of breath as well as multiple other vague complaints. Patient does have a significant past medical history including A. fib, on Eliquis. Exam patient appears to be in A. fib RVR, has not taken any of her home antiarrhythmics. Chest x-ray with possible pneumonia, given this in conjunction with pulmonary symptoms, will elect to treat as potential bacterial pneumonia. Also with acute kidney injury, will give small bolus of fluids evaluate for initial responsiveness. Suspect etiology of her RVR may be secondary to infection or dehydration and not taking her home medications. Will give home dose of carvedilol and continue to monitor. Discussed with internal medicine on-call who will admit the patient further evaluation and observation    Clinical Impression     1. AGUILA (acute kidney injury) ([address]    2. Lung consolidation ([address]        Disposition     PATIENT REFERRED TO:  No follow-up provider specified.     DISCHARGE MEDICATIONS:  New Prescriptions    No medications on file       Lillian Nelson MD  05/29/22 9548

## 2022-05-29 NOTE — FLOWSHEET NOTE
05/29/22 1856   Vitals   Heart Rate (!) 128   BP 93/60   MAP (mmHg) 71   BP Location Right upper arm   BP Upper/Lower Upper   BP Method Automatic     Patient feeling nauseated .   Zofran given

## 2022-05-30 LAB
ALBUMIN SERPL-MCNC: 3.5 G/DL (ref 3.4–5)
ANION GAP SERPL CALCULATED.3IONS-SCNC: 13 MMOL/L (ref 3–16)
BASOPHILS ABSOLUTE: 0.1 K/UL (ref 0–0.2)
BASOPHILS RELATIVE PERCENT: 0.8 %
BUN BLDV-MCNC: 43 MG/DL (ref 7–20)
CALCIUM SERPL-MCNC: 8.8 MG/DL (ref 8.3–10.6)
CHLORIDE BLD-SCNC: 97 MMOL/L (ref 99–110)
CO2: 23 MMOL/L (ref 21–32)
CREAT SERPL-MCNC: 2.1 MG/DL (ref 0.6–1.2)
EKG DIAGNOSIS: NORMAL
EKG Q-T INTERVAL: 326 MS
EKG QRS DURATION: 122 MS
EKG QTC CALCULATION (BAZETT): 489 MS
EKG R AXIS: -41 DEGREES
EKG T AXIS: 116 DEGREES
EKG VENTRICULAR RATE: 135 BPM
EOSINOPHILS ABSOLUTE: 0.1 K/UL (ref 0–0.6)
EOSINOPHILS RELATIVE PERCENT: 0.7 %
GFR AFRICAN AMERICAN: 27
GFR NON-AFRICAN AMERICAN: 22
GLUCOSE BLD-MCNC: 128 MG/DL (ref 70–99)
HCT VFR BLD CALC: 29.1 % (ref 36–48)
HEMOGLOBIN: 9.7 G/DL (ref 12–16)
LACTIC ACID: 1.1 MMOL/L (ref 0.4–2)
LYMPHOCYTES ABSOLUTE: 1.9 K/UL (ref 1–5.1)
LYMPHOCYTES RELATIVE PERCENT: 21.1 %
MAGNESIUM: 2.1 MG/DL (ref 1.8–2.4)
MCH RBC QN AUTO: 30.4 PG (ref 26–34)
MCHC RBC AUTO-ENTMCNC: 33.3 G/DL (ref 31–36)
MCV RBC AUTO: 91.3 FL (ref 80–100)
MONOCYTES ABSOLUTE: 0.9 K/UL (ref 0–1.3)
MONOCYTES RELATIVE PERCENT: 9.8 %
NEUTROPHILS ABSOLUTE: 6 K/UL (ref 1.7–7.7)
NEUTROPHILS RELATIVE PERCENT: 67.6 %
PDW BLD-RTO: 15.4 % (ref 12.4–15.4)
PHOSPHORUS: 3.3 MG/DL (ref 2.5–4.9)
PLATELET # BLD: 252 K/UL (ref 135–450)
PMV BLD AUTO: 9.8 FL (ref 5–10.5)
POTASSIUM SERPL-SCNC: 4.9 MMOL/L (ref 3.5–5.1)
RBC # BLD: 3.18 M/UL (ref 4–5.2)
SODIUM BLD-SCNC: 133 MMOL/L (ref 136–145)
WBC # BLD: 8.9 K/UL (ref 4–11)

## 2022-05-30 PROCEDURE — 2580000003 HC RX 258: Performed by: STUDENT IN AN ORGANIZED HEALTH CARE EDUCATION/TRAINING PROGRAM

## 2022-05-30 PROCEDURE — 36415 COLL VENOUS BLD VENIPUNCTURE: CPT

## 2022-05-30 PROCEDURE — 83735 ASSAY OF MAGNESIUM: CPT

## 2022-05-30 PROCEDURE — 6370000000 HC RX 637 (ALT 250 FOR IP): Performed by: STUDENT IN AN ORGANIZED HEALTH CARE EDUCATION/TRAINING PROGRAM

## 2022-05-30 PROCEDURE — 1200000000 HC SEMI PRIVATE

## 2022-05-30 PROCEDURE — 99223 1ST HOSP IP/OBS HIGH 75: CPT | Performed by: INTERNAL MEDICINE

## 2022-05-30 PROCEDURE — 83605 ASSAY OF LACTIC ACID: CPT

## 2022-05-30 PROCEDURE — 85025 COMPLETE CBC W/AUTO DIFF WBC: CPT

## 2022-05-30 PROCEDURE — 80069 RENAL FUNCTION PANEL: CPT

## 2022-05-30 PROCEDURE — 6360000002 HC RX W HCPCS: Performed by: INTERNAL MEDICINE

## 2022-05-30 RX ORDER — DIGOXIN 0.25 MG/ML
250 INJECTION INTRAMUSCULAR; INTRAVENOUS ONCE
Status: COMPLETED | OUTPATIENT
Start: 2022-05-30 | End: 2022-05-30

## 2022-05-30 RX ORDER — SENNA PLUS 8.6 MG/1
1 TABLET ORAL DAILY PRN
Status: DISCONTINUED | OUTPATIENT
Start: 2022-05-30 | End: 2022-06-01 | Stop reason: HOSPADM

## 2022-05-30 RX ORDER — DIGOXIN 0.25 MG/ML
125 INJECTION INTRAMUSCULAR; INTRAVENOUS EVERY 4 HOURS
Status: COMPLETED | OUTPATIENT
Start: 2022-05-30 | End: 2022-05-30

## 2022-05-30 RX ORDER — SODIUM CHLORIDE 9 MG/ML
INJECTION, SOLUTION INTRAVENOUS CONTINUOUS
Status: ACTIVE | OUTPATIENT
Start: 2022-05-30 | End: 2022-05-30

## 2022-05-30 RX ADMIN — MINERAL OIL 330 ML: 1000 LIQUID ORAL at 11:09

## 2022-05-30 RX ADMIN — CARVEDILOL 25 MG: 25 TABLET, FILM COATED ORAL at 20:10

## 2022-05-30 RX ADMIN — HYDROCODONE BITARTRATE AND ACETAMINOPHEN 1 TABLET: 10; 325 TABLET ORAL at 20:10

## 2022-05-30 RX ADMIN — DIGOXIN 125 MCG: 0.25 INJECTION INTRAMUSCULAR; INTRAVENOUS at 18:24

## 2022-05-30 RX ADMIN — HYDROCODONE BITARTRATE AND ACETAMINOPHEN 1 TABLET: 10; 325 TABLET ORAL at 08:19

## 2022-05-30 RX ADMIN — APIXABAN 2.5 MG: 2.5 TABLET, FILM COATED ORAL at 20:10

## 2022-05-30 RX ADMIN — POLYETHYLENE GLYCOL 3350 17 G: 17 POWDER, FOR SOLUTION ORAL at 08:19

## 2022-05-30 RX ADMIN — SENNOSIDES 8.6 MG: 8.6 TABLET, COATED ORAL at 08:19

## 2022-05-30 RX ADMIN — DIGOXIN 125 MCG: 0.25 INJECTION INTRAMUSCULAR; INTRAVENOUS at 14:59

## 2022-05-30 RX ADMIN — CARVEDILOL 25 MG: 25 TABLET, FILM COATED ORAL at 08:19

## 2022-05-30 RX ADMIN — APIXABAN 2.5 MG: 2.5 TABLET, FILM COATED ORAL at 08:19

## 2022-05-30 RX ADMIN — SODIUM CHLORIDE: 9 INJECTION, SOLUTION INTRAVENOUS at 13:53

## 2022-05-30 RX ADMIN — SODIUM CHLORIDE, PRESERVATIVE FREE 10 ML: 5 INJECTION INTRAVENOUS at 20:10

## 2022-05-30 RX ADMIN — SODIUM CHLORIDE, PRESERVATIVE FREE 5 ML: 5 INJECTION INTRAVENOUS at 08:23

## 2022-05-30 RX ADMIN — DIGOXIN 250 MCG: 0.25 INJECTION INTRAMUSCULAR; INTRAVENOUS at 10:50

## 2022-05-30 ASSESSMENT — PAIN - FUNCTIONAL ASSESSMENT
PAIN_FUNCTIONAL_ASSESSMENT: PREVENTS OR INTERFERES SOME ACTIVE ACTIVITIES AND ADLS

## 2022-05-30 ASSESSMENT — ENCOUNTER SYMPTOMS
BACK PAIN: 1
SHORTNESS OF BREATH: 1
WHEEZING: 0
COUGH: 0
BLOOD IN STOOL: 0
CONSTIPATION: 1
ABDOMINAL PAIN: 1

## 2022-05-30 ASSESSMENT — PAIN SCALES - GENERAL
PAINLEVEL_OUTOF10: 0
PAINLEVEL_OUTOF10: 7
PAINLEVEL_OUTOF10: 4
PAINLEVEL_OUTOF10: 7
PAINLEVEL_OUTOF10: 5
PAINLEVEL_OUTOF10: 3
PAINLEVEL_OUTOF10: 2

## 2022-05-30 ASSESSMENT — PAIN DESCRIPTION - FREQUENCY: FREQUENCY: CONTINUOUS

## 2022-05-30 ASSESSMENT — PAIN DESCRIPTION - DESCRIPTORS
DESCRIPTORS: ACHING

## 2022-05-30 ASSESSMENT — PAIN DESCRIPTION - LOCATION
LOCATION: BACK
LOCATION: BACK;ABDOMEN
LOCATION: BACK
LOCATION: BACK

## 2022-05-30 ASSESSMENT — PAIN DESCRIPTION - ONSET: ONSET: ON-GOING

## 2022-05-30 ASSESSMENT — PAIN DESCRIPTION - ORIENTATION
ORIENTATION: LOWER

## 2022-05-30 ASSESSMENT — PAIN DESCRIPTION - PAIN TYPE
TYPE: ACUTE PAIN

## 2022-05-30 NOTE — PROGRESS NOTES
IV bolus completed. BP: 83/53, HR:117, patient denied having chest pain, SOB, fever, or chill. MD notified via perfect serve. Call light within reach. awaiting response at this time.

## 2022-05-30 NOTE — PROGRESS NOTES
Patient BP: 94/53 and HR: sustaining in the 130's. Pt denies having chest pain, SOB or any acute distress. MD notified via perfect serve. MD ordered to go ahead and give the Coreg and he will switch SBP parameter to >90. Continue to monitor BP. Pt is resting in bed. Call light and commonly used items within reach. Pt encouraged to call for assistance and pt verbalized understanding.

## 2022-05-30 NOTE — PROGRESS NOTES
Nutrition Note       NUTRITION RECOMMENDATIONS:   1. PO Diet: Continue low Na, 1200ml FR diet  2. Provided constipation nutrition therapy to pt 5/30    NUTRITION ASSESSMENT:  Nutritional summary & status: +IP for wt loss and poor appetite. Pt unsure of wt loss noting \"I just woke up from a nap and am a little confused. \"  Pt unsure of UBW. Noted wt on file from March 2022. RD to order new wt. Pt relates lack of appetite to chronic constipation which she treats w/ senokot and enemas as needed. RD provided constipation nutrition therapy handout. Pt w/ ~1000ml of prune juice on tray approved by RN although pt on 1200ml fluid restriction. RD recommended ordering next tray while utilizing nutrition therapy handout. Left handout w/ menu. Will continue to monitor per [facility].  Admission/PMH: Admission :afib; PMH: CAD, HFrEF (EF of 25-30%), HLD, HTN, and hx of MI    MALNUTRITION ASSESSMENT  Context of Malnutrition: Acute Illness   Malnutrition Status: No malnutrition    NUTRITION DIAGNOSIS   Inadequate oral intake related to altered GI function as evidenced by constipation    [address] and/or Nutrient Delivery:  Continue Current Diet  Nutrition Education/Counseling:  Education completed       The patient will still be monitored per nutrition standards of care. Consult dietitian if nutrition interventions essential to patient care is needed.      Marcela Herbert [address], [address] [place] [address]  [place]:  [phone]  Office:  [phone]

## 2022-05-30 NOTE — PROGRESS NOTES
MD notified of Patient's BP:88/53, and HR: sustaining in the 130's. Patient was given Coreg 25 mg at [address] Patient is resting in bed and denied any acute distress at this time. Awaiting MD's response. Nursing will continue to monitor as clinically indicated.

## 2022-05-30 NOTE — PROGRESS NOTES
Patient complaint of constipation. Per patient, she normally takes 4 senna at home for frequent constipation, and uses fleet enema as needed. Administered PRN senna and miralax per orders. Secure message sent to Dr Yuko Lindsay to review need for enema. Awaiting response.

## 2022-05-30 NOTE — PLAN OF CARE
Problem: Pain  Goal: Verbalizes/displays adequate comfort level or baseline comfort level  5/30/2022 0901 by Lopez Ray RN  Flowsheets (Taken 5/30/2022 0901)  Verbalizes/displays adequate comfort level or baseline comfort level:   Encourage patient to monitor pain and request assistance   Assess pain using appropriate pain scale   Administer analgesics based on type and severity of pain and evaluate response   Implement non-pharmacological measures as appropriate and evaluate response  Note: Patient complaint of pain relieved with PRN pain medications, patient educated on alternative methods of pain relief, patient verbalized understanding     Problem: Safety - Adult  Goal: Free from fall injury  5/30/2022 0901 by Lopez Ray RN  Flowsheets  Taken 5/30/2022 0901  Free From Fall Injury:   Instruct family/caregiver on patient safety   Based on caregiver fall risk screen, instruct family/caregiver to ask for assistance with transferring infant if caregiver noted to have fall risk factors  Taken 5/30/2022 0900  Free From Fall Injury:   Instruct family/caregiver on patient safety   Based on caregiver fall risk screen, instruct family/caregiver to ask for assistance with transferring infant if caregiver noted to have fall risk factors  Note: Patient is a high fall risk, fall precautions in place, patient remains free from falls     Problem: Cardiovascular - Adult  Goal: Maintains optimal cardiac output and hemodynamic stability  5/30/2022 0901 by Lopez Ray RN  Flowsheets (Taken 5/30/2022 0901)  Maintains optimal cardiac output and hemodynamic stability:   Monitor blood pressure and heart rate   Monitor urine output and notify Licensed Independent Practitioner for values outside of normal range   Assess for signs of decreased cardiac output   Administer fluid and/or volume expanders as ordered  Note: Patient HR remains in 120's, remains in Afib with RVR on telemetry, BP stable.   Patient educated on signs and symptoms of Afib, patient verbalized understanding. Patient scheduled beta blocker administered per orders. Problem: ABCDS Injury Assessment  Goal: Absence of physical injury  Recent Flowsheet Documentation  Taken 5/30/2022 0900 by José Oneil RN  Absence of Physical Injury: Implement safety measures based on patient assessment     Problem: Skin/Tissue Integrity  Goal: Absence of new skin breakdown  Description: 1. Monitor for areas of redness and/or skin breakdown  2. Assess vascular access sites hourly  3. Every 4-6 hours minimum:  Change oxygen saturation probe site  4. Every 4-6 hours:  If on nasal continuous positive airway pressure, respiratory therapy assess nares and determine need for appliance change or resting period. Note: Patient skin remains intact, preventative mepilex to spine to prevent skin breakdown. Patient educated on need for frequent turning and repositioning to maintain skin integrity, patient verbalized understanding.

## 2022-05-30 NOTE — PLAN OF CARE
Problem: Pain  Goal: Verbalizes/displays adequate comfort level or baseline comfort level  5/29/2022 2348 by Ember Aguirre RN  Outcome: Progressing   Pt complained of back pain, medicated with Norco one tab as ordered. Will continue to monitor.

## 2022-05-30 NOTE — PROGRESS NOTES
Patient has a  MEWS score of 5. BP:90/56, YK:520-310, MD notified. Pt denied any acute distress at this time.

## 2022-05-30 NOTE — CONSULTS
[address]   Cardiac Electrophysiology Consultation   Date: 5/30/2022  Admit Date:  5/29/2022  Reason for Consultation: Michael Jiménez  Consult Requesting Physician: Mariella Ceron MD     Chief Complaint   Patient presents with    Shortness of Breath     feels SOB like she is full of fluid. had some chest pain. Also feels bloated. HPI: Shane Pike is a 80 y.o. with past medical history significant for CAD, status post PCI 2018 by Dr. Allegra Sarabia, hypertension, hyperlipidemia, CKD stage IV, hiatal mitral continue to PVCs, cardiomyopathy with LV ejection fraction of 25 to 30%, severe left atrial enlargement, grade 3 diastolic dysfunction, status post ILR implantation, recently diagnosed to have atrial fibrillation, on Eliquis for atrial fibrillation, and on Coreg was noted to have shortness of breath and bloating sensation for the past few days. Hence, she called on-call cardiology on Saturday and recommended to hold her medications except Eliquis. However, patient continued to feel worse and hence, she came to the hospital.  She was noted to be in atrial fibrillation with rapid ventricular rate. Patient also endorses that she might have missed 1 or 2 dose of Eliquis before that. She does have a visual loss on the right side. Currently, she is not atrial fibrillation with poorly controlled ventricular rate. Clinically, she is dry though. Impression:  1. Persistent atrial fibrillation  2. Nonischemic cardiomyopathy, LV ejection fraction 25 to 30%  3. Grade 3 diastolic dysfunction  4. Nonsustained ventricular tachycardia due to outflow tract PVCs  5. CKD    Plan:  1. Continue Eliquis 2.5 mg twice daily for stroke prophylaxis  2. Secondary to both systolic and diastolic congestive heart failure, she is not feeling well with atrial fibrillation. I will digitalize her with half dose secondary to her renal function, to achieve better rate control  3. Lets continue her on Coreg 25 mg p.o. twice daily  4.   She might have missed 1 or 2 dose of Eliquis and hence, not a good candidate for cardioversion      We will follow      Past Medical History:   Diagnosis Date    Arthritis     Blood circulation, collateral     CAD (coronary artery disease)     CHF (congestive heart failure) (HCC)     Confusion 8/31/2020    GERD (gastroesophageal reflux disease)     History of heart artery stent 12/2018    Hyperlipidemia     Hypertension     Mitral valve prolapse     Myocardial infarct, old     Thyroid disease         Past Surgical History:   Procedure Laterality Date    COLONOSCOPY      EYE SURGERY Left 09/04/2018    PHACO EMULSIFICATION OF CATARACT WITH INTRAOCULAR LENS IMPLANT LEFT EYE     HYSTERECTOMY      OTHER SURGICAL HISTORY  08/27/2018    phacoemulsification of cataract with intraocular lens implant right eye    VT OFFICE/OUTPT VISIT,PROCEDURE ONLY Right 8/27/2018    PHACO EMULSIFICATION OF CATARACT WITH INTRAOCULAR LENS IMPLANT RIGHT EYE performed by Mike Chairez MD at [place] [address] OFFICE/OUTPT [facility] Left 9/4/2018    PHACO EMULSIFICATION OF CATARACT WITH INTRAOCULAR LENS IMPLANT LEFT EYE performed by Mike Chairez MD at [address] THYROIDECTOMY         Allergies   Allergen Reactions    Benazepril Hcl Shortness Of Breath and Swelling    Feldene [Piroxicam] Shortness Of Breath    Hytrin [Terazosin Hcl] Shortness Of Breath    Iv Contrast [Iodides] Anaphylaxis    Acetaminophen     Celebrex [Celecoxib]      GI upset    Cephalexin      Nausea    Hydrochlorothiazide     Iodinated Casein     Monopril [Fosinopril Sodium] Other (See Comments)     Pt states makes her weak    Protonix [Pantoprazole Sodium] Other (See Comments)     Kidney failure      Quinapril Hcl     Toprol Xl [Metoprolol Succinate] Other (See Comments)     C/o leg weakness with this med    Ultracet [Tramadol-Acetaminophen]      Rash    Ziac [Bisoprolol-Hydrochlorothiazide] Other (See Comments)     Pt does not remember reaction to med ? Weak        Social History:  Reviewed. reports that she has never smoked. She has never used smokeless tobacco. She reports that she does not drink alcohol and does not use drugs. Family History:  Reviewed. family history includes Cancer in her father and sister; Diabetes in her brother; Heart Disease in her father and mother; Stroke in her brother. No premature CAD. Review of System:  All other systems reviewed except for that noted above. Pertinent negatives and positives are:     Objective      · General: negative for fever, chills   · Ophthalmic ROS: negative for - eye pain or loss of vision  · ENT ROS: negative for - headaches, sore throat   · Respiratory: negative for - cough, sputum  · Cardiovascular: Reviewed in HPI  · Gastrointestinal: negative for - abdominal pain, diarrhea, N/V  · Hematology: negative for - bleeding, blood clots, bruising or jaundice  · Genito-Urinary:  negative for - Dysuria or incontinence  · Musculoskeletal: negative for - Joint swelling, muscle pain  · Neurological: negative for - confusion, dizziness, headaches   · Psychiatric: No anxiety, no depression. · Dermatological: negative for - rash    Physical Examination:  Vitals:    22 0727   BP: 101/65   Pulse: (!) 121   Resp: 19   Temp: 97.7 °F (36.5 °C)   SpO2: 96%        Intake/Output Summary (Last 24 hours) at 2022 1143  Last data filed at 2022 1600  Gross per 24 hour   Intake 820 ml   Output --   Net 820 ml     In: 820 [P.O.:120]  Out: -    Wt Readings from Last 3 Encounters:   22 129 lb (58.5 kg)   22 130 lb (59 kg)   22 124 lb 9.6 oz (56.5 kg)     Temp  Av.7 °F (36.5 °C)  Min: 97.4 °F (36.3 °C)  Max: 98.2 °F (36.8 °C)  Pulse  Av.7  Min: 105  Max: 143  BP  Min: 83/52  Max: 129/69  SpO2  Av %  Min: 90 %  Max: 97 %    · Telemetry: A. fib with RVR  · Constitutional: Alert. Oriented to person, place, and time. No distress.    · Head: Normocephalic and atraumatic. · Mouth/Throat: Lips appear moist. Oropharynx is clear and moist.  · Eyes: Conjunctivae normal. EOM are normal.   · Neck: Neck supple. No lymphadenopathy. No rigidity. No JVD present. · Cardiovascular: Tachycardia, irregular rhythm. Normal S1&S2. Carotid pulse 2+ bilaterally. · Pulmonary/Chest: Bilateral respiratory sounds present. No respiratory accessory muscle use. No wheezes, No rhonchi. · Abdominal: Soft. Normal bowel sounds present. No distension, No tenderness. No splenomegaly. No hernia. · Musculoskeletal: No tenderness. No edema    · Lymphadenopathy: Has no cervical adenopathy. · Neurological: Alert and oriented. Cranial nerve II-XII grossly intact, No gross deficit to touch. · Skin: Skin is warm and dry. No rash, lesions, ulcerations noted. · Psychiatric: No anxiety nor agitation. Labs:  Reviewed. Recent Labs     05/29/22  1149 05/30/22  0718   * 133*   K 4.5 4.9   CL 94* 97*   CO2 22 23   PHOS  --  3.3   BUN 45* 43*   CREATININE 2.2* 2.1*     Recent Labs     05/29/22  1149 05/30/22  0718   WBC 9.7 8.9   HGB 10.0* 9.7*   HCT 30.3* 29.1*   MCV 91.5 91.3    252     Lab Results   Component Value Date    TROPONINI 0.03 05/29/2022     No results found for: BNP  Lab Results   Component Value Date    PROTIME 16.7 05/29/2022    PROTIME 11.5 03/18/2022    PROTIME 11.5 11/20/2021    INR 1.35 05/29/2022    INR 1.02 03/18/2022    INR 1.02 11/20/2021     Lab Results   Component Value Date    CHOL 150 03/19/2022    HDL 84 03/19/2022    TRIG 65 03/19/2022       Diagnostic and imaging results reviewed. I independently reviewed the ECG and telemetry.      Scheduled Meds:   digoxin  125 mcg IntraVENous Q4H    sodium chloride flush  5-40 mL IntraVENous 2 times per day    apixaban  2.5 mg Oral BID    carvedilol  25 mg Oral BID     Continuous Infusions:   sodium chloride      sodium chloride       PRN Meds:.senna, sodium chloride flush, sodium chloride, ondansetron **OR** ondansetron, polyethylene glycol, HYDROcodone-acetaminophen     Assessment:   Patient Active Problem List    Diagnosis Date Noted    AGUILA (acute kidney injury) ([facility] 01/13/2014    Atrial fibrillation ([address] 05/29/2022    Encounter for loop recorder check 03/30/2022    Stroke of unknown etiology ([address] 03/19/2022    Visual field defect 03/18/2022    Chronic kidney disease, stage IV (severe) ([address] 08/20/2021    Confusion 08/31/2020    PVD (posterior vitreous detachment), both eyes 07/02/2019    Posterior subcapsular polar age-related cataract of both eyes 07/02/2019    Abnormal myocardial perfusion study 11/06/2018    Ischemic cardiomyopathy 11/06/2018    Cardiomyopathy ([address] 11/06/2018    Palpitations 09/04/2018    V-tach ([address] 09/04/2018    GERD (gastroesophageal reflux disease) 01/10/2017    Closed stable burst fracture of first lumbar vertebra ([address] 09/30/2015    Primary insomnia 11/24/2014    Mixed hyperlipidemia 10/29/2014    Gout 04/18/2014    Pain in joint, hand 04/18/2014    Renal insufficiency 01/22/2014    Essential hypertension 04/26/2013    Chronic low back pain 02/22/2013    Other and unspecified angina pectoris 06/21/2011    Coronary atherosclerosis of native coronary artery 06/21/2011    Other chronic pulmonary heart diseases 06/21/2011    Mitral valve disorder 06/21/2011    Acute combined systolic and diastolic heart failure ([address] 06/21/2011      Active Hospital Problems    Diagnosis Date Noted    AGUILA (acute kidney injury) ([facility] [address] [N17.9] 01/13/2014     Priority: High    Atrial fibrillation [facility]) [I48.91] 05/29/2022     Priority: Medium     Thank you for allowing me to participate in the care of Inell Fabiana . If you have any questions/comments, please do not hesitate to contact us.       Amish Rivas MD   Cardiac Electrophysiology  [facility]    For any EP related issues after 5 PM, contact [facility] on call cardiology through [phone].

## 2022-05-30 NOTE — PROGRESS NOTES
Progress Note    Admit Date: 5/29/2022  Day: 2  Diet: ADULT DIET; Regular; Low Sodium (2 gm); 1200 ml    CC: SOB    Interval history: Pt remained tachycardic and had a few episodes of hypotension overnight. She reports significant constipation, says she has not had a full bowel movement in 4-5 days. She endorses L sided chest pain, SOB and abdominal pain in the LLQ. Denies fever, chills, nausea and vomiting. HPI: Patient is a 79 yo female with a PMHx of CAD, HFrEF (EF of 25-30%), HLD, HTN, and hx of MI, who presents with SOB. She endorses worsening SOB over the last few days, it is worse with exertion. She typically sleeps with 3 pillows at night. She talked to cardiology on-call yesterday and they told her to stop taking all of her medications except eliquis and her pain meds. She also endorses feeling more swollen in her B/L LE, intermittent palpitations, lack of appetite over past few days, and on/off chest pain that radiates to the back. She also has some back pain, but she attributes the chest and back pain to not taking her pain meds as of recent.      Upon arrival to the ED, patient was tachycardic,  But otherwise hemodynamically stable. Cr of 2.2, which is elevated from baseline of 1.5. Troponin of 0.04. [place] of [address], which is mildly elevated from prior. CXR indicated minimal bibasilar airspace disaese, atelectasis, or  Pneumonia. He was given a 500cc bolus, unasyn,and vibramycin. She wasin  A  Fib with  RVR and given  Coreg in ED. Urine cx and blood cx obtained. Patient admitted for further management of SOB, afib rvr, and AGUILA.      Medications:     Scheduled Meds:   sodium chloride flush  5-40 mL IntraVENous 2 times per day    apixaban  2.5 mg Oral BID    carvedilol  25 mg Oral BID     Continuous Infusions:   sodium chloride       PRN Meds:senna, sodium chloride flush, sodium chloride, ondansetron **OR** ondansetron, polyethylene glycol, HYDROcodone-acetaminophen    Objective:   Vitals: T-max:  Patient Vitals for the past 8 hrs:   BP Temp Temp src Pulse Resp SpO2   05/30/22 0727 101/65 97.7 °F (36.5 °C) Oral (!) 121 19 96 %   05/30/22 0429 (!) 90/56 97.5 °F (36.4 °C) Axillary (!) 133 18 95 %   05/30/22 0236 (!) 90/55 -- -- -- -- --   05/30/22 0121 (!) 83/52 -- -- -- -- --   05/30/22 0119 (!) 85/56 -- -- (!) 117 18 --       Intake/Output Summary (Last 24 hours) at 5/30/2022 0828  Last data filed at 5/29/2022 1600  Gross per 24 hour   Intake 820 ml   Output --   Net 820 ml       Review of Systems   Constitutional: Positive for appetite change and fatigue. Negative for chills and fever. HENT: Negative. Respiratory: Positive for shortness of breath. Negative for cough and wheezing. Cardiovascular: Positive for chest pain, palpitations and leg swelling. Gastrointestinal: Positive for abdominal pain and constipation. Negative for blood in stool. Endocrine: Negative. Genitourinary: Negative. Musculoskeletal: Positive for back pain. Skin: Negative. Neurological: Negative for dizziness and light-headedness. Physical Exam  Constitutional:       Appearance: Normal appearance. She is normal weight. HENT:      Head: Normocephalic and atraumatic. Nose: Nose normal.      Mouth/Throat:      Mouth: Mucous membranes are moist.   Eyes:      Extraocular Movements: Extraocular movements intact. Conjunctiva/sclera: Conjunctivae normal.      Pupils: Pupils are equal, round, and reactive to light. Cardiovascular:      Rate and Rhythm: Tachycardia present. Rhythm irregular. Pulses: Normal pulses. Heart sounds: Murmur heard. Pulmonary:      Effort: Pulmonary effort is normal.      Breath sounds: Normal breath sounds. Abdominal:      General: There is distension. Palpations: Abdomen is soft. Tenderness: There is no guarding or rebound. Musculoskeletal:         General: Normal range of motion. Cervical back: Normal range of motion.       Right lower leg: Edema present. Left lower leg: Edema present. Comments: Pitting edema above the knee's   Skin:     General: Skin is warm. Capillary Refill: Capillary refill takes less than 2 seconds. Neurological:      Mental Status: She is alert and oriented to person, place, and time. LABS:    CBC:   Recent Labs     05/29/22  1149 05/30/22  0718   WBC 9.7 8.9   HGB 10.0* 9.7*   HCT 30.3* 29.1*    252   MCV 91.5 91.3     Renal:    Recent Labs     05/29/22  1149 05/30/22  0718   * 133*   K 4.5 4.9   CL 94* 97*   CO2 22 23   BUN 45* 43*   CREATININE 2.2* 2.1*   GLUCOSE 139* 128*   CALCIUM 9.7 8.8   MG  --  2.10   PHOS  --  3.3   ANIONGAP 16 13     Hepatic:   Recent Labs     05/29/22  1149 05/30/22  0718   AST 44*  --    ALT 51*  --    BILITOT 0.3  --    PROT 6.6  --    LABALBU 3.9 3.5   ALKPHOS 58  --      Troponin:   Recent Labs     05/29/22  1149 05/29/22  1738   TROPONINI 0.04* 0.03*     BNP: 13, 884    INR:   Recent Labs     05/29/22  1149   INR 1.35*     Lactate: 1.1    Cultures: Urine cultures - E.coli (08/31/2020)  -----------------------------------------------------------------  RAD:   XR CHEST (2 VW)   Final Result      Minimal bibasilar airspace disease, atelectasis or pneumonia. Assessment/Plan:   81 yo female with a PMHx of CAD, HFrEF (EF of 25-30%), HLD, HTN, hx of MI, and thyroid disease, who presents with SOB.      A fib w/ RVR  Tachy to the 120-140s, a fib RVR on arrival to EKG, not taking coreg since yesterday. - Continue eliquis 2.5mg BID    - Continue Coreg 25mg BID   - Holding home meds until seen by cardiology  - Cardiology consulted  -Tele   - EP cs - appreciate recs      AGUILA 2/2 pre-renal vs cardiorenal  Cr 2.1 from 2.2, elevated from baseline of 1.5  -trend RFP  -strict I/O     SOB 2/2 suspected Acute on Chronic HFrEF vs PNA, low suspicion    Increasing SOB at rest and w/ exertion, elevated procal 0.17. Probnp mildly elevated from previous.  SOB likely from not taking medications, rather than HF or PNA. -blood cx pending  -strict I/O  -holding home meds until seen by cards   -EP cs- appreciate recs      Troponin leak likely 2/2 demand ischemia   Trop elevated, but down from prior, having intermittent chest pain  - Trop 0.03 from 0.04, down trending. Asymptomatic Bacteriuria  UA had 2+ bacteria and (+) leukocytes, previous Urine culture grew E. Coli (08/31/2020)  - Urine cultures grew E. Coli (05/29/2022)  - monitor for worsening signs/sx, treat if sx     Code Status:Full code  FEN: Regular, lwo sodium, fluid restriction   PPX:  eliquis    DISPO: MYRTLE Estrada,  Medical Student Year 3  05/30/22  8:28 AM    Addendum by     I independently saw the patient and agree with the findings above. The patient was staffed with the attending.      Miguel Sweet MD  PGY-1

## 2022-05-30 NOTE — PROGRESS NOTES
Patient c/o constipation. Glycolax given at 2043 with no positive result. Pt denied abdominal pain, N/V. MD notified awaiting response.

## 2022-05-31 LAB
ALBUMIN SERPL-MCNC: 3.5 G/DL (ref 3.4–5)
ANION GAP SERPL CALCULATED.3IONS-SCNC: 11 MMOL/L (ref 3–16)
BASOPHILS ABSOLUTE: 0.1 K/UL (ref 0–0.2)
BASOPHILS RELATIVE PERCENT: 0.9 %
BUN BLDV-MCNC: 39 MG/DL (ref 7–20)
CALCIUM SERPL-MCNC: 8.8 MG/DL (ref 8.3–10.6)
CHLORIDE BLD-SCNC: 100 MMOL/L (ref 99–110)
CO2: 26 MMOL/L (ref 21–32)
CREAT SERPL-MCNC: 2 MG/DL (ref 0.6–1.2)
EOSINOPHILS ABSOLUTE: 0.2 K/UL (ref 0–0.6)
EOSINOPHILS RELATIVE PERCENT: 2.8 %
GFR AFRICAN AMERICAN: 29
GFR NON-AFRICAN AMERICAN: 24
GLUCOSE BLD-MCNC: 108 MG/DL (ref 70–99)
HCT VFR BLD CALC: 29.1 % (ref 36–48)
HEMOGLOBIN: 9.5 G/DL (ref 12–16)
LYMPHOCYTES ABSOLUTE: 1.1 K/UL (ref 1–5.1)
LYMPHOCYTES RELATIVE PERCENT: 16.1 %
MAGNESIUM: 2.2 MG/DL (ref 1.8–2.4)
MCH RBC QN AUTO: 30.3 PG (ref 26–34)
MCHC RBC AUTO-ENTMCNC: 32.7 G/DL (ref 31–36)
MCV RBC AUTO: 92.4 FL (ref 80–100)
MONOCYTES ABSOLUTE: 0.6 K/UL (ref 0–1.3)
MONOCYTES RELATIVE PERCENT: 9.1 %
NEUTROPHILS ABSOLUTE: 5 K/UL (ref 1.7–7.7)
NEUTROPHILS RELATIVE PERCENT: 71.1 %
ORGANISM: ABNORMAL
PDW BLD-RTO: 15.2 % (ref 12.4–15.4)
PHOSPHORUS: 2.8 MG/DL (ref 2.5–4.9)
PLATELET # BLD: 239 K/UL (ref 135–450)
PMV BLD AUTO: 9.4 FL (ref 5–10.5)
POTASSIUM SERPL-SCNC: 4.7 MMOL/L (ref 3.5–5.1)
RBC # BLD: 3.15 M/UL (ref 4–5.2)
SODIUM BLD-SCNC: 137 MMOL/L (ref 136–145)
URINE CULTURE, ROUTINE: ABNORMAL
WBC # BLD: 7.1 K/UL (ref 4–11)

## 2022-05-31 PROCEDURE — 6370000000 HC RX 637 (ALT 250 FOR IP): Performed by: STUDENT IN AN ORGANIZED HEALTH CARE EDUCATION/TRAINING PROGRAM

## 2022-05-31 PROCEDURE — 2580000003 HC RX 258: Performed by: STUDENT IN AN ORGANIZED HEALTH CARE EDUCATION/TRAINING PROGRAM

## 2022-05-31 PROCEDURE — 36415 COLL VENOUS BLD VENIPUNCTURE: CPT

## 2022-05-31 PROCEDURE — 97530 THERAPEUTIC ACTIVITIES: CPT

## 2022-05-31 PROCEDURE — 97116 GAIT TRAINING THERAPY: CPT

## 2022-05-31 PROCEDURE — 85025 COMPLETE CBC W/AUTO DIFF WBC: CPT

## 2022-05-31 PROCEDURE — 97162 PT EVAL MOD COMPLEX 30 MIN: CPT

## 2022-05-31 PROCEDURE — 97165 OT EVAL LOW COMPLEX 30 MIN: CPT

## 2022-05-31 PROCEDURE — 80069 RENAL FUNCTION PANEL: CPT

## 2022-05-31 PROCEDURE — 83735 ASSAY OF MAGNESIUM: CPT

## 2022-05-31 PROCEDURE — 6360000002 HC RX W HCPCS: Performed by: INTERNAL MEDICINE

## 2022-05-31 PROCEDURE — 1200000000 HC SEMI PRIVATE

## 2022-05-31 PROCEDURE — 99233 SBSQ HOSP IP/OBS HIGH 50: CPT | Performed by: NURSE PRACTITIONER

## 2022-05-31 RX ORDER — NITROFURANTOIN MACROCRYSTALS 50 MG/1
50 CAPSULE ORAL ONCE
Status: DISCONTINUED | OUTPATIENT
Start: 2022-05-31 | End: 2022-05-31

## 2022-05-31 RX ORDER — CIPROFLOXACIN 2 MG/ML
400 INJECTION, SOLUTION INTRAVENOUS EVERY 24 HOURS
Status: DISCONTINUED | OUTPATIENT
Start: 2022-05-31 | End: 2022-05-31

## 2022-05-31 RX ORDER — NITROFURANTOIN MACROCRYSTALS 50 MG/1
50 CAPSULE ORAL EVERY 6 HOURS SCHEDULED
Status: DISCONTINUED | OUTPATIENT
Start: 2022-05-31 | End: 2022-05-31

## 2022-05-31 RX ORDER — POLYETHYLENE GLYCOL 3350 17 G/17G
17 POWDER, FOR SOLUTION ORAL DAILY
Status: DISCONTINUED | OUTPATIENT
Start: 2022-05-31 | End: 2022-06-01 | Stop reason: HOSPADM

## 2022-05-31 RX ORDER — CEFDINIR 300 MG/1
300 CAPSULE ORAL EVERY 12 HOURS SCHEDULED
Status: DISCONTINUED | OUTPATIENT
Start: 2022-05-31 | End: 2022-05-31 | Stop reason: DRUGHIGH

## 2022-05-31 RX ORDER — 0.9 % SODIUM CHLORIDE 0.9 %
500 INTRAVENOUS SOLUTION INTRAVENOUS ONCE
Status: COMPLETED | OUTPATIENT
Start: 2022-05-31 | End: 2022-06-01

## 2022-05-31 RX ORDER — CEFDINIR 300 MG/1
300 CAPSULE ORAL DAILY
Status: DISCONTINUED | OUTPATIENT
Start: 2022-05-31 | End: 2022-06-01 | Stop reason: HOSPADM

## 2022-05-31 RX ADMIN — HYDROCODONE BITARTRATE AND ACETAMINOPHEN 1 TABLET: 10; 325 TABLET ORAL at 21:17

## 2022-05-31 RX ADMIN — APIXABAN 2.5 MG: 2.5 TABLET, FILM COATED ORAL at 21:18

## 2022-05-31 RX ADMIN — HYDROCODONE BITARTRATE AND ACETAMINOPHEN 1 TABLET: 10; 325 TABLET ORAL at 08:08

## 2022-05-31 RX ADMIN — SODIUM CHLORIDE, PRESERVATIVE FREE 10 ML: 5 INJECTION INTRAVENOUS at 21:18

## 2022-05-31 RX ADMIN — SODIUM CHLORIDE 5 ML: 9 INJECTION, SOLUTION INTRAVENOUS at 11:00

## 2022-05-31 RX ADMIN — CIPROFLOXACIN 400 MG: 2 INJECTION, SOLUTION INTRAVENOUS at 11:01

## 2022-05-31 RX ADMIN — SODIUM CHLORIDE 500 ML: 9 INJECTION, SOLUTION INTRAVENOUS at 15:19

## 2022-05-31 RX ADMIN — POLYETHYLENE GLYCOL (3350) 17 G: 17 POWDER, FOR SOLUTION ORAL at 11:03

## 2022-05-31 RX ADMIN — CEFDINIR 300 MG: 300 CAPSULE ORAL at 15:29

## 2022-05-31 RX ADMIN — CARVEDILOL 25 MG: 25 TABLET, FILM COATED ORAL at 21:17

## 2022-05-31 RX ADMIN — SODIUM CHLORIDE, PRESERVATIVE FREE 5 ML: 5 INJECTION INTRAVENOUS at 08:11

## 2022-05-31 RX ADMIN — CARVEDILOL 25 MG: 25 TABLET, FILM COATED ORAL at 08:08

## 2022-05-31 RX ADMIN — APIXABAN 2.5 MG: 2.5 TABLET, FILM COATED ORAL at 08:08

## 2022-05-31 ASSESSMENT — PAIN - FUNCTIONAL ASSESSMENT: PAIN_FUNCTIONAL_ASSESSMENT: PREVENTS OR INTERFERES SOME ACTIVE ACTIVITIES AND ADLS

## 2022-05-31 ASSESSMENT — PAIN SCALES - GENERAL
PAINLEVEL_OUTOF10: 0
PAINLEVEL_OUTOF10: 2
PAINLEVEL_OUTOF10: 0
PAINLEVEL_OUTOF10: 7
PAINLEVEL_OUTOF10: 7

## 2022-05-31 ASSESSMENT — PAIN DESCRIPTION - FREQUENCY: FREQUENCY: CONTINUOUS

## 2022-05-31 ASSESSMENT — PAIN DESCRIPTION - LOCATION: LOCATION: BACK

## 2022-05-31 ASSESSMENT — PAIN DESCRIPTION - DESCRIPTORS: DESCRIPTORS: ACHING

## 2022-05-31 ASSESSMENT — PAIN DESCRIPTION - ORIENTATION: ORIENTATION: LOWER

## 2022-05-31 ASSESSMENT — PAIN DESCRIPTION - PAIN TYPE: TYPE: ACUTE PAIN

## 2022-05-31 ASSESSMENT — PAIN DESCRIPTION - ONSET: ONSET: ON-GOING

## 2022-05-31 NOTE — PROGRESS NOTES
Occupational Therapy  Facility/Department: [address]  Occupational Therapy Initial Assessment, Tx, DC    Name: Radha Spring  : 1935  MRN: [id number]  Date of Service: 2022    Discharge Recommendations: Radha Spring scored a 22/24 on the AM-PAC ADL Inpatient form. Current research shows that an AM-PAC score of 18 or greater is typically associated with a discharge to the patient's home setting. Based on the patient's AM-PAC score, and their current ADL deficits, it is recommended that the patient have 2-3 sessions per week of Occupational Therapy at d/c to increase the patient's independence. At this time, this patient demonstrates the endurance and safety to discharge home with (home vs OP services) and a follow up treatment frequency of 2-3x/wk. Please see assessment section for further patient specific details. If patient discharges prior to next session this note will serve as a discharge summary. Please see below for the latest assessment towards goals. OT Equipment Recommendations  Equipment Needed: No       Patient Diagnosis(es): The primary encounter diagnosis was AGUILA (acute kidney injury) ([facility] [address] A diagnosis of Lung consolidation [facility]) was also pertinent to this visit. Past Medical History:  has a past medical history of Arthritis, Blood circulation, collateral, CAD (coronary artery disease), CHF (congestive heart failure) ([facility] [address] Confusion, GERD (gastroesophageal reflux disease), History of heart artery stent, Hyperlipidemia, Hypertension, Mitral valve prolapse, Myocardial infarct, old, and Thyroid disease. Past Surgical History:  has a past surgical history that includes Hysterectomy; Thyroidectomy; Colonoscopy; other surgical history (2018); pr office/outpt visit,procedure only (Right, 2018); eye surgery (Left, 2018); and pr office/outpt visit,procedure only (Left, 2018).            Assessment   Assessment: From home with dtr, primarily IND with fx mobility and transfers, ADL, dtr available to assist. Pt reporting feeling at baseline and expressing no needs or concerns for dc. Pt completing mobility in room with cane and SBA, to and from bathroom, toileting, ADLs at sink. No acute OT needs. Sign off. Decision Making: Low Complexity  REQUIRES OT FOLLOW-UP: No  Activity Tolerance  Activity Tolerance: Patient Tolerated treatment well        Plan         Restrictions  Position Activity Restriction  Other position/activity restrictions: Up with assist    Subjective   General  Chart Reviewed: Yes  Additional Pertinent Hx: Pt to ED 5/29 with shortness of breath and admitted for Afib. CXR: Minimal bibasilar airspace disease, atelectasis or pneumonia. PMH:  HTN, OA, CAD, CHF, MI, GERD, confusion, COPD  Referring Practitioner: Callum Carter  Diagnosis: A-fib  Subjective  Subjective: In bed agreeable to session     Social/Functional History  Social/Functional History  Lives With: Spouse,Son,Daughter  Type of Home: House  Home Layout: One level,Laundry in basement  Home Access: Stairs to enter without rails (1)  Bathroom Shower/Tub: Tub/Shower unit  Bathroom Toilet: Handicap height (sink for leverage)  Bathroom Equipment: Tub transfer bench  Home Equipment: Cane,Wheelchair-manual,Walker, rolling,Rollator  Has the patient had two or more falls in the past year or any fall with injury in the past year?: Yes (1 fall in January)  ADL Assistance: Independent  Homemaking Assistance: Needs assistance (daughter does homemaking)  Ambulation Assistance: Independent (uses cane or walker, likes to sit on rollator and scoot around home due to bad knees)  Transfer Assistance: Independent  Active : No  Additional Comments: Spouse has Alzheimer's. Son works. Daughter is home and provides assistance.        Objective   Heart Rate: 94  Heart Rate Source: Monitor  BP: 119/83  BP Location: Left upper arm  BP Method: Automatic  Patient Position: Semi fowlers  MAP (Calculated): 95  Resp: 17  SpO2: 98 %  O2 Device: None (Room air)             Safety Devices  Type of Devices: Left in bed;Bed alarm in place; Chair alarm in place;Nurse notified;Gait belt  Bed Mobility Training  Bed Mobility Training: Yes  Supine to Sit: Modified independent  Balance  Sitting: Intact  Standing: Intact  Gait  Overall Level of Assistance: Stand-by assistance  Assistive Device: Cane, straight     AROM: Generally decreased, functional  Strength: Generally decreased, functional  ADL  Feeding: Independent  Grooming: Supervision (stance at sink wipe face, wash hands)  UB dressing SBA  LE Dressing: Stand by assistance (donning/doffing socks)  Toileting: Stand by assistance           Transfers  Sit to stand: Supervision  Stand to sit: Supervision     Cognition  Overall Cognitive Status: [facility]                  Education Given To: Patient  Education Provided: Role of Therapy;Plan of Care;ADL Adaptive Strategies;Transfer Training  Education Method: Verbal;Demonstration  Education Outcome: Verbalized understanding;Demonstrated understanding                        AM-PAC Score        AM-[facility] Inpatient Daily Activity Raw Score: 22 (05/31/22 1459)  AM-PAC Inpatient ADL T-Scale Score : 47.1 (05/31/22 1459)  ADL Inpatient CMS 0-100% Score: 25.8 (05/31/22 1459)  ADL Inpatient CMS G-Code Modifier : CJ (05/31/22 1459)      Therapy Time   Individual Concurrent Group Co-treatment   Time In 1430         Time Out 1453         Minutes 23           Timed Code Treatment Minutes:   8    Total Treatment Minutes:  [address] [facility],

## 2022-05-31 NOTE — PROGRESS NOTES
Spoke with patient about previous hx of allergy on cephalexin. She states she had some nausea but denies any rash, wheezing, SOB, or anaphylaxis with this medication. Discussed risk and benefit with patient on starting her on this medication and she is willing to try. Will start her on cefdinir for UTI given sensitivities and review of patient's allergies. Will monitor patient during administration for signs of anaphylaxis/allergic reaction.      Sallie Carmichael MD

## 2022-05-31 NOTE — PROGRESS NOTES
Progress Note    Admit Date: 5/29/2022  Day: 2  Diet: ADULT DIET; Regular; Low Sodium (2 gm); 1200 ml    CC: SOB    Interval history:   NAONE. Patient seen and examined this am. Feels well, denies any CP, palpitations, N/V, abdominal pain, dysuria or urgency. Tolerating PO. Will have PTOT see her today. HPI: Patient is a 81 yo female with a PMHx of CAD, HFrEF (EF of 25-30%), HLD, HTN, and hx of MI, who presents with SOB. She endorses worsening SOB over the last few days, it is worse with exertion. She typically sleeps with 3 pillows at night. She talked to cardiology on-call yesterday and they told her to stop taking all of her medications except eliquis and her pain meds. She also endorses feeling more swollen in her B/L LE, intermittent palpitations, lack of appetite over past few days, and on/off chest pain that radiates to the back. She also has some back pain, but she attributes the chest and back pain to not taking her pain meds as of recent.      Upon arrival to the ED, patient was tachycardic,  But otherwise hemodynamically stable. Cr of 2.2, which is elevated from baseline of 1.5. Troponin [place] 0.04. [place] [address], which is mildly elevated from prior. CXR indicated minimal bibasilar airspace disaese, atelectasis, or  Pneumonia. He was given a 500cc bolus, unasyn,and vibramycin. She wasin  A  Fib with  RVR and given  Coreg in ED. Urine cx and blood cx obtained. Patient admitted for further management of SOB, afib rvr, and AGUILA.      Medications:     Scheduled Meds:   polyethylene glycol  17 g Oral Daily    ciprofloxacin  400 mg IntraVENous Q24H    sodium chloride flush  5-40 mL IntraVENous 2 times per day    apixaban  2.5 mg Oral BID    carvedilol  25 mg Oral BID     Continuous Infusions:   sodium chloride       PRN Meds:senna, sodium chloride flush, sodium chloride, ondansetron **OR** ondansetron, HYDROcodone-acetaminophen    Objective:   Vitals:   T-max:  Patient Vitals for the past 8 hrs:   BP Temp Temp src Pulse Resp SpO2   05/31/22 0806 119/83 97.7 °F (36.5 °C) Oral 94 17 98 %   05/31/22 0401 111/62 97.6 °F (36.4 °C) Oral 84 16 97 %       Intake/Output Summary (Last 24 hours) at 5/31/2022 1005  Last data filed at 5/31/2022 0912  Gross per 24 hour   Intake 540 ml   Output 400 ml   Net 140 ml       Review of Systems - As above      Physical Exam  Constitutional:       General: She is not in acute distress. HENT:      Head: Normocephalic and atraumatic. Mouth/Throat:      Mouth: Mucous membranes are moist.   Cardiovascular:      Rate and Rhythm: Normal rate. Rhythm irregular. Pulses: Normal pulses. Heart sounds: Murmur heard. Pulmonary:      Effort: No respiratory distress. Breath sounds: No wheezing or rales. Abdominal:      General: There is distension. Palpations: Abdomen is soft. Tenderness: There is no abdominal tenderness. There is no guarding. Musculoskeletal:      Right lower leg: Edema present. Left lower leg: Edema present. Comments: Pitting edema above the knee's   Skin:     General: Skin is warm. Neurological:      Mental Status: She is alert and oriented to person, place, and time.          LABS:    CBC:   Recent Labs     05/29/22  1149 05/30/22  0718   WBC 9.7 8.9   HGB 10.0* 9.7*   HCT 30.3* 29.1*    252   MCV 91.5 91.3     Renal:    Recent Labs     05/29/22  1149 05/30/22  0718   * 133*   K 4.5 4.9   CL 94* 97*   CO2 22 23   BUN 45* 43*   CREATININE 2.2* 2.1*   GLUCOSE 139* 128*   CALCIUM 9.7 8.8   MG  --  2.10   PHOS  --  3.3   ANIONGAP 16 13     Hepatic:   Recent Labs     05/29/22  1149 05/30/22  0718   AST 44*  --    ALT 51*  --    BILITOT 0.3  --    PROT 6.6  --    LABALBU 3.9 3.5   ALKPHOS 58  --      Troponin:   Recent Labs     05/29/22  1149 05/29/22  1738   TROPONINI 0.04* 0.03*     BNP: 13, 884    INR:   Recent Labs     05/29/22  1149   INR 1.35*     Lactate: 1.1    Cultures: Urine cultures - E.coli (08/31/2020)  -----------------------------------------------------------------  RAD:   XR CHEST (2 VW)   Final Result      Minimal bibasilar airspace disease, atelectasis or pneumonia. Assessment/Plan:   79 yo female with a PMHx of CAD, HFrEF (EF of 25-30%), HLD, HTN, hx of MI, and thyroid disease, who presents with SOB.      Atrial fibrillation  Tachy to the 120-140s, a fib RVR on arrival to EKG, not taking coreg since yesterday. - Continue eliquis BID  - Continue Coreg   - S/P Digoxin  - Cardiology/EP consulted  - Continue Tele      AGUILA 2/2 pre-renal vs cardiorenal  Cr elevated from baseline of 1.5  - Monitor I/Os  - Avoid nephrotoxic agents     Troponin leak likely 2/2 demand ischemia   Trop elevated, but down from prior. EKG nonischemic.   - Stop trending trops    E. coli UTI  UA had 2+ bacteria and (+) leukocytes, previous Urine culture grew E. Coli (08/31/2020). Urine cultures grew E. Coli on 5/29.   - Start antibiotics    Chronic Medical Conditions:  HFrEF: EF 25-30%, G3DD, Monitor I/Os, daily weights, low sodium diet  CKD: Monitor I/Os  Constipation: Bowel regimen  HLD: Atorvastatin  CAD: Statin, ASA, BB     Code Status: Full Code  FEN: ADULT DIET; Regular; Low Sodium (2 gm); 1200 ml  PPX: Eliquis    DISPO: Will have PT/OT see her today, plan for DC tomorrow    Adam Baker MD, PGY-1  05/31/22  10:05 AM     Will staff this patient with Dr. Gregorio Bergman    Patient seen and examined, labs and imaging studies reviewed, agree with assessment and plan as outlined above. Continue with current care and plan. Discussed case with patients nurse, discussed case with care team, discussed plan.       Asya Mcknight MD [place] [facility]

## 2022-05-31 NOTE — PROGRESS NOTES
Physical Therapy  Facility/Department: [address]  Physical Therapy Initial Assessment and Discharge Note    Name: Tyrese Figueroa  : 1935  MRN: [id number]  Date of Service: 2022    Discharge Recommendations: Tyrese Figueroa scored a 21/24 on the AM-PAC short mobility form. At this time, no further PT is recommended upon discharge. Recommend patient returns to prior setting with prior services. PT Equipment Recommendations  Equipment Needed: No        Assessment   Assessment: Pt from home with Afib. Pt demonstrates transfers and gait at/near functional baseline. Pt reports having bad knees at baseline, needing cane or scooting around on rollator at home. Pt's gait is slow, but steady. Pt did reach out for wisdom rail from time to time. Pt voices no concnerns returning home. Pt in agreement no further PT needs. Recommend daily activity per RN staff to maintain strength. Will sign off. Decision Making: Medium Complexity  Requires PT Follow-Up: No     Plan   Plan: Discharge with evaluation only    Safety Devices  Type of Devices: Left in bed,Bed alarm in place,Chair alarm in place,Nurse notified     Restrictions  Position Activity Restriction  Other position/activity restrictions: Up with assist     Subjective   Chart Reviewed: Yes  Additional Pertinent Hx: Pt to ED  with shortness of breath and admitted for Afib. CXR: Minimal bibasilar airspace disease, atelectasis or pneumonia. PMH:  HTN, OA, CAD, CHF, MI, GERD, confusion, COPD  Diagnosis: Afib    Subjective  Subjective: Pt found sitting up in the chair. \"I think I'm going to start driving again. \"  Pt pleasant and agreeable for PT evaluation.     Social/Functional History  Lives With: Spouse,Son,Daughter  Type of Home: House  Home Layout: One level,Laundry in basement  Home Access: Stairs to enter without rails (1)  Bathroom Shower/Tub: Tub/Shower unit  Bathroom Toilet: Handicap height (sink for leverage)  Bathroom 1310         Time Out 1350         Minutes 40         Timed Code Treatment Minutes:  25  Total Treatment Minutes:  40    Christofer Paulino, PT

## 2022-05-31 NOTE — PROGRESS NOTES
Pharmacy Note - Renal dose adjustment made per P/T protocol    Original order:  Cefdinir 300 mg bid    Estimated Creatinine Clearance: 16 mL/min (A) (based on SCr of 2 mg/dL (H)). Recent Labs     05/29/22  1149 05/29/22  1149 05/30/22  0718 05/30/22  0718 05/31/22  1039   BUN 45*  --  43*  --  39*   CREATININE 2.2*   < > 2.1*   < > 2.0*      < > 252   < > 239   INR 1.35*  --   --   --   --     < > = values in this interval not displayed. Renally adjusted order:  Cefdinir 300 mg every 24 hours    Spoke to MD via PS regarding allergy. She discussed with patient, and Cephalexin only caused nausea. Ok to continue at this time. Please call pharmacy with any questions.     Thank you,  Asha Carlson, [facility]  5/31/2022 2:18 PM

## 2022-05-31 NOTE — PROGRESS NOTES
Electrophysiology - PROGRESS NOTE    Admit Date: 5/29/2022     Chief Complaint: AF/RVR     Interval History:   Patient seen and examined and notes reviewed. Patient is being followed for AF/RVR. Patient with a h/o of pAF on Eliquis and a NICMP, EF 25-30% who had been c/o SOB and abd bloating ongoing for several days. She had called the on call MD who advised her to hold all meds except the Eliquis. Patient felt worse and came to the ED and was noted to be in AF/RVR. She has been on Coreg 25 mg BID so was digitalized. Her HR is now controlled in AF. Patient admitted to missing a couple of doses of Eliquis. Currently denies any CP or SOB.      In: 80 [P.O.:580]  Out: 400    Wt Readings from Last 2 Encounters:   05/29/22 129 lb (58.5 kg)   05/23/22 130 lb (59 kg)       Data:   Scheduled Meds:   Scheduled Meds:   polyethylene glycol  17 g Oral Daily    sodium chloride flush  5-40 mL IntraVENous 2 times per day    apixaban  2.5 mg Oral BID    carvedilol  25 mg Oral BID     Continuous Infusions:   sodium chloride       PRN Meds:.senna, sodium chloride flush, sodium chloride, ondansetron **OR** ondansetron, HYDROcodone-acetaminophen  Continuous Infusions:   sodium chloride         Intake/Output Summary (Last 24 hours) at 5/31/2022 0820  Last data filed at 5/30/2022 1416  Gross per 24 hour   Intake 580 ml   Output 400 ml   Net 180 ml       CBC:   Lab Results   Component Value Date    WBC 8.9 05/30/2022    HGB 9.7 05/30/2022     05/30/2022     BMP:  Lab Results   Component Value Date     05/30/2022    K 4.9 05/30/2022    K 4.5 05/29/2022    CL 97 05/30/2022    CO2 23 05/30/2022    BUN 43 05/30/2022    CREATININE 2.1 05/30/2022    GLUCOSE 128 05/30/2022     INR:   Lab Results   Component Value Date    INR 1.35 05/29/2022    INR 1.02 03/18/2022    INR 1.02 11/20/2021        CARDIAC LABS  ENZYMES:  Recent Labs     05/29/22  1149 05/29/22  1739 TROPONINI 0.04* 0.03*     FASTING LIPID PANEL:  Lab Results   Component Value Date    HDL 84 03/19/2022    LDLCALC 53 03/19/2022    TRIG 65 03/19/2022     LIVER PROFILE:  Lab Results   Component Value Date    AST 44 05/29/2022    AST 25 03/18/2022    ALT 51 05/29/2022    ALT 9 03/18/2022       -----------------------------------------------------------------  Telemetry: Personally reviewed  AF, HR controlled, PVCs    Objective:   Vitals: /83   Pulse 94   Temp 97.7 °F (36.5 °C) (Oral)   Resp 17   Ht 5' (1.524 m)   Wt 129 lb (58.5 kg)   SpO2 98%   BMI 25.19 kg/m²   General appearance: alert, appears stated age and cooperative, No acute distress   Eyes: Conjunctiva and pupils normal and reactive  Skin: Skin color, texture, turgor normal. No rashes or ecchymosis.   Neck: no JVD, supple, symmetrical, trachea midline   Lungs: , no accessory muscle use, no respiratory distress  Heart: irreg, not tachy  Abdomen: soft, non-tender; bowel sounds normal  Extremities: No edema, DP +  Psychiatric: normal insight and affect    Patient Active Problem List:     Other and unspecified angina pectoris     Coronary atherosclerosis of native coronary artery     Other chronic pulmonary heart diseases     Mitral valve disorder     Acute combined systolic and diastolic heart failure (HCC)     Chronic low back pain     Essential hypertension     AGUILA (acute kidney injury) ([address]     Renal insufficiency     Gout     Pain in joint, hand     Mixed hyperlipidemia     Primary insomnia     Closed stable burst fracture of first lumbar vertebra (HCC)     GERD (gastroesophageal reflux disease)     Palpitations     V-tach (HCC)     Abnormal myocardial perfusion study     Ischemic cardiomyopathy     Cardiomyopathy ([address]     PVD (posterior vitreous detachment), both eyes     Posterior subcapsular polar age-related cataract of both eyes     Confusion     Chronic kidney disease, stage IV (severe) ([address]     Visual field defect     Stroke of unknown etiology ([facility]     Encounter for loop recorder check     Atrial fibrillation ([facility]        Assessment & Plan:      1. pAF  2. On [address]  3. ICMP  4. Chronic sCHF    79 y/o woman with a h/o HTN, HLD, thyroid disease, GERD, CKD stage IV, MVP, CAD, s/p MI, s/p PCI (2018), HFrEF, EF 25-30%, recent dx of AF on Eliquis, who p/w SOB and abd bloating, noted to be in AF/RVR, was given dig IV - HR now controlled. [id number] 6. TSH 1.09 (8/20/2021). AF  - In AF - HR controlled  - On Eliquis (weight, age) - no s/s bleeding - continue  - On carvedilol 25 mg BID  - Echo - LAE - 4.8/96.1  - Reviewed risk factors, pathophysiology, treatment options and lifestyle modification for atrial fibrillation: Blood pressure control, blood sugar control, healthy diet, minimal alcohol intake, no smoking, activity and exercise, manage stress sleep apnea evaluation and symptoms of a stroke. - Keep K+ > 4.0 and Mg > 2.0  - Reviewed recent labs  - Keep on tele    ICMP/chronic sCHF  - Appears compensated  - BNP 15,562  - EF 25-30%  - NYHA class III  -   - No ACE/ARB/ARNI/SGL2 due to CKD. Dot Dotson CNP  [facility]      I spent a total of 40 minutes in care of the patient and greater than 50% of the time was spent counseling with Christopher Mas and coordinating care regarding their diagnosis, treatments and plan of care.

## 2022-05-31 NOTE — CARE COORDINATION
Case Management Assessment           Initial Evaluation                Date / Time of Evaluation: 5/31/2022 4:33 PM                 Assessment Completed by: FANTASMA Jones, ARYW    Patient Name: Esteban Yee     YOB: 1935  Diagnosis: Atrial fibrillation ([facility] [I48.91]  AGUILA (acute kidney injury) ([facility] [N17.9]  Lung consolidation ([facility] [J18.1]     Date / Time: 5/29/2022 11:04 AM    Patient Admission Status: Inpatient    If patient is discharged prior to next notation, then this note serves as note for discharge by case management. Current PCP: Keyona Julian DO  Clinic Patient: No    Chart Reviewed: Yes  Patient/ Family Interviewed: Yes    Initial assessment completed at bedside with: patient    Hospitalization in the last 30 days: No    Emergency Contacts:  Extended Emergency Contact Information  Primary Emergency Contact: Cynthia Jimenez [address] Phone: [phone]  Mobile Phone: [phone]  Relation: Child  Secondary Emergency Contact: Kate Anderson [address] Phone: [phone]  Work Phone: [phone]  Mobile Phone: [phone]  Relation: Child    Advance Directives:   Code Status: [address]: No    Financial  Payor: Gale Wolfe / Plan: MEDICARE PART A AND B / Product Type: *No Product type* /     Pre-cert required for SNF: No    Pharmacy    [id number] [address]  [place] [address] [address]  Phone: [phone] Fax: [phone]      Potential assistance Purchasing Medications: Potential Assistance Purchasing Medications: No  Does Patient want to participate in local refill/ meds to beds program?: Yes    Meds To Beds General Rules:  1. Can ONLY be done Monday- Friday between 8:30am-5pm  2. Prescription(s) must be in pharmacy by 3pm to be filled same day  3. Copy of patient's insurance/ prescription drug card and patient face sheet must be sent along with the prescription(s)  4. Cost of Rx cannot be added to hospital bill. If financial assistance is needed, please contact unit  or ;  or  CANNOT provide pharmacy voucher for patients co-pays  5. Patients can then  the prescription on their way out of the hospital at discharge, or pharmacy can deliver to the bedside if staff is available. (payment due at time of pick-up or delivery - cash, check, or card accepted)     Able to afford home medications/ co-pay costs: Yes    ADLS  Support Systems: Spouse/Significant Other,Children    PT AM-PAC:   OT AM-PAC:     [place]: home with family  Steps: none    Plans to RETURN to current housing: Yes  Barriers to RETURNING to current housin [address]  Currently ACTIVE with [address]: No    Currently ACTIVE with [place] on Aging: No    Durable Medical Equipment  Equipment: cane, rollator, wheelchair and bath bench    Home Oxygen and Respiratory Equipment  Has HOME OXYGEN prior to admission: No    Dialysis  Active with HD/PD prior to admission: No      DISCHARGE PLAN:  Disposition: Home- No Services Needed    Transportation PLAN for discharge: family     Factors facilitating achievement of predicted outcomes: Family support and Pleasant    Barriers to discharge: Medical complications    Additional Case Management Notes:  Pt is from home with family, daughter can provide 24/7 supervision when she DC's home. Family can transport. Pt has needed DME. CM will continue to follow for DC needs and recs.         The Plan for Transition of Care is related to the following treatment goals of Atrial fibrillation ([address] [I48.91]  AGUILA (acute kidney injury) ([address] [N17.9]  Lung consolidation ([address] [J18.1]    The Patient and/or patient representative Nidia Jain and her family were provided with a choice of provider and agrees with the discharge plan Yes    Freedom of choice list was provided with basic dialogue that supports the patient's individualized plan of care/goals and shares the quality data associated with the providers.  Yes    Care Transition patient: No    FANTASMA Clark, [facility]  Case Management Department  Ph: [phone]   Fax: [phone]

## 2022-05-31 NOTE — PLAN OF CARE
Problem: Pain  Goal: Verbalizes/displays adequate comfort level or baseline comfort level  Outcome: Progressing  Flowsheets (Taken 5/31/2022 1004)  Verbalizes/displays adequate comfort level or baseline comfort level:   Encourage patient to monitor pain and request assistance   Assess pain using appropriate pain scale   Administer analgesics based on type and severity of pain and evaluate response   Implement non-pharmacological measures as appropriate and evaluate response  Note: Patient complaint of pain resolved with PRN pain medications. Patient educated on alternative methods of pain relief, patient verbalized understanding. Problem: Safety - Adult  Goal: Free from fall injury  Outcome: Progressing  Flowsheets  Taken 5/31/2022 1004  Free From Fall Injury: Instruct family/caregiver on patient safety  Taken 5/31/2022 1002  Free From Fall Injury:   Instruct family/caregiver on patient safety   Based on caregiver fall risk screen, instruct family/caregiver to ask for assistance with transferring infant if caregiver noted to have fall risk factors  Note: Patient is a high fall risk, fall precautions in place, patient remains free from falls. Problem: Skin/Tissue Integrity  Goal: Absence of new skin breakdown  Description: 1. Monitor for areas of redness and/or skin breakdown  2. Assess vascular access sites hourly  3. Every 4-6 hours minimum:  Change oxygen saturation probe site  4. Every 4-6 hours:  If on nasal continuous positive airway pressure, respiratory therapy assess nares and determine need for appliance change or resting period. Outcome: Progressing  Note: Patient skin remains intact, preventative mepilex to bony prominence of back, patient educated on need for frequent turning and repositioning to maintain skin integrity, patient verbalized understanding.      Problem: ABCDS Injury Assessment  Goal: Absence of physical injury  Recent Flowsheet Documentation  Taken 5/31/2022 1002 by Niesha Boggs Keely Pearson, RN  Absence of Physical Injury: Implement safety measures based on patient assessment

## 2022-06-01 VITALS
TEMPERATURE: 97.8 F | DIASTOLIC BLOOD PRESSURE: 81 MMHG | OXYGEN SATURATION: 95 % | HEIGHT: 60 IN | WEIGHT: 132.94 LBS | SYSTOLIC BLOOD PRESSURE: 113 MMHG | HEART RATE: 106 BPM | RESPIRATION RATE: 18 BRPM | BODY MASS INDEX: 26.1 KG/M2

## 2022-06-01 LAB
ALBUMIN SERPL-MCNC: 3.1 G/DL (ref 3.4–5)
ANION GAP SERPL CALCULATED.3IONS-SCNC: 6 MMOL/L (ref 3–16)
BASOPHILS ABSOLUTE: 0.1 K/UL (ref 0–0.2)
BASOPHILS RELATIVE PERCENT: 0.9 %
BUN BLDV-MCNC: 31 MG/DL (ref 7–20)
CALCIUM SERPL-MCNC: 8.5 MG/DL (ref 8.3–10.6)
CHLORIDE BLD-SCNC: 102 MMOL/L (ref 99–110)
CO2: 25 MMOL/L (ref 21–32)
CREAT SERPL-MCNC: 1.6 MG/DL (ref 0.6–1.2)
EOSINOPHILS ABSOLUTE: 0.3 K/UL (ref 0–0.6)
EOSINOPHILS RELATIVE PERCENT: 3.4 %
GFR AFRICAN AMERICAN: 37
GFR NON-AFRICAN AMERICAN: 31
GLUCOSE BLD-MCNC: 99 MG/DL (ref 70–99)
HCT VFR BLD CALC: 27.3 % (ref 36–48)
HEMOGLOBIN: 9.1 G/DL (ref 12–16)
LYMPHOCYTES ABSOLUTE: 1.6 K/UL (ref 1–5.1)
LYMPHOCYTES RELATIVE PERCENT: 20.6 %
MAGNESIUM: 2.1 MG/DL (ref 1.8–2.4)
MCH RBC QN AUTO: 30.6 PG (ref 26–34)
MCHC RBC AUTO-ENTMCNC: 33.3 G/DL (ref 31–36)
MCV RBC AUTO: 92 FL (ref 80–100)
MONOCYTES ABSOLUTE: 0.7 K/UL (ref 0–1.3)
MONOCYTES RELATIVE PERCENT: 9.4 %
NEUTROPHILS ABSOLUTE: 5 K/UL (ref 1.7–7.7)
NEUTROPHILS RELATIVE PERCENT: 65.7 %
PDW BLD-RTO: 15 % (ref 12.4–15.4)
PHOSPHORUS: 2.6 MG/DL (ref 2.5–4.9)
PLATELET # BLD: 224 K/UL (ref 135–450)
PMV BLD AUTO: 9.2 FL (ref 5–10.5)
POTASSIUM SERPL-SCNC: 5 MMOL/L (ref 3.5–5.1)
RBC # BLD: 2.97 M/UL (ref 4–5.2)
SODIUM BLD-SCNC: 133 MMOL/L (ref 136–145)
WBC # BLD: 7.5 K/UL (ref 4–11)

## 2022-06-01 PROCEDURE — 99233 SBSQ HOSP IP/OBS HIGH 50: CPT | Performed by: NURSE PRACTITIONER

## 2022-06-01 PROCEDURE — 83735 ASSAY OF MAGNESIUM: CPT

## 2022-06-01 PROCEDURE — 6370000000 HC RX 637 (ALT 250 FOR IP): Performed by: STUDENT IN AN ORGANIZED HEALTH CARE EDUCATION/TRAINING PROGRAM

## 2022-06-01 PROCEDURE — 36415 COLL VENOUS BLD VENIPUNCTURE: CPT

## 2022-06-01 PROCEDURE — 80069 RENAL FUNCTION PANEL: CPT

## 2022-06-01 PROCEDURE — 2580000003 HC RX 258: Performed by: STUDENT IN AN ORGANIZED HEALTH CARE EDUCATION/TRAINING PROGRAM

## 2022-06-01 PROCEDURE — 85025 COMPLETE CBC W/AUTO DIFF WBC: CPT

## 2022-06-01 RX ORDER — LIDOCAINE HYDROCHLORIDE 20 MG/ML
15 SOLUTION OROPHARYNGEAL
Status: DISCONTINUED | OUTPATIENT
Start: 2022-06-01 | End: 2022-06-01 | Stop reason: HOSPADM

## 2022-06-01 RX ORDER — CEFDINIR 300 MG/1
300 CAPSULE ORAL DAILY
Qty: 5 CAPSULE | Refills: 0 | Status: SHIPPED | OUTPATIENT
Start: 2022-06-02 | End: 2022-06-07

## 2022-06-01 RX ADMIN — CARVEDILOL 25 MG: 25 TABLET, FILM COATED ORAL at 08:14

## 2022-06-01 RX ADMIN — APIXABAN 2.5 MG: 2.5 TABLET, FILM COATED ORAL at 08:14

## 2022-06-01 RX ADMIN — POLYETHYLENE GLYCOL (3350) 17 G: 17 POWDER, FOR SOLUTION ORAL at 08:13

## 2022-06-01 RX ADMIN — SENNOSIDES 8.6 MG: 8.6 TABLET, COATED ORAL at 05:55

## 2022-06-01 RX ADMIN — SODIUM CHLORIDE, PRESERVATIVE FREE 10 ML: 5 INJECTION INTRAVENOUS at 08:14

## 2022-06-01 RX ADMIN — CEFDINIR 300 MG: 300 CAPSULE ORAL at 08:18

## 2022-06-01 ASSESSMENT — PAIN SCALES - GENERAL: PAINLEVEL_OUTOF10: 6

## 2022-06-01 ASSESSMENT — PAIN - FUNCTIONAL ASSESSMENT: PAIN_FUNCTIONAL_ASSESSMENT: PREVENTS OR INTERFERES SOME ACTIVE ACTIVITIES AND ADLS

## 2022-06-01 ASSESSMENT — PAIN DESCRIPTION - FREQUENCY: FREQUENCY: CONTINUOUS

## 2022-06-01 ASSESSMENT — PAIN DESCRIPTION - LOCATION: LOCATION: BACK

## 2022-06-01 ASSESSMENT — PAIN DESCRIPTION - ONSET: ONSET: ON-GOING

## 2022-06-01 ASSESSMENT — PAIN DESCRIPTION - DESCRIPTORS: DESCRIPTORS: SHARP;SHOOTING

## 2022-06-01 ASSESSMENT — PAIN DESCRIPTION - PAIN TYPE: TYPE: ACUTE PAIN

## 2022-06-01 ASSESSMENT — PAIN DESCRIPTION - ORIENTATION: ORIENTATION: LOWER

## 2022-06-01 NOTE — CARE COORDINATION
Case Management Assessment            Discharge Note                    Date / Time of Note: 6/1/2022 10:50 AM                  Discharge Note Completed by: FANTASMA Snow, [place]    Patient Name: Rip Iniguez   YOB: 1935  Diagnosis: Atrial fibrillation ([facility] [I48.91]  AGUILA (acute kidney injury) ([facility] [N17.9]  Lung consolidation ([facility] [J18.1]   Date / Time: 5/29/2022 11:04 AM    Current PCP: Olivia Escobar DO  Clinic patient: No    Hospitalization in the last 30 days: No    Advance Directives:  Code Status: Full Code  [place] DNR form completed and on chart: No    Financial:  Payor: MEDICARE / Plan: MEDICARE PART A AND B / Product Type: *No Product type* /      Pharmacy:    [facility], [address] [place] [address]  [facility] [address] [place] [address] [address]  Phone: [phone] Fax: [phone] medications?: Potential Assistance Purchasing Medications: No  Assistance provided by Case Management: None at this time    Does patient want to participate in local refill/ meds to beds program?: Yes    Meds To Beds General Rules:  1. Can ONLY be done Monday- Friday between 8:30am-5pm  2. Prescription(s) must be in pharmacy by 3pm to be filled same day  3. Copy of patient's insurance/ prescription drug card and patient face sheet must be sent along with the prescription(s)  4. Cost of Rx cannot be added to hospital bill. If financial assistance is needed, please contact unit  or ;  or  CANNOT provide pharmacy voucher for patients co-pays  5.  Patients can then  the prescription on their way out of the hospital at discharge, or pharmacy can deliver to the bedside if staff is available. (payment due at time of pick-up or delivery - cash, check, or card accepted)     Able to afford home medications/ co-pay costs: Yes    ADLS:  Current PT AM-PAC Score: 21 /24  Current OT AM-PAC Score: 22 /24      DISCHARGE Disposition: Home with Home Health Care: [facility]     LOC at discharge: Not Applicable  ANNEMARIE Completed: Yes    Notification completed in HENS/PAS?:  Not Applicable    IMM Completed:   Not Indicated    Transportation:  Transportation PLAN for discharge: family   Mode of Transport: Private Car      Transport form completed: No    Home Care:  [address] ordered at discharge: Yes  [address]: [place]  Phone: [phone]  Fax: [phone]  Orders faxed: Yes    Durable Medical Equipment:  DME Provider: None  Equipment obtained during hospitalization:     Home Oxygen and Respiratory Equipment:  Oxygen needed at discharge?: No  [address]: Not Applicable  Portable tank available for discharge?: Not Indicated    Dialysis:  Dialysis patient: No    Dialysis Center:  Not Applicable    Additional CM Notes: Pt from home w/daughter, Pt's Dameon Saldaña provides 24/7 supervision, Pt is agreeable to [address] spoke w/Vicky [phone] w/[facility] and confirmed HHC. Pt's family will transport home. The Plan f-or Transition of Care is related to the following treatment goals of Atrial fibrillation ([address] [I48.91]  AGUILA (acute kidney injury) ([address] [N17.9]  Lung consolidation ([address] [J18.1]    The Patient and/or patient representative Naty and her family were provided with a choice of provider and agrees with the discharge plan Yes    Freedom of choice list was provided with basic dialogue that supports the patient's individualized plan of care/goals and shares the quality data associated with the providers.  Yes    Care Transitions patient: No    FANTASMA Peters, [facility]  Case Management Department  Ph: [phone]  Fax: [phone]

## 2022-06-01 NOTE — DISCHARGE SUMMARY
INTERNAL MEDICINE DEPARTMENT AT [address] [place] [address]  DISCHARGE SUMMARY    Patient ID: Lupe Starks                                             Discharge Date: 6/1/2022   Patient's PCP: Iraj Simmons,                                           Discharge Physician: Nick Drew MD   Admit Date: 5/29/2022   Admitting Physician: Katina Carrillo MD    PROBLEMS DURING HOSPITALIZATION   Atrial fibrillation with RVR ([facility]   AGUILA (acute kidney injury) ([facility]    DISCHARGE DIAGNOSES:  Present on Admission:   Atrial fibrillation with RVR ([facility]   AGUILA (acute kidney injury) [facility]    Hospital Course:    Atrial fibrillation  Tachy to the 120-140s, a fib RVR on arrival to EKG, not taking coreg since yesterday. Cards consulted, EP loaded her with digoxin and continue home coreg and eliquis. Afib was rate controlled on discharge. She was not in acute heart failure, appeared dry and had UTI. Likely cause of her RVR.      AGUILA 2/2 pre-renal  Cr elevated from baseline of 1.5, came in at 2.2. Improved back to baseline with gentle IVF.      E. coli UTI  UA had 2+ bacteria and (+) leukocytes. Urine cultures grew E. Coli on 5/29 resistant to multiple drugs. We reviewed her previous allergy to cephalosporins. She stated she had nausea but denies having any SOB, wheezing, rash or anaphylaxis. We weighed risk and benefit and started her on cefdinir and monitored for reaction. She tolerated well. Discharged on cefdinir to complete course of antibiotic.      On the day of discharge, patient denied any CP, SOB, palpitations, N/V, abdominal pain or urinary symptoms. Patient status improved and patient was stable on the day of discharge. Physical Exam:  /81   Pulse (!) 106   Temp 97.8 °F (36.6 °C) (Oral)   Resp 18   Ht 5' (1.524 m)   Wt 132 lb 15 oz (60.3 kg)   SpO2 95%   BMI 25.96 kg/m²     ROS - As above    Physical Exam  Constitutional:       General: She is not in acute distress.   HENT:      Head: Normocephalic and atraumatic. Mouth/Throat:      Mouth: Mucous membranes are moist.   Cardiovascular:      Rate and Rhythm: Normal rate. Rhythm irregular. Pulses: Normal pulses. Heart sounds: Murmur heard.        Pulmonary:      Effort: No respiratory distress. Breath sounds: No wheezing or rales. Abdominal:      General: There is no distension. Palpations: Abdomen is soft. Tenderness: There is no abdominal tenderness. There is no guarding. Musculoskeletal:      Right lower leg: No edema. Left lower leg: No edema. Skin:     General: Skin is warm.    Neurological:      Mental Status: She is alert and oriented to person, place, and time.         Consults: cardiology  Significant Diagnostic Studies:  chest x-ray  Treatments: IVF, digoxin, antibiotic  Disposition: home  Discharged Condition: Stable  Follow Up: Primary Care Physician in one week    DISCHARGE MEDICATION:     Medication List      START taking these medications    cefdinir 300 MG capsule  Commonly known as: OMNICEF  Take 1 capsule by mouth daily for 5 days  Start taking on: June 2, 2022        CONTINUE taking these medications    apixaban 2.5 MG Tabs tablet  Commonly known as: ELIQUIS  Take 1 tablet by mouth 2 times daily     aspirin 81 MG chewable tablet     atorvastatin 80 MG tablet  Commonly known as: LIPITOR  TAKE 1 TABLET BY MOUTH ONE TIME A DAY     carvedilol 25 MG tablet  Commonly known as: COREG  Take 1 tablet by mouth 2 times daily     Co Q 10 100 MG Caps     diclofenac sodium 1 % Gel  Commonly known as: VOLTAREN  Apply 2 g topically 4 times daily     famotidine 20 MG tablet  Commonly known as: PEPCID  Take 1 tablet by mouth daily     fluticasone 50 MCG/ACT nasal spray  Commonly known as: FLONASE  2 sprays by Each Nostril route daily     furosemide 20 MG tablet  Commonly known as: LASIX  Take 1 tablet by mouth daily as needed (fluid overload)     Handicap Placard Misc  by Does not apply route Length: 5 years HYDROcodone-acetaminophen  MG per tablet  Commonly known as: NORCO     losartan 50 mg tablet  Commonly known as: COZAAR  TAKE 1 TABLET BY MOUTH EVERY DAY     nitroGLYCERIN 0.4 MG SL tablet  Commonly known as: NITROSTAT  PUT 1 TAB UNDER TONGUE EVERY 5 MIN AS NEEDED CHEST PAIN UP TO 3. IF NO RELIEF AFTER 1 DOSE, CALL 911     ondansetron 4 MG tablet  Commonly known as: ZOFRAN  Take 1 tablet by mouth 3 times daily as needed for Nausea or Vomiting     vitamin B-12 1000 MCG tablet  Commonly known as: CYANOCOBALAMIN     vitamin D 1000 UNIT Tabs tablet  Commonly known as: CHOLECALCIFEROL  Take 2 tablets by mouth daily     zolpidem 5 MG tablet  Commonly known as: AMBIEN  Take 1 tablet by mouth nightly as needed for Sleep for up to 180 days. Where to Get Your Medications      These medications were sent to [id number] [place] [address], [address] [place] [address]    Phone: [phone]   · cefdinir 300 MG capsule       Activity: activity as tolerated  Diet: cardiac diet  Wound Care: none needed    Time Spent on discharge is more than 45 minutes    Signed:  Josue Tran MD, PGY-1  6/1/2022  Patient seen and examined, labs and imaging reviewed, agree with assessment and plan as outlined above. patients condition continues to improve doing well, asked patient to monitor side effects of medications which i've discussed at length, recurrence of symptoms or new symptoms including but not limited to chest pain, shortness of breath, nausea, vomiting, fevers or chills and seek immediate medical attention or call 911. Greater than 30 minutes spent on case on day of discharge.      Jovany Torres MD FACP

## 2022-06-01 NOTE — PROGRESS NOTES
Pt ready for discharge. Daughter at bedside and will take the pt home. Went over medication she needs to  at [facility]. Pt educated on all medications and her and her daughter verbalized an understanding. No other needs at this time. Pts IV and tele removed. Transported to lobby by nursing student.

## 2022-06-01 NOTE — DISCHARGE INSTR - COC
Continuity of Care Form    Patient Name: Addie Scott   :  1935  MRN:  [id number]    Admit date:  2022  Discharge date:  ***    Code Status Order: Full Code   Advance Directives:      Admitting Physician:  Carmella Morelos MD  PCP: Aditya Guzman DO    Discharging Nurse: [facility] Unit/Room#: 1108/7497-67  Discharging Unit Phone Number: ***    Emergency Contact:   Extended Emergency Contact Information  Primary Emergency Contact: Frannie Wall [address] Phone: [phone]  Mobile Phone: [phone]  Relation: Child  Secondary Emergency Contact: Brit Huitron [address] Phone: [phone]  Work Phone: [phone]  Mobile Phone: [phone]  Relation: Child    Past Surgical History:  Past Surgical History:   Procedure Laterality Date    COLONOSCOPY      EYE SURGERY Left 2018    PHACO EMULSIFICATION OF CATARACT WITH INTRAOCULAR LENS IMPLANT LEFT EYE     HYSTERECTOMY      OTHER SURGICAL HISTORY  2018    phacoemulsification of cataract with intraocular lens implant right eye    ME OFFICE/OUTPT VISIT,PROCEDURE ONLY Right 2018    PHACO EMULSIFICATION OF CATARACT WITH INTRAOCULAR LENS IMPLANT RIGHT EYE performed by Fernando Cruz MD at [address] [place] [address] OFFICE/OUTPT [facility] Left 2018    PHACO EMULSIFICATION OF CATARACT WITH INTRAOCULAR LENS IMPLANT LEFT EYE performed by Fernando Cruz MD at [facility]         Immunization History:   Immunization History   Administered Date(s) Administered    KRAIGID-19, Bob Ordoñez, Primary or Immunocompromised, PF, 100mcg/0.5mL 2021, 2021    Influenza Vaccine, unspecified formulation 2016    Influenza Virus Vaccine 10/13/2013, 10/02/2015    Influenza, Quadv, 6 mo and older, IM, PF (Flulaval, Fluarix) 2018    Pneumococcal Conjugate 13-valent (Eezbpaq22) 2018    Pneumococcal Polysaccharide ([id number]) 01/15/2014    Td (Adult), 5 Lf Tetanus Toxoid, Pf (Tenivac, Decavac) 2021       Active Problems:  Patient Active Problem List   Diagnosis Code    Other and unspecified angina pectoris I20.9    Coronary atherosclerosis of native coronary artery I25.10    Other chronic pulmonary heart diseases I27.89    Mitral valve disorder I05.9    Acute combined systolic and diastolic heart failure ([facility]) I50.41    Chronic low back pain M54.50, G89.29    Essential hypertension I10    AGUILA (acute kidney injury) ([facility] N17.9    Renal insufficiency N28.9    Gout M10.9    Pain in joint, hand M25.549    Mixed hyperlipidemia E78.2    Primary insomnia F51.01    Closed stable burst fracture of first lumbar vertebra ([facility]) S32.011A    GERD (gastroesophageal reflux disease) K21.9    Palpitations R00.2    V-tach ([facility]) I47.2    Abnormal myocardial perfusion study R94.39    Ischemic cardiomyopathy I25.5    Cardiomyopathy ([facility] I42.9    PVD (posterior vitreous detachment), both eyes H43.813    Posterior subcapsular polar age-related cataract of both eyes H25.043    Confusion R41.0    Chronic kidney disease, stage IV (severe) ([facility]) N18.4    Visual field defect H53.40    Stroke of unknown etiology ([facility] I63.9    Encounter for loop recorder check Z45.09    Atrial fibrillation ([facility]) I48.91       Isolation/Infection:   Isolation            No Isolation          Patient Infection Status       Infection Onset Added Last Indicated Last Indicated By Review Planned Expiration Resolved Resolved By    None active    Resolved    COVID-19 (Rule Out) 22 COVID-19, Rapid (Ordered)   22 Rule-Out Test Resulted    COVID-19 (Rule Out) 22 COVID-19 & Influenza Combo (Ordered)   22 Rule-Out Test Resulted    COVID-19 20 COVID-19   20             Nurse Assessment:  Last Vital Signs: /81   Pulse (!) 106   Temp 97.8 °F (36.6 °C) (Oral)   Resp 18   Ht 5' (1.524 m)   Wt 132 lb 15 oz (60.3 kg)   SpO2 95% BMI 25.96 kg/m²     Last documented pain score (0-10 scale): Pain Level: 6  Last Weight:   Wt Readings from Last 1 Encounters:   06/01/22 132 lb 15 oz (60.3 kg)     Mental Status:  {IP PT MENTAL STATUS:20030}    IV Access:  { ANNEMARIE IV ACCESS:929223234}    Nursing Mobility/ADLs:  Walking   {CHP DME XLRP:109868232}  Transfer  {CHP DME DJKT:751841182}  Bathing  {CHP DME LSTM:483154364}  Dressing  {CHP DME GKBU:510751110}  Toileting  {CHP DME OXIN:353968390}  Feeding  {P DME VQWR:796589248}  Med Admin  {P DME YANG:259439340}  Med Delivery   { ANNEMARIE MED Delivery:866173276}    Wound Care Documentation and Therapy:        Elimination:  Continence: Bowel: {YES / IS:30371}  Bladder: {YES / NQ:15792}  Urinary Catheter: {Urinary Catheter:578238195}   Colostomy/Ileostomy/Ileal Conduit: {YES / EY:45532}       Date of Last BM: ***    Intake/Output Summary (Last 24 hours) at 6/1/2022 1203  Last data filed at 6/1/2022 0943  Gross per 24 hour   Intake 994 ml   Output --   Net 994 ml     I/O last 3 completed shifts: In: 911 [P.O.:200; I.V.:711]  Out: -     Safety Concerns:     508 R&V Safety Concerns:940132498}    Impairments/Disabilities:      508 R&V Impairments/Disabilities:292491156}    Nutrition Therapy:  Current Nutrition Therapy:   508 R&V Diet List:075439828}    Routes of Feeding: {P DME Other Feedings:890816625}  Liquids: {Slp liquid thickness:77949}  Daily Fluid Restriction: {CHP DME Yes amt example:053914133}  Last Modified Barium Swallow with Video (Video Swallowing Test): {Done Not Done FJXE:985682531}    Treatments at the Time of Hospital Discharge:   Respiratory Treatments: ***  Oxygen Therapy:  {Therapy; copd oxygen:10193}  Ventilator:    { CC Vent DZYR:880019435}      Heart Failure Instructions for Daily Management  Patient was treated for chronic combined systolic and diastolic heart failure.   she  will require the following:    Please weigh daily on the same scale and approximately the same time of day.  Report weight gain of 3 pounds/day or 5 pounds/week to : [address],  [address] and [address] / [address]yllis Zuniga [phone]. Please use hospital discharge weight as baseline reference. Please monitor for signs and symptoms of and report to MD:  Worsening Heart Failure: sudden weight gain, shortness of breath, lower extremity or general edema/swelling, abdominal bloating/swelling, inability to lie flat, intolerance to usual activity, or cough (especially at night). Report these finding even if no increase in weight. Dehydration:  having difficulty or a decrease in urination, dizziness, worsening fatigue, or new onset/worsening of generalized weakness. Please continue a LOW SODIUM diet and LIMIT fluid intake to 48 - 64 ounces ( 1.5 - 2 liters) per day. Call [address] [employer],  [address] and [address] / [place]yllis Zuniga [phone] with any questions or concerns. Please continue heart failure education to patient and family/support system. See After Visit Summary for hospital follow up appointment details. Consider spiritual care referral for support and/or completion of advance directives . Consider: [place] [address] telehealth program if patient agreeable and able to participate and palliative care consult for ongoing goals of care, end of life, and/or chronic disease management discussions. Patient's primary cardiologist is Dr Thad Sanchez. Please draw BMP on  and fax results to [facility] [place] so they can be reviewed at cardiology follow up on .       Rehab Therapies: SN/MSW  Weight Bearing Status/Restrictions: [address] RO[address] Weight Bearin}  Other Medical Equipment (for information only, NOT a DME order):  {EQUIPMENT:576584204}  Other Treatments: ***    Patient's personal belongings (please select all that are sent with patient):  {CHP DME Belongings:655490734}    RN SIGNATURE:  {Esignature:921834715}    CASE MANAGEMENT/SOCIAL WORK SECTION    Inpatient Status Date: ***    Readmission Risk Assessment Score:  Readmission Risk              Risk of Unplanned Readmission:  20           Discharging to Facility/ Agency   Name:   Address:  Phone:  Fax:    Dialysis Facility (if applicable)   Name:  Address:  Dialysis Schedule:  Phone:  Fax:    / signature: {Esignature:947435333}    PHYSICIAN SECTION    Prognosis: {Prognosis:4015333949}    Condition at Discharge: [address] Patient Condition:154335313}    Rehab Potential (if transferring to Rehab): {Prognosis:9106033052}    Recommended Labs or Other Treatments After Discharge: ***    Physician Certification: I certify the above information and transfer of Edilma Frey  is necessary for the continuing treatment of the diagnosis listed and that she requires {Admit to Appropriate Level of Care:04115} for {GREATER/LESS:002431458} 30 days.      Update Admission H&P: {CHP DME Changes in PAJOB:312381389}    PHYSICIAN SIGNATURE:  {Esignature:965633616}

## 2022-06-01 NOTE — PROGRESS NOTES
Physician Progress Note      PATIENT:               Liza Mack  CSN #:                  [id number]  :                       1935  ADMIT DATE:       2022 11:04 AM  DISCH DATE:        2022 12:33 PM  RESPONDING  PROVIDER #:        Mary Carrillo TEXT:    Patient admitted with SOB  Noted documentation of NSTEMI . In order to support   the diagnosis of type II MI, please refer to 4th universal definition of MI   below and include additional clinical indicators in your documentation. ? Or   please document if the diagnosis of type II MI has been ruled out after study. The medical record reflects the following:  ?? Risk Factors: A-fib, CHF stopped meds  ? ? Clinical Indicators: H&P: \"NSTEMI 2/2 demand ischemia: Trop elevated, but   down from prior, having intermittent chest pain  -trend troponin q6h. \"     troponi levels  0.04, 0.03  ?? Treatment: trend troponin q6h, Cardiology consult    Fourth Universal Definition of Myocardial Infarction:  Clearly separates MI   from myocardial injury. Patients with elevated blood troponin levels but   without clinical evidence of ischemia are said to have had a myocardial   injury. ? To have a myocardial infarction requires both an elevated troponin   blood test along with at least one of the following:  - Symptoms of acute myocardial ischemia (Types 1 - 5 MI)  - Clinical evidence of ischemia, as evidenced in an electrocardiogram (EKG)   showing new ischemic changes (Type 1, Type 2, Type 3, or Type 4a MI)  - Development of pathological Q waves (Types 1 - 5 MI)  - Imaging evidence of new loss of viable myocardium or new regional wall   motion abnormality in a pattern consistent with an ischemic etiology (Types 1   - 5 MI)  Options provided:  -- NSTEMI type II ruled out after study and demand ischemia due to Acute on   Chronic HFrEF confirmed  -- NSTEMI type II due to Acute on Chronic HFrEF  present as evidenced by,   Please document indicators of myocardial infarction. -- Other - I will add my own diagnosis  -- Disagree - Not applicable / Not valid  -- Disagree - Clinically unable to determine / Unknown  -- Refer to Clinical Documentation Reviewer    PROVIDER RESPONSE TEXT:    NSTEMI type II was ruled out after study and demand ischemia confirmed.     Query created by: Dru Miramontes on 5/31/2022 10:12 AM      Electronically signed by:  Keke Mendez 6/1/2022 1:43 PM

## 2022-06-01 NOTE — PROGRESS NOTES
Electrophysiology - PROGRESS NOTE    Admit Date: 5/29/2022     Chief Complaint: AF/RVR     Interval History:   Patient seen and examined and notes reviewed. Patient is being followed for AF/RVR. Patient with a h/o of pAF on Eliquis and a NICMP, EF 25-30%,  who had been c/o SOB and abd bloating ongoing for several days. She had called the on call MD who advised her to hold all meds except the Eliquis. Patient felt worse and came to the ED and was noted to be in AF/RVR. She has been on Coreg 25 mg BID so was digitalized. Her HR is now controlled in AF. Patient admitted to missing a couple of doses of Eliquis. Currently denies any CP or SOB. Cr had jumped to 2.2 and furosemide was held. Patient had been taking daily and not prn. No s/s of CP or SOB. Being tx'd for UTI. Home today.     In: 0482 [P.O.:363; I.V.:711]  Out: -    Wt Readings from Last 2 Encounters:   06/01/22 132 lb 15 oz (60.3 kg)   05/23/22 130 lb (59 kg)       Data:   Scheduled Meds:   Scheduled Meds:   polyethylene glycol  17 g Oral Daily    cefdinir  300 mg Oral Daily    sodium chloride flush  5-40 mL IntraVENous 2 times per day    apixaban  2.5 mg Oral BID    carvedilol  25 mg Oral BID     Continuous Infusions:   sodium chloride 5 mL (05/31/22 1100)     PRN Meds:.senna, sodium chloride flush, sodium chloride, ondansetron **OR** ondansetron, HYDROcodone-acetaminophen  Continuous Infusions:   sodium chloride 5 mL (05/31/22 1100)       Intake/Output Summary (Last 24 hours) at 6/1/2022 0840  Last data filed at 6/1/2022 0825  Gross per 24 hour   Intake 1074 ml   Output --   Net 1074 ml       CBC:   Lab Results   Component Value Date    WBC 7.5 06/01/2022    HGB 9.1 06/01/2022     06/01/2022     BMP:  Lab Results   Component Value Date     06/01/2022    K 5.0 06/01/2022    K 4.5 05/29/2022     06/01/2022    CO2 25 06/01/2022    BUN 31 06/01/2022    CREATININE 1.6 06/01/2022 GLUCOSE 99 06/01/2022     INR:   Lab Results   Component Value Date    INR 1.35 05/29/2022    INR 1.02 03/18/2022    INR 1.02 11/20/2021        CARDIAC LABS  ENZYMES:  Recent Labs     05/29/22  1149 05/29/22  1738   TROPONINI 0.04* 0.03*     FASTING LIPID PANEL:  Lab Results   Component Value Date    HDL 84 03/19/2022    LDLCALC 53 03/19/2022    TRIG 65 03/19/2022     LIVER PROFILE:  Lab Results   Component Value Date    AST 44 05/29/2022    AST 25 03/18/2022    ALT 51 05/29/2022    ALT 9 03/18/2022       -----------------------------------------------------------------  Telemetry: Personally reviewed  AF, HR controlled, PVCs, NSVT    Objective:   Vitals: /81   Pulse (!) 106   Temp 97.8 °F (36.6 °C) (Oral)   Resp 18   Ht 5' (1.524 m)   Wt 132 lb 15 oz (60.3 kg)   SpO2 95%   BMI 25.96 kg/m²   General appearance: alert, appears stated age and cooperative, No acute distress   Eyes: Conjunctiva and pupils normal and reactive  Skin: Skin color, texture, turgor normal. No rashes or ecchymosis.   Neck: no JVD, supple, symmetrical, trachea midline   Lungs: , no accessory muscle use, no respiratory distress  Heart: irreg, not tachy  Abdomen: soft, non-tender; bowel sounds normal  Extremities: No edema, DP +  Psychiatric: normal insight and affect    Patient Active Problem List:     Other and unspecified angina pectoris     Coronary atherosclerosis of native coronary artery     Other chronic pulmonary heart diseases     Mitral valve disorder     Acute combined systolic and diastolic heart failure (HCC)     Chronic low back pain     Essential hypertension     AGUILA (acute kidney injury) ([facility]     Renal insufficiency     Gout     Pain in joint, hand     Mixed hyperlipidemia     Primary insomnia     Closed stable burst fracture of first lumbar vertebra (HCC)     GERD (gastroesophageal reflux disease)     Palpitations     V-tach (HCC)     Abnormal myocardial perfusion study     Ischemic cardiomyopathy     Cardiomyopathy ([facility]     PVD (posterior vitreous detachment), both eyes     Posterior subcapsular polar age-related cataract of both eyes     Confusion     Chronic kidney disease, stage IV (severe) ([facility]     Visual field defect     Stroke of unknown etiology ([facility]     Encounter for loop recorder check     Atrial fibrillation ([facility]        Assessment & Plan:      1. pAF  2. On [address]  3. ICMP  4. Chronic sCHF  5. CAD  6. NSVT    81 y/o woman with a h/o HTN, HLD, thyroid disease, GERD, CKD stage IV, MVP, CAD, s/p MI, s/p PCI (2018), HFrEF, EF 25-30%, recent dx of AF on Eliquis, who p/w SOB and abd bloating, noted to be in AF/RVR, was given dig IV - HR now controlled. [id number] 6. TSH 1.09 (8/20/2021). AF  - In AF - HR controlled  - On Eliquis (weight, age) - no s/s bleeding - continue  - On carvedilol 25 mg BID  - Echo - LAE - 4.8/96.1  - Reviewed risk factors, pathophysiology, treatment options and lifestyle modification for atrial fibrillation: Blood pressure control, blood sugar control, healthy diet, minimal alcohol intake, no smoking, activity and exercise, manage stress sleep apnea evaluation and symptoms of a stroke. - Keep K+ > 4.0 and Mg > 2.0  - Reviewed recent labs  - Keep on tele  - F/u w/ EP in August    ICMP/chronic sCHF  - Appears compensated  - BNP 74,288  - EF 25-30%  - NYHA class III  -   - No ACE/ARB/ARNI/SGL2 due to CKD. - F/u with Lito Rdz NP in 7-10 days     CAD/NSVT  - On Eliquis  - No s/s  - Not interested in [facility] in the past d/t dye allerghy  - 8/9 beats NSVT on [address] I spent a total of 35 minutes in care of the patient and greater than 50% of the time was spent counseling with Gonzalez Tavarez and coordinating care regarding their diagnosis, treatments and plan of care.

## 2022-06-01 NOTE — PROGRESS NOTES
Progress Note    Admit Date: 5/29/2022  Day: 2  Diet: ADULT DIET; Regular; Low Sodium (2 gm); 1200 ml    CC: SOB    Interval history:   NAONE. Patient seen and examined this am. Wants to go home. Denies any CP, palpitations, N/V, abdominal pain or urinary symptoms. HPI: Patient is a 79 yo female with a PMHx of CAD, HFrEF (EF of 25-30%), HLD, HTN, and hx of MI, who presents with SOB. She endorses worsening SOB over the last few days, it is worse with exertion. She typically sleeps with 3 pillows at night. She talked to cardiology on-call yesterday and they told her to stop taking all of her medications except eliquis and her pain meds. She also endorses feeling more swollen in her B/L LE, intermittent palpitations, lack of appetite over past few days, and on/off chest pain that radiates to the back. She also has some back pain, but she attributes the chest and back pain to not taking her pain meds as of recent.      Upon arrival to the ED, patient was tachycardic,  But otherwise hemodynamically stable. Cr of 2.2, which is elevated from baseline of 1.5. Troponin of 0.04. [place] 750[address], which is mildly elevated from prior. CXR indicated minimal bibasilar airspace disaese, atelectasis, or  Pneumonia. He was given a 500cc bolus, unasyn,and vibramycin. She wasin  A  Fib with  RVR and given  Coreg in ED. Urine cx and blood cx obtained. Patient admitted for further management of SOB, afib rvr, and AGUILA.      Medications:     Scheduled Meds:   polyethylene glycol  17 g Oral Daily    cefdinir  300 mg Oral Daily    sodium chloride flush  5-40 mL IntraVENous 2 times per day    apixaban  2.5 mg Oral BID    carvedilol  25 mg Oral BID     Continuous Infusions:   sodium chloride 5 mL (05/31/22 1100)     PRN Meds:senna, sodium chloride flush, sodium chloride, ondansetron **OR** ondansetron, HYDROcodone-acetaminophen    Objective:   Vitals:   T-max:  Patient Vitals for the past 8 hrs:   BP Temp Temp src Pulse Resp SpO2 Weight   06/01/22 0748 -- -- -- (!) 106 -- -- --   06/01/22 0735 113/81 97.8 °F (36.6 °C) Oral (!) 106 18 -- --   06/01/22 0403 -- -- -- -- -- -- 132 lb 15 oz (60.3 kg)   06/01/22 0304 119/65 97 °F (36.1 °C) Oral 90 18 95 % --       Intake/Output Summary (Last 24 hours) at 6/1/2022 4782  Last data filed at 6/1/2022 0825  Gross per 24 hour   Intake 1074 ml   Output --   Net 1074 ml       Review of Systems - As above      Physical Exam  Constitutional:       General: She is not in acute distress. HENT:      Head: Normocephalic and atraumatic. Mouth/Throat:      Mouth: Mucous membranes are moist.   Cardiovascular:      Rate and Rhythm: Normal rate. Rhythm irregular. Pulses: Normal pulses. Heart sounds: Murmur heard. Pulmonary:      Effort: No respiratory distress. Breath sounds: No wheezing or rales. Abdominal:      General: There is no distension. Palpations: Abdomen is soft. Tenderness: There is no abdominal tenderness. There is no guarding. Musculoskeletal:      Right lower leg: No edema. Left lower leg: No edema. Skin:     General: Skin is warm. Neurological:      Mental Status: She is alert and oriented to person, place, and time.          LABS:    CBC:   Recent Labs     05/30/22  0718 05/31/22  1039 06/01/22  0737   WBC 8.9 7.1 7.5   HGB 9.7* 9.5* 9.1*   HCT 29.1* 29.1* 27.3*    239 224   MCV 91.3 92.4 92.0     Renal:    Recent Labs     05/30/22  0718 05/31/22  1039 06/01/22  0737   * 137 133*   K 4.9 4.7 5.0   CL 97* 100 102   CO2 23 26 25   BUN 43* 39* 31*   CREATININE 2.1* 2.0* 1.6*   GLUCOSE 128* 108* 99   CALCIUM 8.8 8.8 8.5   MG 2.10 2.20 2.10   PHOS 3.3 2.8 2.6   ANIONGAP 13 11 6     Hepatic:   Recent Labs     05/29/22  1149 05/29/22  1149 05/30/22  0718 05/31/22  1039 06/01/22  0737   AST 44*  --   --   --   --    ALT 51*  --   --   --   --    BILITOT 0.3  --   --   --   --    PROT 6.6  --   --   --   --    LABALBU 3.9   < > 3.5 3.5 3.1* ALKPHOS 58  --   --   --   --     < > = values in this interval not displayed. Troponin:   Recent Labs     05/29/22  1149 05/29/22  1738   TROPONINI 0.04* 0.03*     BNP: 13, 884    INR:   Recent Labs     05/29/22  1149   INR 1.35*     Lactate: 1.1    Cultures: Urine cultures - E.coli (08/31/2020)  -----------------------------------------------------------------  RAD:   XR CHEST (2 VW)   Final Result      Minimal bibasilar airspace disease, atelectasis or pneumonia. Assessment/Plan:   81 yo female with a PMHx of CAD, HFrEF (EF of 25-30%), HLD, HTN, hx of MI, and thyroid disease, who presents with SOB.      Atrial fibrillation  Tachy to the 120-140s, a fib RVR on arrival to EKG, not taking coreg since yesterday. S/P Digoxin  - Continue eliquis BID  - Continue Coreg   - Cardiology/EP consulted  - Continue Tele     AGUILA 2/2 pre-renal (improving)  Cr elevated from baseline of 1.5  - Monitor I/Os  - Avoid nephrotoxic agents     Troponin leak likely 2/2 demand ischemia   Trop elevated, but down from prior. EKG nonischemic.   - Stop trending trops    E. coli UTI  UA had 2+ bacteria and (+) leukocytes, previous Urine culture grew E. Coli (08/31/2020). Urine cultures grew E. Coli on 5/29.   - Cont cefdinir (day 2)      Chronic Medical Conditions:  HFrEF: EF 25-30%, G3DD, Monitor I/Os, daily weights, low sodium diet  CKD: Monitor I/Os  Constipation: Bowel regimen  HLD: Atorvastatin  CAD: Statin, ASA, BB     Code Status: Full Code  FEN: ADULT DIET; Regular;  Low Sodium (2 gm); 1200 ml  PPX: Eliquis    DISPO: DC today    Anahy Jara MD, PGY-1  06/01/22  8:52 AM     Will staff this patient with Dr. Jaime Bergman

## 2022-06-01 NOTE — CARE COORDINATION
[facility]    Spoke with patient regarding [facility] services. Patient aware and agreeable to services.  Faxed orders to [facility] for [facility] by 6/3    Willis Guerrero LPN  Care Transition Nurse  [facility]  [phone]

## 2022-06-02 ENCOUNTER — TELEPHONE (OUTPATIENT)
Dept: FAMILY MEDICINE CLINIC | Age: 87
End: 2022-06-02

## 2022-06-02 LAB
BLOOD CULTURE, ROUTINE: NORMAL
CULTURE, BLOOD 2: NORMAL

## 2022-06-02 NOTE — TELEPHONE ENCOUNTER
Patient was complaining of having issues with gas every time she takes her meds. She said its pretty painful and she has a hard time getting rid of it. Any chance patient could try mylicon to help?

## 2022-06-02 NOTE — TELEPHONE ENCOUNTER
[facility] Transitions Initial Follow Up Call    Outreach made within 2 business days of discharge: Yes    Patient: Raffi Zimmerman Patient : 1935   MRN: [id number]  Reason for Admission: There are no discharge diagnoses documented for the most recent discharge. Discharge Date: 22       Spoke with: patient    Discharge department/facility: [facility]    Non-face-to-face services provided:  Scheduled appointment with PCP-6/10/22  Obtained and reviewed discharge summary and/or continuity of care documents    TCM Interactive Patient Contact:  Was patient able to fill all prescriptions: Yes  Was patient instructed to bring all medications to the follow-up visit: Yes  Is patient taking all medications as directed in the discharge summary?  Yes  Does patient understand their discharge instructions: Yes  Does patient have questions or concerns that need addressed prior to 7-14 day follow up office visit: no    Scheduled appointment with PCP within 7-14 days    Follow Up  Future Appointments   Date Time Provider Jerome Mahmood   2022  2:30 PM DAVE Anderson CNP [place] [facility]   6/10/2022  4:00 PM [address] [place] [address] [address]   2022  2:30 PM SCHEDULE, ROV[place]OWOOD REMOTE TRANSMISSION [place] [facility]   2022  2:00 PM SCHEDULE, Ronny Lambert [address] [facility]   2022  2:30 PM DAVE Pinto CNP [place] [facility]   2022  2:00 PM [address] [place] [address] [address]   2022  2:15 PM MD Kayla Hoang [address] [facility]       Triin Franklin RN

## 2022-06-03 NOTE — TELEPHONE ENCOUNTER
She can try [employer] for her gas. That should be fine. Always work on increasing fiber in diet to help any bowel issues. Drink lots of water as well.

## 2022-06-06 ENCOUNTER — HOSPITAL ENCOUNTER (OUTPATIENT)
Age: 87
Setting detail: SPECIMEN
Discharge: HOME | End: 2022-06-06
Payer: MEDICARE

## 2022-06-06 LAB
ANION GAP SERPL CALCULATED.3IONS-SCNC: 10 MMOL/L (ref 3–16)
BUN BLDV-MCNC: 40 MG/DL (ref 7–20)
CALCIUM SERPL-MCNC: 9.2 MG/DL (ref 8.3–10.6)
CHLORIDE BLD-SCNC: 99 MMOL/L (ref 99–110)
CO2: 26 MMOL/L (ref 21–32)
CREAT SERPL-MCNC: 2.1 MG/DL (ref 0.6–1.2)
GFR AFRICAN AMERICAN: 27
GFR NON-AFRICAN AMERICAN: 22
GLUCOSE BLD-MCNC: 111 MG/DL (ref 70–99)
POTASSIUM SERPL-SCNC: 5.5 MMOL/L (ref 3.5–5.1)
SODIUM BLD-SCNC: 135 MMOL/L (ref 136–145)

## 2022-06-06 PROCEDURE — 80048 BASIC METABOLIC PNL TOTAL CA: CPT

## 2022-06-06 RX ORDER — LOSARTAN POTASSIUM 50 MG/1
TABLET ORAL
Qty: 90 TABLET | Refills: 3 | Status: SHIPPED | OUTPATIENT
Start: 2022-06-06 | End: 2022-06-08

## 2022-06-08 ENCOUNTER — OFFICE VISIT (OUTPATIENT)
Dept: CARDIOLOGY CLINIC | Age: 87
End: 2022-06-08
Payer: MEDICARE

## 2022-06-08 VITALS
HEART RATE: 94 BPM | DIASTOLIC BLOOD PRESSURE: 56 MMHG | WEIGHT: 137.8 LBS | BODY MASS INDEX: 26.91 KG/M2 | SYSTOLIC BLOOD PRESSURE: 110 MMHG

## 2022-06-08 DIAGNOSIS — I63.9 CEREBROVASCULAR ACCIDENT (CVA), UNSPECIFIED MECHANISM (HCC): ICD-10-CM

## 2022-06-08 DIAGNOSIS — R00.0 TACHYCARDIA: ICD-10-CM

## 2022-06-08 DIAGNOSIS — I10 PRIMARY HYPERTENSION: ICD-10-CM

## 2022-06-08 DIAGNOSIS — I50.41 ACUTE COMBINED SYSTOLIC AND DIASTOLIC HF (HEART FAILURE) (HCC): Primary | ICD-10-CM

## 2022-06-08 DIAGNOSIS — I25.10 CAD S/P PERCUTANEOUS CORONARY ANGIOPLASTY: ICD-10-CM

## 2022-06-08 DIAGNOSIS — E78.2 MIXED HYPERLIPIDEMIA: ICD-10-CM

## 2022-06-08 DIAGNOSIS — Z98.61 CAD S/P PERCUTANEOUS CORONARY ANGIOPLASTY: ICD-10-CM

## 2022-06-08 DIAGNOSIS — E87.5 HYPERKALEMIA: ICD-10-CM

## 2022-06-08 PROCEDURE — 1111F DSCHRG MED/CURRENT MED MERGE: CPT | Performed by: NURSE PRACTITIONER

## 2022-06-08 PROCEDURE — G8417 CALC BMI ABV UP PARAM F/U: HCPCS | Performed by: NURSE PRACTITIONER

## 2022-06-08 PROCEDURE — 1123F ACP DISCUSS/DSCN MKR DOCD: CPT | Performed by: NURSE PRACTITIONER

## 2022-06-08 PROCEDURE — 99214 OFFICE O/P EST MOD 30 MIN: CPT | Performed by: NURSE PRACTITIONER

## 2022-06-08 PROCEDURE — 1036F TOBACCO NON-USER: CPT | Performed by: NURSE PRACTITIONER

## 2022-06-08 PROCEDURE — G8427 DOCREV CUR MEDS BY ELIG CLIN: HCPCS | Performed by: NURSE PRACTITIONER

## 2022-06-08 PROCEDURE — 1090F PRES/ABSN URINE INCON ASSESS: CPT | Performed by: NURSE PRACTITIONER

## 2022-06-08 RX ORDER — FUROSEMIDE 20 MG/1
20 TABLET ORAL 2 TIMES DAILY
Qty: 60 TABLET | Refills: 5 | Status: ON HOLD | OUTPATIENT
Start: 2022-06-08 | End: 2022-06-29 | Stop reason: HOSPADM

## 2022-06-08 NOTE — PROGRESS NOTES
CC SOB and edema     HPI:  80 y.o. patient of Sasha King and Sary Vogel with acute HFrEF (EF 25-30%; III-DD), CAD(PCI 2018), HTN, HLD. She had a CVA and an ILR placed. She notes 10 lb weight gain and increased edema. SOB is at baseline. Has poor appetite. No chest pain, LH/dizziness, palpitations, syncope or falls. No change in orthopnea. No n/v/d, fever or Gi/ bleeding.  Limiting salt     Past Medical History:   Diagnosis Date    Arthritis     Blood circulation, collateral     CAD (coronary artery disease)     CHF (congestive heart failure) (HCC)     Confusion 8/31/2020    GERD (gastroesophageal reflux disease)     History of heart artery stent 12/2018    Hyperlipidemia     Hypertension     Mitral valve prolapse     Myocardial infarct, old     Thyroid disease      Past Surgical History:   Procedure Laterality Date    COLONOSCOPY      EYE SURGERY Left 09/04/2018    PHACO EMULSIFICATION OF CATARACT WITH INTRAOCULAR LENS IMPLANT LEFT EYE     HYSTERECTOMY      OTHER SURGICAL HISTORY  08/27/2018    phacoemulsification of cataract with intraocular lens implant right eye    NE OFFICE/OUTPT VISIT,PROCEDURE ONLY Right 8/27/2018    PHACO EMULSIFICATION OF CATARACT WITH INTRAOCULAR LENS IMPLANT RIGHT EYE performed by Jacinto Steel MD at [address] OFFICE/OUTPT [facility] Left 9/4/2018    PHACO EMULSIFICATION OF CATARACT WITH INTRAOCULAR LENS IMPLANT LEFT EYE performed by Jacinto Steel MD at [address] [place]    THYROIDECTOMY       Family History   Problem Relation Age of Onset    Heart Disease Mother     Heart Disease Father     Cancer Father    Jeddo Self Cancer Sister     Diabetes Brother     Stroke Brother      Social History     Tobacco Use    Smoking status: Never Smoker    Smokeless tobacco: Never Used   Vaping Use    Vaping Use: Never used   Substance Use Topics    Alcohol use: No     Alcohol/week: 0.0 standard drinks    Drug use: No     Allergies:Benazepril hcl, Feldene [piroxicam], Hytrin [terazosin hcl], Iv contrast [iodides], Acetaminophen, Celebrex [celecoxib], Cephalexin, Hydrochlorothiazide, Iodinated casein, Monopril [fosinopril sodium], Protonix [pantoprazole sodium], Quinapril hcl, Toprol xl [metoprolol succinate], Ultracet [tramadol-acetaminophen], and Ziac [bisoprolol-hydrochlorothiazide]    Review of Systems  General: No changes in  fatigue, or night sweats. + weight gain   HEENT: No blurry or decreased vision. No changes in hearing, nasal discharge or sore throat. Cardiovascular:  See HPI. Respiratory: No cough, hemoptysis, or wheezing. Gastrointestinal:  No abdominal pain, hematochezia, melana, constipation, diarrhea, or history of GI ulcers. Genito-Urinary: No dysuria or hematuria. No urgency or polyuria. Musculoskeletal:  No complaints of joint pain, joint swelling or muscular weakness/soreness. Neurological:  No dizziness, headaches, numbness/tingling, speech problems or weakness. Psychological:  No anxiety or depression. Hematological and Lymphatic: No abnormal bleeding or bruising, blood clots, jaundice or swollen lymph nodes. Endocrine:   No malaise/lethargy, palpitations, polydipsia/polyuria, temperature intolerance or unexpected weight changes  Skin:  No rashes or non-healing ulcers. Physical Exam:  BP (!) 110/56   Pulse 94   Wt 137 lb 12.8 oz (62.5 kg)   BMI 26.91 kg/m²    General (appearance):  No acute distress  Eyes: anicteric   Neck: soft, No JVD  Ears/Nose/Mouth/Thorat: No cyanosis  CV:Irreg, irreg   Respiratory:  Diminished, normal effort   GI: soft, non-tender, non-distended  Skin: Warm, dry. No rashes  Neuro/Psych: Alert and oriented x 3.  Appropriate behavior  Ext:  No c/c. 1-2+ BLE edema  Pulses:  2+ radial     Weight  Wt Readings from Last 3 Encounters:   06/08/22 137 lb 12.8 oz (62.5 kg)   06/01/22 132 lb 15 oz (60.3 kg)   05/23/22 130 lb (59 kg)          CBC:   Lab Results   Component Value Date    WBC 7.5 06/01/2022    HGB 9.1 (L) 06/01/2022 HCT 27.3 (L) 06/01/2022    MCV 92.0 06/01/2022     06/01/2022     BMP:  Lab Results   Component Value Date    CREATININE 2.1 (H) 06/06/2022    BUN 40 (H) 06/06/2022     (L) 06/06/2022    K 5.5 (H) 06/06/2022    CL 99 06/06/2022    CO2 26 06/06/2022     Mag:   Lab Results   Component Value Date    MG 2.10 06/01/2022     LIVER PROFILE:   Lab Results   Component Value Date    ALT 51 (H) 05/29/2022    AST 44 (H) 05/29/2022    ALKPHOS 58 05/29/2022    BILITOT 0.3 05/29/2022     PT/INR:   Lab Results   Component Value Date    INR 1.35 (H) 05/29/2022    INR 1.02 03/18/2022    INR 1.02 11/20/2021    PROTIME 16.7 (H) 05/29/2022    PROTIME 11.5 03/18/2022    PROTIME 11.5 11/20/2021     Pro-BNP   Lab Results   Component Value Date    PROBNP 13,884 05/29/2022    PROBNP 10,711 04/26/2022    PROBNP 2,510 09/04/2018     LIPIDS:  No components found for: CHLPL  Lab Results   Component Value Date    TRIG 65 03/19/2022    TRIG 104 08/20/2021    TRIG 99 03/05/2020     Lab Results   Component Value Date    HDL 84 (H) 03/19/2022    HDL 75 (H) 03/19/2022    HDL 63 (H) 08/20/2021     Lab Results   Component Value Date    LDLCALC 53 03/19/2022    LDLCALC 46 03/19/2022    LDLCALC 45 08/20/2021     Lab Results   Component Value Date    LABVLDL 13 03/19/2022    LABVLDL 12 03/19/2022    LABVLDL 21 08/20/2021     TSH:No results found for: TSH, B9HVOXM, T3CBYLS, THYROIDAB    IMAGING:     3/19/2022 Echo:     Overall left ventricular systolic function is reduced with an estimated   ejection fraction of 25-30%. There is diffuse hypokinesis of the left ventricle. Diastolic filling parameters suggests grade III diastolic dysfunction. The left atrium is severely dilated. Normal right ventricular size and function. Estimated pulmonary artery systolic pressure is at 51 mmHg assuming a right   atrial pressure of 8 mmHg. A bubble study was performed and fails to show evidence of shunting.     2018 [facility]  HEMODYNAMICS:  Left ventricular end-diastolic pressure equals 20. There  is no significant gradient across the aortic valve by pullback post  cineangiography.     LEFT VENTRICULOGRAM:  Left ventriculogram demonstrates global  hypokinesis. Of note,  there was no area of akinesis. The anterior  wall did contract, but again was hypokinetic. Estimated ejection  fraction 40%.     LEFT MAIN:  Left main is a short vessel that gave almost immediate rise  to LAD and circumflex. It is free of significant obstructive disease.     LEFT ANTERIOR DESCENDING:  The LAD courses to and wraps around the apex. It is a large vessel. It gives off multiple small diagonal branches. In the proximal portion of vessel, there is a ruptured plaque. There is  80% narrowing in this area. There is diffuse disease, but no  significant focal obstructive disease.     LEFT CIRCUMFLEX:  Circumflex consists of high rising marginal branch, a  very large second marginal branch courses on the AV groove and then  gives off small posterolateral branch. The circumflex is free of  significant obstructive disease.     RIGHT CORONARY ARTERY:  Right coronary artery is a large dominant  vessel. It gives off a large PDA and several distal posterolateral  branches. No significant obstructive disease in the right coronary  artery.     LEFT ANTERIOR DESCENDING POST INTERVENTION:  There is an 80% stenosis of  the ruptured plaque. Post intervention, there was minimal if any  residual stenosis.     IMPRESSION:  1.  LV dysfunction consistent with cardiomyopathy. 2.  Ventricular tachycardia. 3.  Abnormal Myoview. 4.  LV dysfunction with estimated ejection fraction of 40% with global  hypokinesis. 5.  Successful primary stent to LAD. 2018 Nuc stress  ABNORMAL HIGH RISK STRESS TEST    Normal LV size and mildly reduced systolic function. Left ventricular    ejection fraction of 47%. Hypokinesis of the apical segments.  THere is a    defect in the anterior septal wall at stress and minimal reversal at rest    consistent with scar. There is also a mild defect in the anterior and    anterolateral wall at stress that completely reverses at rest consistent    with ischemia. Medications:   Current Outpatient Medications   Medication Sig Dispense Refill    losartan (COZAAR) 50 mg tablet TAKE 1 TABLET BY MOUTH EVERY DAY 90 tablet 3    carvedilol (COREG) 25 MG tablet Take 1 tablet by mouth 2 times daily 180 tablet 3    furosemide (LASIX) 20 MG tablet Take 1 tablet by mouth daily as needed (fluid overload) 30 tablet 5    nitroGLYCERIN (NITROSTAT) 0.4 MG SL tablet PUT 1 TAB UNDER TONGUE EVERY 5 MIN AS NEEDED CHEST PAIN UP TO 3. IF NO RELIEF AFTER 1 DOSE, CALL 911 25 tablet 3    apixaban (ELIQUIS) 2.5 MG TABS tablet Take 1 tablet by mouth 2 times daily 60 tablet 5    diclofenac sodium (VOLTAREN) 1 % GEL Apply 2 g topically 4 times daily 200 g 2    famotidine (PEPCID) 20 MG tablet Take 1 tablet by mouth daily 60 tablet 3    ondansetron (ZOFRAN) 4 MG tablet Take 1 tablet by mouth 3 times daily as needed for Nausea or Vomiting 30 tablet 3    zolpidem (AMBIEN) 5 MG tablet Take 1 tablet by mouth nightly as needed for Sleep for up to 180 days. (Patient not taking: Reported on 5/6/2022) 30 tablet 2    atorvastatin (LIPITOR) 80 MG tablet TAKE 1 TABLET BY MOUTH ONE TIME A DAY 90 tablet 3    vitamin B-12 (CYANOCOBALAMIN) 1000 MCG tablet Take 1,000 mcg by mouth daily      fluticasone (FLONASE) 50 MCG/ACT nasal spray 2 sprays by Each Nostril route daily 1 Bottle 5    Handicap Placard MISC by Does not apply route Length: 5 years 1 each 0    vitamin D (CHOLECALCIFEROL) 1000 UNIT TABS tablet Take 2 tablets by mouth daily 60 tablet 3    HYDROcodone-acetaminophen (NORCO)  MG per tablet Take 1 tablet by mouth every 6 hours as needed. Quintella Dun Coenzyme Q10 (CO Q 10) 100 MG CAPS Take  by mouth daily.  aspirin 81 MG chewable tablet Take 1 tablet by mouth daily.  30 tablet      No current facility-administered medications for this visit. Assessment:  1. Acute combined systolic and diastolic HF (heart failure) ([facility] [address]    2. Hyperkalemia    3. Tachycardia    4. CAD S/P percutaneous coronary angioplasty    5. Primary hypertension    6. Mixed hyperlipidemia    7. Cerebrovascular accident (CVA), unspecified mechanism ([facility] [address]        Plan:    Hyperkalemia in presence of CKD   Stop losartan recheck BMP  Acute/chronic combined HF   Increase lasix to 20 mg po bid   BMP next week   Stop losartan    Coreg   Discussed s/s decompensated HF   Daily weights, low salt diet, compression stockings    Tachycardia: resolved HR 94   Coreg    Eliquis.      CAD/PCI; stbale   Coreg   ASA   Lipitor  HTN: stable   /56   Coreg  HLD: stable   Lipitor    LDL 53  Hx CVA: stable    On eliquis     Follow up in 1 month       Reviewed most recent: CBC, BMP, LFT, Lipids, Mag, PT/INR, BNP, TSH  Reviewed most recent: ECG, Echo, Nuc stress test,  [facility]

## 2022-06-08 NOTE — PATIENT INSTRUCTIONS
Patient Education        Learning About Low-Potassium Foods  What foods are low in potassium? The foods you eat contain nutrients, such as vitamins and minerals. Potassium is a nutrient. Your body needs the right amount to stay healthy and work as it should. You can use the list below to help you make choices about which foodsto eat. The foods in this list have less than 200 milligrams (mg) of potassium perserving. Fruits   Applesauce, ½ cup   Blueberries, ½ cup   Grapes, 9 grapes   Pineapple, ½ cup   Raspberries, ½ cup   Watermelon, ½ cup  Vegetables   Cucumber (peeled), ½ cup   Eggplant, ½ cup   Green beans, ½ cup   Lettuce, 1 cup   Peas, ½ cup   Radish, 1 radish  Grains   Bagel (plain), 4-inch   Bread, 1 slice   Cereal (puffed rice or puffed wheat), 1 cup   Oatmeal (cooked), ½ cup   Pasta and noodles (cooked), ½ cup   Rice (cooked), ½ cup   Tortilla (flour or corn), 1 tortilla  Dairy and dairy alternatives   Butter, 1 Tbsp   Cheese, 1 oz  Meats and other protein foods   Eggs, 1 egg   Hotdogs (beef or pork), 1 hotdog  Work with your doctor to find out how much of this nutrient you need. Dependingon your health, you may need more or less of it in your diet. Where can you learn more? Go to https://Act-On Softwarepepiceweb.healthHello Health. org and sign in to your [employer] account. Enter P485 in the [facility] box to learn more about \"Learning About Low-Potassium Foods. \"     If you do not have an account, please click on the \"Sign Up Now\" link. Current as of: September 8, 2021               Content Version: 13.2  © 2006-2022 Healthwise, [employer]. Care instructions adapted under license by [facility] ([facility]). If you have questions about a medical condition or this instruction, always ask your healthcare professional. [address] any warranty or liability for your use of this information.          Patient Education        Potassium-Restricted Diet: Care Instructions  Your Care Instructions     Potassium is a mineral. It helps keep the right mix of fluids in your body. Karthikeyan Dieter helps your nerves and muscles work as they should. Most people get the potassium they need from the foods they eat. But if you have certain health problems, such as kidney disease, you may need to be careful about how much potassium you get. This is because too much potassiumcan be harmful. You can control how much potassium you get. You can do this if you eat foodsthat don't have much of it and you don't eat foods that have lots of it. Follow-up care is a key part of your treatment and safety. Be sure to make and go to all appointments, and call your doctor if you are having problems. It's also a good idea to know your test results and keep alist of the medicines you take. How can you care for yourself at home?  Limit foods that are high in potassium. Potassium is in many foods, such as vegetables, fruits, and milk products. High-potassium foods include:  ? Fruits such as bananas, oranges, and cantaloupe. ? Tomatoes. ? Broccoli. ? Milk. ? Spinach. ? Potatoes and sweet potatoes.  Eat foods that don't have as much potassium. These low-potassium foods include:  ? Fruits such as applesauce, pineapple, grapes, blueberries, and raspberries. ? Cucumbers. ? White or brown rice. ? Pasta and noodles. ? Tortillas.  Do not use a salt substitute or \"lite\" salt unless you talk to your doctor first. These often are very high in potassium.  Be sure to tell your doctor about any prescription or over-the-counter medicines you are taking. Some medicines can increase the potassium in your body. Where can you learn more? Go to https://Spinal Modulationpetraneb.SciAps. org and sign in to your prollie account. Enter M262 in the [employer] box to learn more about \"Potassium-Restricted Diet: Care Instructions. \"     If you do not have an account, please click on the \"Sign Up Now\" link.  Current as of: September 8, 2021               Content Version: 13.2  © 2006-2022 Healthwise, "[employer]. Care instructions adapted under license by [facility] ([facility]). If you have questions about a medical condition or this instruction, always ask your healthcare professional. [address] any warranty or liability for your use of this information. Patient Education        Limiting Sodium With Heart Failure: Care Instructions  Your Care Instructions     Sodium causes your body to hold on to extra water. This may cause your heartfailure symptoms to get worse. Limiting sodium may help you feel better. People get most of their sodium from processed foods. Fast food and restaurant meals also tend to be very high in sodium. Your doctor may suggest that you limit sodium. Your doctor can tell you how much sodium is right for you. An example is less than 3,000 mg a day. This includes all the salt you eat incooked or packaged foods. Follow-up care is a key part of your treatment and safety. Be sure to make and go to all appointments, and call your doctor if you are having problems. It's also a good idea to know your test results and keep alist of the medicines you take. How can you care for yourself at home? Read food labels   Read food labels on cans and food packages. The labels tell you how much sodium is in each serving. Make sure that you look at the serving size. If you eat more than the serving size, you have eaten more sodium than is listed for one serving.  Food labels also tell you the Percent Daily Value for sodium. Choose products with low Percent Daily Values for sodium.  Be aware that sodium can come in forms other than salt, including monosodium glutamate (MSG), sodium citrate, and sodium bicarbonate (baking soda). MSG is often added to Asian food. You can sometimes ask for food without MSG or salt.   Buy low-sodium foods   Buy foods that are labeled \"unsalted\" (no salt added), \"sodium-free\" (less than 5 mg of sodium per serving), or \"low-sodium\" (140 mg or less of sodium per serving). A food labeled \"light sodium\" has less than half of the full-sodium version of that food. Foods labeled \"reduced-sodium\" may still have too much sodium.  Buy fresh vegetables or plain, frozen vegetables. Buy low-sodium versions of canned vegetables, soups, and other canned goods. Prepare low-sodium meals   Use less salt each day when cooking. Reducing salt in this way will help you adjust to the taste. Do not add salt after cooking. Take the salt shaker off the table.  Flavor your food with garlic, lemon juice, onion, vinegar, herbs, and spices instead of salt. Do not use soy sauce, steak sauce, onion salt, garlic salt, or ketchup on your food.  Make your own salad dressings, sauces, and ketchup without adding salt.  Use less salt (or none) when recipes call for it. You can often use half the salt a recipe calls for without losing flavor. Other dishes like rice, pasta, and grains do not need added salt.  Rinse canned vegetables. This removes some--but not all--of the salt.  Avoid water that has a naturally high sodium content or that has been treated with water softeners, which add sodium. If you buy bottled water, read the label and choose a sodium-free brand. Avoid high-sodium foods, such as:   Smoked, cured, salted, and canned meat, fish, and poultry.  Ham, mosher, hot dogs, and luncheon meats.  Regular, hard, and processed cheese and regular peanut butter.  Crackers with salted tops.  Frozen prepared meals.  Canned and dried soups, broths, and bouillon, unless labeled sodium-free or low-sodium.  Canned vegetables, unless labeled sodium-free or low-sodium.  Salted snack foods such as chips and pretzels.  [place] fries, pizza, tacos, and other fast foods.    Pickles, olives, ketchup, and other condiments, especially soy sauce, unless labeled sodium-free or low-sodium. If you cannot cook for yourself   Have family members or friends help you, or have someone cook low-sodium meals.  Check with your local senior nutrition program to find out where meals are served and whether they offer a low-sodium option. You can often find these programs through your local health department or hospital.   Have meals delivered to your home. Most [facility] have a Meals on INNA YOUNGHello UniverseSaray. These programs provide one hot meal a day for older adults, delivered to their homes. Ask whether these meals are low-sodium. Let them know that you are on a low-sodium diet. Where can you learn more? Go to https://FindYogipeNATIONSPLAYeweb.Orions Systems. org and sign in to your Webcom account. Enter A166 in the [facility] box to learn more about \"Limiting Sodium With Heart Failure: Care Instructions. \"     If you do not have an account, please click on the \"Sign Up Now\" link. Current as of: January 10, 2022               Content Version: 13.2  © 2006-2022 Healthwise, Incorporated. Care instructions adapted under license by [facility] ([facility]). If you have questions about a medical condition or this instruction, always ask your healthcare professional. [place] [address] any warranty or liability for your use of this information.

## 2022-06-10 ENCOUNTER — OFFICE VISIT (OUTPATIENT)
Dept: FAMILY MEDICINE CLINIC | Age: 87
End: 2022-06-10
Payer: MEDICARE

## 2022-06-10 VITALS
DIASTOLIC BLOOD PRESSURE: 70 MMHG | SYSTOLIC BLOOD PRESSURE: 106 MMHG | OXYGEN SATURATION: 97 % | BODY MASS INDEX: 26.76 KG/M2 | HEART RATE: 82 BPM | WEIGHT: 137 LBS

## 2022-06-10 DIAGNOSIS — N18.4 CHRONIC KIDNEY DISEASE, STAGE IV (SEVERE) (HCC): ICD-10-CM

## 2022-06-10 DIAGNOSIS — R54 ADVANCED AGE: ICD-10-CM

## 2022-06-10 DIAGNOSIS — Z09 HOSPITAL DISCHARGE FOLLOW-UP: ICD-10-CM

## 2022-06-10 DIAGNOSIS — N17.9 AKI (ACUTE KIDNEY INJURY) (HCC): Primary | ICD-10-CM

## 2022-06-10 DIAGNOSIS — I48.19 PERSISTENT ATRIAL FIBRILLATION (HCC): ICD-10-CM

## 2022-06-10 PROCEDURE — 99495 TRANSJ CARE MGMT MOD F2F 14D: CPT | Performed by: FAMILY MEDICINE

## 2022-06-10 PROCEDURE — 1111F DSCHRG MED/CURRENT MED MERGE: CPT | Performed by: FAMILY MEDICINE

## 2022-06-10 ASSESSMENT — PATIENT HEALTH QUESTIONNAIRE - PHQ9
2. FEELING DOWN, DEPRESSED OR HOPELESS: 0
SUM OF ALL RESPONSES TO PHQ QUESTIONS 1-9: 0
SUM OF ALL RESPONSES TO PHQ9 QUESTIONS 1 & 2: 0
1. LITTLE INTEREST OR PLEASURE IN DOING THINGS: 0

## 2022-06-10 NOTE — PROGRESS NOTES
Post-Discharge Transitional Care  Follow Up      Wyatt Velasco   YOB: 1935    Date of Office Visit:  6/10/2022  Date of Hospital Admission: 5/29/22  Date of Hospital Discharge: 6/1/22  Risk of hospital readmission (high >=14%. Medium >=10%) :Readmission Risk Score: 22.1 ( )      Care management risk score Rising risk (score 2-5) and Complex Care (Scores >=6): 3     Non face to face  following discharge, date last encounter closed (first attempt may have been earlier): 6/2/2022  8:28 AM    Call initiated 2 business days of discharge: Yes    ASSESSMENT/PLAN:   AGUILA (acute kidney injury) ([facility] [address]  -     NE DISCHARGE MEDS RECONCILED W/ CURRENT OUTPATIENT MED LIST  Persistent atrial fibrillation (HCC)  -     NE DISCHARGE MEDS RECONCILED W/ CURRENT OUTPATIENT MED LIST  Chronic kidney disease, stage IV (severe) (HCC)  -     AFL(Epic) - Kori Mccormick MD, Nephrology, [facility]  -     NE DISCHARGE MEDS RECONCILED W/ CURRENT OUTPATIENT MED LIST  Advanced age  -     Misc. Devices KIT; Disp-1 kit, R-0, PrintRollator. Use as directed. Hospital discharge follow-up  -     NE DISCHARGE MEDS RECONCILED W/ CURRENT OUTPATIENT MED LIST      Medical Decision Making: moderate complexity  No follow-ups on file. On this date 6/10/2022 I have spent 30 minutes reviewing previous notes, test results and face to face with the patient discussing the diagnosis and importance of compliance with the treatment plan as well as documenting on the day of the visit. Kidney function appears to be near her baseline. Losartan was stopped due to hyperkalemia. She needs to see nephrology. Avoiding salt in diet discussed. B12 supplementation discussed as well. Subjective:   HPI:  Follow up of Hospital problems/diagnosis(es):     Atrial fibrillation  Tachy to the 120-140s, a fib RVR on arrival to EKG, not taking coreg since yesterday. Cards consulted, EP loaded her with digoxin and continue home coreg and eliquis.  Afib was rate controlled on discharge. She was not in acute heart failure, appeared dry and had UTI. Likely cause of her RVR.      AGUILA 2/2 pre-renal  Cr elevated from baseline of 1.5, came in at 2.2. Improved back to baseline with gentle IVF.      E. coli UTI  UA had 2+ bacteria and (+) leukocytes. Urine cultures grew E. Coli on 5/29 resistant to multiple drugs. We reviewed her previous allergy to cephalosporins. She stated she had nausea but denies having any SOB, wheezing, rash or anaphylaxis. We weighed risk and benefit and started her on cefdinir and monitored for reaction. She tolerated well. Discharged on cefdinir to complete course of antibiotic.      On the day of discharge, patient denied any CP, SOB, palpitations, N/V, abdominal pain or urinary symptoms. Patient status improved and patient was stable on the day of discharge. Inpatient course: Discharge summary reviewed- see chart.     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)    Chronic low back pain    Essential hypertension    AGUILA (acute kidney injury) ([address]    Renal insufficiency    Gout    Pain in joint, hand    Mixed hyperlipidemia    Primary insomnia    Closed stable burst fracture of first lumbar vertebra ([address]    GERD (gastroesophageal reflux disease)    Palpitations    V-tach ([address]    Abnormal myocardial perfusion study    Ischemic cardiomyopathy    Cardiomyopathy ([address]    PVD (posterior vitreous detachment), both eyes    Posterior subcapsular polar age-related cataract of both eyes    Confusion    Chronic kidney disease, stage IV (severe) ([address]    Visual field defect    Stroke of unknown etiology ([address]    Encounter for loop recorder check    Atrial fibrillation [facility])       Medications listed as ordered at the time of discharge from hospital     Medication List          Accurate as of Kathryn 10, 2022  1:07 PM. If you have any questions, ask your nurse or doctor. START taking these medications    Misc. Devices Kit  Rollator. Use as directed. Started by: Jordan Oneil, DO        CONTINUE taking these medications    apixaban 2.5 MG Tabs tablet  Commonly known as: ELIQUIS  Take 1 tablet by mouth 2 times daily     aspirin 81 MG chewable tablet     atorvastatin 80 MG tablet  Commonly known as: LIPITOR  TAKE 1 TABLET BY MOUTH ONE TIME A DAY     carvedilol 25 MG tablet  Commonly known as: COREG  Take 1 tablet by mouth 2 times daily     Co Q 10 100 MG Caps     diclofenac sodium 1 % Gel  Commonly known as: VOLTAREN  Apply 2 g topically 4 times daily     famotidine 20 MG tablet  Commonly known as: PEPCID  Take 1 tablet by mouth daily     fluticasone 50 MCG/ACT nasal spray  Commonly known as: FLONASE  2 sprays by Each Nostril route daily     furosemide 20 MG tablet  Commonly known as: LASIX  Take 1 tablet by mouth 2 times daily     Handicap Placard Misc  by Does not apply route Length: 5 years     HYDROcodone-acetaminophen  MG per tablet  Commonly known as: NORCO     nitroGLYCERIN 0.4 MG SL tablet  Commonly known as: NITROSTAT  PUT 1 TAB UNDER TONGUE EVERY 5 MIN AS NEEDED CHEST PAIN UP TO 3. IF NO RELIEF AFTER 1 DOSE, CALL 911     ondansetron 4 MG tablet  Commonly known as: ZOFRAN  Take 1 tablet by mouth 3 times daily as needed for Nausea or Vomiting     vitamin B-12 1000 MCG tablet  Commonly known as: CYANOCOBALAMIN     vitamin D 1000 UNIT Tabs tablet  Commonly known as: CHOLECALCIFEROL  Take 2 tablets by mouth daily     zolpidem 5 MG tablet  Commonly known as: AMBIEN  Take 1 tablet by mouth nightly as needed for Sleep for up to 180 days.            Where to Get Your Medications      You can get these medications from any pharmacy    Bring a paper prescription for each of these medications  · Misc. Devices Kit           Medications marked \"taking\" at this time  Outpatient Medications Marked as Taking for the 6/10/22 encounter (Office Visit) with Jordan Oneil DO   Medication Sig Dispense Refill    Misc. Devices KIT Rollator. Use as directed. 1 kit 0        Medications patient taking as of now reconciled against medications ordered at time of hospital discharge: Yes    A comprehensive review of systems was negative except for what was noted in the HPI. Objective:    /70   Pulse 82   Wt 137 lb (62.1 kg)   SpO2 97%   BMI 26.76 kg/m²   General Appearance: alert and oriented to person, place and time, well developed and well- nourished, in no acute distress  Skin: warm and dry, no rash or erythema  Head: normocephalic and atraumatic  Eyes: pupils equal, round, and reactive to light, extraocular eye movements intact, conjunctivae normal  Neck: supple and non-tender without mass, no thyromegaly or thyroid nodules, no cervical lymphadenopathy  Pulmonary/Chest: clear to auscultation bilaterally- no wheezes, rales or rhonchi, normal air movement, no respiratory distress  Cardiovascular: Irregular rate and rhythm, + murmur, rubs, clicks, or gallops, distal pulses intact, no carotid bruits  Abdomen: soft, non-tender, non-distended, normal bowel sounds, no masses or organomegaly  Extremities: no cyanosis, clubbing or edema  Musculoskeletal: normal range of motion, no joint swelling, deformity or tenderness  Neurologic: reflexes normal and symmetric, no cranial nerve deficit, gait, coordination and speech normal      An electronic signature was used to authenticate this note. --Jordan Oneil DO    Naty García was evaluated today and a DME order was entered for a rollator because she requires this to successfully complete daily living tasks of eating, bathing, ambulating and taking own medications.   A rollator is necessary due to the patient's unsteady gait, upper body weakness, and inability to  an ambulation device; and she can ambulate only by pushing a walker instead of a lesser assistive device such as a cane, crutch, or standard walker. The need for this equipment was discussed with the patient and she understands and is in agreement.

## 2022-06-14 ENCOUNTER — TELEPHONE (OUTPATIENT)
Dept: CARDIOLOGY CLINIC | Age: 87
End: 2022-06-14

## 2022-06-15 ENCOUNTER — TELEPHONE (OUTPATIENT)
Dept: FAMILY MEDICINE CLINIC | Age: 87
End: 2022-06-15

## 2022-06-15 LAB
ANION GAP SERPL CALCULATED.3IONS-SCNC: 15 MMOL/L (ref 3–16)
BUN BLDV-MCNC: 51 MG/DL (ref 7–20)
CALCIUM SERPL-MCNC: 9.1 MG/DL (ref 8.3–10.6)
CHLORIDE BLD-SCNC: 94 MMOL/L (ref 99–110)
CO2: 27 MMOL/L (ref 21–32)
CREAT SERPL-MCNC: 2.4 MG/DL (ref 0.6–1.2)
GFR AFRICAN AMERICAN: 23
GFR NON-AFRICAN AMERICAN: 19
GLUCOSE BLD-MCNC: 123 MG/DL (ref 70–99)
POTASSIUM SERPL-SCNC: 4.6 MMOL/L (ref 3.5–5.1)
SODIUM BLD-SCNC: 136 MMOL/L (ref 136–145)

## 2022-06-15 NOTE — TELEPHONE ENCOUNTER
Talisha Segundo is calling with [address] asking for verbals on skilled nursing that are needed by 3pm today.

## 2022-06-16 ENCOUNTER — TELEPHONE (OUTPATIENT)
Dept: CARDIOLOGY CLINIC | Age: 87
End: 2022-06-16

## 2022-06-16 RX ORDER — CHOLECALCIFEROL (VITAMIN D3) 50 MCG
TABLET ORAL
Qty: 30 TABLET | OUTPATIENT
Start: 2022-06-16

## 2022-06-16 NOTE — TELEPHONE ENCOUNTER
Spoke with Rishi Malagon with [facility] and gave [facility] Grove [facility]

## 2022-06-16 NOTE — TELEPHONE ENCOUNTER
Yuridia Carrera from [facility] called and would like to know if the patient should continue taking     losartan (COZAAR) 50 mg tablet [[id number]]  DISCONTINUED    Order Details  Dose, Route, Frequency: As Directed   Dispense Quantity: 90 tablet Refills: 3    Note to Pharmacy: NEED MORE REFILLS         Sig: TAKE 1 TABLET BY MOUTH EVERY DAY     Please call Yuridia Carrera back at [phone] to advise and also please call the patient's daughter Malachi Dominguez at [phone] to advise also.       According to Leonor Zuluaga NP Note on 06/08/22    Plan:     Hyperkalemia in presence of CKD              Stop losartan recheck BMP  Acute/chronic combined HF              Increase lasix to 20 mg po bid              BMP next week              Stop losartan               Coreg              Discussed s/s decompensated HF              Daily weights, low salt diet, compression stockings    The patient rechecked her labs on 06/15/22

## 2022-06-17 DIAGNOSIS — N18.32 STAGE 3B CHRONIC KIDNEY DISEASE (HCC): ICD-10-CM

## 2022-06-17 DIAGNOSIS — E87.5 HYPERKALEMIA: Primary | ICD-10-CM

## 2022-06-20 ENCOUNTER — TELEPHONE (OUTPATIENT)
Dept: CARDIOLOGY CLINIC | Age: 87
End: 2022-06-20

## 2022-06-20 DIAGNOSIS — N18.32 STAGE 3B CHRONIC KIDNEY DISEASE (HCC): ICD-10-CM

## 2022-06-20 DIAGNOSIS — E87.5 HYPERKALEMIA: Primary | ICD-10-CM

## 2022-06-20 NOTE — TELEPHONE ENCOUNTER
Pt tried to schedule nephrology visit but Dr. Karthik Velazquez office did not have referral. Referral faxed. She said she would also like referral to another nephrologist, Dr. Jg Adkins. Phone number there is [phone].  She will schedule appt with whoever calls her back first.

## 2022-06-23 ENCOUNTER — APPOINTMENT (OUTPATIENT)
Dept: GENERAL RADIOLOGY | Age: 87
DRG: 291 | End: 2022-06-23
Payer: MEDICARE

## 2022-06-23 ENCOUNTER — HOSPITAL ENCOUNTER (INPATIENT)
Age: 87
LOS: 6 days | Discharge: HOME HEALTH | DRG: 291 | End: 2022-06-29
Attending: EMERGENCY MEDICINE | Admitting: INTERNAL MEDICINE
Payer: MEDICARE

## 2022-06-23 ENCOUNTER — TELEPHONE (OUTPATIENT)
Dept: FAMILY MEDICINE CLINIC | Age: 87
End: 2022-06-23

## 2022-06-23 ENCOUNTER — TELEPHONE (OUTPATIENT)
Dept: OTHER | Facility: CLINIC | Age: 87
End: 2022-06-23

## 2022-06-23 DIAGNOSIS — N17.9 AKI (ACUTE KIDNEY INJURY) (HCC): Primary | ICD-10-CM

## 2022-06-23 DIAGNOSIS — I50.43 CHF (CONGESTIVE HEART FAILURE), NYHA CLASS I, ACUTE ON CHRONIC, COMBINED (HCC): ICD-10-CM

## 2022-06-23 DIAGNOSIS — I50.43 ACUTE ON CHRONIC COMBINED SYSTOLIC AND DIASTOLIC CONGESTIVE HEART FAILURE (HCC): ICD-10-CM

## 2022-06-23 LAB
A/G RATIO: 1.5 (ref 1.1–2.2)
ALBUMIN SERPL-MCNC: 3.8 G/DL (ref 3.4–5)
ALP BLD-CCNC: 65 U/L (ref 40–129)
ALT SERPL-CCNC: 36 U/L (ref 10–40)
ANION GAP SERPL CALCULATED.3IONS-SCNC: 14 MMOL/L (ref 3–16)
AST SERPL-CCNC: 37 U/L (ref 15–37)
BACTERIA: ABNORMAL /HPF
BASOPHILS ABSOLUTE: 0.1 K/UL (ref 0–0.2)
BASOPHILS RELATIVE PERCENT: 0.8 %
BILIRUB SERPL-MCNC: 0.5 MG/DL (ref 0–1)
BILIRUBIN URINE: NEGATIVE
BLOOD, URINE: NEGATIVE
BUN BLDV-MCNC: 65 MG/DL (ref 7–20)
CALCIUM SERPL-MCNC: 9.3 MG/DL (ref 8.3–10.6)
CHLORIDE BLD-SCNC: 92 MMOL/L (ref 99–110)
CLARITY: CLEAR
CO2: 27 MMOL/L (ref 21–32)
COLOR: YELLOW
CREAT SERPL-MCNC: 2.9 MG/DL (ref 0.6–1.2)
CRYSTALS, UA: ABNORMAL /HPF
EKG DIAGNOSIS: NORMAL
EKG Q-T INTERVAL: 358 MS
EKG QRS DURATION: 122 MS
EKG QTC CALCULATION (BAZETT): 482 MS
EKG R AXIS: 225 DEGREES
EKG T AXIS: 110 DEGREES
EKG VENTRICULAR RATE: 109 BPM
EOSINOPHILS ABSOLUTE: 0.1 K/UL (ref 0–0.6)
EOSINOPHILS RELATIVE PERCENT: 0.7 %
EPITHELIAL CELLS, UA: ABNORMAL /HPF (ref 0–5)
GFR AFRICAN AMERICAN: 19
GFR NON-AFRICAN AMERICAN: 15
GLUCOSE BLD-MCNC: 138 MG/DL (ref 70–99)
GLUCOSE URINE: NEGATIVE MG/DL
HCT VFR BLD CALC: 28.2 % (ref 36–48)
HEMOGLOBIN: 9.4 G/DL (ref 12–16)
KETONES, URINE: NEGATIVE MG/DL
LEUKOCYTE ESTERASE, URINE: ABNORMAL
LYMPHOCYTES ABSOLUTE: 1.1 K/UL (ref 1–5.1)
LYMPHOCYTES RELATIVE PERCENT: 15.7 %
MCH RBC QN AUTO: 29.2 PG (ref 26–34)
MCHC RBC AUTO-ENTMCNC: 33.3 G/DL (ref 31–36)
MCV RBC AUTO: 87.6 FL (ref 80–100)
MICROSCOPIC EXAMINATION: YES
MONOCYTES ABSOLUTE: 0.9 K/UL (ref 0–1.3)
MONOCYTES RELATIVE PERCENT: 12.3 %
NEUTROPHILS ABSOLUTE: 5 K/UL (ref 1.7–7.7)
NEUTROPHILS RELATIVE PERCENT: 70.5 %
NITRITE, URINE: NEGATIVE
PDW BLD-RTO: 15.8 % (ref 12.4–15.4)
PH UA: 6 (ref 5–8)
PLATELET # BLD: 314 K/UL (ref 135–450)
PMV BLD AUTO: 9.7 FL (ref 5–10.5)
POTASSIUM REFLEX MAGNESIUM: 4.3 MMOL/L (ref 3.5–5.1)
PRO-BNP: ABNORMAL PG/ML (ref 0–449)
PROTEIN UA: NEGATIVE MG/DL
RBC # BLD: 3.23 M/UL (ref 4–5.2)
RBC UA: ABNORMAL /HPF (ref 0–4)
RENAL EPITHELIAL, UA: ABNORMAL /HPF (ref 0–1)
SODIUM BLD-SCNC: 133 MMOL/L (ref 136–145)
SPECIFIC GRAVITY UA: 1.01 (ref 1–1.03)
TOTAL PROTEIN: 6.3 G/DL (ref 6.4–8.2)
TROPONIN: 0.01 NG/ML
URINE TYPE: ABNORMAL
UROBILINOGEN, URINE: 0.2 E.U./DL
WBC # BLD: 7.1 K/UL (ref 4–11)
WBC UA: ABNORMAL /HPF (ref 0–5)

## 2022-06-23 PROCEDURE — 6360000002 HC RX W HCPCS: Performed by: STUDENT IN AN ORGANIZED HEALTH CARE EDUCATION/TRAINING PROGRAM

## 2022-06-23 PROCEDURE — 2580000003 HC RX 258: Performed by: INTERNAL MEDICINE

## 2022-06-23 PROCEDURE — 84484 ASSAY OF TROPONIN QUANT: CPT

## 2022-06-23 PROCEDURE — 93005 ELECTROCARDIOGRAM TRACING: CPT | Performed by: STUDENT IN AN ORGANIZED HEALTH CARE EDUCATION/TRAINING PROGRAM

## 2022-06-23 PROCEDURE — 83550 IRON BINDING TEST: CPT

## 2022-06-23 PROCEDURE — 80053 COMPREHEN METABOLIC PANEL: CPT

## 2022-06-23 PROCEDURE — 85025 COMPLETE CBC W/AUTO DIFF WBC: CPT

## 2022-06-23 PROCEDURE — 2060000000 HC ICU INTERMEDIATE R&B

## 2022-06-23 PROCEDURE — 96374 THER/PROPH/DIAG INJ IV PUSH: CPT

## 2022-06-23 PROCEDURE — 81001 URINALYSIS AUTO W/SCOPE: CPT

## 2022-06-23 PROCEDURE — 83540 ASSAY OF IRON: CPT

## 2022-06-23 PROCEDURE — 83880 ASSAY OF NATRIURETIC PEPTIDE: CPT

## 2022-06-23 PROCEDURE — 71045 X-RAY EXAM CHEST 1 VIEW: CPT

## 2022-06-23 PROCEDURE — 99285 EMERGENCY DEPT VISIT HI MDM: CPT

## 2022-06-23 PROCEDURE — 36415 COLL VENOUS BLD VENIPUNCTURE: CPT

## 2022-06-23 PROCEDURE — 6370000000 HC RX 637 (ALT 250 FOR IP): Performed by: INTERNAL MEDICINE

## 2022-06-23 RX ORDER — SODIUM CHLORIDE 0.9 % (FLUSH) 0.9 %
5-40 SYRINGE (ML) INJECTION PRN
Status: DISCONTINUED | OUTPATIENT
Start: 2022-06-23 | End: 2022-06-29 | Stop reason: HOSPADM

## 2022-06-23 RX ORDER — POLYETHYLENE GLYCOL 3350 17 G/17G
17 POWDER, FOR SOLUTION ORAL DAILY PRN
Status: DISCONTINUED | OUTPATIENT
Start: 2022-06-23 | End: 2022-06-29 | Stop reason: HOSPADM

## 2022-06-23 RX ORDER — SODIUM CHLORIDE 9 MG/ML
INJECTION, SOLUTION INTRAVENOUS PRN
Status: DISCONTINUED | OUTPATIENT
Start: 2022-06-23 | End: 2022-06-29 | Stop reason: HOSPADM

## 2022-06-23 RX ORDER — ONDANSETRON 4 MG/1
4 TABLET, ORALLY DISINTEGRATING ORAL EVERY 8 HOURS PRN
Status: DISCONTINUED | OUTPATIENT
Start: 2022-06-23 | End: 2022-06-29 | Stop reason: HOSPADM

## 2022-06-23 RX ORDER — FUROSEMIDE 10 MG/ML
20 INJECTION INTRAMUSCULAR; INTRAVENOUS 2 TIMES DAILY
Status: DISCONTINUED | OUTPATIENT
Start: 2022-06-24 | End: 2022-06-24

## 2022-06-23 RX ORDER — ONDANSETRON 2 MG/ML
4 INJECTION INTRAMUSCULAR; INTRAVENOUS EVERY 6 HOURS PRN
Status: DISCONTINUED | OUTPATIENT
Start: 2022-06-23 | End: 2022-06-29 | Stop reason: HOSPADM

## 2022-06-23 RX ORDER — CARVEDILOL 25 MG/1
25 TABLET ORAL 2 TIMES DAILY WITH MEALS
Status: DISCONTINUED | OUTPATIENT
Start: 2022-06-24 | End: 2022-06-24

## 2022-06-23 RX ORDER — SODIUM CHLORIDE 0.9 % (FLUSH) 0.9 %
5-40 SYRINGE (ML) INJECTION EVERY 12 HOURS SCHEDULED
Status: DISCONTINUED | OUTPATIENT
Start: 2022-06-23 | End: 2022-06-29 | Stop reason: HOSPADM

## 2022-06-23 RX ORDER — HYDROCODONE BITARTRATE AND ACETAMINOPHEN 5; 325 MG/1; MG/1
2 TABLET ORAL EVERY 6 HOURS PRN
Status: DISCONTINUED | OUTPATIENT
Start: 2022-06-23 | End: 2022-06-29 | Stop reason: HOSPADM

## 2022-06-23 RX ORDER — VITAMIN B COMPLEX
2000 TABLET ORAL DAILY
Status: DISCONTINUED | OUTPATIENT
Start: 2022-06-24 | End: 2022-06-29 | Stop reason: HOSPADM

## 2022-06-23 RX ORDER — ASPIRIN 81 MG/1
81 TABLET, CHEWABLE ORAL DAILY
Status: DISCONTINUED | OUTPATIENT
Start: 2022-06-24 | End: 2022-06-29 | Stop reason: HOSPADM

## 2022-06-23 RX ORDER — FUROSEMIDE 10 MG/ML
40 INJECTION INTRAMUSCULAR; INTRAVENOUS ONCE
Status: COMPLETED | OUTPATIENT
Start: 2022-06-23 | End: 2022-06-23

## 2022-06-23 RX ORDER — ATORVASTATIN CALCIUM 80 MG/1
80 TABLET, FILM COATED ORAL NIGHTLY
Status: DISCONTINUED | OUTPATIENT
Start: 2022-06-23 | End: 2022-06-29 | Stop reason: HOSPADM

## 2022-06-23 RX ADMIN — HYDROCODONE BITARTRATE AND ACETAMINOPHEN 2 TABLET: 5; 325 TABLET ORAL at 22:52

## 2022-06-23 RX ADMIN — FUROSEMIDE 40 MG: 10 INJECTION, SOLUTION INTRAMUSCULAR; INTRAVENOUS at 20:44

## 2022-06-23 RX ADMIN — SODIUM CHLORIDE, PRESERVATIVE FREE 5 ML: 5 INJECTION INTRAVENOUS at 23:54

## 2022-06-23 ASSESSMENT — ENCOUNTER SYMPTOMS
CHEST TIGHTNESS: 0
COUGH: 0
RHINORRHEA: 0
NAUSEA: 1
TROUBLE SWALLOWING: 0
ABDOMINAL DISTENTION: 0
WHEEZING: 0
ABDOMINAL PAIN: 0
SINUS PRESSURE: 0
CHOKING: 0
SHORTNESS OF BREATH: 0
STRIDOR: 0
BACK PAIN: 1
SINUS PAIN: 0

## 2022-06-23 ASSESSMENT — PAIN DESCRIPTION - LOCATION
LOCATION: BACK;CHEST
LOCATION: BACK

## 2022-06-23 ASSESSMENT — PAIN SCALES - GENERAL
PAINLEVEL_OUTOF10: 7
PAINLEVEL_OUTOF10: 10

## 2022-06-23 ASSESSMENT — PAIN - FUNCTIONAL ASSESSMENT: PAIN_FUNCTIONAL_ASSESSMENT: NONE - DENIES PAIN

## 2022-06-23 NOTE — TELEPHONE ENCOUNTER
Patient petty get into nephrologist til September. Her function was 22 in may and is now 19 within a few week period it dropped. Want to know if there is something  can do to get the pt in sooner with nephrologist? Please advise. Patients daughters are leaning towards taking the pt to the ER to get more answers.

## 2022-06-23 NOTE — TELEPHONE ENCOUNTER
Spoke with Edy Bhatia at Dr. Jamia Stephenson office, she stated that we would need to fax over the pt's most recent lab work in order to see if Dr. Jamia Stephenson can work the patient in, for a sooner appointment. I have faxed the most recent GFR levels to Dr. Jamia Stephenson office with attention to: Jossy lA. I also spoke with Nelda Thomas (Dather, Hipaa) she states that when Antelmo was there at pt's home this morning her level dropped within a weeks times, Alis Horne from [facility] - brought her supervisor in to let the patient and her other daughter Pop know that she needs to go to the hospital right away.      Pt is currently being transported by Pop to [facility]

## 2022-06-23 NOTE — TELEPHONE ENCOUNTER
Writer contacted ED provider to inform of 30 day readmission risk. Writer's attempt to contact ED provider was unsuccessful. Still waiting for a room.     Call Back: If you need to call back to inform of disposition you can contact me at [phone]

## 2022-06-23 NOTE — TELEPHONE ENCOUNTER
Incoming call from Remy Chen with Dr. Makenzie Bar office.  and staff is aware of the situation,  was on vacation last week and this week has rounds at the Hospital so there is no clinic hours this week. They have been working behind the scenes to confirm getting her into their clinic next week. I did inform Remy Chen that patient was being taken to [facility] ED by family members. Remy Chen states that when she spoke with the patient's daughter (unsure which one) that she was very short and upset on the phone with her regarding the scheduling situation. Just wanted to keep you informed that they are aware and awaiting confirmation from  on where to accommodate scheduling next week if able.

## 2022-06-24 ENCOUNTER — APPOINTMENT (OUTPATIENT)
Dept: ULTRASOUND IMAGING | Age: 87
DRG: 291 | End: 2022-06-24
Payer: MEDICARE

## 2022-06-24 LAB
ANION GAP SERPL CALCULATED.3IONS-SCNC: 15 MMOL/L (ref 3–16)
BUN BLDV-MCNC: 66 MG/DL (ref 7–20)
CALCIUM SERPL-MCNC: 9.1 MG/DL (ref 8.3–10.6)
CHLORIDE BLD-SCNC: 95 MMOL/L (ref 99–110)
CO2: 21 MMOL/L (ref 21–32)
CREAT SERPL-MCNC: 2.8 MG/DL (ref 0.6–1.2)
GFR AFRICAN AMERICAN: 19
GFR NON-AFRICAN AMERICAN: 16
GLUCOSE BLD-MCNC: 107 MG/DL (ref 70–99)
IRON SATURATION: 6 % (ref 15–50)
IRON: 22 UG/DL (ref 37–145)
MAGNESIUM: 2.7 MG/DL (ref 1.8–2.4)
POTASSIUM SERPL-SCNC: 4.9 MMOL/L (ref 3.5–5.1)
SODIUM BLD-SCNC: 131 MMOL/L (ref 136–145)
TOTAL IRON BINDING CAPACITY: 383 UG/DL (ref 260–445)

## 2022-06-24 PROCEDURE — 99223 1ST HOSP IP/OBS HIGH 75: CPT | Performed by: NURSE PRACTITIONER

## 2022-06-24 PROCEDURE — 5A2204Z RESTORATION OF CARDIAC RHYTHM, SINGLE: ICD-10-PCS | Performed by: INTERNAL MEDICINE

## 2022-06-24 PROCEDURE — 80061 LIPID PANEL: CPT

## 2022-06-24 PROCEDURE — 80048 BASIC METABOLIC PNL TOTAL CA: CPT

## 2022-06-24 PROCEDURE — 6360000002 HC RX W HCPCS: Performed by: INTERNAL MEDICINE

## 2022-06-24 PROCEDURE — 83883 ASSAY NEPHELOMETRY NOT SPEC: CPT

## 2022-06-24 PROCEDURE — 83735 ASSAY OF MAGNESIUM: CPT

## 2022-06-24 PROCEDURE — 6370000000 HC RX 637 (ALT 250 FOR IP): Performed by: INTERNAL MEDICINE

## 2022-06-24 PROCEDURE — 93005 ELECTROCARDIOGRAM TRACING: CPT | Performed by: INTERNAL MEDICINE

## 2022-06-24 PROCEDURE — 76770 US EXAM ABDO BACK WALL COMP: CPT

## 2022-06-24 PROCEDURE — 92960 CARDIOVERSION ELECTRIC EXT: CPT

## 2022-06-24 PROCEDURE — 2580000003 HC RX 258: Performed by: INTERNAL MEDICINE

## 2022-06-24 PROCEDURE — 2060000000 HC ICU INTERMEDIATE R&B

## 2022-06-24 PROCEDURE — 85610 PROTHROMBIN TIME: CPT

## 2022-06-24 PROCEDURE — 84155 ASSAY OF PROTEIN SERUM: CPT

## 2022-06-24 PROCEDURE — 99152 MOD SED SAME PHYS/QHP 5/>YRS: CPT | Performed by: INTERNAL MEDICINE

## 2022-06-24 PROCEDURE — 92960 CARDIOVERSION ELECTRIC EXT: CPT | Performed by: INTERNAL MEDICINE

## 2022-06-24 PROCEDURE — 36415 COLL VENOUS BLD VENIPUNCTURE: CPT

## 2022-06-24 PROCEDURE — 2700000000 HC OXYGEN THERAPY PER DAY

## 2022-06-24 PROCEDURE — 99223 1ST HOSP IP/OBS HIGH 75: CPT | Performed by: INTERNAL MEDICINE

## 2022-06-24 PROCEDURE — 94761 N-INVAS EAR/PLS OXIMETRY MLT: CPT

## 2022-06-24 PROCEDURE — 84165 PROTEIN E-PHORESIS SERUM: CPT

## 2022-06-24 RX ORDER — LIDOCAINE 4 G/G
1 PATCH TOPICAL DAILY
Status: DISCONTINUED | OUTPATIENT
Start: 2022-06-24 | End: 2022-06-29 | Stop reason: HOSPADM

## 2022-06-24 RX ORDER — CARVEDILOL 25 MG/1
25 TABLET ORAL 2 TIMES DAILY WITH MEALS
Status: DISCONTINUED | OUTPATIENT
Start: 2022-06-24 | End: 2022-06-24

## 2022-06-24 RX ORDER — METOPROLOL TARTRATE 50 MG/1
50 TABLET, FILM COATED ORAL 2 TIMES DAILY
Status: DISCONTINUED | OUTPATIENT
Start: 2022-06-24 | End: 2022-06-25

## 2022-06-24 RX ORDER — AMIODARONE HYDROCHLORIDE 200 MG/1
200 TABLET ORAL DAILY
Status: DISCONTINUED | OUTPATIENT
Start: 2022-06-24 | End: 2022-06-24

## 2022-06-24 RX ORDER — AMIODARONE HYDROCHLORIDE 200 MG/1
200 TABLET ORAL DAILY
Status: DISCONTINUED | OUTPATIENT
Start: 2022-06-24 | End: 2022-06-29 | Stop reason: HOSPADM

## 2022-06-24 RX ADMIN — CARVEDILOL 25 MG: 25 TABLET, FILM COATED ORAL at 10:01

## 2022-06-24 RX ADMIN — APIXABAN 2.5 MG: 2.5 TABLET, FILM COATED ORAL at 00:27

## 2022-06-24 RX ADMIN — FUROSEMIDE 5 MG/HR: 10 INJECTION, SOLUTION INTRAMUSCULAR; INTRAVENOUS at 14:27

## 2022-06-24 RX ADMIN — ATORVASTATIN CALCIUM 80 MG: 80 TABLET, FILM COATED ORAL at 00:27

## 2022-06-24 RX ADMIN — ATORVASTATIN CALCIUM 80 MG: 80 TABLET, FILM COATED ORAL at 20:33

## 2022-06-24 RX ADMIN — CARVEDILOL 25 MG: 25 TABLET, FILM COATED ORAL at 00:26

## 2022-06-24 RX ADMIN — AMIODARONE HYDROCHLORIDE 200 MG: 200 TABLET ORAL at 20:33

## 2022-06-24 RX ADMIN — APIXABAN 2.5 MG: 2.5 TABLET, FILM COATED ORAL at 09:55

## 2022-06-24 RX ADMIN — FUROSEMIDE 20 MG: 10 INJECTION, SOLUTION INTRAMUSCULAR; INTRAVENOUS at 09:55

## 2022-06-24 RX ADMIN — Medication 2000 UNITS: at 09:55

## 2022-06-24 RX ADMIN — SODIUM CHLORIDE, PRESERVATIVE FREE 10 ML: 5 INJECTION INTRAVENOUS at 09:56

## 2022-06-24 RX ADMIN — ASPIRIN 81 MG: 81 TABLET, CHEWABLE ORAL at 09:55

## 2022-06-24 RX ADMIN — POLYETHYLENE GLYCOL 3350 17 G: 17 POWDER, FOR SOLUTION ORAL at 09:55

## 2022-06-24 RX ADMIN — HYDROCODONE BITARTRATE AND ACETAMINOPHEN 2 TABLET: 5; 325 TABLET ORAL at 20:32

## 2022-06-24 RX ADMIN — APIXABAN 2.5 MG: 2.5 TABLET, FILM COATED ORAL at 20:33

## 2022-06-24 RX ADMIN — HYDROCODONE BITARTRATE AND ACETAMINOPHEN 2 TABLET: 5; 325 TABLET ORAL at 04:39

## 2022-06-24 ASSESSMENT — PAIN DESCRIPTION - LOCATION
LOCATION: BACK
LOCATION: BACK

## 2022-06-24 ASSESSMENT — PAIN SCALES - GENERAL
PAINLEVEL_OUTOF10: 0
PAINLEVEL_OUTOF10: 4
PAINLEVEL_OUTOF10: 7
PAINLEVEL_OUTOF10: 0
PAINLEVEL_OUTOF10: 7
PAINLEVEL_OUTOF10: 6
PAINLEVEL_OUTOF10: 6
PAINLEVEL_OUTOF10: 5

## 2022-06-24 NOTE — PROGRESS NOTES
ETCO2  Monitoring done for conscious sedation. Patient is on 4 liters/min via nasal cannula for procedure.     Baseline information:  HR: 108 BP: 122/71  RR: 18 LOC: alert  ETCO2: 32 SpO2: 100    5 minutes after sedation administered:  HR: 100 BP: 127/82  RR: 17 LOC: obtunded  ETCO2: 28 SpO2: 100    10 min after sedation administered:  HR: 62   RR: 12 LOC: obtunded  ETCO2: 27 SpO2: 100        Post-Procedure:  HR: 61 BP: 90/62  RR: 17 LOC: lethargic  ETCO2: 27 SpO2: 100  well    Patient/caregiver was educated on the proper method of use:  Yes      Level of patient/caregiver understanding able to:   [] Verbalize understanding   [] Demonstrate understanding       [] Teach back        [] Needs reinforcement       []  No available caregiver               []  Other:     Response to education:  Good

## 2022-06-24 NOTE — PROGRESS NOTES
Pt BP is 92/55, Nephrologist notified. Instructed to start lasix gtt and only stop if Bp drops below 85. Will continue to monitor.   Electronically signed by Royal Leticia RN on 6/24/2022 at 11:27 AM

## 2022-06-24 NOTE — CONSULTS
Assessment/Plan:      Cardiorenal syndrome, volume overloaded ( bnp 11,182)  -given EF on last echo   - Cr. fluctuation along w/ elevated JVD  - 5mg Lasiks Q1hr  -strict I/O   -smith cath  -Mg daily   -monitor daily weight     AGUILA on stage IV CKD   - Cr. Baseline 1.5, currently 2.8  - BUN 65  -renal panel  - avoid nephrotoxic agents     Rule out secondary causes ( complains of foamy urine). -UA  -Renal US   -check free light chains   - rule out vasculitis           [facility] would like to thank Jaxon Almaguer MD   for opportunity to serve this patient      Please call with questions at-   24 Hrs Answering service [phone] or  7 am- 5 pm via Perfect serve or cell phone  Dr. Avtar Pérez for consult :   Worsening kidney function     HPI :     Patient presented to the hospital with c/o BLE edema, abnormal labs, 14 lb wt gain. BNP >11,000, Cr 2.9, noted to be in AF/RVR overnight with episode of NSVT x 24 beats. HR up to 140's. She is complaining of feeling bloated, BLE edema and fatigue. No complaints of palpitations, CP. States she has been compliant with her medications at home. Currently eating breakfast. Daughter present at bedside      ROS:     positives in bold   Constitutional:  fever, chills, weakness, weight change, fatigue  Skin:  rash, pruritus, hair loss, bruising, dry skin, petechiae  Head, Face, Neck   headaches, swelling,  cervical adenopathy  Respiratory: shortness of breath, cough, or wheezing  Cardiovascular: chest pain, palpitations, dizzy, edema  Gastrointestinal: nausea, vomiting, diarrhea, constipation,belly pain    Yellow skin, blood in stool  Musculoskeletal:  back pain, muscle weakness, gait problems,       joint pain or swelling. Genitourinary:  dysuria, poor urine flow, flank pain, blood in urine  Neurologic:  vertigo, TIA'S, syncope, seizures, focal weakness  Psychosocial:  insomnia, anxiety, or depression.   All other remaining systems are negative or unable to obtain    PMH/PSH/SH/Family History:     Past Medical History:   Diagnosis Date    Arthritis     Blood circulation, collateral     CAD (coronary artery disease)     CHF (congestive heart failure) (HCC)     Confusion 8/31/2020    GERD (gastroesophageal reflux disease)     History of heart artery stent 12/2018    Hyperlipidemia     Hypertension     Mitral valve prolapse     Myocardial infarct, old     Thyroid disease        Past Surgical History:   Procedure Laterality Date    COLONOSCOPY      EYE SURGERY Left 09/04/2018    PHACO EMULSIFICATION OF CATARACT WITH INTRAOCULAR LENS IMPLANT LEFT EYE     HYSTERECTOMY (CERVIX STATUS UNKNOWN)      OTHER SURGICAL HISTORY  08/27/2018    phacoemulsification of cataract with intraocular lens implant right eye    [place] OFFICE/OUTPT VISIT,PROCEDURE ONLY Right 8/27/2018    PHACO EMULSIFICATION OF CATARACT WITH INTRAOCULAR LENS IMPLANT RIGHT EYE performed by Opal Arzate MD at [address] [facility] OFFICE/OUTPT [address] [facility] Left 9/4/2018    PHACO EMULSIFICATION OF CATARACT WITH INTRAOCULAR LENS IMPLANT LEFT EYE performed by Opal Arzate MD at [address] [facility]          reports that she has never smoked. She has never used smokeless tobacco. She reports that she does not drink alcohol and does not use drugs. family history includes Cancer in her father and sister; Diabetes in her brother; Heart Disease in her father and mother; Stroke in her brother.      Medication:     Current Facility-Administered Medications: carvedilol (COREG) tablet 25 mg, 25 mg, Oral, BID WC  lidocaine 4 % external patch 1 patch, 1 patch, TransDERmal, Daily  furosemide (LASIX) 100 mg in dextrose 5 % 100 mL infusion, 5 mg/hr, IntraVENous, Continuous  apixaban (ELIQUIS) tablet 2.5 mg, 2.5 mg, Oral, BID  aspirin chewable tablet 81 mg, 81 mg, Oral, Daily  atorvastatin (LIPITOR) tablet 80 mg, 80 mg, Oral, Nightly  vitamin D (CHOLECALCIFEROL) tablet 2,000 Units, 2,000 Units, Oral, Daily  sodium

## 2022-06-24 NOTE — CONSULTS
Consult received. Labs and notes were reviewed. Case was discussed with the staff. Full note to follow.     Thanks  Nephrology  [address]  Office: [phone]  Cell: [phone]  Fax: [phone]

## 2022-06-24 NOTE — H&P
Hospital Medicine History & Physical      PCP: Sarahi Boss DO    Date of Admission: 6/23/2022    Date of Service: Pt seen/examined on 6/23/2022 and Admitted to Inpatient with expected LOS greater than two midnights due to medical therapy. Chief Complaint: Shortness of breath and 14 pound weight gain      History Of Present Illness:      80 y.o. female who presents with complaints of worsening bilateral leg swelling, shortness of breath and weight gain of 14 pounds since her last admission 3 weeks ago. Patient also has worsening kidney numbers from her baseline and unable to see a nephrologist as outpatient until September this year. Patient has been declining overall over the past couple of months per family. She has been fatigued and sleeping a lot. Family is advised by her visiting home physician to bring her to the ER given abnormal labs and changes in clinical status. Denies fever, chills, chest or abdominal pain, nausea, vomiting, urinary symptoms or changes in bowel habits. Patient is compliant with her medications but does not make much urine now. Patient has history of combined heart failure with ejection fraction of 24-78%, grade 3 diastolic dysfunction and moderate pulmonary hypertension on recent echocardiogram.  She is on Lasix 20 mg twice daily. Per patient's daughters at bedside patient is very compliant with her medications and uses only 500 mg of dietary sodium.     Patient has been hospitalized from 5/29 through 6/1 for atrial fibrillation with RVR and AGUILA    Past Medical History:          Diagnosis Date    Arthritis     Blood circulation, collateral     CAD (coronary artery disease)     CHF (congestive heart failure) (HCC)     Confusion 8/31/2020    GERD (gastroesophageal reflux disease)     History of heart artery stent 12/2018    Hyperlipidemia     Hypertension     Mitral valve prolapse     Myocardial infarct, old     Thyroid disease        Past Surgical History:          Procedure Laterality Date    COLONOSCOPY      EYE SURGERY Left 09/04/2018    PHACO EMULSIFICATION OF CATARACT WITH INTRAOCULAR LENS IMPLANT LEFT EYE     HYSTERECTOMY (CERVIX STATUS UNKNOWN)      OTHER SURGICAL HISTORY  08/27/2018    phacoemulsification of cataract with intraocular lens implant right eye    [place] OFFICE/OUTPT VISIT,PROCEDURE ONLY Right 8/27/2018    PHACO EMULSIFICATION OF CATARACT WITH INTRAOCULAR LENS IMPLANT RIGHT EYE performed by Enoc Sellers MD at [address] [place] [address] OFFICE/OUTPT [address] [facility] [address] Left 9/4/2018    PHACO EMULSIFICATION OF CATARACT WITH INTRAOCULAR LENS IMPLANT LEFT EYE performed by Enoc Sellers MD at [address] [place] [address]         Medications Prior to Admission:      Prior to Admission medications    Medication Sig Start Date End Date Taking? Authorizing Provider   Misc. Devices KIT Rollator. Use as directed. 6/10/22   Debra Shahid DO   furosemide (LASIX) 20 MG tablet Take 1 tablet by mouth 2 times daily 6/8/22   Christin Chatman, APRN - CNP   carvedilol (COREG) 25 MG tablet Take 1 tablet by mouth 2 times daily 5/23/22   Christin Chatman APRN - CNP   nitroGLYCERIN (NITROSTAT) 0.4 MG SL tablet PUT 1 TAB UNDER TONGUE EVERY 5 MIN AS NEEDED CHEST PAIN UP TO 3. IF NO RELIEF AFTER 1 DOSE, CALL 911 4/25/22   Bernardo Reyez MD   apixaban Maggy Aquino) 2.5 MG TABS tablet Take 1 tablet by mouth 2 times daily 3/22/22   Belinda Cesar APRN - CNP   diclofenac sodium (VOLTAREN) 1 % GEL Apply 2 g topically 4 times daily 3/22/22   Irma Pierson MD   famotidine (PEPCID) 20 MG tablet Take 1 tablet by mouth daily 3/22/22   Irma Pierson MD   ondansetron [facility]) 4 MG tablet Take 1 tablet by mouth 3 times daily as needed for Nausea or Vomiting 3/7/22   Debra Shahid DO   zolpidem (AMBIEN) 5 MG tablet Take 1 tablet by mouth nightly as needed for Sleep for up to 180 days.   Patient not taking: Reported on 5/6/2022 3/7/22 9/3/22  Debra Shahid DO   atorvastatin (LIPITOR) Pulse (!) 111   Temp 97.6 °F (36.4 °C) (Oral)   Resp 12   Wt 138 lb (62.6 kg)   SpO2 94%   BMI 26.95 kg/m²     General appearance: Chronically ill-appearing, elderly frail female  HEENT:  Normal cephalic, atraumatic without obvious deformity. Pupils equal, round, and reactive to light. Extra ocular muscles intact. Conjunctivae/corneas clear. Neck: Supple, with full range of motion. No jugular venous distention. Trachea midline. Respiratory:  Normal respiratory effort. Patient is able to speak in full sentences. Bibasilar crackles +  Cardiovascular: Irregularly irregular rhythm, tachycardic, with normal S1/S2 with ejection systolic murmur + in aortic area, no rubs or gallops. Abdomen: Soft, non-tender, non-distended with normal bowel sounds. Musculoskeletal:  No clubbing, cyanosis, pitting 2+ lower extremity edema bilaterally up to the knees. Full range of motion without deformity. Skin: Skin color, texture, turgor normal.  Multiple bruising in bilateral lower extremities. Neurologic:  Neurovascularly intact without any focal sensory/motor deficits. Cranial nerves: II-XII intact, grossly non-focal.  Psychiatric:  Alert and oriented, thought content appropriate, normal insight  Capillary Refill: Brisk,3 seconds, normal  Peripheral Pulses: +2 palpable, equal bilaterally       Labs:     Recent Labs     06/23/22 1912   WBC 7.1   HGB 9.4*   HCT 28.2*        Recent Labs     06/23/22 1912   *   K 4.3   CL 92*   CO2 27   BUN 65*   CREATININE 2.9*   CALCIUM 9.3     Recent Labs     06/23/22 1912   AST 37   ALT 36   BILITOT 0.5   ALKPHOS 65     No results for input(s): INR in the last 72 hours.   Recent Labs     06/23/22 1912   TROPONINI 0.01       Urinalysis:      Lab Results   Component Value Date    NITRU Negative 06/23/2022    WBCUA 6-9 06/23/2022    BACTERIA 1+ 06/23/2022    RBCUA 0-2 06/23/2022    BLOODU Negative 06/23/2022    SPECGRAV 1.015 06/23/2022    GLUCOSEU Negative 06/23/2022 Radiology:     EKG:  I have reviewed the EKG with the following interpretation: Atrial fibrillation with RVR, VR = 102, no acute ST-T changes    XR CHEST PORTABLE   Final Result      Patchy bilateral airspace disease. Correlate for pneumonia are less typical for pulmonary edema. Consults:    IP CONSULT TO HOSPITALIST  IP CONSULT TO HEART FAILURE NURSE/COORDINATOR  IP CONSULT TO DIETITIAN    ASSESSMENT:    Active Hospital Problems    Diagnosis Date Noted    CHF (congestive heart failure), NYHA class I, acute on chronic, combined ([facility] [address] [I50.43] 06/23/2022     Priority: Medium   #Acute on chronic exacerbation of combined CHF, likely exacerbated by valvular pathology/tachycardia  #Moderate pulmonary hypertension  #AGUILA on CKD, rule out cardiorenal syndrome given EF of 25%  #Chronic anemia-stable at baseline  #Persistent atrial fibrillation with RVR  #Physical deconditioning over the past couple of months with worsening fatigue and somnolence  #Chronic pain    PLAN:  -Patient received Lasix 40 mg IV in ED  -Continue on Lasix 20 mg IV twice daily starting tomorrow morning  -Low-sodium diet  -Strict intake and output, daily weights  -Monitor electrolytes and renal function  -CHF team and cardiology consult  -Nephrology consult regarding worsening kidney function. Patient is scheduled to see a nephrologist/Dr. Lupe Arriaga which is in September. Patient and family agreeable to see a nephrologist from here  -Monitor H&H closely given worsening CKD. No evidence of active bleeding  -Continue on beta-blocker and Eliquis  -Continue her home medications appropriately, including for chronic pain with caution  -Supportive therapy  -PT/OT as tolerated      DVT Prophylaxis: Eliquis  Diet: ADULT DIET; Regular; 4 carb choices (60 gm/meal);  Low Fat/Low Chol/High Fiber/2 gm Na  Code Status: Full Code    PT/OT Eval Status: As tolerated with assistance and fall precautions    Dispo -GMF with telemetry       [facility], MD    Thank you Joceline Trimble DO for the opportunity to be involved in this patient's care. If you have any questions or concerns please feel free to contact me at [phone].

## 2022-06-24 NOTE — CONSULTS
[facility]   Electrophysiology Nurse Practitioner  Consult Rounding    Date: 6/24/2022    Reason for Consultation/ Chief Complaint: AF/RVR, VT    Patient interviewed, examined and pertinent medical data and imaging studies reviewed. Refer to the NP's note for details of clinical findings, assessment and plan with which I agree. Corrections and additions as noted:    A. fib with RVR  Cardiomyopathy with LV ejection fraction of 25 to 30%  Nonsustained ventricular tachycardia  Discussed in detail about the treatment options    Recommend cardioversion later today  After cardioversion, we will start her on amiodarone 200 mg p.o. daily  She have allergies listed for iodinated contrast; please note that the hypersensitivity amiodarone in patients who have iodine allergy is very low  Pharmacotherapy. 2012 Jul;32(7):618-22. doi: 10.1002/j.[id number]. Epub 2012 May 17. PMID: [id number]. Optimal medical therapy for heart failure, as per Dr. Nolvia Logan MD   Cardiac Electrophysiology  [address] [facility] [phone]      History Obtained From: Patient and medical record. Cardiac Hx: Emiliano Merchant is a 80 y.o. woman with a h/o HTN, HLD, CAD, s/p MI, s/p PCI (12/2018), ICMP, EF 25-30% who p/w vision loss, MRI of the head showed areas of acute ischemia thought to be embolic, S/p ILR (9/1928, Dr. Maryellen Bhat), new AF found on ILR and placed on [address] [place] [address] (5/2022), hospitalized with AF/RVR (5/2022), digitalized and HR controlled, who p/w SOB, wt gain, found to be in AF/RVR    HPI: Patient presented to the hospital with c/o BLE edema, abnormal labs, 14 lb wt gain. BNP >11,000, Cr 2.9, noted to be in AF/RVR overnight with episode of NSVT x 24 beats. HR up to 140's. She is complaining of feeling bloated, BLE edema and fatigue. No complaints of palpitations, CP. States she has been compliant with her medications at home.  Currently eating breakfast. Daughter present at bedside. Home medications:   No current facility-administered medications on file prior to encounter. Current Outpatient Medications on File Prior to Encounter   Medication Sig Dispense Refill    Misc. Devices KIT Rollator. Use as directed. 1 kit 0    furosemide (LASIX) 20 MG tablet Take 1 tablet by mouth 2 times daily 60 tablet 5    carvedilol (COREG) 25 MG tablet Take 1 tablet by mouth 2 times daily 180 tablet 3    nitroGLYCERIN (NITROSTAT) 0.4 MG SL tablet PUT 1 TAB UNDER TONGUE EVERY 5 MIN AS NEEDED CHEST PAIN UP TO 3. IF NO RELIEF AFTER 1 DOSE, CALL 911 25 tablet 3    apixaban (ELIQUIS) 2.5 MG TABS tablet Take 1 tablet by mouth 2 times daily 60 tablet 5    diclofenac sodium (VOLTAREN) 1 % GEL Apply 2 g topically 4 times daily 200 g 2    famotidine (PEPCID) 20 MG tablet Take 1 tablet by mouth daily 60 tablet 3    ondansetron (ZOFRAN) 4 MG tablet Take 1 tablet by mouth 3 times daily as needed for Nausea or Vomiting 30 tablet 3    zolpidem (AMBIEN) 5 MG tablet Take 1 tablet by mouth nightly as needed for Sleep for up to 180 days. (Patient not taking: Reported on 5/6/2022) 30 tablet 2    atorvastatin (LIPITOR) 80 MG tablet TAKE 1 TABLET BY MOUTH ONE TIME A DAY 90 tablet 3    vitamin B-12 (CYANOCOBALAMIN) 1000 MCG tablet Take 1,000 mcg by mouth daily      fluticasone (FLONASE) 50 MCG/ACT nasal spray 2 sprays by Each Nostril route daily 1 Bottle 5    Handicap Placard MISC by Does not apply route Length: 5 years 1 each 0    vitamin D (CHOLECALCIFEROL) 1000 UNIT TABS tablet Take 2 tablets by mouth daily 60 tablet 3    HYDROcodone-acetaminophen (NORCO)  MG per tablet Take 1 tablet by mouth every 6 hours as needed. Julio Cesar Allis Coenzyme Q10 (CO Q 10) 100 MG CAPS Take  by mouth daily.  aspirin 81 MG chewable tablet Take 1 tablet by mouth daily.  30 tablet        Scheduled Meds:   carvedilol  25 mg Oral BID WC    lidocaine  1 patch TransDERmal Daily    apixaban  2.5 mg Oral BID    aspirin  81 mg Oral Daily    atorvastatin  80 mg Oral Nightly    Vitamin D  2,000 Units Oral Daily    sodium chloride flush  5-40 mL IntraVENous 2 times per day    furosemide  20 mg IntraVENous BID     Continuous Infusions:   sodium chloride       PRN Meds:sodium chloride flush, sodium chloride, ondansetron **OR** ondansetron, polyethylene glycol, HYDROcodone-acetaminophen       Past Medical History:   Diagnosis Date    Arthritis     Blood circulation, collateral     CAD (coronary artery disease)     CHF (congestive heart failure) (HCC)     Confusion 8/31/2020    GERD (gastroesophageal reflux disease)     History of heart artery stent 12/2018    Hyperlipidemia     Hypertension     Mitral valve prolapse     Myocardial infarct, old     Thyroid disease         Past Surgical History:   Procedure Laterality Date    COLONOSCOPY      EYE SURGERY Left 09/04/2018    PHACO EMULSIFICATION OF CATARACT WITH INTRAOCULAR LENS IMPLANT LEFT EYE     HYSTERECTOMY (CERVIX STATUS UNKNOWN)      OTHER SURGICAL HISTORY  08/27/2018    phacoemulsification of cataract with intraocular lens implant right eye    [place] OFFICE/OUTPT VISIT,PROCEDURE ONLY Right 8/27/2018    PHACO EMULSIFICATION OF CATARACT WITH INTRAOCULAR LENS IMPLANT RIGHT EYE performed by Kristen Villafuerte MD at [address] OFFICE/OUTPT VISIT,PROCEDURE ONLY Left 9/4/2018    PHACO EMULSIFICATION OF CATARACT WITH INTRAOCULAR LENS IMPLANT LEFT EYE performed by Kristen Villafuerte MD at [address] THYROIDECTOMY         Allergies   Allergen Reactions    Benazepril Hcl Shortness Of Breath and Swelling    Feldene [Piroxicam] Shortness Of Breath    Hytrin [Terazosin Hcl] Shortness Of Breath    Iv Contrast [Iodides] Anaphylaxis    Acetaminophen     Celebrex [Celecoxib]      GI upset    Cephalexin      Nausea    Hydrochlorothiazide     Iodinated Casein     Monopril [Fosinopril Sodium] Other (See Comments)     Pt states makes her weak    Protonix [Pantoprazole Sodium] Other reduced systolic function. Left ventricular    ejection fraction of 47%. Hypokinesis of the apical segments. THere is a    defect in the anterior septal wall at stress and minimal reversal at rest    consistent with scar. There is also a mild defect in the anterior and    anterolateral wall at stress that completely reverses at rest consistent    with ischemia. Cardiac Angiography:11/2018, Dr. Recinos Dies     IMPRESSION:  1.  LV dysfunction consistent with cardiomyopathy. 2.  Ventricular tachycardia. 3.  Abnormal Myoview. 4.  LV dysfunction with estimated ejection fraction of 40% with global  hypokinesis. 5.  Successful primary stent to LAD.     Problem List:   Patient Active Problem List    Diagnosis Date Noted    AGUILA (acute kidney injury) ([facility] [address] 01/13/2014    CHF (congestive heart failure), NYHA class I, acute on chronic, combined ([address] 06/23/2022    Atrial fibrillation ([address] 05/29/2022    Encounter for loop recorder check 03/30/2022    Stroke of unknown etiology ([address] 03/19/2022    Visual field defect 03/18/2022    Chronic kidney disease, stage IV (severe) ([address] 08/20/2021    Confusion 08/31/2020    PVD (posterior vitreous detachment), both eyes 07/02/2019    Posterior subcapsular polar age-related cataract of both eyes 07/02/2019    Abnormal myocardial perfusion study 11/06/2018    Ischemic cardiomyopathy 11/06/2018    Cardiomyopathy ([address] 11/06/2018    Palpitations 09/04/2018    V-tach ([address] 09/04/2018    GERD (gastroesophageal reflux disease) 01/10/2017    Closed stable burst fracture of first lumbar vertebra ([address] 09/30/2015    Primary insomnia 11/24/2014    Mixed hyperlipidemia 10/29/2014    Gout 04/18/2014    Pain in joint, hand 04/18/2014    Renal insufficiency 01/22/2014    Essential hypertension 04/26/2013    Chronic low back pain 02/22/2013    Other and unspecified angina pectoris 06/21/2011    Coronary atherosclerosis of native coronary artery 06/21/2011    Other chronic pulmonary heart diseases 06/21/2011    Mitral valve disorder 06/21/2011    Acute combined systolic and diastolic heart failure ([facility] [address] 06/21/2011        Assessment:   1. AGUILA (acute kidney injury) ([facility] [address]    2. Acute on chronic combined systolic and diastolic congestive heart failure ([facility] [address]    3. AF/RVR  4. VT  5. ICMP    AF/RVR  - In AF, HR up to 140's at times  - CHADSVASc at least 6  - On Eliquis 2.5 mg BID (age, Cr, wt), no bleeding, no missed doses  - On Coreg 25 mg BID  - Will d/w Dr. Nishi Velazquez rhythm mgmt options, consider DCCV  - Keep K>4.0, Mg>2.0  - ECG ordered and results personally reviewed     VT  - 24 beats VT on tele noted, asx  - Last [facility] 2018  - On Coreg 25 mg BID  - Will order lexiscan    ICMP/ Acute on chronic HFrEF  - BNP >11,000, BLE edema, wt gain  - EF 25-30%  -   - NYHA III  - S/p IV lasix 40 mg x 1 dose  - On lasix gtt  - On Coreg 25 mg BID  - No ACE/ARB/ARNI d/t kidney fxn  - Dr. Kamila Alberto managing    EF of 25-30%  No ACE/ARB/ARNI d/t CKD for systolic HF  ASA and Statin for CAD  Anticoagulation for AF  No tobacco use. All questions and concerns were addressed to the patient/family. Alternatives to my treatment were discussed. The note was completed using EMR. Every effort was made to ensure accuracy; however, inadvertent computerized transcription errors may be present.      [employer], NP  [facility]

## 2022-06-24 NOTE — PLAN OF CARE
Problem: Discharge Planning  Goal: Discharge to home or other facility with appropriate resources  Outcome: Progressing  Flowsheets (Taken 6/24/2022 0805)  Discharge to home or other facility with appropriate resources: Identify barriers to discharge with patient and caregiver     Problem: Pain  Goal: Verbalizes/displays adequate comfort level or baseline comfort level  Outcome: Progressing     Problem: Safety - Adult  Goal: Free from fall injury  Outcome: Progressing  Flowsheets (Taken 6/24/2022 1850)  Free From Fall Injury:   Instruct family/caregiver on patient safety   Based on caregiver fall risk screen, instruct family/caregiver to ask for assistance with transferring infant if caregiver noted to have fall risk factors     Problem: Chronic Conditions and Co-morbidities  Goal: Patient's chronic conditions and co-morbidity symptoms are monitored and maintained or improved  Outcome: Progressing  Flowsheets (Taken 6/24/2022 0805)  Care Plan - Patient's Chronic Conditions and Co-Morbidity Symptoms are Monitored and Maintained or Improved: Monitor and assess patient's chronic conditions and comorbid symptoms for stability, deterioration, or improvement     Problem: Cardiovascular - Adult  Goal: Maintains optimal cardiac output and hemodynamic stability  Outcome: Progressing  Flowsheets (Taken 6/24/2022 0805)  Maintains optimal cardiac output and hemodynamic stability:   Monitor blood pressure and heart rate   Monitor urine output and notify Licensed Independent Practitioner for values outside of normal range   Assess for signs of decreased cardiac output

## 2022-06-24 NOTE — CARE COORDINATION
Case Management Assessment           Initial Evaluation                Date / Time of Evaluation: 6/24/2022 1:59 PM                 Assessment Completed by: Flaquito Chi RN    Patient Name: Juana Edwards     YOB: 1935  Diagnosis: AGUILA (acute kidney injury) ([facility] [address] [N17.9]  Acute on chronic combined systolic and diastolic congestive heart failure ([facility] [address] [I50.43]  CHF (congestive heart failure), NYHA class I, acute on chronic, combined ([facility] [address] [I50.43]     Date / Time: 6/23/2022  5:32 PM    Patient Admission Status: Inpatient    If patient is discharged prior to next notation, then this note serves as note for discharge by case management.      Current PCP: Valerie Hurtado DO  Clinic Patient: No    Chart Reviewed: Yes  Patient/ Family Interviewed: Yes    Initial assessment completed at bedside with: patient and grandson    Hospitalization in the last 30 days: Yes    Emergency Contacts:  Extended Emergency Contact Information  Primary Emergency Contact: Sherifnitish Ontiverosshi [address] Phone: [phone]  Mobile Phone: [phone]  Relation: Child  Secondary Emergency Contact: Mcdonald Spurling [address] Phone: [phone]  Work Phone: [phone]  Mobile Phone: [phone]  Relation: Child    Advance Directives:   Code Status: Full [address]: No    Financial  Payor: [place] / Plan: MEDICARE PART A AND B / Product Type: *No Product type* /     Pre-cert required for SNF: No    Pharmacy    [id number] [place], [address] [place] [address]  [address] [facility] [address]  Phone: [phone] Fax: [address] [place], [place] - [address] [place] [address]  Phone: [phone] Fax: [phone]      Potential assistance Purchasing Medications: Potential Assistance Purchasing Medications: No  Does Patient want to participate in local refill/ meds to beds program?: Yes    Meds To Beds General Rules:  1. Can ONLY be done Monday- Friday between 8:30am-5pm  2. Prescription(s) must be in pharmacy by 3pm to be filled same day  3. Copy of patient's insurance/ prescription drug card and patient face sheet must be sent along with the prescription(s)  4. Cost of Rx cannot be added to hospital bill. If financial assistance is needed, please contact unit  or ;  or  CANNOT provide pharmacy voucher for patients co-pays  5.  Patients can then  the prescription on their way out of the hospital at discharge, or pharmacy can deliver to the bedside if staff is available. (payment due at time of pick-up or delivery - cash, check, or card accepted)     Able to afford home medications/ co-pay costs: Yes    ADLS  Support Systems: Children,Family Members    PT AM-PAC:   /24  OT AM-PAC:   /24    [place]: From home in ranch home with daughter   Steps: 1 step to enter,     Plans to RETURN to current housing: Yes  Barriers to RETURNING to current housing: medical complications     Edouard Granados 78  Currently ACTIVE with [address]: Yes  [address]: Valorie Isabel  Phone: [phone]  Fax: [phone]    Currently ACTIVE with [place] [facility]: No      Durable Medical Equipment  DME Provider: n/a  Equipment: wheelchair, cane, walker and bath bench    Home Oxygen and [address] prior to admission: No    DISCHARGE PLAN:  Disposition: Home with [address]: [address] for discharge: family     Factors facilitating achievement of predicted outcomes: Family support, Caregiver support, Cooperative and Has needed Durable Medical Equipment at home    Barriers to discharge: Medical complications and Medication managment    Additional Case Management Notes: Pt is a readmit, from home with daughter and [facility]. Plan is for pt to return home with [facility]. Pt going for Cardioversion today and stress test in am.     The Plan for Transition of Care is related to the following treatment goals of AGUILA (acute kidney injury) ([facility] [address] [N17.9]  Acute on chronic combined systolic and diastolic congestive heart failure (HCC) [I50.43]  CHF (congestive heart failure), NYHA class I, acute on chronic, combined ([address] [I50.43]    The Patient and/or patient representative Alondra Gallegos and her family were provided with a choice of provider and agrees with the discharge plan Yes    Freedom of choice list was provided with basic dialogue that supports the patient's individualized plan of care/goals and shares the quality data associated with the providers.  Yes    Care Transition patient: No    Nazia So RN  [facility]  Case Management Department  Ph: [phone]   Fax: [phone]

## 2022-06-24 NOTE — PROGRESS NOTES
Pt came in with A fib RVR in 130s. Pt is currently sustaining in the 110s-120s. Pt had 24 beats of Vtach at 168 bpm. Pt report being asymptomatic. Vital signs as follows:     Vitals:    06/24/22 0805   BP: 98/72   Pulse: (!) 121   Resp: 18   Temp: 97.4 °F (36.3 °C)   SpO2: 96%     MD notified. Awaiting futher instructions. Will continue to monitor.     Electronically signed by Barrington Leventhal, RN on 6/24/2022 at 8:10 AM

## 2022-06-24 NOTE — ED NOTES
Report called to RN on PCU. Will transport patient once bed is clean and ready with all belongings at bedside.       Nitesh Chester RN  06/23/22 6186

## 2022-06-24 NOTE — PROGRESS NOTES
Pt admitted from Ed. Connected to telemetry monitoring. Vital signs and head-to-toe assessment performed. Oriented to room, call-light, routine of nurse+physician rounding, frequency of VS/assessments, meal-times, lab-draws, and medication administrations. 4 Eyes Admission Assessment     I agree as the admission nurse that 2 RN's have performed a thorough Head to Toe Skin Assessment on the patient. ALL assessment sites listed below have been assessed on admission. Areas assessed by both nurses: Mily Hugehs and Meryl  [x]   Head, Face, and Ears   [x]   Shoulders, Back, and Chest  [x]   Arms, Elbows, and Hands   [x]   Coccyx, Sacrum, and Ischium  [x]   Legs, Feet, and Heels        Does the Patient have Skin Breakdown? No      Rash under left breast. Right heel redness, blanchable.          Michel Prevention initiated:  No   Wound Care Orders initiated:  No      [facility] nurse consulted for Pressure Injury (Stage 3,4, Unstageable, DTI, NWPT, and Complex wounds) or Michel score 18 or lower:  No      Nurse 1 eSignature: Electronically signed by Ranjit Yuen RN on 6/23/22 at 11:14 PM EDT    **SHARE this note so that the co-signing nurse is able to place an eSignature**    Nurse 2 eSignature: Electronically signed by Rene Hager RN on 6/24/22 at 1:24 AM EDT

## 2022-06-24 NOTE — CONSULTS
Cardiology Consultation                                                                    Pt Name: Marium Grover  Age: 80 y.o. Sex: female  : 1935  Location: [facility]-    Referring Physician: Lc Blunt MD  Primary cardiologist: Dr Vane Schmitt      Reason for Consult:     Reason for Consultation/Chief Complaint: AHF, AFRVR    HPI:     Prema Dykes is a 80 y.o. female with a past medical history of CAD status post stent 2018, HTN, HLP, CKD stage IV, HFrEF with EF 25%, mitral valve prolapse with moderate MR, VT, LBBB, PAFRVR, recurrent ischemic CVA's.     LHC 2018: Proximal LAD 80% stenosed status post stent x1, otherwise normal coronaries, LVEF 40%.    Echo : LVEF 50%.    Echo 10/2018: LVEF 35-40%.    Echo 2022: Normal LV, LVEF 25-40%, diastolic grade 3, severe LAE, moderate MR, severe MAC, normal RV, RVSP 51 (8), negative bubble study.  Per personal review of images, there is no apical clot with IV contrast, posterior mitral valve leaflet with a large echodensity measuring 2.4 x 2.4 cm, cannot exclude severe MAC vs clot vs mass (less likely vegetation in view of no fevers and normal WBC)     Status post ILR ([employer] Reveal) by Dr. Janet Riley on 3/21/22. Patient presented to the emergency room on  with progressively worse shortness of breath, weight gain 14 pounds and bilateral lower extremity swelling over the last 3 weeks. She was afebrile, in AF -120-120 bpm, low normal BP /70's millimeters of mercury, normal oxygen saturation on room air. Creatinine 2.9 (up from baseline 1.6), proBNP 11,000, troponin 0.01, hemoglobin 9.4, chest x-ray consistent with patchy bilateral infiltrates. ECG consistent with  bpm, occasional PVCs versus aberrancy, LBBB (122 ms).      Patient was admitted for acute on chronic HFrEF, PAF RVR, AGUILA.       Histories     Past Medical History:   has a past medical history of Arthritis, Blood circulation, collateral, CAD (coronary artery disease), CHF (congestive heart failure) ([facility]), Confusion, GERD (gastroesophageal reflux disease), History of heart artery stent, Hyperlipidemia, Hypertension, Mitral valve prolapse, Myocardial infarct, old, and Thyroid disease. Surgical History:   has a past surgical history that includes Hysterectomy; Thyroidectomy; Colonoscopy; other surgical history (08/27/2018); pr office/outpt visit,procedure only (Right, 8/27/2018); eye surgery (Left, 09/04/2018); and pr office/outpt visit,procedure only (Left, 9/4/2018). Social History:   reports that she has never smoked. She has never used smokeless tobacco. She reports that she does not drink alcohol and does not use drugs. Family History:  No evidence for sudden cardiac death or premature CAD      Medications:       Home Medications  Were reviewed and are listed in nursing record. and/or listed below  Prior to Admission medications    Medication Sig Start Date End Date Taking? Authorizing Provider   Misc. Devices KIT Rollator. Use as directed.  6/10/22   Debra Shahid DO   furosemide (LASIX) 20 MG tablet Take 1 tablet by mouth 2 times daily 6/8/22   DAVE Boone CNP   carvedilol (COREG) 25 MG tablet Take 1 tablet by mouth 2 times daily 5/23/22   DAVE Boone CNP   nitroGLYCERIN (NITROSTAT) 0.4 MG SL tablet PUT 1 TAB UNDER TONGUE EVERY 5 MIN AS NEEDED CHEST PAIN UP TO 3. IF NO RELIEF AFTER 1 DOSE, CALL 911 4/25/22   Jamia Barnes MD   apixaban [place]) 2.5 MG TABS tablet Take 1 tablet by mouth 2 times daily 3/22/22   DAVE Cordero CNP   diclofenac sodium (VOLTAREN) 1 % GEL Apply 2 g topically 4 times daily 3/22/22   Elizabeth Vizcaino MD   famotidine (PEPCID) 20 MG tablet Take 1 tablet by mouth daily 3/22/22   Elizabeth Vizcaino MD   ondansetron [facility]) 4 MG tablet Take 1 tablet by mouth 3 times daily as needed for Nausea or Vomiting 3/7/22   Debra Shahid DO   zolpidem (AMBIEN) 5 MG tablet Take 1 tablet by mouth nightly as needed for Sleep for up to 180 days. Patient not taking: Reported on 5/6/2022 3/7/22 9/3/22  Debra Shahid DO   atorvastatin (LIPITOR) 80 MG tablet TAKE 1 TABLET BY MOUTH ONE TIME A DAY 10/13/21   Sagar Arizmendi MD   vitamin B-12 (CYANOCOBALAMIN) 1000 MCG tablet Take 1,000 mcg by mouth daily    Historical Provider, MD   fluticasone (FLONASE) 50 MCG/ACT nasal spray 2 sprays by Each Nostril route daily 7/2/19   Debra Shahid DO   Handicap Placard MISC by Does not apply route Length: 5 years 5/7/19   Debra Shahid DO   vitamin D (CHOLECALCIFEROL) 1000 UNIT TABS tablet Take 2 tablets by mouth daily 11/8/18   Sagar Arizmendi MD   HYDROcodone-acetaminophen (NORCO)  MG per tablet Take 1 tablet by mouth every 6 hours as needed. . 1/3/17   Historical Provider, MD   Coenzyme Q10 (CO Q 10) 100 MG CAPS Take  by mouth daily. Historical Provider, MD   aspirin 81 MG chewable tablet Take 1 tablet by mouth daily. 1/16/14   Tee Lopez MD          Inpatient Medications:   carvedilol  25 mg Oral BID WC    lidocaine  1 patch TransDERmal Daily    apixaban  2.5 mg Oral BID    aspirin  81 mg Oral Daily    atorvastatin  80 mg Oral Nightly    Vitamin D  2,000 Units Oral Daily    sodium chloride flush  5-40 mL IntraVENous 2 times per day       IV drips:   furosemide (LASIX) 1mg/ml infusion      sodium chloride         PRN:  sodium chloride flush, sodium chloride, ondansetron **OR** ondansetron, polyethylene glycol, HYDROcodone-acetaminophen    Allergy:     Benazepril hcl, Feldene [piroxicam], Hytrin [terazosin hcl], Iv contrast [iodides], Acetaminophen, Celebrex [celecoxib], Cephalexin, Hydrochlorothiazide, Iodinated casein, Monopril [fosinopril sodium], Protonix [pantoprazole sodium], Quinapril hcl, Toprol xl [metoprolol succinate], Ultracet [tramadol-acetaminophen], and Ziac [bisoprolol-hydrochlorothiazide]       Review of Systems:     All 12 point review of symptoms completed.  Pertinent positives identified in the HPI, all other review of symptoms negative as below. CONSTITUTIONAL: No fatigue  SKIN: No rash or pruritis. EYES: No visual changes or diplopia. No scleral icterus. ENT: No Headaches, hearing loss or vertigo. No mouth sores or sore throat. CARDIOVASCULAR: No chest pain/chest pressure/chest discomfort. No palpitations. + edema. RESPIRATORY: ++ dyspnea. No cough or wheezing, no sputum production. GASTROINTESTINAL: No N/V/D. No abdominal pain, appetite loss, blood in stools. GENITOURINARY: No dysuria, trouble voiding, or hematuria. MUSCULOSKELETAL:  No gait disturbance, weakness or joint complaints. NEUROLOGICAL: No headache, diplopia, change in muscle strength, numbness or tingling. No change in gait, balance, coordination, mood, affect, memory, mentation, behavior. ENDOCRINE: No excessive thirst, fluid intake, or urination. No tremor. HEMATOLOGIC: No abnormal bruising or bleeding. ALLERGY: No nasal congestion or hives.       Physical Examination:     Vitals:    06/24/22 0333 06/24/22 0730 06/24/22 0805 06/24/22 1113   BP: (!) 127/57 105/73 98/72 (!) 92/55   Pulse: (!) 108 (!) 104 (!) 121 98   Resp: 15 18 18 18   Temp: 97.4 °F (36.3 °C) 98.3 °F (36.8 °C) 97.4 °F (36.3 °C) 97.4 °F (36.3 °C)   TempSrc: Oral Oral Oral Oral   SpO2: 96% 95% 96% 93%   Weight:  139 lb 3.2 oz (63.1 kg)     Height:           Wt Readings from Last 3 Encounters:   06/24/22 139 lb 3.2 oz (63.1 kg)   06/10/22 137 lb (62.1 kg)   06/08/22 137 lb 12.8 oz (62.5 kg)         General Appearance:  Alert, cooperative, no distress, appears stated age Appropriate weight   Head:  Normocephalic, without obvious abnormality, atraumatic   Eyes:  PERRL, conjunctiva/corneas clear EOM intact  Ears normal   Throat no lesions       Nose: Nares normal, no drainage or sinus tenderness   Throat: Lips, mucosa, and tongue normal   Neck: Supple, symmetrical, trachea midline, no adenopathy, thyroid: not enlarged, symmetric, no tenderness/mass/nodules, no carotid bruit. Lungs:   Respirations unlabored, basilar ronchi. Chest Wall:  No tenderness or deformity   Heart:  Irregular rhythm, rate is tachy, S1, S2 normal, there is no murmur, there is no rub or gallop, cannot assess jvd, no bilateral lower extremity edema   Abdomen:   Soft, non-tender, bowel sounds active all four quadrants,  no masses, no organomegaly       Extremities: Extremities normal, atraumatic, no cyanosis. Pulses: 2+ and symmetric   Skin: Skin color, texture, turgor normal, no rashes or lesions   Pysch: Normal mood and affect   Neurologic: Normal gross motor and sensory exam.  Cranial nerves intact        Labs:     Recent Labs     06/23/22 1912 06/24/22  0452   * 131*   K 4.3 4.9   BUN 65* 66*   CREATININE 2.9* 2.8*   CL 92* 95*   CO2 27 21   GLUCOSE 138* 107*   CALCIUM 9.3 9.1   MG  --  2.70*     Recent Labs     06/23/22 1912   WBC 7.1   HGB 9.4*   HCT 28.2*      MCV 87.6     No results for input(s): CHOLTOT, TRIG, HDL, CHOLHDL, LDL in the last 72 hours. Invalid input(s): Patricio Dawkins  No results for input(s): PTT, INR in the last 72 hours. Invalid input(s): PT  Recent Labs     06/23/22 1912   TROPONINI 0.01     No results for input(s): BNP in the last 72 hours. No results for input(s): TSH in the last 72 hours. No results for input(s): CHOL, HDL, LDLCALC, TRIG in the last 72 hours.]    Lab Results   Component Value Date    TROPONINI 0.01 06/23/2022         Imaging:     Telemetry personally reviewed:  AFRVR      Assessment / Plan:     1. Acute on chronic HFrEF. Patient appears volume overloaded and hemodynamically tenuous. Her systolic heart failure is most likely mixed in etiology (ischemic, CAD and nonischemic, tachycardia induced cardiomyopathy). 2.  CAD status post stent. There is no evidence of ACS with this admission. 3.  History of recurrent ischemic strokes, due to AF. 4.  PAF RVR. EP has been consulted. 5.  HTN  6. AGUILA on CKD.   It is due to volume overload. - Agree with Lasix drip  - Will change Coreg 25 mg twice daily to Lopressor 50 mg twice daily for better rate control without compromising blood pressure (would switch to Toprol prior to discharge)  - Would consider amiodarone drip (will defer to EP in case alternative approach is chosen)  - Continue with Eliquis 2.5 twice daily, baby aspirin, Lipitor 80 mg daily  - Strict I's and O's every shift and standing weights if possible, low-salt diet, daily BMP with reflex to Mg (correct lytes for goals K >4.0 and Mg > 2.0) and wean supplemental oxygen to off (or down to baseline supplemental oxygen requirements) for sats greater than 92-94%. I have personally reviewed the reports and images of labs, radiological studies, cardiac studies including ECG's and telemetry, current and old medical records. The note was completed using EMR and Dragon dictation system. Every effort was made to ensure accuracy; however, inadvertent computerized transcription errors may be present. All questions and concerns were addressed to the patient/family. Alternatives to my treatment were discussed. I would like to thank you for providing me the opportunity to participate in the care of your patient. If you have any questions, please do not hesitate to contact me.      Clearance MD Ronak, [facility], [address]  The [address] [address]  Ph: [phone]  Fax: [phone]

## 2022-06-24 NOTE — PROCEDURES
[facility], [place], operated by [facility]     Electrophysiology Procedure Note       Date of Procedure: 6/24/2022  Patient's Name: Dayan Wise  YOB: 1935   Medical Record Number: [id number]  Referring Physician: Mathew Parra MD  Procedure Performed by: Linda Abreu MD    Procedures performed:  · Anesthesia: Monitored Anesthesia Care  · Level of sedation plan: Moderate sedation (conscious sedation) with intravenous Propofol 30 mg  · Start time: 1740  · Stop time: 1750  · Mallampati airway assessment class: I  · ASA class: 1  · An independent trained observer pushed medications at my direction. We monitored the patient's level of consciousness and vital signs/physiologic status throughout the procedure duration  · External Electrical cardioversion     Indication of the procedure: Symptomatic, persistent atrial fibrillation      Details of procedure: The patient was brought to the cath lab area in a fasting and non-sedated state. The risks, benefits and alternatives of the procedure were discussed with the patient. The patient opted to proceed with the procedure. Written informed consent was signed and placed in the chart. A timeout protocol was completed to identify the patient and the procedure being performed. Patient is on chronic anticoagulation therapy. The patient was monitored continuously with ECG, pulse oximetry, blood pressure monitoring, and direct observation. We then administered intravenous propofol for sedation, and electrical DC cardioversion was performed using 200J, synchronized shock. Patient was converted to sinus rhythm. The patient tolerated the procedure well and there were no complications. Conclusion:   Successful external DC cardioversion of symptomatic, persistent atrial fibrillation     Plan:   Patient will be transferred to the floor. Will continue with Eliquis and start her on amiodarone. Thank you for allowing me to participate in the care of this patient.  If you have any questions, please feel free to contact me.     Nabil Carrillo MD   Cardiac Electrophysiology  [facility]  Office [phone]  [employer]

## 2022-06-25 LAB
ALBUMIN SERPL-MCNC: 3.1 G/DL (ref 3.4–5)
ANION GAP SERPL CALCULATED.3IONS-SCNC: 13 MMOL/L (ref 3–16)
BUN BLDV-MCNC: 66 MG/DL (ref 7–20)
CALCIUM SERPL-MCNC: 8.8 MG/DL (ref 8.3–10.6)
CHLORIDE BLD-SCNC: 97 MMOL/L (ref 99–110)
CO2: 25 MMOL/L (ref 21–32)
CREAT SERPL-MCNC: 2.9 MG/DL (ref 0.6–1.2)
GFR AFRICAN AMERICAN: 19
GFR NON-AFRICAN AMERICAN: 15
GLUCOSE BLD-MCNC: 120 MG/DL (ref 70–99)
MAGNESIUM: 2.6 MG/DL (ref 1.8–2.4)
PHOSPHORUS: 4.7 MG/DL (ref 2.5–4.9)
POTASSIUM SERPL-SCNC: 4.4 MMOL/L (ref 3.5–5.1)
SODIUM BLD-SCNC: 135 MMOL/L (ref 136–145)

## 2022-06-25 PROCEDURE — 2580000003 HC RX 258: Performed by: INTERNAL MEDICINE

## 2022-06-25 PROCEDURE — 2060000000 HC ICU INTERMEDIATE R&B

## 2022-06-25 PROCEDURE — 99233 SBSQ HOSP IP/OBS HIGH 50: CPT | Performed by: INTERNAL MEDICINE

## 2022-06-25 PROCEDURE — 6370000000 HC RX 637 (ALT 250 FOR IP): Performed by: INTERNAL MEDICINE

## 2022-06-25 PROCEDURE — 36415 COLL VENOUS BLD VENIPUNCTURE: CPT

## 2022-06-25 PROCEDURE — 80069 RENAL FUNCTION PANEL: CPT

## 2022-06-25 PROCEDURE — 83735 ASSAY OF MAGNESIUM: CPT

## 2022-06-25 PROCEDURE — 2500000003 HC RX 250 WO HCPCS: Performed by: INTERNAL MEDICINE

## 2022-06-25 PROCEDURE — 6360000002 HC RX W HCPCS: Performed by: INTERNAL MEDICINE

## 2022-06-25 RX ADMIN — APIXABAN 2.5 MG: 2.5 TABLET, FILM COATED ORAL at 11:04

## 2022-06-25 RX ADMIN — MICONAZOLE NITRATE: 20 POWDER TOPICAL at 22:30

## 2022-06-25 RX ADMIN — ASPIRIN 81 MG: 81 TABLET, CHEWABLE ORAL at 09:50

## 2022-06-25 RX ADMIN — FUROSEMIDE 10 MG/HR: 10 INJECTION, SOLUTION INTRAMUSCULAR; INTRAVENOUS at 23:24

## 2022-06-25 RX ADMIN — MICONAZOLE NITRATE: 20 POWDER TOPICAL at 00:01

## 2022-06-25 RX ADMIN — HYDROCODONE BITARTRATE AND ACETAMINOPHEN 2 TABLET: 5; 325 TABLET ORAL at 02:48

## 2022-06-25 RX ADMIN — SODIUM CHLORIDE, PRESERVATIVE FREE 10 ML: 5 INJECTION INTRAVENOUS at 09:51

## 2022-06-25 RX ADMIN — SODIUM CHLORIDE, PRESERVATIVE FREE 10 ML: 5 INJECTION INTRAVENOUS at 22:30

## 2022-06-25 RX ADMIN — HYDROCODONE BITARTRATE AND ACETAMINOPHEN 2 TABLET: 5; 325 TABLET ORAL at 23:23

## 2022-06-25 RX ADMIN — AMIODARONE HYDROCHLORIDE 200 MG: 200 TABLET ORAL at 09:50

## 2022-06-25 RX ADMIN — Medication 2000 UNITS: at 09:50

## 2022-06-25 RX ADMIN — APIXABAN 2.5 MG: 2.5 TABLET, FILM COATED ORAL at 22:30

## 2022-06-25 RX ADMIN — HYDROCODONE BITARTRATE AND ACETAMINOPHEN 2 TABLET: 5; 325 TABLET ORAL at 16:59

## 2022-06-25 RX ADMIN — MICONAZOLE NITRATE: 20 POWDER TOPICAL at 11:02

## 2022-06-25 RX ADMIN — METOPROLOL TARTRATE 25 MG: 25 TABLET, FILM COATED ORAL at 22:30

## 2022-06-25 RX ADMIN — SODIUM CHLORIDE, PRESERVATIVE FREE 5 ML: 5 INJECTION INTRAVENOUS at 03:06

## 2022-06-25 RX ADMIN — METOPROLOL TARTRATE 25 MG: 25 TABLET, FILM COATED ORAL at 09:50

## 2022-06-25 RX ADMIN — ATORVASTATIN CALCIUM 80 MG: 80 TABLET, FILM COATED ORAL at 22:30

## 2022-06-25 RX ADMIN — ONDANSETRON 4 MG: 4 TABLET, ORALLY DISINTEGRATING ORAL at 07:04

## 2022-06-25 ASSESSMENT — PAIN DESCRIPTION - PAIN TYPE
TYPE: CHRONIC PAIN
TYPE: CHRONIC PAIN

## 2022-06-25 ASSESSMENT — PAIN SCALES - GENERAL
PAINLEVEL_OUTOF10: 10
PAINLEVEL_OUTOF10: 10
PAINLEVEL_OUTOF10: 6
PAINLEVEL_OUTOF10: 0
PAINLEVEL_OUTOF10: 8

## 2022-06-25 ASSESSMENT — PAIN DESCRIPTION - LOCATION
LOCATION: BACK

## 2022-06-25 ASSESSMENT — PAIN DESCRIPTION - ORIENTATION
ORIENTATION: MID;ANTERIOR
ORIENTATION: MID
ORIENTATION: MID

## 2022-06-25 ASSESSMENT — PAIN - FUNCTIONAL ASSESSMENT
PAIN_FUNCTIONAL_ASSESSMENT: PREVENTS OR INTERFERES SOME ACTIVE ACTIVITIES AND ADLS
PAIN_FUNCTIONAL_ASSESSMENT: PREVENTS OR INTERFERES SOME ACTIVE ACTIVITIES AND ADLS

## 2022-06-25 ASSESSMENT — PAIN DESCRIPTION - DESCRIPTORS
DESCRIPTORS: ACHING;SHARP
DESCRIPTORS: SHARP
DESCRIPTORS: SHARP

## 2022-06-25 ASSESSMENT — PAIN SCALES - WONG BAKER: WONGBAKER_NUMERICALRESPONSE: 0

## 2022-06-25 NOTE — PROGRESS NOTES
Hospitalist Progress Note      PCP: [address] [place] [address]     Chief Complaint. Presented to hospital for SOB    Date of Admission: 6/23/2022      Subjective:  VS stable. Stress test was canceled for today because patient states that she eats overnight. However no staff have bring her food. Spoke with daughter, patient lives with dementia  and son at home. She has become more forgetful and daughter concerned about dementia on her. S/p Successful cardioversion of symptomatic AFib on 6/24/22. Otherwise doing well denies any chest pain palpitation or shortness of breath. Medications:  Reviewed    Infusion Medications    furosemide (LASIX) 1mg/ml infusion 10 mg/hr (06/25/22 1207)    sodium chloride       Scheduled Medications    metoprolol tartrate  25 mg Oral BID    lidocaine  1 patch TransDERmal Daily    amiodarone  200 mg Oral Daily    miconazole   Topical BID    apixaban  2.5 mg Oral BID    aspirin  81 mg Oral Daily    atorvastatin  80 mg Oral Nightly    Vitamin D  2,000 Units Oral Daily    sodium chloride flush  5-40 mL IntraVENous 2 times per day     PRN Meds: sodium chloride flush, sodium chloride, ondansetron **OR** ondansetron, polyethylene glycol, HYDROcodone-acetaminophen      Intake/Output Summary (Last 24 hours) at 6/25/2022 1541  Last data filed at 6/25/2022 0558  Gross per 24 hour   Intake 0 ml   Output 250 ml   Net -250 ml       Physical Exam Performed:    /69   Pulse 65   Temp 97.3 °F (36.3 °C) (Oral)   Resp 18   Ht 5' (1.524 m)   Wt 140 lb 3.2 oz (63.6 kg)   SpO2 97%   BMI 27.38 kg/m²     General appearance: No apparent distress,   HEENT:  Conjunctivae/corneas clear. Neck: Supple, with full range of motion. Respiratory:  Normal respiratory effort. Clear to auscultation, bilaterally without Rales/Wheezes/Rhonchi.   Cardiovascular: Regular rate and rhythm with normal S1/S2 without murmurs or rubs  Abdomen: Soft, non-tender, non-distended, normal bowel sounds. Musculoskeletal: No cyanosis or edema bilaterally  Neurologic:  without any focal sensory/motor deficits. grossly non-focal.  Psychiatric: Alert and oriented, Normal mood  Peripheral Pulses: +2 palpable, equal bilaterally       Labs:   Recent Labs     06/23/22 1912   WBC 7.1   HGB 9.4*   HCT 28.2*        Recent Labs     06/23/22 1912 06/24/22  0452 06/25/22  0423   * 131* 135*   K 4.3 4.9 4.4   CL 92* 95* 97*   CO2 27 21 25   BUN 65* 66* 66*   CREATININE 2.9* 2.8* 2.9*   CALCIUM 9.3 9.1 8.8   PHOS  --   --  4.7     Recent Labs     06/23/22 1912   AST 37   ALT 36   BILITOT 0.5   ALKPHOS 65     No results for input(s): INR in the last 72 hours. Recent Labs     06/23/22 1912   TROPONINI 0.01       Urinalysis:      Lab Results   Component Value Date    NITRU Negative 06/23/2022    WBCUA 6-9 06/23/2022    BACTERIA 1+ 06/23/2022    RBCUA 0-2 06/23/2022    BLOODU Negative 06/23/2022    SPECGRAV 1.015 06/23/2022    GLUCOSEU Negative 06/23/2022       Radiology:  US RENAL COMPLETE   Final Result      1. Bilateral cortical renal atrophic changes concordant with chronic medical renal disease and no evidence of hydronephrosis   2. Nonobstructive left renal nephrolithiasis   3. Benign left renal cyst               XR CHEST PORTABLE   Final Result      Patchy bilateral airspace disease. Correlate for pneumonia are less typical for pulmonary edema.        NM MYOCARDIAL SPECT REST EXERCISE OR RX    (Results Pending)         Assessment/Plan:    Active Hospital Problems    Diagnosis     CHF (congestive heart failure), NYHA class I, acute on chronic, combined ([facility] [I50.43]      Priority: Medium     #Acute on chronic exacerbation of combined CHF, likely exacerbated by valvular pathology/tachycardia  #Moderate pulmonary hypertension  #AGUILA on CKD, rule out cardiorenal syndrome given EF of 25%  #Chronic anemia-stable at baseline  #Persistent atrial fibrillation with RVR s/p cardioversion on 6/24/22  #Physical deconditioning over the past couple of months with worsening fatigue and somnolence  #Chronic pain  # Forgetfulness,? Dementia     PLAN:  - Continue  IV lasix gtt, monitor BMP, cardiology/nephrology following  -Strict intake and output, daily weights  -Monitor electrolytes and renal function  -CHF team and cardiology following, appreciate input  -Monitor H&H closely given worsening CKD. No evidence of active bleeding  -Started amiodarone   -Continue on beta-blocker and Eliquis  -Continue her home medications appropriately, including for chronic pain with caution    DVT Prophylaxis: Eliquis  Diet: ADULT DIET; Regular; 4 carb choices (60 gm/meal);  Low Fat/Low Chol/High Fiber/2 gm Na  Code Status: Full Code    PT/OT Eval Status: ordered    Dispo/Plan of care - continue Laisx gtt per nephrology    Isabell Erwin MD

## 2022-06-25 NOTE — PROGRESS NOTES
Plan:     Cont lasix gtt  Follow daily weights UOP and Creatinine trend   BP on lowish   Serologies and FLC were sent   Follow with trend closely   High risk for needing RRT if volume control becomes an issues. His metoprolol to 25 to get some more blood pressure so the volume can be removed without worsening kidney function      Assessment:      Cardiorenal syndrome, volume overloaded ( bnp 11,182)  -given EF on last echo   - Cr. fluctuation along w/ elevated JVD  -    AGUILA on stage IV CKD   - Cr. Baseline 1.5, currently 2.8  - BUN 65  -renal panel  - avoid nephrotoxic agents     Rule out secondary causes ( complains of foamy urine). -UA  -Renal US   -check free light chains   - rule out vasculitis           [facility] would like to thank Ashli Vizcarra MD   for opportunity to serve this patient      Please call with questions at-   24 Hrs Answering service [phone] or  7 am- 5 pm via Perfect serve or cell phone  Dr. Anushka Valle for consult :   Worsening kidney function     HPI :     Patient presented to the hospital with c/o BLE edema, abnormal labs, 14 lb wt gain. BNP >11,000, Cr 2.9, noted to be in AF/RVR overnight with episode of NSVT x 24 beats. HR up to 140's. She is complaining of feeling bloated, BLE edema and fatigue. No complaints of palpitations, CP. States she has been compliant with her medications at home.  Currently eating breakfast. Daughter present at bedside      ROS:     positives in bold   Constitutional:  fever, chills, weakness, weight change, fatigue  Skin:  rash, pruritus, hair loss, bruising, dry skin, petechiae  Head, Face, Neck   headaches, swelling,  cervical adenopathy  Respiratory: shortness of breath, cough, or wheezing  Cardiovascular: chest pain, palpitations, dizzy, edema  Gastrointestinal: nausea, vomiting, diarrhea, constipation,belly pain    Yellow skin, blood in stool  Musculoskeletal:  back pain, muscle weakness, gait problems,       joint pain or swelling. Genitourinary:  dysuria, poor urine flow, flank pain, blood in urine  Neurologic:  vertigo, TIA'S, syncope, seizures, focal weakness  Psychosocial:  insomnia, anxiety, or depression. All other remaining systems are negative or unable to obtain    PMH/PSH/SH/Family History:     Past Medical History:   Diagnosis Date    Arthritis     Blood circulation, collateral     CAD (coronary artery disease)     [facility]     Confusion 8/31/2020    GERD (gastroesophageal reflux disease)     History of heart artery stent 12/2018    Hyperlipidemia     Hypertension     Mitral valve prolapse     Myocardial infarct, old     Thyroid disease        Past Surgical History:   Procedure Laterality Date    COLONOSCOPY      EYE SURGERY Left 09/04/2018    PHACO EMULSIFICATION OF CATARACT WITH INTRAOCULAR LENS IMPLANT LEFT EYE     HYSTERECTOMY (CERVIX STATUS UNKNOWN)      OTHER SURGICAL HISTORY  08/27/2018    phacoemulsification of cataract with intraocular lens implant right eye    [facility] OFFICE/OUTPT VISIT,PROCEDURE ONLY Right 8/27/2018    PHACO EMULSIFICATION OF CATARACT WITH INTRAOCULAR LENS IMPLANT RIGHT EYE performed by Oneyda Sotckton MD at [facility] OFFICE/OUTPT [facility] Left 9/4/2018    PHACO EMULSIFICATION OF CATARACT WITH INTRAOCULAR LENS IMPLANT LEFT EYE performed by Oneyda Stockton MD at [address] [place] [address] Dr          reports that she has never smoked. She has never used smokeless tobacco. She reports that she does not drink alcohol and does not use drugs. family history includes Cancer in her father and sister; Diabetes in her brother; Heart Disease in her father and mother; Stroke in her brother.      Medication:     Current Facility-Administered Medications: metoprolol tartrate (LOPRESSOR) tablet 25 mg, 25 mg, Oral, BID  lidocaine 4 % external patch 1 patch, 1 patch, TransDERmal, Daily  furosemide (LASIX) 100 mg in dextrose 5 % 100 mL infusion, 5 mg/hr, IntraVENous, Continuous  amiodarone (CORDARONE) tablet 200 mg, 200 mg, Oral, Daily  miconazole (MICOTIN) 2 % powder, , Topical, BID  apixaban (ELIQUIS) tablet 2.5 mg, 2.5 mg, Oral, BID  aspirin chewable tablet 81 mg, 81 mg, Oral, Daily  atorvastatin (LIPITOR) tablet 80 mg, 80 mg, Oral, Nightly  vitamin D (CHOLECALCIFEROL) tablet 2,000 Units, 2,000 Units, Oral, Daily  sodium chloride flush 0.9 % injection 5-40 mL, 5-40 mL, IntraVENous, 2 times per day  sodium chloride flush 0.9 % injection 5-40 mL, 5-40 mL, IntraVENous, PRN  0.9 % sodium chloride infusion, , IntraVENous, PRN  ondansetron (ZOFRAN-ODT) disintegrating tablet 4 mg, 4 mg, Oral, Q8H PRN **OR** ondansetron (ZOFRAN) injection 4 mg, 4 mg, IntraVENous, Q6H PRN  polyethylene glycol (GLYCOLAX) packet 17 g, 17 g, Oral, Daily PRN  HYDROcodone-acetaminophen (NORCO) 5-325 MG per tablet 2 tablet, 2 tablet, Oral, Q6H PRN       Vitals :     /75   Pulse 65   Temp 97.3 °F (36.3 °C) (Oral)   Resp 18   Ht 5' (1.524 m)   Wt 139 lb 3.2 oz (63.1 kg)   SpO2 95%   BMI 27.19 kg/m²       I & O :       Intake/Output Summary (Last 24 hours) at 6/25/2022 0850  Last data filed at 6/24/2022 1709  Gross per 24 hour   Intake 240 ml   Output --   Net 240 ml        Physical Examination :     General appearance: Anxious- no, distressed- no, in good spirits- yes  HEENT: Lips- normal, teeth- ok , oral mucosa- moist  Neck : Mass- no, appears symmetrical, JVD- +  Respiratory: Respiratory effort-  , wheeze- no, crackles -   Cardiovascular:  Ausculation- +  M/R/G, bl ext Edema 3 + Pitting   Abdomen: visible mass- no, distention- no, scar- no, tenderness- no                            hepatosplenomegaly-  no  Musculoskeletal:  clubbing no,cyanosis- no , digital ischemia- no                           muscle strength- grossly normal , tone - grossly normal  Skin: rashes- no , ulcers- no, induration- no, tightening - no  Psychiatric:  Judgement and insight- normal           AAO X 3 LABS:     Recent Labs     06/23/22 1912   WBC 7.1   HGB 9.4*   HCT 28.2*        Recent Labs     06/23/22 1912 06/24/22 0452 06/25/22 0423   * 131* 135*   K 4.3 4.9 4.4   CL 92* 95* 97*   CO2 27 21 25   BUN 65* 66* 66*   CREATININE 2.9* 2.8* 2.9*   GLUCOSE 138* 107* 120*   MG  --  2.70* 2.60*   PHOS  --   --  4.7          Thanks  Nephrology  [address], [address]  Office: [phone]  Fax: [phone]

## 2022-06-25 NOTE — PROGRESS NOTES
Talked with Kinga from [facility] who was advised that pt. Told nurses that she had coffee and cheerios earlier. Talked with Dr. Dwaine Schwartz (cardiology) concerning this and he advised to Cancel the stress test at this time.

## 2022-06-25 NOTE — PROGRESS NOTES
Cardiology Consult Service  Daily Progress Note        Admit Date:  6/23/2022  Primary cardiologist: Dr Raymond Meyers    Reason for Consultation/Chief Complaint: AHF, AFRVR    Subjective:     Melissa Dykes is a 80 y.o. female with a past medical history of CAD status post stent 2018, HTN, HLP, CKD stage IV, HFrEF with EF 25%, mitral valve prolapse with moderate MR, VT, LBBB, PAFRVR, recurrent ischemic CVA's.     LHC 11/6/2018: Proximal LAD 80% stenosed status post stent x1, otherwise normal coronaries, LVEF 40%.    Echo 2014: LVEF 50%.    Echo 10/2018: LVEF 35-40%.    Echo 03/19/2022: Normal LV, LVEF 11-01%, diastolic grade 3, severe LAE, moderate MR, severe MAC, normal RV, RVSP 51 (8), negative bubble study.  Per personal review of images, there is no apical clot with IV contrast, posterior mitral valve leaflet with a large echodensity measuring 2.4 x 2.4 cm, cannot exclude severe MAC vs clot vs mass (less likely vegetation in view of no fevers and normal WBC)     Status post ILR ([employer] Reveal) by Dr. Tyson Arrieta 3/21/22.      Patient presented to the emergency room on 6/23 with progressively worse shortness of breath, weight gain 14 pounds and bilateral lower extremity swelling over the last 3 weeks. She was afebrile, in AF -120-120 bpm, low normal BP /70's millimeters of mercury, normal oxygen saturation on room air. Creatinine 2.9 (up from baseline 1.6), proBNP 11,000, troponin 0.01, hemoglobin 9.4, chest x-ray consistent with patchy bilateral infiltrates.     ECG consistent with  bpm, occasional PVCs versus aberrancy, LBBB (122 ms).      Patient was admitted for acute on chronic HFrEF, PAF RVR, AGUILA.      Status post successful DC cardioversion 6/24/2022. Interval history:  Overnight patient had some low blood pressure, BP has now recovered. Telemetry was personally reviewed, reveals NSR 60s with a brief 5 beat NSVT.   I's and O's are inaccurate, weight stable unchanged, creatinine unchanged 2.9. Patient is currently off supplemental oxygen. Objective:     Medications:   metoprolol tartrate  25 mg Oral BID    lidocaine  1 patch TransDERmal Daily    amiodarone  200 mg Oral Daily    miconazole   Topical BID    apixaban  2.5 mg Oral BID    aspirin  81 mg Oral Daily    atorvastatin  80 mg Oral Nightly    Vitamin D  2,000 Units Oral Daily    sodium chloride flush  5-40 mL IntraVENous 2 times per day       IV drips:   furosemide (LASIX) 1mg/ml infusion 10 mg/hr (06/25/22 1207)    sodium chloride         PRN:  sodium chloride flush, sodium chloride, ondansetron **OR** ondansetron, polyethylene glycol, HYDROcodone-acetaminophen    Vitals:    06/25/22 0558 06/25/22 0810 06/25/22 0947 06/25/22 1107   BP: 97/62 112/75 105/66 100/69   Pulse:  65 65 65   Resp:  18 18 18   Temp:  97.3 °F (36.3 °C)  97.3 °F (36.3 °C)   TempSrc:  Oral  Oral   SpO2:  95% 96% 97%   Weight:       Height:           Intake/Output Summary (Last 24 hours) at 6/25/2022 1626  Last data filed at 6/25/2022 1551  Gross per 24 hour   Intake 0 ml   Output 600 ml   Net -600 ml     I/O last 3 completed shifts: In: 240 [P.O.:240]  Out: 250 [Urine:250]  Wt Readings from Last 3 Encounters:   06/25/22 140 lb 3.2 oz (63.6 kg)   06/10/22 137 lb (62.1 kg)   06/08/22 137 lb 12.8 oz (62.5 kg)       Admit Wt: Weight: 138 lb (62.6 kg)   Todays Wt: Weight: 140 lb 3.2 oz (63.6 kg)        Physical Exam:         General Appearance:  Alert, cooperative, no distress, appears stated age Appropriate weight   Head:  Normocephalic, without obvious abnormality, atraumatic   Eyes:  PERRL, conjunctiva/corneas clear EOM intact  Ears normal   Throat no lesions       Nose: Nares normal, no drainage or sinus tenderness   Throat: Lips, mucosa, and tongue normal   Neck: Supple, symmetrical, trachea midline, no adenopathy, thyroid: not enlarged, symmetric, no tenderness/mass/nodules, no carotid bruit.         Lungs:   Normal respiratory rate, lungs with basilar ronchi bilaterally. Chest Wall:  No tenderness or deformity   Heart:  Regular rhythm, rate is controlled, S1, S2 normal, there is no murmur, there is no rub or gallop, cannot assess jvd, no bilateral lower extremity edema   Abdomen:   Soft, non-tender, bowel sounds active all four quadrants,  no masses, no organomegaly       Extremities: Extremities normal, atraumatic, no cyanosis. Pulses: 2+ and symmetric   Skin: Skin color, texture, turgor normal, no rashes or lesions   Pysch: Normal mood and affect   Neurologic: Normal gross motor and sensory exam.  Cranial nerves intact       Labs:   Recent Labs     06/23/22 1912 06/24/22  0452 06/25/22  0423   * 131* 135*   K 4.3 4.9 4.4   BUN 65* 66* 66*   CREATININE 2.9* 2.8* 2.9*   CL 92* 95* 97*   CO2 27 21 25   GLUCOSE 138* 107* 120*   CALCIUM 9.3 9.1 8.8   MG  --  2.70* 2.60*     Recent Labs     06/23/22 1912   WBC 7.1   HGB 9.4*   HCT 28.2*      MCV 87.6     No results for input(s): CHOLTOT, TRIG, HDL, CHOLHDL, LDL in the last 72 hours. Invalid input(s): Roland Sandhu  No results for input(s): PTT, INR in the last 72 hours. Invalid input(s): PT  Recent Labs     06/23/22 1912   TROPONINI 0.01     No results for input(s): BNP in the last 72 hours. No results for input(s): NTPROBNP in the last 72 hours. No results for input(s): TSH in the last 72 hours. Imaging:       Assessment & Plan:     1. Acute on chronic HFrEF. Patient appears volume overloaded and hemodynamically tenuous. Her systolic heart failure is most likely mixed in etiology (ischemic, CAD and nonischemic, tachycardia induced cardiomyopathy). 2.  CAD status post stent. There is no evidence of ACS with this admission. 3.  History of recurrent ischemic strokes, due to AF. 4.  PAF RVR. EP has been consulted, status post DC cardioversion, now in sinus  5. HTN  6. AGUILA on CKD.   It is due to volume overload.           -  Will increase Lasix drip to 10 mg/h  - Change Lopressor 25 twice daily to Toprol 50 mg daily tomorrow a.m.  - Continue with Eliquis 2.5 twice daily, baby aspirin, Lipitor 80 mg daily  - Strict I's and O's every shift and standing weights if possible, low-salt diet, daily BMP with reflex to Mg (correct lytes for goals K >4.0 and Mg > 2.0) and wean supplemental oxygen to off (or down to baseline supplemental oxygen requirements) for sats greater than 92-94%. -Continue with amiodarone 200 mg daily, Eliquis, aspirin, Lipitor. I have spent 35 minutes of face to face time with the patient with more than 50% spent counseling and coordinating care. I have personally reviewed the reports and images of labs, radiological studies, cardiac studies including ECG's and telemetry, current and old medical records. The note was completed using EMR and Dragon dictation system. Every effort was made to ensure accuracy; however, inadvertent computerized transcription errors may be present. All questions and concerns were addressed to the patient/family. Alternatives to my treatment were discussed. Thank you for allowing to us to participate in the raymon or Elan Serrano. Please call our service with questions.     Francisco Rodriguez MD, [facility] [place], [address]  The [address]  [address] [address] [place] [address]  Ph: [phone]  Fax: [phone]

## 2022-06-26 LAB
ALBUMIN SERPL-MCNC: 3.3 G/DL (ref 3.4–5)
ANION GAP SERPL CALCULATED.3IONS-SCNC: 14 MMOL/L (ref 3–16)
BUN BLDV-MCNC: 67 MG/DL (ref 7–20)
CALCIUM SERPL-MCNC: 9 MG/DL (ref 8.3–10.6)
CHLORIDE BLD-SCNC: 96 MMOL/L (ref 99–110)
CHOLESTEROL, TOTAL: 104 MG/DL (ref 0–199)
CO2: 23 MMOL/L (ref 21–32)
CREAT SERPL-MCNC: 2.9 MG/DL (ref 0.6–1.2)
GFR AFRICAN AMERICAN: 19
GFR NON-AFRICAN AMERICAN: 15
GLUCOSE BLD-MCNC: 96 MG/DL (ref 70–99)
HDLC SERPL-MCNC: 45 MG/DL (ref 40–60)
LDL CHOLESTEROL CALCULATED: 43 MG/DL
MAGNESIUM: 2.6 MG/DL (ref 1.8–2.4)
PHOSPHORUS: 4.7 MG/DL (ref 2.5–4.9)
POTASSIUM SERPL-SCNC: 4.3 MMOL/L (ref 3.5–5.1)
PRO-BNP: 9566 PG/ML (ref 0–449)
SODIUM BLD-SCNC: 133 MMOL/L (ref 136–145)
TRIGL SERPL-MCNC: 80 MG/DL (ref 0–150)
VLDLC SERPL CALC-MCNC: 16 MG/DL

## 2022-06-26 PROCEDURE — 2060000000 HC ICU INTERMEDIATE R&B

## 2022-06-26 PROCEDURE — 99233 SBSQ HOSP IP/OBS HIGH 50: CPT | Performed by: INTERNAL MEDICINE

## 2022-06-26 PROCEDURE — 6370000000 HC RX 637 (ALT 250 FOR IP): Performed by: INTERNAL MEDICINE

## 2022-06-26 PROCEDURE — 83880 ASSAY OF NATRIURETIC PEPTIDE: CPT

## 2022-06-26 PROCEDURE — 80069 RENAL FUNCTION PANEL: CPT

## 2022-06-26 PROCEDURE — 36415 COLL VENOUS BLD VENIPUNCTURE: CPT

## 2022-06-26 PROCEDURE — 2580000003 HC RX 258: Performed by: INTERNAL MEDICINE

## 2022-06-26 PROCEDURE — 83735 ASSAY OF MAGNESIUM: CPT

## 2022-06-26 RX ORDER — METOLAZONE 2.5 MG/1
2.5 TABLET ORAL ONCE
Status: COMPLETED | OUTPATIENT
Start: 2022-06-26 | End: 2022-06-26

## 2022-06-26 RX ADMIN — Medication 2000 UNITS: at 08:13

## 2022-06-26 RX ADMIN — ATORVASTATIN CALCIUM 80 MG: 80 TABLET, FILM COATED ORAL at 20:22

## 2022-06-26 RX ADMIN — HYDROCODONE BITARTRATE AND ACETAMINOPHEN 2 TABLET: 5; 325 TABLET ORAL at 20:22

## 2022-06-26 RX ADMIN — ASPIRIN 81 MG: 81 TABLET, CHEWABLE ORAL at 08:13

## 2022-06-26 RX ADMIN — METOPROLOL TARTRATE 25 MG: 25 TABLET, FILM COATED ORAL at 20:22

## 2022-06-26 RX ADMIN — APIXABAN 2.5 MG: 2.5 TABLET, FILM COATED ORAL at 20:22

## 2022-06-26 RX ADMIN — MICONAZOLE NITRATE: 20 POWDER TOPICAL at 20:22

## 2022-06-26 RX ADMIN — METOLAZONE 2.5 MG: 2.5 TABLET ORAL at 17:23

## 2022-06-26 RX ADMIN — AMIODARONE HYDROCHLORIDE 200 MG: 200 TABLET ORAL at 08:13

## 2022-06-26 RX ADMIN — HYDROCODONE BITARTRATE AND ACETAMINOPHEN 2 TABLET: 5; 325 TABLET ORAL at 05:54

## 2022-06-26 RX ADMIN — SODIUM CHLORIDE, PRESERVATIVE FREE 10 ML: 5 INJECTION INTRAVENOUS at 20:22

## 2022-06-26 RX ADMIN — HYDROCODONE BITARTRATE AND ACETAMINOPHEN 2 TABLET: 5; 325 TABLET ORAL at 13:31

## 2022-06-26 RX ADMIN — METOPROLOL TARTRATE 25 MG: 25 TABLET, FILM COATED ORAL at 11:19

## 2022-06-26 RX ADMIN — APIXABAN 2.5 MG: 2.5 TABLET, FILM COATED ORAL at 08:13

## 2022-06-26 RX ADMIN — MICONAZOLE NITRATE: 20 POWDER TOPICAL at 08:15

## 2022-06-26 RX ADMIN — SODIUM CHLORIDE, PRESERVATIVE FREE 10 ML: 5 INJECTION INTRAVENOUS at 08:13

## 2022-06-26 ASSESSMENT — PAIN SCALES - GENERAL
PAINLEVEL_OUTOF10: 7
PAINLEVEL_OUTOF10: 7
PAINLEVEL_OUTOF10: 0
PAINLEVEL_OUTOF10: 0
PAINLEVEL_OUTOF10: 8
PAINLEVEL_OUTOF10: 0
PAINLEVEL_OUTOF10: 7
PAINLEVEL_OUTOF10: 7
PAINLEVEL_OUTOF10: 8
PAINLEVEL_OUTOF10: 0

## 2022-06-26 ASSESSMENT — PAIN - FUNCTIONAL ASSESSMENT
PAIN_FUNCTIONAL_ASSESSMENT: PREVENTS OR INTERFERES SOME ACTIVE ACTIVITIES AND ADLS

## 2022-06-26 ASSESSMENT — PAIN SCALES - WONG BAKER
WONGBAKER_NUMERICALRESPONSE: 0

## 2022-06-26 ASSESSMENT — PAIN DESCRIPTION - ONSET: ONSET: ON-GOING

## 2022-06-26 ASSESSMENT — PAIN DESCRIPTION - LOCATION
LOCATION: BACK

## 2022-06-26 ASSESSMENT — PAIN DESCRIPTION - DESCRIPTORS
DESCRIPTORS: ACHING
DESCRIPTORS: ACHING;DISCOMFORT
DESCRIPTORS: ACHING;SHARP
DESCRIPTORS: ACHING;DISCOMFORT

## 2022-06-26 ASSESSMENT — PAIN DESCRIPTION - ORIENTATION
ORIENTATION: MID

## 2022-06-26 ASSESSMENT — PAIN DESCRIPTION - PAIN TYPE
TYPE: CHRONIC PAIN

## 2022-06-26 ASSESSMENT — PAIN DESCRIPTION - FREQUENCY: FREQUENCY: CONTINUOUS

## 2022-06-26 NOTE — PLAN OF CARE
Problem: Discharge Planning  Goal: Discharge to home or other facility with appropriate resources  Outcome: Progressing  Flowsheets (Taken 6/25/2022 2010)  Discharge to home or other facility with appropriate resources:   Identify barriers to discharge with patient and caregiver   Arrange for needed discharge resources and transportation as appropriate   Identify discharge learning needs (meds, wound care, etc)     Problem: Pain  Goal: Verbalizes/displays adequate comfort level or baseline comfort level  Outcome: Progressing  Flowsheets (Taken 6/25/2022 2010)  Verbalizes/displays adequate comfort level or baseline comfort level:   Encourage patient to monitor pain and request assistance   Assess pain using appropriate pain scale   Administer analgesics based on type and severity of pain and evaluate response   Implement non-pharmacological measures as appropriate and evaluate response     Problem: Safety - Adult  Goal: Free from fall injury  Outcome: Progressing  Flowsheets (Taken 6/25/2022 2320)  Free From Fall Injury:   Instruct family/caregiver on patient safety   Based on caregiver fall risk screen, instruct family/caregiver to ask for assistance with transferring infant if caregiver noted to have fall risk factors     Problem: ABCDS Injury Assessment  Goal: Absence of physical injury  Outcome: Progressing  Flowsheets (Taken 6/25/2022 2320)  Absence of Physical Injury: Implement safety measures based on patient assessment     Problem: Chronic Conditions and Co-morbidities  Goal: Patient's chronic conditions and co-morbidity symptoms are monitored and maintained or improved  Outcome: Progressing  Flowsheets (Taken 6/25/2022 2010)  Care Plan - Patient's Chronic Conditions and Co-Morbidity Symptoms are Monitored and Maintained or Improved:   Monitor and assess patient's chronic conditions and comorbid symptoms for stability, deterioration, or improvement   Collaborate with multidisciplinary team to address chronic and comorbid conditions and prevent exacerbation or deterioration   Update acute care plan with appropriate goals if chronic or comorbid symptoms are exacerbated and prevent overall improvement and discharge     Problem: Respiratory - Adult  Goal: Achieves optimal ventilation and oxygenation  Outcome: Progressing  Flowsheets (Taken 6/25/2022 2010)  Achieves optimal ventilation and oxygenation:   Assess for changes in respiratory status   Assess for changes in mentation and behavior   Encourage broncho-pulmonary hygiene including cough, deep breathe, incentive spirometry   Assess the need for suctioning and aspirate as needed   Assess and instruct to report shortness of breath or any respiratory difficulty   Respiratory therapy support as indicated     Problem: Cardiovascular - Adult  Goal: Maintains optimal cardiac output and hemodynamic stability  Outcome: Progressing  Flowsheets (Taken 6/25/2022 2010)  Maintains optimal cardiac output and hemodynamic stability:   Monitor blood pressure and heart rate   Assess for signs of decreased cardiac output  Goal: Absence of cardiac dysrhythmias or at baseline  Outcome: Progressing  Flowsheets (Taken 6/25/2022 2010)  Absence of cardiac dysrhythmias or at baseline:   Monitor cardiac rate and rhythm   Assess for signs of decreased cardiac output     Problem: Metabolic/Fluid and Electrolytes - Adult  Goal: Electrolytes maintained within normal limits  Outcome: Progressing  Flowsheets (Taken 6/25/2022 2010)  Electrolytes maintained within normal limits:   Monitor labs and assess patient for signs and symptoms of electrolyte imbalances   Monitor response to electrolyte replacements, including repeat lab results as appropriate   Fluid restriction as ordered   Instruct patient on fluid and nutrition restrictions as appropriate  Goal: Hemodynamic stability and optimal renal function maintained  Outcome: Progressing  Flowsheets (Taken 6/25/2022 2010)  Hemodynamic stability and optimal renal function maintained:   Monitor labs and assess for signs and symptoms of volume excess or deficit   Monitor intake, output and patient weight   Monitor response to interventions for patient's volume status, including labs, urine output, blood pressure (other measures as available)   Encourage oral intake as appropriate   Instruct patient on fluid and nutrition restrictions as appropriate

## 2022-06-26 NOTE — PROGRESS NOTES
Hospitalist Progress Note      PCP: Juan Carlos Lynn DO    Chief Complaint. Presented to hospital for SOB    Date of Admission: 6/23/2022      Subjective:  VS stable. S/p Successful cardioversion of symptomatic AFib on 6/24/22. Hypotensive. Reduced metoprolol to 25 BID to allow for diuretics   No family at bedside. Awake, alert and communicative. Pain under control. Medications:  Reviewed    Infusion Medications    sodium chloride       Scheduled Medications    metoprolol tartrate  25 mg Oral BID    lidocaine  1 patch TransDERmal Daily    amiodarone  200 mg Oral Daily    miconazole   Topical BID    apixaban  2.5 mg Oral BID    aspirin  81 mg Oral Daily    atorvastatin  80 mg Oral Nightly    Vitamin D  2,000 Units Oral Daily    sodium chloride flush  5-40 mL IntraVENous 2 times per day     PRN Meds: sodium chloride flush, sodium chloride, ondansetron **OR** ondansetron, polyethylene glycol, HYDROcodone-acetaminophen      Intake/Output Summary (Last 24 hours) at 6/26/2022 1518  Last data filed at 6/26/2022 1035  Gross per 24 hour   Intake 720 ml   Output 1600 ml   Net -880 ml       Physical Exam Performed:    /71   Pulse 71   Temp 97.4 °F (36.3 °C) (Oral)   Resp 18   Ht 5' (1.524 m)   Wt 139 lb 8.8 oz (63.3 kg)   SpO2 98%   BMI 27.25 kg/m²     General appearance: No apparent distress,   HEENT:  Conjunctivae/corneas clear. Neck: Supple, with full range of motion. Respiratory:  Normal respiratory effort. Clear to auscultation, bilaterally without Rales/Wheezes/Rhonchi. Cardiovascular: Regular rate and rhythm with normal S1/S2 without murmurs or rubs  Abdomen: Soft, non-tender, non-distended, normal bowel sounds. Musculoskeletal: bilateral 2+ pitting edema around ankles  Neurologic:  without any focal sensory/motor deficits.  grossly non-focal.  Psychiatric: Alert and oriented, Normal mood  Peripheral Pulses: +2 palpable, equal bilaterally       Labs:   Recent Labs 06/23/22 1912   WBC 7.1   HGB 9.4*   HCT 28.2*        Recent Labs     06/24/22  0452 06/25/22  0423 06/26/22  0440   * 135* 133*   K 4.9 4.4 4.3   CL 95* 97* 96*   CO2 21 25 23   BUN 66* 66* 67*   CREATININE 2.8* 2.9* 2.9*   CALCIUM 9.1 8.8 9.0   PHOS  --  4.7 4.7     Recent Labs     06/23/22 1912   AST 37   ALT 36   BILITOT 0.5   ALKPHOS 65     No results for input(s): INR in the last 72 hours. Recent Labs     06/23/22 1912   TROPONINI 0.01       Urinalysis:      Lab Results   Component Value Date    NITRU Negative 06/23/2022    WBCUA 6-9 06/23/2022    BACTERIA 1+ 06/23/2022    RBCUA 0-2 06/23/2022    BLOODU Negative 06/23/2022    SPECGRAV 1.015 06/23/2022    GLUCOSEU Negative 06/23/2022       Radiology:  US RENAL COMPLETE   Final Result      1. Bilateral cortical renal atrophic changes concordant with chronic medical renal disease and no evidence of hydronephrosis   2. Nonobstructive left renal nephrolithiasis   3. Benign left renal cyst               XR CHEST PORTABLE   Final Result      Patchy bilateral airspace disease. Correlate for pneumonia are less typical for pulmonary edema. NM MYOCARDIAL SPECT REST EXERCISE OR RX    (Results Pending)         Assessment/Plan:    Active Hospital Problems    Diagnosis     CHF (congestive heart failure), NYHA class I, acute on chronic, [facility] [I50.43]      Priority: Medium     #Acute on chronic exacerbation of combined CHF, likely exacerbated by valvular pathology/tachycardia  #Moderate pulmonary hypertension  #AGUILA on CKD, rule out cardiorenal syndrome given EF of 25%  #Chronic anemia-stable at baseline  #Persistent atrial fibrillation with RVR s/p cardioversion on 6/24/22  #Physical deconditioning over the past couple of months with worsening fatigue and somnolence  #Chronic pain  # Forgetfulness,?   Dementia--outpatient evaluatioj     PLAN:  -  IV lasix gtt management per cardiology and nephrology, monitor BMP  -Strict intake and output, daily weights  -Monitor electrolytes and renal function  -CHF team and cardiology following, appreciate input  -Monitor H&H closely given worsening CKD. No evidence of active bleeding  -Continue amiodarone   -Continue on beta-blocker and Eliquis  -Continue her home medications appropriately, including for chronic pain with caution    DVT Prophylaxis: Eliquis  Diet: ADULT DIET; Regular; 4 carb choices (60 gm/meal);  Low Fat/Low Chol/High Fiber/2 gm Na  Code Status: Full Code    PT/OT Eval Status: ordered    Dispo/Plan of care - inpatient pending clinical improvement    Slade Sagastume MD

## 2022-06-26 NOTE — PROGRESS NOTES
Plan:       Creatinine better  Sodium better  Follow with [facility]   Weight slightly better   Cont lower metoprolol. His metoprolol to 25 to get some more blood pressure so the volume can be removed without worsening kidney function  Overall seems to be doing better at this time      Assessment:      Cardiorenal syndrome, volume overloaded ( bnp 11,182)  -given EF on last echo   - Cr. fluctuation along w/ elevated JVD  -    AGUILA on stage IV CKD   - Cr. Baseline 1.5, currently 2.8  - BUN 65  -renal panel  - avoid nephrotoxic agents     Rule out secondary causes ( complains of foamy urine). -UA  -Renal US   -check free light chains   - rule out vasculitis           [facility] would like to thank Jaylene Espinoza MD   for opportunity to serve this patient      Please call with questions at-   24 Hrs Answering service [phone] or  7 am- 5 pm via Perfect serve or cell phone  Dr. Antionette Hansen for consult :   Worsening kidney function     HPI :     Patient presented to the hospital with c/o BLE edema, abnormal labs, 14 lb wt gain. BNP >11,000, Cr 2.9, noted to be in AF/RVR overnight with episode of NSVT x 24 beats. HR up to 140's. She is complaining of feeling bloated, BLE edema and fatigue. No complaints of palpitations, CP. States she has been compliant with her medications at home. Currently eating breakfast. Daughter present at bedside      ROS:     positives in bold   Constitutional:  fever, chills, weakness, weight change, fatigue  Skin:  rash, pruritus, hair loss, bruising, dry skin, petechiae  Head, Face, Neck   headaches, swelling,  cervical adenopathy  Respiratory: shortness of breath, cough, or wheezing  Cardiovascular: chest pain, palpitations, dizzy, edema  Gastrointestinal: nausea, vomiting, diarrhea, constipation,belly pain    Yellow skin, blood in stool  Musculoskeletal:  back pain, muscle weakness, gait problems,       joint pain or swelling.   Genitourinary:  dysuria, poor urine tablet 200 mg, 200 mg, Oral, Daily  miconazole (MICOTIN) 2 % powder, , Topical, BID  apixaban (ELIQUIS) tablet 2.5 mg, 2.5 mg, Oral, BID  aspirin chewable tablet 81 mg, 81 mg, Oral, Daily  atorvastatin (LIPITOR) tablet 80 mg, 80 mg, Oral, Nightly  vitamin D (CHOLECALCIFEROL) tablet 2,000 Units, 2,000 Units, Oral, Daily  sodium chloride flush 0.9 % injection 5-40 mL, 5-40 mL, IntraVENous, 2 times per day  sodium chloride flush 0.9 % injection 5-40 mL, 5-40 mL, IntraVENous, PRN  0.9 % sodium chloride infusion, , IntraVENous, PRN  ondansetron (ZOFRAN-ODT) disintegrating tablet 4 mg, 4 mg, Oral, Q8H PRN **OR** ondansetron (ZOFRAN) injection 4 mg, 4 mg, IntraVENous, Q6H PRN  polyethylene glycol (GLYCOLAX) packet 17 g, 17 g, Oral, Daily PRN  HYDROcodone-acetaminophen (NORCO) 5-325 MG per tablet 2 tablet, 2 tablet, Oral, Q6H PRN       Vitals :     /65   Pulse 56   Temp 97.5 °F (36.4 °C) (Oral)   Resp 18   Ht 5' (1.524 m)   Wt 139 lb 8.8 oz (63.3 kg)   SpO2 97%   BMI 27.25 kg/m²       I & O :       Intake/Output Summary (Last 24 hours) at 6/26/2022 0910  Last data filed at 6/26/2022 0405  Gross per 24 hour   Intake 480 ml   Output 1200 ml   Net -720 ml        Physical Examination :     General appearance: Anxious- no, distressed- no, in good spirits- yes  HEENT: Lips- normal, teeth- ok , oral mucosa- moist  Neck : Mass- no, appears symmetrical, JVD- +  Respiratory: Respiratory effort-  , wheeze- no, crackles -   Cardiovascular:  Ausculation- +  M/R/G, bl ext Edema 3 + Pitting   Abdomen: visible mass- no, distention- no, scar- no, tenderness- no                            hepatosplenomegaly-  no  Musculoskeletal:  clubbing no,cyanosis- no , digital ischemia- no                           muscle strength- grossly normal , tone - grossly normal  Skin: rashes- no , ulcers- no, induration- no, tightening - no  Psychiatric:  Judgement and insight- normal           AAO X 3     LABS:     Recent Labs     06/23/22 1912

## 2022-06-26 NOTE — PLAN OF CARE
Problem: Discharge Planning  Goal: Discharge to home or other facility with appropriate resources  6/26/2022 1008 by Eduardo Okeefe RN  Outcome: Progressing     Problem: Pain  Goal: Verbalizes/displays adequate comfort level or baseline comfort level  6/26/2022 1008 by Eduardo Okeefe RN  Outcome: Progressing     Problem: Safety - Adult  Goal: Free from fall injury  6/26/2022 1008 by Eduardo Okeefe RN  Outcome: Progressing

## 2022-06-26 NOTE — PROGRESS NOTES
Cardiology Consult Service  Daily Progress Note        Admit Date:  6/23/2022  Primary cardiologist: Dr Callum Arguello    Reason for Consultation/Chief Complaint: AHF, AFRVR    Subjective:     Russell Dykes is a 80 y.o. female with a past medical history of CAD status post stent 2018, HTN, HLP, CKD stage IV, HFrEF with EF 25%, mitral valve prolapse with moderate MR, VT, LBBB, PAFRVR, recurrent ischemic CVA's.     LHC 11/6/2018: Proximal LAD 80% stenosed status post stent x1, otherwise normal coronaries, LVEF 40%.    Echo 2014: LVEF 50%.    Echo 10/2018: LVEF 35-40%.    Echo 03/19/2022: Normal LV, LVEF 10-68%, diastolic grade 3, severe LAE, moderate MR, severe MAC, normal RV, RVSP 51 (8), negative bubble study.  Per personal review of images, there is no apical clot with IV contrast, posterior mitral valve leaflet with a large echodensity measuring 2.4 x 2.4 cm, cannot exclude severe MAC vs clot vs mass (less likely vegetation in view of no fevers and normal WBC)     Status post ILR ([employer] Reveal) by Dr. Clarice Levy 3/21/22.      Patient presented to the emergency room on 6/23 with progressively worse shortness of breath, weight gain 14 pounds and bilateral lower extremity swelling over the last 3 weeks. She was afebrile, in AF -120-120 bpm, low normal BP /70's millimeters of mercury, normal oxygen saturation on room air. Creatinine 2.9 (up from baseline 1.6), proBNP 11,000, troponin 0.01, hemoglobin 9.4, chest x-ray consistent with patchy bilateral infiltrates.     ECG consistent with  bpm, occasional PVCs versus aberrancy, LBBB (122 ms).      Patient was admitted for acute on chronic HFrEF, PAF RVR, AGUILA.      Status post successful DC cardioversion 6/24/2022. Interval history:  Patient reports improving symptoms. I's and O's and weight unchanged, creatinine stable 2.9.      Objective:     Medications:   metOLazone  2.5 mg Oral Once    metoprolol tartrate  25 mg Oral BID    lidocaine lower extremity edema   Abdomen:   Soft, non-tender, bowel sounds active all four quadrants,  no masses, no organomegaly       Extremities: Extremities normal, atraumatic, no cyanosis. Pulses: 2+ and symmetric   Skin: Skin color, texture, turgor normal, no rashes or lesions   Pysch: Normal mood and affect   Neurologic: Normal gross motor and sensory exam.  Cranial nerves intact       Labs:   Recent Labs     06/24/22 0452 06/25/22  0423 06/26/22  0440   * 135* 133*   K 4.9 4.4 4.3   BUN 66* 66* 67*   CREATININE 2.8* 2.9* 2.9*   CL 95* 97* 96*   CO2 21 25 23   GLUCOSE 107* 120* 96   CALCIUM 9.1 8.8 9.0   MG 2.70* 2.60* 2.60*     Recent Labs     06/23/22 1912   WBC 7.1   HGB 9.4*   HCT 28.2*      MCV 87.6     Recent Labs     06/24/22 0452   TRIG 80   HDL 45     No results for input(s): PTT, INR in the last 72 hours. Invalid input(s): PT  Recent Labs     06/23/22 1912   TROPONINI 0.01     No results for input(s): BNP in the last 72 hours. No results for input(s): NTPROBNP in the last 72 hours. No results for input(s): TSH in the last 72 hours. Imaging:       Assessment & Plan:     1. Acute on chronic HFrEF. Patient appears volume overloaded and hemodynamically tenuous. Her systolic heart failure is most likely mixed in etiology (ischemic, CAD and nonischemic, tachycardia induced cardiomyopathy). 2.  CAD status post stent. There is no evidence of ACS with this admission. 3.  History of recurrent ischemic strokes, due to AF. 4.  PAF RVR. EP has been consulted, status post DC cardioversion, now in sinus  5. HTN  6. AGUILA on CKD.   It is due to volume overload.           -  Cw Lasix drip to 10 mg/h and give metolazone 2.5 x 1  -  Continue with Lopressor 25 mg twice daily  - Continue with Eliquis 2.5 twice daily, baby aspirin, Lipitor 80 mg daily  - Strict I's and O's every shift and standing weights if possible, low-salt diet, daily BMP with reflex to Mg (correct lytes for goals K >4.0 and Mg > 2.0) and wean supplemental oxygen to off (or down to baseline supplemental oxygen requirements) for sats greater than 92-94%. -Continue with amiodarone 200 mg daily. I have spent 35 minutes of face to face time with the patient with more than 50% spent counseling and coordinating care. I have personally reviewed the reports and images of labs, radiological studies, cardiac studies including ECG's and telemetry, current and old medical records. The note was completed using EMR and Dragon dictation system. Every effort was made to ensure accuracy; however, inadvertent computerized transcription errors may be present. All questions and concerns were addressed to the patient/family. Alternatives to my treatment were discussed. Thank you for allowing to us to participate in the care or Angela Cook. Please call our service with questions.     Leesa Montelongo MD, [facility] [place], [address]  [facility] [address] [facility] [address]  [facility] [address] [address]  Ph: [phone]  Fax: [phone]

## 2022-06-27 ENCOUNTER — NURSE ONLY (OUTPATIENT)
Dept: CARDIOLOGY CLINIC | Age: 87
End: 2022-06-27
Payer: MEDICARE

## 2022-06-27 DIAGNOSIS — I63.9 STROKE OF UNKNOWN ETIOLOGY (HCC): ICD-10-CM

## 2022-06-27 DIAGNOSIS — Z45.09 ENCOUNTER FOR LOOP RECORDER CHECK: ICD-10-CM

## 2022-06-27 LAB
ALBUMIN SERPL-MCNC: 2.8 G/DL (ref 3.1–4.9)
ALBUMIN SERPL-MCNC: 3.5 G/DL (ref 3.4–5)
ALPHA-1-GLOBULIN: 0.3 G/DL (ref 0.2–0.4)
ALPHA-2-GLOBULIN: 0.8 G/DL (ref 0.4–1.1)
ANION GAP SERPL CALCULATED.3IONS-SCNC: 12 MMOL/L (ref 3–16)
BETA GLOBULIN: 1 G/DL (ref 0.9–1.6)
BUN BLDV-MCNC: 64 MG/DL (ref 7–20)
CALCIUM SERPL-MCNC: 8.9 MG/DL (ref 8.3–10.6)
CHLORIDE BLD-SCNC: 96 MMOL/L (ref 99–110)
CO2: 29 MMOL/L (ref 21–32)
CREAT SERPL-MCNC: 2.8 MG/DL (ref 0.6–1.2)
EKG ATRIAL RATE: 67 BPM
EKG DIAGNOSIS: NORMAL
EKG P AXIS: 21 DEGREES
EKG P-R INTERVAL: 188 MS
EKG Q-T INTERVAL: 450 MS
EKG QRS DURATION: 124 MS
EKG QTC CALCULATION (BAZETT): 475 MS
EKG R AXIS: 123 DEGREES
EKG T AXIS: 54 DEGREES
EKG VENTRICULAR RATE: 67 BPM
GAMMA GLOBULIN: 0.7 G/DL (ref 0.6–1.8)
GFR AFRICAN AMERICAN: 19
GFR NON-AFRICAN AMERICAN: 16
GLUCOSE BLD-MCNC: 88 MG/DL (ref 70–99)
MAGNESIUM: 2.4 MG/DL (ref 1.8–2.4)
PHOSPHORUS: 5.1 MG/DL (ref 2.5–4.9)
POTASSIUM SERPL-SCNC: 4.2 MMOL/L (ref 3.5–5.1)
PROTEIN PROTEIN: <0.004 G/DL
PROTEIN PROTEIN: <4 MG/DL
SODIUM BLD-SCNC: 137 MMOL/L (ref 136–145)
SPE/IFE INTERPRETATION: NORMAL
TOTAL PROTEIN: 5.5 G/DL (ref 6.4–8.2)

## 2022-06-27 PROCEDURE — 84166 PROTEIN E-PHORESIS/URINE/CSF: CPT

## 2022-06-27 PROCEDURE — 6370000000 HC RX 637 (ALT 250 FOR IP): Performed by: INTERNAL MEDICINE

## 2022-06-27 PROCEDURE — G2066 INTER DEVC REMOTE 30D: HCPCS | Performed by: INTERNAL MEDICINE

## 2022-06-27 PROCEDURE — 83735 ASSAY OF MAGNESIUM: CPT

## 2022-06-27 PROCEDURE — 80069 RENAL FUNCTION PANEL: CPT

## 2022-06-27 PROCEDURE — 2060000000 HC ICU INTERMEDIATE R&B

## 2022-06-27 PROCEDURE — 93010 ELECTROCARDIOGRAM REPORT: CPT | Performed by: INTERNAL MEDICINE

## 2022-06-27 PROCEDURE — 82570 ASSAY OF URINE CREATININE: CPT

## 2022-06-27 PROCEDURE — 93298 REM INTERROG DEV EVAL SCRMS: CPT | Performed by: INTERNAL MEDICINE

## 2022-06-27 PROCEDURE — 99233 SBSQ HOSP IP/OBS HIGH 50: CPT | Performed by: NURSE PRACTITIONER

## 2022-06-27 PROCEDURE — 2580000003 HC RX 258: Performed by: INTERNAL MEDICINE

## 2022-06-27 PROCEDURE — 36415 COLL VENOUS BLD VENIPUNCTURE: CPT

## 2022-06-27 PROCEDURE — 84156 ASSAY OF PROTEIN URINE: CPT

## 2022-06-27 PROCEDURE — 99233 SBSQ HOSP IP/OBS HIGH 50: CPT | Performed by: INTERNAL MEDICINE

## 2022-06-27 PROCEDURE — 6360000002 HC RX W HCPCS: Performed by: INTERNAL MEDICINE

## 2022-06-27 RX ORDER — METOLAZONE 5 MG/1
5 TABLET ORAL ONCE
Status: COMPLETED | OUTPATIENT
Start: 2022-06-27 | End: 2022-06-27

## 2022-06-27 RX ORDER — METOLAZONE 5 MG/1
10 TABLET ORAL ONCE
Status: DISCONTINUED | OUTPATIENT
Start: 2022-06-27 | End: 2022-06-27

## 2022-06-27 RX ADMIN — SODIUM CHLORIDE, PRESERVATIVE FREE 10 ML: 5 INJECTION INTRAVENOUS at 20:28

## 2022-06-27 RX ADMIN — ASPIRIN 81 MG: 81 TABLET, CHEWABLE ORAL at 08:45

## 2022-06-27 RX ADMIN — FUROSEMIDE 20 MG/HR: 10 INJECTION, SOLUTION INTRAMUSCULAR; INTRAVENOUS at 16:02

## 2022-06-27 RX ADMIN — METOPROLOL TARTRATE 25 MG: 25 TABLET, FILM COATED ORAL at 20:34

## 2022-06-27 RX ADMIN — MICONAZOLE NITRATE: 20 POWDER TOPICAL at 20:26

## 2022-06-27 RX ADMIN — FUROSEMIDE 20 MG/HR: 10 INJECTION, SOLUTION INTRAMUSCULAR; INTRAVENOUS at 11:59

## 2022-06-27 RX ADMIN — METOLAZONE 5 MG: 5 TABLET ORAL at 14:53

## 2022-06-27 RX ADMIN — SODIUM CHLORIDE, PRESERVATIVE FREE 10 ML: 5 INJECTION INTRAVENOUS at 08:47

## 2022-06-27 RX ADMIN — HYDROCODONE BITARTRATE AND ACETAMINOPHEN 2 TABLET: 5; 325 TABLET ORAL at 02:57

## 2022-06-27 RX ADMIN — HYDROCODONE BITARTRATE AND ACETAMINOPHEN 2 TABLET: 5; 325 TABLET ORAL at 22:56

## 2022-06-27 RX ADMIN — AMIODARONE HYDROCHLORIDE 200 MG: 200 TABLET ORAL at 08:45

## 2022-06-27 RX ADMIN — METOPROLOL TARTRATE 25 MG: 25 TABLET, FILM COATED ORAL at 08:45

## 2022-06-27 RX ADMIN — APIXABAN 2.5 MG: 2.5 TABLET, FILM COATED ORAL at 08:45

## 2022-06-27 RX ADMIN — ATORVASTATIN CALCIUM 80 MG: 80 TABLET, FILM COATED ORAL at 20:34

## 2022-06-27 RX ADMIN — APIXABAN 2.5 MG: 2.5 TABLET, FILM COATED ORAL at 20:34

## 2022-06-27 RX ADMIN — HYDROCODONE BITARTRATE AND ACETAMINOPHEN 2 TABLET: 5; 325 TABLET ORAL at 16:09

## 2022-06-27 RX ADMIN — FUROSEMIDE 20 MG/HR: 10 INJECTION, SOLUTION INTRAMUSCULAR; INTRAVENOUS at 20:54

## 2022-06-27 RX ADMIN — Medication 2000 UNITS: at 08:45

## 2022-06-27 RX ADMIN — MICONAZOLE NITRATE: 20 POWDER TOPICAL at 08:47

## 2022-06-27 RX ADMIN — FUROSEMIDE 10 MG/HR: 10 INJECTION, SOLUTION INTRAMUSCULAR; INTRAVENOUS at 02:57

## 2022-06-27 ASSESSMENT — PAIN DESCRIPTION - DESCRIPTORS
DESCRIPTORS: SHOOTING
DESCRIPTORS: ACHING
DESCRIPTORS: ACHING;DISCOMFORT
DESCRIPTORS: SHARP;STABBING

## 2022-06-27 ASSESSMENT — PAIN SCALES - WONG BAKER
WONGBAKER_NUMERICALRESPONSE: 0

## 2022-06-27 ASSESSMENT — PAIN DESCRIPTION - ORIENTATION
ORIENTATION: MID

## 2022-06-27 ASSESSMENT — PAIN DESCRIPTION - PAIN TYPE
TYPE: CHRONIC PAIN
TYPE: CHRONIC PAIN

## 2022-06-27 ASSESSMENT — PAIN SCALES - GENERAL
PAINLEVEL_OUTOF10: 7
PAINLEVEL_OUTOF10: 7
PAINLEVEL_OUTOF10: 0
PAINLEVEL_OUTOF10: 7
PAINLEVEL_OUTOF10: 0
PAINLEVEL_OUTOF10: 10
PAINLEVEL_OUTOF10: 0
PAINLEVEL_OUTOF10: 7
PAINLEVEL_OUTOF10: 0
PAINLEVEL_OUTOF10: 6
PAINLEVEL_OUTOF10: 10

## 2022-06-27 ASSESSMENT — PAIN DESCRIPTION - FREQUENCY
FREQUENCY: CONTINUOUS
FREQUENCY: CONTINUOUS

## 2022-06-27 ASSESSMENT — PAIN DESCRIPTION - LOCATION
LOCATION: BACK

## 2022-06-27 ASSESSMENT — PAIN DESCRIPTION - ONSET
ONSET: ON-GOING
ONSET: ON-GOING

## 2022-06-27 NOTE — PROGRESS NOTES
Plan:     Stable creatinine at 2.8  Urine is 2575 ml    BP is stable   Weight is 140 > 137 pounds. On lasix drip and prn Zaroxolyn     Overall seems to be doing better at this time      Assessment:      Cardiorenal syndrome, volume overloaded ( bnp 11,182)  -given EF on last echo   - Cr. fluctuation along w/ elevated JVD  -    AGUILA on stage IV CKD   - Cr. Baseline 1.5, and admitted with 2.8  - avoid nephrotoxic agents     Rule out secondary causes ( complains of foamy urine). -UA  -Renal US with normal sized kidneys and echogenicity and L cyst  -check free light chains       [facility] would like to thank Ligia Herndon MD   for opportunity to serve this patient      Please call with questions at-   24 Hrs Answering service [phone] or  7 am- 5 pm via Perfect serve or cell phone  Dr. Kirk Drummond for consult :   Worsening kidney function     HPI :     Patient presented to the hospital with c/o BLE edema, abnormal labs, 14 lb wt gain. BNP >11,000, Cr 2.9, noted to be in AF/RVR overnight with episode of NSVT x 24 beats. HR up to 140's. She is complaining of feeling bloated, BLE edema and fatigue. No complaints of palpitations, CP. States she has been compliant with her medications at home. Currently eating breakfast. Daughter present at bedside      ROS:     positives in bold   Constitutional:  fever, chills, weakness, weight change, fatigue  Skin:  rash, pruritus, hair loss, bruising, dry skin, petechiae  Head, Face, Neck   headaches, swelling,  cervical adenopathy  Respiratory: shortness of breath, cough, or wheezing  Cardiovascular: chest pain, palpitations, dizzy, edema  Gastrointestinal: nausea, vomiting, diarrhea, constipation,belly pain    Yellow skin, blood in stool  Musculoskeletal:  back pain, muscle weakness, gait problems,       joint pain or swelling.   Genitourinary:  dysuria, poor urine flow, flank pain, blood in urine  Neurologic:  vertigo, TIA'S, syncope, seizures, focal weakness  Psychosocial:  insomnia, anxiety, or depression. All other remaining systems are negative or unable to obtain    PMH/PSH/SH/Family History:     Past Medical History:   Diagnosis Date    Arthritis     Blood circulation, collateral     CAD (coronary artery disease)     CHF (congestive heart failure) (HCC)     Confusion 8/31/2020    GERD (gastroesophageal reflux disease)     History of heart artery stent 12/2018    Hyperlipidemia     Hypertension     Mitral valve prolapse     Myocardial infarct, old     Thyroid disease        Past Surgical History:   Procedure Laterality Date    COLONOSCOPY      EYE SURGERY Left 09/04/2018    PHACO EMULSIFICATION OF CATARACT WITH INTRAOCULAR LENS IMPLANT LEFT EYE     HYSTERECTOMY (CERVIX STATUS UNKNOWN)      OTHER SURGICAL HISTORY  08/27/2018    phacoemulsification of cataract with intraocular lens implant right eye    [place] OFFICE/OUTPT VISIT,PROCEDURE ONLY Right 8/27/2018    PHACO EMULSIFICATION OF CATARACT WITH INTRAOCULAR LENS IMPLANT RIGHT EYE performed by Erich Zee MD at [address] [place] [address]e OFFICE/OUTPT [facility] Left 9/4/2018    PHACO EMULSIFICATION OF CATARACT WITH INTRAOCULAR LENS IMPLANT LEFT EYE performed by Erich Zee MD at [address] [place] [address] Dr          reports that she has never smoked. She has never used smokeless tobacco. She reports that she does not drink alcohol and does not use drugs. family history includes Cancer in her father and sister; Diabetes in her brother; Heart Disease in her father and mother; Stroke in her brother.      Medication:     Current Facility-Administered Medications: furosemide (LASIX) 100 mg in dextrose 5 % 100 mL infusion, 10 mg/hr, IntraVENous, Continuous  metoprolol tartrate (LOPRESSOR) tablet 25 mg, 25 mg, Oral, BID  lidocaine 4 % external patch 1 patch, 1 patch, TransDERmal, Daily  amiodarone (CORDARONE) tablet 200 mg, 200 mg, Oral, Daily  miconazole (MICOTIN) 2 % powder, , Topical, BID  apixaban (ELIQUIS) tablet 2.5 mg, 2.5 mg, Oral, BID  aspirin chewable tablet 81 mg, 81 mg, Oral, Daily  atorvastatin (LIPITOR) tablet 80 mg, 80 mg, Oral, Nightly  vitamin D (CHOLECALCIFEROL) tablet 2,000 Units, 2,000 Units, Oral, Daily  sodium chloride flush 0.9 % injection 5-40 mL, 5-40 mL, IntraVENous, 2 times per day  sodium chloride flush 0.9 % injection 5-40 mL, 5-40 mL, IntraVENous, PRN  0.9 % sodium chloride infusion, , IntraVENous, PRN  ondansetron (ZOFRAN-ODT) disintegrating tablet 4 mg, 4 mg, Oral, Q8H PRN **OR** ondansetron (ZOFRAN) injection 4 mg, 4 mg, IntraVENous, Q6H PRN  polyethylene glycol (GLYCOLAX) packet 17 g, 17 g, Oral, Daily PRN  HYDROcodone-acetaminophen (NORCO) 5-325 MG per tablet 2 tablet, 2 tablet, Oral, Q6H PRN       Vitals :     /72   Pulse 66   Temp 97.1 °F (36.2 °C) (Oral)   Resp 16   Ht 5' (1.524 m)   Wt 137 lb 2 oz (62.2 kg)   SpO2 96%   BMI 26.78 kg/m²       I & O :       Intake/Output Summary (Last 24 hours) at 6/27/2022 0825  Last data filed at 6/27/2022 8977  Gross per 24 hour   Intake 1267.38 ml   Output 2575 ml   Net -1307.62 ml        Physical Examination :     General appearance: Anxious- no, distressed- no, in good spirits- yes  HEENT: Lips- normal, teeth- ok , oral mucosa- moist  Neck : Mass- no, appears symmetrical, JVD- +  Respiratory: Respiratory effort-  , wheeze- no, crackles -   Cardiovascular: Ausculation- +  M/R/G, bl ext Edema 3 + Pitting   Abdomen: visible mass- no, distention- no, scar- no, tenderness- no                            hepatosplenomegaly-  no  Musculoskeletal:  clubbing no,cyanosis- no , digital ischemia- no                           muscle strength- grossly normal , tone - grossly normal  Skin: rashes- no , ulcers- no, induration- no, tightening - no  Psychiatric:  Judgement and insight- normal           AAO X 3     LABS:     No results for input(s): WBC, HGB, HCT, PLT in the last 72 hours.   Recent Labs     06/25/22  7137 06/26/22  0440 06/27/22  0448   * 133* 137   K 4.4 4.3 4.2   CL 97* 96* 96*   CO2 25 23 29   BUN 66* 67* 64*   CREATININE 2.9* 2.9* 2.8*   GLUCOSE 120* 96 88   MG 2.60* 2.60* 2.40   PHOS 4.7 4.7 5.1*          Thanks  Nephrology  [address], [address]  Office: [phone]  Fax: [phone]

## 2022-06-27 NOTE — PLAN OF CARE
Problem: Discharge Planning  Goal: Discharge to home or other facility with appropriate resources  Outcome: Progressing  Flowsheets (Taken 6/26/2022 2010)  Discharge to home or other facility with appropriate resources:   Identify barriers to discharge with patient and caregiver   Arrange for needed discharge resources and transportation as appropriate   Identify discharge learning needs (meds, wound care, etc)     Problem: Pain  Goal: Verbalizes/displays adequate comfort level or baseline comfort level  Outcome: Progressing  Flowsheets (Taken 6/26/2022 2010)  Verbalizes/displays adequate comfort level or baseline comfort level:   Encourage patient to monitor pain and request assistance   Assess pain using appropriate pain scale   Administer analgesics based on type and severity of pain and evaluate response   Implement non-pharmacological measures as appropriate and evaluate response     Problem: Safety - Adult  Goal: Free from fall injury  Outcome: Progressing  Flowsheets (Taken 6/26/2022 2300)  Free From Fall Injury:   Instruct family/caregiver on patient safety   Based on caregiver fall risk screen, instruct family/caregiver to ask for assistance with transferring infant if caregiver noted to have fall risk factors     Problem: ABCDS Injury Assessment  Goal: Absence of physical injury  Outcome: Progressing  Flowsheets (Taken 6/26/2022 2300)  Absence of Physical Injury: Implement safety measures based on patient assessment     Problem: Chronic Conditions and Co-morbidities  Goal: Patient's chronic conditions and co-morbidity symptoms are monitored and maintained or improved  Outcome: Progressing  Flowsheets (Taken 6/26/2022 2010)  Care Plan - Patient's Chronic Conditions and Co-Morbidity Symptoms are Monitored and Maintained or Improved:   Monitor and assess patient's chronic conditions and comorbid symptoms for stability, deterioration, or improvement   Collaborate with multidisciplinary team to address chronic and comorbid conditions and prevent exacerbation or deterioration   Update acute care plan with appropriate goals if chronic or comorbid symptoms are exacerbated and prevent overall improvement and discharge     Problem: Respiratory - Adult  Goal: Achieves optimal ventilation and oxygenation  Outcome: Progressing  Flowsheets (Taken 6/26/2022 2010)  Achieves optimal ventilation and oxygenation:   Assess for changes in respiratory status   Assess for changes in mentation and behavior   Position to facilitate oxygenation and minimize respiratory effort   Oxygen supplementation based on oxygen saturation or arterial blood gases   Encourage broncho-pulmonary hygiene including cough, deep breathe, incentive spirometry   Assess and instruct to report shortness of breath or any respiratory difficulty   Respiratory therapy support as indicated

## 2022-06-27 NOTE — PROGRESS NOTES
Electrophysiology - PROGRESS NOTE    Admit Date: 6/23/2022     Chief Complaint: AF/RVR     Interval History:   Patient seen and examined and notes reviewed. Patient is being followed for AF/RVR. Patient admitted with AF/RVR. S/p DCCV to NSR on 6/24/22. Planned for MPI d/t low EF however ate. Currently on a lasix gtt and neg 2.7 L. Remains with BLE edema - 1-2+. Denies CP or SOB. Wants to get up and walk.  Currently for MPI however ate this am.     In: 1267.4 [P.O.:960; I.V.:307.4]  Out: 2575    Wt Readings from Last 2 Encounters:   06/27/22 137 lb 2 oz (62.2 kg)   06/10/22 137 lb (62.1 kg)       Data:   Scheduled Meds:   Scheduled Meds:   metOLazone  10 mg Oral Once    metoprolol tartrate  25 mg Oral BID    lidocaine  1 patch TransDERmal Daily    amiodarone  200 mg Oral Daily    miconazole   Topical BID    apixaban  2.5 mg Oral BID    aspirin  81 mg Oral Daily    atorvastatin  80 mg Oral Nightly    Vitamin D  2,000 Units Oral Daily    sodium chloride flush  5-40 mL IntraVENous 2 times per day     Continuous Infusions:   furosemide (LASIX) 1mg/ml infusion 10 mg/hr (06/27/22 0257)    sodium chloride       PRN Meds:.sodium chloride flush, sodium chloride, ondansetron **OR** ondansetron, polyethylene glycol, HYDROcodone-acetaminophen  Continuous Infusions:   furosemide (LASIX) 1mg/ml infusion 10 mg/hr (06/27/22 0257)    sodium chloride         Intake/Output Summary (Last 24 hours) at 6/27/2022 0928  Last data filed at 6/27/2022 0650  Gross per 24 hour   Intake 1267.38 ml   Output 2575 ml   Net -1307.62 ml       CBC:   Lab Results   Component Value Date    WBC 7.1 06/23/2022    HGB 9.4 06/23/2022     06/23/2022     BMP:  Lab Results   Component Value Date     06/27/2022    K 4.2 06/27/2022    K 4.3 06/23/2022    CL 96 06/27/2022    CO2 29 06/27/2022    BUN 64 06/27/2022    CREATININE 2.8 06/27/2022    GLUCOSE 88 06/27/2022     INR:   Lab Results   Component Value Date    INR 1.35 05/29/2022    INR 1.02 03/18/2022    INR 1.02 11/20/2021        CARDIAC LABS  ENZYMES:No results for input(s): CKMB, CKMBINDEX, TROPONINI in the last 72 hours. Invalid input(s): CKTOTAL;3  FASTING LIPID PANEL:  Lab Results   Component Value Date    HDL 45 06/24/2022    LDLCALC 43 06/24/2022    TRIG 80 06/24/2022     LIVER PROFILE:  Lab Results   Component Value Date    AST 37 06/23/2022    AST 44 05/29/2022    ALT 36 06/23/2022    ALT 51 05/29/2022       -----------------------------------------------------------------  Telemetry: Personally reviewed  NSR, PVCs, NSVT - 6 beats    Objective:   Vitals: /84   Pulse 71   Temp 97.1 °F (36.2 °C) (Oral)   Resp 18   Ht 5' (1.524 m)   Wt 137 lb 2 oz (62.2 kg)   SpO2 97%   BMI 26.78 kg/m²   General appearance: alert, appears stated age and cooperative, No acute distress   Eyes: Conjunctiva and pupils normal and reactive  Skin: Skin color, texture, turgor normal. No rashes or ecchymosis.   Neck: no JVD, supple, symmetrical, trachea midline   Lungs: , no accessory muscle use, no respiratory distress  Heart: RRR  Abdomen: soft, non-tender; bowel sounds normal  Extremities: 1-2+ BLE edema, DP +  Psychiatric: normal insight and affect    Patient Active Problem List:     Other and unspecified angina pectoris     Coronary atherosclerosis of native coronary artery     Other chronic pulmonary heart diseases     Mitral valve disorder     Acute combined systolic and diastolic heart failure (HCC)     Chronic low back pain     Essential hypertension     AGUILA (acute kidney injury) ([facility] [address]     Renal insufficiency     Gout     Pain in joint, hand     Mixed hyperlipidemia     Primary insomnia     Closed stable burst fracture of first lumbar vertebra (HCC)     GERD (gastroesophageal reflux disease)     Palpitations     V-tach (HCC)     Abnormal myocardial perfusion study     Ischemic cardiomyopathy     Cardiomyopathy (HCC)     PVD (posterior vitreous detachment), both eyes     Posterior subcapsular polar age-related cataract of both eyes     Confusion     Chronic kidney disease, stage IV (severe) (HCC)     Visual field defect     Stroke of unknown etiology ([facility] [address]     Encounter for loop recorder check     Atrial fibrillation (HCC)     CHF (congestive heart failure), NYHA class I, acute on chronic, combined ([facility] [address]        Assessment & Plan:      1. AF/RVR  2. NICMP  3. Chronic sCHF  4. CAD  5. On [address]  6. On amio    81 y/o woman with a h/o HTN, HLD, thyroid disease, GERD, CKD stage IV, MVP, CAD, s/p MI, s/p PCI (2018), grade III DD, NICMP, chronic sCHF, EF 25-30%, pAF on Eliquis, who p/w AF/RVR, s/p DCCV to NSR (6/24/2022), placed on amio 200 mg QD. [id number] 6. TSH 1.89 (8/20/21). AF  - In NSR  - S/p DCCV to NSR 6/24/2022  - On Eliquis 2.5 mg BID (age, Cr) - no s/s bleeding - continue  - On amio 200 mg QD - screening labs are stable  - On metoprolol 25 mg BID  - Reviewed risk factors, pathophysiology, treatment options and lifestyle modification for atrial fibrillation: Blood pressure control, blood sugar control, healthy diet, minimal alcohol intake, no smoking, activity and exercise, manage stress sleep apnea evaluation and symptoms of a stroke. - Keep K+ > 4.0 and Mg > 2.0  - Reviewed recent labs  - Echo - LA - 4.8/96.1    CAD  - No s/s  - On ASA, statin  - MPI pending - patient ate this am    NICMP  - EF 25-30%  - NYHA class II/III  -   - On metoprolol 25 mg BID - change to long acting on d/c  - No ACE/ARB/ARNI/SGL2 due to CKD. - On a lasix gtt - neg 2.7L      Dorcus Reveal CNP  [address]      I spent a total of 35 minutes in care of the patient and greater than 50% of the time was spent counseling with Sallie Pitt and coordinating care regarding their diagnosis, treatments and plan of care.

## 2022-06-27 NOTE — PROGRESS NOTES
Cardiology Consult Service  Daily Progress Note        Admit Date:  6/23/2022  Primary cardiologist: Dr Efrain Ibarra    Reason for Consultation/Chief Complaint: AHF, AFRVR    Subjective:     Jesse Dykes is a 80 y.o. female with a past medical history of CAD status post stent 2018, HTN, HLP, CKD stage IV, HFrEF with EF 25%, mitral valve prolapse with moderate MR, VT, LBBB, PAFRVR, recurrent ischemic CVA's.     LHC 11/6/2018: Proximal LAD 80% stenosed status post stent x1, otherwise normal coronaries, LVEF 40%.    Echo 2014: LVEF 50%.    Echo 10/2018: LVEF 35-40%.    Echo 03/19/2022: Normal LV, LVEF 47-01%, diastolic grade 3, severe LAE, moderate MR, severe MAC, normal RV, RVSP 51 (8), negative bubble study.  Per personal review of images, there is no apical clot with IV contrast, posterior mitral valve leaflet with a large echodensity measuring 2.4 x 2.4 cm, cannot exclude severe MAC vs clot vs mass (less likely vegetation in view of no fevers and normal WBC)     Status post ILR ([employer] Reveal) by Dr. Velazquez Payment 3/21/22.      Patient presented to the emergency room on 6/23 with progressively worse shortness of breath, weight gain 14 pounds and bilateral lower extremity swelling over the last 3 weeks. She was afebrile, in AF -120-120 bpm, low normal BP /70's millimeters of mercury, normal oxygen saturation on room air. Creatinine 2.9 (up from baseline 1.6), proBNP 11,000, troponin 0.01, hemoglobin 9.4, chest x-ray consistent with patchy bilateral infiltrates.     ECG consistent with  bpm, occasional PVCs versus aberrancy, LBBB (122 ms).      Patient was admitted for acute on chronic HFrEF, PAF RVR, AGUILA.      Status post successful DC cardioversion 6/24/2022. Home diuretics: Lasix 20 mg twice daily    Interval history:  Patient reports no significant change with her lower extremity edema. I's and O's -1 L, weight down 2 pounds, creatinine stable at 2.8.     Objective:     Medications:   metOLazone  5 mg Oral Once    metoprolol tartrate  25 mg Oral BID    lidocaine  1 patch TransDERmal Daily    amiodarone  200 mg Oral Daily    miconazole   Topical BID    apixaban  2.5 mg Oral BID    aspirin  81 mg Oral Daily    atorvastatin  80 mg Oral Nightly    Vitamin D  2,000 Units Oral Daily    sodium chloride flush  5-40 mL IntraVENous 2 times per day       IV drips:   furosemide (LASIX) 1mg/ml infusion 20 mg/hr (06/27/22 1159)    sodium chloride         PRN:  sodium chloride flush, sodium chloride, ondansetron **OR** ondansetron, polyethylene glycol, HYDROcodone-acetaminophen    Vitals:    06/27/22 0305 06/27/22 0327 06/27/22 0843 06/27/22 1154   BP: 121/72  132/84 108/71   Pulse: 66  71 66   Resp: 16 16 18 20   Temp: 97.1 °F (36.2 °C)      TempSrc: Oral   Oral   SpO2: 96%  97% 96%   Weight: 137 lb 2 oz (62.2 kg)      Height:           Intake/Output Summary (Last 24 hours) at 6/27/2022 1343  Last data filed at 6/27/2022 1230  Gross per 24 hour   Intake 1132.2 ml   Output 3325 ml   Net -2192.8 ml     I/O last 3 completed shifts: In: 1747.4 [P.O.:1440; I.V.:307.4]  Out: 3225 [Urine:3225]  Wt Readings from Last 3 Encounters:   06/27/22 137 lb 2 oz (62.2 kg)   06/10/22 137 lb (62.1 kg)   06/08/22 137 lb 12.8 oz (62.5 kg)       Admit Wt: Weight: 138 lb (62.6 kg)   Todays Wt: Weight: 137 lb 2 oz (62.2 kg)        Physical Exam:         General Appearance:  Alert, cooperative, no distress, appears stated age Appropriate weight   Head:  Normocephalic, without obvious abnormality, atraumatic   Eyes:  PERRL, conjunctiva/corneas clear EOM intact  Ears normal   Throat no lesions       Nose: Nares normal, no drainage or sinus tenderness   Throat: Lips, mucosa, and tongue normal   Neck: Supple, symmetrical, trachea midline, no adenopathy, thyroid: not enlarged, symmetric, no tenderness/mass/nodules, no carotid bruit. Lungs:   Normal respiratory rate, lungs with basilar ronchi bilaterally.     Chest Wall: Lopressor 25 mg twice daily  - Continue with Eliquis 2.5 twice daily, baby aspirin, Lipitor 80 mg daily  - Strict I's and O's every shift and standing weights if possible, low-salt diet, daily BMP with reflex to Mg (correct lytes for goals K >4.0 and Mg > 2.0) and wean supplemental oxygen to off (or down to baseline supplemental oxygen requirements) for sats greater than 92-94%. -Continue with amiodarone 200 mg daily. I have spent 35 minutes of face to face time with the patient with more than 50% spent counseling and coordinating care. I have personally reviewed the reports and images of labs, radiological studies, cardiac studies including ECG's and telemetry, current and old medical records. The note was completed using EMR and Dragon dictation system. Every effort was made to ensure accuracy; however, inadvertent computerized transcription errors may be present. All questions and concerns were addressed to the patient/family. Alternatives to my treatment were discussed. Thank you for allowing to us to participate in the care or Scarlet Shah. Please call our service with questions.     Nawaf Villafuerte MD, [facility], [address]  The [address]  [address] [address] [place] [address]  Ph: [phone]  Fax: [phone]

## 2022-06-27 NOTE — CARE COORDINATION
Case Management Assessment           Daily Note                 Date/ Time of Note: 6/27/2022 2:15 PM         Note completed by: Nancy Boyer RN    Patient Name: Emiliano Merchant  YOB: 1935    Diagnosis:AGUILA (acute kidney injury) ([facility] [address] [N17.9]  Acute on chronic combined systolic and diastolic congestive heart failure ([facility] [address] [I50.43]  CHF (congestive heart failure), NYHA class I, acute on chronic, combined ([facility] [address] [I50.43]  Patient Admission Status: Inpatient    Date of Admission:6/23/2022  5:32 PM Length of Stay: 4 GLOS: GMLOS: 3.8    Current Plan of Care: Lasix gtt, Cardiology and Nephrology following, stress test pending  ________________________________________________________________________________________  PT AM-PAC:   / 24 per last evaluation on: pending 06/27/22     OT AM-PAC:   / 24 per last evaluation on: pending 06/27/22     DME Needs for discharge:   ________________________________________________________________________________________  Discharge Plan: Home with [address] [place] [address] Way: [facility]    Tentative discharge date: TBD    Current barriers to discharge: transition off lasix gtt, medical stability    Referrals completed: Not Applicable    Resources/ information provided: Not indicated at this time  ________________________________________________________________________________________  Case Management Notes: CM continues to follow for DC planning and needs. Patient continues on Lasix gtt at this timer per cardiology. Nephrology continues to follow. Patient noted to have pending stress test and PT/OT orders at this time. [facility] Kelsie Vallejo and her family were provided with choice of provider; she and her family are in agreement with the discharge plan.     Care Transition Patient: No    Nnacy Boyer, MANUEL  The [facility]  Case Management Department  Ph: [phone]  Fax: [phone]

## 2022-06-28 LAB
ALBUMIN SERPL-MCNC: 3.6 G/DL (ref 3.4–5)
ANION GAP SERPL CALCULATED.3IONS-SCNC: 13 MMOL/L (ref 3–16)
BUN BLDV-MCNC: 63 MG/DL (ref 7–20)
CALCIUM SERPL-MCNC: 9.5 MG/DL (ref 8.3–10.6)
CHLORIDE BLD-SCNC: 91 MMOL/L (ref 99–110)
CO2: 33 MMOL/L (ref 21–32)
CREAT SERPL-MCNC: 2.6 MG/DL (ref 0.6–1.2)
CREATININE URINE: 16 MG/DL (ref 28–259)
GFR AFRICAN AMERICAN: 21
GFR NON-AFRICAN AMERICAN: 17
GLUCOSE BLD-MCNC: 99 MG/DL (ref 70–99)
INR BLD: 3.2 (ref 0.88–1.12)
INR BLD: 3.6 (ref 0.88–1.12)
KAPPA, FREE LIGHT CHAINS, SERUM: 90.16 MG/L (ref 3.3–19.4)
KAPPA/LAMBDA RATIO: 1.99 (ref 0.26–1.65)
KAPPA/LAMBDA TEST COMMENT: ABNORMAL
LAMBDA, FREE LIGHT CHAINS, SERUM: 45.26 MG/L (ref 5.71–26.3)
LV EF: 55 %
LVEF MODALITY: NORMAL
MAGNESIUM: 2.2 MG/DL (ref 1.8–2.4)
PHOSPHORUS: 4.9 MG/DL (ref 2.5–4.9)
POTASSIUM SERPL-SCNC: 3.7 MMOL/L (ref 3.5–5.1)
PROTEIN PROTEIN: <4 MG/DL
PROTEIN/CREAT RATIO: ABNORMAL MG/DL
SODIUM BLD-SCNC: 137 MMOL/L (ref 136–145)

## 2022-06-28 PROCEDURE — 36415 COLL VENOUS BLD VENIPUNCTURE: CPT

## 2022-06-28 PROCEDURE — 6370000000 HC RX 637 (ALT 250 FOR IP): Performed by: INTERNAL MEDICINE

## 2022-06-28 PROCEDURE — 80069 RENAL FUNCTION PANEL: CPT

## 2022-06-28 PROCEDURE — 6360000002 HC RX W HCPCS: Performed by: INTERNAL MEDICINE

## 2022-06-28 PROCEDURE — 97116 GAIT TRAINING THERAPY: CPT

## 2022-06-28 PROCEDURE — 2580000003 HC RX 258: Performed by: INTERNAL MEDICINE

## 2022-06-28 PROCEDURE — 97166 OT EVAL MOD COMPLEX 45 MIN: CPT

## 2022-06-28 PROCEDURE — 97530 THERAPEUTIC ACTIVITIES: CPT

## 2022-06-28 PROCEDURE — 97535 SELF CARE MNGMENT TRAINING: CPT

## 2022-06-28 PROCEDURE — 97162 PT EVAL MOD COMPLEX 30 MIN: CPT

## 2022-06-28 PROCEDURE — 2060000000 HC ICU INTERMEDIATE R&B

## 2022-06-28 PROCEDURE — 6370000000 HC RX 637 (ALT 250 FOR IP): Performed by: NURSE PRACTITIONER

## 2022-06-28 PROCEDURE — 83735 ASSAY OF MAGNESIUM: CPT

## 2022-06-28 PROCEDURE — 78452 HT MUSCLE IMAGE SPECT MULT: CPT

## 2022-06-28 PROCEDURE — A9502 TC99M TETROFOSMIN: HCPCS | Performed by: INTERNAL MEDICINE

## 2022-06-28 PROCEDURE — 3430000000 HC RX DIAGNOSTIC RADIOPHARMACEUTICAL: Performed by: INTERNAL MEDICINE

## 2022-06-28 PROCEDURE — 99233 SBSQ HOSP IP/OBS HIGH 50: CPT | Performed by: NURSE PRACTITIONER

## 2022-06-28 PROCEDURE — 99233 SBSQ HOSP IP/OBS HIGH 50: CPT | Performed by: INTERNAL MEDICINE

## 2022-06-28 PROCEDURE — 93017 CV STRESS TEST TRACING ONLY: CPT

## 2022-06-28 RX ORDER — METOLAZONE 5 MG/1
5 TABLET ORAL ONCE
Status: COMPLETED | OUTPATIENT
Start: 2022-06-28 | End: 2022-06-28

## 2022-06-28 RX ORDER — POTASSIUM CHLORIDE 20 MEQ/1
20 TABLET, EXTENDED RELEASE ORAL ONCE
Status: COMPLETED | OUTPATIENT
Start: 2022-06-28 | End: 2022-06-28

## 2022-06-28 RX ADMIN — ASPIRIN 81 MG: 81 TABLET, CHEWABLE ORAL at 08:07

## 2022-06-28 RX ADMIN — Medication 2000 UNITS: at 08:07

## 2022-06-28 RX ADMIN — METOPROLOL TARTRATE 25 MG: 25 TABLET, FILM COATED ORAL at 21:25

## 2022-06-28 RX ADMIN — HYDROCODONE BITARTRATE AND ACETAMINOPHEN 2 TABLET: 5; 325 TABLET ORAL at 08:07

## 2022-06-28 RX ADMIN — FUROSEMIDE 20 MG/HR: 10 INJECTION, SOLUTION INTRAMUSCULAR; INTRAVENOUS at 13:12

## 2022-06-28 RX ADMIN — SODIUM CHLORIDE, PRESERVATIVE FREE 5 ML: 5 INJECTION INTRAVENOUS at 22:07

## 2022-06-28 RX ADMIN — ATORVASTATIN CALCIUM 80 MG: 80 TABLET, FILM COATED ORAL at 21:25

## 2022-06-28 RX ADMIN — HYDROCODONE BITARTRATE AND ACETAMINOPHEN 2 TABLET: 5; 325 TABLET ORAL at 23:52

## 2022-06-28 RX ADMIN — HYDROCODONE BITARTRATE AND ACETAMINOPHEN 2 TABLET: 5; 325 TABLET ORAL at 18:12

## 2022-06-28 RX ADMIN — MICONAZOLE NITRATE: 20 POWDER TOPICAL at 12:16

## 2022-06-28 RX ADMIN — TETROFOSMIN 10 MILLICURIE: 1.38 INJECTION, POWDER, LYOPHILIZED, FOR SOLUTION INTRAVENOUS at 08:22

## 2022-06-28 RX ADMIN — AMIODARONE HYDROCHLORIDE 200 MG: 200 TABLET ORAL at 08:07

## 2022-06-28 RX ADMIN — APIXABAN 2.5 MG: 2.5 TABLET, FILM COATED ORAL at 21:25

## 2022-06-28 RX ADMIN — FUROSEMIDE 20 MG/HR: 10 INJECTION, SOLUTION INTRAMUSCULAR; INTRAVENOUS at 01:28

## 2022-06-28 RX ADMIN — METOPROLOL TARTRATE 25 MG: 25 TABLET, FILM COATED ORAL at 08:07

## 2022-06-28 RX ADMIN — TETROFOSMIN 30 MILLICURIE: 1.38 INJECTION, POWDER, LYOPHILIZED, FOR SOLUTION INTRAVENOUS at 09:39

## 2022-06-28 RX ADMIN — MICONAZOLE NITRATE: 20 POWDER TOPICAL at 21:28

## 2022-06-28 RX ADMIN — METOLAZONE 5 MG: 5 TABLET ORAL at 12:17

## 2022-06-28 RX ADMIN — REGADENOSON 0.4 MG: 0.08 INJECTION, SOLUTION INTRAVENOUS at 09:39

## 2022-06-28 RX ADMIN — SODIUM CHLORIDE, PRESERVATIVE FREE 10 ML: 5 INJECTION INTRAVENOUS at 08:12

## 2022-06-28 RX ADMIN — POTASSIUM CHLORIDE 20 MEQ: 20 TABLET, EXTENDED RELEASE ORAL at 18:28

## 2022-06-28 RX ADMIN — FUROSEMIDE 20 MG/HR: 10 INJECTION, SOLUTION INTRAMUSCULAR; INTRAVENOUS at 07:19

## 2022-06-28 RX ADMIN — APIXABAN 2.5 MG: 2.5 TABLET, FILM COATED ORAL at 08:07

## 2022-06-28 ASSESSMENT — PAIN DESCRIPTION - LOCATION
LOCATION: BACK

## 2022-06-28 ASSESSMENT — PAIN DESCRIPTION - DESCRIPTORS
DESCRIPTORS: ACHING
DESCRIPTORS: ACHING;SORE
DESCRIPTORS: ACHING
DESCRIPTORS: ACHING

## 2022-06-28 ASSESSMENT — PAIN DESCRIPTION - ORIENTATION
ORIENTATION: MID;LOWER

## 2022-06-28 ASSESSMENT — PAIN SCALES - GENERAL
PAINLEVEL_OUTOF10: 5
PAINLEVEL_OUTOF10: 6
PAINLEVEL_OUTOF10: 5
PAINLEVEL_OUTOF10: 3
PAINLEVEL_OUTOF10: 10
PAINLEVEL_OUTOF10: 6
PAINLEVEL_OUTOF10: 7

## 2022-06-28 ASSESSMENT — PAIN DESCRIPTION - PAIN TYPE
TYPE: CHRONIC PAIN

## 2022-06-28 ASSESSMENT — PAIN DESCRIPTION - FREQUENCY: FREQUENCY: CONTINUOUS

## 2022-06-28 ASSESSMENT — PAIN DESCRIPTION - ONSET: ONSET: ON-GOING

## 2022-06-28 NOTE — PROGRESS NOTES
Occupational Therapy/ Physical Therapy  Off floor  Pt off floor in testing at time of attempt for OT/ PT eval. Will follow up later this date as schedule allows vs 6/29. Caitie Goss.  Quan Jeffrey, OTR/L #[id number]  Carlota Young, PT

## 2022-06-28 NOTE — PROGRESS NOTES
Hospitalist Progress Note      PCP: Tressa Restrepo DO    Date of Admission: 6/23/2022    HPI:  80 y.o. female who presents with complaints of worsening bilateral leg swelling, shortness of breath and weight gain of 14 pounds since her last admission 3 weeks ago. Patient also has worsening kidney numbers from her baseline and unable to see a nephrologist as outpatient until September this year. Patient has been declining overall over the past couple of months per family. She has been fatigued and sleeping a lot. Family is advised by her visiting home physician to bring her to the ER given abnormal labs and changes in clinical status. Denies fever, chills, chest or abdominal pain, nausea, vomiting, urinary symptoms or changes in bowel habits. Patient is compliant with her medications but does not make much urine now.     Patient has history of combined heart failure with ejection fraction of 53-09%, grade 3 diastolic dysfunction and moderate pulmonary hypertension on recent echocardiogram.  She is on Lasix 20 mg twice daily. Per patient's daughters at bedside patient is very compliant with her medications and uses only 500 mg of dietary sodium.     Patient has been hospitalized from 5/29 through 6/1 for atrial fibrillation with RVR and AGUILA    Subjective:    Reports leg swelling still present but better. SOB is mild. No cp.     Medications:  Reviewed    Infusion Medications    furosemide (LASIX) 1mg/ml infusion 20 mg/hr (06/28/22 1312)    sodium chloride       Scheduled Medications    metoprolol tartrate  25 mg Oral BID    lidocaine  1 patch TransDERmal Daily    amiodarone  200 mg Oral Daily    miconazole   Topical BID    apixaban  2.5 mg Oral BID    aspirin  81 mg Oral Daily    atorvastatin  80 mg Oral Nightly    Vitamin D  2,000 Units Oral Daily    sodium chloride flush  5-40 mL IntraVENous 2 times per day     PRN Meds: sodium chloride flush, sodium chloride, ondansetron **OR** ondansetron, polyethylene glycol, HYDROcodone-acetaminophen      Intake/Output Summary (Last 24 hours) at 6/28/2022 1444  Last data filed at 6/28/2022 1344  Gross per 24 hour   Intake 290 ml   Output 5050 ml   Net -4760 ml       Physical Exam Performed:    /72   Pulse 66   Temp 97.4 °F (36.3 °C) (Oral)   Resp 18   Ht 5' (1.524 m)   Wt 127 lb 13.9 oz (58 kg)   SpO2 95%   BMI 24.97 kg/m²     General appearance: No apparent distress  Respiratory:  Normal respiratory effort. Bibasilar creps  Cardiovascular: Regular rate and rhythm   Abdomen: Soft, non-tender, non-distended with normal bowel sounds. Musculoskeletal: 1-2+ le edema mostly in ankles  Neurologic:  Neurovascularly intact without any focal sensory/motor deficits. Grossly non-focal.  Psychiatric: Alert and oriented, thought content appropriate, normal insight    Labs:   No results for input(s): WBC, HGB, HCT, PLT in the last 72 hours. Recent Labs     06/26/22  0440 06/27/22  0448 06/28/22  0443   * 137 137   K 4.3 4.2 3.7   CL 96* 96* 91*   CO2 23 29 33*   BUN 67* 64* 63*   CREATININE 2.9* 2.8* 2.6*   CALCIUM 9.0 8.9 9.5   PHOS 4.7 5.1* 4.9     Urinalysis:      Lab Results   Component Value Date    NITRU Negative 06/23/2022    WBCUA 6-9 06/23/2022    BACTERIA 1+ 06/23/2022    RBCUA 0-2 06/23/2022    BLOODU Negative 06/23/2022    SPECGRAV 1.015 06/23/2022    GLUCOSEU Negative 06/23/2022       Radiology:  NM MYOCARDIAL SPECT REST EXERCISE OR RX   Final Result      US RENAL COMPLETE   Final Result      1. Bilateral cortical renal atrophic changes concordant with chronic medical renal disease and no evidence of hydronephrosis   2. Nonobstructive left renal nephrolithiasis   3. Benign left renal cyst               XR CHEST PORTABLE   Final Result      Patchy bilateral airspace disease. Correlate for pneumonia are less typical for pulmonary edema.            Assessment/Plan:    Active Hospital Problems    Diagnosis     CHF (congestive heart failure), NYHA class I, acute on chronic, combined ([facility] [address] [I50.43]      Priority: Medium   Recent echo w/ ef 25-30%. Currently on high dose lasix gtt per cardiology- close monitoring of I/o and daily weights. Replace k to keep >4. Hopefully can transition to oral diuretic today or am.  Stress test to exclude ischemia as cause of acute exacerbation done today is negative. Appreciate cards help. ARF w/ CKD stage 3:  Likely cardiorenal as creat improved w/ diuresis. Follow I/O and renal fnx. Afib w/ rvr:  S/p cardioversion on 6/24- cont amio/ metoprolol/ eliquis. Chronic pain:  Cont pta meds. DVT Prophylaxis: Eliquis  Diet: ADULT DIET; Regular; 4 carb choices (60 gm/meal);  Low Fat/Low Chol/High Fiber/2 gm Na  Code Status: Full Code    PT/OT Eval Status: Sonny Bear MD

## 2022-06-28 NOTE — PROGRESS NOTES
PM assessment completed and medications given. Patient is NPO at this time for a Stress Test.  Patient is A&O and denies any needs at this time. Standard safety measures in place.

## 2022-06-28 NOTE — FLOWSHEET NOTE
06/27/22 2018   Assessment   Charting Type Shift assessment   Psychosocial   Psychosocial (WDL) WDL   Neurological   Neuro (WDL) WDL   Level of Consciousness Alert (0)   [place] Coma Scale   Eye Opening 4   Best Verbal Response 5   Best Motor Response 6   [place] Coma Scale Score 15   HEENT (Head, Ears, Eyes, Nose, & Throat)   HEENT (WDL) X   Teeth Missing teeth   Respiratory   Respiratory Pattern Regular   Respiratory Depth Normal   Respiratory Quality/Effort Unlabored   Chest Assessment Chest expansion asymmetrical;Trachea midline   L Breath Sounds Clear   R Breath Sounds Clear   Cardiac   Cardiac (WDL) X   Cardiac Regularity Regular   Heart Sounds Murmur   Cardiac Rhythm Sinus rhythm   Cardiac Monitor   Telemetry Box Number [id number]   Telemetry Monitor Alarm Parameters 50/120   Gastrointestinal   Abdominal (WDL) WDL   RUQ Bowel Sounds Active   LUQ Bowel Sounds Active   RLQ Bowel Sounds Active   LLQ Bowel Sounds Active   Abdomen Inspection Soft   Genitourinary   Genitourinary (WDL) WDL   Peripheral Vascular   Peripheral Vascular (WDL) X   Edema Right lower extremity; Left lower extremity   RLE Edema Trace   LLE Edema Trace   Skin Integumentary    Skin Integumentary (WDL) X   Skin Condition/Temp Dry;Swollen/edematous   Skin Integrity Redness   Location under L breast   Skin Fold Management Yes   Dressing Site Breasts   Treatment Pharmaceutical   Date Applied 06/27/22   Musculoskeletal   Musculoskeletal (WDL) X   RL Extremity Swelling; Unsteady;Weakness   LL Extremity Swelling; Unsteady;Weakness

## 2022-06-28 NOTE — PROGRESS NOTES
Occupational Therapy  Facility/Department: [place] [address] [address]  Occupational Therapy Initial Assessment/ Treatment/ Discharge    Name: Omkar Rodas  : 1935  MRN: [id number]  Date of Service: 2022    Discharge Recommendations:     OT Equipment Recommendations  Equipment Needed: No   Omkar Rodas scored a 20/24 on the AM-PAC ADL Inpatient form. Current research shows that an AM-PAC score of 18 or greater is typically associated with a discharge to the patient's home setting. Based on the patient's AM-PAC score, and their current ADL deficits, it is recommended that the patient have 2-3 sessions per week of Occupational Therapy at d/c to increase the patient's independence. At this time, this patient demonstrates the endurance and safety to discharge home with home vs OP services and a follow up treatment frequency of 2-3x/wk. Please see assessment section for further patient specific details. If patient discharges prior to next session this note will serve as a discharge summary. Please see below for the latest assessment towards goals. Patient Diagnosis(es): The primary encounter diagnosis was AGUILA (acute kidney injury) ([address] A diagnosis of Acute on chronic combined systolic and diastolic congestive heart failure (HCC) was also pertinent to this visit. Past Medical History:  has a past medical history of Arthritis, Blood circulation, collateral, CAD (coronary artery disease), CHF (congestive heart failure) ([address] Confusion, GERD (gastroesophageal reflux disease), History of heart artery stent, Hyperlipidemia, Hypertension, Mitral valve prolapse, Myocardial infarct, old, and Thyroid disease. Past Surgical History:  has a past surgical history that includes Hysterectomy; Thyroidectomy; Colonoscopy; other surgical history (2018); pr office/outpt visit,procedure only (Right, 2018); eye surgery (Left, 2018); and pr office/outpt visit,procedure only (Left, 2018).     Treatment arrival, finishing breakfast. Agreeable to OT eval and treat. Social/Functional History  Social/Functional History  Lives With: Spouse (and son and daughter)  Type of Home: House  Home Layout: One level  Home Access:  (one porch step to enter)  Bathroom Shower/Tub: Tub/Shower unit  Bathroom Toilet: Standard  Bathroom Equipment: Shower chair  Home Equipment: [employer]  Has the patient had two or more falls in the past year or any fall with injury in the past year?: Yes (several in past 6 months)  Receives Help From: Family  ADL Assistance:  (assist from daughter with showering/dressing)  Homemaking Assistance:  (daughter performs)  Ambulation Assistance: Independent (uses rollator)  Transfer Assistance: Independent  Active : No  Patient's  Info: family plans to arrange transport now d/t visual deficits       Objective   Heart Rate: 66  Heart Rate Source: Monitor  BP: 124/72  BP Location: Left upper arm  BP Method: Automatic  Patient Position: Semi fowlers  MAP (Calculated): 89.33  Resp: 18  SpO2: 95 %  O2 Device: None (Room air)             Safety Devices  Type of Devices: Left in chair;Call light within reach; Chair alarm in place     Toilet Transfers  Toilet - Technique: Ambulating  Equipment Used: Grab bars  Toilet Transfer: Contact guard assistance  Toilet Transfers Comments: to stand from low surface     ADL  Feeding: Beverage management; Independent  Grooming: Stand by assistance  Grooming Skilled Clinical Factors: standing at sink for hand hygiene, washing face  LE Dressing: Setup  LE Dressing Skilled Clinical Factors: doffing socks/ donning shoes  Toileting: Stand by assistance     Activity Tolerance  Activity Tolerance: Patient tolerated treatment well;Patient limited by fatigue     Transfers  Sit to stand: Stand by assistance  Stand to sit: Stand by assistance  Vision - Basic Assessment  Prior Vision: Wears glasses all the time  Cognition  Overall Cognitive Status: [facility] Education Given To: Patient; Family  Education Provided: Role of Therapy;Plan of Care  Education Method: Verbal  Barriers to Learning: None  Education Outcome: Verbalized understanding       Treatment included functional transfer training, ADLs, and patient education. AM-PAC Score        AM-PAC Inpatient Daily Activity Raw Score: 20 (06/28/22 1223)  AM-PAC Inpatient ADL T-Scale Score : 42.03 (06/28/22 1223)  ADL Inpatient CMS 0-100% Score: 38.32 (06/28/22 1223)  ADL Inpatient CMS G-Code Modifier : CJ (06/28/22 1223)    Goals  Patient Goals   Patient goals : to go home       Therapy Time   Individual Concurrent Group Co-treatment   Time In 1130         Time Out 1210         Minutes 40         Timed Code Treatment Minutes: 25 Minutes (+15 min eval)    Vane ROOT  [address] [facility], OTR/L [id number]

## 2022-06-28 NOTE — PROGRESS NOTES
Cardiology Consult Service  Daily Progress Note        Admit Date:  6/23/2022  Primary cardiologist: Dr Gregorio Nunes    Reason for Consultation/Chief Complaint: AHF, AFRVR    Subjective:     Bear Dykes is a 80 y.o. female with a past medical history of CAD status post stent 2018, HTN, HLP, CKD stage IV, HFrEF with EF 25%, mitral valve prolapse with moderate MR, VT, LBBB, PAFRVR, recurrent ischemic CVA's.     LHC 11/6/2018: Proximal LAD 80% stenosed status post stent x1, otherwise normal coronaries, LVEF 40%.    Echo 2014: LVEF 50%.    Echo 10/2018: LVEF 35-40%.    Echo 03/19/2022: Normal LV, LVEF 37-42%, diastolic grade 3, severe LAE, moderate MR, severe MAC, normal RV, RVSP 51 (8), negative bubble study.  Per personal review of images, there is no apical clot with IV contrast, posterior mitral valve leaflet with a large echodensity measuring 2.4 x 2.4 cm, cannot exclude severe MAC vs clot vs mass (less likely vegetation in view of no fevers and normal WBC)     Status post ILR ([employer] Reveal) by Dr. Melissa Rod 3/21/22.      Patient presented to the emergency room on 6/23 with progressively worse shortness of breath, weight gain 14 pounds and bilateral lower extremity swelling over the last 3 weeks. She was afebrile, in AF -120-120 bpm, low normal BP /70's millimeters of mercury, normal oxygen saturation on room air. Creatinine 2.9 (up from baseline 1.6), proBNP 11,000, troponin 0.01, hemoglobin 9.4, chest x-ray consistent with patchy bilateral infiltrates.     ECG consistent with  bpm, occasional PVCs versus aberrancy, LBBB (122 ms).      Patient was admitted for acute on chronic HFrEF, PAF RVR, AGUILA.      Status post successful DC cardioversion 6/24/2022. Home diuretics: Lasix 20 mg twice daily    Interval history:  Patient with some improvement with her lower extremity edema. Creatinine down to 2.6 from 2.8.     Objective:     Medications:   metoprolol tartrate  25 mg Oral BID    lidocaine  1 patch TransDERmal Daily    amiodarone  200 mg Oral Daily    miconazole   Topical BID    apixaban  2.5 mg Oral BID    aspirin  81 mg Oral Daily    atorvastatin  80 mg Oral Nightly    Vitamin D  2,000 Units Oral Daily    sodium chloride flush  5-40 mL IntraVENous 2 times per day       IV drips:   furosemide (LASIX) 1mg/ml infusion 20 mg/hr (06/28/22 1312)    sodium chloride         PRN:  sodium chloride flush, sodium chloride, ondansetron **OR** ondansetron, polyethylene glycol, HYDROcodone-acetaminophen    Vitals:    06/28/22 0416 06/28/22 0759 06/28/22 0837 06/28/22 1222   BP: 123/76 (!) 148/87  124/72   Pulse: 64 74  66   Resp: 20 18 18 18   Temp: 97.2 °F (36.2 °C) 97.2 °F (36.2 °C)  97.4 °F (36.3 °C)   TempSrc: Oral Oral  Oral   SpO2: 96% 93%  95%   Weight:       Height:           Intake/Output Summary (Last 24 hours) at 6/28/2022 1449  Last data filed at 6/28/2022 1344  Gross per 24 hour   Intake 290 ml   Output 5050 ml   Net -4760 ml     I/O last 3 completed shifts: In: 1482.2 [P.O.:720; I.V.:762.2]  Out: 6975 [Urine:6975]  Wt Readings from Last 3 Encounters:   06/28/22 127 lb 13.9 oz (58 kg)   06/10/22 137 lb (62.1 kg)   06/08/22 137 lb 12.8 oz (62.5 kg)       Admit Wt: Weight: 138 lb (62.6 kg)   Todays Wt: Weight: 127 lb 13.9 oz (58 kg)        Physical Exam:         General Appearance:  Alert, cooperative, no distress, appears stated age Appropriate weight   Head:  Normocephalic, without obvious abnormality, atraumatic   Eyes:  PERRL, conjunctiva/corneas clear EOM intact  Ears normal   Throat no lesions       Nose: Nares normal, no drainage or sinus tenderness   Throat: Lips, mucosa, and tongue normal   Neck: Supple, symmetrical, trachea midline, no adenopathy, thyroid: not enlarged, symmetric, no tenderness/mass/nodules, no carotid bruit. Lungs:   Normal respiratory rate, lungs with basilar ronchi bilaterally.     Chest Wall:  No tenderness or deformity   Heart:  Regular rhythm, rate is controlled, S1, S2 normal, there is no murmur, there is no rub or gallop, cannot assess jvd, 2+ bilateral lower extremity edema   Abdomen:   Soft, non-tender, bowel sounds active all four quadrants,  no masses, no organomegaly       Extremities: Extremities normal, atraumatic, no cyanosis. Pulses: 2+ and symmetric   Skin: Skin color, texture, turgor normal, no rashes or lesions   Pysch: Normal mood and affect   Neurologic: Normal gross motor and sensory exam.  Cranial nerves intact       Labs:   Recent Labs     06/26/22  0440 06/27/22  0448 06/28/22  0443   * 137 137   K 4.3 4.2 3.7   BUN 67* 64* 63*   CREATININE 2.9* 2.8* 2.6*   CL 96* 96* 91*   CO2 23 29 33*   GLUCOSE 96 88 99   CALCIUM 9.0 8.9 9.5   MG 2.60* 2.40 2.20     No results for input(s): WBC, HGB, HCT, PLT, MCV in the last 72 hours. No results for input(s): CHOLTOT, TRIG, HDL, CHOLHDL, LDL in the last 72 hours. Invalid input(s): [place]  No results for input(s): PTT, INR in the last 72 hours. Invalid input(s): PT  No results for input(s): CKTOTAL, CKMB, CKMBINDEX, TROPONINI in the last 72 hours. No results for input(s): BNP in the last 72 hours. No results for input(s): NTPROBNP in the last 72 hours. No results for input(s): TSH in the last 72 hours. Imaging:       Assessment & Plan:     1. Acute on chronic HFrEF. Patient appears volume overloaded and hemodynamically tenuous. Her systolic heart failure is most likely mixed in etiology (ischemic, CAD and nonischemic, tachycardia induced cardiomyopathy). 2.  CAD status post stent. There is no evidence of ACS with this admission. 3.  History of recurrent ischemic strokes, due to AF. 4.  PAF RVR. EP has been consulted, status post DC cardioversion, now in sinus  5. HTN  6. AGUILA on CKD. It is due to volume overload.           -  Cw Lasix drip to 20 mg/h and give metolazone 5 x 1; consider switching to p.o. diuretics tomorrow.   -  Continue with Lopressor 25 mg twice daily  - Continue with Eliquis 2.5 twice daily, baby aspirin, Lipitor 80 mg daily  - Strict I's and O's every shift and standing weights if possible, low-salt diet, daily BMP with reflex to Mg (correct lytes for goals K >4.0 and Mg > 2.0) and wean supplemental oxygen to off (or down to baseline supplemental oxygen requirements) for sats greater than 92-94%. -Continue with amiodarone 200 mg daily. I have spent 35 minutes of face to face time with the patient with more than 50% spent counseling and coordinating care. I have personally reviewed the reports and images of labs, radiological studies, cardiac studies including ECG's and telemetry, current and old medical records. The note was completed using EMR and Dragon dictation system. Every effort was made to ensure accuracy; however, inadvertent computerized transcription errors may be present. All questions and concerns were addressed to the patient/family. Alternatives to my treatment were discussed. Thank you for allowing to us to participate in the care or Dennis Singh. Please call our service with questions.     Pastora Dee MD, [facility], [address]  The [address]  [address] [address]  Ph: [phone]  Fax: [phone]

## 2022-06-28 NOTE — CARE COORDINATION
CM following. Pt is from home with daughter and [facility] with [facility]. Pt remains on lasix gtt, Cards following, EP consulted.      Melinda Martinez RN, BSN, [address]  Case Management Department  [phone]

## 2022-06-28 NOTE — PROGRESS NOTES
ILR for CVA. Remote transmission received from patient's monitor at home. Since 05.26.22:  3 Symptom events w available ECGs illustrating what appears to be AF w RVR and ectopy. 2 Tachy events w available ECGs illustrating what appears to be AF w RVR/SVT, as well as some noise/artifact oversensing. 18 AF events, 99.7% burden, w ECGs illustrating what appears to be AF. PVC burden 7.4%. Pt on Lopressor, Eliquis, and now amiodarone s/p DCCV 06.24.22. End of 31-day monitoring period 6/27/22. EP physician to review. See PACEART report under Cardiology tab. Will continue to monitor remotely.

## 2022-06-28 NOTE — PROGRESS NOTES
Electrophysiology - PROGRESS NOTE    Admit Date: 6/23/2022     Chief Complaint: AF/RVR     Interval History:   Patient seen and examined and notes reviewed. Patient is being followed for AF/RVR. Patient admitted with AF/RVR. S/p DCCV to NSR on 6/24/22. Planned for MPI d/t low EF however ate. Currently on a lasix gtt and neg 2.7 L. Remains with BLE edema - 1-2+. Denies CP or SOB. S/p MPI that showed no evidence of myocardial ischemia or scar. Remains in NSR with occasional PVCs, couplets and triplets overnight. In: 694.8 [P.O.:240;  I.V.:454.8]  Out: 4600    Wt Readings from Last 2 Encounters:   06/28/22 127 lb 13.9 oz (58 kg)   06/10/22 137 lb (62.1 kg)       Data:   Scheduled Meds:   Scheduled Meds:   metoprolol tartrate  25 mg Oral BID    lidocaine  1 patch TransDERmal Daily    amiodarone  200 mg Oral Daily    miconazole   Topical BID    apixaban  2.5 mg Oral BID    aspirin  81 mg Oral Daily    atorvastatin  80 mg Oral Nightly    Vitamin D  2,000 Units Oral Daily    sodium chloride flush  5-40 mL IntraVENous 2 times per day     Continuous Infusions:   furosemide (LASIX) 1mg/ml infusion 20 mg/hr (06/28/22 0719)    sodium chloride       PRN Meds:.technetium tetrofosmin, technetium tetrofosmin, regadenoson, sodium chloride flush, sodium chloride, ondansetron **OR** ondansetron, polyethylene glycol, HYDROcodone-acetaminophen  Continuous Infusions:   furosemide (LASIX) 1mg/ml infusion 20 mg/hr (06/28/22 0719)    sodium chloride         Intake/Output Summary (Last 24 hours) at 6/28/2022 0817  Last data filed at 6/28/2022 0500  Gross per 24 hour   Intake 694.82 ml   Output 4600 ml   Net -3905.18 ml       CBC:   Lab Results   Component Value Date    WBC 7.1 06/23/2022    HGB 9.4 06/23/2022     06/23/2022     BMP:  Lab Results   Component Value Date     06/28/2022    K 3.7 06/28/2022    K 4.3 06/23/2022    CL 91 06/28/2022    CO2 33 06/28/2022    BUN 63 06/28/2022    CREATININE 2.6 06/28/2022    GLUCOSE 99 06/28/2022     INR:   Lab Results   Component Value Date    INR 1.35 05/29/2022    INR 1.02 03/18/2022    INR 1.02 11/20/2021        CARDIAC LABS  ENZYMES:No results for input(s): CKMB, CKMBINDEX, TROPONINI in the last 72 hours. Invalid input(s): CKTOTAL;3  FASTING LIPID PANEL:  Lab Results   Component Value Date    HDL 45 06/24/2022    LDLCALC 43 06/24/2022    TRIG 80 06/24/2022     LIVER PROFILE:  Lab Results   Component Value Date    AST 37 06/23/2022    AST 44 05/29/2022    ALT 36 06/23/2022    ALT 51 05/29/2022       -----------------------------------------------------------------  Telemetry: Personally reviewed  NSR, PVCs, NSVT - 4 beats    Objective:   Vitals: BP (!) 148/87   Pulse 74   Temp 97.2 °F (36.2 °C) (Oral)   Resp 18   Ht 5' (1.524 m)   Wt 127 lb 13.9 oz (58 kg)   SpO2 93%   BMI 24.97 kg/m²   General appearance: alert, appears stated age and cooperative, No acute distress   Eyes: Conjunctiva and pupils normal and reactive  Skin: Skin color, texture, turgor normal. No rashes or ecchymosis.   Neck: no JVD, supple, symmetrical, trachea midline   Lungs: , no accessory muscle use, no respiratory distress  Heart: RRR  Abdomen: soft, non-tender; bowel sounds normal  Extremities: 1-2+ BLE edema, DP +  Psychiatric: normal insight and affect    Patient Active Problem List:     Other and unspecified angina pectoris     Coronary atherosclerosis of native coronary artery     Other chronic pulmonary heart diseases     Mitral valve disorder     Acute combined systolic and diastolic heart failure (HCC)     Chronic low back pain     Essential hypertension     AGUILA (acute kidney injury) ([facility]     Renal insufficiency     Gout     Pain in joint, hand     Mixed hyperlipidemia     Primary insomnia     Closed stable burst fracture of first lumbar vertebra (HCC)     GERD (gastroesophageal reflux disease)     Palpitations     V-tach ([address] Abnormal myocardial perfusion study     Ischemic cardiomyopathy     Cardiomyopathy ([facility]     PVD (posterior vitreous detachment), both eyes     Posterior subcapsular polar age-related cataract of both eyes     Confusion     Chronic kidney disease, stage IV (severe) (HCC)     Visual field defect     Stroke of unknown etiology ([facility]     Encounter for loop recorder check     Atrial fibrillation (HCC)     CHF (congestive heart failure), NYHA class I, acute on chronic, combined ([facility]        Assessment & Plan:      1. AF/RVR  2. NICMP  3. Chronic sCHF  4. CAD  5. On [facility]  6. On amio    79 y/o woman with a h/o HTN, HLD, thyroid disease, GERD, CKD stage IV, MVP, CAD, s/p MI, s/p PCI (2018), grade III DD, NICMP, chronic sCHF, EF 25-30%, pAF on Eliquis, who p/w AF/RVR, s/p DCCV to NSR (6/24/2022), placed on amio 200 mg QD. [id number] 6. TSH 1.89 (8/20/21). AF  - In NSR - occasional PVCs, couplets, triplets  - S/p DCCV to NSR 6/24/2022  - On Eliquis 2.5 mg BID (age, Cr) - no s/s bleeding - continue  - On amio 200 mg QD - screening labs are stable  - On metoprolol 25 mg BID  - Reviewed risk factors, pathophysiology, treatment options and lifestyle modification for atrial fibrillation: Blood pressure control, blood sugar control, healthy diet, minimal alcohol intake, no smoking, activity and exercise, manage stress sleep apnea evaluation and symptoms of a stroke. - Keep K+ > 4.0 and Mg > 2.0 - replacement ordered  - Reviewed recent labs  - Echo - [place] - 4.8/96.1    CAD  - No s/s  - On ASA, statin  - MPI showed no reversible ischemia    NICMP  - EF 25-30%  - NYHA class II/III  -   - On metoprolol 25 mg BID - change to long acting on d/c  - No ACE/ARB/ARNI/SGL2 due to CKD.    - On a lasix gtt - neg 2.7L  - Dr. Mey Samuels following      [facility] [address]      I spent a total of 35 minutes in care of the patient and greater than 50% of the time was spent counseling with Sallie Pitt and coordinating care regarding their diagnosis, treatments and plan of care.

## 2022-06-28 NOTE — PLAN OF CARE
Problem: Discharge Planning  Goal: Discharge to home or other facility with appropriate resources  Outcome: Progressing     Problem: Safety - Adult  Goal: Free from fall injury  Outcome: Progressing     Problem: ABCDS Injury Assessment  Goal: Absence of physical injury  Outcome: Progressing     Problem: Chronic Conditions and Co-morbidities  Goal: Patient's chronic conditions and co-morbidity symptoms are monitored and maintained or improved  Outcome: Progressing     Problem: Respiratory - Adult  Goal: Achieves optimal ventilation and oxygenation  Outcome: Progressing     Problem: Cardiovascular - Adult  Goal: Maintains optimal cardiac output and hemodynamic stability  Outcome: Progressing  Goal: Absence of cardiac dysrhythmias or at baseline  Outcome: Progressing     Problem: Metabolic/Fluid and Electrolytes - Adult  Goal: Electrolytes maintained within normal limits  Outcome: Progressing  Goal: Hemodynamic stability and optimal renal function maintained  Outcome: Progressing

## 2022-06-28 NOTE — PROGRESS NOTES
Plan:     Stable creatinine at 2.6  Urine is 4600 ml    BP is stable   Weight is 140 > 137> 127 pounds. On lasix drip and prn Zaroxolyn . Will switch to oral and prepare for Discharge     Overall seems to be doing better at this time      Assessment:      Cardiorenal syndrome, volume overloaded ( bnp 11,182)  -given EF on last echo   - Cr. fluctuation along w/ elevated JVD  -    AGUILA on stage IV CKD   - Cr. Baseline 1.5, and admitted with 2.8  - avoid nephrotoxic agents     Rule out secondary causes ( complains of foamy urine). -UA  -Renal US with normal sized kidneys and echogenicity and L cyst  -check free light chains       [facility] would like to thank Parker Linares MD   for opportunity to serve this patient      Please call with questions at-   24 Hrs Answering service [phone] or  7 am- 5 pm via [employer] or cell phone  Dr. Aida Hutson for consult :   Worsening kidney function     HPI :     Patient presented to the hospital with c/o BLE edema, abnormal labs, 14 lb wt gain. BNP >11,000, Cr 2.9, noted to be in AF/RVR overnight with episode of NSVT x 24 beats. HR up to 140's. She is complaining of feeling bloated, BLE edema and fatigue. No complaints of palpitations, CP. States she has been compliant with her medications at home. Currently eating breakfast. Daughter present at bedside      ROS:     positives in bold   Constitutional:  fever, chills, weakness, weight change, fatigue  Skin:  rash, pruritus, hair loss, bruising, dry skin, petechiae  Head, Face, Neck   headaches, swelling,  cervical adenopathy  Respiratory: shortness of breath, cough, or wheezing  Cardiovascular: chest pain, palpitations, dizzy, edema  Gastrointestinal: nausea, vomiting, diarrhea, constipation,belly pain    Yellow skin, blood in stool  Musculoskeletal:  back pain, muscle weakness, gait problems,       joint pain or swelling.   Genitourinary:  dysuria, poor urine flow, flank pain, blood in urine  Neurologic:  vertigo, TIA'S, syncope, seizures, focal weakness  Psychosocial:  insomnia, anxiety, or depression. All other remaining systems are negative or unable to obtain    PMH/PSH/SH/Family History:     Past Medical History:   Diagnosis Date    Arthritis     Blood circulation, collateral     CAD (coronary artery disease)     CHF (congestive heart failure) (HCC)     Confusion 8/31/2020    GERD (gastroesophageal reflux disease)     History of heart artery stent 12/2018    Hyperlipidemia     Hypertension     Mitral valve prolapse     Myocardial infarct, old     Thyroid disease        Past Surgical History:   Procedure Laterality Date    COLONOSCOPY      EYE SURGERY Left 09/04/2018    PHACO EMULSIFICATION OF CATARACT WITH INTRAOCULAR LENS IMPLANT LEFT EYE     HYSTERECTOMY (CERVIX STATUS UNKNOWN)      OTHER SURGICAL HISTORY  08/27/2018    phacoemulsification of cataract with intraocular lens implant right eye    [place] OFFICE/OUTPT VISIT,PROCEDURE ONLY Right 8/27/2018    PHACO EMULSIFICATION OF CATARACT WITH INTRAOCULAR LENS IMPLANT RIGHT EYE performed by Ras Rivas MD at [facility] OFFICE/OUTPT [facility] Left 9/4/2018    PHACO EMULSIFICATION OF CATARACT WITH INTRAOCULAR LENS IMPLANT LEFT EYE performed by Ras Rivas MD at [address] [place] [address] Dr          reports that she has never smoked. She has never used smokeless tobacco. She reports that she does not drink alcohol and does not use drugs. family history includes Cancer in her father and sister; Diabetes in her brother; Heart Disease in her father and mother; Stroke in her brother.      Medication:     Current Facility-Administered Medications: technetium tetrofosmin (Tc-MYOVIEW) injection 10 millicurie, 10 millicurie, IntraVENous, ONCE PRN  technetium tetrofosmin (Tc-MYOVIEW) injection 30 millicurie, 30 millicurie, IntraVENous, ONCE PRN  regadenoson (LEXISCAN) injection 0.4 mg, 0.4 mg, IntraVENous, ONCE PRN  furosemide (LASIX) 100 mg in dextrose 5 % 100 mL infusion, 20 mg/hr, IntraVENous, Continuous  metoprolol tartrate (LOPRESSOR) tablet 25 mg, 25 mg, Oral, BID  lidocaine 4 % external patch 1 patch, 1 patch, TransDERmal, Daily  amiodarone (CORDARONE) tablet 200 mg, 200 mg, Oral, Daily  miconazole (MICOTIN) 2 % powder, , Topical, BID  apixaban (ELIQUIS) tablet 2.5 mg, 2.5 mg, Oral, BID  aspirin chewable tablet 81 mg, 81 mg, Oral, Daily  atorvastatin (LIPITOR) tablet 80 mg, 80 mg, Oral, Nightly  vitamin D (CHOLECALCIFEROL) tablet 2,000 Units, 2,000 Units, Oral, Daily  sodium chloride flush 0.9 % injection 5-40 mL, 5-40 mL, IntraVENous, 2 times per day  sodium chloride flush 0.9 % injection 5-40 mL, 5-40 mL, IntraVENous, PRN  0.9 % sodium chloride infusion, , IntraVENous, PRN  ondansetron (ZOFRAN-ODT) disintegrating tablet 4 mg, 4 mg, Oral, Q8H PRN **OR** ondansetron (ZOFRAN) injection 4 mg, 4 mg, IntraVENous, Q6H PRN  polyethylene glycol (GLYCOLAX) packet 17 g, 17 g, Oral, Daily PRN  HYDROcodone-acetaminophen (NORCO) 5-325 MG per tablet 2 tablet, 2 tablet, Oral, Q6H PRN       Vitals :     BP (!) 148/87   Pulse 74   Temp 97.2 °F (36.2 °C) (Oral)   Resp 18   Ht 5' (1.524 m)   Wt 127 lb 13.9 oz (58 kg)   SpO2 93%   BMI 24.97 kg/m²       I & O :       Intake/Output Summary (Last 24 hours) at 6/28/2022 6711  Last data filed at 6/28/2022 0500  Gross per 24 hour   Intake 694.82 ml   Output 4600 ml   Net -3905.18 ml        Physical Examination :     General appearance: Anxious- no, distressed- no, in good spirits- yes  HEENT: Lips- normal, teeth- ok , oral mucosa- moist  Neck : Mass- no, appears symmetrical, JVD- +  Respiratory: Respiratory effort-  , wheeze- no, crackles -   Cardiovascular:  Ausculation- +  M/R/G, bl ext Edema 3 + Pitting   Abdomen: visible mass- no, distention- no, scar- no, tenderness- no                            hepatosplenomegaly-  no  Musculoskeletal:  clubbing no,cyanosis- no , digital ischemia- no                           muscle strength- grossly normal , tone - grossly normal  Skin: rashes- no , ulcers- no, induration- no, tightening - no  Psychiatric:  Judgement and insight- normal           AAO X 3     LABS:     No results for input(s): WBC, HGB, HCT, PLT in the last 72 hours.   Recent Labs     06/26/22  0440 06/27/22  0448 06/28/22  0443   * 137 137   K 4.3 4.2 3.7   CL 96* 96* 91*   CO2 23 29 33*   BUN 67* 64* 63*   CREATININE 2.9* 2.8* 2.6*   GLUCOSE 96 88 99   MG 2.60* 2.40 2.20   PHOS 4.7 5.1* 4.9          Thanks  Nephrology  [address], [address]  Office: [phone]  Fax: [phone]

## 2022-06-29 ENCOUNTER — TELEPHONE (OUTPATIENT)
Dept: FAMILY MEDICINE CLINIC | Age: 87
End: 2022-06-29

## 2022-06-29 VITALS
RESPIRATION RATE: 18 BRPM | BODY MASS INDEX: 23.11 KG/M2 | WEIGHT: 117.73 LBS | HEIGHT: 60 IN | OXYGEN SATURATION: 94 % | SYSTOLIC BLOOD PRESSURE: 133 MMHG | HEART RATE: 62 BPM | DIASTOLIC BLOOD PRESSURE: 74 MMHG | TEMPERATURE: 97.3 F

## 2022-06-29 LAB
ALBUMIN SERPL-MCNC: 3.5 G/DL (ref 3.4–5)
ANION GAP SERPL CALCULATED.3IONS-SCNC: 17 MMOL/L (ref 3–16)
BUN BLDV-MCNC: 58 MG/DL (ref 7–20)
CALCIUM SERPL-MCNC: 9.9 MG/DL (ref 8.3–10.6)
CHLORIDE BLD-SCNC: 87 MMOL/L (ref 99–110)
CO2: 33 MMOL/L (ref 21–32)
CREAT SERPL-MCNC: 2.3 MG/DL (ref 0.6–1.2)
GFR AFRICAN AMERICAN: 24
GFR NON-AFRICAN AMERICAN: 20
GLUCOSE BLD-MCNC: 95 MG/DL (ref 70–99)
PHOSPHORUS: 4 MG/DL (ref 2.5–4.9)
POTASSIUM SERPL-SCNC: 3.5 MMOL/L (ref 3.5–5.1)
PRO-BNP: 9949 PG/ML (ref 0–449)
SODIUM BLD-SCNC: 137 MMOL/L (ref 136–145)
URINE ELECTROPHORESIS INTERP: NORMAL

## 2022-06-29 PROCEDURE — 6370000000 HC RX 637 (ALT 250 FOR IP): Performed by: INTERNAL MEDICINE

## 2022-06-29 PROCEDURE — 2580000003 HC RX 258: Performed by: INTERNAL MEDICINE

## 2022-06-29 PROCEDURE — 80069 RENAL FUNCTION PANEL: CPT

## 2022-06-29 PROCEDURE — 99233 SBSQ HOSP IP/OBS HIGH 50: CPT | Performed by: NURSE PRACTITIONER

## 2022-06-29 PROCEDURE — 36415 COLL VENOUS BLD VENIPUNCTURE: CPT

## 2022-06-29 PROCEDURE — 83880 ASSAY OF NATRIURETIC PEPTIDE: CPT

## 2022-06-29 RX ORDER — METOPROLOL SUCCINATE 50 MG/1
50 TABLET, EXTENDED RELEASE ORAL DAILY
Qty: 30 TABLET | Refills: 3 | Status: SHIPPED | OUTPATIENT
Start: 2022-06-30 | End: 2022-07-18 | Stop reason: SDUPTHER

## 2022-06-29 RX ORDER — AMIODARONE HYDROCHLORIDE 200 MG/1
200 TABLET ORAL DAILY
Qty: 30 TABLET | Refills: 0 | Status: SHIPPED | OUTPATIENT
Start: 2022-06-30 | End: 2022-07-18 | Stop reason: SDUPTHER

## 2022-06-29 RX ORDER — FUROSEMIDE 40 MG/1
40 TABLET ORAL 2 TIMES DAILY
Status: DISCONTINUED | OUTPATIENT
Start: 2022-06-29 | End: 2022-06-29 | Stop reason: HOSPADM

## 2022-06-29 RX ORDER — METOPROLOL SUCCINATE 50 MG/1
50 TABLET, EXTENDED RELEASE ORAL DAILY
Status: DISCONTINUED | OUTPATIENT
Start: 2022-06-30 | End: 2022-06-29 | Stop reason: HOSPADM

## 2022-06-29 RX ORDER — CALCIUM CARBONATE 200(500)MG
500 TABLET,CHEWABLE ORAL 3 TIMES DAILY PRN
Status: DISCONTINUED | OUTPATIENT
Start: 2022-06-29 | End: 2022-06-29 | Stop reason: HOSPADM

## 2022-06-29 RX ORDER — FUROSEMIDE 40 MG/1
40 TABLET ORAL 2 TIMES DAILY
Qty: 60 TABLET | Refills: 3 | Status: ON HOLD | OUTPATIENT
Start: 2022-06-29 | End: 2022-07-08 | Stop reason: HOSPADM

## 2022-06-29 RX ORDER — LIDOCAINE 4 G/G
1 PATCH TOPICAL DAILY
Qty: 10 PATCH | Refills: 0 | Status: SHIPPED | OUTPATIENT
Start: 2022-06-30 | End: 2022-07-18 | Stop reason: SDUPTHER

## 2022-06-29 RX ADMIN — APIXABAN 2.5 MG: 2.5 TABLET, FILM COATED ORAL at 08:48

## 2022-06-29 RX ADMIN — ASPIRIN 81 MG: 81 TABLET, CHEWABLE ORAL at 08:48

## 2022-06-29 RX ADMIN — AMIODARONE HYDROCHLORIDE 200 MG: 200 TABLET ORAL at 08:48

## 2022-06-29 RX ADMIN — METOPROLOL TARTRATE 25 MG: 25 TABLET, FILM COATED ORAL at 08:48

## 2022-06-29 RX ADMIN — HYDROCODONE BITARTRATE AND ACETAMINOPHEN 2 TABLET: 5; 325 TABLET ORAL at 05:48

## 2022-06-29 RX ADMIN — MICONAZOLE NITRATE: 20 POWDER TOPICAL at 08:50

## 2022-06-29 RX ADMIN — Medication 2000 UNITS: at 08:48

## 2022-06-29 RX ADMIN — HYDROCODONE BITARTRATE AND ACETAMINOPHEN 2 TABLET: 5; 325 TABLET ORAL at 11:32

## 2022-06-29 RX ADMIN — SODIUM CHLORIDE, PRESERVATIVE FREE 10 ML: 5 INJECTION INTRAVENOUS at 08:48

## 2022-06-29 ASSESSMENT — PAIN DESCRIPTION - LOCATION
LOCATION: BACK

## 2022-06-29 ASSESSMENT — PAIN DESCRIPTION - ORIENTATION
ORIENTATION: LOWER

## 2022-06-29 ASSESSMENT — PAIN SCALES - GENERAL
PAINLEVEL_OUTOF10: 10
PAINLEVEL_OUTOF10: 10
PAINLEVEL_OUTOF10: 6
PAINLEVEL_OUTOF10: 6

## 2022-06-29 ASSESSMENT — PAIN DESCRIPTION - PAIN TYPE
TYPE: CHRONIC PAIN
TYPE: CHRONIC PAIN

## 2022-06-29 ASSESSMENT — PAIN DESCRIPTION - DESCRIPTORS
DESCRIPTORS: ACHING
DESCRIPTORS: ACHING
DESCRIPTORS: ACHING;SORE
DESCRIPTORS: ACHING

## 2022-06-29 ASSESSMENT — PAIN DESCRIPTION - FREQUENCY: FREQUENCY: CONTINUOUS

## 2022-06-29 ASSESSMENT — PAIN DESCRIPTION - ONSET: ONSET: ON-GOING

## 2022-06-29 NOTE — PLAN OF CARE
Problem: Discharge Planning  Goal: Discharge to home or other facility with appropriate resources  Outcome: Progressing  Note: Pt plans to go home with daughter. Problem: Pain  Goal: Verbalizes/displays adequate comfort level or baseline comfort level  Outcome: Progressing  Note: Pt getting PRN norco for chronic back pain. Pt states the pain is always there. Trying to make pt more comfortable with ambulation and frequent repositioning. Will continue to monitor. Problem: Safety - Adult  Goal: Free from fall injury  Outcome: Progressing  Note: No falls noted thus far this shift, bed in lowest position, alarm on, non-skid socks on, call light within reach, hourly checks, safety maintained, will continue to monitor. Problem: Cardiovascular - Adult  Goal: Maintains optimal cardiac output and hemodynamic stability  Outcome: Progressing  Lasix gtt infusing per order.       Problem: Respiratory - Adult  Goal: Achieves optimal ventilation and oxygenation  Outcome: Adequate for Discharge

## 2022-06-29 NOTE — PROGRESS NOTES
Plan:     Stable creatinine at 2.3  Urine is 5950 ml    BP is stable   Weight is 140 > 137> 127> 117 pounds. On lasix drip and prn Zaroxolyn . Consider switching to oral diuretics in anticipation for DC    Overall seems to be doing better at this time      Assessment:      Cardiorenal syndrome, volume overloaded ( bnp 11,182)  -given EF on last echo   - Cr. fluctuation along w/ elevated JVD  -    AGUILA on stage IV CKD   - Cr. Baseline 1.5, and admitted with 2.8  - avoid nephrotoxic agents     Rule out secondary causes ( complains of foamy urine). -UA  -Renal US with normal sized kidneys and echogenicity and L cyst  -check free light chains       [facility] would like to thank Jose Barthel, MD   for opportunity to serve this patient      Please call with questions at-   24 Hrs Answering service [phone] or  7 am- 5 pm via [employer] or cell phone  Dr. Esther Herrera for consult :   Worsening kidney function     HPI :     Patient presented to the hospital with c/o BLE edema, abnormal labs, 14 lb wt gain. BNP >11,000, Cr 2.9, noted to be in AF/RVR overnight with episode of NSVT x 24 beats. HR up to 140's. She is complaining of feeling bloated, BLE edema and fatigue. No complaints of palpitations, CP. States she has been compliant with her medications at home. Currently eating breakfast. Daughter present at bedside      ROS:     positives in bold   Constitutional:  fever, chills, weakness, weight change, fatigue  Skin:  rash, pruritus, hair loss, bruising, dry skin, petechiae  Head, Face, Neck   headaches, swelling,  cervical adenopathy  Respiratory: shortness of breath, cough, or wheezing  Cardiovascular: chest pain, palpitations, dizzy, edema  Gastrointestinal: nausea, vomiting, diarrhea, constipation,belly pain    Yellow skin, blood in stool  Musculoskeletal:  back pain, muscle weakness, gait problems,       joint pain or swelling.   Genitourinary:  dysuria, poor urine flow, flank pain, blood in urine  Neurologic:  vertigo, TIA'S, syncope, seizures, focal weakness  Psychosocial:  insomnia, anxiety, or depression. All other remaining systems are negative or unable to obtain    PMH/PSH/SH/Family History:     Past Medical History:   Diagnosis Date    Arthritis     Blood circulation, collateral     CAD (coronary artery disease)     CHF (congestive heart failure) (HCC)     Confusion 8/31/2020    GERD (gastroesophageal reflux disease)     History of heart artery stent 12/2018    Hyperlipidemia     Hypertension     Mitral valve prolapse     Myocardial infarct, old     Thyroid disease        Past Surgical History:   Procedure Laterality Date    COLONOSCOPY      EYE SURGERY Left 09/04/2018    PHACO EMULSIFICATION OF CATARACT WITH INTRAOCULAR LENS IMPLANT LEFT EYE     HYSTERECTOMY (CERVIX STATUS UNKNOWN)      OTHER SURGICAL HISTORY  08/27/2018    phacoemulsification of cataract with intraocular lens implant right eye    [place] OFFICE/OUTPT VISIT,PROCEDURE ONLY Right 8/27/2018    PHACO EMULSIFICATION OF CATARACT WITH INTRAOCULAR LENS IMPLANT RIGHT EYE performed by Josh [facility]en MD [facility] [address] [facility] OFF[facility]PT [facility] Left 9/4/2018    PHACO EMULSIFICATION OF CATARACT WITH INTRAOCULAR LENS IMPLANT LEFT EYE performed by Josh Steen MD at [address] [place] [address]          reports that she has never smoked. She has never used smokeless tobacco. She reports that she does not drink alcohol and does not use drugs. family history includes Cancer in her father and sister; Diabetes in her brother; Heart Disease in her father and mother; Stroke in her brother.      Medication:     Current Facility-Administered Medications: calcium carbonate (TUMS) chewable tablet 500 mg, 500 mg, Oral, TID PRN  furosemide (LASIX) 100 mg in dextrose 5 % 100 mL infusion, 20 mg/hr, IntraVENous, Continuous  metoprolol tartrate (LOPRESSOR) tablet 25 mg, 25 mg, Oral, BID  lidocaine 4 % external patch 1 patch, 1 patch, TransDERmal, Daily  amiodarone (CORDARONE) tablet 200 mg, 200 mg, Oral, Daily  miconazole (MICOTIN) 2 % powder, , Topical, BID  apixaban (ELIQUIS) tablet 2.5 mg, 2.5 mg, Oral, BID  aspirin chewable tablet 81 mg, 81 mg, Oral, Daily  atorvastatin (LIPITOR) tablet 80 mg, 80 mg, Oral, Nightly  vitamin D (CHOLECALCIFEROL) tablet 2,000 Units, 2,000 Units, Oral, Daily  sodium chloride flush 0.9 % injection 5-40 mL, 5-40 mL, IntraVENous, 2 times per day  sodium chloride flush 0.9 % injection 5-40 mL, 5-40 mL, IntraVENous, PRN  0.9 % sodium chloride infusion, , IntraVENous, PRN  ondansetron (ZOFRAN-ODT) disintegrating tablet 4 mg, 4 mg, Oral, Q8H PRN **OR** ondansetron (ZOFRAN) injection 4 mg, 4 mg, IntraVENous, Q6H PRN  polyethylene glycol (GLYCOLAX) packet 17 g, 17 g, Oral, Daily PRN  HYDROcodone-acetaminophen (NORCO) 5-325 MG per tablet 2 tablet, 2 tablet, Oral, Q6H PRN       Vitals :     /74   Pulse 62   Temp 97.3 °F (36.3 °C) (Oral)   Resp 18   Ht 5' (1.524 m)   Wt 117 lb 11.6 oz (53.4 kg)   SpO2 94%   BMI 22.99 kg/m²       I & O :       Intake/Output Summary (Last 24 hours) at 6/29/2022 6689  Last data filed at 6/29/2022 0746  Gross per 24 hour   Intake 720 ml   Output 6050 ml   Net -5330 ml        Physical Examination :     General appearance: Anxious- no, distressed- no, in good spirits- yes  HEENT: Lips- normal, teeth- ok , oral mucosa- moist  Neck : Mass- no, appears symmetrical, JVD- +  Respiratory: Respiratory effort-  , wheeze- no, crackles -   Cardiovascular:  Ausculation- +  M/R/G, bl ext Edema 3 + Pitting   Abdomen: visible mass- no, distention- no, scar- no, tenderness- no                            hepatosplenomegaly-  no  Musculoskeletal:  clubbing no,cyanosis- no , digital ischemia- no                           muscle strength- grossly normal , tone - grossly normal  Skin: rashes- no , ulcers- no, induration- no, tightening - no  Psychiatric:  Judgement and insight- normal           AAO X 3     LABS:     No results for input(s): WBC, HGB, HCT, PLT in the last 72 hours.   Recent Labs     06/27/22  0448 06/28/22  0443 06/29/22  0518    137 137   K 4.2 3.7 3.5   CL 96* 91* 87*   CO2 29 33* 33*   BUN 64* 63* 58*   CREATININE 2.8* 2.6* 2.3*   GLUCOSE 88 99 95   MG 2.40 2.20  --    PHOS 5.1* 4.9 4.0          Thanks  Nephrology  [address], [address]  Office: [phone]  Fax: [phone]

## 2022-06-29 NOTE — PLAN OF CARE
Problem: Chronic Conditions and Co-morbidities  Goal: Patient's chronic conditions and co-morbidity symptoms are monitored and maintained or improved  Outcome: Progressing     Problem: Cardiovascular - Adult  Goal: Maintains optimal cardiac output and hemodynamic stability  6/29/2022 1010 by Mildred Lehman RN  Outcome: Progressing  Flowsheets (Taken 6/29/2022 1010)  Maintains optimal cardiac output and hemodynamic stability:   Monitor blood pressure and heart rate   Monitor urine output and notify Licensed Independent Practitioner for values outside of normal range   Assess for signs of decreased cardiac output  6/29/2022 0251 by Benjamín Angeles RN  Outcome: Progressing

## 2022-06-29 NOTE — PROGRESS NOTES
Electrophysiology - PROGRESS NOTE    Admit Date: 6/23/2022     Chief Complaint: AF/RVR     Interval History:   Patient seen and examined and notes reviewed. Patient is being followed for AF/RVR. Patient admitted with AF/RVR. S/p DCCV to NSR on 6/24/22. Planned for MPI d/t low EF however ate. Currently on a lasix gtt and neg 2.7 L. Remains with BLE edema - 1-2+. Denies CP or SOB. S/p MPI that showed no evidence of myocardial ischemia or scar. Remains in NSR with occasional PVCs, couplets and triplets overnight. 2 runs of NSVT - longest 12 beats, shortest 8 beats. Denies chest pain or shortness of breath.     In: 80 [P.O.:780]  Out: 6450    Wt Readings from Last 2 Encounters:   06/29/22 117 lb 11.6 oz (53.4 kg)   06/10/22 137 lb (62.1 kg)       Data:   Scheduled Meds:   Scheduled Meds:   metoprolol tartrate  25 mg Oral BID    lidocaine  1 patch TransDERmal Daily    amiodarone  200 mg Oral Daily    miconazole   Topical BID    apixaban  2.5 mg Oral BID    aspirin  81 mg Oral Daily    atorvastatin  80 mg Oral Nightly    Vitamin D  2,000 Units Oral Daily    sodium chloride flush  5-40 mL IntraVENous 2 times per day     Continuous Infusions:   furosemide (LASIX) 1mg/ml infusion 20 mg/hr (06/28/22 1312)    sodium chloride       PRN Meds:.calcium carbonate, sodium chloride flush, sodium chloride, ondansetron **OR** ondansetron, polyethylene glycol, HYDROcodone-acetaminophen  Continuous Infusions:   furosemide (LASIX) 1mg/ml infusion 20 mg/hr (06/28/22 1312)    sodium chloride         Intake/Output Summary (Last 24 hours) at 6/29/2022 0810  Last data filed at 6/29/2022 0746  Gross per 24 hour   Intake 720 ml   Output 6050 ml   Net -5330 ml       CBC:   Lab Results   Component Value Date    WBC 7.1 06/23/2022    HGB 9.4 06/23/2022     06/23/2022     BMP:  Lab Results   Component Value Date     06/29/2022    K 3.5 06/29/2022    K 4.3 06/23/2022 CL 87 06/29/2022    CO2 33 06/29/2022    BUN 58 06/29/2022    CREATININE 2.3 06/29/2022    GLUCOSE 95 06/29/2022     INR:   Lab Results   Component Value Date    INR 3.20 06/24/2022    INR 3.60 06/24/2022    INR 1.35 05/29/2022        CARDIAC LABS  ENZYMES:No results for input(s): CKMB, CKMBINDEX, TROPONINI in the last 72 hours. Invalid input(s): CKTOTAL;3  FASTING LIPID PANEL:  Lab Results   Component Value Date    HDL 45 06/24/2022    LDLCALC 43 06/24/2022    TRIG 80 06/24/2022     LIVER PROFILE:  Lab Results   Component Value Date    AST 37 06/23/2022    AST 44 05/29/2022    ALT 36 06/23/2022    ALT 51 05/29/2022       -----------------------------------------------------------------  Telemetry: Personally reviewed  NSR, PVCs, NSVT - 12/8 beats    Objective:   Vitals: /74   Pulse 62   Temp 97.3 °F (36.3 °C) (Oral)   Resp 18   Ht 5' (1.524 m)   Wt 117 lb 11.6 oz (53.4 kg)   SpO2 94%   BMI 22.99 kg/m²   General appearance: alert, appears stated age and cooperative, No acute distress   Eyes: Conjunctiva and pupils normal and reactive  Skin: Skin color, texture, turgor normal. No rashes or ecchymosis.   Neck: no JVD, supple, symmetrical, trachea midline   Lungs: , no accessory muscle use, no respiratory distress  Heart: RRR  Abdomen: soft, non-tender; bowel sounds normal  Extremities: 1-2+ BLE edema, DP +  Psychiatric: normal insight and affect    Patient Active Problem List:     Other and unspecified angina pectoris     Coronary atherosclerosis of native coronary artery     Other chronic pulmonary heart diseases     Mitral valve disorder     Acute combined systolic and diastolic heart failure (HCC)     Chronic low back pain     Essential hypertension     AGUILA (acute kidney injury) ([address]     Renal insufficiency     Gout     Pain in joint, hand     Mixed hyperlipidemia     Primary insomnia     Closed stable burst fracture of first lumbar vertebra (HCC)     GERD (gastroesophageal reflux disease)

## 2022-06-29 NOTE — CARE COORDINATION
Case Management Assessment            Discharge Note                    Date / Time of Note: 6/29/2022 10:42 AM                  Discharge Note Completed by: Alaina Sharpe RN    Patient Name: Darrel Balderas   YOB: 1935  Diagnosis: AGUILA (acute kidney injury) ([facility] [N17.9]  Acute on chronic combined systolic and diastolic congestive heart failure ([facility] [I50.43]  CHF (congestive heart failure), NYHA class I, acute on chronic, combined ([facility] [I50.43]   Date / Time: 6/23/2022  5:32 PM    Current PCP: Clare Julian DO  Clinic patient: No    Hospitalization in the last 30 days: Yes    Advance Directives:  Code Status: Full Code  [place] DNR form completed and on chart: No    Financial:  Payor: MEDICARE / Plan: MEDICARE PART A AND B / Product Type: *No Product type* /      Pharmacy:    [address] [place] [address]  [address] [facility] [address] [place] [address] [address]  Phone: [phone] Fax: [address] [place] [address], [address] [place] [address] [facility] [phone] - F [phone]  [address] [place] [address]  Phone: [phone] Fax: [phone]      Assistance purchasing medications?: Potential Assistance Purchasing Medications: No  Assistance provided by Case Management: None at this time    Does patient want to participate in local refill/ meds to beds program?: Yes    Meds To Beds General Rules:  1. Can ONLY be done Monday- Friday between 8:30am-5pm  2. Prescription(s) must be in pharmacy by 3pm to be filled same day  3. Copy of patient's insurance/ prescription drug card and patient face sheet must be sent along with the prescription(s)  4. Cost of Rx cannot be added to hospital bill. If financial assistance is needed, please contact unit  or ;  or  CANNOT provide pharmacy voucher for patients co-pays  5.  Patients can then  the prescription on their way out of the hospital at discharge, or pharmacy can deliver to the bedside if staff is available. (payment due at time of pick-up or delivery - cash, check, or card accepted)     Able to afford home medications/ co-pay costs: Yes    ADLS:  Current PT AM-PAC Score: 21 /24  Current OT AM-PAC Score: 20 /24      DISCHARGE Disposition: Home with [employer]: [facility]     LOC at discharge: Not Applicable  ANNEMARIE Completed: Not Indicated    Notification completed in HENS/PAS?:  Not Applicable    IMM Completed:       Transportation:  Transportation PLAN for discharge: family   Mode of Transport: Private Car  Reason for medical transport: Not Applicable  Name of [address] [place] [address],[address]: Not Applicable      Transport form completed: Not Indicated    Home Care:  [address] ordered at discharge: Yes  [address]: [facility]  Phone: [phone]  Fax: [phone]  Orders faxed: Yes    Durable Medical Equipment:  DME Provider: n/a  Equipment obtained during hospitalization: none    Home Oxygen and Respiratory Equipment:  Oxygen needed at discharge?: Not [address]: Not Applicable  Portable tank available for discharge?: Not Indicated    Dialysis:  Dialysis patient: No    Dialysis Center:  Not Applicable    Hospice Services:  Location: Not Applicable  Agency: Not Applicable    Consents signed: Not Indicated    Referrals made at [facility] for outpatient continued care:  Not Applicable    Additional CM Notes: Pt to discharge home with [facility] and new referral made to [address].     The Plan for Transition of Care is related to the following treatment goals of AGUILA (acute kidney injury) ([address] [N17.9]  Acute on chronic combined systolic and diastolic congestive heart failure (HCC) [I50.43]  CHF (congestive heart failure), NYHA class I, acute on chronic, combined ([address] [I50.43]    The Patient and/or patient representative Ekaterina Rojo and her family were provided with a choice of provider and agrees with the discharge plan Yes    Freedom of choice list was provided with basic dialogue that supports the patient's individualized plan of care/goals and shares the quality data associated with the providers.  Yes    Care Transitions patient: No    Kathye Fothergill, RN  [facility]  Case Management Department  Ph: [phone]  Fax: [phone]

## 2022-06-29 NOTE — TELEPHONE ENCOUNTER
Pt scheduled for a Hospital Follow up, Pt just got discharge today.  Called daughter Julio Hernandez to see if she would like mother to have a VV hospital/u instead, Julio Hernandez agreed, Julio Hernandez stated that \"mom is very weak\" - a text will go to her phone tomorrow at 1pm.

## 2022-06-29 NOTE — CARE COORDINATION
Resumption of Care    Patient currently active with [facility] prior to admission.  Orders faxed to [facility] for Resumption of Care by 7/1  Electronically signed by Saturnino Stark LPN on 2/09/9886 at 25:78 PM

## 2022-06-29 NOTE — PROGRESS NOTES
Discharge instructions, medication regimen, follow-up appointments and prescriptions explained and given to pt. Daughter at bedside. All questions answered. IV removed with tip intact. Telemetry discontinued. Pt and daughter verbalized understanding when to seek medical attention. Pt stable at discharge

## 2022-06-29 NOTE — TELEPHONE ENCOUNTER
[facility] 45 Transitions Initial Follow Up Call    Outreach made within 2 business days of discharge: Yes    Patient: Alonza Simmonds Patient : 1935   MRN: [id number]  Reason for Admission: There are no discharge diagnoses documented for the most recent discharge. Discharge Date: 22       Spoke with: Patients daughter    Discharge department/facility: [facility]     TCM Interactive Patient Contact:  Was patient able to fill all prescriptions: Yes  Was patient instructed to bring all medications to the follow-up visit: Yes  Is patient taking all medications as directed in the discharge summary?  Yes  Does patient understand their discharge instructions: Yes  Does patient have questions or concerns that need addressed prior to 7-14 day follow up office visit: no    Scheduled appointment with PCP within 7-14 days    Follow Up  Future Appointments   Date Time Provider Jerome Mahmood   2022  1:00 PM DO Luis Smith   2022  1:30 PM DAVE Hoffman CNPwood Card [facility]   2022  1:30 PM DAVE Hoffman CNPwood Card [facility]   2022  9:30 AM SCHEDULE, Danya Li REMOTE TRANSMISSION Aurora August [facility]   2022  2:30 PM DAVE Garcia CNPwood Card [facility]   2022  2:00 PM DO SHEA Shearer  [place] - DYSTEPHEN   2022  2:15 PM MD Willa Mercedes Card [facility]       Dionisio Camargo

## 2022-06-30 ENCOUNTER — TELEMEDICINE (OUTPATIENT)
Dept: FAMILY MEDICINE CLINIC | Age: 87
End: 2022-06-30
Payer: MEDICARE

## 2022-06-30 DIAGNOSIS — N18.4 CHRONIC KIDNEY DISEASE, STAGE IV (SEVERE) (HCC): ICD-10-CM

## 2022-06-30 DIAGNOSIS — Z79.899 ON AMIODARONE THERAPY: ICD-10-CM

## 2022-06-30 DIAGNOSIS — I50.41 ACUTE COMBINED SYSTOLIC AND DIASTOLIC HEART FAILURE (HCC): Primary | ICD-10-CM

## 2022-06-30 DIAGNOSIS — I50.43 CHF (CONGESTIVE HEART FAILURE), NYHA CLASS I, ACUTE ON CHRONIC, COMBINED (HCC): ICD-10-CM

## 2022-06-30 DIAGNOSIS — N17.9 AKI (ACUTE KIDNEY INJURY) (HCC): ICD-10-CM

## 2022-06-30 DIAGNOSIS — Z09 HOSPITAL DISCHARGE FOLLOW-UP: ICD-10-CM

## 2022-06-30 PROCEDURE — 1111F DSCHRG MED/CURRENT MED MERGE: CPT | Performed by: FAMILY MEDICINE

## 2022-06-30 PROCEDURE — 99495 TRANSJ CARE MGMT MOD F2F 14D: CPT | Performed by: FAMILY MEDICINE

## 2022-06-30 RX ORDER — ONDANSETRON 4 MG/1
4 TABLET, FILM COATED ORAL 3 TIMES DAILY PRN
Qty: 30 TABLET | Refills: 5 | Status: SHIPPED | OUTPATIENT
Start: 2022-06-30

## 2022-06-30 NOTE — PROGRESS NOTES
Post-Discharge Transitional Care  Follow Up      Inell Fabiana   YOB: 1935    Date of Office Visit:  6/30/2022  Date of Hospital Admission: 6/23/22  Date of Hospital Discharge: 6/29/22  Risk of hospital readmission (high >=14%. Medium >=10%) :Readmission Risk Score: 20.1 ( )      Care management risk score Rising risk (score 2-5) and Complex Care (Scores >=6): 3     Non face to face  following discharge, date last encounter closed (first attempt may have been earlier): 6/29/2022  1:28 PM    Call initiated 2 business days of discharge: Yes    ASSESSMENT/PLAN:   Below is the assessment and plan developed based on review of pertinent history, physical exam, labs, studies, and medications. 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 On this date 6/30/2022 I have spent 30 minutes reviewing previous notes, test results and face to face with the patient discussing the diagnosis and importance of compliance with the treatment plan as well as documenting on the day of the visit. Her symptoms are improved. Discussed avoiding salt. We will give some Zofran for the nausea. She will see her new nephrologist soon. Continue vitamin B12 as she was deficient. She did have normocytic anemia and her iron was low. She will discuss with her nephrologist if iron is appropriate. We will check TSH in the future given that she is now on amiodarone therapy.     Subjective:   HPI:  Follow up of Hospital problems/diagnosis(es):       #Acute on chronic exacerbation of combined CHF, likely exacerbated by valvular pathology/tachycardia  #Moderate pulmonary hypertension  #AGUILA on CKD, rule out cardiorenal syndrome given EF of 25%  #Chronic anemia-stable at baseline  #Persistent atrial fibrillation with RVR s/p cardioversion on 6/24/22  #Physical deconditioning over the past couple of months with worsening fatigue and somnolence  #Chronic pain  # Forgetfulness,? Dementia--outpatient geriatric evaluation    Inpatient course: Discharge summary reviewed- see chart. 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)    Chronic low back pain    Essential hypertension    AGUILA (acute kidney injury) ([address]    Renal insufficiency    Gout    Pain in joint, hand    Mixed hyperlipidemia    Primary insomnia    Closed stable burst fracture of first lumbar vertebra ([address]    GERD (gastroesophageal reflux disease)    Palpitations    V-tach ([address]    Abnormal myocardial perfusion study    Ischemic cardiomyopathy    Cardiomyopathy ([address]    PVD (posterior vitreous detachment), both eyes    Posterior subcapsular polar age-related cataract of both eyes    Confusion    Chronic kidney disease, stage IV (severe) ([address]    Visual field defect    Stroke of unknown etiology ([address]    Encounter for loop recorder check    Atrial fibrillation ([address]    CHF (congestive heart failure), NYHA class I, acute on chronic, combined ([address]    On amiodarone therapy    On continuous oral anticoagulation       Medications listed as ordered at the time of discharge from hospital     Medication List          Accurate as of June 30, 2022  1:22 PM. If you have any questions, ask your nurse or doctor.             CONTINUE taking these medications    amiodarone 200 MG tablet  Commonly known as: CORDARONE  Take 1 tablet by mouth daily     apixaban 2.5 MG Tabs tablet  Commonly known as: ELIQUIS  Take 1 tablet by mouth 2 times daily     aspirin 81 MG chewable tablet     atorvastatin 80 MG tablet  Commonly known as: LIPITOR  TAKE 1 TABLET BY MOUTH ONE TIME A DAY     Co Q 10 100 MG Caps     diclofenac sodium 1 % Gel  Commonly known as: VOLTAREN  Apply 2 g topically 4 times daily     famotidine 20 MG tablet  Commonly known as: PEPCID  Take 1 tablet by mouth daily     fluticasone 50 MCG/ACT nasal spray  Commonly known as: FLONASE  2 sprays by Each Nostril route daily     furosemide 40 MG tablet  Commonly known as: LASIX  Take 1 tablet by mouth 2 times daily     Handicap Placard Misc  by Does not apply route Length: 5 years     HYDROcodone-acetaminophen  MG per tablet  Commonly known as: NORCO     lidocaine 4 % external patch  Place 1 patch onto the skin daily     metoprolol succinate 50 MG extended release tablet  Commonly known as: TOPROL XL  Take 1 tablet by mouth daily     miconazole 2 % powder  Commonly known as: MICOTIN  Apply topically 2 times daily. Misc. Devices Kit  Rollator. Use as directed. nitroGLYCERIN 0.4 MG SL tablet  Commonly known as: NITROSTAT  PUT 1 TAB UNDER TONGUE EVERY 5 MIN AS NEEDED CHEST PAIN UP TO 3. IF NO RELIEF AFTER 1 DOSE, CALL 911     ondansetron 4 MG tablet  Commonly known as: ZOFRAN  Take 1 tablet by mouth 3 times daily as needed for Nausea or Vomiting     vitamin B-12 1000 MCG tablet  Commonly known as: CYANOCOBALAMIN     vitamin D 1000 UNIT Tabs tablet  Commonly known as: CHOLECALCIFEROL  Take 2 tablets by mouth daily     zolpidem 5 MG tablet  Commonly known as: AMBIEN  Take 1 tablet by mouth nightly as needed for Sleep for up to 180 days.            Where to Get Your Medications      These medications were sent to [facility], [address]  [address] [facility], [address] [place] [address] Phone: [phone]   · ondansetron 4 MG tablet           Medications marked \"taking\" at this time  Outpatient Medications Marked as Taking for the 6/30/22 encounter (Telemedicine) with Debra Shahid DO   Medication Sig Dispense Refill    ondansetron (ZOFRAN) 4 MG tablet Take 1 tablet by mouth 3 times daily as needed for Nausea or Vomiting 30 tablet 5        Medications patient taking as of now reconciled against medications ordered at time of hospital discharge: Yes    A comprehensive review of systems was negative except for what was noted in the HPI. Objective:    Patient-Reported Vitals  No data recorded    General Appearance: alert and oriented to person, place and time, well-developed and well-nourished, in no acute distress  Skin: warm and dry, no rash or erythema  Head: normocephalic and atraumatic    Radha Spring, was evaluated through a synchronous (real-time) audio-video encounter. The patient (or guardian if applicable) is aware that this is a billable service, which includes applicable co-pays. This Virtual Visit was conducted with patient's (and/or legal guardian's) consent. The visit was conducted pursuant to the emergency declaration under the [facility], [address] [place] [address] authority and the [employer] General Act. Patient identification was verified, and a caregiver was present when appropriate. The patient was located at Home: [address] [place] [address]. Provider was located at [place] [address] (Appt Dept): [facility],  [address] [place] [address] [address]. An electronic signature was used to authenticate this note.   --Karen Alas DO

## 2022-07-05 ENCOUNTER — TELEPHONE (OUTPATIENT)
Dept: OTHER | Facility: CLINIC | Age: 87
End: 2022-07-05

## 2022-07-05 ENCOUNTER — APPOINTMENT (OUTPATIENT)
Dept: GENERAL RADIOLOGY | Age: 87
DRG: 312 | End: 2022-07-05
Payer: MEDICARE

## 2022-07-05 ENCOUNTER — TELEPHONE (OUTPATIENT)
Dept: CARDIOLOGY CLINIC | Age: 87
End: 2022-07-05

## 2022-07-05 ENCOUNTER — TELEPHONE (OUTPATIENT)
Dept: FAMILY MEDICINE CLINIC | Age: 87
End: 2022-07-05

## 2022-07-05 ENCOUNTER — HOSPITAL ENCOUNTER (INPATIENT)
Age: 87
LOS: 2 days | Discharge: HOME HEALTH | DRG: 312 | End: 2022-07-08
Attending: EMERGENCY MEDICINE | Admitting: INTERNAL MEDICINE
Payer: MEDICARE

## 2022-07-05 ENCOUNTER — APPOINTMENT (OUTPATIENT)
Dept: CT IMAGING | Age: 87
DRG: 312 | End: 2022-07-05
Payer: MEDICARE

## 2022-07-05 DIAGNOSIS — E87.1 HYPONATREMIA: Primary | ICD-10-CM

## 2022-07-05 DIAGNOSIS — E87.6 HYPOKALEMIA: ICD-10-CM

## 2022-07-05 DIAGNOSIS — R42 LIGHTHEADEDNESS: ICD-10-CM

## 2022-07-05 LAB
ALBUMIN SERPL-MCNC: 3.7 G/DL (ref 3.4–5)
ALP BLD-CCNC: 60 U/L (ref 40–129)
ALT SERPL-CCNC: 25 U/L (ref 10–40)
ANION GAP SERPL CALCULATED.3IONS-SCNC: 10 MMOL/L (ref 3–16)
ANION GAP SERPL CALCULATED.3IONS-SCNC: 11 MMOL/L (ref 3–16)
AST SERPL-CCNC: 33 U/L (ref 15–37)
BACTERIA: ABNORMAL /HPF
BASOPHILS ABSOLUTE: 0.1 K/UL (ref 0–0.2)
BASOPHILS RELATIVE PERCENT: 1 %
BILIRUB SERPL-MCNC: 0.6 MG/DL (ref 0–1)
BILIRUBIN DIRECT: <0.2 MG/DL (ref 0–0.3)
BILIRUBIN URINE: NEGATIVE
BILIRUBIN, INDIRECT: NORMAL MG/DL (ref 0–1)
BLOOD, URINE: NEGATIVE
BUN BLDV-MCNC: 47 MG/DL (ref 7–20)
BUN BLDV-MCNC: 49 MG/DL (ref 7–20)
CALCIUM SERPL-MCNC: 8.9 MG/DL (ref 8.3–10.6)
CALCIUM SERPL-MCNC: 9.6 MG/DL (ref 8.3–10.6)
CHLORIDE BLD-SCNC: 79 MMOL/L (ref 99–110)
CHLORIDE BLD-SCNC: 85 MMOL/L (ref 99–110)
CHP ED QC CHECK: YES
CLARITY: CLEAR
CO2: 32 MMOL/L (ref 21–32)
CO2: 35 MMOL/L (ref 21–32)
COARSE CASTS, UA: ABNORMAL /LPF (ref 0–2)
COLOR: YELLOW
CREAT SERPL-MCNC: 2 MG/DL (ref 0.6–1.2)
CREAT SERPL-MCNC: 2.2 MG/DL (ref 0.6–1.2)
EOSINOPHILS ABSOLUTE: 0.1 K/UL (ref 0–0.6)
EOSINOPHILS RELATIVE PERCENT: 2.4 %
EPITHELIAL CELLS, UA: ABNORMAL /HPF (ref 0–5)
GFR AFRICAN AMERICAN: 26
GFR AFRICAN AMERICAN: 29
GFR NON-AFRICAN AMERICAN: 21
GFR NON-AFRICAN AMERICAN: 24
GLUCOSE BLD-MCNC: 81 MG/DL (ref 70–99)
GLUCOSE BLD-MCNC: 92 MG/DL (ref 70–99)
GLUCOSE BLD-MCNC: 93 MG/DL
GLUCOSE BLD-MCNC: 93 MG/DL (ref 70–99)
GLUCOSE URINE: NEGATIVE MG/DL
HCT VFR BLD CALC: 31.2 % (ref 36–48)
HEMOGLOBIN: 10.3 G/DL (ref 12–16)
KETONES, URINE: NEGATIVE MG/DL
LEUKOCYTE ESTERASE, URINE: ABNORMAL
LYMPHOCYTES ABSOLUTE: 1.4 K/UL (ref 1–5.1)
LYMPHOCYTES RELATIVE PERCENT: 28.6 %
MAGNESIUM: 2.4 MG/DL (ref 1.8–2.4)
MCH RBC QN AUTO: 28.6 PG (ref 26–34)
MCHC RBC AUTO-ENTMCNC: 33.2 G/DL (ref 31–36)
MCV RBC AUTO: 86.1 FL (ref 80–100)
MICROSCOPIC EXAMINATION: YES
MONOCYTES ABSOLUTE: 0.6 K/UL (ref 0–1.3)
MONOCYTES RELATIVE PERCENT: 11.4 %
NEUTROPHILS ABSOLUTE: 2.8 K/UL (ref 1.7–7.7)
NEUTROPHILS RELATIVE PERCENT: 56.6 %
NITRITE, URINE: NEGATIVE
PDW BLD-RTO: 17.1 % (ref 12.4–15.4)
PERFORMED ON: NORMAL
PH UA: 7 (ref 5–8)
PLATELET # BLD: 181 K/UL (ref 135–450)
PMV BLD AUTO: 8.5 FL (ref 5–10.5)
POTASSIUM REFLEX MAGNESIUM: 2.5 MMOL/L (ref 3.5–5.1)
POTASSIUM SERPL-SCNC: 3.1 MMOL/L (ref 3.5–5.1)
PROTEIN UA: NEGATIVE MG/DL
RBC # BLD: 3.62 M/UL (ref 4–5.2)
RBC UA: ABNORMAL /HPF (ref 0–4)
RENAL EPITHELIAL, UA: ABNORMAL /HPF (ref 0–1)
SODIUM BLD-SCNC: 125 MMOL/L (ref 136–145)
SODIUM BLD-SCNC: 127 MMOL/L (ref 136–145)
SPECIFIC GRAVITY UA: 1.01 (ref 1–1.03)
TOTAL PROTEIN: 6.4 G/DL (ref 6.4–8.2)
TROPONIN: 0.01 NG/ML
URINE TYPE: ABNORMAL
UROBILINOGEN, URINE: 0.2 E.U./DL
WBC # BLD: 4.9 K/UL (ref 4–11)
WBC UA: ABNORMAL /HPF (ref 0–5)

## 2022-07-05 PROCEDURE — 99285 EMERGENCY DEPT VISIT HI MDM: CPT

## 2022-07-05 PROCEDURE — 6360000002 HC RX W HCPCS: Performed by: STUDENT IN AN ORGANIZED HEALTH CARE EDUCATION/TRAINING PROGRAM

## 2022-07-05 PROCEDURE — 85025 COMPLETE CBC W/AUTO DIFF WBC: CPT

## 2022-07-05 PROCEDURE — 72125 CT NECK SPINE W/O DYE: CPT

## 2022-07-05 PROCEDURE — 80048 BASIC METABOLIC PNL TOTAL CA: CPT

## 2022-07-05 PROCEDURE — 81001 URINALYSIS AUTO W/SCOPE: CPT

## 2022-07-05 PROCEDURE — 83735 ASSAY OF MAGNESIUM: CPT

## 2022-07-05 PROCEDURE — 96365 THER/PROPH/DIAG IV INF INIT: CPT

## 2022-07-05 PROCEDURE — 93005 ELECTROCARDIOGRAM TRACING: CPT | Performed by: STUDENT IN AN ORGANIZED HEALTH CARE EDUCATION/TRAINING PROGRAM

## 2022-07-05 PROCEDURE — 6370000000 HC RX 637 (ALT 250 FOR IP): Performed by: STUDENT IN AN ORGANIZED HEALTH CARE EDUCATION/TRAINING PROGRAM

## 2022-07-05 PROCEDURE — 71045 X-RAY EXAM CHEST 1 VIEW: CPT

## 2022-07-05 PROCEDURE — 70450 CT HEAD/BRAIN W/O DYE: CPT

## 2022-07-05 PROCEDURE — 80076 HEPATIC FUNCTION PANEL: CPT

## 2022-07-05 PROCEDURE — 36415 COLL VENOUS BLD VENIPUNCTURE: CPT

## 2022-07-05 PROCEDURE — 84484 ASSAY OF TROPONIN QUANT: CPT

## 2022-07-05 RX ORDER — POTASSIUM CHLORIDE 7.45 MG/ML
10 INJECTION INTRAVENOUS ONCE
Status: COMPLETED | OUTPATIENT
Start: 2022-07-05 | End: 2022-07-05

## 2022-07-05 RX ORDER — POTASSIUM CHLORIDE 20 MEQ/1
40 TABLET, EXTENDED RELEASE ORAL ONCE
Status: COMPLETED | OUTPATIENT
Start: 2022-07-05 | End: 2022-07-05

## 2022-07-05 RX ADMIN — POTASSIUM CHLORIDE 10 MEQ: 7.46 INJECTION, SOLUTION INTRAVENOUS at 20:13

## 2022-07-05 RX ADMIN — POTASSIUM CHLORIDE 40 MEQ: 20 TABLET, EXTENDED RELEASE ORAL at 20:14

## 2022-07-05 ASSESSMENT — ENCOUNTER SYMPTOMS
NAUSEA: 1
DIARRHEA: 0
EYES NEGATIVE: 1
CONSTIPATION: 0
ABDOMINAL PAIN: 0
VOMITING: 1
BLOOD IN STOOL: 0
ABDOMINAL DISTENTION: 0
RESPIRATORY NEGATIVE: 1

## 2022-07-05 ASSESSMENT — PAIN DESCRIPTION - DESCRIPTORS: DESCRIPTORS: ACHING

## 2022-07-05 ASSESSMENT — PAIN DESCRIPTION - LOCATION: LOCATION: BACK

## 2022-07-05 ASSESSMENT — PAIN - FUNCTIONAL ASSESSMENT
PAIN_FUNCTIONAL_ASSESSMENT: 0-10
PAIN_FUNCTIONAL_ASSESSMENT: PREVENTS OR INTERFERES SOME ACTIVE ACTIVITIES AND ADLS

## 2022-07-05 ASSESSMENT — PAIN SCALES - GENERAL: PAINLEVEL_OUTOF10: 5

## 2022-07-05 ASSESSMENT — PAIN DESCRIPTION - FREQUENCY: FREQUENCY: CONTINUOUS

## 2022-07-05 ASSESSMENT — PAIN DESCRIPTION - PAIN TYPE: TYPE: ACUTE PAIN

## 2022-07-05 NOTE — TELEPHONE ENCOUNTER
Spoke to daughterMirza to reschedule appt. Elevator not working. Daughter would like to have labs ordered and home care nurse can get labs. She states Pt has kidney and heart failure. Requesting to see doctor only or phone visit with NP after labs are done. Please call daughter, Mirza Morelos about this at [phone].

## 2022-07-05 NOTE — ED PROVIDER NOTES
ED Attending Attestation Note     Date of evaluation: 7/5/2022    This patient was seen by the resident. I have seen and examined the patient, agree with the workup, evaluation, management and diagnosis. The care plan has been discussed. I have reviewed the ECG and concur with the resident's interpretation. My assessment reveals thin elderly female, lying on the stretcher in no apparent distress. Pulses slow, irregularly irregular, no audible MRG, speech is clear and coherent, symmetrical smile and facial muscle movements.      Hong Rebollar MD  07/05/22 [id number]

## 2022-07-05 NOTE — TELEPHONE ENCOUNTER
Kumar Parks from [facility] care called and Needs verbals for if Dr. Drsis Cuellar will sign Home care after pts hospitalization.

## 2022-07-05 NOTE — ED TRIAGE NOTES
Pt comes to the Ed from SNF after fall. Pt denies losing consciousness, but states she lost her balance.

## 2022-07-05 NOTE — TELEPHONE ENCOUNTER
Writer contacted Dr. Chucky Roque to inform of 30 day readmission risk. Hienr's attempt to contact ED provider was unsuccessful. In with a pt.     Call Back: If you need to call back to inform of disposition you can contact me at [phone]

## 2022-07-05 NOTE — ED PROVIDER NOTES
[facility] RESIDENT NOTE     Date of Evaluation: 7/5/2022    Chief Complaint     Fall (at [facility] // no LOC // lost balance )    History of Present Illness     Darrel Balderas is a 80 y.o. female with PMHx atrial fibrillation (on Eliquis, recently started amiodarone), HFrEF, CVA (s/p ILR placement), CKDIV who presents with dizziness and a fall. History primarily provided by patient's daughter who is at bedside. Patient has been experiencing lightheadedness over the past week. She was recently admitted to [facility] on 6/24/2022 with volume overload and shortness of breath. She was also reportedly found to be in atrial fibrillation with RVR and was cardioverted during the admission and subsequently initiated on amiodarone which was continued on discharge. Over the past several days, patient's daughter report that she has been having episodes of lightheadedness and fatigue. Her daughter reports she has not been acting like herself. Today, the patient was having multiple episodes of lightheadedness and ultimately had a fall without loss of consciousness. She struck left side of her face below her eye and also landed on her left shoulder. She denies any pain anywhere and reports that she feels fine. She denies any loss of consciousness after the fall. She also endorses nausea ever since she started her amiodarone and had an episode of nonbloody, nonbilious emesis yesterday though no further episodes today. Patient denies any fevers, chills, sore throat, rhinorrhea, cough, shortness of breath, chest pain, abdominal pain, hematochezia, melena, hematemesis, lower extremity swelling, dysuria, rash, headache. At baseline, patient was able to ambulate independently but has been more unsteady ever since she has been having these episodes of lightheadedness. Review of Systems     Review of Systems   Constitutional: Positive for activity change and fatigue. Negative for appetite change, chills and fever. HENT: Negative. Eyes: Negative. Respiratory: Negative. Cardiovascular: Negative. Gastrointestinal: Positive for nausea and vomiting. Negative for abdominal distention, abdominal pain, blood in stool, constipation and diarrhea. Endocrine: Negative. Genitourinary: Negative. Musculoskeletal: Negative. Skin: Negative. Neurological: Positive for dizziness and light-headedness. Negative for syncope, facial asymmetry and headaches. Psychiatric/Behavioral: Negative. Past Medical, Surgical, Family, and Social History     She has a past medical history of Arthritis, Blood circulation, collateral, CAD (coronary artery disease), CHF (congestive heart failure) ([facility] [address] Confusion, GERD (gastroesophageal reflux disease), History of heart artery stent, Hyperlipidemia, Hypertension, Mitral valve prolapse, Myocardial infarct, old, and Thyroid disease. She has a past surgical history that includes Hysterectomy; Thyroidectomy; Colonoscopy; other surgical history (08/27/2018); pr office/outpt visit,procedure only (Right, 8/27/2018); eye surgery (Left, 09/04/2018); and pr office/outpt visit,procedure only (Left, 9/4/2018). Her family history includes Cancer in her father and sister; Diabetes in her brother; Heart Disease in her father and mother; Stroke in her brother. She reports that she has never smoked. She has never used smokeless tobacco. She reports that she does not drink alcohol and does not use drugs. Medications     Previous Medications    AMIODARONE (CORDARONE) 200 MG TABLET    Take 1 tablet by mouth daily    APIXABAN (ELIQUIS) 2.5 MG TABS TABLET    Take 1 tablet by mouth 2 times daily    ASPIRIN 81 MG CHEWABLE TABLET    Take 1 tablet by mouth daily. ATORVASTATIN (LIPITOR) 80 MG TABLET    TAKE 1 TABLET BY MOUTH ONE TIME A DAY    COENZYME Q10 (CO Q 10) 100 MG CAPS    Take  by mouth daily.     DICLOFENAC SODIUM (VOLTAREN) 1 % GEL    Apply 2 g topically 4 times daily    FAMOTIDINE (PEPCID) 20 MG TABLET    Take 1 tablet by mouth daily    FLUTICASONE (FLONASE) 50 MCG/ACT NASAL SPRAY    2 sprays by Each Nostril route daily    FUROSEMIDE (LASIX) 40 MG TABLET    Take 1 tablet by mouth 2 times daily    HANDICAP PLACARD MISC    by Does not apply route Length: 5 years    HYDROCODONE-ACETAMINOPHEN (NORCO)  MG PER TABLET    Take 1 tablet by mouth every 6 hours as needed. Kathi Carr LIDOCAINE 4 % EXTERNAL PATCH    Place 1 patch onto the skin daily    METOPROLOL SUCCINATE (TOPROL XL) 50 MG EXTENDED RELEASE TABLET    Take 1 tablet by mouth daily    MICONAZOLE (MICOTIN) 2 % POWDER    Apply topically 2 times daily. MISC. DEVICES KIT    Rollator. Use as directed. NITROGLYCERIN (NITROSTAT) 0.4 MG SL TABLET    PUT 1 TAB UNDER TONGUE EVERY 5 MIN AS NEEDED CHEST PAIN UP TO 3. IF NO RELIEF AFTER 1 DOSE, CALL 911    ONDANSETRON (ZOFRAN) 4 MG TABLET    Take 1 tablet by mouth 3 times daily as needed for Nausea or Vomiting    VITAMIN B-12 (CYANOCOBALAMIN) 1000 MCG TABLET    Take 1,000 mcg by mouth daily    VITAMIN D (CHOLECALCIFEROL) 1000 UNIT TABS TABLET    Take 2 tablets by mouth daily    ZOLPIDEM (AMBIEN) 5 MG TABLET    Take 1 tablet by mouth nightly as needed for Sleep for up to 180 days. Allergies     She is allergic to benazepril hcl, feldene [piroxicam], hytrin [terazosin hcl], iv contrast [iodides], acetaminophen, celebrex [celecoxib], cephalexin, hydrochlorothiazide, iodinated casein, monopril [fosinopril sodium], protonix [pantoprazole sodium], quinapril hcl, ultracet [tramadol-acetaminophen], and ziac [bisoprolol-hydrochlorothiazide]. Physical Exam     INITIAL VITALS:   BP: 115/77, Temp: 97.8 °F (36.6 °C), Heart Rate: 51, Resp: 15, SpO2: 93 %     General: 80 y.o. female, chronically-ill appearing in no acute distress. HEENT: Normocephalic, atraumatic. Mucous membranes are moist.  Eyes: Anicteric, PERRL, EOMI.   Neck: Supple, full ROM, trachea midline. Pulmonary: Lungs clear to auscultation bilaterally with symmetric aeration. No wheezes/rales/rhonchi. Cardiac: Regular rate and rhythm. No murmurs/rubs/gallops. Abdomen: Soft, non-tender, non-distended. No rebound or guarding. MSK: No cervical, thoracic, lumbar spinal tenderness to palpation. Extremities: 2+ radial pulses. No extremity deformity, swelling or tenderness appreciated. Skin: No rashes or bruising. Neuro: Alert and oriented x3. Speech normal. Moves UE and LE spontaneously and symmetrically. Sensation intact to light touch throughout bilateral UE and LE. Cranial nerves II-XII intact. Psych: Mood and affect appropriate for situation. DiagnosticResults     EKG:  Pending at time of sign-out. RADIOLOGY:  XR CHEST PORTABLE   Final Result   1. No acute cardiopulmonary findings. CT Cervical Spine WO Contrast   Final Result      1. No evidence of acute fracture in the cervical spine. 2.  No significant spondylosis. CT Head WO Contrast   Final Result   1. Mild atrophy. 2. Patchy areas of decreased white matter attenuation. The findings are nonspecific, but most likely represent chronic small vessel ischemic change. 3.  No evidence of an acute intracranial process.         LABS:  Results for orders placed or performed during the hospital encounter of 07/05/22   Hepatic Function Panel   Result Value Ref Range    Total Protein 6.4 6.4 - 8.2 g/dL    Albumin 3.7 3.4 - 5.0 g/dL    Alkaline Phosphatase 60 40 - 129 U/L    ALT 25 10 - 40 U/L    AST 33 15 - 37 U/L    Total Bilirubin 0.6 0.0 - 1.0 mg/dL    Bilirubin, Direct <0.2 0.0 - 0.3 mg/dL    Bilirubin, Indirect see below 0.0 - 1.0 mg/dL   CBC with Auto Differential   Result Value Ref Range    WBC 4.9 4.0 - 11.0 K/uL    RBC 3.62 (L) 4.00 - 5.20 M/uL    Hemoglobin 10.3 (L) 12.0 - 16.0 g/dL    Hematocrit 31.2 (L) 36.0 - 48.0 %    MCV 86.1 80.0 - 100.0 fL    MCH 28.6 26.0 - 34.0 pg    MCHC 33.2 31.0 - 36.0 g/dL    RDW 17.1 (H) 12.4 - 15.4 %    Platelets 340 680 - 850 K/uL    MPV 8.5 5.0 - 10.5 fL    Neutrophils % 56.6 %    Lymphocytes % 28.6 %    Monocytes % 11.4 %    Eosinophils % 2.4 %    Basophils % 1.0 %    Neutrophils Absolute 2.8 1.7 - 7.7 K/uL    Lymphocytes Absolute 1.4 1.0 - 5.1 K/uL    Monocytes Absolute 0.6 0.0 - 1.3 K/uL    Eosinophils Absolute 0.1 0.0 - 0.6 K/uL    Basophils Absolute 0.1 0.0 - 0.2 K/uL   Basic Metabolic Panel w/ Reflex to MG   Result Value Ref Range    Sodium 125 (L) 136 - 145 mmol/L    Potassium reflex Magnesium 2.5 (LL) 3.5 - 5.1 mmol/L    Chloride 79 (L) 99 - 110 mmol/L    CO2 35 (H) 21 - 32 mmol/L    Anion Gap 11 3 - 16    Glucose 92 70 - 99 mg/dL    BUN 49 (H) 7 - 20 mg/dL    CREATININE 2.2 (H) 0.6 - 1.2 mg/dL    GFR Non-African American 21 (A) >60    GFR  26 (A) >60    Calcium 9.6 8.3 - 10.6 mg/dL   Magnesium   Result Value Ref Range    Magnesium 2.40 1.80 - 2.40 mg/dL   Troponin   Result Value Ref Range    Troponin 0.01 <0.01 ng/mL   POC Glucose   Result Value Ref Range    Glucose 93 mg/dL    QC OK? yes    POCT Glucose   Result Value Ref Range    POC Glucose 93 70 - 99 mg/dl    Performed on ACCU-CHEK      ED BEDSIDE ULTRASOUND:  None    RECENT VITALS:   BP: 136/64, Temp: 97.8 °F (36.6 °C), Heart Rate: 56,Resp: 20, SpO2: 98 %     Procedures     None    ED Course     Nursing Notes, Past Medical Hx, Past Surgical Hx, Social Hx, Allergies, and Family Hx were reviewed. PATIENT GIVEN FOLLOWING MEDICATIONS:  Orders Placed This Encounter   Medications    potassium chloride 10 mEq/100 mL IVPB (Peripheral Line)    potassium chloride (KLOR-CON M) extended release tablet 40 mEq     CONSULTS:  None    MEDICAL DECISION MAKING / ASSESSMENT / Saima Brandi is a 80 y.o. female who presents with lightheadedness/dizziness and a fall without consciousness.   Overall, clinical presentation is concerning for possible medication side effect given her lightheadedness, nausea, and unsteadiness have all started since she was initiated on amiodarone. We will also obtain CT head and cervical spine imaging to rule out any traumatic injuries. Additionally, will obtain a CXR and an XR of her left shoulder. BMP with multiple electrolyte abnormalities with hyponatremia to 125 (new from most recent prior), hypokalemia to 2.5 (also new from most recent prior). Her bicarbonate and creatinine are roughly at her baseline. We will replete her potassium judiciously given patient's known CKD. LFTs unremarkable. CBC with anemia at baseline. Mag within normal limits. FSBG 93. Troponin 0.01, will defer repeat given patient denies any shortness of breath or chest pain. CT head and cervical spine without any acute traumatic findings. CXR without any acute cardiopulmonary abnormalities. ECG and UA pending at time. This patient was also evaluated by the attending physician. All care plans were discussed and agreed upon. Clinical Impression     No diagnosis found. Disposition     PATIENT REFERRED TO:  No follow-up provider specified. DISCHARGE MEDICATIONS:  New Prescriptions    No medications on file     DISPOSITION    At this time I am going off-service and will be signing out care of this patient to my colleague Dr. Ellie Paulson for further care.  My colleague's responsibilities will include:  - Follow-up UA and ECG  - Reassessment and disposition         Saji Juares MD  07/05/22 2040

## 2022-07-06 PROBLEM — R55 NEAR SYNCOPE: Status: ACTIVE | Noted: 2022-07-06

## 2022-07-06 PROBLEM — R00.1 BRADYCARDIA: Status: ACTIVE | Noted: 2022-07-06

## 2022-07-06 LAB
ANION GAP SERPL CALCULATED.3IONS-SCNC: 14 MMOL/L (ref 3–16)
BASOPHILS ABSOLUTE: 0.1 K/UL (ref 0–0.2)
BASOPHILS RELATIVE PERCENT: 1.3 %
BUN BLDV-MCNC: 47 MG/DL (ref 7–20)
CALCIUM SERPL-MCNC: 9.5 MG/DL (ref 8.3–10.6)
CHLORIDE BLD-SCNC: 87 MMOL/L (ref 99–110)
CO2: 30 MMOL/L (ref 21–32)
CREAT SERPL-MCNC: 2.1 MG/DL (ref 0.6–1.2)
EKG ATRIAL RATE: 55 BPM
EKG ATRIAL RATE: 57 BPM
EKG DIAGNOSIS: NORMAL
EKG DIAGNOSIS: NORMAL
EKG P AXIS: 13 DEGREES
EKG P AXIS: 31 DEGREES
EKG P-R INTERVAL: 196 MS
EKG P-R INTERVAL: 198 MS
EKG Q-T INTERVAL: 508 MS
EKG Q-T INTERVAL: 542 MS
EKG QRS DURATION: 142 MS
EKG QRS DURATION: 148 MS
EKG QTC CALCULATION (BAZETT): 494 MS
EKG QTC CALCULATION (BAZETT): 518 MS
EKG R AXIS: 0 DEGREES
EKG R AXIS: 56 DEGREES
EKG T AXIS: 110 DEGREES
EKG T AXIS: 126 DEGREES
EKG VENTRICULAR RATE: 55 BPM
EKG VENTRICULAR RATE: 57 BPM
EOSINOPHILS ABSOLUTE: 0.2 K/UL (ref 0–0.6)
EOSINOPHILS RELATIVE PERCENT: 2.8 %
GFR AFRICAN AMERICAN: 27
GFR NON-AFRICAN AMERICAN: 22
GLUCOSE BLD-MCNC: 70 MG/DL (ref 70–99)
HCT VFR BLD CALC: 30.4 % (ref 36–48)
HEMOGLOBIN: 9.8 G/DL (ref 12–16)
LYMPHOCYTES ABSOLUTE: 1.4 K/UL (ref 1–5.1)
LYMPHOCYTES RELATIVE PERCENT: 22.5 %
MAGNESIUM: 2.4 MG/DL (ref 1.8–2.4)
MCH RBC QN AUTO: 27.6 PG (ref 26–34)
MCHC RBC AUTO-ENTMCNC: 32.2 G/DL (ref 31–36)
MCV RBC AUTO: 85.7 FL (ref 80–100)
MONOCYTES ABSOLUTE: 0.7 K/UL (ref 0–1.3)
MONOCYTES RELATIVE PERCENT: 11.2 %
NEUTROPHILS ABSOLUTE: 3.9 K/UL (ref 1.7–7.7)
NEUTROPHILS RELATIVE PERCENT: 62.2 %
PDW BLD-RTO: 16.6 % (ref 12.4–15.4)
PLATELET # BLD: 182 K/UL (ref 135–450)
PMV BLD AUTO: 8.8 FL (ref 5–10.5)
POTASSIUM REFLEX MAGNESIUM: 3.4 MMOL/L (ref 3.5–5.1)
RBC # BLD: 3.54 M/UL (ref 4–5.2)
SODIUM BLD-SCNC: 131 MMOL/L (ref 136–145)
WBC # BLD: 6.2 K/UL (ref 4–11)

## 2022-07-06 PROCEDURE — 93010 ELECTROCARDIOGRAM REPORT: CPT | Performed by: INTERNAL MEDICINE

## 2022-07-06 PROCEDURE — 97530 THERAPEUTIC ACTIVITIES: CPT

## 2022-07-06 PROCEDURE — 83735 ASSAY OF MAGNESIUM: CPT

## 2022-07-06 PROCEDURE — 2580000003 HC RX 258: Performed by: INTERNAL MEDICINE

## 2022-07-06 PROCEDURE — 6370000000 HC RX 637 (ALT 250 FOR IP): Performed by: INTERNAL MEDICINE

## 2022-07-06 PROCEDURE — 99223 1ST HOSP IP/OBS HIGH 75: CPT | Performed by: INTERNAL MEDICINE

## 2022-07-06 PROCEDURE — 97161 PT EVAL LOW COMPLEX 20 MIN: CPT

## 2022-07-06 PROCEDURE — 2500000003 HC RX 250 WO HCPCS: Performed by: INTERNAL MEDICINE

## 2022-07-06 PROCEDURE — 1200000000 HC SEMI PRIVATE

## 2022-07-06 PROCEDURE — 85025 COMPLETE CBC W/AUTO DIFF WBC: CPT

## 2022-07-06 PROCEDURE — 93005 ELECTROCARDIOGRAM TRACING: CPT | Performed by: INTERNAL MEDICINE

## 2022-07-06 PROCEDURE — 97166 OT EVAL MOD COMPLEX 45 MIN: CPT

## 2022-07-06 PROCEDURE — 80048 BASIC METABOLIC PNL TOTAL CA: CPT

## 2022-07-06 PROCEDURE — 36415 COLL VENOUS BLD VENIPUNCTURE: CPT

## 2022-07-06 PROCEDURE — 97535 SELF CARE MNGMENT TRAINING: CPT

## 2022-07-06 PROCEDURE — 97116 GAIT TRAINING THERAPY: CPT

## 2022-07-06 RX ORDER — SODIUM CHLORIDE 0.9 % (FLUSH) 0.9 %
5-40 SYRINGE (ML) INJECTION PRN
Status: DISCONTINUED | OUTPATIENT
Start: 2022-07-06 | End: 2022-07-08 | Stop reason: HOSPADM

## 2022-07-06 RX ORDER — SODIUM CHLORIDE 9 MG/ML
INJECTION, SOLUTION INTRAVENOUS CONTINUOUS
Status: ACTIVE | OUTPATIENT
Start: 2022-07-06 | End: 2022-07-06

## 2022-07-06 RX ORDER — ONDANSETRON 4 MG/1
4 TABLET, ORALLY DISINTEGRATING ORAL EVERY 8 HOURS PRN
Status: DISCONTINUED | OUTPATIENT
Start: 2022-07-06 | End: 2022-07-08 | Stop reason: HOSPADM

## 2022-07-06 RX ORDER — AMIODARONE HYDROCHLORIDE 200 MG/1
200 TABLET ORAL DAILY
Status: DISCONTINUED | OUTPATIENT
Start: 2022-07-06 | End: 2022-07-08 | Stop reason: HOSPADM

## 2022-07-06 RX ORDER — VITAMIN B COMPLEX
2000 TABLET ORAL DAILY
Status: DISCONTINUED | OUTPATIENT
Start: 2022-07-06 | End: 2022-07-08 | Stop reason: HOSPADM

## 2022-07-06 RX ORDER — ATORVASTATIN CALCIUM 80 MG/1
80 TABLET, FILM COATED ORAL NIGHTLY
Status: DISCONTINUED | OUTPATIENT
Start: 2022-07-06 | End: 2022-07-08 | Stop reason: HOSPADM

## 2022-07-06 RX ORDER — ONDANSETRON 2 MG/ML
4 INJECTION INTRAMUSCULAR; INTRAVENOUS EVERY 6 HOURS PRN
Status: DISCONTINUED | OUTPATIENT
Start: 2022-07-06 | End: 2022-07-08 | Stop reason: HOSPADM

## 2022-07-06 RX ORDER — ACETAMINOPHEN 325 MG/1
650 TABLET ORAL EVERY 6 HOURS PRN
Status: DISCONTINUED | OUTPATIENT
Start: 2022-07-06 | End: 2022-07-08 | Stop reason: HOSPADM

## 2022-07-06 RX ORDER — POTASSIUM CHLORIDE 20 MEQ/1
40 TABLET, EXTENDED RELEASE ORAL ONCE
Status: COMPLETED | OUTPATIENT
Start: 2022-07-06 | End: 2022-07-06

## 2022-07-06 RX ORDER — POLYETHYLENE GLYCOL 3350 17 G/17G
17 POWDER, FOR SOLUTION ORAL DAILY PRN
Status: DISCONTINUED | OUTPATIENT
Start: 2022-07-06 | End: 2022-07-08 | Stop reason: HOSPADM

## 2022-07-06 RX ORDER — ACETAMINOPHEN 650 MG/1
650 SUPPOSITORY RECTAL EVERY 6 HOURS PRN
Status: DISCONTINUED | OUTPATIENT
Start: 2022-07-06 | End: 2022-07-08 | Stop reason: HOSPADM

## 2022-07-06 RX ORDER — METOPROLOL SUCCINATE 50 MG/1
50 TABLET, EXTENDED RELEASE ORAL DAILY
Status: DISCONTINUED | OUTPATIENT
Start: 2022-07-06 | End: 2022-07-08 | Stop reason: HOSPADM

## 2022-07-06 RX ORDER — HYDROCODONE BITARTRATE AND ACETAMINOPHEN 10; 325 MG/1; MG/1
1 TABLET ORAL EVERY 6 HOURS PRN
Status: DISCONTINUED | OUTPATIENT
Start: 2022-07-06 | End: 2022-07-08 | Stop reason: HOSPADM

## 2022-07-06 RX ORDER — SODIUM CHLORIDE 9 MG/ML
INJECTION, SOLUTION INTRAVENOUS PRN
Status: DISCONTINUED | OUTPATIENT
Start: 2022-07-06 | End: 2022-07-08 | Stop reason: HOSPADM

## 2022-07-06 RX ORDER — SODIUM CHLORIDE 9 MG/ML
INJECTION, SOLUTION INTRAVENOUS CONTINUOUS
Status: DISCONTINUED | OUTPATIENT
Start: 2022-07-06 | End: 2022-07-07

## 2022-07-06 RX ORDER — SODIUM CHLORIDE 0.9 % (FLUSH) 0.9 %
5-40 SYRINGE (ML) INJECTION EVERY 12 HOURS SCHEDULED
Status: DISCONTINUED | OUTPATIENT
Start: 2022-07-06 | End: 2022-07-08 | Stop reason: HOSPADM

## 2022-07-06 RX ADMIN — POLYETHYLENE GLYCOL 3350 17 G: 17 POWDER, FOR SOLUTION ORAL at 16:44

## 2022-07-06 RX ADMIN — SODIUM CHLORIDE: 9 INJECTION, SOLUTION INTRAVENOUS at 03:30

## 2022-07-06 RX ADMIN — DOXYCYCLINE 100 MG: 100 INJECTION, POWDER, LYOPHILIZED, FOR SOLUTION INTRAVENOUS at 22:35

## 2022-07-06 RX ADMIN — POTASSIUM CHLORIDE 40 MEQ: 1500 TABLET, EXTENDED RELEASE ORAL at 03:41

## 2022-07-06 RX ADMIN — HYDROCODONE BITARTRATE AND ACETAMINOPHEN 1 TABLET: 10; 325 TABLET ORAL at 22:39

## 2022-07-06 RX ADMIN — HYDROCODONE BITARTRATE AND ACETAMINOPHEN 1 TABLET: 10; 325 TABLET ORAL at 08:58

## 2022-07-06 RX ADMIN — APIXABAN 2.5 MG: 2.5 TABLET, FILM COATED ORAL at 08:53

## 2022-07-06 RX ADMIN — SODIUM CHLORIDE 25 ML: 9 INJECTION, SOLUTION INTRAVENOUS at 10:25

## 2022-07-06 RX ADMIN — DOXYCYCLINE 100 MG: 100 INJECTION, POWDER, LYOPHILIZED, FOR SOLUTION INTRAVENOUS at 10:26

## 2022-07-06 RX ADMIN — SODIUM CHLORIDE: 9 INJECTION, SOLUTION INTRAVENOUS at 13:27

## 2022-07-06 RX ADMIN — SODIUM CHLORIDE: 9 INJECTION, SOLUTION INTRAVENOUS at 19:31

## 2022-07-06 RX ADMIN — APIXABAN 2.5 MG: 2.5 TABLET, FILM COATED ORAL at 22:28

## 2022-07-06 RX ADMIN — HYDROCODONE BITARTRATE AND ACETAMINOPHEN 1 TABLET: 10; 325 TABLET ORAL at 17:23

## 2022-07-06 RX ADMIN — ATORVASTATIN CALCIUM 80 MG: 80 TABLET, FILM COATED ORAL at 22:27

## 2022-07-06 RX ADMIN — Medication 2000 UNITS: at 08:53

## 2022-07-06 ASSESSMENT — PAIN SCALES - GENERAL
PAINLEVEL_OUTOF10: 8
PAINLEVEL_OUTOF10: 7
PAINLEVEL_OUTOF10: 7
PAINLEVEL_OUTOF10: 5
PAINLEVEL_OUTOF10: 5
PAINLEVEL_OUTOF10: 8
PAINLEVEL_OUTOF10: 5
PAINLEVEL_OUTOF10: 3
PAINLEVEL_OUTOF10: 3

## 2022-07-06 ASSESSMENT — PAIN DESCRIPTION - DESCRIPTORS
DESCRIPTORS: ACHING

## 2022-07-06 ASSESSMENT — PAIN - FUNCTIONAL ASSESSMENT
PAIN_FUNCTIONAL_ASSESSMENT: PREVENTS OR INTERFERES SOME ACTIVE ACTIVITIES AND ADLS

## 2022-07-06 ASSESSMENT — PAIN DESCRIPTION - PAIN TYPE
TYPE: CHRONIC PAIN
TYPE: ACUTE PAIN
TYPE: CHRONIC PAIN

## 2022-07-06 ASSESSMENT — PAIN DESCRIPTION - FREQUENCY
FREQUENCY: CONTINUOUS

## 2022-07-06 ASSESSMENT — PAIN DESCRIPTION - ONSET
ONSET: ON-GOING

## 2022-07-06 ASSESSMENT — PAIN DESCRIPTION - ORIENTATION
ORIENTATION: MID;LOWER
ORIENTATION: UPPER;ANTERIOR
ORIENTATION: MID;LOWER
ORIENTATION: LOWER
ORIENTATION: LOWER
ORIENTATION: UPPER;ANTERIOR
ORIENTATION: LOWER
ORIENTATION: MID;LOWER

## 2022-07-06 ASSESSMENT — PAIN DESCRIPTION - LOCATION
LOCATION: BACK
LOCATION: BACK
LOCATION: HEAD
LOCATION: HEAD
LOCATION: BACK

## 2022-07-06 NOTE — CARE COORDINATION
[facility]    Received [facility] office notification that active patient had been admitted. Current services include:SN PT OT MSW. Will continue to follow till disposition.  Electronically signed by Stacey Cesar LPN on 8/7/20 at 2:26 PM EDT          Stacey Cesar LPN  [facility] Care Transition Nurse  [phone]

## 2022-07-06 NOTE — PROGRESS NOTES
4 Eyes Admission Assessment     I agree as the admission nurse that 2 RN's have performed a thorough Head to Toe Skin Assessment on the patient. ALL assessment sites listed below have been assessed on admission. Areas assessed by both nurses:   [x]   Head, Face, and Ears   [x]   Shoulders, Back, and Chest  [x]   Arms, Elbows, and Hands   [x]   Coccyx, Sacrum, and Ischium  [x]   Legs, Feet, and Heels     Superficial rash noted under left breast       Does the Patient have Skin Breakdown?   No         Michel Prevention initiated:  Yes   Wound Care Orders initiated:  No      [facility] nurse consulted for Pressure Injury (Stage 3,4, Unstageable, DTI, NWPT, and Complex wounds) or Michel score 18 or lower:  No      Nurse 1 eSignature: Electronically signed by Heather Fernandes RN on 7/6/22 at 4:54 AM EDT    **SHARE this note so that the co-signing nurse is able to place an eSignature**    Nurse 2 eSignature: Electronically signed by Roberto Carlos Topete RN on 7/6/22 at 4:57 AM EDT

## 2022-07-06 NOTE — PROGRESS NOTES
Occupational Therapy  Facility/Department: [place] [address] [facility]U  Occupational Therapy Initial Assessment/tx    Name: Divine Parham  : 1935  MRN: [id number]  Date of Service: 2022    Discharge Recommendations:  Divine Parham scored a 20/24 on the AM-PAC ADL Inpatient form. Current research shows that an AM-PAC score of 18 or greater is typically associated with a discharge to the patient's home setting. Based on the patient's AM-PAC score, and their current ADL deficits, it is recommended that the patient have 2-3 sessions per week of Occupational Therapy at d/c to increase the patient's independence. At this time, this patient demonstrates the endurance and safety to discharge home with home OT and a follow up treatment frequency of 2-3x/wk. Please see assessment section for further patient specific details. If patient discharges prior to next session this note will serve as a discharge summary. Please see below for the latest assessment towards goals. OT Equipment Recommendations  Other: transfer tub bench may be beneficial       Patient Diagnosis(es): The primary encounter diagnosis was Hyponatremia. Diagnoses of Hypokalemia and Lightheadedness were also pertinent to this visit. Past Medical History:  has a past medical history of Arthritis, Blood circulation, collateral, CAD (coronary artery disease), CHF (congestive heart failure) ([address] Confusion, GERD (gastroesophageal reflux disease), History of heart artery stent, Hyperlipidemia, Hypertension, Mitral valve prolapse, Myocardial infarct, old, and Thyroid disease. Past Surgical History:  has a past surgical history that includes Hysterectomy; Thyroidectomy; Colonoscopy; other surgical history (2018); pr office/outpt visit,procedure only (Right, 2018); eye surgery (Left, 2018); and pr office/outpt visit,procedure only (Left, 2018).     Treatment Diagnosis: impaired ADLs and transfers      Assessment   Performance deficits Independent  Homemaking Assistance:  (daughter does all home mgt)  Ambulation Assistance: Independent (prior to a few days ago she used rolling walker or cane indep)  Transfer Assistance: Independent  Active : No  Leisure & Hobbies: I pad, playing cards  Additional Comments: social history obtained from daughter       Objective                Safety Devices  Type of Devices: Left in chair;Chair alarm in place;Call light within reach;Nurse notified     Toilet Transfers  Toilet - Technique: Ambulating (with rolling walker and CGA/Santos to/from bathroom)  Equipment Used: Standard toilet (grab bar)  Toilet Transfer: Contact guard assistance     ADL  LE Dressing: Minimal assistance (donning depend brief)  Toileting: Minimal assistance        Bed mobility  Supine to Sit: Moderate assistance;Minimal assistance (cues, increased time/effort, use of bed rail)  Scooting: Stand by assistance  Transfers  Sit to stand:  (cues for hand placement)  Stand to sit:  (cues for hand placement)     Cognition  Overall Cognitive Status: Exceptions (pt groggy)  Arousal/Alertness: Delayed responses to stimuli  Following Commands:  Follows one step commands with increased time  Attention Span: Appears intact  Sequencing: Requires cues for some  Orientation  Overall Orientation Status: Impaired  Orientation Level: Oriented to place;Oriented to person;Oriented to situation;Disoriented to time                  Education Given To: Patient  Education Provided: Role of Therapy;Plan of Care;Transfer Training  Education Method: Demonstration;Verbal  Education Outcome: Verbalized understanding;Continued education needed           Hand Dominance  Hand Dominance: Right            G-Code     OutComes Score                                                  AM-PAC Score        AM-[facility] Inpatient Daily Activity Raw Score: 20 (07/06/22 0909)  AM-PAC Inpatient ADL T-Scale Score : 42.03 (07/06/22 0909)  ADL Inpatient CMS 0-100% Score: 38.32 (07/06/22 0964)  ADL Inpatient CMS G-Code Modifier : CJ (07/06/22 5155)    Goals  Short Term Goals  Time Frame for Short term goals: discharge  Short Term Goal 1: pt to transfer to commode with SBA  Short Term Goal 2: pt to manage toileting with S  Short Term Goal 3: pt to don brief with SBA  Short Term Goal 4: pt to tolerate 10 reps B UE exerc to increaese activity tolerance  Patient Goals   Patient goals : not stated       Therapy Time   Individual Concurrent Group Co-treatment   Time In 0805         Time Out 0845         Minutes 40              Timed Code Treatment Minutes:   25    Total Treatment Minutes:  [address], OTR/L 9018

## 2022-07-06 NOTE — ED NOTES
Pt provided urine sample and states she now gives consent for EKG and oral/IV potassium      Maximino Alexandra RN  07/05/22 2010

## 2022-07-06 NOTE — DISCHARGE INSTR - COC
Continuity of Care Form    Patient Name: Angela Cook   :  1935  MRN:  [id number]    Admit date:  2022  Discharge date:  ***    Code Status Order: Full Code   Advance Directives:      Admitting Physician:  Nora Ferrer MD  PCP: [address] [place], [place] [address]DO    Discharging Nurse: [facility] Unit/Room#: 7420/2915-00  Discharging Unit Phone Number: ***    Emergency Contact:   Extended Emergency Contact Information  Primary Emergency Contact: Jenniffer Mclaughlin  Home Phone: [phone]  Relation: Child  Secondary Emergency Contact: Marylin Morales[address] Phone: [phone]  Mobile Phone: [phone]  Relation: Child    Past Surgical History:  Past Surgical History:   Procedure Laterality Date    COLONOSCOPY      EYE SURGERY Left 2018    PHACO EMULSIFICATION OF CATARACT WITH INTRAOCULAR LENS IMPLANT LEFT EYE     HYSTERECTOMY (CERVIX STATUS UNKNOWN)      OTHER SURGICAL HISTORY  2018    phacoemulsification of cataract with intraocular lens implant right eye    ND OFFICE/OUTPT VISIT,PROCEDURE ONLY Right 2018    PHACO EMULSIFICATION OF CATARACT WITH INTRAOCULAR LENS IMPLANT RIGHT EYE performed by Oneyda Stockton MD at [facility] OFFICE/OUTPT [facility] Left 2018    PHACO EMULSIFICATION OF CATARACT WITH INTRAOCULAR LENS IMPLANT LEFT EYE performed by Oneyda Stockton MD at [address] [place] [address]         Immunization History:   Immunization History   Administered Date(s) Administered    COVID-19, MODERNA BLUE border, Primary or Immunocompromised, (age 12y+), IM, 100 mcg/0.5mL 2021, 2021    Influenza Vaccine, unspecified formulation 2016    Influenza Virus Vaccine 10/13/2013, 10/02/2015    Influenza, Quadv, 6 mo and older, IM, PF (Flulaval, Fluarix) 2018    Pneumococcal Conjugate 13-valent (Sutlbll47) 2018    Pneumococcal Polysaccharide (Lbxlcosdj58) 01/15/2014    Td (Adult), 5 Lf Tetanus Toxoid, Pf (Ignacio Mooring) 2021       Active Problems:  Patient Active Problem List   Diagnosis Code    Other and unspecified angina pectoris I20.9    Coronary atherosclerosis of native coronary artery I25.10    Other chronic pulmonary heart diseases I27.89    Mitral valve disorder I05.9    Acute combined systolic and diastolic heart failure ([facility]) I50.41    Chronic low back pain M54.50, G89.29    Essential hypertension I10    AGUILA (acute kidney injury) ([facility] N17.9    Renal insufficiency N28.9    Gout M10.9    Pain in joint, hand M25.549    Mixed hyperlipidemia E78.2    Primary insomnia F51.01    Closed stable burst fracture of first lumbar vertebra ([facility]) S32.011A    GERD (gastroesophageal reflux disease) K21.9    Palpitations R00.2    V-tach ([facility]) I47.2    Abnormal myocardial perfusion study R94.39    Ischemic cardiomyopathy I25.5    Cardiomyopathy ([address] I42.9    PVD (posterior vitreous detachment), both eyes H43.813    Posterior subcapsular polar age-related cataract of both eyes H25.043    Confusion R41.0    Chronic kidney disease, stage IV (severe) ([address] N18.4    Visual field defect H53.40    Stroke of unknown etiology ([address] I63.9    Encounter for loop recorder check Z45.09    Atrial fibrillation ([address] I48.91    CHF (congestive heart failure), NYHA class I, acute on chronic, combined ([address] I50.43    On amiodarone therapy Z79.899    On continuous oral anticoagulation Z79.01    Near syncope R55    Bradycardia R00.1       Isolation/Infection:   Isolation            No Isolation          Patient Infection Status       Infection Onset Added Last Indicated Last Indicated By Review Planned Expiration Resolved Resolved By    None active    Resolved    COVID-19 (Rule Out) 22 COVID-19, Rapid (Ordered)   22 Rule-Out Test Resulted    COVID-19 (Rule Out) 22 COVID-19 & Influenza Combo (Ordered)   22 Rule-Out Test Resulted    COVID-19 20 COVID-19   20  Nurse Assessment:  Last Vital Signs: /63   Pulse 61   Temp 98 °F (36.7 °C) (Oral)   Resp 18   Ht 5' (1.524 m)   Wt 118 lb 3.2 oz (53.6 kg)   SpO2 98%   BMI 23.08 kg/m²     Last documented pain score (0-10 scale): Pain Level: 8  Last Weight:   Wt Readings from Last 1 Encounters:   22 118 lb 3.2 oz (53.6 kg)     Mental Status:  {IP PT MENTAL STATUS:}    IV Access:  { ANNEMARIE IV ACCESS:437365182}    Nursing Mobility/ADLs:  Walking   {CHP DME DHIJ:770764152}  Transfer  {CHP DME RQHQ:474027980}  Bathing  {CHP DME VAWA:847628294}  Dressing  {CHP DME TYOH:243565648}  Toileting  {CHP DME ZSAB:374791112}  Feeding  {CHP DME ZANL:315025598}  Med Admin  {CHP DME LXHK:138333107}  Med Delivery   { ANNEMARIE MED Delivery:078667800}    Wound Care Documentation and Therapy:        Elimination:  Continence: Bowel: {YES / WQ:38852}  Bladder: {YES / X}  Urinary Catheter: {Urinary Catheter:680458625}   Colostomy/Ileostomy/Ileal Conduit: {YES / PC:54958}       Date of Last BM: ***    Intake/Output Summary (Last 24 hours) at 2022 1826  Last data filed at 2022 1543  Gross per 24 hour   Intake 500 ml   Output 750 ml   Net -250 ml     I/O last 3 completed shifts:   In: 300 [P.O.:200; IV Piggyback:100]  Out: 600 [Urine:600]    Safety Concerns:     [address] [employer] Safety Concerns:856609056}    Impairments/Disabilities:      [address] [employer] Impairments/Disabilities:984481740}    Nutrition Therapy:  Current Nutrition Therapy:   [address] [employer] Diet List:674919398}    Routes of Feeding: {CHP DME Other Feedings:419091255}  Liquids: {Slp liquid thickness:99117}  Daily Fluid Restriction: {CHP DME Yes amt example:296534847}  Last Modified Barium Swallow with Video (Video Swallowing Test): {Done Not Done CXQI:422646604}    Treatments at the Time of Hospital Discharge:   Respiratory Treatments: ***  Oxygen Therapy:  {Therapy; copd oxygen:14148}  Ventilator:    {MH CC Vent QNRQ:943032304}    Heart Failure Instructions for Daily Management  Patient was treated for chronic combined systolic and diastolic heart failure. she  will require the following:    Please weigh daily on the same scale and approximately the same time of day. Report weight gain of 3 pounds/day or 5 pounds/week [facility] [phone]. Please use hospital discharge weight as baseline reference. Please monitor for signs and symptoms of and report to MD:  Worsening Heart Failure: sudden weight gain, shortness of breath, lower extremity or general edema/swelling, abdominal bloating/swelling, inability to lie flat, intolerance to usual activity, or cough (especially at night). Report these finding even if no increase in weight. Dehydration:  having difficulty or a decrease in urination, dizziness, worsening fatigue, or new onset/worsening of generalized weakness. Please continue a LOW SODIUM diet and LIMIT fluid intake to 48 - 64 ounces ( 1.5 - 2 liters) per day. Call [facility] [phone] with any questions or concerns. Please continue heart failure education to patient and family/support system. See After Visit Summary for hospital follow up appointment details. Consider spiritual care referral for support and/or completion of advance directives . Consider: [place] [address] telehealth program if patient agreeable and able to participate.   Patient's primary cardiologist is  [facility]       Rehab Therapies: SN PT OT  Weight Bearing Status/Restrictions: [facility] CC Weight Bearin}  Other Medical Equipment (for information only, NOT a DME order):  {EQUIPMENT:462438272}  Other Treatments: ***  HOME HEALTH CARE: LEVEL 3 [address] to establish plan of care for patient over 60 day period   Nursing  Initial home SN evaluation visit to occur within 24-48 hours for:  medication management  VS and clinical assessment  S&S chronic disease exacerbation education + when to contact MD / NP  care coordination  Medication Reconciliation during 1st SN visit    PT/OT  Evaluations in home within 24-48 hours of discharge to include DME and home safety   Frontload therapy 5 days, then 3x a week   OT to evaluate if patient has [address] needs for personal care      evaluation within 24-48 hours to evaluate resources & insurance for potential [place], [place], LTC, and Medicaid options      Palliative Care referral within 5 days of hospital discharge  PCP Visit scheduled within 3 - 7 days of hospital discharge      Telehealth-Homecare Vitals(If patient is agreeable and meets guidelines)    Patient's personal belongings (please select all that are sent with patient):  {CHP DME Belongings:505705184}    RN SIGNATURE:  {Esignature:936333922}    CASE MANAGEMENT/SOCIAL WORK SECTION    Inpatient Status Date: ***    Readmission Risk Assessment Score:  Readmission Risk              Risk of Unplanned Readmission:  28           Discharging to Facility/ Agency   Name:  [facility]    Address: [address] [place] [address], [place] [address]  Phone: [phone]  Fax: [phone]      Dialysis Facility (if applicable)   Name:  Address:  Dialysis Schedule:  Phone:  Fax:    / signature: {Esignature:684710435}    PHYSICIAN SECTION    Prognosis: {Prognosis:9400225079}    Condition at Discharge: [address] Patient Condition:706612818}    Rehab Potential (if transferring to Rehab): {Prognosis:2649915474}    Recommended Labs or Other Treatments After Discharge: ***    Physician Certification: I certify the above information and transfer of Roberto Carlos Blair  is necessary for the continuing treatment of the diagnosis listed and that she requires {Admit to Appropriate Level of Care:12938} for {GREATER/LESS:191999849} 30 days.      Update Admission H&P: {CHP DME Changes in QNGAY:390715018}    PHYSICIAN SIGNATURE:  {Esignature:572090402}

## 2022-07-06 NOTE — ED PROVIDER NOTES
[address] ENCOUNTER          PHYSICIAN ASSISTANT NOTE       Date of evaluation: 7/5/2022    Chief Complaint     Fall (at [facility] // no LOC // lost balance )        ADDENDUM:      Care of this patient was assumed from my colleague, Dr. Fredricka Aschoff. The patient was seen for Fall (at [facility] // no LOC // lost balance )  . The patient's initial evaluation and plan have been discussed with the prior provider who initially evaluated the patient. Nursing Notes, Past Medical Hx, Past Surgical Hx, Social Hx, Allergies, and Family Hx were all reviewed. Physical Exam     INITIAL VITALS: BP: 115/77, Temp: 97.8 °F (36.6 °C), Heart Rate: 51, Resp: 15, SpO2: 93 %     Nursing note and vitals reviewed. Physical Exam  Vitals and nursing note reviewed. Constitutional:       General: She is sleeping. She is not in acute distress. Appearance: She is well-developed. She is not diaphoretic. HENT:      Head: Normocephalic and atraumatic. Eyes:      General: No scleral icterus. Conjunctiva/sclera: Conjunctivae normal.      Pupils: Pupils are equal, round, and reactive to light. Neck:      Vascular: No JVD. Cardiovascular:      Rate and Rhythm: Regular rhythm. Bradycardia present. Heart sounds: Normal heart sounds. Pulmonary:      Effort: Pulmonary effort is normal. No respiratory distress. Breath sounds: Normal breath sounds. No stridor. No wheezing or rales. Chest:      Chest wall: No tenderness. Abdominal:      General: There is no distension. Palpations: Abdomen is soft. Tenderness: There is no abdominal tenderness. Musculoskeletal:         General: No tenderness. Normal range of motion. Cervical back: Normal range of motion and neck supple. Skin:     General: Skin is warm and dry. Coloration: Skin is not pale. Findings: No rash. Neurological:      Mental Status: She is oriented to person, place, and time and easily aroused.    Psychiatric: Behavior: Behavior normal.          Procedures     N/A    MEDICAL DECISION MAKING     Elan Serrano is admitted to the Emergency Department for evaluation of her chief complaint as described in the history of present illness. Complete history and physical was performed by me and my attending. Nursing notes, past medical history, surgical history, family history and social history were reviewed and addressed in the HPI. Nick Caputo is a 80 y.o. female who presents to the emergency department with a complaint of lightheadedness and resulting fall. The patient was previously evaluated by the prior emergency department team.  Please see their note for their assessment, evaluation, diagnostics and plan. Briefly, the patient presented to the emergency department for lightheadedness over the past week with increasing fatigue. The patient had recently been admitted to this hospital secondary to volume overload, shortness of breath and CHF exacerbation. She was found to be in atrial fibrillation with RVR was cardioverted during the admission and started on oral amiodarone. Since starting the medication, the patient's family reports that she has been having episodes of lightheadedness and increasing fatigue, in addition to not acting like herself. Sustained a fall at a skilled nursing facility today, striking the left side of her face and landing on her left shoulder. During her earlier ED evaluation, the patient was found to be hyponatremic and hypokalemic with potassium repletion initiated in the ED. At the time of turnover, the patient was pending the results of her urinalysis and ECG. ECG was evaluated by the ED attending physician. Urinalysis demonstrates slight pyuria with scant bacteriuria, though the patient is asymptomatic. Cultures pending.   Repeat BMP after initial round of potassium repletion demonstrates a potassium of 3.1, slight improvement of the patient sodium 127 and slight improvement of the patient's creatinine to 2.0, though elevated off of the patient's baseline. Initially, the patient was not interested in being admitted to the hospital, but after further discussion with the patient and her family about the concerns related to her continued fall risk and the potential issues associated with her electrolyte abnormalities and AGUILA, the patient agreed to be admitted to the hospital for further evaluation management. I discussed this plan at length the patient who verbalizes understanding and is in agreement. The patient was seen and evaluated by myself and the attending physician, Jeremy Minor MD who agrees with my assessment, treatment and plan. Clinical Impression     1. Hyponatremia    2. Hypokalemia    3. Lightheadedness        Disposition     DISPOSITION Admitted 07/06/2022 12:24:28 AM        Carmen Lujan. GHAZAL Ornelas  5:06 AM    Relevant History and Diagnostic Information     Past Medical, Surgical, Family, and Social History     She has a past medical history of Arthritis, Blood circulation, collateral, CAD (coronary artery disease), CHF (congestive heart failure) ([facility] [address] Confusion, GERD (gastroesophageal reflux disease), History of heart artery stent, Hyperlipidemia, Hypertension, Mitral valve prolapse, Myocardial infarct, old, and Thyroid disease. She has a past surgical history that includes Hysterectomy; Thyroidectomy; Colonoscopy; other surgical history (08/27/2018); pr office/outpt visit,procedure only (Right, 8/27/2018); eye surgery (Left, 09/04/2018); and pr office/outpt visit,procedure only (Left, 9/4/2018). Her family history includes Cancer in her father and sister; Diabetes in her brother; Heart Disease in her father and mother; Stroke in her brother. She reports that she has never smoked. She has never used smokeless tobacco. She reports that she does not drink alcohol and does not use drugs.     Medications     Current Discharge Medication List      CONTINUE these medications which have NOT CHANGED    Details   ondansetron (ZOFRAN) 4 MG tablet Take 1 tablet by mouth 3 times daily as needed for Nausea or Vomiting  Qty: 30 tablet, Refills: 5      amiodarone (CORDARONE) 200 MG tablet Take 1 tablet by mouth daily  Qty: 30 tablet, Refills: 0      metoprolol succinate (TOPROL XL) 50 MG extended release tablet Take 1 tablet by mouth daily  Qty: 30 tablet, Refills: 3      furosemide (LASIX) 40 MG tablet Take 1 tablet by mouth 2 times daily  Qty: 60 tablet, Refills: 3      lidocaine 4 % external patch Place 1 patch onto the skin daily  Qty: 10 patch, Refills: 0      miconazole (MICOTIN) 2 % powder Apply topically 2 times daily. Qty: 45 g, Refills: 1      Misc. Devices KIT Rollator. Use as directed. Qty: 1 kit, Refills: 0    Associated Diagnoses: Advanced age      nitroGLYCERIN (NITROSTAT) 0.4 MG SL tablet PUT 1 TAB UNDER TONGUE EVERY 5 MIN AS NEEDED CHEST PAIN UP TO 3. IF NO RELIEF AFTER 1 DOSE, CALL 911  Qty: 25 tablet, Refills: 3      apixaban (ELIQUIS) 2.5 MG TABS tablet Take 1 tablet by mouth 2 times daily  Qty: 60 tablet, Refills: 5      diclofenac sodium (VOLTAREN) 1 % GEL Apply 2 g topically 4 times daily  Qty: 200 g, Refills: 2      famotidine (PEPCID) 20 MG tablet Take 1 tablet by mouth daily  Qty: 60 tablet, Refills: 3      zolpidem (AMBIEN) 5 MG tablet Take 1 tablet by mouth nightly as needed for Sleep for up to 180 days. Qty: 30 tablet, Refills: 2    Comments: This request is for a new prescription for a controlled substance as required by Federal/State law. DX Code Needed  .   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 .      Coenzyme Q10 (CO Q 10) 100 MG CAPS Take  by mouth daily. aspirin 81 MG chewable tablet Take 1 tablet by mouth daily. Qty: 30 tablet             Allergies     She is allergic to benazepril hcl, feldene [piroxicam], hytrin [terazosin hcl], iv contrast [iodides], acetaminophen, celebrex [celecoxib], cephalexin, hydrochlorothiazide, iodinated casein, monopril [fosinopril sodium], protonix [pantoprazole sodium], quinapril hcl, ultracet [tramadol-acetaminophen], and ziac [bisoprolol-hydrochlorothiazide]. ED Course     Nursing Notes, Past Medical Hx, Past Surgical Hx, Social Hx,Allergies, and Family Hx were reviewed.     Patient was given the following medications:  Orders Placed This Encounter   Medications    potassium chloride 10 mEq/100 mL IVPB (Peripheral Line)    potassium chloride (KLOR-CON M) extended release tablet 40 mEq    amiodarone (CORDARONE) tablet 200 mg    apixaban (ELIQUIS) tablet 2.5 mg     Order Specific Question:   Indication of Use     Answer:   A Fib/A Flutter    atorvastatin (LIPITOR) tablet 80 mg    metoprolol succinate (TOPROL XL) extended release tablet 50 mg    vitamin D (CHOLECALCIFEROL) tablet 2,000 Units    sodium chloride flush 0.9 % injection 5-40 mL    sodium chloride flush 0.9 % injection 5-40 mL    0.9 % sodium chloride infusion    OR Linked Order Group     ondansetron (ZOFRAN-ODT) disintegrating tablet 4 mg     ondansetron (ZOFRAN) injection 4 mg    polyethylene glycol (GLYCOLAX) packet 17 g    OR Linked Order Group     acetaminophen (TYLENOL) tablet 650 mg     acetaminophen (TYLENOL) suppository 650 mg    0.9 % sodium chloride infusion    potassium chloride (KLOR-CON M) extended release tablet 40 mEq       Diagnostic Results     ED BEDSIDE ULTRASOUND:  N/A    RECENT VITALS:  BP: (!) 142/68,Temp: 97.9 °F (36.6 °C), Heart Rate: 57, Resp: 16, SpO2: 98 %     RADIOLOGY:  XR CHEST PORTABLE   Final Result   1. No acute cardiopulmonary findings. CT Cervical Spine WO Contrast   Final Result      1. No evidence of acute fracture in the cervical spine. 2.  No significant spondylosis. CT Head WO Contrast   Final Result   1. Mild atrophy. 2. Patchy areas of decreased white matter attenuation. The findings are nonspecific, but most likely represent chronic small vessel ischemic change. 3.  No evidence of an acute intracranial process.           LABS:   Results for orders placed or performed during the hospital encounter of 07/05/22   Hepatic Function Panel   Result Value Ref Range    Total Protein 6.4 6.4 - 8.2 g/dL    Albumin 3.7 3.4 - 5.0 g/dL    Alkaline Phosphatase 60 40 - 129 U/L    ALT 25 10 - 40 U/L    AST 33 15 - 37 U/L    Total Bilirubin 0.6 0.0 - 1.0 mg/dL    Bilirubin, Direct <0.2 0.0 - 0.3 mg/dL    Bilirubin, Indirect see below 0.0 - 1.0 mg/dL   CBC with Auto Differential   Result Value Ref Range    WBC 4.9 4.0 - 11.0 K/uL    RBC 3.62 (L) 4.00 - 5.20 M/uL    Hemoglobin 10.3 (L) 12.0 - 16.0 g/dL    Hematocrit 31.2 (L) 36.0 - 48.0 %    MCV 86.1 80.0 - 100.0 fL    MCH 28.6 26.0 - 34.0 pg    MCHC 33.2 31.0 - 36.0 g/dL    RDW 17.1 (H) 12.4 - 15.4 %    Platelets 889 856 - 691 K/uL    MPV 8.5 5.0 - 10.5 fL    Neutrophils % 56.6 %    Lymphocytes % 28.6 %    Monocytes % 11.4 %    Eosinophils % 2.4 %    Basophils % 1.0 %    Neutrophils Absolute 2.8 1.7 - 7.7 K/uL    Lymphocytes Absolute 1.4 1.0 - 5.1 K/uL    Monocytes Absolute 0.6 0.0 - 1.3 K/uL    Eosinophils Absolute 0.1 0.0 - 0.6 K/uL    Basophils Absolute 0.1 0.0 - 0.2 K/uL   Basic Metabolic Panel w/ Reflex to MG   Result Value Ref Range    Sodium 125 (L) 136 - 145 mmol/L    Potassium reflex Magnesium 2.5 (LL) 3.5 - 5.1 mmol/L    Chloride 79 (L) 99 - 110 mmol/L    CO2 35 (H) 21 - 32 mmol/L    Anion Gap 11 3 - 16    Glucose 92 70 - 99 mg/dL    BUN 49 (H) 7 - 20 mg/dL    CREATININE 2.2 (H) 0.6 - 1.2 mg/dL    GFR Non-African American 21 (A) >60    GFR  26 (A) >60    Calcium 9.6 8.3 - 10.6 mg/dL   Magnesium   Result Value Ref Range    Magnesium 2.40 1.80 - 2.40 mg/dL   Urinalysis with Microscopic   Result Value Ref Range    Color, UA Yellow Straw/Yellow    Clarity, UA Clear Clear    Glucose, Ur Negative Negative mg/dL    Bilirubin Urine Negative Negative    Ketones, Urine Negative Negative mg/dL    Specific Gravity, UA 1.010 1.005 - 1.030    Blood, Urine Negative Negative    pH, UA 7.0 5.0 - 8.0    Protein, UA Negative Negative mg/dL    Urobilinogen, Urine 0.2 <2.0 E.U./dL    Nitrite, Urine Negative Negative    Leukocyte Esterase, Urine SMALL (A) Negative    Microscopic Examination YES     Urine Type NotGiven     Coarse Casts, UA 0-2 0 - 2 /LPF    WBC, UA 6-9 (A) 0 - 5 /HPF    RBC, UA 0-2 0 - 4 /HPF    Epithelial Cells, UA 2-5 0 - 5 /HPF    Renal Epithelial, UA 0-1 0 - 1 /HPF    Bacteria, UA 1+ (A) None Seen /HPF   Troponin   Result Value Ref Range    Troponin 0.01 <0.01 ng/mL   Basic Metabolic Panel   Result Value Ref Range    Sodium 127 (L) 136 - 145 mmol/L    Potassium 3.1 (L) 3.5 - 5.1 mmol/L    Chloride 85 (L) 99 - 110 mmol/L    CO2 32 21 - 32 mmol/L    Anion Gap 10 3 - 16    Glucose 81 70 - 99 mg/dL    BUN 47 (H) 7 - 20 mg/dL    CREATININE 2.0 (H) 0.6 - 1.2 mg/dL    GFR Non-African American 24 (A) >60    GFR  29 (A) >60    Calcium 8.9 8.3 - 10.6 mg/dL   POC Glucose   Result Value Ref Range    Glucose 93 mg/dL    QC OK?  yes    POCT Glucose   Result Value Ref Range    POC Glucose 93 70 - 99 mg/dl    Performed on ACCU-CHEK        CONSULTS:  IP CONSULT TO HOSPITALIST  IP CONSULT TO CARDIOLOGY    PATIENT REFERRED TO:  No follow-up provider specified.     DISCHARGE MEDICATIONS:  Current Discharge Medication List            [address], [place]  07/06/22 4629

## 2022-07-06 NOTE — CONSULTS
Reason for Consultation/Chief Complaint:  Dizziness/light headed    History of Present Illness:  Cash Arthur is a 80 y.o. patient whom we were asked to see for dizziness/light headed. Presents with feeling light headed and fatigued. Had fall earlier yesterday at [facility]. Lost her balance. No sheila syncope. Was lethargic, light headed, dizzy all day yesterday. Barely arousable through day yesterday. No hx palp/tachy. No cp/sob. No recent pnd or orthopnea. Limits salt. Past Medical History:   has a past medical history of Arthritis, Blood circulation, collateral, CAD (coronary artery disease), CHF (congestive heart failure) ([facility] [address] Confusion, GERD (gastroesophageal reflux disease), History of heart artery stent, Hyperlipidemia, Hypertension, Mitral valve prolapse, Myocardial infarct, old, and Thyroid disease. Surgical History:   has a past surgical history that includes Hysterectomy; Thyroidectomy; Colonoscopy; other surgical history (08/27/2018); pr office/outpt visit,procedure only (Right, 8/27/2018); eye surgery (Left, 09/04/2018); and pr office/outpt visit,procedure only (Left, 9/4/2018). Social History:   reports that she has never smoked. She has never used smokeless tobacco. She reports that she does not drink alcohol and does not use drugs. Family History:  No evidence for sudden cardiac death or premature CAD    Home Medications:  Were reviewed and are listed in nursing record. and/or listed below  Prior to Admission medications    Medication Sig Start Date End Date Taking?  Authorizing Provider   ondansetron (ZOFRAN) 4 MG tablet Take 1 tablet by mouth 3 times daily as needed for Nausea or Vomiting 6/30/22   Debra Shahid DO   amiodarone (CORDARONE) 200 MG tablet Take 1 tablet by mouth daily 6/30/22   Edmund Pires MD   metoprolol succinate (TOPROL XL) 50 MG extended release tablet Take 1 tablet by mouth daily 6/30/22   Edmund Pires MD   furosemide (LASIX) 40 MG tablet Take 1 tablet by mouth 2 times daily 6/29/22   Radha Sanchez MD   lidocaine 4 % external patch Place 1 patch onto the skin daily 6/30/22   Radha Sanchez MD   miconazole (MICOTIN) 2 % powder Apply topically 2 times daily. 6/29/22   Radha Sanchez MD   Misc. Devices KIT Rollator. Use as directed. 6/10/22   Debra Shahid DO   nitroGLYCERIN (NITROSTAT) 0.4 MG SL tablet PUT 1 TAB UNDER TONGUE EVERY 5 MIN AS NEEDED CHEST PAIN UP TO 3. IF NO RELIEF AFTER 1 DOSE, CALL 911 4/25/22   Lenny Scruggs MD   apixaban Piter Ashley) 2.5 MG TABS tablet Take 1 tablet by mouth 2 times daily 3/22/22   DAVE Victor - CNP   diclofenac sodium (VOLTAREN) 1 % GEL Apply 2 g topically 4 times daily 3/22/22   Dilcia Robertson MD   famotidine (PEPCID) 20 MG tablet Take 1 tablet by mouth daily 3/22/22   Dilcia Robertson MD   zolpidem (AMBIEN) 5 MG tablet Take 1 tablet by mouth nightly as needed for Sleep for up to 180 days. Patient not taking: Reported on 5/6/2022 3/7/22 9/3/22  Debra Shahid DO   atorvastatin (LIPITOR) 80 MG tablet TAKE 1 TABLET BY MOUTH ONE TIME A DAY 10/13/21   Lenny Scruggs MD   vitamin B-12 (CYANOCOBALAMIN) 1000 MCG tablet Take 1,000 mcg by mouth daily    Historical Provider, MD   fluticasone (FLONASE) 50 MCG/ACT nasal spray 2 sprays by Each Nostril route daily 7/2/19   Debra Shahid DO   Handicap Placard MISC by Does not apply route Length: 5 years 5/7/19   Debra Shahid DO   vitamin D (CHOLECALCIFEROL) 1000 UNIT TABS tablet Take 2 tablets by mouth daily 11/8/18   Lenny Scruggs MD   HYDROcodone-acetaminophen (NORCO)  MG per tablet Take 1 tablet by mouth every 6 hours as needed. . 1/3/17   Historical Provider, MD   Coenzyme Q10 (CO Q 10) 100 MG CAPS Take  by mouth daily. Historical Provider, MD   aspirin 81 MG chewable tablet Take 1 tablet by mouth daily.  1/16/14   Eneida Ty MD        Allergies:  Benazepril hcl, Feldene [piroxicam], Hytrin [terazosin hcl], Iv contrast [iodides], Acetaminophen, Celebrex [celecoxib], Cephalexin, Hydrochlorothiazide, Iodinated casein, Monopril [fosinopril sodium], Protonix [pantoprazole sodium], Quinapril hcl, Ultracet [tramadol-acetaminophen], and Ziac [bisoprolol-hydrochlorothiazide]     Review of Systems:       · Constitutional: there has been no unanticipated weight loss. There's been no change in energy level, sleep pattern, or activity level. · Eyes: No visual changes or diplopia. No scleral icterus. · ENT: No Headaches, hearing loss or vertigo. No mouth sores or sore throat. · Cardiovascular: No loss of consciousness. No hemoptysis, pleuritic pain, or phlebitis. · Respiratory: No cough or wheezing, no sputum production. No hematemesis. · Gastrointestinal: No abdominal pain, appetite loss, blood in stools. No change in bowel or bladder habits. · Genitourinary: No dysuria, trouble voiding, or hematuria. · Musculoskeletal:  No gait disturbance, weakness or joint complaints. · Integumentary: No rash or pruritis. · All other systems reviewed negative as done.      Physical Examination:    Vitals:    07/06/22 0928   BP:    Pulse:    Resp: 20   Temp:    SpO2:     Weight: 118 lb 3.2 oz (53.6 kg)         General Appearance:  Lethargic, cooperative, no distress, appears stated age   Head:  Normocephalic, without obvious abnormality, atraumatic   Eyes:  PERRL, conjunctiva/corneas clear       Nose: Nares normal, no drainage or sinus tenderness   Throat: Lips, mucosa, and tongue normal   Neck: Supple, symmetrical, trachea midline       Lungs:   Decreased BS, respirations unlabored   Chest Wall:  No tenderness or deformity   Heart:  Regular rate and rhythm, S1, S2 normal, no murmur, rub or gallop   Abdomen:   Soft, non-tender, bowel sounds active all four quadrants           Extremities: Extremities normal, atraumatic, no cyanosis or edema   Pulses: 2+ and symmetric   Skin: Skin color, texture, turgor normal, no rashes or lesions   Pysch: Blunted affect Neurologic: Normal gross motor and sensory exam.         Labs  CBC:   Lab Results   Component Value Date/Time    WBC 6.2 07/06/2022 05:20 AM    RBC 3.54 07/06/2022 05:20 AM    HGB 9.8 07/06/2022 05:20 AM    HCT 30.4 07/06/2022 05:20 AM    MCV 85.7 07/06/2022 05:20 AM    RDW 16.6 07/06/2022 05:20 AM     07/06/2022 05:20 AM     CMP:    Lab Results   Component Value Date/Time     07/06/2022 05:20 AM    K 3.4 07/06/2022 05:20 AM    CL 87 07/06/2022 05:20 AM    CO2 30 07/06/2022 05:20 AM    BUN 47 07/06/2022 05:20 AM    CREATININE 2.1 07/06/2022 05:20 AM    GFRAA 27 07/06/2022 05:20 AM    AGRATIO 1.5 06/23/2022 07:12 PM    LABGLOM 22 07/06/2022 05:20 AM    GLUCOSE 70 07/06/2022 05:20 AM    PROT 6.4 07/05/2022 05:53 PM    CALCIUM 9.5 07/06/2022 05:20 AM    BILITOT 0.6 07/05/2022 05:53 PM    ALKPHOS 60 07/05/2022 05:53 PM    AST 33 07/05/2022 05:53 PM    ALT 25 07/05/2022 05:53 PM     PT/INR:  No results found for: PTINR  Lab Results   Component Value Date    TROPONINI 0.01 07/05/2022       EKG:  I have reviewed EKG with the following interpretation:  Impression:  Personally reviewed, SB, LBBB, NSSTTW changes.      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)    Chronic low back pain    Essential hypertension    AGUILA (acute kidney injury) ([facility] [address]    Renal insufficiency    Gout    Pain in joint, hand    Mixed hyperlipidemia    Primary insomnia    Closed stable burst fracture of first lumbar vertebra ([facility] [address]    GERD (gastroesophageal reflux disease)    Palpitations    V-tach ([facility] [address]    Abnormal myocardial perfusion study    Ischemic cardiomyopathy    Cardiomyopathy ([address]    PVD (posterior vitreous detachment), both eyes    Posterior subcapsular polar age-related cataract of both eyes    Confusion    [facility], [facility] IV (severe) ([facility])    Visual field defect    Stroke of unknown etiology ([facility] [address]    Encounter for loop recorder check    Atrial fibrillation (HCC)    CHF (congestive heart failure), NYHA class I, acute on chronic, combined ([facility] [address]    On amiodarone therapy    On continuous oral anticoagulation    Near syncope    Bradycardia         Plan:    Episode of lethargy, dizziness and being very unresponsive through yesterday. Significant hyponatremia maybe basis. Some bradycardia. Agree with holding metoprolol. Rhythm appears stable. myoview 6/22 negative with nl EF 55%. Negative trop. Monitor. Correct electrolyte imbalances. Watch Hgb. Check thyroid functions. On amio at home.

## 2022-07-06 NOTE — PROGRESS NOTES
Physical Therapy  Facility/Department: [place] [address] [facility]U  Physical Therapy Initial Assessment / treatment    Name: Jett Davalos  : 1935  MRN: [id number]  Date of Service: 2022    Discharge Recommendations: Jett Davalos scored a 17/24 on the AM-PAC short mobility form. Current research shows that an AM-PAC score of 18 or greater is typically associated with a discharge to the patient's home setting. Based on the patient's AM-PAC score and their current functional mobility deficits, it is recommended that the patient have 2-3 sessions per week of Physical Therapy at d/c to increase the patient's independence. At this time, this patient demonstrates the endurance and safety to discharge home with home  services and a follow up treatment frequency of 2-3x/wk. Please see assessment section for further patient specific details. If patient discharges prior to next session this note will serve as a discharge summary. Please see below for the latest assessment towards goals. PT Equipment Recommendations  Equipment Needed: No  Other: pt owns rollator      Patient Diagnosis(es): The primary encounter diagnosis was Hyponatremia. Diagnoses of Hypokalemia and Lightheadedness were also pertinent to this visit. Past Medical History:  has a past medical history of Arthritis, Blood circulation, collateral, CAD (coronary artery disease), CHF (congestive heart failure) ([address] Confusion, GERD (gastroesophageal reflux disease), History of heart artery stent, Hyperlipidemia, Hypertension, Mitral valve prolapse, Myocardial infarct, old, and Thyroid disease. Past Surgical History:  has a past surgical history that includes Hysterectomy; Thyroidectomy; Colonoscopy; other surgical history (2018); pr office/outpt visit,procedure only (Right, 2018); eye surgery (Left, 2018); and pr office/outpt visit,procedure only (Left, 2018).     Assessment   Body Structures, Functions, Activity Limitations Requiring Skilled Therapeutic Intervention: Decreased functional mobility ; Decreased endurance;Decreased strength  Assessment: Pt is 80 y.o. female admit s/p fall. Pt is from home with her  who has medical issues but children provide assist for both of their parents. Pt is below her functional baseline today, requiring use of RW for safe amb. Fatigues quickly although pt notes she did not sleep much overnight. If pt returns home, rec 24hr assist and home PT. If 24hr assist is not available, rec continued IP PT. Treatment Diagnosis: impaired gait and transfers  Therapy Prognosis: Good  Decision Making: Low Complexity  Requires PT Follow-Up: Yes     Plan   Plan  Plan:  (2-5x/week)  Current Treatment Recommendations: Strengthening,Balance training,Gait training,Functional mobility training,Transfer training,Endurance training,Therapeutic activities,Patient/Caregiver education & training  Safety Devices  Type of Devices: Left in chair,Chair alarm in place,Call light within reach,Nurse notified,Gait belt     Restrictions  Position Activity Restriction  Other position/activity restrictions: up with assist     Subjective   General  Chart Reviewed: Yes  Additional Pertinent Hx: Admit 7/5 s/p fall at [facility]; head CT: (-) acute; (+) hyponatremia and hypokalemia; PMHx: HFrEF, CVA (s/p ILR placement), CKD, a-fib  Referring Practitioner: Rufina Rivas MD  Diagnosis: hypokalemia  Subjective  Subjective: Pt found reclined in chair. Agrees to PT. Reports 6/10 LBP. RN aware. Dtr arrived during session.          Social/Functional History  Social/Functional History  Lives With: Spouse (and 1 daughter-alternates staying, spouse has severe dementia)  Home Layout: One level,Laundry in basement  Home Access: Stairs to enter without rails (1)  Bathroom Shower/Tub: Tub/Shower unit  Bathroom Toilet: Standard (counter next to commode)  Bathroom Equipment: Shower chair  Home Equipment: Cane,Walker, 4 wheeled (gerichair available)  ADL Assistance: Independent  Homemaking Assistance:  (dtr does all home management)  Ambulation Assistance: Independent (with rollator the past few days; prior to this - no AD in the house and use of cane when outside)  Transfer Assistance: Independent  Active : No  Leisure & Hobbies: I pad, playing cards  Additional Comments: social history obtained from daughter  [address]: Within Functional Limits  Hearing  Hearing: Within functional limits    Cognition   Orientation  Orientation Level: Oriented to place;Oriented to person;Oriented to situation (oriented to month and year)  Cognition  Overall Cognitive Status: Exceptions  Arousal/Alertness: Delayed responses to stimuli  Following Commands:  Follows one step commands with increased time  Attention Span: Appears intact  Memory: Decreased recall of recent events  Sequencing: Requires cues for some  Cognition Comment: needs cues to keep eyes open at times during session     Objective               AROM RLE (degrees)  RLE AROM: WFL  AROM LLE (degrees)  LLE AROM : WFL  Strength RLE  Comment: 4/5 hip flex, knee ext, ankle DF  Strength LLE  Comment: 4/5 hip flex, knee ext, ankle DF              Transfers  Sit to Stand: Contact guard assistance  Stand to sit: Contact guard assistance  Comment: min cues for safety with turning in small spaces and preparing to sit down  Ambulation  Device: Rolling Walker  Assistance: Contact guard assistance  Quality of Gait: kyphotic posture, effortful, leans heavily on walker  Gait Deviations: Slow Sadia;Decreased step length;Decreased step height  Distance: 5 ft; 8 ft; 20 ft  Comments: fatigued post final amb trial     Balance  Sitting - Static: Fair  Sitting - Dynamic: Fair (SBA due to lethargy)  Standing - Static: Fair  Standing - Dynamic: Fair (CGA with RW)           OutComes Score                                                  AM-PAC Score  AM-PAC Inpatient Mobility Raw Score : 17 (07/06/22

## 2022-07-06 NOTE — CARE COORDINATION
Case Management Assessment           Initial Evaluation                Date / Time of Evaluation: 7/6/2022 11:23 AM                 Assessment Completed by: Chelsea Galdamez RN    Patient Name: Sallie Pitt     YOB: 1935  Diagnosis: Hypokalemia [E87.6]  Lightheadedness [R42]  Hyponatremia [E87.1]  Near syncope [R55]     Date / Time: 7/5/2022  4:15 PM    Patient Admission Status: Inpatient    If patient is discharged prior to next notation, then this note serves as note for discharge by case management.      Current PCP: Riki Paris,   Clinic Patient: No    Chart Reviewed: Yes  Patient/ Family Interviewed: Yes    Initial assessment completed at bedside with: patient and daughter Marietta Crespo    Hospitalization in the last 30 days: Yes    Emergency Contacts:  Extended Emergency Contact Information  Primary Emergency Contact: Leyda De Jesus  [place] Phone: [phone]  Relation: Child  Secondary Emergency Contact: [address] Phone: [phone]  Mobile Phone: [phone]  Relation: Child    Advance Directives:   Code Status: Full 2021 Otilia Stanford y: Yes  Agent: Madyson Enriquez  Contact Number: [phone]    Copy present: No     In paper Chart: No    Scanned into EMR No    Financial  Payor: MEDICARE / Plan: MEDICARE PART A AND B / Product Type: *No Product type* /     Pre-cert required for SNF: No    Pharmacy    [facility] [address] [facility]  [address] [facility] [address] [facility] [address]  Phone: [phone] Fax: [address] , [address] [facility] [address] [place] [address]  Phone: [phone] Fax: [phone]      Potential assistance Purchasing Medications: Potential Assistance Purchasing Medications: No  Does Patient want to participate in local refill/ meds to beds program?: Yes    Meds To Beds General Rules:  1. Can ONLY be done Monday- Friday between 8:30am-5pm  2. Prescription(s) must be in pharmacy by 3pm to be filled same day  3. Copy of patient's insurance/ prescription drug card and patient face sheet must be sent along with the prescription(s)  4. Cost of Rx cannot be added to hospital bill. If financial assistance is needed, please contact unit  or ;  or  CANNOT provide pharmacy voucher for patients co-pays  5. Patients can then  the prescription on their way out of the hospital at discharge, or pharmacy can deliver to the bedside if staff is available. (payment due at time of pick-up or delivery - cash, check, or card accepted)     Able to afford home medications/ co-pay costs: Yes    ADLS  Support Systems: Spouse/Significant Other,Children,Family Members    PT AM-PAC: 17 /24  OT AM-PAC: 20 /24    [place]: 1 level   Steps: 2    Plans to RETURN to current housing: Yes  Barriers to RETURNING to current housing: none    [address]  Currently ACTIVE with [address]: Yes  [address]: [place]  Phone: [phone]  Fax: [phone]          Durable Medical Equipment  DME Provider: n/a  Equipment: cane and walker    Home Oxygen and Respiratory Equipment  Has HOME OXYGEN prior to admission: No  [address]: Not Applicable      Dialysis  Active with HD/PD prior to admission: No  Nephrologist:     HD Center:  Not Applicable    DISCHARGE PLAN:  Disposition: Home with [address]: [address] for discharge: family     Factors facilitating achievement of predicted outcomes: Family support, Cooperative and Pleasant    Barriers to discharge: Medical complications    Additional Case Management Notes:   Patient is from home with spouse and daughter Mary Cazares. Patient has [facility] with [facility] and is happy with services.   Patient is working with [employer] for [employer] and other services. Patient plans to return home at discharge, daughter to transport. The Plan for Transition of Care is related to the following treatment goals of Hypokalemia [E87.6]  Lightheadedness [R42]  Hyponatremia [E87.1]  Near syncope [R55]    The Patient and/or patient representative Naty and her family were provided with a choice of provider and agrees with the discharge plan Yes    Freedom of choice list was provided with basic dialogue that supports the patient's individualized plan of care/goals and shares the quality data associated with the providers.  Yes    Care Transition patient: No    Anahy Leonard RN  [facility] [employer]  Case Management Department  Ph: [phone]   Fax: [phone]

## 2022-07-07 LAB
ALBUMIN SERPL-MCNC: 3.4 G/DL (ref 3.4–5)
ANION GAP SERPL CALCULATED.3IONS-SCNC: 10 MMOL/L (ref 3–16)
BUN BLDV-MCNC: 44 MG/DL (ref 7–20)
CALCIUM SERPL-MCNC: 9.1 MG/DL (ref 8.3–10.6)
CHLORIDE BLD-SCNC: 95 MMOL/L (ref 99–110)
CO2: 32 MMOL/L (ref 21–32)
CREAT SERPL-MCNC: 2 MG/DL (ref 0.6–1.2)
GFR AFRICAN AMERICAN: 29
GFR NON-AFRICAN AMERICAN: 24
GLUCOSE BLD-MCNC: 122 MG/DL (ref 70–99)
MAGNESIUM: 2.3 MG/DL (ref 1.8–2.4)
PARATHYROID HORMONE INTACT: 86.3 PG/ML (ref 14–72)
PHOSPHORUS: 2.4 MG/DL (ref 2.5–4.9)
POTASSIUM SERPL-SCNC: 4.1 MMOL/L (ref 3.5–5.1)
SODIUM BLD-SCNC: 137 MMOL/L (ref 136–145)
VITAMIN D 25-HYDROXY: 60.2 NG/ML

## 2022-07-07 PROCEDURE — 2500000003 HC RX 250 WO HCPCS: Performed by: INTERNAL MEDICINE

## 2022-07-07 PROCEDURE — 2580000003 HC RX 258: Performed by: INTERNAL MEDICINE

## 2022-07-07 PROCEDURE — 1200000000 HC SEMI PRIVATE

## 2022-07-07 PROCEDURE — 36415 COLL VENOUS BLD VENIPUNCTURE: CPT

## 2022-07-07 PROCEDURE — 6370000000 HC RX 637 (ALT 250 FOR IP): Performed by: INTERNAL MEDICINE

## 2022-07-07 PROCEDURE — 97116 GAIT TRAINING THERAPY: CPT

## 2022-07-07 PROCEDURE — 99221 1ST HOSP IP/OBS SF/LOW 40: CPT | Performed by: NURSE PRACTITIONER

## 2022-07-07 PROCEDURE — 94761 N-INVAS EAR/PLS OXIMETRY MLT: CPT

## 2022-07-07 PROCEDURE — 6360000002 HC RX W HCPCS: Performed by: INTERNAL MEDICINE

## 2022-07-07 PROCEDURE — 80069 RENAL FUNCTION PANEL: CPT

## 2022-07-07 PROCEDURE — 83735 ASSAY OF MAGNESIUM: CPT

## 2022-07-07 PROCEDURE — 97530 THERAPEUTIC ACTIVITIES: CPT

## 2022-07-07 PROCEDURE — 82306 VITAMIN D 25 HYDROXY: CPT

## 2022-07-07 PROCEDURE — 83970 ASSAY OF PARATHORMONE: CPT

## 2022-07-07 PROCEDURE — 99233 SBSQ HOSP IP/OBS HIGH 50: CPT | Performed by: INTERNAL MEDICINE

## 2022-07-07 RX ORDER — POTASSIUM CHLORIDE 20 MEQ/1
40 TABLET, EXTENDED RELEASE ORAL ONCE
Status: COMPLETED | OUTPATIENT
Start: 2022-07-07 | End: 2022-07-07

## 2022-07-07 RX ORDER — FERROUS SULFATE 325(65) MG
325 TABLET ORAL
Status: DISCONTINUED | OUTPATIENT
Start: 2022-07-08 | End: 2022-07-08 | Stop reason: HOSPADM

## 2022-07-07 RX ADMIN — APIXABAN 2.5 MG: 2.5 TABLET, FILM COATED ORAL at 21:32

## 2022-07-07 RX ADMIN — SODIUM CHLORIDE, PRESERVATIVE FREE 10 ML: 5 INJECTION INTRAVENOUS at 23:14

## 2022-07-07 RX ADMIN — HYDROCODONE BITARTRATE AND ACETAMINOPHEN 1 TABLET: 10; 325 TABLET ORAL at 15:23

## 2022-07-07 RX ADMIN — IRON SUCROSE 200 MG: 20 INJECTION, SOLUTION INTRAVENOUS at 13:28

## 2022-07-07 RX ADMIN — ATORVASTATIN CALCIUM 80 MG: 80 TABLET, FILM COATED ORAL at 21:32

## 2022-07-07 RX ADMIN — Medication 2000 UNITS: at 09:04

## 2022-07-07 RX ADMIN — DOXYCYCLINE 100 MG: 100 INJECTION, POWDER, LYOPHILIZED, FOR SOLUTION INTRAVENOUS at 11:12

## 2022-07-07 RX ADMIN — HYDROCODONE BITARTRATE AND ACETAMINOPHEN 1 TABLET: 10; 325 TABLET ORAL at 09:04

## 2022-07-07 RX ADMIN — HYDROCODONE BITARTRATE AND ACETAMINOPHEN 1 TABLET: 10; 325 TABLET ORAL at 22:02

## 2022-07-07 RX ADMIN — POLYETHYLENE GLYCOL 3350 17 G: 17 POWDER, FOR SOLUTION ORAL at 15:23

## 2022-07-07 RX ADMIN — POTASSIUM CHLORIDE 40 MEQ: 1500 TABLET, EXTENDED RELEASE ORAL at 11:12

## 2022-07-07 RX ADMIN — SODIUM CHLORIDE 25 ML: 9 INJECTION, SOLUTION INTRAVENOUS at 13:27

## 2022-07-07 RX ADMIN — DOXYCYCLINE 100 MG: 100 INJECTION, POWDER, LYOPHILIZED, FOR SOLUTION INTRAVENOUS at 21:40

## 2022-07-07 RX ADMIN — APIXABAN 2.5 MG: 2.5 TABLET, FILM COATED ORAL at 09:04

## 2022-07-07 RX ADMIN — SODIUM CHLORIDE 25 ML: 9 INJECTION, SOLUTION INTRAVENOUS at 21:40

## 2022-07-07 ASSESSMENT — PAIN DESCRIPTION - ONSET
ONSET: ON-GOING

## 2022-07-07 ASSESSMENT — PAIN DESCRIPTION - LOCATION
LOCATION: BACK

## 2022-07-07 ASSESSMENT — PAIN - FUNCTIONAL ASSESSMENT
PAIN_FUNCTIONAL_ASSESSMENT: PREVENTS OR INTERFERES SOME ACTIVE ACTIVITIES AND ADLS

## 2022-07-07 ASSESSMENT — PAIN DESCRIPTION - ORIENTATION
ORIENTATION: MID
ORIENTATION: LOWER;MID
ORIENTATION: LOWER;MID
ORIENTATION: LOWER
ORIENTATION: LOWER
ORIENTATION: LOWER;MID

## 2022-07-07 ASSESSMENT — PAIN SCALES - GENERAL
PAINLEVEL_OUTOF10: 5
PAINLEVEL_OUTOF10: 3
PAINLEVEL_OUTOF10: 7
PAINLEVEL_OUTOF10: 7
PAINLEVEL_OUTOF10: 6
PAINLEVEL_OUTOF10: 2
PAINLEVEL_OUTOF10: 7
PAINLEVEL_OUTOF10: 0

## 2022-07-07 ASSESSMENT — PAIN DESCRIPTION - PAIN TYPE
TYPE: CHRONIC PAIN

## 2022-07-07 ASSESSMENT — PAIN DESCRIPTION - FREQUENCY
FREQUENCY: CONTINUOUS
FREQUENCY: INTERMITTENT
FREQUENCY: CONTINUOUS
FREQUENCY: INTERMITTENT
FREQUENCY: CONTINUOUS

## 2022-07-07 ASSESSMENT — PAIN DESCRIPTION - DESCRIPTORS
DESCRIPTORS: ACHING
DESCRIPTORS: STABBING

## 2022-07-07 NOTE — PLAN OF CARE
Problem: ABCDS Injury Assessment  Goal: Absence of physical injury  Outcome: Progressing  Flowsheets (Taken 7/7/2022 0327)  Absence of Physical Injury: Implement safety measures based on patient assessment     Problem: Respiratory - Adult  Goal: Achieves optimal ventilation and oxygenation  Outcome: Progressing  Flowsheets (Taken 7/7/2022 0327)  Achieves optimal ventilation and oxygenation:   Assess for changes in respiratory status   Assess for changes in mentation and behavior   Encourage broncho-pulmonary hygiene including cough, deep breathe, incentive spirometry   Assess and instruct to report shortness of breath or any respiratory difficulty     Problem: Cardiovascular - Adult  Goal: Maintains optimal cardiac output and hemodynamic stability  Outcome: Progressing  Flowsheets (Taken 7/7/2022 0327)  Maintains optimal cardiac output and hemodynamic stability:   Monitor blood pressure and heart rate   Monitor urine output and notify Licensed Independent Practitioner for values outside of normal range   Assess for signs of decreased cardiac output     Problem: Cardiovascular - Adult  Goal: Absence of cardiac dysrhythmias or at baseline  Outcome: Progressing  Flowsheets (Taken 7/7/2022 0327)  Absence of cardiac dysrhythmias or at baseline:   Monitor cardiac rate and rhythm   Assess for signs of decreased cardiac output     Problem: Safety - Adult  Goal: Free from fall injury  Outcome: Progressing  Note: Pt is a Fall Risk. See [facility] Lloyd Fall Risk Score. Pt bed in low position and side rails up. Call light and belongings in reach. Pt encouraged to call for assistance. Will continue with hourly rounds for PO intake, pain needs, toileting, and repositioning as needed.         Problem: Pain  Goal: Verbalizes/displays adequate comfort level or baseline comfort level  Outcome: Progressing  Flowsheets (Taken 7/7/2022 0327)  Verbalizes/displays adequate comfort level or baseline comfort level:   Encourage patient to monitor

## 2022-07-07 NOTE — CONSULTS
[facility]  Palliative Medicine Consultation Note      Date Of Admission:7/5/2022  Date of consult: 07/07/22  Seen by [facility] in the past:  No    Recommendations:        Met with the pt and her daughter at the bedside, introduced palliative care. Pt lives with her daughter Raymon Hardin and her , Raymon Hardin cares for them. Pt reports that she ambulates with a walker/cane. She reports she used to have SOB on ambulation but since her cardioversion has been much better. Denies complaints other than chronic back pain. We discussed her understanding of her condition. She reports daughter Raymon Hardin is her HCPOA, family will bring in copy today or tomorrow. We discussed code status in depth, she reports at this time would like to remain a full code, but would not want to be kept alive on machines indefinitely if prognosis was poor. Her daughter at bedside said they are intent on honoring pt's wishes. 1. Goals of Care/Advanced Care planning/Code status: Full code - continue with current management. Pt hoping to return to previous quality of life which she endorses is quite good. 2. Pain: pt endorses chronic back pain, takes norco q6h prn at home  3. SOB: denies, resting comfortably on room air  4. Near syncope/fall: CT head with chronic changes, CT cervical spine with no acute abnormalities, Paroxysmal A. fib, recently started on amiodarone. Patient is on beta-blocker too. EKG-with sinus bradycardia. Patient is on Lasix twice daily, Electrolyte abnormalities-hyponatremia, hypokalemia  5. CHF with last EF 25-30%: d/w pt today  6.  Disposition: planning for home with Gabi Mcnally when medically ready for discharge    Reason for Consult:         []  Goals of Care  []  Code Status Discussion/Advanced Care Planning   []  Psychosocial/Family Support  []  Symptom Management  [x]  Other (Specify) HF readmission    Requesting Physician: Dr. Aurelio Pinto:  Fall    History Obtained From:  patient, electronic medical record    History of Present Illness:         Vania Schwarz is a 80 y.o. female with PMH of Afib, CHF (EF 25-30%), CAD, HTN, GERD who presented with a fall due to loss of balance from home. Of note, she was recently admitted for afib with RVR and was cardioverted at that time. CT head performed with chronic changes.      Subjective:         Past Medical History:        Diagnosis Date    Arthritis     Blood circulation, collateral     CAD (coronary artery disease)     CHF (congestive heart failure) (HCC)     Confusion 8/31/2020    GERD (gastroesophageal reflux disease)     History of heart artery stent 12/2018    Hyperlipidemia     Hypertension     Mitral valve prolapse     Myocardial infarct, old     Thyroid disease        Past Surgical History:        Procedure Laterality Date    COLONOSCOPY      EYE SURGERY Left 09/04/2018    PHACO EMULSIFICATION OF CATARACT WITH INTRAOCULAR LENS IMPLANT LEFT EYE     HYSTERECTOMY (CERVIX STATUS UNKNOWN)      OTHER SURGICAL HISTORY  08/27/2018    phacoemulsification of cataract with intraocular lens implant right eye    [place] OFFICE/OUTPT VISIT,PROCEDURE ONLY Right 8/27/2018    PHACO EMULSIFICATION OF CATARACT WITH INTRAOCULAR LENS IMPLANT RIGHT EYE performed by Bharat Horvath MD at [address] [place] [address] OFFICE/OUTPT VISIT,PROCEDURE ONLY Left 9/4/2018    PHACO EMULSIFICATION OF CATARACT WITH INTRAOCULAR LENS IMPLANT LEFT EYE performed by Bharat Horvath MD at [address] [place] [address] Dr         Current Medications:    Medications Prior to Admission: ondansetron (ZOFRAN) 4 MG tablet, Take 1 tablet by mouth 3 times daily as needed for Nausea or Vomiting  amiodarone (CORDARONE) 200 MG tablet, Take 1 tablet by mouth daily  metoprolol succinate (TOPROL XL) 50 MG extended release tablet, Take 1 tablet by mouth daily  furosemide (LASIX) 40 MG tablet, Take 1 tablet by mouth 2 times daily  lidocaine 4 % external patch, Place 1 patch onto the skin daily  miconazole (MICOTIN) 2 % powder, Apply topically 2 times daily. Misc. Devices KIT, Rollator. Use as directed. nitroGLYCERIN (NITROSTAT) 0.4 MG SL tablet, PUT 1 TAB UNDER TONGUE EVERY 5 MIN AS NEEDED CHEST PAIN UP TO 3. IF NO RELIEF AFTER 1 DOSE, CALL 911  apixaban (ELIQUIS) 2.5 MG TABS tablet, Take 1 tablet by mouth 2 times daily  diclofenac sodium (VOLTAREN) 1 % GEL, Apply 2 g topically 4 times daily  famotidine (PEPCID) 20 MG tablet, Take 1 tablet by mouth daily  zolpidem (AMBIEN) 5 MG tablet, Take 1 tablet by mouth nightly as needed for Sleep for up to 180 days. (Patient not taking: Reported on 5/6/2022)  atorvastatin (LIPITOR) 80 MG tablet, TAKE 1 TABLET BY MOUTH ONE TIME A DAY  vitamin B-12 (CYANOCOBALAMIN) 1000 MCG tablet, Take 1,000 mcg by mouth daily  fluticasone (FLONASE) 50 MCG/ACT nasal spray, 2 sprays by Each Nostril route daily  Handicap Placard [facility], by Does not apply route Length: 5 years  vitamin D (CHOLECALCIFEROL) 1000 UNIT TABS tablet, Take 2 tablets by mouth daily  HYDROcodone-acetaminophen (NORCO)  MG per tablet, Take 1 tablet by mouth every 6 hours as needed. .  Coenzyme Q10 (CO Q 10) 100 MG CAPS, Take  by mouth daily. aspirin 81 MG chewable tablet, Take 1 tablet by mouth daily. Allergies:  Benazepril hcl, Feldene [piroxicam], Hytrin [terazosin hcl], Iv contrast [iodides], Acetaminophen, Celebrex [celecoxib], Cephalexin, Hydrochlorothiazide, Iodinated casein, Monopril [fosinopril sodium], Protonix [pantoprazole sodium], Quinapril hcl, Ultracet [tramadol-acetaminophen], and Ziac [bisoprolol-hydrochlorothiazide]    Social History:    · TOBACCO: reports that she has never smoked. She has never used smokeless tobacco.  · ETOH:   reports no history of alcohol use. · Patient currently lives with family , daughter    Review of Systems -   Review of Systems: A 10 point review of systems was conducted, significant findings as notedin HPI.     Objective:          Physical Exam  Constitutional:       General: She is not in acute distress. Cardiovascular:      Rate and Rhythm: Normal rate and regular rhythm. Heart sounds: Normal heart sounds. Pulmonary:      Breath sounds: Normal breath sounds. Abdominal:      General: Bowel sounds are normal.      Palpations: Abdomen is soft. Musculoskeletal:      Right lower leg: No edema. Left lower leg: No edema. Skin:     General: Skin is warm and dry. Neurological:      Mental Status: She is alert and oriented to person, place, and time. Palliative Performance Scale:  [] 60% Ambulation reduced; Significant disease; Can't do hobbies/housework; intake normal or reduced; occasional assist; LOC full/confusion  [] 50% Mainly sit/lie; Extensive disease; Can't do any work; Considerable assist; intake normal  Or reduced; LOC full/confusion  [] 40% Mainly in bed; Extensive disease; Mainly assist; intake normal or reduced; occasional assist; LOC full/confusion  [] 30% Bed Bound; Extensive disease; Total care; intake reduced; LOC full/confusion  [] 20% Bed Bound; Extensive disease; Total care; intake minimal; Drowsy/coma  [] 10% Bed Bound; Extensive disease; Total care; Mouth care only; Drowsy/coma  [] 0% Death    PPS: greater than 70%     Vitals:    /62   Pulse 62   Temp 97.9 °F (36.6 °C) (Oral)   Resp 16   Ht 5' (1.524 m)   Wt 115 lb 1.3 oz (52.2 kg)   SpO2 93%   BMI 22.48 kg/m²     Labs:    BMP:   Recent Labs     07/05/22 1753 07/05/22 1753 07/05/22 2021 07/05/22 2159 07/06/22  0520   *  --   --  127* 131*   K 2.5*  --   --  3.1* 3.4*   CL 79*  --   --  85* 87*   CO2 35*  --   --  32 30   BUN 49*  --   --  47* 47*   CREATININE 2.2*  --   --  2.0* 2.1*   GLUCOSE 92   < > 93 81 70    < > = values in this interval not displayed.      CBC:   Recent Labs     07/05/22 1753 07/06/22  0520   WBC 4.9 6.2   HGB 10.3* 9.8*   HCT 31.2* 30.4*    182       LFT's:   Recent Labs     07/05/22  1753   AST 33   ALT 25   BILITOT 0.6   ALKPHOS 60 Troponin:   Recent Labs     07/05/22  1753   TROPONINI 0.01     BNP: No results for input(s): BNP in the last 72 hours. ABGs: No results for input(s): PHART, PUN8HRC, PO2ART in the last 72 hours. INR: No results for input(s): INR in the last 72 hours. U/A:  Recent Labs     07/05/22 2023   COLORU Yellow   PHUR 7.0   WBCUA 6-9*   RBCUA 0-2   BACTERIA 1+*   CLARITYU Clear   SPECGRAV 1.010   LEUKOCYTESUR SMALL*   UROBILINOGEN 0.2   BILIRUBINUR Negative   BLOODU Negative   GLUCOSEU Negative       XR CHEST PORTABLE   Final Result   1. No acute cardiopulmonary findings. CT Cervical Spine WO Contrast   Final Result      1. No evidence of acute fracture in the cervical spine. 2.  No significant spondylosis. CT Head WO Contrast   Final Result   1. Mild atrophy. 2. Patchy areas of decreased white matter attenuation. The findings are nonspecific, but most likely represent chronic small vessel ischemic change. 3.  No evidence of an acute intracranial process. Conclusion/Time spent:         Recommendations see above    Time spent with patient and/or family: 20  Time reviewing records: 10 min   Time communicating with staff: 5 min     A total of 35 minutes spent with the patient and family on unit greater than 50% in counseling regarding palliative care and in goals of care for the patient. Thank you to Dr. Elena Montana for this consultation. We will continue to follow Ms. Dykes's care as needed.       [address] [facility]  Inpatient Palliative Care  [phone]

## 2022-07-07 NOTE — PROGRESS NOTES
Comprehensive Nutrition Assessment    Type and Reason for Visit:  Positive Nutrition Screen    Nutrition Recommendations/Plan:   1. Liberalize to no added salt diet  2. Add Ensure enlive BID  3. Encourage PO intakes  4. Monitor nutrition adequacy, pertinent labs, bowel habits, wt changes, and clinical progress     Malnutrition Assessment:  Malnutrition Status: At risk for malnutrition (Comment) (07/07/22 1127)    Context:  Acute Illness     Findings of the 6 clinical characteristics of malnutrition:  Energy Intake:  Unable to assess  Weight Loss:  Greater than 5% over 1 month     Body Fat Loss:  Unable to assess     Muscle Mass Loss:  Unable to assess      Nutrition Assessment:    Positive nutrition screen r/t weight loss, poor intakes. Pt admitted s/p fall at [facility]. PMH CHF. Pt unavailable during attempts to visit today. Currently on cardiac diet with PO inakes 51-75% x1 meal. CBW is 115# with 12% wt loss x1mo per EMR. Will liberalize to JAYAN diet and add Ensure BID to promote adequate nutrition. Will continue to [facility]. Nutrition Related Findings:    Na 131. K 3.4. No edema. BM 7/6. Wound Type: None       Current Nutrition Intake & Therapies:    Average Meal Intake: 51-75% (x1)  Average Supplements Intake: None Ordered  ADULT DIET; Regular; Low Fat/Low Chol/High Fiber/2 gm Na    Anthropometric Measures:  Height: 5' (152.4 cm)  Ideal Body Weight (IBW): 100 lbs (45 kg)       Current Body Weight: 115 lb (52.2 kg), 115 % IBW.  Weight Source: Standing Scale  Current BMI (kg/m2): 22.5  Usual Body Weight: 132 lb (59.9 kg) (6/1/22 bed scale)  % Weight Change (Calculated): -12.9                    BMI Categories: Normal Weight (BMI 22.0 to 24.9) age over 72    Estimated Daily Nutrient Needs:  Energy Requirements Based On: Kcal/kg  Weight Used for Energy Requirements: Current  Energy (kcal/day): 3074-1528  Weight Used for Protein Requirements: Current  Protein (g/day): 52-62  Fluid (ml/day): Less than 2000ml daily per CHF guidelines    Nutrition Diagnosis:   · Inadequate oral intake related to inadequate protein-energy intake as evidenced by weight loss greater than or equal to 5% in 1 month    Nutrition Interventions:   Food and/or Nutrient Delivery: Modify Current Diet,Start Oral Nutrition Supplement  Nutrition Education/Counseling: Education not appropriate  Coordination of Nutrition Care: Continue to monitor while inpatient       Goals:     Goals: PO intake 50% or greater,prior to discharge       Nutrition Monitoring and Evaluation:   Behavioral-Environmental Outcomes: None Identified  Food/Nutrient Intake Outcomes: Food and Nutrient Intake,Supplement Intake  Physical Signs/Symptoms Outcomes: Weight,Nutrition Focused Physical Findings,Biochemical Data    Discharge Planning:    Continue current [facility],[address], [address]: [id number]

## 2022-07-07 NOTE — PROGRESS NOTES
[facility] Daily Progress Note      Admit Date:  7/5/2022    Subjective:  Ms. Jose Frances was seen and examined. F/U light headed/dizzy. Hx CHF, CAD. Feeling much better/stronger today. No further dizziness.   No cp/sob    Objective:   BP (P) 127/62   Pulse (P) 69   Temp (P) 98.4 °F (36.9 °C) (Oral)   Resp 16   Ht 5' (1.524 m)   Wt 115 lb 1.3 oz (52.2 kg)   SpO2 (P) 95%   BMI 22.48 kg/m²     Intake/Output Summary (Last 24 hours) at 7/7/2022 1520  Last data filed at 7/7/2022 1448  Gross per 24 hour   Intake 222 ml   Output 150 ml   Net 72 ml       TELEMETRY: personally reviewed, Sinus     Physical Exam:  General:  Awake, alert, NAD  Skin:  Warm and dry  Neck:  JVP not elevated  Chest:  Clear to auscultation, respiration normal  Cardiovascular:  RRR S1S2  Abdomen:  Soft nontender  Extremities:  no edema    Medications:    iron sucrose  200 mg IntraVENous Q24H    [START ON 7/8/2022] ferrous sulfate  325 mg Oral Daily with breakfast    amiodarone  200 mg Oral Daily    apixaban  2.5 mg Oral BID    atorvastatin  80 mg Oral Nightly    metoprolol succinate  50 mg Oral Daily    Vitamin D  2,000 Units Oral Daily    sodium chloride flush  5-40 mL IntraVENous 2 times per day    doxycycline (VIBRAMYCIN) IV  100 mg IntraVENous Q12H      sodium chloride 25 mL (07/07/22 1327)     sodium chloride flush, sodium chloride, ondansetron **OR** ondansetron, polyethylene glycol, acetaminophen **OR** acetaminophen, HYDROcodone-acetaminophen    Lab Data:  CBC:   Recent Labs     07/05/22  1753 07/06/22  0520   WBC 4.9 6.2   HGB 10.3* 9.8*   HCT 31.2* 30.4*   MCV 86.1 85.7    182     BMP:   Recent Labs     07/05/22  2159 07/06/22  0520 07/07/22  1044   * 131* 137   K 3.1* 3.4* 4.1   CL 85* 87* 95*   CO2 32 30 32   PHOS  --   --  2.4*   BUN 47* 47* 44*   CREATININE 2.0* 2.1* 2.0*     LIVER PROFILE:   Recent Labs     07/05/22  1753   AST 33   ALT 25   BILIDIR <0.2   BILITOT 0.6   ALKPHOS 60     PT/INR: No results for input(s): PROTIME, INR in the last 72 hours. APTT: No results for input(s): APTT in the last 72 hours. BNP:  No results for input(s): BNP in the last 72 hours. IMAGING:     Assessment:  Patient Active Problem List    Diagnosis Date Noted    AGUILA (acute kidney injury) ([facility] [address] 01/13/2014    Near syncope 07/06/2022    Bradycardia 07/06/2022    On amiodarone therapy     On continuous oral anticoagulation     CHF (congestive heart failure), NYHA class I, acute on chronic, combined ([address] 06/23/2022    Atrial fibrillation ([address] 05/29/2022    Encounter for loop recorder check 03/30/2022    Stroke of unknown etiology ([address] 03/19/2022    Visual field defect 03/18/2022    Chronic kidney disease, stage IV (severe) ([address] 08/20/2021    Confusion 08/31/2020    PVD (posterior vitreous detachment), both eyes 07/02/2019    Posterior subcapsular polar age-related cataract of both eyes 07/02/2019    Abnormal myocardial perfusion study 11/06/2018    Ischemic cardiomyopathy 11/06/2018    Cardiomyopathy ([address] 11/06/2018    Palpitations 09/04/2018    V-tach ([address] 09/04/2018    GERD (gastroesophageal reflux disease) 01/10/2017    Closed stable burst fracture of first lumbar vertebra ([address] 09/30/2015    Primary insomnia 11/24/2014    Mixed hyperlipidemia 10/29/2014    Gout 04/18/2014    Pain in joint, hand 04/18/2014    Renal insufficiency 01/22/2014    Essential hypertension 04/26/2013    Chronic low back pain 02/22/2013    Other and unspecified angina pectoris 06/21/2011    Coronary atherosclerosis of native coronary artery 06/21/2011    Other chronic pulmonary heart diseases 06/21/2011    Mitral valve disorder 06/21/2011    Acute combined systolic and diastolic heart failure ([address] 06/21/2011       Plan:  1. Feeling much better. Stronger. No light headed/dizziness. Na back to nl at 137. K normal 4.1.  CV appears stable.        Core Measures:  · Discharge instructions:   · LVEF documented:

## 2022-07-07 NOTE — CONSULTS
chronic diastolic heart failure, a fib, coronary artery disease (with coronary stent), hyperlipidemia, CKD stage 4, thyroid disease (with thyroidectomy). She reports recently feeling dizzy and lightheaded. Family reports that since the beginning of her hospital admission, her confusion has drastically improved. Patient reports very little dizziness now and denies dyspnea. Interval History:     Yesterday the patients blood pressure had fallen into the 426E systolic and heart rate was in the low 60s so beta blocker and amiodarone were held. ROS:       positives in bold   Constitutional:  fever, chills, weakness, weight change, fatigue  Skin:  rash, pruritus, hair loss, bruising, dry skin, petechiae  Head, Face, Neck   headaches, swelling,  cervical adenopathy  Respiratory: shortness of breath, cough, or wheezing  Cardiovascular: chest pain, palpitations, dizzy, edema  Gastrointestinal: nausea, vomiting, diarrhea, constipation,belly pain    Yellow skin, blood in stool  Musculoskeletal:  back pain, muscle weakness, gait problems,       joint pain or swelling. Genitourinary:  dysuria, poor urine flow, flank pain, blood in urine  Neurologic:  vertigo, TIA'S, syncope, seizures, focal weakness  Psychosocial:  insomnia, anxiety, or depression. Additional positive findings:                          All other remaining systems are negative.         PMH/PSH/SH/Family History:     Past Medical History:   Diagnosis Date    Arthritis     Blood circulation, collateral     CAD (coronary artery disease)     CHF (congestive heart failure) ([facility])     Confusion 8/31/2020    GERD (gastroesophageal reflux disease)     History of heart artery stent 12/2018    Hyperlipidemia     Hypertension     Mitral valve prolapse     Myocardial infarct, old     Thyroid disease        Past Surgical History:   Procedure Laterality Date    COLONOSCOPY      EYE SURGERY Left 09/04/2018    PHACO EMULSIFICATION OF CATARACT WITH INTRAOCULAR LENS IMPLANT LEFT EYE     HYSTERECTOMY (CERVIX STATUS UNKNOWN)      OTHER SURGICAL HISTORY  08/27/2018    phacoemulsification of cataract with intraocular lens implant right eye    AL OFFICE/OUTPT VISIT,PROCEDURE ONLY Right 8/27/2018    PHACO EMULSIFICATION OF CATARACT WITH INTRAOCULAR LENS IMPLANT RIGHT EYE performed by Zoe Stock MD at [address] OFFICE/OUTPT [address] Left 9/4/2018    PHACO EMULSIFICATION OF CATARACT WITH INTRAOCULAR LENS IMPLANT LEFT EYE performed by Zoe Stock MD at [address] History     Socioeconomic History    Marital status:      Spouse name: Joy Box    Number of children: Not on file    Years of education: 12    Highest education level: Not on file   Occupational History    Occupation: retired   Tobacco Use    Smoking status: Never Smoker    Smokeless tobacco: Never Used   Vaping Use    Vaping Use: Never used   Substance and Sexual Activity    Alcohol use: No     Alcohol/week: 0.0 standard drinks    Drug use: No    Sexual activity: Never     Comment:  living with spouse. Other Topics Concern    Not on file   Social History Narrative    Not on file     Social Determinants of Health     Financial Resource Strain: Medium Risk    Difficulty of Paying Living Expenses: Somewhat hard   Food Insecurity: No Food Insecurity    Worried About Running Out of Food in the Last Year: Never true    Ran Out of Food in the Last Year: Never true   Transportation Needs:     Lack of Transportation (Medical): Not on file    Lack of Transportation (Non-Medical):  Not on file   Physical Activity:     Days of Exercise per Week: Not on file    Minutes of Exercise per Session: Not on file   Stress:     Feeling of Stress : Not on file   Social Connections:     Frequency of Communication with Friends and Family: Not on file    Frequency of Social Gatherings with Friends and Family: Not on file    Attends [employer] Services: Not on file    Active Member of Clubs or Organizations: Not on file    Attends Club or Organization Meetings: Not on file    Marital Status: Not on file   Intimate Partner Violence:     Fear of Current or Ex-Partner: Not on file    Emotionally Abused: Not on file    Physically Abused: Not on file    Sexually Abused: Not on file   Housing Stability:     Unable to Pay for Housing in the Last Year: Not on file    Number of [place] in the Last Year: Not on file    Unstable Housing in the Last Year: Not on file           Problem Relation Age of Onset    Heart Disease Mother     Heart Disease Father     Cancer Father     Cancer Sister     Diabetes Brother     Stroke Brother           Medication:     Scheduled Meds:   [Held by provider] amiodarone  200 mg Oral Daily    apixaban  2.5 mg Oral BID    atorvastatin  80 mg Oral Nightly    [Held by provider] metoprolol succinate  50 mg Oral Daily    Vitamin D  2,000 Units Oral Daily    sodium chloride flush  5-40 mL IntraVENous 2 times per day    doxycycline (VIBRAMYCIN) IV  100 mg IntraVENous Q12H     Continuous Infusions:   sodium chloride 25 mL (07/06/22 1025)    sodium chloride 50 mL/hr at 07/06/22 1931     PRN Meds:.sodium chloride flush, sodium chloride, ondansetron **OR** ondansetron, polyethylene glycol, acetaminophen **OR** acetaminophen, HYDROcodone-acetaminophen       Vitals :     Vitals:    07/07/22 0900   BP: 132/60   Pulse: 68   Resp: 21   Temp: 98 °F (36.7 °C)   SpO2: 97%       I & O :       Intake/Output Summary (Last 24 hours) at 7/7/2022 0920  Last data filed at 7/6/2022 1543  Gross per 24 hour   Intake 200 ml   Output 150 ml   Net 50 ml        Physical Examination :     General appearance: Anxious- no, distressed- no, in good spirits- yes  HEENT: Lips- normal, teeth- ok , oral mucosa- moist  Neck : Mass- no, appears symmetrical, JVD- not visible  Respiratory: Respiratory effort- normal, wheeze- no, crackles - no  Cardiovascular:  Ausculation- No M/R/G, Edema -none  Abdomen: visible mass- no, distention- no, scar- no, tenderness- no                            hepatosplenomegaly-  no  Musculoskeletal:  clubbing no,cyanosis- no , digital ischemia- no                           muscle strength- grossly normal , tone - grossly normal  Skin: rashes- no , ulcers- no, induration- no, tightening - no  Psychiatric:  Judgement and insight- normal           AAO X 3     LABS:     Recent Labs     07/05/22 1753 07/06/22  0520   WBC 4.9 6.2   HGB 10.3* 9.8*   HCT 31.2* 30.4*    182     Recent Labs     07/05/22 1753 07/05/22 1753 07/05/22 2021 07/05/22 2159 07/06/22  0520   *  --   --  127* 131*   K 2.5*  --   --  3.1* 3.4*   CL 79*  --   --  85* 87*   CO2 35*  --   --  32 30   BUN 49*  --   --  47* 47*   CREATININE 2.2*  --   --  2.0* 2.1*   GLUCOSE 92   < > 93 81 70   MG 2.40  --   --   --  2.40    < > = values in this interval not displayed. Will discuss with Dr. Syed Avila MD, MD  Internal Medicine, PGY1    Patient was seen and examined and the case was discussed with the resident. He acted as my scribe. I agree with the assessment and plan.     Thanks  Nephrology  [address] [facility], [address] [place] [address]  Office: [phone]  Cell: [phone]  Fax: [phone]

## 2022-07-07 NOTE — CARE COORDINATION
Case Management Assessment           Daily Note                 Date/ Time of Note: 7/7/2022 9:17 AM         Note completed by: Leatha Gallegos RN    Patient Name: Jaqueline Serna  YOB: 1935    Diagnosis:Hypokalemia [E87.6]  Lightheadedness [R42]  Hyponatremia [E87.1]  Near syncope [R55]  Patient Admission Status: Inpatient    Date of Admission:7/5/2022  4:15 PM Length of Stay: 1 GLOS:      Current Plan of Care: IVF, IV abx, palliative consult   ________________________________________________________________________________________  PT AM-PAC: 17 / 24 per last evaluation on: 7/6    OT AM-PAC: 20 / 24 per last evaluation on: 7/6    DME Needs for discharge: defer  ________________________________________________________________________________________  Discharge Plan: Home with [address] [place] [address] Way: [facility]    Tentative discharge date: 1/2 days    Current barriers to discharge: medical clearance      ________________________________________________________________________________________  Case Management Notes:  Patient is from home with family, active with [facility], plans to return home with same services. Harshil Veras and her family were provided with choice of provider; she and her family are in agreement with the discharge plan.     Care Transition Patient: Flor Gallegos RN  [facility]  Case Management Department  Ph: [phone]  Fax: [phone]

## 2022-07-07 NOTE — PROGRESS NOTES
gait for safety. Pt hopes to return home with continued family assist and HHPT.   Activity Tolerance  Activity Tolerance: Patient tolerated treatment well     Plan   Plan  Plan:  (2-5x/week)  Current Treatment Recommendations: Strengthening,Balance training,Gait training,Functional mobility training,Transfer training,Endurance training,Therapeutic activities,Patient/Caregiver education & training  Safety Devices  Type of Devices: Left in chair,Chair alarm in place,Call light within reach,Nurse notified,Gait belt     Restrictions  Position Activity Restriction  Other position/activity restrictions: up with assist     Subjective   Subjective  Subjective: Pt in bed upon PT entry, agreeable to working with PT         Social/Functional History  Social/Functional History  Lives With: Spouse (and 1 daughter-alternates staying, spouse has severe dementia)  Home Layout: One level,Laundry in basement  Home Access: Stairs to enter without rails (1)  Bathroom Shower/Tub: Tub/Shower unit  Bathroom Toilet: Standard (counter next to commode)  Bathroom Equipment: Shower chair  Home Equipment: Cane,Walker, 4 wheeled (gerichair available)  ADL Assistance: Independent  Homemaking Assistance:  (dtr does all home management)  Ambulation Assistance: Independent (with rollator the past few days; prior to this - no AD in the house and use of cane when outside)  Transfer Assistance: Independent  Active : No  Leisure & Hobbies: I pad, playing cards  Additional Comments: social history obtained from daughter    Objective   Heart Rate: 69  Heart Rate Source: Monitor  BP: 130/65  BP Location: Right upper arm  BP Method: Automatic  Patient Position: Semi fowlers  MAP (Calculated): 86.67  Resp: 14  SpO2: 98 %  O2 Device: None (Room air)      Bed mobility  Supine to Sit: Stand by assistance (HOB elevated, increased time to complete task, use of side rail)  Scooting: Stand by assistance  Transfers  Sit to Stand: Contact guard assistance (cues for hand placement)  Stand to sit: Contact guard assistance (cues for hand placement)  Ambulation  Device: Rolling Walker  Assistance: Contact guard assistance  Quality of Gait: slow gait, decreased step length and height  Gait Deviations: Slow Sadia;Decreased step length;Decreased step height  Distance: 30 ft x 2     Balance  Comments: good stability with CG with RW, pt able to maneuver walker well. Assisted pt with doffing and donning gown, briefs, washing up with wipes while seated on eob. Set up for washing up, min assist for gown and brief change.   AM-PAC Score  AM-PAC Inpatient Mobility Raw Score : 17 (07/07/22 1355)  AM-PAC Inpatient T-Scale Score : 42.13 (07/07/22 1355)  Mobility Inpatient CMS 0-100% Score: 50.57 (07/07/22 1355)  Mobility Inpatient CMS G-Code Modifier : CK (07/07/22 1355)          Goals  Short Term Goals  Time Frame for Short term goals: discharge  7/7 ongoing  Short term goal 1: Pt will transfer sit <--> stand with supervision  Short term goal 2: Pt will amb 50 ft with RW and supervision  Patient Goals   Patient goals : return home       Education - safety, bed mobility, transfers and gait         Therapy Time   Individual Concurrent Group Co-treatment   Time In 1315         Time Out 1345         Minutes 30               Timed Code Treatment Minutes:   30    Total Treatment Minutes:  Erica 40, [id number]

## 2022-07-08 VITALS
BODY MASS INDEX: 23.11 KG/M2 | RESPIRATION RATE: 14 BRPM | OXYGEN SATURATION: 100 % | WEIGHT: 117.73 LBS | DIASTOLIC BLOOD PRESSURE: 64 MMHG | TEMPERATURE: 98.2 F | HEART RATE: 64 BPM | HEIGHT: 60 IN | SYSTOLIC BLOOD PRESSURE: 104 MMHG

## 2022-07-08 PROCEDURE — 2580000003 HC RX 258: Performed by: INTERNAL MEDICINE

## 2022-07-08 PROCEDURE — 6360000002 HC RX W HCPCS: Performed by: INTERNAL MEDICINE

## 2022-07-08 PROCEDURE — 99232 SBSQ HOSP IP/OBS MODERATE 35: CPT | Performed by: INTERNAL MEDICINE

## 2022-07-08 PROCEDURE — 97535 SELF CARE MNGMENT TRAINING: CPT

## 2022-07-08 PROCEDURE — 6370000000 HC RX 637 (ALT 250 FOR IP): Performed by: INTERNAL MEDICINE

## 2022-07-08 PROCEDURE — 2500000003 HC RX 250 WO HCPCS: Performed by: INTERNAL MEDICINE

## 2022-07-08 RX ORDER — FUROSEMIDE 40 MG/1
40 TABLET ORAL DAILY
Status: DISCONTINUED | OUTPATIENT
Start: 2022-07-08 | End: 2022-07-08

## 2022-07-08 RX ORDER — FUROSEMIDE 40 MG/1
40 TABLET ORAL DAILY
Qty: 60 TABLET | Refills: 3 | Status: SHIPPED | OUTPATIENT
Start: 2022-07-09 | End: 2022-07-18

## 2022-07-08 RX ORDER — FUROSEMIDE 40 MG/1
40 TABLET ORAL DAILY
Status: DISCONTINUED | OUTPATIENT
Start: 2022-07-09 | End: 2022-07-08 | Stop reason: HOSPADM

## 2022-07-08 RX ORDER — FERROUS SULFATE 325(65) MG
325 TABLET ORAL
Qty: 30 TABLET | Refills: 3 | Status: SHIPPED | OUTPATIENT
Start: 2022-07-09 | End: 2022-07-08 | Stop reason: HOSPADM

## 2022-07-08 RX ORDER — DOXYCYCLINE HYCLATE 100 MG
100 TABLET ORAL 2 TIMES DAILY
Qty: 10 TABLET | Refills: 0 | Status: SHIPPED | OUTPATIENT
Start: 2022-07-08 | End: 2022-07-13

## 2022-07-08 RX ADMIN — HYDROCODONE BITARTRATE AND ACETAMINOPHEN 1 TABLET: 10; 325 TABLET ORAL at 06:11

## 2022-07-08 RX ADMIN — HYDROCODONE BITARTRATE AND ACETAMINOPHEN 1 TABLET: 10; 325 TABLET ORAL at 12:21

## 2022-07-08 RX ADMIN — METOPROLOL SUCCINATE 50 MG: 50 TABLET, EXTENDED RELEASE ORAL at 09:19

## 2022-07-08 RX ADMIN — DOXYCYCLINE 100 MG: 100 INJECTION, POWDER, LYOPHILIZED, FOR SOLUTION INTRAVENOUS at 13:08

## 2022-07-08 RX ADMIN — SODIUM CHLORIDE, PRESERVATIVE FREE 10 ML: 5 INJECTION INTRAVENOUS at 09:21

## 2022-07-08 RX ADMIN — Medication 2000 UNITS: at 09:19

## 2022-07-08 RX ADMIN — AMIODARONE HYDROCHLORIDE 200 MG: 200 TABLET ORAL at 09:19

## 2022-07-08 RX ADMIN — IRON SUCROSE 200 MG: 20 INJECTION, SOLUTION INTRAVENOUS at 11:38

## 2022-07-08 RX ADMIN — APIXABAN 2.5 MG: 2.5 TABLET, FILM COATED ORAL at 09:19

## 2022-07-08 ASSESSMENT — PAIN DESCRIPTION - DESCRIPTORS
DESCRIPTORS: ACHING
DESCRIPTORS: ACHING
DESCRIPTORS: STABBING
DESCRIPTORS: ACHING

## 2022-07-08 ASSESSMENT — PAIN DESCRIPTION - PAIN TYPE
TYPE: CHRONIC PAIN

## 2022-07-08 ASSESSMENT — PAIN DESCRIPTION - ORIENTATION
ORIENTATION: LOWER
ORIENTATION: LOWER
ORIENTATION: MID
ORIENTATION: LOWER

## 2022-07-08 ASSESSMENT — PAIN - FUNCTIONAL ASSESSMENT
PAIN_FUNCTIONAL_ASSESSMENT: PREVENTS OR INTERFERES SOME ACTIVE ACTIVITIES AND ADLS

## 2022-07-08 ASSESSMENT — PAIN SCALES - GENERAL
PAINLEVEL_OUTOF10: 6
PAINLEVEL_OUTOF10: 3
PAINLEVEL_OUTOF10: 6
PAINLEVEL_OUTOF10: 7
PAINLEVEL_OUTOF10: 6

## 2022-07-08 ASSESSMENT — PAIN DESCRIPTION - LOCATION
LOCATION: BACK

## 2022-07-08 ASSESSMENT — PAIN DESCRIPTION - ONSET
ONSET: ON-GOING

## 2022-07-08 ASSESSMENT — PAIN DESCRIPTION - FREQUENCY: FREQUENCY: CONTINUOUS

## 2022-07-08 NOTE — PLAN OF CARE
Problem: ABCDS Injury Assessment  Goal: Absence of physical injury  Outcome: Progressing  Flowsheets  Taken 7/7/2022 2001  Absence of Physical Injury: Implement safety measures based on patient assessment  Taken 7/7/2022 0727  Absence of Physical Injury: Implement safety measures based on patient assessment     Problem: Metabolic/Fluid and Electrolytes - Adult  Goal: Electrolytes maintained within normal limits  Outcome: Progressing     Problem: Metabolic/Fluid and Electrolytes - Adult  Goal: Hemodynamic stability and optimal renal function maintained  Outcome: Progressing     Problem: Safety - Adult  Goal: Free from fall injury  Outcome: Progressing  Flowsheets (Taken 7/7/2022 0727)  Free From Fall Injury:   Instruct family/caregiver on patient safety   Based on caregiver fall risk screen, instruct family/caregiver to ask for assistance with transferring infant if caregiver noted to have fall risk factors     Problem: Pain  Goal: Verbalizes/displays adequate comfort level or baseline comfort level  Outcome: Progressing  Flowsheets (Taken 7/7/2022 2001)  Verbalizes/displays adequate comfort level or baseline comfort level:   Encourage patient to monitor pain and request assistance   Assess pain using appropriate pain scale   Administer analgesics based on type and severity of pain and evaluate response   Implement non-pharmacological measures as appropriate and evaluate response   Consider cultural and social influences on pain and pain management   Notify Licensed Independent Practitioner if interventions unsuccessful or patient reports new pain     Problem: Discharge Planning  Goal: Discharge to home or other facility with appropriate resources  Outcome: Progressing  Flowsheets (Taken 7/7/2022 2001)  Discharge to home or other facility with appropriate resources:   Identify barriers to discharge with patient and caregiver   Arrange for needed discharge resources and transportation as appropriate   Identify discharge learning needs (meds, wound care, etc)   Arrange for interpreters to assist at discharge as needed   Refer to discharge planning if patient needs post-hospital services based on physician order or complex needs related to functional status, cognitive ability or social support system

## 2022-07-08 NOTE — PLAN OF CARE
Problem: ABCDS Injury Assessment  Goal: Absence of physical injury  7/8/2022 0356 by Sandrine Camp RN  Outcome: Progressing  Flowsheets (Taken 7/8/2022 0356)  Absence of Physical Injury: Implement safety measures based on patient assessment     Problem: Metabolic/Fluid and Electrolytes - Adult  Goal: Electrolytes maintained within normal limits  7/8/2022 0356 by Sandrine Camp RN  Outcome: Progressing  Flowsheets (Taken 7/8/2022 0356)  Electrolytes maintained within normal limits: Monitor labs and assess patient for signs and symptoms of electrolyte imbalances       Problem: Cardiovascular - Adult  Goal: Maintains optimal cardiac output and hemodynamic stability  Outcome: Progressing  Flowsheets (Taken 7/8/2022 0356)  Maintains optimal cardiac output and hemodynamic stability:   Monitor blood pressure and heart rate   Monitor urine output and notify Licensed Independent Practitioner for values outside of normal range   Assess for signs of decreased cardiac output     Problem: Safety - Adult  Goal: Free from fall injury  7/8/2022 0356 by Sandrine Camp RN  Outcome: Progressing  Note: Pt is a Fall Risk. See Franklin Roberth Fall Risk Score. Pt bed in low position and side rails up. Call light and belongings in reach. Pt encouraged to call for assistance. Will continue with hourly rounds for PO intake, pain needs, toileting, and repositioning as needed.         Problem: Pain  Goal: Verbalizes/displays adequate comfort level or baseline comfort level  7/8/2022 0356 by Sandrine Camp RN  Outcome: Progressing  Flowsheets (Taken 7/8/2022 0356)  Verbalizes/displays adequate comfort level or baseline comfort level:   Encourage patient to monitor pain and request assistance   Assess pain using appropriate pain scale   Administer analgesics based on type and severity of pain and evaluate response   Implement non-pharmacological measures as appropriate and evaluate response   Consider cultural and social influences on pain

## 2022-07-08 NOTE — PROGRESS NOTES
Occupational Therapy  Facility/Department: [place] [address] [facility]  Occupational Therapy Daily Treatment    Name: Marium Grover  : 1935  MRN: [id number]  Date of Service: 2022    Discharge Recommendations:  Marium Grover scored a 21/24 on the AM-PAC ADL Inpatient form. Current research shows that an AM-PAC score of 18 or greater is typically associated with a discharge to the patient's home setting. Based on the patient's AM-PAC score, and their current ADL deficits, it is recommended that the patient have 2-3 sessions per week of Occupational Therapy at d/c to increase the patient's independence. At this time, this patient demonstrates the endurance and safety to discharge home with home OT and a follow up treatment frequency of 2-3x/wk. Please see assessment section for further patient specific details. If patient discharges prior to next session this note will serve as a discharge summary. Please see below for the latest assessment towards goals. OT Equipment Recommendations  Equipment Needed: Yes  Mobility Devices: ADL Assistive Devices  ADL Assistive Devices: Transfer Tub Bench  Other: transfer tub bench may be beneficial       Patient Diagnosis(es): The primary encounter diagnosis was Hyponatremia. Diagnoses of Hypokalemia and Lightheadedness were also pertinent to this visit. Past Medical History:  has a past medical history of Arthritis, Blood circulation, collateral, CAD (coronary artery disease), CHF (congestive heart failure) ([address] Confusion, GERD (gastroesophageal reflux disease), History of heart artery stent, Hyperlipidemia, Hypertension, Mitral valve prolapse, Myocardial infarct, old, and Thyroid disease. Past Surgical History:  has a past surgical history that includes Hysterectomy;  Thyroidectomy; Colonoscopy; other surgical history (2018); pr office/outpt visit,procedure only (Right, 2018); eye surgery (Left, 2018); and pr office/outpt visit,procedure only (Left, 9/4/2018). Treatment Diagnosis: impaired ADLs and transfers      Assessment   Performance deficits / Impairments: Decreased functional mobility ; Decreased ADL status; Decreased endurance  Assessment: Pt demo improved performance in OT session this date as evidenced by completing UE/LE dressing tasks w/ spvn-SBA. Pt also demo increased independence w/ functional mobiltiy and transfers, completing sit <>stand trasfers w/ SBA and ambulating to/from bathroom w/ SBA-spvn using RW. Pt reports plan to dc home today and would benefit from 24hr A and HHOT to maximize safety and functional independence. Treatment Diagnosis: impaired ADLs and transfers  REQUIRES OT FOLLOW-UP: Yes  Activity Tolerance  Activity Tolerance: Patient Tolerated treatment well        Plan   Plan  Times per Week: 2-5  Times per Day: Daily  Current Treatment Recommendations: Balance training,ROM,Functional mobility training,Self-Care / ADL,Endurance training,Safety education & training     Restrictions  Position Activity Restriction  Other position/activity restrictions: up with assist    Subjective   General  Chart Reviewed:  Yes  Additional Pertinent Hx: PMH:  A fib, CHF, hyponatremia, hypokalemia, CAD, GERD, HTN. MI. thyroidectomy, CVA  Family / Caregiver Present: Yes (daughter)  Referring Practitioner: Dr. Alan Barclay  Diagnosis: Pt admitted with dizziness, s/p fall-dx of hyponatremia, hypokalemia head CT=neg acute process, cervical spine CT=neg, CXR=neg  Subjective  Subjective: Pt sitting at EOB finishing lunch upon arrival. Pt pleasant and agreeable to OT session     Social/Functional History  Social/Functional History  Lives With: Spouse (and 1 daughter-alternates staying, spouse has severe dementia)  Home Layout: One level,Laundry in basement  Home Access: Stairs to enter without rails (1)  Bathroom Shower/Tub: Tub/Shower unit  Bathroom Toilet: Standard (counter next to commode)  Bathroom Equipment: Shower chair  Home Equipment: Cane,Walker, 4 wheeled (gerichair available)  ADL Assistance: [address]:  (dtr does all home management)  Ambulation Assistance: Independent (with rollator the past few days; prior to this - no AD in the house and use of cane when outside)  Transfer Assistance: Independent  Active : No  Leisure & Hobbies: I pad, playing cards  Additional Comments: social history obtained from daughter       Objective   Heart Rate: 64  Heart Rate Source: Monitor  BP: 104/64  BP Location: Right upper arm  BP Method: Automatic  Patient Position: Semi fowlers  MAP (Calculated): 77.33  Resp: 14  SpO2: 100 %  O2 Device: None (Room air)             Safety Devices  Type of Devices: Bed alarm in place; Left in bed;Call light within reach;Nurse notified  Gait  Overall Level of Assistance: Stand-by assistance (progressing to spvn for funcitonal mobility to/from bathroom using RW)  Toilet Transfers  Toilet - Technique: Ambulating (w/ RW)  Equipment Used: Standard toilet  Toilet Transfer: Stand by assistance  Toilet Transfers Comments: use of GB and VC for hand placement     ADL  Feeding: Independent  Grooming: Supervision (- Mod I)  Grooming Skilled Clinical Factors: pt in stance at sink to complete hand hygiene w/ spvn for standing balance. pt seated at EOB to comb hair w/ Mod I and ++ time  UE Dressing: Setup  UE Dressing Skilled Clinical Factors: pt seated EOb to doff gown and don bra and t-shirt w/ setup assist to retrieve clothing from dresser drawers  LE Dressing: Stand by assistance;Setup  LE Dressing Skilled Clinical Factors: pt seated EOB to thread BLE into pants w/ spvn for dynamic sitting balance. Pt educated on adaptive strategy of threading LLE(weaker leg) prior to RLE(stronger leg).  Pt in stance to complete clothing mgmt at hips w/ SBA for dynamic standing balance  Toileting: Stand by assistance  Toileting Skilled Clinical Factors: pt voided seated on std toilet w/ SBA during clothing mgmt at hips and for dynamic sitting balance during rear chelsey hygiene        Bed mobility  Supine to Sit: Stand by assistance (HOB elevated)  Sit to Supine: Stand by assistance (HOB elevated)  Scooting: Stand by assistance  Transfers  Sit to stand: Stand by assistance  Stand to sit: Stand by assistance  Transfer Comments: cues for hand placement                        Education Given To: Patient; Family  Education Provided: Role of Therapy;Plan of Care;Transfer Training;ADL Adaptive Strategies  Education Method: Demonstration;Verbal  Education Outcome: Verbalized understanding                        G-Code     OutComes Score                                                  AM-PAC Score        AM-[facility] Inpatient Daily Activity Raw Score: 21 (07/08/22 1525)  AM-PAC Inpatient ADL T-Scale Score : 44.27 (07/08/22 1525)  ADL Inpatient CMS 0-100% Score: 32.79 (07/08/22 1525)  ADL Inpatient CMS G-Code Modifier : CJ (07/08/22 1525)    Goals  Short Term Goals  Time Frame for Short term goals: discharge  Short Term Goal 1: pt to transfer to commode with SBA- goal met 7/8  Short Term Goal 2: pt to manage toileting with S- ongoing  Short Term Goal 3: pt to don brief with SBA- ongoing  Short Term Goal 4: pt to tolerate 10 reps B UE exerc to increaese activity tolerance- ongoing  Patient Goals   Patient goals : not stated       Therapy Time   Individual Concurrent Group Co-treatment   Time In 1420         Time Out 1458         Minutes [address], OT

## 2022-07-08 NOTE — CARE COORDINATION
Case Management Assessment            Discharge Note                    Date / Time of Note: 7/8/2022 3:57 PM                  Discharge Note Completed by: Lyly Booker RN    Patient Name: Jaylon López   YOB: 1935  Diagnosis: Hypokalemia [E87.6]  Lightheadedness [R42]  Hyponatremia [E87.1]  Near syncope [R55]   Date / Time: 7/5/2022  4:15 PM    Current PCP: Dameon Davenport DO  Clinic patient: No    Hospitalization in the last 30 days: Yes    Advance Directives:  Code Status: Full Code  [place] DNR form completed and on chart: Not Indicated    Financial:  Payor: MEDICARE / Plan: MEDICARE PART A AND B / Product Type: *No Product type* /      Pharmacy:    [address] [facility]  [address] [facility] [address] [facility] [address]  Phone: [phone] Fax: [phone] [facility] [address] [facility] [phone] - F [phone]  [address] [place] [address] [place] [address]  Phone: [phone] Fax: [phone]      Assistance purchasing medications?: Potential Assistance Purchasing Medications: No  Assistance provided by Case Management: None at this time    Does patient want to participate in local refill/ meds to beds program?: Yes    Meds To Beds General Rules:  1. Can ONLY be done Monday- Friday between 8:30am-5pm  2. Prescription(s) must be in pharmacy by 3pm to be filled same day  3. Copy of patient's insurance/ prescription drug card and patient face sheet must be sent along with the prescription(s)  4. Cost of Rx cannot be added to hospital bill. If financial assistance is needed, please contact unit  or ;  or  CANNOT provide pharmacy voucher for patients co-pays  5.  Patients can then  the prescription on their way out of the hospital at discharge, or pharmacy can deliver to the bedside if staff is available. (payment due at time of pick-up or delivery - cash, check, or card accepted)     Able to afford home medications/ co-pay costs: Yes    ADLS:  Current PT AM-PAC Score: 17 /24  Current OT AM-PAC Score: 21 /24      DISCHARGE Disposition: Home with [address]: [facility]     LOC at discharge: Not Applicable  ANNEMARIE Completed: Not Indicated    Notification completed in HENS/PAS?:  Not Applicable    IMM Completed:   Not Indicated    Transportation:  Transportation PLAN for discharge: family   Mode of Transport: Private Car  Reason for medical transport: Not Applicable  Name of Transport Company: Not Applicable  Time of Transport: tbd    Transport form completed: Not Indicated    Home Care:  [address] ordered at discharge: Yes  [address]: Valorie Isabel  Phone: [phone]  Fax: [phone]  Orders faxed: Yes    Durable Medical Equipment:  DME Provider: n/a  Equipment obtained during hospitalization: n/a    Home Oxygen and Respiratory Equipment:  Oxygen needed at discharge?: Not [address]: Not Applicable  Portable tank available for discharge?: Not Indicated    Dialysis:  Dialysis patient: No    Dialysis Center:  Not Applicable    Hospice Services:  Location: Not Applicable  Agency: Not Applicable    Consents signed: Not Indicated    Referrals made at [facility] for outpatient continued care:  Not Applicable    Additional CM Notes:   Patient is from home with family, active with [facility]. Family requesting palliative care referral, sent to [employer] [phone], [phone]. Family to transport home.      The Plan for Transition of Care is related to the following treatment goals of Hypokalemia [E87.6]  Lightheadedness [R42]  Hyponatremia [E87.1]  Near syncope [R55]    The Patient and/or patient representative Naty and her family were provided with a choice of provider and agrees with the discharge plan Yes    Freedom of choice list was provided with basic dialogue that supports the patient's individualized plan of care/goals and shares the quality data associated with the providers.  Yes    Care Transitions patient: No    Ania Rdz RN  [facility]  Case Management Department  Ph: [phone]  Fax: [phone]

## 2022-07-08 NOTE — PROGRESS NOTES
Hospitalist Progress Note      PCP: JOVANNA Burger DO    Chief Complaint. Presented to hospital for lightheadedness, dizziness    Date of Admission: 7/5/2022      Subjective:   denies chest pain, nausea, vomiting, shortness of breath, fever or chills. mention feels overall better    Medications:  Reviewed    Infusion Medications    sodium chloride 25 mL (07/07/22 1327)     Scheduled Medications    iron sucrose  200 mg IntraVENous Q24H    [START ON 7/8/2022] ferrous sulfate  325 mg Oral Daily with breakfast    amiodarone  200 mg Oral Daily    apixaban  2.5 mg Oral BID    atorvastatin  80 mg Oral Nightly    metoprolol succinate  50 mg Oral Daily    Vitamin D  2,000 Units Oral Daily    sodium chloride flush  5-40 mL IntraVENous 2 times per day    doxycycline (VIBRAMYCIN) IV  100 mg IntraVENous Q12H     PRN Meds: sodium chloride flush, sodium chloride, ondansetron **OR** ondansetron, polyethylene glycol, acetaminophen **OR** acetaminophen, HYDROcodone-acetaminophen      Intake/Output Summary (Last 24 hours) at 7/7/2022 2030  Last data filed at 7/7/2022 1448  Gross per 24 hour   Intake 522 ml   Output 300 ml   Net 222 ml       Physical Exam Performed:    /62   Pulse 69   Temp 98.4 °F (36.9 °C) (Oral)   Resp 17   Ht 5' (1.524 m)   Wt 115 lb 1.3 oz (52.2 kg)   SpO2 95%   BMI 22.48 kg/m²     General appearance: No apparent distress,   HEENT:  Conjunctivae/corneas clear. Neck: Supple, with full range of motion. Respiratory:  Normal respiratory effort. Clear to auscultation, bilaterally without Rales/Wheezes/Rhonchi. Cardiovascular: Regular rate and rhythm with normal S1/S2 without murmurs or rubs  Abdomen: Soft, non-tender, non-distended, normal bowel sounds. Musculoskeletal: No cyanosis or edema bilaterally  Neurologic:  without any focal sensory/motor deficits.  grossly non-focal.  Psychiatric: Alert and oriented, Normal mood  Peripheral Pulses: +2 palpable, equal bilaterally       Labs: Recent Labs     07/05/22  1753 07/06/22  0520   WBC 4.9 6.2   HGB 10.3* 9.8*   HCT 31.2* 30.4*    182     Recent Labs     07/05/22  2159 07/06/22  0520 07/07/22  1044   * 131* 137   K 3.1* 3.4* 4.1   CL 85* 87* 95*   CO2 32 30 32   BUN 47* 47* 44*   CREATININE 2.0* 2.1* 2.0*   CALCIUM 8.9 9.5 9.1   PHOS  --   --  2.4*     Recent Labs     07/05/22  1753   AST 33   ALT 25   BILIDIR <0.2   BILITOT 0.6   ALKPHOS 60     No results for input(s): INR in the last 72 hours. Recent Labs     07/05/22  1753   TROPONINI 0.01       Urinalysis:      Lab Results   Component Value Date/Time    NITRU Negative 07/05/2022 08:23 PM    [address] 07/05/2022 08:23 PM    BACTERIA 1+ 07/05/2022 08:23 PM    RBCUA 0-2 07/05/2022 08:23 PM    BLOODU Negative 07/05/2022 08:23 PM    SPECGRAV 1.010 07/05/2022 08:23 PM    GLUCOSEU Negative 07/05/2022 08:23 PM       Radiology:  XR CHEST PORTABLE   Final Result   1. No acute cardiopulmonary findings. CT Cervical Spine WO Contrast   Final Result      1. No evidence of acute fracture in the cervical spine. 2.  No significant spondylosis. CT Head WO Contrast   Final Result   1. Mild atrophy. 2. Patchy areas of decreased white matter attenuation. The findings are nonspecific, but most likely represent chronic small vessel ischemic change. 3.  No evidence of an acute intracranial process. Assessment/Plan:    Active Hospital Problems    Diagnosis     Hypokalemia [E87.6]      Priority: Medium    Hyponatremia [E87.1]      Priority: Medium    Near syncope [R55]      Priority: Medium    Bradycardia [R00.1]      Priority: Medium    Cardiomyopathy ([facility] [I42.9]        #Fatigue, lightheadedness and near syncope  #Fall at the nursing home, due to loss of balance, but no loss of consciousness. CT head with chronic changes, CT cervical spine with no acute abnormalities  #Paroxysmal A. fib, recently started on amiodarone. Patient is on beta-blocker too. EKG-with sinus bradycardia. Patient is on Lasix twice daily  #Electrolyte abnormalities-hyponatremia, hypokalemia  #AGUILA on CKD stage IV  #Chronic anemia-H&H stable at baseline  #Abnormal UA, patient remains afebrile, no leukocytosis and no urinary symptoms, patient  #History of CAD and CHF. Chest x-ray with no acute airspace disease or congestive changes  #Hypothyroid     PLAN:  -Check orthostatic vital signs-pending results  -Hold Lasix, amiodarone and beta-blocker  -Gentle IV hydration  -Replace potassium, check magnesium  -Monitor electrolytes and renal function  -Monitor CBC  -Follow urine cultures and start on antibiotics if indicated  -Continue on home cardiac medications appropriately, including Eliquis  -Cardiology consult  -Fall precautions  -PT/OT  -Social service consult  -Supportive therapy  Diet: ADULT DIET; Regular;  No Added Salt (3-4 gm)  ADULT ORAL NUTRITION SUPPLEMENT; Breakfast, Dinner; Standard High Calorie/High Protein Oral Supplement  Code Status: Full Code    PT/OT Eval Status: ordered    Dispo/Plan of care -likely discharge tomorrow, continue IV fluids, discussed with the daughter at the bedside, answered all questions, verbalized understanding and agreed with the plan of care    Aydin Nava MD

## 2022-07-08 NOTE — PROGRESS NOTES
Physician Progress Note      Nasreen Montano  CSN #:                  [id number]  :                       1935  ADMIT DATE:       2022 4:15 PM  [address] [place] DATE:  RESPONDING  PROVIDER #:        Allie Saeed MD          QUERY TEXT:    Pt admitted with dizziness and has CHF documented. If possible, please   document in progress notes and discharge summary further specificity regarding   the type and acuity of CHF:    The medical record reflects the following:  Risk Factors: 80year old female  Clinical Indicators: Per cardiology consult assessment- Extremities normal,   atraumatic, no cyanosis or edema. Per nephrology consult note- grade 3 chronic   diastolic heart failure. ..patient currently normotensive, not currently fluid   overloaded, patient currently around her baseline dry weight (115lbs). Per   last echo 22- Overall left ventricular systolic function is reduced with   an estimated ejection fraction of 25-30%. There is diffuse hypokinesis of the   left ventricle. Diastolic filling parameters suggests grade III diastolic   dysfunction. The left atrium is severely d  Treatment: Labs, cardiology consult, nephrology consult, I&Os, metoprolol   succinate  Options provided:  -- Acute on Chronic Systolic and Diastolic CHF  -- Chronic Systolic and Diastolic CHF  -- Other - I will add my own diagnosis  -- Disagree - Not applicable / Not valid  -- Disagree - Clinically unable to determine / Unknown  -- Refer to Clinical Documentation Reviewer    PROVIDER RESPONSE TEXT:    Acute chf ruled out    Query created by:  Loyd Calvin on 2022 3:23 PM      Electronically signed by:  Allie Saeed MD 2022 1:28 AM

## 2022-07-08 NOTE — FLOWSHEET NOTE
07/07/22 2321 07/07/22 2324 07/07/22 2326   Vitals   Blood Pressure Lying 131/79  --   --    Pulse Lying 80 PER MINUTE  --   --    Blood Pressure Sitting  --  155/80  --    Pulse Sitting  --  81 PER MINUTE  --    Blood Pressure Standing  --   --  150/90   Pulse Standing  --   --  76 PER MINUTE   Orthostatic BP and HR

## 2022-07-08 NOTE — PROGRESS NOTES
ALKPHOS 60     PT/INR: No results for input(s): PROTIME, INR in the last 72 hours. APTT: No results for input(s): APTT in the last 72 hours. BNP:  No results for input(s): BNP in the last 72 hours. IMAGING:     Assessment:  Patient Active Problem List    Diagnosis Date Noted    AGUILA (acute kidney injury) ([facility] 01/13/2014    Hypokalemia     Hyponatremia     Near syncope 07/06/2022    Bradycardia 07/06/2022    On amiodarone therapy     On continuous oral anticoagulation     CHF (congestive heart failure), NYHA class I, acute on chronic, combined ([address] 06/23/2022    Atrial fibrillation ([facility] 05/29/2022    Encounter for loop recorder check 03/30/2022    Stroke of unknown etiology ([facility] 03/19/2022    Visual field defect 03/18/2022    Chronic kidney disease, stage IV (severe) ([facility] 08/20/2021    Confusion 08/31/2020    PVD (posterior vitreous detachment), both eyes 07/02/2019    Posterior subcapsular polar age-related cataract of both eyes 07/02/2019    Abnormal myocardial perfusion study 11/06/2018    Ischemic cardiomyopathy 11/06/2018    Cardiomyopathy ([address] 11/06/2018    Palpitations 09/04/2018    V-tach ([facility] 09/04/2018    GERD (gastroesophageal reflux disease) 01/10/2017    Closed stable burst fracture of first lumbar vertebra ([facility] 09/30/2015    Primary insomnia 11/24/2014    Mixed hyperlipidemia 10/29/2014    Gout 04/18/2014    Pain in joint, hand 04/18/2014    Renal insufficiency 01/22/2014    Essential hypertension 04/26/2013    Chronic low back pain 02/22/2013    Other and unspecified angina pectoris 06/21/2011    Coronary atherosclerosis of native coronary artery 06/21/2011    Other chronic pulmonary heart diseases 06/21/2011    Mitral valve disorder 06/21/2011    Acute combined systolic and diastolic heart failure ([address] 06/21/2011       Plan:  1. Looking and feeling much better. No light headed/dizziness. HR normal.  No significant bradycardia. Labs pending.   CV appears stable. Nothing to add. Will follow from a distance.        Core Measures:  · Discharge instructions:   · LVEF documented:   · ACEI for LV dysfunction:   · Smoking Cessation:    Ray Velez MD, MD 7/8/2022 10:23 AM

## 2022-07-08 NOTE — PROGRESS NOTES
[facility]    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 Not contributing to CKD    AGUILA on CKD stage 4   -Cr 2.1 (baseline 1.5)   -avoid nephrotoxic agents  -likely due to over diuresis (during a hospital admission last week)   -low Na (125) and K (2.5) on admission from over diuresis   -low Na manifesting as dizziness and confusion in patient    Hypertension and Fluid status  -patient currently normotensive  -not currently fluid overloaded  -patient currently around her baseline dry weight (115lbs)    Anemia  -low hemoglobin (9.8)  -likely due to CKD  -GI source not ruled out  -will give iron    Lipids  -currently within normal range  -continue current statin    Secondary hyperparathyroidism  -Calcium in normal range  -Phosphate not elevated  -PTH slightly elevated but within target range for CKD stage [facility] would like to thank Tiffany Larson MD   for opportunity to serve this patient      Please call with questions at-   24 Hrs Answering service [phone] or  7 am- 5 pm via Perfect serve or cell phone  Gale De Jesus MD          CC/reason for consult :     AGUILA on CKD 4  Hyponatremia. Hypokalemia. HPI :     Tristen Armas is a 80 y.o. female presented to the hospital on 7/5/2022 following a fall when she lost her balance, without loss of consciousness.  Past medical history is notable for chronic diastolic heart failure, a HYSTERECTOMY (CERVIX STATUS UNKNOWN)      OTHER SURGICAL HISTORY  08/27/2018    phacoemulsification of cataract with intraocular lens implant right eye    CO OFFICE/OUTPT VISIT,PROCEDURE ONLY Right 8/27/2018    PHACO EMULSIFICATION OF CATARACT WITH INTRAOCULAR LENS IMPLANT RIGHT EYE performed by Braxton Londono MD at [address] OFFICE/OUTPT [address] Left 9/4/2018    PHACO EMULSIFICATION OF CATARACT WITH INTRAOCULAR LENS IMPLANT LEFT EYE performed by Braxton Londono MD at [address] [facility] Marital status:      Spouse name: Morteza Meléndez Number of children: Not on file    Years of education: 15    Highest education level: Not on file   Occupational History    Occupation: retired   Tobacco Use    Smoking status: Never Smoker    Smokeless tobacco: Never Used   Vaping Use    Vaping Use: Never used   Substance and Sexual Activity    Alcohol use: No     Alcohol/week: 0.0 standard drinks    Drug use: No    Sexual activity: Never     Comment:  living with spouse. Other Topics Concern    Not on file   Social History Narrative    Not on file     Social Determinants of Health     Financial Resource Strain: Medium Risk    Difficulty of Paying Living Expenses: Somewhat hard   Food Insecurity: No Food Insecurity    Worried About Running Out of Food in the Last Year: Never true    Ramona of Food in the Last Year: Never true   Transportation Needs:     Lack of Transportation (Medical): Not on file    Lack of Transportation (Non-Medical):  Not on file   Physical Activity:     Days of Exercise per Week: Not on file    Minutes of Exercise per Session: Not on file   Stress:     Feeling of Stress : Not on file   Social Connections:     Frequency of Communication with Friends and Family: Not on file    Frequency of Social Gatherings with Friends and Family: Not on file    Attends Anglican Services: Not on file    Active Member of Clubs or Organizations: Not on file    Attends Club or Organization Meetings: Not on file    Marital Status: Not on file   Intimate Partner Violence:     Fear of Current or Ex-Partner: Not on file    Emotionally Abused: Not on file    Physically Abused: Not on file    Sexually Abused: Not on file   Housing Stability:     Unable to Pay for Housing in the Last Year: Not on file    Number of Places Lived in the Last Year: Not on file    Unstable Housing in the Last Year: Not on file           Problem Relation Age of Onset    Heart Disease Mother     Heart Disease Father     Cancer Father     Cancer Sister     Diabetes Brother     Stroke Brother           Medication:     Scheduled Meds:   iron sucrose  200 mg IntraVENous Q24H    ferrous sulfate  325 mg Oral Daily with breakfast    amiodarone  200 mg Oral Daily    apixaban  2.5 mg Oral BID    atorvastatin  80 mg Oral Nightly    metoprolol succinate  50 mg Oral Daily    Vitamin D  2,000 Units Oral Daily    sodium chloride flush  5-40 mL IntraVENous 2 times per day    doxycycline (VIBRAMYCIN) IV  100 mg IntraVENous Q12H     Continuous Infusions:   sodium chloride 25 mL (07/07/22 2140)     PRN Meds:.sodium chloride flush, sodium chloride, ondansetron **OR** ondansetron, polyethylene glycol, acetaminophen **OR** acetaminophen, HYDROcodone-acetaminophen       Vitals :     Vitals:    07/08/22 0809   BP: 122/62   Pulse: 68   Resp: 17   Temp: 97.8 °F (36.6 °C)   SpO2: 95%       I & O :       Intake/Output Summary (Last 24 hours) at 7/8/2022 2061  Last data filed at 7/8/2022 7650  Gross per 24 hour   Intake 762 ml   Output 1000 ml   Net -238 ml        Physical Examination :     General appearance: Anxious- no, distressed- no, in good spirits- yes  HEENT: Lips- normal, teeth- ok , oral mucosa- moist  Neck : Mass- no, appears symmetrical, JVD- not visible  Respiratory: Respiratory effort- normal, wheeze- no, crackles - no  Cardiovascular:  Ausculation- No M/R/G, Edema -none  Abdomen: visible mass- no, distention- no, scar- no, tenderness- no                            hepatosplenomegaly-  no  Musculoskeletal:  clubbing no,cyanosis- no , digital ischemia- no                           muscle strength- grossly normal , tone - grossly normal  Skin: rashes- no , ulcers- no, induration- no, tightening - no  Psychiatric:  Judgement and insight- normal           AAO X 3     LABS:     Recent Labs     07/05/22 1753 07/06/22  0520   WBC 4.9 6.2   HGB 10.3* 9.8*   HCT 31.2* 30.4*    182     Recent Labs     07/05/22  1753 07/05/22  2021 07/05/22  2159 07/06/22  0520 07/07/22  1044   *   < > 127* 131* 137   K 2.5*  --  3.1* 3.4* 4.1   CL 79*   < > 85* 87* 95*   CO2 35*   < > 32 30 32   BUN 49*   < > 47* 47* 44*   CREATININE 2.2*   < > 2.0* 2.1* 2.0*   GLUCOSE 92   < > 81 70 122*   MG 2.40  --   --  2.40 2.30   PHOS  --   --   --   --  2.4*    < > = values in this interval not displayed. Will discuss with Dr. Kel Johnson MD, MD  Internal Medicine, PGY1    Patient was seen and examined and the case was discussed with the resident. He acted as my scribe. I agree with the assessment and plan.     Thanks  Nephrology  Rolan Thacker [address] [facility], [address] [place] [address]  Office: [phone]  Cell: [phone]  Fax: [phone]

## 2022-07-08 NOTE — PROGRESS NOTES
Offered night shift updates for family. Pt stated she will update them through phone or when family comes this morning.  All questions answered,

## 2022-07-08 NOTE — PROGRESS NOTES
Discharge teaching and instructions for diagnosis/procedure of chf, syncope completed with patient using teachback method. AVS reviewed. meds to beds picked up by patients daughter. Patient voiced understanding regarding prescriptions, follow up appointments, and care of self at home.  Discharged ambulatory and in a wheelchair to  home with support per family

## 2022-07-11 ENCOUNTER — TELEPHONE (OUTPATIENT)
Dept: FAMILY MEDICINE CLINIC | Age: 87
End: 2022-07-11

## 2022-07-11 NOTE — TELEPHONE ENCOUNTER
[facility] Transitions Initial Follow Up Call    Outreach made within 2 business days of discharge: Yes    Patient: Candie Thomas Patient : 1935   MRN: [id number]  Reason for Admission: There are no discharge diagnoses documented for the most recent discharge. Discharge Date: 22       Spoke with: patient    Discharge department/facility: [place] [address]    Non-face-to-face services provided:  Scheduled appointment with PCP-22  Obtained and reviewed discharge summary and/or continuity of care documents    TCM Interactive Patient Contact:  Was patient able to fill all prescriptions: Yes  Was patient instructed to bring all medications to the follow-up visit: Yes  Is patient taking all medications as directed in the discharge summary?  Yes  Does patient understand their discharge instructions: Yes  Does patient have questions or concerns that need addressed prior to 7-14 day follow up office visit: no    Scheduled appointment with PCP within 7-14 days    Follow Up  Future Appointments   Date Time Provider Jerome Mahmood   2022  2:15 PM DAVE Ramirez CNP [place] [facility]   2022  2:00 PM DO SHEA Shearer  [address]   2022  9:30 AM SCHEDULE, [place] REMOTE TRANSMISSION [facility]   2022  2:30 PM DAVE Goldberg CNP [facility]   2022  2:00 PM DO SHEA Sheldon [address]   2022  2:15 PM MD Kayla Krishnan [address] [facility]       Alina Gómez RN

## 2022-07-11 NOTE — TELEPHONE ENCOUNTER
Verbal is okay. I don't think we need any hospital follow-up labs. Her labs upon leaving the hospital were stable fortunately. Chronic kidney disease was stable and her potassium level was stable.

## 2022-07-11 NOTE — TELEPHONE ENCOUNTER
Left Devan Dodson a vm to call our office back- with retain details of the verbal.     There was no secure vm to stated what Dr. Ayo Cutler I said. Gave call back number.

## 2022-07-11 NOTE — TELEPHONE ENCOUNTER
Leonie Hanley, Nurse with [facility] asking for a Verbal Order to continue with Home Care. And Would like to know if PCP wants any Hospital Follow up Labs completed prior to Hospital Follow Up on Monday. Please Scott Griffin at [phone].

## 2022-07-12 NOTE — CARE COORDINATION
[facility]    Patient aware and agreeable to services.  Faxed orders to [facility] for [facility] by 7/12    Brennen Rivas LPN  Care Transition Nurse  [address]  [phone]

## 2022-07-18 ENCOUNTER — TELEPHONE (OUTPATIENT)
Dept: CARDIOLOGY CLINIC | Age: 87
End: 2022-07-18

## 2022-07-18 ENCOUNTER — TELEMEDICINE (OUTPATIENT)
Dept: FAMILY MEDICINE CLINIC | Age: 87
End: 2022-07-18
Payer: MEDICARE

## 2022-07-18 DIAGNOSIS — E87.1 HYPONATREMIA: ICD-10-CM

## 2022-07-18 DIAGNOSIS — N17.9 AKI (ACUTE KIDNEY INJURY) (HCC): ICD-10-CM

## 2022-07-18 DIAGNOSIS — E87.6 HYPOKALEMIA: ICD-10-CM

## 2022-07-18 DIAGNOSIS — Z09 HOSPITAL DISCHARGE FOLLOW-UP: Primary | ICD-10-CM

## 2022-07-18 DIAGNOSIS — I48.19 PERSISTENT ATRIAL FIBRILLATION (HCC): ICD-10-CM

## 2022-07-18 DIAGNOSIS — I50.41 ACUTE COMBINED SYSTOLIC AND DIASTOLIC HEART FAILURE (HCC): ICD-10-CM

## 2022-07-18 PROCEDURE — 99495 TRANSJ CARE MGMT MOD F2F 14D: CPT | Performed by: FAMILY MEDICINE

## 2022-07-18 PROCEDURE — 1111F DSCHRG MED/CURRENT MED MERGE: CPT | Performed by: FAMILY MEDICINE

## 2022-07-18 RX ORDER — AMIODARONE HYDROCHLORIDE 200 MG/1
200 TABLET ORAL DAILY
Qty: 30 TABLET | Refills: 5 | Status: SHIPPED | OUTPATIENT
Start: 2022-07-18 | End: 2022-08-24

## 2022-07-18 RX ORDER — LIDOCAINE 4 G/G
1 PATCH TOPICAL DAILY
Qty: 30 PATCH | Refills: 3 | Status: SHIPPED | OUTPATIENT
Start: 2022-07-18

## 2022-07-18 RX ORDER — FUROSEMIDE 40 MG/1
TABLET ORAL
Qty: 30 TABLET | Refills: 3 | Status: SHIPPED | OUTPATIENT
Start: 2022-07-18 | End: 2022-10-17

## 2022-07-18 RX ORDER — METOPROLOL SUCCINATE 50 MG/1
50 TABLET, EXTENDED RELEASE ORAL DAILY
Qty: 30 TABLET | Refills: 5 | Status: SHIPPED | OUTPATIENT
Start: 2022-07-18

## 2022-07-18 NOTE — TELEPHONE ENCOUNTER
Requested Prescriptions     Pending Prescriptions Disp Refills    metoprolol succinate (TOPROL XL) 50 MG extended release tablet 30 tablet 3     Sig: Take 1 tablet by mouth in the morning. amiodarone (CORDARONE) 200 MG tablet 30 tablet 0     Sig: Take 1 tablet by mouth in the morning.           Number: 30    Refills: 5    Last Office Visit: 6/8/2022     Next Office Visit: 7/27/2022

## 2022-07-18 NOTE — PROGRESS NOTES
Post-Discharge Transitional Care  Follow Up      Sallei Pitt   YOB: 1935    Date of Office Visit:  7/18/2022  Date of Hospital Admission: 7/5/22  Date of Hospital Discharge: 7/8/22  Risk of hospital readmission (high >=14%. Medium >=10%) :Readmission Risk Score: 23.2 ( )      Care management risk score Rising risk (score 2-5) and Complex Care (Scores >=6): 3     Non face to face  following discharge, date last encounter closed (first attempt may have been earlier): 7/11/2022  9:24 AM    Call initiated 2 business days of discharge: Yes    ASSESSMENT/PLAN:   Below is the assessment and plan developed based on review of pertinent history, physical exam, labs, studies, and medications. Hospital discharge follow-up  -     SD DISCHARGE MEDS RECONCILED W/ CURRENT OUTPATIENT MED LIST  AGUILA (acute kidney injury) ([facility]  -     SD DISCHARGE MEDS RECONCILED W/ CURRENT OUTPATIENT MED LIST  Acute combined systolic and diastolic heart failure (HCC)  -     SD DISCHARGE MEDS RECONCILED W/ CURRENT OUTPATIENT MED LIST  Persistent atrial fibrillation (HCC)  -     SD DISCHARGE MEDS RECONCILED W/ CURRENT OUTPATIENT MED LIST  Hypokalemia  -     SD DISCHARGE MEDS RECONCILED W/ CURRENT OUTPATIENT MED LIST  Hyponatremia  -     SD DISCHARGE MEDS RECONCILED W/ CURRENT OUTPATIENT MED LIST    Medical Decision Making: moderate complexity  Return if symptoms worsen or fail to improve. On this date 7/18/2022 I have spent 30 minutes reviewing previous notes, test results and face to face with the patient discussing the diagnosis and importance of compliance with the treatment plan as well as documenting on the day of the visit. Patient is doing much better. No more lightheadedness or dizziness. Her potassium and sodium had normalized upon discharge. Her kidney function is back to her baseline. She had amiodarone added on to her medication was for her atrial fibrillation.   It appears she had a mild UTI that was treated per patient. No urinary symptoms currently. Subjective:   HPI:  Follow up of Hospital problems/diagnosis(es):      Near syncope [R55] 07/06/2022       Priority: Medium   #Fatigue, lightheadedness and near syncope  #Fall at the home, due to loss of balance, but no loss of consciousness. CT head with chronic changes, CT cervical spine with no acute abnormalities  #Paroxysmal A. fib, recently started on amiodarone. Patient is on beta-blocker too. EKG-with sinus bradycardia. Patient is on Lasix twice daily  #Electrolyte abnormalities-hyponatremia, hypokalemia  #AGUILA on CKD stage IV  #Chronic anemia-H&H stable at baseline  #Abnormal UA, patient remains afebrile, no leukocytosis and no urinary symptoms, patient  #History of CAD and CHF. Chest x-ray with no acute airspace disease or congestive changes  #Hypothyroid    Inpatient course: Discharge summary reviewed- see chart.     Interval history/Current status: improved    Patient Active Problem List   Diagnosis    Other and unspecified angina pectoris    Coronary atherosclerosis of native coronary artery    Other chronic pulmonary heart diseases    Mitral valve disorder    Acute combined systolic and diastolic heart failure (HCC)    Chronic low back pain    Essential hypertension    AGUIAL (acute kidney injury) ([address]    Renal insufficiency    Gout    Pain in joint, hand    Mixed hyperlipidemia    Primary insomnia    Closed stable burst fracture of first lumbar vertebra (HCC)    GERD (gastroesophageal reflux disease)    Palpitations    V-tach (HCC)    Abnormal myocardial perfusion study    Ischemic cardiomyopathy    Cardiomyopathy ([address]    PVD (posterior vitreous detachment), both eyes    Posterior subcapsular polar age-related cataract of both eyes    Confusion    Chronic kidney disease, stage IV (severe) ([address]    Visual field defect    Stroke of unknown etiology ([address]    Encounter for loop recorder check    Atrial fibrillation (HCC)    CHF (congestive heart failure), NYHA class I, acute on chronic, combined ([facility] [address]    On amiodarone therapy    On continuous oral anticoagulation    Near syncope    Bradycardia    Hypokalemia    Hyponatremia       Medications listed as ordered at the time of discharge from hospital     Medication List            Accurate as of July 18, 2022  2:31 PM. If you have any questions, ask your nurse or doctor. CHANGE how you take these medications      lidocaine 4 % external patch  Place 1 patch onto the skin in the morning. As needed for pain. What changed: additional instructions  Changed by: Tressa Restrepo, DO            CONTINUE taking these medications      amiodarone 200 MG tablet  Commonly known as: CORDARONE  Take 1 tablet by mouth in the morning. apixaban 2.5 MG Tabs tablet  Commonly known as: ELIQUIS  Take 1 tablet by mouth 2 times daily     aspirin 81 MG chewable tablet     atorvastatin 80 MG tablet  Commonly known as: LIPITOR  TAKE 1 TABLET BY MOUTH ONE TIME A DAY     Co Q 10 100 MG Caps     diclofenac sodium 1 % Gel  Commonly known as: VOLTAREN  Apply 2 g topically 4 times daily     famotidine 20 MG tablet  Commonly known as: PEPCID  Take 1 tablet by mouth daily     fluticasone 50 MCG/ACT nasal spray  Commonly known as: FLONASE  2 sprays by Each Nostril route daily     furosemide 40 MG tablet  Commonly known as: LASIX  Take 1 tablet by mouth daily     Handicap Placard Misc  by Does not apply route Length: 5 years     HYDROcodone-acetaminophen  MG per tablet  Commonly known as: NORCO     metoprolol succinate 50 MG extended release tablet  Commonly known as: TOPROL XL  Take 1 tablet by mouth in the morning. miconazole 2 % powder  Commonly known as: MICOTIN  Apply topically 2 times daily. Misc. Devices Kit  Rollator. Use as directed.      nitroGLYCERIN 0.4 MG SL tablet  Commonly known as: NITROSTAT  PUT 1 TAB UNDER TONGUE EVERY 5 MIN AS NEEDED CHEST PAIN UP TO 3. IF NO RELIEF AFTER 1 DOSE, CALL 911     ondansetron 4 MG tablet  Commonly known as: ZOFRAN  Take 1 tablet by mouth 3 times daily as needed for Nausea or Vomiting     vitamin B-12 1000 MCG tablet  Commonly known as: CYANOCOBALAMIN     vitamin D 1000 UNIT Tabs tablet  Commonly known as: CHOLECALCIFEROL  Take 2 tablets by mouth daily     zolpidem 5 MG tablet  Commonly known as: AMBIEN  Take 1 tablet by mouth nightly as needed for Sleep for up to 180 days. Where to Get Your Medications        These medications were sent to [address] [place] [address], [address] [place] [address], [address] [place] [id number]      Phone: [phone]   amiodarone 200 MG tablet  lidocaine 4 % external patch  metoprolol succinate 50 MG extended release tablet           Medications marked \"taking\" at this time  Outpatient Medications Marked as Taking for the 7/18/22 encounter (Telemedicine) with Debra Shahid DO   Medication Sig Dispense Refill    metoprolol succinate (TOPROL XL) 50 MG extended release tablet Take 1 tablet by mouth in the morning. 30 tablet 5    amiodarone (CORDARONE) 200 MG tablet Take 1 tablet by mouth in the morning. 30 tablet 5    lidocaine 4 % external patch Place 1 patch onto the skin in the morning. As needed for pain. 30 patch 3    furosemide (LASIX) 40 MG tablet Take 1 tablet by mouth daily 60 tablet 3    ondansetron (ZOFRAN) 4 MG tablet Take 1 tablet by mouth 3 times daily as needed for Nausea or Vomiting 30 tablet 5    miconazole (MICOTIN) 2 % powder Apply topically 2 times daily.  45 g 1    nitroGLYCERIN (NITROSTAT) 0.4 MG SL tablet PUT 1 TAB UNDER TONGUE EVERY 5 MIN AS NEEDED CHEST PAIN UP TO 3. IF NO RELIEF AFTER 1 DOSE, CALL 911 25 tablet 3    apixaban (ELIQUIS) 2.5 MG TABS tablet Take 1 tablet by mouth 2 times daily 60 tablet 5    diclofenac sodium (VOLTAREN) 1 % GEL Apply 2 g topically 4 times daily 200 g 2    famotidine (PEPCID) 20 MG tablet Take 1 tablet by mouth daily 60 tablet 3 atorvastatin (LIPITOR) 80 MG tablet TAKE 1 TABLET BY MOUTH ONE TIME A DAY 90 tablet 3    vitamin B-12 (CYANOCOBALAMIN) 1000 MCG tablet Take 1,000 mcg by mouth daily      fluticasone (FLONASE) 50 MCG/ACT nasal spray 2 sprays by Each Nostril route daily 1 Bottle 5    vitamin D (CHOLECALCIFEROL) 1000 UNIT TABS tablet Take 2 tablets by mouth daily 60 tablet 3    HYDROcodone-acetaminophen (NORCO)  MG per tablet Take 1 tablet by mouth every 6 hours as needed. .      Coenzyme Q10 (CO Q 10) 100 MG CAPS Take  by mouth daily. aspirin 81 MG chewable tablet Take 1 tablet by mouth daily. 30 tablet         Medications patient taking as of now reconciled against medications ordered at time of hospital discharge: Yes    A comprehensive review of systems was negative except for what was noted in the HPI. Objective:    Patient-Reported Vitals  Patient-Reported Weight: 115lb  Patient-Reported Height: 5'0    General Appearance: alert and oriented to person, place and time, well-developed and well-nourished, in no acute distress  Skin: warm and dry, no rash or erythema  Head: normocephalic and atraumatic    Garland Fennel, was evaluated through a synchronous (real-time) audio-video encounter. The patient (or guardian if applicable) is aware that this is a billable service, which includes applicable co-pays. This Virtual Visit was conducted with patient's (and/or legal guardian's) consent. The visit was conducted pursuant to the emergency declaration under the [address] [place] [address], [address] [place] [address] authority and the [employer] General Act. Patient identification was verified, and a caregiver was present when appropriate. The patient was located at Home: [address] [place] [address]. Provider was located at [facility] (Appt Dept): [address]  [address] [facility] [address].       An electronic signature was used to authenticate this

## 2022-07-18 NOTE — TELEPHONE ENCOUNTER
Requested Prescriptions     Pending Prescriptions Disp Refills    Cyanocobalamin (VITAMIN B-12) 1000 MCG extended release tablet [Pharmacy Med Name: VITAMIN B-12 ER 1000 MCG ORAL TABLET EXTENDED RELEASE] 90 tablet      Sig: TAKE 1 TABLET (1,000 MCG) BY MOUTH DAILY (AM)    vitamin D3 (CHOLECALCIFEROL) 25 MCG (1000 UT) TABS tablet [Pharmacy Med Name: VITAMIN D3 25 MCG (1000 UT) ORAL TABLET] 90 tablet      Sig: TAKE 1 TABLET (1,000 IU) BY MOUTH DAILY IN THE MORNING    atorvastatin (LIPITOR) 80 MG tablet [Pharmacy Med Name: ATORVASTATIN CALCIUM 80 MG ORAL TABLET] 60 tablet      Sig: TAKE 1 TABLET BY MOUTH ONE TIME A DAY (EVENING)          Number: 90    Refills: 3    Last Office Visit: 6/8/2022     Next Office Visit: 7/27/2022

## 2022-07-18 NOTE — TELEPHONE ENCOUNTER
Requested Prescriptions     Pending Prescriptions Disp Refills    furosemide (LASIX) 40 MG tablet [Pharmacy Med Name: FUROSEMIDE 40 MG ORAL TABLET] 30 tablet      Sig: TAKE 1 TABLET BY MOUTH DAILY IN THE MORNING (ORIGINAL RX WRITTEN BY DR. David Romero)          Number: 30    Refills: 5    Last Office Visit: 6/8/2022     Next Office Visit: 7/18/2022

## 2022-07-19 RX ORDER — MELATONIN
Qty: 90 TABLET | Refills: 3 | Status: SHIPPED | OUTPATIENT
Start: 2022-07-19

## 2022-07-19 RX ORDER — ATORVASTATIN CALCIUM 80 MG/1
TABLET, FILM COATED ORAL
Qty: 60 TABLET | Refills: 3 | Status: SHIPPED | OUTPATIENT
Start: 2022-07-19

## 2022-07-19 RX ORDER — CYANOCOBALAMIN (VITAMIN B-12) 1000 MCG
TABLET, EXTENDED RELEASE ORAL
Qty: 90 TABLET | Refills: 3 | Status: SHIPPED | OUTPATIENT
Start: 2022-07-19

## 2022-07-20 ENCOUNTER — TELEPHONE (OUTPATIENT)
Dept: ADMINISTRATIVE | Age: 87
End: 2022-07-20

## 2022-07-20 NOTE — PROGRESS NOTES
Physician Progress Note      PATIENT:               Galindo Triana  CSN #:                  [id number]  :                       1935  ADMIT DATE:       2022 4:15 PM  [address] [place] DATE:        2022 4:04 PM  RESPONDING  PROVIDER #:        Marylin Sanchez MD          QUERY TEXT:    Patient admitted with fatigue, lightheadedness and near syncope. Noted to have   hyponatremia. If possible, please document in progress notes and discharge   summary if you are evaluating and/or treating any of the following: The medical record reflects the following:  Risk Factors: 80year old female  Clinical Indicators: On admission 22- Sodium 125. Per 22   cardiology consult note- Presents with feeling light headed and fatigued. Had   fall earlier yesterday at [facility]. Lost her balance. No sheila syncope. Was   lethargic, light headed, dizzy all day yesterday. Barely arousable through   day yesterday. No hx palp/tachy. No cp/sob. No recent pnd or orthopnea. Limits salt. .. Significant hyponatremia maybe basis. Some bradycardia. Agree   with holding metoprolol. Rhythm appears stable. myoview  negative with   nl EF 55%. Negative trop. Monitor. Correct electrol  Treatment: Labs, NS IVF, electrolyte replacements  Options provided:  -- Syncope likely due to hyponatremia  -- Other - I will add my own diagnosis  -- Disagree - Not applicable / Not valid  -- Disagree - Clinically unable to determine / Unknown  -- Refer to Clinical Documentation Reviewer    PROVIDER RESPONSE TEXT:    Syncope not related to hyponatremia    Query created by:  Yudi Garner on 2022 8:29 AM      Electronically signed by:  Marylin Sanchez MD 2022 1:28 PM

## 2022-07-25 ENCOUNTER — HOSPITAL ENCOUNTER (OUTPATIENT)
Age: 87
Setting detail: SPECIMEN
Discharge: HOME | End: 2022-07-25
Payer: MEDICARE

## 2022-07-25 LAB
ALBUMIN SERPL-MCNC: 4.1 G/DL (ref 3.4–5)
ANION GAP SERPL CALCULATED.3IONS-SCNC: 10 MMOL/L (ref 3–16)
BUN BLDV-MCNC: 26 MG/DL (ref 7–20)
CALCIUM SERPL-MCNC: 9.5 MG/DL (ref 8.3–10.6)
CHLORIDE BLD-SCNC: 99 MMOL/L (ref 99–110)
CO2: 29 MMOL/L (ref 21–32)
CREAT SERPL-MCNC: 1.8 MG/DL (ref 0.6–1.2)
GFR AFRICAN AMERICAN: 32
GFR NON-AFRICAN AMERICAN: 27
GLUCOSE BLD-MCNC: 76 MG/DL (ref 70–99)
PHOSPHORUS: 3.8 MG/DL (ref 2.5–4.9)
POTASSIUM SERPL-SCNC: 4.3 MMOL/L (ref 3.5–5.1)
SODIUM BLD-SCNC: 138 MMOL/L (ref 136–145)

## 2022-07-25 PROCEDURE — 36415 COLL VENOUS BLD VENIPUNCTURE: CPT

## 2022-07-25 PROCEDURE — 80069 RENAL FUNCTION PANEL: CPT

## 2022-07-27 ENCOUNTER — OFFICE VISIT (OUTPATIENT)
Dept: CARDIOLOGY CLINIC | Age: 87
End: 2022-07-27
Payer: MEDICARE

## 2022-07-27 VITALS
SYSTOLIC BLOOD PRESSURE: 140 MMHG | WEIGHT: 117 LBS | HEART RATE: 60 BPM | BODY MASS INDEX: 22.85 KG/M2 | DIASTOLIC BLOOD PRESSURE: 80 MMHG

## 2022-07-27 DIAGNOSIS — R06.02 SOB (SHORTNESS OF BREATH): ICD-10-CM

## 2022-07-27 DIAGNOSIS — I50.43 CHF (CONGESTIVE HEART FAILURE), NYHA CLASS I, ACUTE ON CHRONIC, COMBINED (HCC): Primary | ICD-10-CM

## 2022-07-27 PROCEDURE — 1124F ACP DISCUSS-NO DSCNMKR DOCD: CPT | Performed by: NURSE PRACTITIONER

## 2022-07-27 PROCEDURE — 99214 OFFICE O/P EST MOD 30 MIN: CPT | Performed by: NURSE PRACTITIONER

## 2022-07-27 NOTE — PROGRESS NOTES
[facility]   Cardiology Note   Pt of Dr Lesly Reyes MD, Christie Gupta RN, FNP APRN CVNP  Date: 7/27/2022  Admit Date: (Not on file)         Primary cardiologist:  Lesly Reyes  Here today as fu after hosp visit for dizziness     HPI: Jett Davlaos is a 80 y.o. female with a past medical history of . myoview 6/22 negative with nl EF 55%. Negative trop CAD(PCI 2018), HTN, HLD. She had a CVA and an ILR placed./ afib  . S/p DCCV to NSR on 6/24/22  EF on stress test  is wnl   On Eliquis 2.5 mg BID (age, Cr) - no s/s bleeding - continue  On amio 200 mg QD - screening labs are stable  On metoprolol succinate  50mg daily     Stress test 6/2022 There is a small and very mild basal anteroseptal wall fixed defect, most likely an artifact (RV insertion point). There is a moderate size, moderate intensity, worse with rest images basal inferolateral wall defect, most likely an artifact  (diaphragmatic attenuation). There is no evidence of myocardial ischemia or scar. There is moderate LV dilatation with borderline normal systolic function. Left ventricular ejection fraction of 55 %. There are no regional wall motion abnormalities. Overall findings represent a low risk scan. TTE 3/2022 Overall left ventricular systolic function is reduced with an estimated ejection fraction of 25-30%. There is diffuse hypokinesis of the left ventricle. Diastolic filling parameters suggests grade III diastolic dysfunction. The left atrium is severely dilated. Normal right ventricular size and function. Estimated pulmonary artery systolic pressure is at 51 mmHg assuming a right atrial pressure of 8 mmHg. A bubble study was performed and fails to show evidence of shunting. Interval Hx: Today, she is with son and . No c/o palpitations or dizziness or presyncope   No new complaints today. No major events overnight.    Denies having chest pain, palpitations, shortness of breath, orthopnea/PND, cough, or dizziness at the time of this visit. Patient seen and examined. Clinical notes reviewed. Telemetry reviewed / Pertinent labs, diagnostic, device, and imaging results reviewed as a part of this visit  I spent a total of 35 minutes and greater than 50% of the time was spent counseling with patient  coordinating care regarding her diagnosis, treatments and plan of care. EKG TTE stress test: any LHC/RHC reviewed       Past Medical History:   Diagnosis Date    Arthritis     Blood circulation, collateral     CAD (coronary artery disease)     CHF (congestive heart failure) (HCC)     Confusion 8/31/2020    GERD (gastroesophageal reflux disease)     History of heart artery stent 12/2018    Hyperlipidemia     Hypertension     Mitral valve prolapse     Myocardial infarct, old     Thyroid disease         Past Surgical History:    has a past surgical history that includes Hysterectomy; Thyroidectomy; Colonoscopy; other surgical history (08/27/2018); pr office/outpt visit,procedure only (Right, 8/27/2018); eye surgery (Left, 09/04/2018); and pr office/outpt visit,procedure only (Left, 9/4/2018). Social History:  Reviewed. reports that she has never smoked. She has never used smokeless tobacco. She reports that she does not drink alcohol and does not use drugs. Allergies: Allergies   Allergen Reactions    Benazepril Hcl Shortness Of Breath and Swelling    Feldene [Piroxicam] Shortness Of Breath    Hytrin [Terazosin Hcl] Shortness Of Breath    Iv Contrast [Iodides] Anaphylaxis    Acetaminophen     Celebrex [Celecoxib]      GI upset    Cephalexin      Nausea    Hydrochlorothiazide     Iodinated Casein     Monopril [Fosinopril Sodium] Other (See Comments)     Pt states makes her weak    Protonix [Pantoprazole Sodium] Other (See Comments)     Kidney failure      Quinapril Hcl     Ultracet [Tramadol-Acetaminophen]      Rash    Ziac [Bisoprolol-Hydrochlorothiazide] Other (See Comments)     Pt does not remember reaction to med ?  Weak Family History:  Reviewed. family history includes Cancer in her father and sister; Diabetes in her brother; Heart Disease in her father and mother; Stroke in her brother. Denies family history of sudden cardiac death, arrhythmia, premature CAD    Home Meds:  Prior to Visit Medications    Medication Sig Taking? Authorizing Provider   Cyanocobalamin (VITAMIN B-12) 1000 MCG extended release tablet TAKE 1 TABLET (1,000 MCG) BY MOUTH DAILY (AM) Yes Marilee Claudio MD   vitamin D3 (CHOLECALCIFEROL) 25 MCG (1000 UT) TABS tablet TAKE 1 TABLET (1,000 IU) BY MOUTH DAILY IN THE MORNING Yes Marilee Claudio MD   atorvastatin (LIPITOR) 80 MG tablet TAKE 1 TABLET BY MOUTH ONE TIME A DAY (EVENING) Yes Marilee Claudio MD   metoprolol succinate (TOPROL XL) 50 MG extended release tablet Take 1 tablet by mouth in the morning. Yes DAVE Hoffman CNP   amiodarone (CORDARONE) 200 MG tablet Take 1 tablet by mouth in the morning. Yes DAVE Hoffman CNP   furosemide (LASIX) 40 MG tablet TAKE 1 TABLET BY MOUTH DAILY IN THE MORNING (ORIGINAL RX WRITTEN BY DR. Fern Nunez) Yes DAVE Argueta CNP   lidocaine 4 % external patch Place 1 patch onto the skin in the morning. As needed for pain. Yes Debra Shahid DO   ondansetron (ZOFRAN) 4 MG tablet Take 1 tablet by mouth 3 times daily as needed for Nausea or Vomiting Yes Debra Shahid, DO   miconazole (MICOTIN) 2 % powder Apply topically 2 times daily. Yes Herman Carrasco MD   Misc. Devices KIT Rollator. Use as directed.  Yes Debra Shahid, DO   nitroGLYCERIN (NITROSTAT) 0.4 MG SL tablet PUT 1 TAB UNDER TONGUE EVERY 5 MIN AS NEEDED CHEST PAIN UP TO 3. IF NO RELIEF AFTER 1 DOSE, CALL 911 Yes Marilee Claudio MD   apixaban (ELIQUIS) 2.5 MG TABS tablet Take 1 tablet by mouth 2 times daily Yes DAVE Harris CNP   diclofenac sodium (VOLTAREN) 1 % GEL Apply 2 g topically 4 times daily Yes Ivone Champagne MD   famotidine (PEPCID) 20 MG tablet Take 1 tablet by mouth daily Yes Maci Sargent MD   zolpidem (AMBIEN) 5 MG tablet Take 1 tablet by mouth nightly as needed for Sleep for up to 180 days. Yes Debra Shahid DO   vitamin B-12 (CYANOCOBALAMIN) 1000 MCG tablet Take 1,000 mcg by mouth daily Yes Historical Provider, MD   fluticasone (FLONASE) 50 MCG/ACT nasal spray 2 sprays by Each Nostril route daily Yes Debra Shahid DO   Handicap Placard MISC by Does not apply route Length: 5 years Yes Debra Shahid DO   HYDROcodone-acetaminophen (NORCO)  MG per tablet Take 1 tablet by mouth every 6 hours as needed. . Yes Historical Provider, MD   Coenzyme Q10 (CO Q 10) 100 MG CAPS Take  by mouth daily. Yes Historical Provider, MD   aspirin 81 MG chewable tablet Take 1 tablet by mouth daily. Yes Tete Hidalgo MD        Scheduled Meds:  Continuous Infusions:  PRN Meds:     Review of Systems:  Constitutional: Negative for fever, night sweats, chills, weight changes  Skin: Negative for rash, pruritus, bleeding, blood clots, or bruising    HEENT: Negative for vision changes, ringing in the ears, dysphagia, or swollen lymph nodes  Respiratory: Reviewed in HPI  Cardiovascular: Reviewed in HPI  Gastrointestinal: Negative for abdominal pain, N/V/D, constipation, or black/tarry stools  Genito-Urinary: Negative for dysuria, incontinence, or hematuria  Musculoskeletal: Negative for joint swelling, muscle pain, or injuries  Neurological/Psych: Negative for confusion, seizures, headaches, or TIA-like symptoms. No anxiety or depression. Physical Examination:  Vitals:    07/27/22 1315   BP: (!) 140/80   Pulse: 60         Wt Readings from Last 3 Encounters:   07/27/22 117 lb (53.1 kg)   07/08/22 117 lb 11.6 oz (53.4 kg)   06/29/22 117 lb 11.6 oz (53.4 kg)       Constitutional: Cooperative and in no apparent distress, and appears well nourished  Skin: Warm and pink; no pallor, cyanosis, clubbing, or bruising   HEENT: Symmetric and normocephalic.    Cardiovascular: S1/S2 present Respiratory: Respirations symmetric and unlabored. Lungs clear to auscultation bilaterally, no wheezing, crackles, or rhonchi  Gastrointestinal: Abdomen soft and round. Bowel sounds normoactive without tenderness or masses. Musculoskeletal: Bilateral upper and lower extremity strength 5/5 with full ROM  Neurologic/Psych: Awake and orientated to person, place and time.  Calm affect, appropriate mood      BMP:     Lab Results   Component Value Date/Time    CHOL 104 06/24/2022 04:52 AM    HDL 45 06/24/2022 04:52 AM    TRIG 80 06/24/2022 04:52 AM     LFTS:   Lab Results   Component Value Date/Time    ALT 25 07/05/2022 05:53 PM    AST 33 07/05/2022 05:53 PM    ALKPHOS 60 07/05/2022 05:53 PM    PROT 6.4 07/05/2022 05:53 PM    AGRATIO 1.5 06/23/2022 07:12 PM    BILITOT 0.6 07/05/2022 05:53 PM     Cardiac Enzymes:   Lab Results   Component Value Date/Time    TROPONINI 0.01 07/05/2022 05:53 PM    TROPONINI 0.01 06/23/2022 07:12 PM    TROPONINI 0.03 05/29/2022 05:38 PM     Coags:   Lab Results   Component Value Date/Time    PROTIME 16.7 05/29/2022 11:49 AM    INR 3.20 06/24/2022 04:15 PM         Problem List:   Patient Active Problem List    Diagnosis Date Noted    AGUILA (acute kidney injury) ([facility] 01/13/2014    Hypokalemia     Hyponatremia     Near syncope 07/06/2022    Bradycardia 07/06/2022    On amiodarone therapy     On continuous oral anticoagulation     CHF (congestive heart failure), NYHA class I, acute on chronic, combined ([facility] [facility] 06/23/2022    Atrial fibrillation ([facility] [facility] 05/29/2022    Encounter for loop recorder check 03/30/2022    Stroke of unknown etiology ([facility] [facility] 03/19/2022    Visual field defect 03/18/2022    Chronic kidney disease, stage IV (severe) ([facility] [facility] 08/20/2021    Confusion 08/31/2020    PVD (posterior vitreous detachment), both eyes 07/02/2019    Posterior subcapsular polar age-related cataract of both eyes 07/02/2019    Abnormal myocardial perfusion study 11/06/2018    Ischemic cardiomyopathy 11/06/2018 Cardiomyopathy ([facility] 11/06/2018    Palpitations 09/04/2018    V-tach ([facility] [address] 09/04/2018    GERD (gastroesophageal reflux disease) 01/10/2017    Closed stable burst fracture of first lumbar vertebra ([facility] [address] 09/30/2015    Primary insomnia 11/24/2014    Mixed hyperlipidemia 10/29/2014    Gout 04/18/2014    Pain in joint, hand 04/18/2014    Renal insufficiency 01/22/2014    Essential hypertension 04/26/2013    Chronic low back pain 02/22/2013    Other and unspecified angina pectoris 06/21/2011    Coronary atherosclerosis of native coronary artery 06/21/2011    Other chronic pulmonary heart diseases 06/21/2011    Mitral valve disorder 06/21/2011    Acute combined systolic and diastolic heart failure ([facility] [address] 06/21/2011        Assessment     Here today as fu after hosp visit for dizziness   Myoview 6/22 negative with nl EF 55%. Negative trop     CAD(PCI 2018)  Stable   EF on stress test  is wnl     Stress test 6/2022 There is a small and very mild basal anteroseptal wall fixed defect, most likely an artifact (RV insertion point). There is a moderate size, moderate intensity, worse with rest images basal inferolateral wall defect, most likely an artifact  (diaphragmatic attenuation). There is no evidence of myocardial ischemia or scar. There is moderate LV dilatation with borderline normal systolic function. Left ventricular ejection fraction of 55 %. There are no regional wall motion abnormalities. Overall findings represent a low risk scan. TTE 3/2022 Overall left ventricular systolic function is reduced with an estimated ejection fraction of 25-30%. There is diffuse hypokinesis of the left ventricle. Diastolic filling parameters suggests grade III diastolic dysfunction. The left atrium is severely dilated. Normal right ventricular size and function. Estimated pulmonary artery systolic pressure is at 51 mmHg assuming a right atrial pressure of 8 mmHg.   A bubble study was performed and fails to show evidence of shunting. HTN  wnl     HLD    She had a CVA and an ILR placed./ afib  . Afib   S/p DCCV to NSR on 6/24/22  On Eliquis 2.5 mg BID (age, Cr) - no s/s bleeding - continue  On amio 200 mg QD - screening labs are stable  On metoprolol succinate  50mg daily     Plan   Fu with Dr Marce Saunders as planned / [facility] EP as planned   Blood work TTE PTV     Thank you for allowing to us to participate in the care of [facility].   Rodriguez Beyer APRN-CNP-CVNP    [facility]

## 2022-08-03 ENCOUNTER — TELEPHONE (OUTPATIENT)
Dept: CARDIOLOGY CLINIC | Age: 87
End: 2022-08-03

## 2022-08-03 NOTE — TELEPHONE ENCOUNTER
Brought pt down to MRI   Mo Ellington from [facility] pt seen Dorlanaa Offer on 7.27.22 and labs were ordered and nicole would like to know if they need to be done in a certain time please call [phone]

## 2022-08-03 NOTE — DISCHARGE SUMMARY
07/06/2022 05:20 AM       Renal:    Lab Results   Component Value Date/Time     07/25/2022 10:15 AM    K 4.3 07/25/2022 10:15 AM    K 3.4 07/06/2022 05:20 AM    CL 99 07/25/2022 10:15 AM    CO2 29 07/25/2022 10:15 AM    BUN 26 07/25/2022 10:15 AM    CREATININE 1.8 07/25/2022 10:15 AM    CALCIUM 9.5 07/25/2022 10:15 AM    PHOS 3.8 07/25/2022 10:15 AM         Significant Diagnostic Studies    Radiology:   NM MYOCARDIAL SPECT REST EXERCISE OR RX   Final Result      US RENAL COMPLETE   Final Result      1. Bilateral cortical renal atrophic changes concordant with chronic medical renal disease and no evidence of hydronephrosis   2. Nonobstructive left renal nephrolithiasis   3. Benign left renal cyst               XR CHEST PORTABLE   Final Result      Patchy bilateral airspace disease. Correlate for pneumonia are less typical for pulmonary edema. Consults:     IP CONSULT TO HOSPITALIST  IP CONSULT TO HEART FAILURE NURSE/COORDINATOR  IP CONSULT TO DIETITIAN  IP CONSULT TO NEPHROLOGY  IP CONSULT TO CARDIOLOGY  IP CONSULT TO CARDIOLOGY    Disposition:  Home     Condition at Discharge: Stable    Discharge Instructions/Follow-up:  Cardiology    Code Status:  Prior     Activity: activity as tolerated    Diet: cardiac diet      Discharge Medications:     Discharge Medication List as of 6/29/2022 10:47 AM             Details   miconazole (MICOTIN) 2 % powder Apply topically 2 times daily. , Disp-45 g, R-1, Normal      amiodarone (CORDARONE) 200 MG tablet Take 1 tablet by mouth daily, Disp-30 tablet, R-0Normal      metoprolol succinate (TOPROL XL) 50 MG extended release tablet Take 1 tablet by mouth daily, Disp-30 tablet, R-3Normal      lidocaine 4 % external patch Place 1 patch onto the skin daily, TransDERmal, DAILY Starting Thu 6/30/2022, Disp-10 patch, R-0, Normal                Details   furosemide (LASIX) 40 MG tablet Take 1 tablet by mouth 2 times daily, Disp-60 tablet, R-3Normal                Details Misc. Devices KIT Disp-1 kit, R-0, PrintRollator. Use as directed. nitroGLYCERIN (NITROSTAT) 0.4 MG SL tablet PUT 1 TAB UNDER TONGUE EVERY 5 MIN AS NEEDED CHEST PAIN UP TO 3. IF NO RELIEF AFTER 1 DOSE, CALL 911, Disp-25 tablet, R-3Normal      apixaban (ELIQUIS) 2.5 MG TABS tablet Take 1 tablet by mouth 2 times daily, Disp-60 tablet, R-5Normal      diclofenac sodium (VOLTAREN) 1 % GEL Apply 2 g topically 4 times daily, Topical, 4 TIMES DAILY Starting Tue 3/22/2022, Disp-200 g, R-2, Normal      famotidine (PEPCID) 20 MG tablet Take 1 tablet by mouth daily, Disp-60 tablet, R-3Normal      zolpidem (AMBIEN) 5 MG tablet Take 1 tablet by mouth nightly as needed for Sleep for up to 180 days. , Disp-30 tablet, R-2Normal      ondansetron (ZOFRAN) 4 MG tablet Take 1 tablet by mouth 3 times daily as needed for Nausea or Vomiting, Disp-30 tablet, R-3Normal      atorvastatin (LIPITOR) 80 MG tablet TAKE 1 TABLET BY MOUTH ONE TIME A DAY, Disp-90 tablet, R-3Normal      vitamin B-12 (CYANOCOBALAMIN) 1000 MCG tablet Take 1,000 mcg by mouth dailyHistorical Med      fluticasone (FLONASE) 50 MCG/ACT nasal spray 2 sprays by Each Nostril route daily, Disp-1 Bottle, R-5Normal      Handicap Placard [facility] Starting Tue 5/7/2019, Disp-1 each, R-0, PrintLength: 5 years      vitamin D (CHOLECALCIFEROL) 1000 UNIT TABS tablet Take 2 tablets by mouth daily, Disp-60 tablet, R-3Normal      HYDROcodone-acetaminophen (NORCO)  MG per tablet Take 1 tablet by mouth every 6 hours as needed. Derek Arizmendi Historical Med      Coenzyme Q10 (CO Q 10) 100 MG CAPS Take  by mouth daily. aspirin 81 MG chewable tablet Take 1 tablet by mouth daily. , Disp-30 tablet             Time Spent on discharge is more than 30 minutes in the examination, evaluation, counseling and review of medications and discharge plan. Signed:    Nas Wild MD   8/3/2022      Thank you Sreedhar Holder DO for the opportunity to be involved in this patient's care.  If you have any questions or concerns, please feel free to contact me at [phone].

## 2022-08-03 NOTE — TELEPHONE ENCOUNTER
Spoke with [facility], patient still needs labs drawn, she will try for this Friday or Monday.

## 2022-08-11 ENCOUNTER — HOSPITAL ENCOUNTER (OUTPATIENT)
Age: 87
Setting detail: SPECIMEN
Discharge: HOME | End: 2022-08-11
Payer: MEDICARE

## 2022-08-11 ENCOUNTER — NURSE ONLY (OUTPATIENT)
Dept: CARDIOLOGY CLINIC | Age: 87
End: 2022-08-11
Payer: MEDICARE

## 2022-08-11 DIAGNOSIS — Z45.09 ENCOUNTER FOR LOOP RECORDER CHECK: Primary | ICD-10-CM

## 2022-08-11 DIAGNOSIS — I63.9 CRYPTOGENIC STROKE (HCC): ICD-10-CM

## 2022-08-11 LAB
A/G RATIO: 1.7 (ref 1.1–2.2)
ALBUMIN SERPL-MCNC: 3.9 G/DL (ref 3.4–5)
ALP BLD-CCNC: 82 U/L (ref 40–129)
ALT SERPL-CCNC: 12 U/L (ref 10–40)
ANION GAP SERPL CALCULATED.3IONS-SCNC: 11 MMOL/L (ref 3–16)
AST SERPL-CCNC: 21 U/L (ref 15–37)
BILIRUB SERPL-MCNC: 0.4 MG/DL (ref 0–1)
BILIRUBIN DIRECT: <0.2 MG/DL (ref 0–0.3)
BILIRUBIN, INDIRECT: NORMAL MG/DL (ref 0–1)
BUN BLDV-MCNC: 29 MG/DL (ref 7–20)
CALCIUM SERPL-MCNC: 9.4 MG/DL (ref 8.3–10.6)
CHLORIDE BLD-SCNC: 94 MMOL/L (ref 99–110)
CO2: 30 MMOL/L (ref 21–32)
CREAT SERPL-MCNC: 1.7 MG/DL (ref 0.6–1.2)
GFR AFRICAN AMERICAN: 34
GFR NON-AFRICAN AMERICAN: 28
GLUCOSE BLD-MCNC: 99 MG/DL (ref 70–99)
HCT VFR BLD CALC: 29 % (ref 36–48)
HEMOGLOBIN: 9.8 G/DL (ref 12–16)
MAGNESIUM: 2.2 MG/DL (ref 1.8–2.4)
MCH RBC QN AUTO: 29.2 PG (ref 26–34)
MCHC RBC AUTO-ENTMCNC: 33.6 G/DL (ref 31–36)
MCV RBC AUTO: 87 FL (ref 80–100)
PARATHYROID HORMONE INTACT: 77.6 PG/ML (ref 14–72)
PDW BLD-RTO: 19.7 % (ref 12.4–15.4)
PLATELET # BLD: 279 K/UL (ref 135–450)
PMV BLD AUTO: 7.7 FL (ref 5–10.5)
POTASSIUM SERPL-SCNC: 3.9 MMOL/L (ref 3.5–5.1)
PRO-BNP: 4207 PG/ML (ref 0–449)
RBC # BLD: 3.34 M/UL (ref 4–5.2)
SODIUM BLD-SCNC: 135 MMOL/L (ref 136–145)
T4 FREE: 1.5 NG/DL (ref 0.9–1.8)
TOTAL PROTEIN: 6.2 G/DL (ref 6.4–8.2)
VITAMIN D 25-HYDROXY: 73.9 NG/ML
WBC # BLD: 7.4 K/UL (ref 4–11)

## 2022-08-11 PROCEDURE — 83970 ASSAY OF PARATHORMONE: CPT

## 2022-08-11 PROCEDURE — 82306 VITAMIN D 25 HYDROXY: CPT

## 2022-08-11 PROCEDURE — G2066 INTER DEVC REMOTE 30D: HCPCS | Performed by: INTERNAL MEDICINE

## 2022-08-11 PROCEDURE — 80053 COMPREHEN METABOLIC PANEL: CPT

## 2022-08-11 PROCEDURE — 82248 BILIRUBIN DIRECT: CPT

## 2022-08-11 PROCEDURE — 85027 COMPLETE CBC AUTOMATED: CPT

## 2022-08-11 PROCEDURE — 36415 COLL VENOUS BLD VENIPUNCTURE: CPT

## 2022-08-11 PROCEDURE — 83735 ASSAY OF MAGNESIUM: CPT

## 2022-08-11 PROCEDURE — 83880 ASSAY OF NATRIURETIC PEPTIDE: CPT

## 2022-08-11 PROCEDURE — 84439 ASSAY OF FREE THYROXINE: CPT

## 2022-08-11 PROCEDURE — 93298 REM INTERROG DEV EVAL SCRMS: CPT | Performed by: INTERNAL MEDICINE

## 2022-08-11 NOTE — PROGRESS NOTES
ILR for CVA. Remote transmission received from patient's monitor at home. Since 06/24/22:  0.5% AT/ AF burden, w no ECGs  (Toprol, amiodarone, Eliquis, s/p DCCV 06.24.2)  PVC burden 2.5%. EP physician to review. See PACEART report under Cardiology tab. Will continue to monitor remotely. (End of 31-day monitoring period 8/11/22).

## 2022-08-14 NOTE — DISCHARGE SUMMARY
Hospital Medicine Discharge Summary    Patient ID: Jaqueline Serna      Patient's PCP: Alber Castelan DO    Admit Date: 7/5/2022     Discharge Date: 7/8/2022    Admitting Physician: Nika Pruitt MD     Discharge Physician: Jn Andrews MD     Discharge Diagnoses: Active Hospital Problems    Diagnosis Date Noted    Hypokalemia [E87.6]      Priority: Medium    Hyponatremia [E87.1]      Priority: Medium    Near syncope [R55] 07/06/2022     Priority: Medium    Bradycardia [R00.1] 07/06/2022     Priority: Medium    Cardiomyopathy ([address] [I42.9] 11/06/2018       The patient was seen and examined on day of discharge and this discharge summary is in conjunction with any daily progress note from day of discharge. Condition at discharge - stable    Hospital Course: patient seen and evaluated on the day of discharge. Patient informed about following up with appointments. Patient verbalized understanding for follow-up appointments. The patient and / or the family were informed of the results of tests, a time was given to answer questions, a plan was proposed and they agreed with plan. Medical reconciliation performed. Patient discharged stable condition. On the date of discharge, the patient reported feeling stable. The patient was found to not be in any acute distress, with vital signs within normal limits, and no new abnormalities on physical examination. Further, the patient expressed appropriate understanding of, and agreement with, the discharge recommendations, medications, and plan. #Fatigue, lightheadedness and near syncope  #Fall at the nursing home, due to loss of balance, but no loss of consciousness. CT head with chronic changes, CT cervical spine with no acute abnormalities  #Paroxysmal A. fib, recently started on amiodarone. Patient is on beta-blocker too. EKG-with sinus bradycardia.   Patient is on Lasix twice daily  #Electrolyte abnormalities-hyponatremia, hypokalemia  #AGUILA on CKD stage IV  #Chronic anemia-H&H stable at baseline  #Abnormal UA, patient remains afebrile, no leukocytosis and no urinary symptoms, patient  #History of CAD and CHF. Chest x-ray with no acute airspace disease or congestive changes  #Hypothyroid     PLAN:  -Check orthostatic vital signs-pending results  -Hold Lasix, amiodarone and beta-blocker  -Gentle IV hydration  -Replace potassium, check magnesium  -Monitor electrolytes and renal function  -Monitor CBC  -Follow urine cultures and start on antibiotics if indicated  -Continue on home cardiac medications appropriately, including Eliquis  -Cardiology consult  -Fall precautions  -PT/OT  -Social service consult  -Supportive therapy  Diet: ADULT DIET; Regular; No Added Salt (3-4 gm)  ADULT ORAL NUTRITION SUPPLEMENT; Breakfast, Dinner; Standard High Calorie/High Protein Oral Supplement  Code Status: Full Code     PT/OT Eval Status: ordered     Dispo/Plan of care -likely    Discussed with daughter who was agreeable to discharge    Exam:     /64   Pulse 64   Temp 98.2 °F (36.8 °C)   Resp 14   Ht 5' (1.524 m)   Wt 117 lb 11.6 oz (53.4 kg)   SpO2 100%   BMI 22.99 kg/m²   General appearance: No apparent distress,  HEENT:  Conjunctivae/corneas clear. Neck: Supple, with full range of motion. Respiratory:  Normal respiratory effort. Clear to auscultation, bilaterally without Rales/Wheezes/Rhonchi. Cardiovascular: Regular rate and rhythm with normal S1/S2 without murmurs or rubs  Abdomen: Soft, non-tender, non-distended, normal bowel sounds. Musculoskeletal: No cyanosis or edema bilaterally  Neurologic:  without any focal sensory/motor deficits.  grossly non-focal.  Psychiatric: Alert and oriented, Normal mood  Peripheral Pulses: +2 palpable, equal bilaterally       Consults:     IP CONSULT TO HOSPITALIST  IP CONSULT TO CARDIOLOGY  IP CONSULT TO SOCIAL WORK  IP CONSULT TO CASE MANAGEMENT  IP CONSULT TO NEPHROLOGY  IP CONSULT TO PALLIATIVE CARE  IP CONSULT TO HOME CARE NEEDS      Code Status:  Prior    Activity: activity as tolerated    Labs: For convenience and continuity at follow-up the following most recent labs are provided:      CBC:    Lab Results   Component Value Date/Time    WBC 7.4 08/11/2022 01:00 PM    HGB 9.8 08/11/2022 01:00 PM    HCT 29.0 08/11/2022 01:00 PM     08/11/2022 01:00 PM       Renal:    Lab Results   Component Value Date/Time     08/11/2022 01:00 PM    K 3.9 08/11/2022 01:00 PM    K 3.4 07/06/2022 05:20 AM    CL 94 08/11/2022 01:00 PM    CO2 30 08/11/2022 01:00 PM    BUN 29 08/11/2022 01:00 PM    CREATININE 1.7 08/11/2022 01:00 PM    CALCIUM 9.4 08/11/2022 01:00 PM    PHOS 3.8 07/25/2022 10:15 AM       Discharge Medications:     Discharge Medication List as of 7/8/2022  3:34 PM             Details   doxycycline hyclate (VIBRA-TABS) 100 MG tablet Take 1 tablet by mouth 2 times daily for 5 days, Disp-10 tablet, R-0Normal                Details   furosemide (LASIX) 40 MG tablet Take 1 tablet by mouth daily, Disp-60 tablet, R-3Normal                Details   ondansetron (ZOFRAN) 4 MG tablet Take 1 tablet by mouth 3 times daily as needed for Nausea or Vomiting, Disp-30 tablet, R-5Normal      miconazole (MICOTIN) 2 % powder Apply topically 2 times daily. , Disp-45 g, R-1, Normal      amiodarone (CORDARONE) 200 MG tablet Take 1 tablet by mouth daily, Disp-30 tablet, R-0Normal      metoprolol succinate (TOPROL XL) 50 MG extended release tablet Take 1 tablet by mouth daily, Disp-30 tablet, R-3Normal      lidocaine 4 % external patch Place 1 patch onto the skin daily, TransDERmal, DAILY Starting Thu 6/30/2022, Disp-10 patch, R-0, Normal      Misc. Devices KIT Disp-1 kit, R-0, PrintRollator. Use as directed.       nitroGLYCERIN (NITROSTAT) 0.4 MG SL tablet PUT 1 TAB UNDER TONGUE EVERY 5 MIN AS NEEDED CHEST PAIN UP TO 3. IF NO RELIEF AFTER 1 DOSE, CALL 911, Disp-25 tablet, R-3Normal      apixaban (ELIQUIS) 2.5 MG TABS tablet

## 2022-08-22 ENCOUNTER — HOSPITAL ENCOUNTER (OUTPATIENT)
Dept: NON INVASIVE DIAGNOSTICS | Age: 87
Discharge: HOME | End: 2022-08-22
Payer: MEDICARE

## 2022-08-22 DIAGNOSIS — R06.02 SOB (SHORTNESS OF BREATH): ICD-10-CM

## 2022-08-22 DIAGNOSIS — I50.43 CHF (CONGESTIVE HEART FAILURE), NYHA CLASS I, ACUTE ON CHRONIC, COMBINED (HCC): ICD-10-CM

## 2022-08-22 LAB
LV EF: 43 %
LVEF MODALITY: NORMAL

## 2022-08-22 PROCEDURE — 93306 TTE W/DOPPLER COMPLETE: CPT

## 2022-08-23 NOTE — PROGRESS NOTES
[address]   Electrophysiology  Office Visit  Date: 8/24/2022    Chief Complaint   Patient presents with    Atrial Fibrillation    Cerebrovascular Accident    Cardiomyopathy    Congestive Heart Failure    Device Check         Cardiac HX: Elan Serrano is a 80 y.o. 81 y/o woman with a h/o HTN, HLD, CAD, s/p MI, s/p PCI (12/2018), ICMP, EF 25-30% who p/w vision loss, MRI of the head showed areas of acute ischemia thought to be embolic, S/p ILR, pAF who p/w AF/RVR, s/p DCCV to NSR (6/24/22) placed on amio 200 mg QD    Interval History/HPI: Patient is here for follow-up for CVA, ICMP, CAD, AF. She recently presented to the hospital in June 2022 with complaints of BLE edema, abnormal labs, 14 pound weight gain. Her BNP was noted to be greater than 11,000. She was noted to go into AF/RVR. She underwent DCCV with Dr. Fortino Mustafa (6/24/2022) and was placed on amiodarone afterwards. She states since starting the amiodarone she has been feeling tingling in her hands. Device check today shows no arrhythmias, 1.3% PVC burden. Pt denies palpitations, heart racing, dizziness, lightheadedness. No CP, SOB, PND, orthopnea, BLE edema. BP on the soft side. Home medications:   Current Outpatient Medications on File Prior to Visit   Medication Sig Dispense Refill    Cyanocobalamin (VITAMIN B-12) 1000 MCG extended release tablet TAKE 1 TABLET (1,000 MCG) BY MOUTH DAILY (AM) 90 tablet 3    vitamin D3 (CHOLECALCIFEROL) 25 MCG (1000 UT) TABS tablet TAKE 1 TABLET (1,000 IU) BY MOUTH DAILY IN THE MORNING 90 tablet 3    atorvastatin (LIPITOR) 80 MG tablet TAKE 1 TABLET BY MOUTH ONE TIME A DAY (EVENING) 60 tablet 3    metoprolol succinate (TOPROL XL) 50 MG extended release tablet Take 1 tablet by mouth in the morning.  30 tablet 5    furosemide (LASIX) 40 MG tablet TAKE 1 TABLET BY MOUTH DAILY IN THE MORNING (ORIGINAL RX WRITTEN BY DR. Mark TALBOT) 30 tablet 3    lidocaine 4 % external patch Place 1 patch onto the skin in the morning. As needed for pain. 30 patch 3    ondansetron (ZOFRAN) 4 MG tablet Take 1 tablet by mouth 3 times daily as needed for Nausea or Vomiting 30 tablet 5    miconazole (MICOTIN) 2 % powder Apply topically 2 times daily. 45 g 1    Misc. Devices KIT Rollator. Use as directed. 1 kit 0    nitroGLYCERIN (NITROSTAT) 0.4 MG SL tablet PUT 1 TAB UNDER TONGUE EVERY 5 MIN AS NEEDED CHEST PAIN UP TO 3. IF NO RELIEF AFTER 1 DOSE, CALL 911 25 tablet 3    apixaban (ELIQUIS) 2.5 MG TABS tablet Take 1 tablet by mouth 2 times daily 60 tablet 5    diclofenac sodium (VOLTAREN) 1 % GEL Apply 2 g topically 4 times daily 200 g 2    famotidine (PEPCID) 20 MG tablet Take 1 tablet by mouth daily 60 tablet 3    vitamin B-12 (CYANOCOBALAMIN) 1000 MCG tablet Take 1,000 mcg by mouth daily      fluticasone (FLONASE) 50 MCG/ACT nasal spray 2 sprays by Each Nostril route daily 1 Bottle 5    Handicap Placard MISC by Does not apply route Length: 5 years 1 each 0    HYDROcodone-acetaminophen (NORCO)  MG per tablet Take 1 tablet by mouth every 6 hours as needed. Vearl Pippins aspirin 81 MG chewable tablet Take 1 tablet by mouth daily. 30 tablet     zolpidem (AMBIEN) 5 MG tablet Take 1 tablet by mouth nightly as needed for Sleep for up to 180 days. (Patient not taking: Reported on 8/24/2022) 30 tablet 2    Coenzyme Q10 (CO Q 10) 100 MG CAPS Take  by mouth daily. (Patient not taking: Reported on 8/24/2022)       No current facility-administered medications on file prior to visit.        Past Medical History:   Diagnosis Date    Arthritis     Blood circulation, collateral     CAD (coronary artery disease)     CHF (congestive heart failure) ([facility])     Confusion 8/31/2020    GERD (gastroesophageal reflux disease)     History of heart artery stent 12/2018    Hyperlipidemia     Hypertension     Mitral valve prolapse     Myocardial infarct, old     Thyroid disease         Past Surgical History:   Procedure Laterality Date    COLONOSCOPY      EYE SURGERY Left 09/04/2018    PHACO EMULSIFICATION OF CATARACT WITH INTRAOCULAR LENS IMPLANT LEFT EYE     HYSTERECTOMY (CERVIX STATUS UNKNOWN)      OTHER SURGICAL HISTORY  08/27/2018    phacoemulsification of cataract with intraocular lens implant right eye    [place] OFFICE/OUTPT VISIT,PROCEDURE ONLY Right 8/27/2018    PHACO EMULSIFICATION OF CATARACT WITH INTRAOCULAR LENS IMPLANT RIGHT EYE performed by Beronica Clarke MD at [address] OFFICE/OUTPT [facility] Left 9/4/2018    PHACO EMULSIFICATION OF CATARACT WITH INTRAOCULAR LENS IMPLANT LEFT EYE performed by Beronica Clarke MD at [facility]         Family History:  Reviewed. family history includes Cancer in her father and sister; Diabetes in her brother; Heart Disease in her father and mother; Stroke in her brother. Review of System:    Constitutional: No fevers, chills. Cardiovascular: No for chest pain, yes for dyspnea on exertion, No for palpitations or No for loss of consciousness. No cough, hemoptysis, No for pleuritic pain, or phlebitis. Respiratory: No for cough or wheezing. No hematemesis. Gastrointestinal: No blood in stools. Genitourinary: No hematuria. Musculoskeletal: No gait disturbance,    Integumentary: No rash or pruritis. Psychiatric: No anxiety, or depression. Hem/Lymph: No abnormal bruising or bleeding. Physical Examination:  Vitals:    08/24/22 1446   BP: (!) 94/50   Pulse: 64           Wt Readings from Last 3 Encounters:   08/24/22 117 lb (53.1 kg)   07/27/22 117 lb (53.1 kg)   07/08/22 117 lb 11.6 oz (53.4 kg)       Constitutional: Oriented. No distress. Head: Normocephalic and atraumatic. Neck: Neck supple. No rigidity. No JVD present. Cardiovascular: Normal rate, regular rhythm, S1&S2. Pulmonary/Chest: Bilateral respiratory sounds. No wheezes, No rhonchi. Musculoskeletal: No tenderness. No edema    Neurological: Alert and oriented. Cranial nerve appears intact, No Gross deficit   Skin: Skin is warm and dry. No rash noted. Psychiatric: Has a normal mood, affect and behavior     Labs:  Reviewed. No results for input(s): NA, K, CL, CO2, PHOS, BUN, CREATININE, CA in the last 72 hours. Invalid input(s):  TSH    No results for input(s): WBC, HGB, HCT, MCV, PLT in the last 72 hours. Lab Results   Component Value Date/Time    TROPONINI 0.01 07/05/2022 05:53 PM     No results found for: BNP  Lab Results   Component Value Date/Time    PROTIME 16.7 05/29/2022 11:49 AM    PROTIME 11.5 03/18/2022 02:01 PM    PROTIME 11.5 11/20/2021 04:14 PM    INR 3.20 06/24/2022 04:15 PM    INR 3.60 06/24/2022 04:11 PM    INR 1.35 05/29/2022 11:49 AM     Lab Results   Component Value Date/Time    CHOL 104 06/24/2022 04:52 AM    HDL 45 06/24/2022 04:52 AM    TRIG 80 06/24/2022 04:52 AM       ECG: Personally reviewed: NSR, HR 64, , , QTc 485    ECHO:  8/2022  Summary   The left ventricular systolic function is low normal to mildly reduced with   an ejection fraction of 40-45 %. There is hypokinesis of the inferior and basal/mid septal walls. There is mild concentric left ventricular hypertrophy. Grade II diastolic dysfunction with elevated left ventricular filling   pressure. The left atrium is severely dilated. Severe mitral regurgitation. Systolic flow reversal in pulmonary veins. Moderate tricuspid regurgitation. Systolic pulmonary artery pressure (SPAP) is estimated at 61 mmHg consistent   with severe pulmonary hypertension (Right atrial pressure of 3 mmHg). Stress Test: 10/2018  Summary    ABNORMAL HIGH RISK STRESS TEST    Normal LV size and mildly reduced systolic function. Left ventricular    ejection fraction of 47%. Hypokinesis of the apical segments. THere is a    defect in the anterior septal wall at stress and minimal reversal at rest    consistent with scar.  There is also a mild defect in the anterior and    anterolateral wall at stress that completely reverses at rest consistent    with ischemia. Cardiac Angiography: 11/2018, Dr. Liz Hernandez:  1.  LV dysfunction consistent with cardiomyopathy. 2.  Ventricular tachycardia. 3.  Abnormal Myoview. 4.  LV dysfunction with estimated ejection fraction of 40% with global  hypokinesis. 5.  Successful primary stent to LAD. Problem List:   Patient Active Problem List    Diagnosis Date Noted    AGUILA (acute kidney injury) ([address] 01/13/2014    Hypokalemia     Hyponatremia     Near syncope 07/06/2022    Bradycardia 07/06/2022    On amiodarone therapy     On continuous oral anticoagulation     CHF (congestive heart failure), NYHA class I, acute on chronic, combined ([address] 06/23/2022    Atrial fibrillation ([address] 05/29/2022    Encounter for loop recorder check 03/30/2022    Stroke of unknown etiology ([address] 03/19/2022    Visual field defect 03/18/2022    Chronic kidney disease, stage IV (severe) ([address] 08/20/2021    Confusion 08/31/2020    PVD (posterior vitreous detachment), both eyes 07/02/2019    Posterior subcapsular polar age-related cataract of both eyes 07/02/2019    Abnormal myocardial perfusion study 11/06/2018    Ischemic cardiomyopathy 11/06/2018    Cardiomyopathy ([address] 11/06/2018    Palpitations 09/04/2018    V-tach ([address] 09/04/2018    GERD (gastroesophageal reflux disease) 01/10/2017    Closed stable burst fracture of first lumbar vertebra ([address] 09/30/2015    Primary insomnia 11/24/2014    Mixed hyperlipidemia 10/29/2014    Gout 04/18/2014    Pain in joint, hand 04/18/2014    Renal insufficiency 01/22/2014    Essential hypertension 04/26/2013    Chronic low back pain 02/22/2013    Other and unspecified angina pectoris 06/21/2011    Coronary atherosclerosis of native coronary artery 06/21/2011    Other chronic pulmonary heart diseases 06/21/2011    Mitral valve disorder 06/21/2011    Acute combined systolic and diastolic heart failure ([address] 06/21/2011        Assessment:   1. Ischemic cardiomyopathy    2. Cryptogenic stroke ([address]    3. Encounter for loop recorder check    4. Chronic systolic congestive heart failure ([address]    5. CAD in native artery    6. Paroxysmal atrial fibrillation (HCC)        CVA  - Thought to be embolic  - S/p ILR placement. - Device check shows no new arrhythmias, PVC burden 1.3%  - F/u 6 months    AF  - In NSR, no AF seen on device  - CHADSVASc at least 6  - On Eliquis 2.5 mg BID, no bleeding  - On amio 200 mg QD, states she has had some tingling in her fingers and starting the amiodarone, will cut dose to 100 mg daily  - TSH/LFT's up to date  - On Toprol 50 mg QD    ICMP  - EF 25-30%, now 40-45% (8/2022)  - Reviewed most recent echo results with patient and daughter  - NYHA class II/III  -   - On Toprol 50 mg QD, laisx 40 mg QD  - No ACE/ARB/ARN I/SGLT 2 due to soft BPs/CKD     CAD  - No s/s  - S/p PCI 2018  - On ASA, plavix and statin  - follows w/ Dr. Dinorah Ingram    EF of 70-12%  No ARB systolic HF  ASA and Statin for CAD  No known AF  Tobacco use was discussed with the patient and education provided. All questions and concerns were addressed to the patient/family. Alternatives to my treatment were discussed. The note was completed using EMR. Every effort was made to ensure accuracy; however, inadvertent computerized transcription errors may be present. Patient received education regarding their diagnosis, treatment and medications while in the office today.       Kane Borrero, [address]

## 2022-08-24 ENCOUNTER — NURSE ONLY (OUTPATIENT)
Dept: CARDIOLOGY CLINIC | Age: 87
End: 2022-08-24

## 2022-08-24 ENCOUNTER — OFFICE VISIT (OUTPATIENT)
Dept: CARDIOLOGY CLINIC | Age: 87
End: 2022-08-24
Payer: MEDICARE

## 2022-08-24 VITALS
SYSTOLIC BLOOD PRESSURE: 94 MMHG | BODY MASS INDEX: 22.85 KG/M2 | DIASTOLIC BLOOD PRESSURE: 50 MMHG | HEART RATE: 64 BPM | WEIGHT: 117 LBS

## 2022-08-24 DIAGNOSIS — I25.10 CAD IN NATIVE ARTERY: ICD-10-CM

## 2022-08-24 DIAGNOSIS — Z45.09 ENCOUNTER FOR LOOP RECORDER CHECK: ICD-10-CM

## 2022-08-24 DIAGNOSIS — I50.22 CHRONIC SYSTOLIC CONGESTIVE HEART FAILURE (HCC): ICD-10-CM

## 2022-08-24 DIAGNOSIS — I25.5 ISCHEMIC CARDIOMYOPATHY: Primary | ICD-10-CM

## 2022-08-24 DIAGNOSIS — I48.0 PAROXYSMAL ATRIAL FIBRILLATION (HCC): ICD-10-CM

## 2022-08-24 DIAGNOSIS — I63.9 CRYPTOGENIC STROKE (HCC): ICD-10-CM

## 2022-08-24 PROCEDURE — 1090F PRES/ABSN URINE INCON ASSESS: CPT | Performed by: NURSE PRACTITIONER

## 2022-08-24 PROCEDURE — G8420 CALC BMI NORM PARAMETERS: HCPCS | Performed by: NURSE PRACTITIONER

## 2022-08-24 PROCEDURE — G8427 DOCREV CUR MEDS BY ELIG CLIN: HCPCS | Performed by: NURSE PRACTITIONER

## 2022-08-24 PROCEDURE — 99214 OFFICE O/P EST MOD 30 MIN: CPT | Performed by: NURSE PRACTITIONER

## 2022-08-24 PROCEDURE — 1124F ACP DISCUSS-NO DSCNMKR DOCD: CPT | Performed by: NURSE PRACTITIONER

## 2022-08-24 PROCEDURE — 1036F TOBACCO NON-USER: CPT | Performed by: NURSE PRACTITIONER

## 2022-08-24 RX ORDER — AMIODARONE HYDROCHLORIDE 100 MG/1
100 TABLET ORAL DAILY
Qty: 30 TABLET | Refills: 5 | Status: SHIPPED | OUTPATIENT
Start: 2022-08-24

## 2022-08-24 NOTE — PROGRESS NOTES
ILR for CVA  Last remote 8.11.2022    Remote transmission received and shows no new episodes/evnets. PVC burden of 1.3%. Current ECG illustrates NSR. EP physician will review. See Pace[facility] report under the Cardiology tab. Patient remains on furosemide, metoprolol, amiodarone, eliquis. Patient will see NPWS today. We will continue to monitor remotely.

## 2022-08-25 ENCOUNTER — TELEPHONE (OUTPATIENT)
Dept: FAMILY MEDICINE CLINIC | Age: 87
End: 2022-08-25

## 2022-08-25 NOTE — TELEPHONE ENCOUNTER
Rivas Weber a nurse with [facility] called to let PCP aware that patient fell a couple weeks ago and also last Monday. Fall from a couple weeks ago patient does have a large bruise under her left breast on left side. Fall from Monday no injuries noted.  Thanks

## 2022-09-12 NOTE — TELEPHONE ENCOUNTER
I see medication on her med list. Has she been taking it? She has f/u with Dr. Connie Ferrer tomorrow and can further discuss.

## 2022-09-12 NOTE — TELEPHONE ENCOUNTER
Requested Prescriptions     Pending Prescriptions Disp Refills    ASPIRIN LOW DOSE 81 MG EC tablet [Pharmacy Med Name: ASPIRIN LOW DOSE 81 MG ORAL TABLET DELAYED RELEASE] 30 tablet      Sig: TAKE 1 TABLET BY MOUTH DAILY (AM)          Number: 30    Refills: 5    Last Office Visit: 8/24/2022     Next Office Visit: 9/13/2022     Last Refill: 01/16/2014    Last Labs: 8/11/2022 T4/ Magnesium / Vit D/ PTH/ BNP/ Hepatic/ CMP/ CBC    This mediation is no longer on her med list should she still be taking it.

## 2022-09-13 ENCOUNTER — OFFICE VISIT (OUTPATIENT)
Dept: CARDIOLOGY CLINIC | Age: 87
End: 2022-09-13
Payer: MEDICARE

## 2022-09-13 VITALS
HEART RATE: 72 BPM | SYSTOLIC BLOOD PRESSURE: 110 MMHG | BODY MASS INDEX: 23.2 KG/M2 | WEIGHT: 118.8 LBS | DIASTOLIC BLOOD PRESSURE: 58 MMHG

## 2022-09-13 DIAGNOSIS — R42 LIGHT HEADED: Primary | ICD-10-CM

## 2022-09-13 DIAGNOSIS — Z98.61 HISTORY OF PTCA: ICD-10-CM

## 2022-09-13 DIAGNOSIS — I25.10 CAD IN NATIVE ARTERY: ICD-10-CM

## 2022-09-13 DIAGNOSIS — I10 ESSENTIAL HYPERTENSION: ICD-10-CM

## 2022-09-13 DIAGNOSIS — I50.22 CHRONIC SYSTOLIC CONGESTIVE HEART FAILURE (HCC): ICD-10-CM

## 2022-09-13 DIAGNOSIS — I47.20 VT (VENTRICULAR TACHYCARDIA): ICD-10-CM

## 2022-09-13 DIAGNOSIS — I34.0 NONRHEUMATIC MITRAL VALVE REGURGITATION: ICD-10-CM

## 2022-09-13 DIAGNOSIS — I42.0 DILATED CARDIOMYOPATHY (HCC): ICD-10-CM

## 2022-09-13 PROCEDURE — 1124F ACP DISCUSS-NO DSCNMKR DOCD: CPT | Performed by: INTERNAL MEDICINE

## 2022-09-13 PROCEDURE — 99214 OFFICE O/P EST MOD 30 MIN: CPT | Performed by: INTERNAL MEDICINE

## 2022-09-13 PROCEDURE — 1090F PRES/ABSN URINE INCON ASSESS: CPT | Performed by: INTERNAL MEDICINE

## 2022-09-13 PROCEDURE — 1036F TOBACCO NON-USER: CPT | Performed by: INTERNAL MEDICINE

## 2022-09-13 PROCEDURE — G8427 DOCREV CUR MEDS BY ELIG CLIN: HCPCS | Performed by: INTERNAL MEDICINE

## 2022-09-13 PROCEDURE — G8420 CALC BMI NORM PARAMETERS: HCPCS | Performed by: INTERNAL MEDICINE

## 2022-09-13 NOTE — PROGRESS NOTES
Subjective:      Patient ID: Marah Peña is a 80 y.o. female. HPI:  Ms. Wally Carter is here today for  follow up CHF/cardiomyopathy/CAD/HTN/MR/VT. Recent stroke. Having problems with kidneys. Had palps. Unchanged. No sob/edema. No tachycardia. No syncope. No chest pain. No pnd or orthopnea. Bp good. Edema good. Watches salt.  Following with renal.        Past Medical History:   Diagnosis Date    Arthritis     Blood circulation, collateral     CAD (coronary artery disease)     CHF (congestive heart failure) (HCC)     Confusion 8/31/2020    GERD (gastroesophageal reflux disease)     History of heart artery stent 12/2018    Hyperlipidemia     Hypertension     Mitral valve prolapse     Myocardial infarct, old     Thyroid disease      Past Surgical History:   Procedure Laterality Date    COLONOSCOPY      EYE SURGERY Left 09/04/2018    PHACO EMULSIFICATION OF CATARACT WITH INTRAOCULAR LENS IMPLANT LEFT EYE     HYSTERECTOMY (CERVIX STATUS UNKNOWN)      OTHER SURGICAL HISTORY  08/27/2018    phacoemulsification of cataract with intraocular lens implant right eye    [facility] [place]ISIT,PROCEDURE ONLY Right 8/27/2018    PHACO EMULSIFICATION OF CATARACT WITH INTRAOCULAR LENS IMPLANT RIGHT EYE performed by Lelo Gary MD at [address] OFFICE/OUTPT [facility] Left 9/4/2018    PHACO EMULSIFICATION OF CATARACT WITH INTRAOCULAR LENS IMPLANT LEFT EYE performed by Lelo Gary MD at [address]   Allergen Reactions    Benazepril Hcl Shortness Of Breath and Swelling    Feldene [Piroxicam] Shortness Of Breath    Hytrin [Terazosin Hcl] Shortness Of Breath    Iv Contrast [Iodides] Anaphylaxis    Acetaminophen     Celebrex [Celecoxib]      GI upset    Cephalexin      Nausea    Hydrochlorothiazide     Iodinated Casein     Monopril [Fosinopril Sodium] Other (See Comments)     Pt states makes her weak    Protonix [Pantoprazole Sodium] Other (See Comments)     Kidney failure MOUTH DAILY IN THE MORNING 90 tablet 3    atorvastatin (LIPITOR) 80 MG tablet TAKE 1 TABLET BY MOUTH ONE TIME A DAY (EVENING) 60 tablet 3    metoprolol succinate (TOPROL XL) 50 MG extended release tablet Take 1 tablet by mouth in the morning. 30 tablet 5    furosemide (LASIX) 40 MG tablet TAKE 1 TABLET BY MOUTH DAILY IN THE MORNING (ORIGINAL RX WRITTEN BY DR. Lorenz Bloch MAJEED) 30 tablet 3    lidocaine 4 % external patch Place 1 patch onto the skin in the morning. As needed for pain. 30 patch 3    ondansetron (ZOFRAN) 4 MG tablet Take 1 tablet by mouth 3 times daily as needed for Nausea or Vomiting 30 tablet 5    miconazole (MICOTIN) 2 % powder Apply topically 2 times daily. 45 g 1    Misc. Devices KIT Rollator. Use as directed. 1 kit 0    nitroGLYCERIN (NITROSTAT) 0.4 MG SL tablet PUT 1 TAB UNDER TONGUE EVERY 5 MIN AS NEEDED CHEST PAIN UP TO 3. IF NO RELIEF AFTER 1 DOSE, CALL 911 25 tablet 3    apixaban (ELIQUIS) 2.5 MG TABS tablet Take 1 tablet by mouth 2 times daily 60 tablet 5    diclofenac sodium (VOLTAREN) 1 % GEL Apply 2 g topically 4 times daily 200 g 2    famotidine (PEPCID) 20 MG tablet Take 1 tablet by mouth daily 60 tablet 3    vitamin B-12 (CYANOCOBALAMIN) 1000 MCG tablet Take 1,000 mcg by mouth daily      fluticasone (FLONASE) 50 MCG/ACT nasal spray 2 sprays by Each Nostril route daily 1 Bottle 5    Handicap Placard MISC by Does not apply route Length: 5 years 1 each 0    HYDROcodone-acetaminophen (NORCO)  MG per tablet Take 1 tablet by mouth every 6 hours as needed. .      Coenzyme Q10 (CO Q 10) 100 MG CAPS Take  by mouth daily. (Patient not taking: Reported on 8/24/2022)      aspirin 81 MG chewable tablet Take 1 tablet by mouth daily. 30 tablet      No current facility-administered medications for this visit. Vitals:    09/13/22 1406   BP: (!) 110/58   Pulse: 72         Wt 118    Review of Systems   Constitutional: Negative for activity change, appetite change. Some fatigue.    Respiratory: Negative for cough, choking, chest tightness and shortness of breath. Cardiovascular: Negative for chest pain, palpitations and leg swelling. Denies PND or orthopnea. No tachycardia or syncope. Neurological: Negative for dizziness, syncope and light-headedness. Psychiatric/Behavioral: Negative for behavioral problems, confusion and agitation. All other systems reviewed negative as done. Objective:   Physical Exam   Constitutional: She is oriented to person, place, and time. She appears well-developed and well-nourished. No distress. HENT:   Head: Normocephalic and atraumatic. Eyes: Conjunctivae and EOM are normal. Right eye exhibits no discharge. Left eye exhibits no discharge. Neck: Normal range of motion. No JVD present. Cardiovascular: Normal rate, regular rhythm, S1 normal, S2 normal and normal heart sounds. Exam reveals no gallop. No murmur heard. Pulses:       Radial pulses are 2+ on the right side, and 2+ on the left side. Pulmonary/Chest: Effort normal and breath sounds normal. No respiratory distress. She has no wheezes. She has no rales. Abdominal: Soft. Bowel sounds are normal. There is no tenderness. Musculoskeletal: Normal range of motion. She exhibits no edema. Neurological: She is alert and oriented to person, place, and time. Skin: Skin is warm and dry. Psychiatric: She has a normal mood and affect. Her behavior is normal. Thought content normal.       Assessment:       Diagnosis Orders   1. Light headed        2. Dilated cardiomyopathy ([address]        3. Chronic systolic congestive heart failure ([address]        4. CAD in native artery        5. History of PTCA        6. VT (ventricular tachycardia) ([address]        7. Essential hypertension        8. Nonrheumatic mitral valve regurgitation                Plan:      CV stable. Edema stable. Palps controlled. No angina/CP-stable. Remains compensated. Continue coreg/losartan/HCTZ for CHF. No  palps. Continue coreg 25 mg bid and losartan 50 mg qd  BP ok. Renal following. Reviewed previous records and testing including myoview, echo 10/18, cath 11/18 and holter 1/19, showing vent ectopy of 7%. Tries to watch salt. Follow up 6 months.

## 2022-09-14 ENCOUNTER — TELEPHONE (OUTPATIENT)
Dept: CARDIOLOGY CLINIC | Age: 87
End: 2022-09-14

## 2022-09-14 NOTE — TELEPHONE ENCOUNTER
Jay Keith from [facility] billing called. She said she needs order for labs signed by Dr. Melanie Chopra. She is faxing over.

## 2022-09-19 ENCOUNTER — NURSE ONLY (OUTPATIENT)
Dept: CARDIOLOGY CLINIC | Age: 87
End: 2022-09-19
Payer: MEDICARE

## 2022-09-19 DIAGNOSIS — I63.9 CRYPTOGENIC STROKE (HCC): Primary | ICD-10-CM

## 2022-09-19 DIAGNOSIS — Z45.09 ENCOUNTER FOR LOOP RECORDER CHECK: ICD-10-CM

## 2022-09-19 PROCEDURE — 93298 REM INTERROG DEV EVAL SCRMS: CPT | Performed by: INTERNAL MEDICINE

## 2022-09-19 PROCEDURE — G2066 INTER DEVC REMOTE 30D: HCPCS | Performed by: INTERNAL MEDICINE

## 2022-09-21 ENCOUNTER — HOSPITAL ENCOUNTER (OUTPATIENT)
Age: 87
Setting detail: SPECIMEN
Discharge: HOME | End: 2022-09-21
Payer: MEDICARE

## 2022-09-21 LAB
A/G RATIO: 1.2 (ref 1.1–2.2)
ALBUMIN SERPL-MCNC: 3.3 G/DL (ref 3.4–5)
ALP BLD-CCNC: 132 U/L (ref 40–129)
ALT SERPL-CCNC: 9 U/L (ref 10–40)
ANION GAP SERPL CALCULATED.3IONS-SCNC: 12 MMOL/L (ref 3–16)
AST SERPL-CCNC: 19 U/L (ref 15–37)
BASOPHILS ABSOLUTE: 0.2 K/UL (ref 0–0.2)
BASOPHILS RELATIVE PERCENT: 3.1 %
BILIRUB SERPL-MCNC: <0.2 MG/DL (ref 0–1)
BUN BLDV-MCNC: 28 MG/DL (ref 7–20)
CALCIUM SERPL-MCNC: 8.7 MG/DL (ref 8.3–10.6)
CHLORIDE BLD-SCNC: 95 MMOL/L (ref 99–110)
CO2: 29 MMOL/L (ref 21–32)
CREAT SERPL-MCNC: 1.9 MG/DL (ref 0.6–1.2)
EOSINOPHILS ABSOLUTE: 0.2 K/UL (ref 0–0.6)
EOSINOPHILS RELATIVE PERCENT: 3.1 %
GFR AFRICAN AMERICAN: 30
GFR NON-AFRICAN AMERICAN: 25
GLUCOSE BLD-MCNC: 93 MG/DL (ref 70–99)
HCT VFR BLD CALC: 21.9 % (ref 36–48)
HEMOGLOBIN: 7 G/DL (ref 12–16)
LYMPHOCYTES ABSOLUTE: 1.1 K/UL (ref 1–5.1)
LYMPHOCYTES RELATIVE PERCENT: 16.6 %
MCH RBC QN AUTO: 28.3 PG (ref 26–34)
MCHC RBC AUTO-ENTMCNC: 32.2 G/DL (ref 31–36)
MCV RBC AUTO: 87.7 FL (ref 80–100)
MONOCYTES ABSOLUTE: 0.7 K/UL (ref 0–1.3)
MONOCYTES RELATIVE PERCENT: 10.8 %
NEUTROPHILS ABSOLUTE: 4.6 K/UL (ref 1.7–7.7)
NEUTROPHILS RELATIVE PERCENT: 66.4 %
PARATHYROID HORMONE INTACT: 90.6 PG/ML (ref 14–72)
PDW BLD-RTO: 16.5 % (ref 12.4–15.4)
PHOSPHORUS: 3.8 MG/DL (ref 2.5–4.9)
PLATELET # BLD: 357 K/UL (ref 135–450)
PMV BLD AUTO: 7.7 FL (ref 5–10.5)
POTASSIUM SERPL-SCNC: 3.6 MMOL/L (ref 3.5–5.1)
RBC # BLD: 2.49 M/UL (ref 4–5.2)
SODIUM BLD-SCNC: 136 MMOL/L (ref 136–145)
TOTAL PROTEIN: 6.1 G/DL (ref 6.4–8.2)
VITAMIN D 25-HYDROXY: 62.8 NG/ML
WBC # BLD: 6.9 K/UL (ref 4–11)

## 2022-09-21 PROCEDURE — 80053 COMPREHEN METABOLIC PANEL: CPT

## 2022-09-21 PROCEDURE — 82306 VITAMIN D 25 HYDROXY: CPT

## 2022-09-21 PROCEDURE — 84100 ASSAY OF PHOSPHORUS: CPT

## 2022-09-21 PROCEDURE — 83970 ASSAY OF PARATHORMONE: CPT

## 2022-09-21 PROCEDURE — 82652 VIT D 1 25-DIHYDROXY: CPT

## 2022-09-21 PROCEDURE — 36415 COLL VENOUS BLD VENIPUNCTURE: CPT

## 2022-09-21 PROCEDURE — 85025 COMPLETE CBC W/AUTO DIFF WBC: CPT

## 2022-09-24 LAB — VITAMIN D 1,25-DIHYDROXY: 29.8 PG/ML (ref 19.9–79.3)

## 2022-09-26 RX ORDER — ASPIRIN 81 MG/1
TABLET, COATED ORAL
Qty: 30 TABLET | Refills: 5 | OUTPATIENT
Start: 2022-09-26

## 2022-10-09 ENCOUNTER — HOSPITAL ENCOUNTER (OUTPATIENT)
Age: 87
Discharge: HOME | End: 2022-10-09
Payer: MEDICARE

## 2022-10-09 LAB
FERRITIN: 51.3 NG/ML (ref 15–150)
IRON SATURATION: 10 % (ref 15–50)
IRON: 36 UG/DL (ref 37–145)
TOTAL IRON BINDING CAPACITY: 344 UG/DL (ref 260–445)

## 2022-10-09 PROCEDURE — 83550 IRON BINDING TEST: CPT

## 2022-10-09 PROCEDURE — 82728 ASSAY OF FERRITIN: CPT

## 2022-10-09 PROCEDURE — 83540 ASSAY OF IRON: CPT

## 2022-10-10 ENCOUNTER — TELEMEDICINE (OUTPATIENT)
Dept: FAMILY MEDICINE CLINIC | Age: 87
End: 2022-10-10
Payer: MEDICARE

## 2022-10-10 DIAGNOSIS — F51.01 PRIMARY INSOMNIA: ICD-10-CM

## 2022-10-10 DIAGNOSIS — E78.2 MIXED HYPERLIPIDEMIA: ICD-10-CM

## 2022-10-10 DIAGNOSIS — I10 ESSENTIAL HYPERTENSION: Primary | ICD-10-CM

## 2022-10-10 DIAGNOSIS — D50.9 IRON DEFICIENCY ANEMIA, UNSPECIFIED IRON DEFICIENCY ANEMIA TYPE: ICD-10-CM

## 2022-10-10 PROCEDURE — 1124F ACP DISCUSS-NO DSCNMKR DOCD: CPT | Performed by: FAMILY MEDICINE

## 2022-10-10 PROCEDURE — 1090F PRES/ABSN URINE INCON ASSESS: CPT | Performed by: FAMILY MEDICINE

## 2022-10-10 PROCEDURE — G8428 CUR MEDS NOT DOCUMENT: HCPCS | Performed by: FAMILY MEDICINE

## 2022-10-10 PROCEDURE — 99214 OFFICE O/P EST MOD 30 MIN: CPT | Performed by: FAMILY MEDICINE

## 2022-10-10 ASSESSMENT — ENCOUNTER SYMPTOMS: BLOOD IN STOOL: 0

## 2022-10-10 NOTE — PROGRESS NOTES
Alexa Maya is a 80 y.o. female    Chief Complaint   Patient presents with    6 Month Follow-Up    Hypertension    Hyperlipidemia    Insomnia       HPI:    The patient was evaluated through a synchronous (real-time) audio-video encounter. The patient (or guardian if applicable) is aware that this is a billable service, which includes applicable co-pays. This Virtual Visit was conducted with patient's (and/or legal guardian's) consent. The visit was conducted pursuant to the emergency declaration under the [facility], [address] [place] [address] authority and the [employer] Act. Patient identification was verified, and a caregiver was present when appropriate. The patient was located at home. Provider was located at [facility] (Appt Dept): [address]  [address] [facility] [address] [place] [id number] [facility] [address]. Hypertension  This is a chronic problem. The current episode started more than 1 year ago. The problem is unchanged. The problem is controlled. Past treatments include angiotensin blockers, diuretics and beta blockers. The current treatment provides significant improvement. There are no compliance problems. Hyperlipidemia  This is a chronic problem. The current episode started more than 1 year ago. The problem is controlled. Recent lipid tests were reviewed and are low. Current antihyperlipidemic treatment includes statins. The current treatment provides significant improvement of lipids. There are no compliance problems. Risk factors for coronary artery disease include hypertension. Primary insomnia. This is a chronic condition. She has trouble going and staying asleep. She used to take 15 Ambien tablets per month but now has come off her medicines and feels similar to prior. She does have chronic pain. ROS:    Review of Systems   Constitutional:  Positive for fatigue. Gastrointestinal:  Negative for blood in stool. Psychiatric/Behavioral:  Positive for sleep disturbance. There were no vitals taken for this visit. Physical Exam:    Physical Exam  Constitutional:       General: She is not in acute distress. Appearance: Normal appearance. She is well-developed and normal weight. She is not ill-appearing, toxic-appearing or diaphoretic. Neurological:      Mental Status: She is alert. Psychiatric:         Mood and Affect: Mood normal.         Behavior: Behavior normal.         Thought Content: Thought content normal.       Current Outpatient Medications   Medication Sig Dispense Refill    amiodarone (PACERONE) 100 MG tablet Take 1 tablet by mouth daily 30 tablet 5    Cyanocobalamin (VITAMIN B-12) 1000 MCG extended release tablet TAKE 1 TABLET (1,000 MCG) BY MOUTH DAILY (AM) 90 tablet 3    vitamin D3 (CHOLECALCIFEROL) 25 MCG (1000 UT) TABS tablet TAKE 1 TABLET (1,000 IU) BY MOUTH DAILY IN THE MORNING 90 tablet 3    atorvastatin (LIPITOR) 80 MG tablet TAKE 1 TABLET BY MOUTH ONE TIME A DAY (EVENING) 60 tablet 3    metoprolol succinate (TOPROL XL) 50 MG extended release tablet Take 1 tablet by mouth in the morning. 30 tablet 5    furosemide (LASIX) 40 MG tablet TAKE 1 TABLET BY MOUTH DAILY IN THE MORNING (ORIGINAL RX WRITTEN BY DR. Rajani TALBOT) 30 tablet 3    lidocaine 4 % external patch Place 1 patch onto the skin in the morning. As needed for pain. 30 patch 3    ondansetron (ZOFRAN) 4 MG tablet Take 1 tablet by mouth 3 times daily as needed for Nausea or Vomiting 30 tablet 5    miconazole (MICOTIN) 2 % powder Apply topically 2 times daily. 45 g 1    Misc. Devices KIT Rollator. Use as directed.  1 kit 0    nitroGLYCERIN (NITROSTAT) 0.4 MG SL tablet PUT 1 TAB UNDER TONGUE EVERY 5 MIN AS NEEDED CHEST PAIN UP TO 3. IF NO RELIEF AFTER 1 DOSE, CALL 911 25 tablet 3    apixaban (ELIQUIS) 2.5 MG TABS tablet Take 1 tablet by mouth 2 times daily 60 tablet 5    diclofenac sodium (VOLTAREN) 1 % GEL Apply 2 g topically 4 times daily 200 g 2    famotidine (PEPCID) 20 MG tablet Take 1 tablet by mouth daily 60 tablet 3    vitamin B-12 (CYANOCOBALAMIN) 1000 MCG tablet Take 1,000 mcg by mouth daily      fluticasone (FLONASE) 50 MCG/ACT nasal spray 2 sprays by Each Nostril route daily 1 Bottle 5    Handicap Placard MISC by Does not apply route Length: 5 years 1 each 0    HYDROcodone-acetaminophen (NORCO)  MG per tablet Take 1 tablet by mouth every 6 hours as needed. .      Coenzyme Q10 (CO Q 10) 100 MG CAPS Take by mouth daily      aspirin 81 MG chewable tablet Take 1 tablet by mouth daily. 30 tablet      No current facility-administered medications for this visit. Assessment:    1. Essential hypertension    2. Mixed hyperlipidemia    3. Primary insomnia    4. Iron deficiency anemia, unspecified iron deficiency anemia type        Plan:    1. Essential hypertension  Stable. Continue current medications. - CBC Auto Differential  - Comprehensive Metabolic Panel    2. Mixed hyperlipidemia  Stable. Continue current medications. - CBC Auto Differential  - Comprehensive Metabolic Panel    3. Primary insomnia  Stable off of Ambien. Discussed sleep hygiene. 4.  Iron deficiency anemia, unspecified iron deficiency anemia type  She is positive go she got IV iron infusions during hospital treatments and feels she needs another round. She has no rectal bleeding. She does have chronic kidney disease as well. [facility]. Return in about 1 year (around 10/10/2023) for Hypertension, Hyperlipidemia, insomnia.

## 2022-10-14 NOTE — TELEPHONE ENCOUNTER
Corrected. New rx sent in.
Izaiah Fisher, [facility] stating [facility] received the script for pt's Amitiza, but the sig does not match the qty to dispense. Would like a call back to clarify directions/use.
Visitation Policy Reviewed

## 2022-10-17 ENCOUNTER — TELEPHONE (OUTPATIENT)
Dept: CARDIOLOGY CLINIC | Age: 87
End: 2022-10-17

## 2022-10-17 RX ORDER — APIXABAN 2.5 MG/1
TABLET, FILM COATED ORAL
Qty: 60 TABLET | Refills: 5 | OUTPATIENT
Start: 2022-10-17

## 2022-10-17 RX ORDER — APIXABAN 2.5 MG/1
TABLET, FILM COATED ORAL
Qty: 180 TABLET | Refills: 3 | OUTPATIENT
Start: 2022-10-17

## 2022-10-17 RX ORDER — FUROSEMIDE 40 MG/1
TABLET ORAL
Qty: 90 TABLET | Refills: 3 | Status: SHIPPED | OUTPATIENT
Start: 2022-10-17

## 2022-10-17 RX ORDER — ASPIRIN 81 MG/1
81 TABLET, CHEWABLE ORAL DAILY
Qty: 90 TABLET | Refills: 3 | Status: SHIPPED | OUTPATIENT
Start: 2022-10-17

## 2022-10-17 RX ORDER — ASPIRIN 81 MG/1
TABLET, COATED ORAL
Qty: 90 TABLET | Refills: 3 | OUTPATIENT
Start: 2022-10-17

## 2022-10-17 RX ORDER — ASPIRIN 81 MG/1
TABLET, COATED ORAL
Qty: 30 TABLET | OUTPATIENT
Start: 2022-10-17

## 2022-10-17 NOTE — TELEPHONE ENCOUNTER
Requested Prescriptions     Pending Prescriptions Disp Refills    furosemide (LASIX) 40 MG tablet [Pharmacy Med Name: FUROSEMIDE 40 MG ORAL TABLET] 90 tablet 3     Sig: TAKE 1 TABLET BY MOUTH DAILY IN THE MORNING                Last Office Visit: 9/13/2022     Next Office Visit: [phone]

## 2022-10-17 NOTE — TELEPHONE ENCOUNTER
Requested Prescriptions     Pending Prescriptions Disp Refills    apixaban (ELIQUIS) 2.5 MG TABS tablet 180 tablet 3     Sig: Take 1 tablet by mouth 2 times daily    aspirin 81 MG chewable tablet 90 tablet 3     Sig: Take 1 tablet by mouth daily          Number: 90 day    Refills: 3    Last Office Visit: 9/13/2022     Next Office Visit: 11/17/2022     OUT

## 2022-10-17 NOTE — TELEPHONE ENCOUNTER
Requested Prescriptions     Pending Prescriptions Disp Refills    ELIQUIS 2.5 MG TABS tablet [Pharmacy Med Name: ELIQUIS 2.5 MG ORAL TABLET] 180 tablet 3     Sig: TAKE 1 TABLET BY MOUTH TWICE DAILY (AM, EVENING) (ORIGINAL RX WRITTEN BY DR. ARIANA REY)    ASPIRIN LOW DOSE 81 MG EC tablet [Pharmacy Med Name: ASPIRIN LOW DOSE 81 MG ORAL TABLET DELAYED RELEASE] 90 tablet 3     Sig: TAKE 1 TABLET BY MOUTH DAILY (AM)              Last Office Visit: 9/13/2022     Next Office Visit: 02.16.2023        Last Labs: [phone]

## 2022-10-26 NOTE — RESULT ENCOUNTER NOTE
Unremarkable device check.    Continue to monitor    Melissa Michelle MD   Cardiac Electrophysiology  [address] [facility] [phone]

## 2022-11-06 ENCOUNTER — APPOINTMENT (OUTPATIENT)
Dept: GENERAL RADIOLOGY | Age: 87
DRG: 481 | End: 2022-11-06
Payer: MEDICARE

## 2022-11-06 ENCOUNTER — APPOINTMENT (OUTPATIENT)
Dept: CT IMAGING | Age: 87
DRG: 481 | End: 2022-11-06
Payer: MEDICARE

## 2022-11-06 ENCOUNTER — HOSPITAL ENCOUNTER (INPATIENT)
Age: 87
LOS: 3 days | Discharge: NURSING, REHAB OR LONG-TERM CARE | DRG: 481 | End: 2022-11-09
Attending: STUDENT IN AN ORGANIZED HEALTH CARE EDUCATION/TRAINING PROGRAM | Admitting: INTERNAL MEDICINE
Payer: MEDICARE

## 2022-11-06 DIAGNOSIS — S72.141A CLOSED DISPLACED INTERTROCHANTERIC FRACTURE OF RIGHT FEMUR, INITIAL ENCOUNTER (HCC): Primary | ICD-10-CM
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
## 2022-11-06 PROBLEM — S72.001A HIP FRACTURE REQUIRING OPERATIVE REPAIR, RIGHT, CLOSED, INITIAL ENCOUNTER (HCC): Status: ACTIVE | Noted: 2022-11-06

## 2022-11-06 LAB
A/G RATIO: 1.4 (ref 1.1–2.2)
ABO/RH: NORMAL
ALBUMIN SERPL-MCNC: 3.9 G/DL (ref 3.4–5)
ALP BLD-CCNC: 109 U/L (ref 40–129)
ALT SERPL-CCNC: 10 U/L (ref 10–40)
ANION GAP SERPL CALCULATED.3IONS-SCNC: 7 MMOL/L (ref 3–16)
ANTIBODY SCREEN: NORMAL
APTT: 33.4 SEC (ref 23–34.3)
AST SERPL-CCNC: 22 U/L (ref 15–37)
BACTERIA: ABNORMAL /HPF
BASOPHILS ABSOLUTE: 0.1 K/UL (ref 0–0.2)
BASOPHILS RELATIVE PERCENT: 0.8 %
BILIRUB SERPL-MCNC: <0.2 MG/DL (ref 0–1)
BILIRUBIN URINE: NEGATIVE
BLOOD, URINE: NEGATIVE
BUN BLDV-MCNC: 28 MG/DL (ref 7–20)
CALCIUM SERPL-MCNC: 9.1 MG/DL (ref 8.3–10.6)
CHLORIDE BLD-SCNC: 97 MMOL/L (ref 99–110)
CLARITY: CLEAR
CO2: 31 MMOL/L (ref 21–32)
COLOR: YELLOW
CREAT SERPL-MCNC: 1.7 MG/DL (ref 0.6–1.2)
EKG ATRIAL RATE: 70 BPM
EKG DIAGNOSIS: NORMAL
EKG P AXIS: 22 DEGREES
EKG P-R INTERVAL: 152 MS
EKG Q-T INTERVAL: 458 MS
EKG QRS DURATION: 134 MS
EKG QTC CALCULATION (BAZETT): 494 MS
EKG R AXIS: -8 DEGREES
EKG T AXIS: 94 DEGREES
EKG VENTRICULAR RATE: 70 BPM
EOSINOPHILS ABSOLUTE: 0.2 K/UL (ref 0–0.6)
EOSINOPHILS RELATIVE PERCENT: 2.9 %
GFR SERPL CREATININE-BSD FRML MDRD: 29 ML/MIN/{1.73_M2}
GLUCOSE BLD-MCNC: 135 MG/DL (ref 70–99)
GLUCOSE URINE: NEGATIVE MG/DL
HCT VFR BLD CALC: 29.4 % (ref 36–48)
HEMOGLOBIN: 9.9 G/DL (ref 12–16)
INFLUENZA A: NOT DETECTED
INFLUENZA B: NOT DETECTED
INR BLD: 1.17 (ref 0.87–1.14)
KETONES, URINE: NEGATIVE MG/DL
LEUKOCYTE ESTERASE, URINE: NEGATIVE
LYMPHOCYTES ABSOLUTE: 1.2 K/UL (ref 1–5.1)
LYMPHOCYTES RELATIVE PERCENT: 16.3 %
MCH RBC QN AUTO: 29.1 PG (ref 26–34)
MCHC RBC AUTO-ENTMCNC: 33.6 G/DL (ref 31–36)
MCV RBC AUTO: 86.7 FL (ref 80–100)
MICROSCOPIC EXAMINATION: ABNORMAL
MONOCYTES ABSOLUTE: 0.5 K/UL (ref 0–1.3)
MONOCYTES RELATIVE PERCENT: 7.4 %
NEUTROPHILS ABSOLUTE: 5.2 K/UL (ref 1.7–7.7)
NEUTROPHILS RELATIVE PERCENT: 72.6 %
NITRITE, URINE: NEGATIVE
PDW BLD-RTO: 19 % (ref 12.4–15.4)
PH UA: 7.5 (ref 5–8)
PLATELET # BLD: 272 K/UL (ref 135–450)
PMV BLD AUTO: 7.8 FL (ref 5–10.5)
POTASSIUM SERPL-SCNC: 4.1 MMOL/L (ref 3.5–5.1)
PROTEIN UA: NEGATIVE MG/DL
PROTHROMBIN TIME: 14.7 SEC (ref 11.7–14.5)
RBC # BLD: 3.39 M/UL (ref 4–5.2)
RBC UA: ABNORMAL /HPF (ref 0–4)
SARS-COV-2 RNA, RT PCR: NOT DETECTED
SODIUM BLD-SCNC: 135 MMOL/L (ref 136–145)
SPECIFIC GRAVITY UA: 1.01 (ref 1–1.03)
TOTAL PROTEIN: 6.7 G/DL (ref 6.4–8.2)
TROPONIN: <0.01 NG/ML
URINE TYPE: ABNORMAL
UROBILINOGEN, URINE: 0.2 E.U./DL
WBC # BLD: 7.1 K/UL (ref 4–11)
WBC UA: ABNORMAL /HPF (ref 0–5)

## 2022-11-06 PROCEDURE — 2580000003 HC RX 258: Performed by: INTERNAL MEDICINE

## 2022-11-06 PROCEDURE — 2700000000 HC OXYGEN THERAPY PER DAY

## 2022-11-06 PROCEDURE — 86850 RBC ANTIBODY SCREEN: CPT

## 2022-11-06 PROCEDURE — 86901 BLOOD TYPING SEROLOGIC RH(D): CPT

## 2022-11-06 PROCEDURE — 85730 THROMBOPLASTIN TIME PARTIAL: CPT

## 2022-11-06 PROCEDURE — 94761 N-INVAS EAR/PLS OXIMETRY MLT: CPT

## 2022-11-06 PROCEDURE — 86923 COMPATIBILITY TEST ELECTRIC: CPT

## 2022-11-06 PROCEDURE — 84484 ASSAY OF TROPONIN QUANT: CPT

## 2022-11-06 PROCEDURE — 96375 TX/PRO/DX INJ NEW DRUG ADDON: CPT

## 2022-11-06 PROCEDURE — 96374 THER/PROPH/DIAG INJ IV PUSH: CPT

## 2022-11-06 PROCEDURE — 1200000000 HC SEMI PRIVATE

## 2022-11-06 PROCEDURE — 3209999900 CT LUMBAR SPINE TRAUMA RECONSTRUCTION

## 2022-11-06 PROCEDURE — 81001 URINALYSIS AUTO W/SCOPE: CPT

## 2022-11-06 PROCEDURE — 85610 PROTHROMBIN TIME: CPT

## 2022-11-06 PROCEDURE — 73502 X-RAY EXAM HIP UNI 2-3 VIEWS: CPT

## 2022-11-06 PROCEDURE — 93005 ELECTROCARDIOGRAM TRACING: CPT | Performed by: STUDENT IN AN ORGANIZED HEALTH CARE EDUCATION/TRAINING PROGRAM

## 2022-11-06 PROCEDURE — 6360000002 HC RX W HCPCS: Performed by: STUDENT IN AN ORGANIZED HEALTH CARE EDUCATION/TRAINING PROGRAM

## 2022-11-06 PROCEDURE — 85025 COMPLETE CBC W/AUTO DIFF WBC: CPT

## 2022-11-06 PROCEDURE — 74176 CT ABD & PELVIS W/O CONTRAST: CPT

## 2022-11-06 PROCEDURE — 93010 ELECTROCARDIOGRAM REPORT: CPT | Performed by: INTERNAL MEDICINE

## 2022-11-06 PROCEDURE — 96376 TX/PRO/DX INJ SAME DRUG ADON: CPT

## 2022-11-06 PROCEDURE — 36415 COLL VENOUS BLD VENIPUNCTURE: CPT

## 2022-11-06 PROCEDURE — 6360000002 HC RX W HCPCS: Performed by: INTERNAL MEDICINE

## 2022-11-06 PROCEDURE — 6370000000 HC RX 637 (ALT 250 FOR IP): Performed by: INTERNAL MEDICINE

## 2022-11-06 PROCEDURE — 71045 X-RAY EXAM CHEST 1 VIEW: CPT

## 2022-11-06 PROCEDURE — P9016 RBC LEUKOCYTES REDUCED: HCPCS

## 2022-11-06 PROCEDURE — 87636 SARSCOV2 & INF A&B AMP PRB: CPT

## 2022-11-06 PROCEDURE — 86900 BLOOD TYPING SEROLOGIC ABO: CPT

## 2022-11-06 PROCEDURE — 99285 EMERGENCY DEPT VISIT HI MDM: CPT

## 2022-11-06 PROCEDURE — 80053 COMPREHEN METABOLIC PANEL: CPT

## 2022-11-06 PROCEDURE — 73562 X-RAY EXAM OF KNEE 3: CPT

## 2022-11-06 PROCEDURE — 72125 CT NECK SPINE W/O DYE: CPT

## 2022-11-06 PROCEDURE — 70450 CT HEAD/BRAIN W/O DYE: CPT

## 2022-11-06 RX ORDER — VITAMIN B COMPLEX
1000 TABLET ORAL DAILY
Status: DISCONTINUED | OUTPATIENT
Start: 2022-11-07 | End: 2022-11-09 | Stop reason: HOSPADM

## 2022-11-06 RX ORDER — POLYETHYLENE GLYCOL 3350 17 G/17G
17 POWDER, FOR SOLUTION ORAL DAILY PRN
Status: DISCONTINUED | OUTPATIENT
Start: 2022-11-06 | End: 2022-11-09 | Stop reason: HOSPADM

## 2022-11-06 RX ORDER — FLUTICASONE PROPIONATE 50 MCG
2 SPRAY, SUSPENSION (ML) NASAL DAILY
Status: DISCONTINUED | OUTPATIENT
Start: 2022-11-07 | End: 2022-11-09 | Stop reason: HOSPADM

## 2022-11-06 RX ORDER — ASPIRIN 81 MG/1
81 TABLET, CHEWABLE ORAL DAILY
Status: DISCONTINUED | OUTPATIENT
Start: 2022-11-07 | End: 2022-11-09 | Stop reason: HOSPADM

## 2022-11-06 RX ORDER — CHOLECALCIFEROL (VITAMIN D3) 125 MCG
1000 CAPSULE ORAL DAILY
Status: DISCONTINUED | OUTPATIENT
Start: 2022-11-07 | End: 2022-11-09 | Stop reason: HOSPADM

## 2022-11-06 RX ORDER — AMIODARONE HYDROCHLORIDE 200 MG/1
100 TABLET ORAL DAILY
Status: DISCONTINUED | OUTPATIENT
Start: 2022-11-07 | End: 2022-11-09 | Stop reason: HOSPADM

## 2022-11-06 RX ORDER — ONDANSETRON 2 MG/ML
4 INJECTION INTRAMUSCULAR; INTRAVENOUS EVERY 6 HOURS PRN
Status: DISCONTINUED | OUTPATIENT
Start: 2022-11-06 | End: 2022-11-09 | Stop reason: HOSPADM

## 2022-11-06 RX ORDER — ACETAMINOPHEN 325 MG/1
650 TABLET ORAL EVERY 6 HOURS PRN
Status: DISCONTINUED | OUTPATIENT
Start: 2022-11-06 | End: 2022-11-09

## 2022-11-06 RX ORDER — SODIUM CHLORIDE 0.9 % (FLUSH) 0.9 %
5-40 SYRINGE (ML) INJECTION PRN
Status: DISCONTINUED | OUTPATIENT
Start: 2022-11-06 | End: 2022-11-09 | Stop reason: HOSPADM

## 2022-11-06 RX ORDER — SODIUM CHLORIDE 9 MG/ML
INJECTION, SOLUTION INTRAVENOUS CONTINUOUS
Status: DISCONTINUED | OUTPATIENT
Start: 2022-11-06 | End: 2022-11-09 | Stop reason: HOSPADM

## 2022-11-06 RX ORDER — METOPROLOL SUCCINATE 50 MG/1
50 TABLET, EXTENDED RELEASE ORAL DAILY
Status: DISCONTINUED | OUTPATIENT
Start: 2022-11-07 | End: 2022-11-09 | Stop reason: HOSPADM

## 2022-11-06 RX ORDER — SODIUM CHLORIDE 9 MG/ML
INJECTION, SOLUTION INTRAVENOUS PRN
Status: DISCONTINUED | OUTPATIENT
Start: 2022-11-06 | End: 2022-11-09 | Stop reason: HOSPADM

## 2022-11-06 RX ORDER — FENTANYL CITRATE 50 UG/ML
50 INJECTION, SOLUTION INTRAMUSCULAR; INTRAVENOUS ONCE
Status: COMPLETED | OUTPATIENT
Start: 2022-11-06 | End: 2022-11-06

## 2022-11-06 RX ORDER — ONDANSETRON 4 MG/1
4 TABLET, ORALLY DISINTEGRATING ORAL EVERY 8 HOURS PRN
Status: DISCONTINUED | OUTPATIENT
Start: 2022-11-06 | End: 2022-11-09 | Stop reason: HOSPADM

## 2022-11-06 RX ORDER — ATORVASTATIN CALCIUM 40 MG/1
80 TABLET, FILM COATED ORAL NIGHTLY
Status: DISCONTINUED | OUTPATIENT
Start: 2022-11-06 | End: 2022-11-09 | Stop reason: HOSPADM

## 2022-11-06 RX ORDER — MORPHINE SULFATE 2 MG/ML
2 INJECTION, SOLUTION INTRAMUSCULAR; INTRAVENOUS
Status: DISCONTINUED | OUTPATIENT
Start: 2022-11-06 | End: 2022-11-07

## 2022-11-06 RX ORDER — FENTANYL CITRATE 50 UG/ML
25 INJECTION, SOLUTION INTRAMUSCULAR; INTRAVENOUS ONCE
Status: COMPLETED | OUTPATIENT
Start: 2022-11-06 | End: 2022-11-06

## 2022-11-06 RX ORDER — SODIUM CHLORIDE 0.9 % (FLUSH) 0.9 %
5-40 SYRINGE (ML) INJECTION EVERY 12 HOURS SCHEDULED
Status: DISCONTINUED | OUTPATIENT
Start: 2022-11-06 | End: 2022-11-09 | Stop reason: HOSPADM

## 2022-11-06 RX ORDER — ACETAMINOPHEN 650 MG/1
650 SUPPOSITORY RECTAL EVERY 6 HOURS PRN
Status: DISCONTINUED | OUTPATIENT
Start: 2022-11-06 | End: 2022-11-09

## 2022-11-06 RX ORDER — FAMOTIDINE 20 MG/1
20 TABLET, FILM COATED ORAL DAILY
Status: DISCONTINUED | OUTPATIENT
Start: 2022-11-07 | End: 2022-11-09 | Stop reason: HOSPADM

## 2022-11-06 RX ADMIN — Medication 10 ML: at 22:48

## 2022-11-06 RX ADMIN — MORPHINE SULFATE 2 MG: 2 INJECTION, SOLUTION INTRAMUSCULAR; INTRAVENOUS at 22:48

## 2022-11-06 RX ADMIN — ONDANSETRON HYDROCHLORIDE 4 MG: 2 INJECTION, SOLUTION INTRAMUSCULAR; INTRAVENOUS at 19:45

## 2022-11-06 RX ADMIN — HYDROMORPHONE HYDROCHLORIDE 0.5 MG: 1 INJECTION, SOLUTION INTRAMUSCULAR; INTRAVENOUS; SUBCUTANEOUS at 17:58

## 2022-11-06 RX ADMIN — MORPHINE SULFATE 2 MG: 2 INJECTION, SOLUTION INTRAMUSCULAR; INTRAVENOUS at 19:45

## 2022-11-06 RX ADMIN — SODIUM CHLORIDE: 9 INJECTION, SOLUTION INTRAVENOUS at 22:54

## 2022-11-06 RX ADMIN — FENTANYL CITRATE 25 MCG: 50 INJECTION INTRAMUSCULAR; INTRAVENOUS at 15:25

## 2022-11-06 RX ADMIN — FENTANYL CITRATE 50 MCG: 50 INJECTION INTRAMUSCULAR; INTRAVENOUS at 16:59

## 2022-11-06 RX ADMIN — ATORVASTATIN CALCIUM 80 MG: 40 TABLET, FILM COATED ORAL at 22:47

## 2022-11-06 ASSESSMENT — LIFESTYLE VARIABLES
HOW MANY STANDARD DRINKS CONTAINING ALCOHOL DO YOU HAVE ON A TYPICAL DAY: PATIENT DOES NOT DRINK
HOW OFTEN DO YOU HAVE A DRINK CONTAINING ALCOHOL: NEVER

## 2022-11-06 ASSESSMENT — PAIN - FUNCTIONAL ASSESSMENT
PAIN_FUNCTIONAL_ASSESSMENT: ACTIVITIES ARE NOT PREVENTED
PAIN_FUNCTIONAL_ASSESSMENT: 0-10

## 2022-11-06 ASSESSMENT — PAIN SCALES - GENERAL
PAINLEVEL_OUTOF10: 10
PAINLEVEL_OUTOF10: 8
PAINLEVEL_OUTOF10: 8
PAINLEVEL_OUTOF10: 7
PAINLEVEL_OUTOF10: 8
PAINLEVEL_OUTOF10: 10

## 2022-11-06 ASSESSMENT — PAIN DESCRIPTION - DESCRIPTORS
DESCRIPTORS: ACHING;BURNING;CRAMPING;DISCOMFORT
DESCRIPTORS: SHARP

## 2022-11-06 ASSESSMENT — PAIN DESCRIPTION - LOCATION
LOCATION: HIP
LOCATION: HIP

## 2022-11-06 ASSESSMENT — PAIN DESCRIPTION - PAIN TYPE: TYPE: ACUTE PAIN

## 2022-11-06 ASSESSMENT — PAIN DESCRIPTION - ORIENTATION
ORIENTATION: RIGHT
ORIENTATION: RIGHT

## 2022-11-06 ASSESSMENT — PAIN DESCRIPTION - FREQUENCY: FREQUENCY: CONTINUOUS

## 2022-11-06 NOTE — ED NOTES
Pt returned from [email or web address]; [facility] placed      Liane Hernandez RN  11/06/22 0493

## 2022-11-06 NOTE — ED PROVIDER NOTES
Emergency Department Encounter    Patient: Nolvia Mata  MRN: [id number]  : 1935  Date of Evaluation: 2022  ED Provider:  Grace Gerardo MD    Triage Chief Complaint:   Fall (Pt tripped in wisdom at home; hit back of no LOC; taking blood thinners; c/o right hip pain, rotation to right hip noted )    [place]:  Nolvia Mata is a 80 y.o. female with history seen below presenting with fall at home. Patient states she was coming out of her bathroom and believes that her foot slipped on the ground and she came down onto her right hip. States she has moderate to severe pain of her right hip is constant, stabbing, worse with palpation and movement without relieving factors she states her hip is externally rotated. States she did hit her head slightly denies loss of consciousness, significant headache, blurred vision, focal neurodeficits or motor or sensory changes. Denies neck pain. States she does have some lower back pain that is similar to prior. Denies any motor or sensory changes in lower extremity, bowel or bladder changes. States she does have some mild discomfort along the right lower quadrant of the abdomen after the fall. Denies nausea vomiting, change in bowel habits, change in urination urination. Denies any chest pain, shortness of breath, fevers, cough, sputum production.     ROS - see HPI, below listed is current ROS at time of my eval:    At least 14 systems reviewed, negative other HPI    Past Medical History:   Diagnosis Date    Arthritis     Blood circulation, collateral     CAD (coronary artery disease)     CHF (congestive heart failure) (HCC)     Confusion 2020    GERD (gastroesophageal reflux disease)     History of heart artery stent 2018    Hyperlipidemia     Hypertension     Mitral valve prolapse     Myocardial infarct, old     Thyroid disease      Past Surgical History:   Procedure Laterality Date    COLONOSCOPY      EYE SURGERY Left 2018    PHACO EMULSIFICATION OF CATARACT WITH INTRAOCULAR LENS IMPLANT LEFT EYE     HYSTERECTOMY (CERVIX STATUS UNKNOWN)      OTHER SURGICAL HISTORY  08/27/2018    phacoemulsification of cataract with intraocular lens implant right eye    [place] OFFICE/OUTPT VISIT,PROCEDURE ONLY Right 8/27/2018    PHACO EMULSIFICATION OF CATARACT WITH INTRAOCULAR LENS IMPLANT RIGHT EYE performed by Keyanna Ca MD at [address] OFFICE/OUTPT [address] [facility] Left 9/4/2018    PHACO EMULSIFICATION OF CATARACT WITH INTRAOCULAR LENS IMPLANT LEFT EYE performed by Keyanna Ca MD at [address] [facility]       Family History   Problem Relation Age of Onset    Heart Disease Mother     Heart Disease Father     Cancer Father     Cancer Sister     Diabetes Brother     Stroke Brother      Social History     Socioeconomic History    Marital status:      Spouse name: Pancho Pinto    Number of children: Not on file    Years of education: 12    Highest education level: Not on file   Occupational History    Occupation: retired   Tobacco Use    Smoking status: Never    Smokeless tobacco: Never   Vaping Use    Vaping Use: Never used   Substance and Sexual Activity    Alcohol use: No     Alcohol/week: 0.0 standard drinks    Drug use: No    Sexual activity: Never     Comment:  living with spouse. Other Topics Concern    Not on file   Social History Narrative    Not on file     Social Determinants of Health     Financial Resource Strain: Not on file   Food Insecurity: Not on file   Transportation Needs: Not on file   Physical Activity: Not on file   Stress: Not on file   Social Connections: Not on file   Intimate Partner Violence: Not on file   Housing Stability: Not on file     No current facility-administered medications for this encounter.      Current Outpatient Medications   Medication Sig Dispense Refill    furosemide (LASIX) 40 MG tablet TAKE 1 TABLET BY MOUTH DAILY IN THE MORNING 90 tablet 3    apixaban (ELIQUIS) 2.5 MG TABS tablet Take 1 tablet by mouth 2 times daily 180 tablet 3    aspirin 81 MG chewable tablet Take 1 tablet by mouth daily 90 tablet 3    amiodarone (PACERONE) 100 MG tablet Take 1 tablet by mouth daily 30 tablet 5    Cyanocobalamin (VITAMIN B-12) 1000 MCG extended release tablet TAKE 1 TABLET (1,000 MCG) BY MOUTH DAILY (AM) 90 tablet 3    vitamin D3 (CHOLECALCIFEROL) 25 MCG (1000 UT) TABS tablet TAKE 1 TABLET (1,000 IU) BY MOUTH DAILY IN THE MORNING 90 tablet 3    atorvastatin (LIPITOR) 80 MG tablet TAKE 1 TABLET BY MOUTH ONE TIME A DAY (EVENING) 60 tablet 3    metoprolol succinate (TOPROL XL) 50 MG extended release tablet Take 1 tablet by mouth in the morning. 30 tablet 5    lidocaine 4 % external patch Place 1 patch onto the skin in the morning. As needed for pain. 30 patch 3    ondansetron (ZOFRAN) 4 MG tablet Take 1 tablet by mouth 3 times daily as needed for Nausea or Vomiting 30 tablet 5    miconazole (MICOTIN) 2 % powder Apply topically 2 times daily. 45 g 1    Misc. Devices KIT Rollator. Use as directed. 1 kit 0    nitroGLYCERIN (NITROSTAT) 0.4 MG SL tablet PUT 1 TAB UNDER TONGUE EVERY 5 MIN AS NEEDED CHEST PAIN UP TO 3. IF NO RELIEF AFTER 1 DOSE, CALL 911 25 tablet 3    famotidine (PEPCID) 20 MG tablet Take 1 tablet by mouth daily 60 tablet 3    vitamin B-12 (CYANOCOBALAMIN) 1000 MCG tablet Take 1,000 mcg by mouth daily      fluticasone (FLONASE) 50 MCG/ACT nasal spray 2 sprays by Each Nostril route daily 1 Bottle 5    Handicap Placard MISC by Does not apply route Length: 5 years 1 each 0    HYDROcodone-acetaminophen (NORCO)  MG per tablet Take 1 tablet by mouth every 6 hours as needed.  .      Coenzyme Q10 (CO Q 10) 100 MG CAPS Take by mouth daily       Allergies   Allergen Reactions    Benazepril Hcl Shortness Of Breath and Swelling    Feldene [Piroxicam] Shortness Of Breath    Hytrin [Terazosin Hcl] Shortness Of Breath    Iv Contrast [Iodides] Anaphylaxis    Acetaminophen     Celebrex [Celecoxib]      GI upset Cephalexin      Nausea    Hydrochlorothiazide     Iodinated Casein     Monopril [Fosinopril Sodium] Other (See Comments)     Pt states makes her weak    Protonix [Pantoprazole Sodium] Other (See Comments)     Kidney failure      Quinapril Hcl     Ultracet [Tramadol-Acetaminophen]      Rash    Ziac [Bisoprolol-Hydrochlorothiazide] Other (See Comments)     Pt does not remember reaction to med ? Weak        Nursing Notes Reviewed    Physical Exam:  Triage VS:    ED Triage Vitals [11/06/22 1448]   Enc Vitals Group      BP (!) 192/89      Heart Rate 74      Resp 22      Temp 97 °F (36.1 °C)      Temp Source Oral      SpO2 96 %      Weight 117 lb (53.1 kg)      Height       Head Circumference       Peak Flow       Pain Score       Pain Loc       Pain Edu? Excl. in [address]? My pulse ox interpretation is - normal    General appearance:  No acute distress. Skin:  Warm. Dry. Eye:  Extraocular movements intact. Ears, nose, mouth and throat:  Oral mucosa moist   Neck:  Trachea midline. No tenderness palpation over the C-spine  Extremity: Shortened and externally rotated right lower extremity with tenderness palpation of the right hip, patient does have 2+ distal pulses, normal sensation light touch, less than 2-second cap refill no tenderness palpation in any other region of any other extremities, no swelling. Normal ROM     Heart:  Regular rate and rhythm, normal S1 & S2, no extra heart sounds. Perfusion:  intact  Respiratory:  Lungs clear to auscultation bilaterally. Respirations nonlabored. Abdominal:  Normal bowel sounds. Soft. Nontender. Non distended. Back:  No CVA tenderness to palpation no tenderness palpation over the T-spine patient does have some discomfort centrally along the mid to lower L-spine  Neurological:  Alert and oriented times 3. No focal neuro deficits.              Psychiatric:  Appropriate    I have reviewed and interpreted all of the currently available lab results from this visit (if applicable):  No results found for this visit on 11/06/22. Radiographs (if obtained):  Radiologist's Report Reviewed:  No results found. EKG (if obtained): (All EKG's are interpreted by myself in the absence of a cardiologist)  Sinus rhythm with occasional PVC, left bundle branch block, ventricular rate 70, MN interval 152, QRS duration 134, QTc 494, does not meet Sgarbossa criteria, similar to prior exam    MDM:    49-year-old female presenting with history seen above. Patient is hypertensive at 192/89 on presentation but otherwise vitals are reassuring and patient afebrile satting 1 room air. Physical exam can be seen above. CBC reveals no leukocytosis. Hemoglobin is stable for patient. CMP is overall reassuring. EKG can be seen above similar to prior exam.  Arlis Fu is within normal limits. COVID and flu are negative. CT head and C-spine are nonacute. There is an age-indeterminate mild superior endplate deformity of T4. Patient has no tenderness palpation of this region. CT abdomen pelvis shows a comminuted displaced right intertrochanteric femur fracture with a right sacral frank insufficiency fracture favored to be subacute with no acute solid organ injuries. Chronic findings of the lower thoracic and lumbar spine without acute lumbar fracture identified. Discussed with orthopedics as well as hospital service and will admit patient for further evaluation and treatment    Clinical Impression:  1. Closed displaced intertrochanteric fracture of right femur, initial encounter ([facility] [address]    2. Fall from standing, initial encounter             Comment: Please note this report has been produced using speech recognition software and may contain errors related to that system including errors in grammar, punctuation, and spelling, as well as words and phrases that may be inappropriate. Efforts were made to edit the dictations.         Issac Cruz MD  11/06/22 7344

## 2022-11-06 NOTE — PLAN OF CARE
85yo with mechanical fall at home. R femoral fracture. R knee effusion and probable meniscus injury. Hx CAD, stent 2018, MVP, on eliquis - ?reason. Admit to med surg. AC on hold. Pain control. Ortho consult. EKG LBBB - though this is not new and without acute changes of ischemia.        Marcelina Herrera MD  11/6/2022  6:28 PM

## 2022-11-06 NOTE — ED NOTES
19 [address] sent to Dr. Pritchett Sessions    [id number] Consult completed with call back from Dr. Pritchett Sessions speaking with Dr. Marilyn Huston  11/06/22 [phone]

## 2022-11-06 NOTE — ED NOTES
[place] [id number] sent to Dr. Tima Taylor completed admit orders placed on epic       Price Handing  11/06/22 1420

## 2022-11-07 ENCOUNTER — ANESTHESIA (OUTPATIENT)
Dept: OPERATING ROOM | Age: 87
DRG: 481 | End: 2022-11-07
Payer: MEDICARE

## 2022-11-07 ENCOUNTER — APPOINTMENT (OUTPATIENT)
Dept: GENERAL RADIOLOGY | Age: 87
DRG: 481 | End: 2022-11-07
Payer: MEDICARE

## 2022-11-07 ENCOUNTER — ANESTHESIA EVENT (OUTPATIENT)
Dept: OPERATING ROOM | Age: 87
DRG: 481 | End: 2022-11-07
Payer: MEDICARE

## 2022-11-07 PROBLEM — S72.141A CLOSED DISPLACED INTERTROCHANTERIC FRACTURE OF RIGHT FEMUR (HCC): Status: ACTIVE | Noted: 2022-11-06

## 2022-11-07 PROBLEM — Z01.818 PRE-OP EVALUATION: Status: ACTIVE | Noted: 2022-11-07
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
## 2022-11-07 PROBLEM — Z86.73 HISTORY OF CVA (CEREBROVASCULAR ACCIDENT): Status: ACTIVE | Noted: 2022-11-07

## 2022-11-07 LAB
ALBUMIN SERPL-MCNC: 3.3 G/DL (ref 3.4–5)
ANION GAP SERPL CALCULATED.3IONS-SCNC: 5 MMOL/L (ref 3–16)
BASOPHILS ABSOLUTE: 0 K/UL (ref 0–0.2)
BASOPHILS RELATIVE PERCENT: 0.4 %
BLOOD BANK DISPENSE STATUS: NORMAL
BLOOD BANK PRODUCT CODE: NORMAL
BPU ID: NORMAL
BUN BLDV-MCNC: 26 MG/DL (ref 7–20)
CALCIUM SERPL-MCNC: 8.8 MG/DL (ref 8.3–10.6)
CHLORIDE BLD-SCNC: 99 MMOL/L (ref 99–110)
CO2: 29 MMOL/L (ref 21–32)
CREAT SERPL-MCNC: 1.5 MG/DL (ref 0.6–1.2)
DESCRIPTION BLOOD BANK: NORMAL
EOSINOPHILS ABSOLUTE: 0 K/UL (ref 0–0.6)
EOSINOPHILS RELATIVE PERCENT: 0.2 %
GFR SERPL CREATININE-BSD FRML MDRD: 34 ML/MIN/{1.73_M2}
GLUCOSE BLD-MCNC: 103 MG/DL (ref 70–99)
HCT VFR BLD CALC: 27.5 % (ref 36–48)
HCT VFR BLD CALC: 32.7 % (ref 36–48)
HEMOGLOBIN: 10.2 G/DL (ref 12–16)
HEMOGLOBIN: 9.1 G/DL (ref 12–16)
INR BLD: 1.21 (ref 0.87–1.14)
IRON SATURATION: 16 % (ref 15–50)
IRON: 38 UG/DL (ref 37–145)
LYMPHOCYTES ABSOLUTE: 0.9 K/UL (ref 1–5.1)
LYMPHOCYTES RELATIVE PERCENT: 9.3 %
MCH RBC QN AUTO: 28.7 PG (ref 26–34)
MCHC RBC AUTO-ENTMCNC: 33.1 G/DL (ref 31–36)
MCV RBC AUTO: 86.6 FL (ref 80–100)
MONOCYTES ABSOLUTE: 0.7 K/UL (ref 0–1.3)
MONOCYTES RELATIVE PERCENT: 7.6 %
NEUTROPHILS ABSOLUTE: 8 K/UL (ref 1.7–7.7)
NEUTROPHILS RELATIVE PERCENT: 82.5 %
PDW BLD-RTO: 19.9 % (ref 12.4–15.4)
PHOSPHORUS: 4.3 MG/DL (ref 2.5–4.9)
PLATELET # BLD: 247 K/UL (ref 135–450)
PMV BLD AUTO: 7.7 FL (ref 5–10.5)
POTASSIUM SERPL-SCNC: 4 MMOL/L (ref 3.5–5.1)
PROTHROMBIN TIME: 15.2 SEC (ref 11.7–14.5)
RBC # BLD: 3.17 M/UL (ref 4–5.2)
SODIUM BLD-SCNC: 133 MMOL/L (ref 136–145)
TOTAL IRON BINDING CAPACITY: 244 UG/DL (ref 260–445)
WBC # BLD: 9.7 K/UL (ref 4–11)

## 2022-11-07 PROCEDURE — 6360000002 HC RX W HCPCS: Performed by: ANESTHESIOLOGY

## 2022-11-07 PROCEDURE — 6360000002 HC RX W HCPCS: Performed by: HOSPITALIST

## 2022-11-07 PROCEDURE — 36415 COLL VENOUS BLD VENIPUNCTURE: CPT

## 2022-11-07 PROCEDURE — 2580000003 HC RX 258: Performed by: STUDENT IN AN ORGANIZED HEALTH CARE EDUCATION/TRAINING PROGRAM

## 2022-11-07 PROCEDURE — C1769 GUIDE WIRE: HCPCS | Performed by: STUDENT IN AN ORGANIZED HEALTH CARE EDUCATION/TRAINING PROGRAM

## 2022-11-07 PROCEDURE — 85014 HEMATOCRIT: CPT

## 2022-11-07 PROCEDURE — 2500000003 HC RX 250 WO HCPCS: Performed by: NURSE ANESTHETIST, CERTIFIED REGISTERED

## 2022-11-07 PROCEDURE — 2709999900 HC NON-CHARGEABLE SUPPLY: Performed by: STUDENT IN AN ORGANIZED HEALTH CARE EDUCATION/TRAINING PROGRAM

## 2022-11-07 PROCEDURE — 6370000000 HC RX 637 (ALT 250 FOR IP): Performed by: INTERNAL MEDICINE

## 2022-11-07 PROCEDURE — 2580000003 HC RX 258: Performed by: INTERNAL MEDICINE

## 2022-11-07 PROCEDURE — 3600000014 HC SURGERY LEVEL 4 ADDTL 15MIN: Performed by: STUDENT IN AN ORGANIZED HEALTH CARE EDUCATION/TRAINING PROGRAM

## 2022-11-07 PROCEDURE — 0QS606Z REPOSITION RIGHT UPPER FEMUR WITH INTRAMEDULLARY INTERNAL FIXATION DEVICE, OPEN APPROACH: ICD-10-PCS | Performed by: STUDENT IN AN ORGANIZED HEALTH CARE EDUCATION/TRAINING PROGRAM

## 2022-11-07 PROCEDURE — 80069 RENAL FUNCTION PANEL: CPT

## 2022-11-07 PROCEDURE — 83550 IRON BINDING TEST: CPT

## 2022-11-07 PROCEDURE — 6360000002 HC RX W HCPCS: Performed by: STUDENT IN AN ORGANIZED HEALTH CARE EDUCATION/TRAINING PROGRAM

## 2022-11-07 PROCEDURE — 99223 1ST HOSP IP/OBS HIGH 75: CPT | Performed by: INTERNAL MEDICINE

## 2022-11-07 PROCEDURE — C1713 ANCHOR/SCREW BN/BN,TIS/BN: HCPCS | Performed by: STUDENT IN AN ORGANIZED HEALTH CARE EDUCATION/TRAINING PROGRAM

## 2022-11-07 PROCEDURE — 85025 COMPLETE CBC W/AUTO DIFF WBC: CPT

## 2022-11-07 PROCEDURE — APPNB60 APP NON BILLABLE TIME 46-60 MINS: Performed by: PHYSICIAN ASSISTANT

## 2022-11-07 PROCEDURE — 3700000001 HC ADD 15 MINUTES (ANESTHESIA): Performed by: STUDENT IN AN ORGANIZED HEALTH CARE EDUCATION/TRAINING PROGRAM

## 2022-11-07 PROCEDURE — 3700000000 HC ANESTHESIA ATTENDED CARE: Performed by: STUDENT IN AN ORGANIZED HEALTH CARE EDUCATION/TRAINING PROGRAM

## 2022-11-07 PROCEDURE — 7100000001 HC PACU RECOVERY - ADDTL 15 MIN: Performed by: STUDENT IN AN ORGANIZED HEALTH CARE EDUCATION/TRAINING PROGRAM

## 2022-11-07 PROCEDURE — 2580000003 HC RX 258: Performed by: NURSE ANESTHETIST, CERTIFIED REGISTERED

## 2022-11-07 PROCEDURE — 6370000000 HC RX 637 (ALT 250 FOR IP): Performed by: STUDENT IN AN ORGANIZED HEALTH CARE EDUCATION/TRAINING PROGRAM

## 2022-11-07 PROCEDURE — 85018 HEMOGLOBIN: CPT

## 2022-11-07 PROCEDURE — 1200000000 HC SEMI PRIVATE

## 2022-11-07 PROCEDURE — 3600000004 HC SURGERY LEVEL 4 BASE: Performed by: STUDENT IN AN ORGANIZED HEALTH CARE EDUCATION/TRAINING PROGRAM

## 2022-11-07 PROCEDURE — 7100000000 HC PACU RECOVERY - FIRST 15 MIN: Performed by: STUDENT IN AN ORGANIZED HEALTH CARE EDUCATION/TRAINING PROGRAM

## 2022-11-07 PROCEDURE — 3209999900 FLUORO FOR SURGICAL PROCEDURES

## 2022-11-07 PROCEDURE — 2500000003 HC RX 250 WO HCPCS: Performed by: STUDENT IN AN ORGANIZED HEALTH CARE EDUCATION/TRAINING PROGRAM

## 2022-11-07 PROCEDURE — 6360000002 HC RX W HCPCS

## 2022-11-07 PROCEDURE — 6360000002 HC RX W HCPCS: Performed by: NURSE ANESTHETIST, CERTIFIED REGISTERED

## 2022-11-07 PROCEDURE — 0QS6XZZ REPOSITION RIGHT UPPER FEMUR, EXTERNAL APPROACH: ICD-10-PCS | Performed by: STUDENT IN AN ORGANIZED HEALTH CARE EDUCATION/TRAINING PROGRAM

## 2022-11-07 PROCEDURE — 85610 PROTHROMBIN TIME: CPT

## 2022-11-07 PROCEDURE — 36430 TRANSFUSION BLD/BLD COMPNT: CPT

## 2022-11-07 PROCEDURE — 83540 ASSAY OF IRON: CPT

## 2022-11-07 PROCEDURE — 27245 TREAT THIGH FRACTURE: CPT | Performed by: STUDENT IN AN ORGANIZED HEALTH CARE EDUCATION/TRAINING PROGRAM

## 2022-11-07 PROCEDURE — 2720000010 HC SURG SUPPLY STERILE: Performed by: STUDENT IN AN ORGANIZED HEALTH CARE EDUCATION/TRAINING PROGRAM

## 2022-11-07 PROCEDURE — 2500000003 HC RX 250 WO HCPCS: Performed by: PHYSICIAN ASSISTANT

## 2022-11-07 PROCEDURE — 6360000002 HC RX W HCPCS: Performed by: INTERNAL MEDICINE

## 2022-11-07 DEVICE — NAIL IM L170MM DIA11MM NK 125DEG SHT PROX FEM GRN TI-15MO: Type: IMPLANTABLE DEVICE | Site: HIP | Status: FUNCTIONAL

## 2022-11-07 DEVICE — SCREW LK TI 5.0MM X 32MM: Type: IMPLANTABLE DEVICE | Site: HIP | Status: FUNCTIONAL

## 2022-11-07 DEVICE — IMPLANTABLE DEVICE: Type: IMPLANTABLE DEVICE | Site: HIP | Status: FUNCTIONAL

## 2022-11-07 RX ORDER — SODIUM CHLORIDE 0.9 % (FLUSH) 0.9 %
5-40 SYRINGE (ML) INJECTION EVERY 12 HOURS SCHEDULED
Status: DISCONTINUED | OUTPATIENT
Start: 2022-11-07 | End: 2022-11-07 | Stop reason: SDUPTHER

## 2022-11-07 RX ORDER — HYDRALAZINE HYDROCHLORIDE 20 MG/ML
10 INJECTION INTRAMUSCULAR; INTRAVENOUS EVERY 4 HOURS PRN
Status: DISCONTINUED | OUTPATIENT
Start: 2022-11-07 | End: 2022-11-09 | Stop reason: HOSPADM

## 2022-11-07 RX ORDER — CEFAZOLIN SODIUM 1 G/3ML
INJECTION, POWDER, FOR SOLUTION INTRAMUSCULAR; INTRAVENOUS PRN
Status: DISCONTINUED | OUTPATIENT
Start: 2022-11-07 | End: 2022-11-07 | Stop reason: SDUPTHER

## 2022-11-07 RX ORDER — ONDANSETRON 2 MG/ML
INJECTION INTRAMUSCULAR; INTRAVENOUS PRN
Status: DISCONTINUED | OUTPATIENT
Start: 2022-11-07 | End: 2022-11-07 | Stop reason: SDUPTHER

## 2022-11-07 RX ORDER — TRIAMCINOLONE ACETONIDE 40 MG/ML
INJECTION, SUSPENSION INTRA-ARTICULAR; INTRAMUSCULAR PRN
Status: DISCONTINUED | OUTPATIENT
Start: 2022-11-07 | End: 2022-11-07 | Stop reason: ALTCHOICE

## 2022-11-07 RX ORDER — TRANEXAMIC ACID 100 MG/ML
INJECTION, SOLUTION INTRAVENOUS PRN
Status: DISCONTINUED | OUTPATIENT
Start: 2022-11-07 | End: 2022-11-07 | Stop reason: SDUPTHER

## 2022-11-07 RX ORDER — ONDANSETRON 2 MG/ML
4 INJECTION INTRAMUSCULAR; INTRAVENOUS EVERY 6 HOURS PRN
Status: DISCONTINUED | OUTPATIENT
Start: 2022-11-07 | End: 2022-11-07 | Stop reason: SDUPTHER

## 2022-11-07 RX ORDER — ROCURONIUM BROMIDE 10 MG/ML
INJECTION, SOLUTION INTRAVENOUS PRN
Status: DISCONTINUED | OUTPATIENT
Start: 2022-11-07 | End: 2022-11-07 | Stop reason: SDUPTHER

## 2022-11-07 RX ORDER — MIDAZOLAM HYDROCHLORIDE 1 MG/ML
1 INJECTION INTRAMUSCULAR; INTRAVENOUS EVERY 5 MIN PRN
Status: DISCONTINUED | OUTPATIENT
Start: 2022-11-07 | End: 2022-11-07 | Stop reason: HOSPADM

## 2022-11-07 RX ORDER — LIDOCAINE HYDROCHLORIDE 10 MG/ML
INJECTION, SOLUTION EPIDURAL; INFILTRATION; INTRACAUDAL; PERINEURAL PRN
Status: DISCONTINUED | OUTPATIENT
Start: 2022-11-07 | End: 2022-11-07 | Stop reason: ALTCHOICE

## 2022-11-07 RX ORDER — CLINDAMYCIN PHOSPHATE 600 MG/50ML
600 INJECTION INTRAVENOUS
Status: COMPLETED | OUTPATIENT
Start: 2022-11-07 | End: 2022-11-07

## 2022-11-07 RX ORDER — DIPHENHYDRAMINE HYDROCHLORIDE 50 MG/ML
12.5 INJECTION INTRAMUSCULAR; INTRAVENOUS
Status: DISCONTINUED | OUTPATIENT
Start: 2022-11-07 | End: 2022-11-07 | Stop reason: HOSPADM

## 2022-11-07 RX ORDER — SODIUM CHLORIDE, SODIUM LACTATE, POTASSIUM CHLORIDE, CALCIUM CHLORIDE 600; 310; 30; 20 MG/100ML; MG/100ML; MG/100ML; MG/100ML
INJECTION, SOLUTION INTRAVENOUS CONTINUOUS PRN
Status: DISCONTINUED | OUTPATIENT
Start: 2022-11-07 | End: 2022-11-07 | Stop reason: SDUPTHER

## 2022-11-07 RX ORDER — ONDANSETRON 4 MG/1
4 TABLET, ORALLY DISINTEGRATING ORAL EVERY 8 HOURS PRN
Status: DISCONTINUED | OUTPATIENT
Start: 2022-11-07 | End: 2022-11-07 | Stop reason: SDUPTHER

## 2022-11-07 RX ORDER — PROPOFOL 10 MG/ML
INJECTION, EMULSION INTRAVENOUS PRN
Status: DISCONTINUED | OUTPATIENT
Start: 2022-11-07 | End: 2022-11-07 | Stop reason: SDUPTHER

## 2022-11-07 RX ORDER — SODIUM CHLORIDE 0.9 % (FLUSH) 0.9 %
5-40 SYRINGE (ML) INJECTION PRN
Status: DISCONTINUED | OUTPATIENT
Start: 2022-11-07 | End: 2022-11-07 | Stop reason: SDUPTHER

## 2022-11-07 RX ORDER — LIDOCAINE HYDROCHLORIDE 20 MG/ML
INJECTION, SOLUTION INFILTRATION; PERINEURAL PRN
Status: DISCONTINUED | OUTPATIENT
Start: 2022-11-07 | End: 2022-11-07 | Stop reason: SDUPTHER

## 2022-11-07 RX ORDER — SODIUM CHLORIDE 9 MG/ML
INJECTION, SOLUTION INTRAVENOUS PRN
Status: DISCONTINUED | OUTPATIENT
Start: 2022-11-07 | End: 2022-11-07 | Stop reason: SDUPTHER

## 2022-11-07 RX ORDER — SODIUM CHLORIDE 9 MG/ML
25 INJECTION, SOLUTION INTRAVENOUS PRN
Status: DISCONTINUED | OUTPATIENT
Start: 2022-11-07 | End: 2022-11-07 | Stop reason: SDUPTHER

## 2022-11-07 RX ORDER — SODIUM CHLORIDE 9 MG/ML
INJECTION, SOLUTION INTRAVENOUS PRN
Status: DISCONTINUED | OUTPATIENT
Start: 2022-11-07 | End: 2022-11-09 | Stop reason: HOSPADM

## 2022-11-07 RX ORDER — OXYCODONE HYDROCHLORIDE 5 MG/1
5 TABLET ORAL PRN
Status: DISCONTINUED | OUTPATIENT
Start: 2022-11-07 | End: 2022-11-07 | Stop reason: HOSPADM

## 2022-11-07 RX ORDER — TRANEXAMIC ACID 100 MG/ML
1000 INJECTION, SOLUTION INTRAVENOUS ONCE
Status: DISCONTINUED | OUTPATIENT
Start: 2022-11-07 | End: 2022-11-07 | Stop reason: HOSPADM

## 2022-11-07 RX ORDER — HYDRALAZINE HYDROCHLORIDE 20 MG/ML
5 INJECTION INTRAMUSCULAR; INTRAVENOUS
Status: DISCONTINUED | OUTPATIENT
Start: 2022-11-07 | End: 2022-11-07 | Stop reason: HOSPADM

## 2022-11-07 RX ORDER — HYDROCODONE BITARTRATE AND ACETAMINOPHEN 5; 325 MG/1; MG/1
2 TABLET ORAL EVERY 6 HOURS PRN
Status: DISCONTINUED | OUTPATIENT
Start: 2022-11-07 | End: 2022-11-09

## 2022-11-07 RX ORDER — OXYCODONE HYDROCHLORIDE 5 MG/1
10 TABLET ORAL PRN
Status: DISCONTINUED | OUTPATIENT
Start: 2022-11-07 | End: 2022-11-07 | Stop reason: HOSPADM

## 2022-11-07 RX ORDER — ONDANSETRON 2 MG/ML
4 INJECTION INTRAMUSCULAR; INTRAVENOUS EVERY 10 MIN PRN
Status: DISCONTINUED | OUTPATIENT
Start: 2022-11-07 | End: 2022-11-07 | Stop reason: HOSPADM

## 2022-11-07 RX ADMIN — HYDROMORPHONE HYDROCHLORIDE 0.25 MG: 1 INJECTION, SOLUTION INTRAMUSCULAR; INTRAVENOUS; SUBCUTANEOUS at 16:49

## 2022-11-07 RX ADMIN — TRANEXAMIC ACID 1000 MG: 100 INJECTION, SOLUTION INTRAVENOUS at 15:49

## 2022-11-07 RX ADMIN — HYDROCODONE BITARTRATE AND ACETAMINOPHEN 2 TABLET: 5; 325 TABLET ORAL at 20:02

## 2022-11-07 RX ADMIN — CEFAZOLIN 2000 MG: 2 INJECTION, POWDER, FOR SOLUTION INTRAMUSCULAR; INTRAVENOUS at 22:11

## 2022-11-07 RX ADMIN — PHENYLEPHRINE HYDROCHLORIDE 100 MCG: 10 INJECTION INTRAVENOUS at 15:06

## 2022-11-07 RX ADMIN — ACETAMINOPHEN 650 MG: 325 TABLET ORAL at 19:15

## 2022-11-07 RX ADMIN — HYDROMORPHONE HYDROCHLORIDE 1 MG: 1 INJECTION, SOLUTION INTRAMUSCULAR; INTRAVENOUS; SUBCUTANEOUS at 06:23

## 2022-11-07 RX ADMIN — PHENYLEPHRINE HYDROCHLORIDE 50 MCG: 10 INJECTION INTRAVENOUS at 15:25

## 2022-11-07 RX ADMIN — HYDROMORPHONE HYDROCHLORIDE 1 MG: 1 INJECTION, SOLUTION INTRAMUSCULAR; INTRAVENOUS; SUBCUTANEOUS at 10:21

## 2022-11-07 RX ADMIN — SODIUM CHLORIDE: 9 INJECTION, SOLUTION INTRAVENOUS at 10:19

## 2022-11-07 RX ADMIN — PHENYLEPHRINE HYDROCHLORIDE 50 MCG: 10 INJECTION INTRAVENOUS at 16:17

## 2022-11-07 RX ADMIN — HYDROMORPHONE HYDROCHLORIDE 0.25 MG: 1 INJECTION, SOLUTION INTRAMUSCULAR; INTRAVENOUS; SUBCUTANEOUS at 17:21

## 2022-11-07 RX ADMIN — ROCURONIUM BROMIDE 50 MG: 10 INJECTION, SOLUTION INTRAVENOUS at 15:06

## 2022-11-07 RX ADMIN — CLINDAMYCIN PHOSPHATE 600 MG: 600 INJECTION, SOLUTION INTRAVENOUS at 15:00

## 2022-11-07 RX ADMIN — LIDOCAINE HYDROCHLORIDE 20 MG: 20 INJECTION, SOLUTION INFILTRATION; PERINEURAL at 15:06

## 2022-11-07 RX ADMIN — AMIODARONE HYDROCHLORIDE 100 MG: 200 TABLET ORAL at 07:42

## 2022-11-07 RX ADMIN — CEFAZOLIN 2000 MG: 2 INJECTION, POWDER, FOR SOLUTION INTRAMUSCULAR; INTRAVENOUS at 15:40

## 2022-11-07 RX ADMIN — ONDANSETRON 4 MG: 4 TABLET, ORALLY DISINTEGRATING ORAL at 20:02

## 2022-11-07 RX ADMIN — SUGAMMADEX 200 MG: 100 INJECTION, SOLUTION INTRAVENOUS at 16:23

## 2022-11-07 RX ADMIN — HYDROMORPHONE HYDROCHLORIDE 0.25 MG: 1 INJECTION, SOLUTION INTRAMUSCULAR; INTRAVENOUS; SUBCUTANEOUS at 22:17

## 2022-11-07 RX ADMIN — ONDANSETRON HYDROCHLORIDE 4 MG: 2 INJECTION, SOLUTION INTRAMUSCULAR; INTRAVENOUS at 15:47

## 2022-11-07 RX ADMIN — PHENYLEPHRINE HYDROCHLORIDE 100 MCG: 10 INJECTION INTRAVENOUS at 16:11

## 2022-11-07 RX ADMIN — CEFAZOLIN 2 G: 330 INJECTION, POWDER, FOR SOLUTION INTRAMUSCULAR; INTRAVENOUS at 15:38

## 2022-11-07 RX ADMIN — SODIUM CHLORIDE: 9 INJECTION, SOLUTION INTRAVENOUS at 22:09

## 2022-11-07 RX ADMIN — HYDROMORPHONE HYDROCHLORIDE 0.25 MG: 1 INJECTION, SOLUTION INTRAMUSCULAR; INTRAVENOUS; SUBCUTANEOUS at 17:01

## 2022-11-07 RX ADMIN — PROPOFOL 50 MG: 10 INJECTION, EMULSION INTRAVENOUS at 15:06

## 2022-11-07 RX ADMIN — FAMOTIDINE 20 MG: 20 TABLET ORAL at 07:42

## 2022-11-07 RX ADMIN — PHENYLEPHRINE HYDROCHLORIDE 100 MCG: 10 INJECTION INTRAVENOUS at 16:20

## 2022-11-07 RX ADMIN — METOPROLOL SUCCINATE 50 MG: 50 TABLET, EXTENDED RELEASE ORAL at 07:42

## 2022-11-07 RX ADMIN — SODIUM CHLORIDE, POTASSIUM CHLORIDE, SODIUM LACTATE AND CALCIUM CHLORIDE: 600; 310; 30; 20 INJECTION, SOLUTION INTRAVENOUS at 15:06

## 2022-11-07 RX ADMIN — ATORVASTATIN CALCIUM 80 MG: 40 TABLET, FILM COATED ORAL at 19:15

## 2022-11-07 RX ADMIN — Medication 10 ML: at 22:17

## 2022-11-07 RX ADMIN — HYDROMORPHONE HYDROCHLORIDE 0.25 MG: 1 INJECTION, SOLUTION INTRAMUSCULAR; INTRAVENOUS; SUBCUTANEOUS at 17:32

## 2022-11-07 RX ADMIN — HYDROMORPHONE HYDROCHLORIDE 1 MG: 1 INJECTION, SOLUTION INTRAMUSCULAR; INTRAVENOUS; SUBCUTANEOUS at 01:50

## 2022-11-07 ASSESSMENT — PAIN SCALES - GENERAL
PAINLEVEL_OUTOF10: 9
PAINLEVEL_OUTOF10: 7
PAINLEVEL_OUTOF10: 10
PAINLEVEL_OUTOF10: 9
PAINLEVEL_OUTOF10: 8
PAINLEVEL_OUTOF10: 8
PAINLEVEL_OUTOF10: 10
PAINLEVEL_OUTOF10: 6
PAINLEVEL_OUTOF10: 9
PAINLEVEL_OUTOF10: 6

## 2022-11-07 ASSESSMENT — PAIN DESCRIPTION - PAIN TYPE
TYPE: SURGICAL PAIN
TYPE: ACUTE PAIN
TYPE: SURGICAL PAIN

## 2022-11-07 ASSESSMENT — PAIN DESCRIPTION - DESCRIPTORS
DESCRIPTORS: SHARP
DESCRIPTORS: ACHING;SORE;DISCOMFORT
DESCRIPTORS: SORE;SHARP
DESCRIPTORS: SHARP
DESCRIPTORS: ACHING;SORE;DISCOMFORT
DESCRIPTORS: ACHING;DISCOMFORT

## 2022-11-07 ASSESSMENT — PAIN DESCRIPTION - ORIENTATION
ORIENTATION: RIGHT

## 2022-11-07 ASSESSMENT — PAIN - FUNCTIONAL ASSESSMENT
PAIN_FUNCTIONAL_ASSESSMENT: PREVENTS OR INTERFERES SOME ACTIVE ACTIVITIES AND ADLS
PAIN_FUNCTIONAL_ASSESSMENT: PREVENTS OR INTERFERES WITH MANY ACTIVE NOT PASSIVE ACTIVITIES
PAIN_FUNCTIONAL_ASSESSMENT: PREVENTS OR INTERFERES SOME ACTIVE ACTIVITIES AND ADLS

## 2022-11-07 ASSESSMENT — PAIN DESCRIPTION - LOCATION
LOCATION: LEG;HIP
LOCATION: LEG;HIP
LOCATION: HIP
LOCATION: LEG;HIP
LOCATION: HIP

## 2022-11-07 ASSESSMENT — PAIN DESCRIPTION - FREQUENCY
FREQUENCY: INTERMITTENT
FREQUENCY: CONTINUOUS
FREQUENCY: INTERMITTENT

## 2022-11-07 ASSESSMENT — PAIN DESCRIPTION - ONSET
ONSET: ON-GOING
ONSET: ON-GOING

## 2022-11-07 NOTE — ED NOTES
Pt oxygen saturation dropping to 87% RA when sleeping; 2L NC applied; O2 at 93%      Annie Pascual RN  11/06/22 3403

## 2022-11-07 NOTE — PLAN OF CARE
Problem: Discharge Planning  Goal: Discharge to home or other facility with appropriate resources  11/7/2022 1138 by Vale Young RN  Outcome: Progressing  Flowsheets (Taken 11/7/2022 1000)  Discharge to home or other facility with appropriate resources: Identify barriers to discharge with patient and caregiver  11/6/2022 2241 by Nicci Nunez RN  Outcome: Progressing  Flowsheets (Taken 11/6/2022 2234)  Discharge to home or other facility with appropriate resources:   Identify barriers to discharge with patient and caregiver   Identify discharge learning needs (meds, wound care, etc)   Refer to discharge planning if patient needs post-hospital services based on physician order or complex needs related to functional status, cognitive ability or social support system   Arrange for needed discharge resources and transportation as appropriate     Problem: Pain  Goal: Verbalizes/displays adequate comfort level or baseline comfort level  11/7/2022 1138 by Vale Young RN  Outcome: Progressing  11/6/2022 2241 by Nicci Nunez RN  Outcome: Progressing     Problem: Safety - Adult  Goal: Free from fall injury  11/7/2022 1138 by Vale Young RN  Outcome: Progressing  11/6/2022 2241 by Nicci Nunez RN  Outcome: Progressing     Problem: Skin/Tissue Integrity  Goal: Absence of new skin breakdown  Description: 1. Monitor for areas of redness and/or skin breakdown  2. Assess vascular access sites hourly  3. Every 4-6 hours minimum:  Change oxygen saturation probe site  4. Every 4-6 hours:  If on nasal continuous positive airway pressure, respiratory therapy assess nares and determine need for appliance change or resting period.   11/7/2022 1138 by Vale Young RN  Outcome: Progressing  11/6/2022 2241 by Nicci Nunez RN  Outcome: Progressing     Problem: Chronic Conditions and Co-morbidities  Goal: Patient's chronic conditions and co-morbidity symptoms are monitored and maintained or improved  Outcome: Progressing

## 2022-11-07 NOTE — PROGRESS NOTES
Admission assessment completed, see flow sheet. Patient is alert and oriented x 4. Respiration easy and unlabored. Side rails up x 2, and bed in low position. Call light is within reach. 4 Eyes Skin Assessment     The patient is being assess for   Admission    I agree that 2 RN's have performed a thorough Head to Toe Skin Assessment on the patient. ALL assessment sites listed below have been assessed. Areas assessed for pressure by both nurses:   [x]   Head, Face, and Ears   [x]   Shoulders, Back, and Chest, Abdomen  [x]   Arms, Elbows, and Hands   [x]   Coccyx, Sacrum, and Ischium  [x]   Legs, Feet, and Heels    Scattered bruising and abrasions. Skin Assessed Under all Medical Devices by both nurses:  O2 device tubing              All Mepilex Borders were peeled back and area peeked at by both nurses:  NA  Please list where Mepilex Borders are located:  NA             **SHARE this note so that the co-signing nurse is able to place an eSignature**    Co-signer eSignature: {Esignature:[id number]}    Does the Patient have Skin Breakdown related to pressure?   No            Michel Prevention initiated:  Yes   Wound Care Orders initiated:  NA      [facility] nurse consulted for Pressure Injury (Stage 3,4, Unstageable, DTI, NWPT, Complex wounds)and New or Established Ostomies:  NA      Primary Nurse eSignature: Electronically signed by Kady Martel RN on 11/6/22 at 10:41 PM EST

## 2022-11-07 NOTE — ANESTHESIA PRE PROCEDURE
Department of Anesthesiology  Preprocedure Note       Name:  Kacie Jeffery   Age:  80 y.o.  :  1935                                          MRN:  [id number]         Date:  2022      Surgeon: Niranjan Dickerson):  Omaira Ruiz MD    Procedure: Procedure(s):  RIGHT HIP INTRAMEDULLARY NAILING    Medications prior to admission:   Prior to Admission medications    Medication Sig Start Date End Date Taking? Authorizing Provider   furosemide (LASIX) 40 MG tablet TAKE 1 TABLET BY MOUTH DAILY IN THE MORNING 10/17/22   DAVE Valera CNP   apixaban (ELIQUIS) 2.5 MG TABS tablet Take 1 tablet by mouth 2 times daily 10/17/22   DAVE Valencia CNP   aspirin 81 MG chewable tablet Take 1 tablet by mouth daily 10/17/22   DAVE Valencia CNP   amiodarone (PACERONE) 100 MG tablet Take 1 tablet by mouth daily 22   DAVE Valencia CNP   Cyanocobalamin (VITAMIN B-12) 1000 MCG extended release tablet TAKE 1 TABLET (1,000 MCG) BY MOUTH DAILY (AM) 22   Farrukh Hernandez MD   vitamin D3 (CHOLECALCIFEROL) 25 MCG (1000 UT) TABS tablet TAKE 1 TABLET (1,000 IU) BY MOUTH DAILY IN THE MORNING 22   Farrukh Hernandez MD   atorvastatin (LIPITOR) 80 MG tablet TAKE 1 TABLET BY MOUTH ONE TIME A DAY (EVENING) 22   Farrukh Hernandez MD   metoprolol succinate (TOPROL XL) 50 MG extended release tablet Take 1 tablet by mouth in the morning. 22   Drake CoupeDAVE CNP   lidocaine 4 % external patch Place 1 patch onto the skin in the morning. As needed for pain. 22   Debra Shahid DO   ondansetron (ZOFRAN) 4 MG tablet Take 1 tablet by mouth 3 times daily as needed for Nausea or Vomiting 22   Debra Shahid DO   miconazole (MICOTIN) 2 % powder Apply topically 2 times daily. 22   Fredy Lane MD   Misc. Devices KIT Rollator. Use as directed. 6/10/22   Debra Shahid DO   nitroGLYCERIN (NITROSTAT) 0.4 MG SL tablet PUT 1 TAB UNDER TONGUE EVERY 5 MIN AS NEEDED CHEST PAIN UP TO 3.  IF NO RELIEF AFTER 1 DOSE, CALL 911 4/25/22   Genet Pires MD   famotidine (PEPCID) 20 MG tablet Take 1 tablet by mouth daily 3/22/22   Jono Lacey MD   vitamin B-12 (CYANOCOBALAMIN) 1000 MCG tablet Take 1,000 mcg by mouth daily    Historical Provider, MD   fluticasone (FLONASE) 50 MCG/ACT nasal spray 2 sprays by Each Nostril route daily 7/2/19   Debra Shahid DO   Handicap Placard MISC by Does not apply route Length: 5 years 5/7/19   Debra Shahid DO   HYDROcodone-acetaminophen (NORCO)  MG per tablet Take 1 tablet by mouth every 6 hours as needed.  . 1/3/17   Historical Provider, MD   Coenzyme Q10 (CO Q 10) 100 MG CAPS Take by mouth daily    Historical Provider, MD       Current medications:    Current Facility-Administered Medications   Medication Dose Route Frequency Provider Last Rate Last Admin    HYDROmorphone (DILAUDID) injection 1 mg  1 mg IntraVENous Q4H PRN Traci Lepe MD   1 mg at 11/07/22 5483    amiodarone (CORDARONE) tablet 100 mg  100 mg Oral Daily Reyna Perez MD        aspirin chewable tablet 81 mg  81 mg Oral Daily Reyna Perez MD        atorvastatin (LIPITOR) tablet 80 mg  80 mg Oral Nightly Reyna Perez MD   80 mg at 11/06/22 2247    famotidine (PEPCID) tablet 20 mg  20 mg Oral Daily Reyna Perez MD        fluticasone (FLONASE) 50 MCG/ACT nasal spray 2 spray  2 spray Each Nostril Daily Reyna Perez MD        metoprolol succinate (TOPROL XL) extended release tablet 50 mg  50 mg Oral Daily Reyna Perez MD        vitamin B-12 (CYANOCOBALAMIN) tablet 1,000 mcg  1,000 mcg Oral Daily Reyna Perez MD        Vitamin D (CHOLECALCIFEROL) tablet 1,000 Units  1,000 Units Oral Daily Reyna Perez MD        morphine (PF) injection 2 mg  2 mg IntraVENous Q3H PRN Reyna Perez MD   2 mg at 11/06/22 2248    0.9 % sodium chloride infusion   IntraVENous Continuous Reyna Perez MD 75 mL/hr at 11/06/22 2254 New Bag at 11/06/22 2254    sodium chloride flush 0.9 % injection 5-40 mL  5-40 mL IntraVENous 2 times per day Chaz Barahona MD   10 mL at 11/06/22 2248    sodium chloride flush 0.9 % injection 5-40 mL  5-40 mL IntraVENous PRN Chaz Barahona MD        0.9 % sodium chloride infusion   IntraVENous PRN Chaz Barahona MD        ondansetron (ZOFRAN-ODT) disintegrating tablet 4 mg  4 mg Oral Q8H PRN Chaz Barahona MD        Or    ondansetron (ZOFRAN) injection 4 mg  4 mg IntraVENous Q6H PRN Chaz Barahona MD   4 mg at 11/06/22 1945    polyethylene glycol (GLYCOLAX) packet 17 g  17 g Oral Daily PRN Chaz Barahona MD        acetaminophen (TYLENOL) tablet 650 mg  650 mg Oral Q6H PRN Lucho Santana MD        Or    acetaminophen (TYLENOL) suppository 650 mg  650 mg Rectal Q6H PRN Chaz Barahona MD           Allergies: Allergies   Allergen Reactions    Benazepril Hcl Shortness Of Breath and Swelling    Feldene [Piroxicam] Shortness Of Breath    Hytrin [Terazosin Hcl] Shortness Of Breath    Iv Contrast [Iodides] Anaphylaxis    Acetaminophen     Celebrex [Celecoxib]      GI upset    Cephalexin      Nausea    Hydrochlorothiazide     Iodinated Casein     Monopril [Fosinopril Sodium] Other (See Comments)     Pt states makes her weak    Protonix [Pantoprazole Sodium] Other (See Comments)     Kidney failure      Quinapril Hcl     Ultracet [Tramadol-Acetaminophen]      Rash    Ziac [Bisoprolol-Hydrochlorothiazide] Other (See Comments)     Pt does not remember reaction to med ?  Weak        Problem List:    Patient Active Problem List   Diagnosis Code    Other and unspecified angina pectoris I20.9    Coronary atherosclerosis of native coronary artery I25.10    Other chronic pulmonary heart diseases I27.89    Mitral valve disorder I05.9    Acute combined systolic and diastolic heart failure (HCC) I50.41    Chronic low back pain M54.50, G89.29    Essential hypertension I10    AGUILA (acute kidney injury) ([facility] [address] N17.9    Renal insufficiency N28.9    Gout M10.9    Pain in joint, hand M25.549    Mixed hyperlipidemia E78.2    Primary insomnia F51.01    Closed stable burst fracture of first lumbar vertebra ([facility]) S32.011A    GERD (gastroesophageal reflux disease) K21.9    Palpitations R00.2    V-tach I47.20    Abnormal myocardial perfusion study R94.39    Ischemic cardiomyopathy I25.5    Cardiomyopathy ([facility]) I42.9    PVD (posterior vitreous detachment), both eyes H43.813    Posterior subcapsular polar age-related cataract of both eyes H25.043    Confusion R41.0    Chronic kidney disease, stage IV (severe) ([facility]) N18.4    Visual field defect H53.40    Stroke of unknown etiology ([facility]) I63.9    Encounter for loop recorder check Z45.09    Atrial fibrillation ([facility]) I48.91    CHF (congestive heart failure), NYHA class I, acute on chronic, combined ([facility]) I50.43    On amiodarone therapy Z79.899    On continuous oral anticoagulation Z79.01    Near syncope R55    Bradycardia R00.1    Hypokalemia E87.6    Hyponatremia E87.1    Hip fracture requiring operative repair, right, closed, initial encounter ([facility] [address] S72.001A       Past Medical History:        Diagnosis Date    Arthritis     Blood circulation, collateral     CAD (coronary artery disease)     CHF (congestive heart failure) ([facility])     Confusion 8/31/2020    GERD (gastroesophageal reflux disease)     History of heart artery stent 12/2018    Hyperlipidemia     Hypertension     Mitral valve prolapse     Myocardial infarct, old     Thyroid disease        Past Surgical History:        Procedure Laterality Date    COLONOSCOPY      EYE SURGERY Left 09/04/2018    PHACO EMULSIFICATION OF CATARACT WITH INTRAOCULAR LENS IMPLANT LEFT EYE     HYSTERECTOMY (CERVIX STATUS UNKNOWN)      OTHER SURGICAL HISTORY  08/27/2018    phacoemulsification of cataract with intraocular lens implant right eye    AL OFFICE/OUTPT VISIT,PROCEDURE ONLY Right 8/27/2018    PHACO EMULSIFICATION OF CATARACT WITH INTRAOCULAR LENS IMPLANT RIGHT EYE performed by Bianca Lott MD at [address] [facility] OFFICE/OUTPT [facility] Left 9/4/2018    PHACO EMULSIFICATION OF CATARACT WITH INTRAOCULAR LENS IMPLANT LEFT EYE performed by Bianca Lott MD at [address] [facility] History:    Social History     Tobacco Use    Smoking status: Never    Smokeless tobacco: Never   Substance Use Topics    Alcohol use: No     Alcohol/week: 0.0 standard drinks                                Counseling given: Not Answered      Vital Signs (Current):   Vitals:    11/06/22 2215 11/06/22 2248 11/07/22 0150 11/07/22 0623   BP: (!) 196/94  (!) 192/84    Pulse: 72  72    Resp: 17 18 18 18   Temp: 99.2 °F (37.3 °C)  98.4 °F (36.9 °C)    TempSrc: Oral  Oral    SpO2: 100%  98%    Weight: 112 lb (50.8 kg)      Height: 5' 1\" (1.549 m)                                                 BP Readings from Last 3 Encounters:   11/07/22 (!) 192/84   09/13/22 (!) 110/58   08/24/22 (!) 94/50       NPO Status:                                                                                 BMI:   Wt Readings from Last 3 Encounters:   11/06/22 112 lb (50.8 kg)   09/13/22 118 lb 12.8 oz (53.9 kg)   08/24/22 117 lb (53.1 kg)     Body mass index is 21.16 kg/m².     CBC:   Lab Results   Component Value Date/Time    WBC 9.7 11/07/2022 05:57 AM    RBC 3.17 11/07/2022 05:57 AM    HGB 9.1 11/07/2022 05:57 AM    HCT 27.5 11/07/2022 05:57 AM    MCV 86.6 11/07/2022 05:57 AM    RDW 19.9 11/07/2022 05:57 AM     11/07/2022 05:57 AM       CMP:   Lab Results   Component Value Date/Time     11/07/2022 05:57 AM    K 4.0 11/07/2022 05:57 AM    K 3.4 07/06/2022 05:20 AM    CL 99 11/07/2022 05:57 AM    CO2 29 11/07/2022 05:57 AM    BUN 26 11/07/2022 05:57 AM    CREATININE 1.5 11/07/2022 05:57 AM    GFRAA 30 09/21/2022 10:45 AM    AGRATIO 1.4 11/06/2022 03:02 PM    LABGLOM 34 11/07/2022 05:57 AM    GLUCOSE 103 11/07/2022 05:57 AM    PROT 6.7 11/06/2022 03:02 PM    CALCIUM 8.8 11/07/2022 05:57 AM    BILITOT <0.2 11/06/2022 03:02 PM    ALKPHOS 109 11/06/2022 03:02 PM    AST 22 11/06/2022 03:02 PM    ALT 10 11/06/2022 03:02 PM       POC Tests: No results for input(s): POCGLU, POCNA, POCK, POCCL, POCBUN, POCHEMO, POCHCT in the last 72 hours. Coags:   Lab Results   Component Value Date/Time    PROTIME 15.2 11/07/2022 05:57 AM    INR 1.21 11/07/2022 05:57 AM    APTT 33.4 11/06/2022 03:02 PM       HCG (If Applicable): No results found for: PREGTESTUR, PREGSERUM, HCG, HCGQUANT     ABGs: No results found for: PHART, PO2ART, QBY4BDV, IVJ6VCZ, BEART, Q8LIGKUZ     Type & Screen (If Applicable):  No results found for: LABABO, LABRH    Drug/Infectious Status (If Applicable):  No results found for: HIV, HEPCAB    COVID-19 Screening (If Applicable):   Lab Results   Component Value Date/Time    COVID19 NOT DETECTED 11/06/2022 04:02 PM    COVID19 DETECTED 11/25/2020 05:28 PM           Anesthesia Evaluation  Patient summary reviewed and Nursing notes reviewed no history of anesthetic complications:   Airway: Mallampati: II  TM distance: >3 FB   Neck ROM: full  Mouth opening: > = 3 FB   Dental:          Pulmonary:Negative Pulmonary ROS                              Cardiovascular:    (+) hypertension:, valvular problems/murmurs: MVP, angina:, past MI:, CAD:, CHF: systolic and diastolic,                   Neuro/Psych:   (+) CVA:, psychiatric history:            GI/Hepatic/Renal: Neg GI/Hepatic/Renal ROS  (+) GERD: well controlled, renal disease: CRI,      (-) liver disease       Endo/Other: Negative Endo/Other ROS       (-) diabetes mellitus               Abdominal:             Vascular: negative vascular ROS.          Other Findings:           Anesthesia Plan      general     ASA 3     (I discussed with the patient the risks and benefits of PIV, general anesthesia, IV Narcotics, PACU. All questions were answered the patient agrees with the plan)  Induction: intravenous. MIPS: Prophylactic antiemetics administered. Anesthetic plan and risks discussed with patient and child/children. Plan discussed with CRNA.                     Yesenia Pimentel MD   11/7/2022

## 2022-11-07 NOTE — CONSULTS
Consultant: Melony Berrios MD  From: ED  Reason: R IT fx    Chief Complaint   Patient presents with    Fall     Pt tripped in wisdom at home; hit back of no LOC; taking blood thinners; c/o right hip pain, rotation to right hip noted         History of Present Illness      Nolvia Mata is a 80 y.o. female who presented to [facility] ED after a fall and was found to have R IT fx for which ortho was consulted. Upon evaluation pain is controlled and isolated to the R hip. No neurologic complaints. Patient previously lived at home and was a community ambulator.        Past History       Past Medical History:   Diagnosis Date    Arthritis     Blood circulation, collateral     CAD (coronary artery disease)     CHF (congestive heart failure) ([facility])     Confusion 8/31/2020    GERD (gastroesophageal reflux disease)     History of heart artery stent 12/2018    Hyperlipidemia     Hypertension     Mitral valve prolapse     Myocardial infarct, old     Thyroid disease      Past Surgical History:   Procedure Laterality Date    COLONOSCOPY      EYE SURGERY Left 09/04/2018    PHACO EMULSIFICATION OF CATARACT WITH INTRAOCULAR LENS IMPLANT LEFT EYE     HYSTERECTOMY (CERVIX STATUS UNKNOWN)      OTHER SURGICAL HISTORY  08/27/2018    phacoemulsification of cataract with intraocular lens implant right eye    [facility] OFFICE/OUTPT VISIT,PROCEDURE ONLY Right 8/27/2018    PHACO EMULSIFICATION OF CATARACT WITH INTRAOCULAR LENS IMPLANT RIGHT EYE performed by Shirley Rothman MD at [address] [facility] OFFICE/OUTPT [facility] Left 9/4/2018    PHACO EMULSIFICATION OF CATARACT WITH INTRAOCULAR LENS IMPLANT LEFT EYE performed by Shirley Rothman MD at [address] [facility]       Family History   Problem Relation Age of Onset    Heart Disease Mother     Heart Disease Father     Cancer Father     Cancer Sister     Diabetes Brother     Stroke Brother      Social History     Tobacco Use    Smoking status: Never    Smokeless tobacco: Never   Substance Use Topics    Alcohol use: No     Alcohol/week: 0.0 standard drinks      No current facility-administered medications on file prior to encounter. Current Outpatient Medications on File Prior to Encounter   Medication Sig Dispense Refill    furosemide (LASIX) 40 MG tablet TAKE 1 TABLET BY MOUTH DAILY IN THE MORNING 90 tablet 3    apixaban (ELIQUIS) 2.5 MG TABS tablet Take 1 tablet by mouth 2 times daily 180 tablet 3    aspirin 81 MG chewable tablet Take 1 tablet by mouth daily 90 tablet 3    amiodarone (PACERONE) 100 MG tablet Take 1 tablet by mouth daily 30 tablet 5    Cyanocobalamin (VITAMIN B-12) 1000 MCG extended release tablet TAKE 1 TABLET (1,000 MCG) BY MOUTH DAILY (AM) 90 tablet 3    vitamin D3 (CHOLECALCIFEROL) 25 MCG (1000 UT) TABS tablet TAKE 1 TABLET (1,000 IU) BY MOUTH DAILY IN THE MORNING 90 tablet 3    atorvastatin (LIPITOR) 80 MG tablet TAKE 1 TABLET BY MOUTH ONE TIME A DAY (EVENING) 60 tablet 3    metoprolol succinate (TOPROL XL) 50 MG extended release tablet Take 1 tablet by mouth in the morning. 30 tablet 5    lidocaine 4 % external patch Place 1 patch onto the skin in the morning. As needed for pain. 30 patch 3    ondansetron (ZOFRAN) 4 MG tablet Take 1 tablet by mouth 3 times daily as needed for Nausea or Vomiting 30 tablet 5    miconazole (MICOTIN) 2 % powder Apply topically 2 times daily. 45 g 1    Misc. Devices KIT Rollator. Use as directed.  1 kit 0    nitroGLYCERIN (NITROSTAT) 0.4 MG SL tablet PUT 1 TAB UNDER TONGUE EVERY 5 MIN AS NEEDED CHEST PAIN UP TO 3. IF NO RELIEF AFTER 1 DOSE, CALL 911 25 tablet 3    famotidine (PEPCID) 20 MG tablet Take 1 tablet by mouth daily 60 tablet 3    vitamin B-12 (CYANOCOBALAMIN) 1000 MCG tablet Take 1,000 mcg by mouth daily      fluticasone (FLONASE) 50 MCG/ACT nasal spray 2 sprays by Each Nostril route daily 1 Bottle 5    Handicap Placard MISC by Does not apply route Length: 5 years 1 each 0    HYDROcodone-acetaminophen (NORCO)  MG per tablet Take 1 tablet by mouth every 6 hours as needed. .      Coenzyme Q10 (CO Q 10) 100 MG CAPS Take by mouth daily        Benazepril hcl, Feldene [piroxicam], Hytrin [terazosin hcl], Iv contrast [iodides], Acetaminophen, Celebrex [celecoxib], Cephalexin, Hydrochlorothiazide, Iodinated casein, Monopril [fosinopril sodium], Protonix [pantoprazole sodium], Quinapril hcl, Ultracet [tramadol-acetaminophen], and Ziac [bisoprolol-hydrochlorothiazide]    Review of Systems      10-point ROS is negative other than what's mentioned in HPI. Physical Exam        BP (!) 183/94   Pulse 72   Temp 98.2 °F (36.8 °C) (Oral)   Resp 18   Ht 5' 1\" (1.549 m)   Wt 112 lb (50.8 kg)   SpO2 96%   BMI 21.16 kg/m²      Constitutional:       General: He is not in acute distress. Appearance: Normal appearance. Cardiovascular:      Rate and Rhythm: Normal rate and regular rhythm. Pulses: Normal pulses. Pulmonary:      Effort: Pulmonary effort is normal. No respiratory distress. Neurological:      Mental Status: He is alert and oriented to person, place, and time. Mental status is at baseline. Musculoskeletal:  RLE: skin in tact. No sign of distal neurovascular compromise. Imaging       Radiographs: XR pelvis shows displaced R IT fx      Assessment and Plan      81 y/o female with R IT fx    -OR today for R hip IMN  -Cardiology / hospitalist recs note patient optimized. -Will transfuse 1 unit of blood chelsey op given heart history and expected further blood loss. Electronically signed by Mitchell Bo MD on 49/3/7750 at 2:52 PM  This dictation was generated by voice recognition computer software. Although all attempts are made to edit the dictation for accuracy, there may be errors in the transcription that are not intended.

## 2022-11-07 NOTE — ED NOTES
Prt request home dose pain medication Norco- 10-325mg; pain level after PRN Morphine 2mg  @ 9/10; message sent to provider     Kristina Kimbrough RN  11/06/22 7033

## 2022-11-07 NOTE — PLAN OF CARE
Department of Orthopedic Surgery  Physician Assistant   Pre- Rounding Consult Note/Plan of Care Note      Reason for Consult:  right hip pain  Date of Service: 11/7/2022 10:13 AM    HISTORY OF PRESENT ILLNESS:                The patient is a 80 y.o. female who presents with above chief complaint. Pt states she fell last night and landed on her right side. Had immediate pain in the right hip. States she has chronic pain/OA in the right and left knee and sometimes the right knee feels like it gives out and this time it caused her to fall. No new pain in the right knee or ankle. She was unable to get up or walk after the fall. She has an extensive cardiac history managed by Dr Raghavendra Preston at [facility] and states she would want him called before surgery as she was told not to get any procedures done due to her risk in the past.  Also has hx of CKD.       Past Medical History:        Diagnosis Date    Arthritis     Blood circulation, collateral     CAD (coronary artery disease)     CHF (congestive heart failure) (HCC)     Confusion 8/31/2020    GERD (gastroesophageal reflux disease)     History of heart artery stent 12/2018    Hyperlipidemia     Hypertension     Mitral valve prolapse     Myocardial infarct, old     Thyroid disease      Past Surgical History:        Procedure Laterality Date    COLONOSCOPY      EYE SURGERY Left 09/04/2018    PHACO EMULSIFICATION OF CATARACT WITH INTRAOCULAR LENS IMPLANT LEFT EYE     HYSTERECTOMY (CERVIX STATUS UNKNOWN)      OTHER SURGICAL HISTORY  08/27/2018    phacoemulsification of cataract with intraocular lens implant right eye    NM OFFICE/OUTPT VISIT,PROCEDURE ONLY Right 8/27/2018    PHACO EMULSIFICATION OF CATARACT WITH INTRAOCULAR LENS IMPLANT RIGHT EYE performed by Cherelle Rowell MD at [address] [facility] OFFICE/OUTPT [facility] Left 9/4/2018    PHACO EMULSIFICATION OF CATARACT WITH INTRAOCULAR LENS IMPLANT LEFT EYE performed by Cherelle Rowell MD at [facility] Medications Prior to Admission:   Prior to Admission medications    Medication Sig Start Date End Date Taking? Authorizing Provider   furosemide (LASIX) 40 MG tablet TAKE 1 TABLET BY MOUTH DAILY IN THE MORNING 10/17/22   DAVE Amezcua - CNP   apixaban (ELIQUIS) 2.5 MG TABS tablet Take 1 tablet by mouth 2 times daily 10/17/22   [facility], APR[facility] - CNP   aspirin 81 MG chewable tablet Take 1 tablet by mouth daily 10/17/22   [facility]   amiodarone (PACERONE) 100 MG tablet Take 1 tablet by mouth daily 8/24/22   [facility]   Cyanocobalamin (VITAMIN B-12) 1000 MCG extended release tablet TAKE 1 TABLET (1,000 MCG) BY MOUTH DAILY (AM) 7/19/22   Jacki Goldsmith MD   vitamin D3 (CHOLECALCIFEROL) 25 MCG (1000 UT) TABS tablet TAKE 1 TABLET (1,000 IU) BY MOUTH DAILY IN THE MORNING 7/19/22   Jacki Goldsmith MD   atorvastatin (LIPITOR) 80 MG tablet TAKE 1 TABLET BY MOUTH ONE TIME A DAY (EVENING) 7/19/22   Jacki Goldsmith MD   metoprolol succinate (TOPROL XL) 50 MG extended release tablet Take 1 tablet by mouth in the morning. 7/18/22   DAVE Stout CNP   lidocaine 4 % external patch Place 1 patch onto the skin in the morning. As needed for pain. 7/18/22   Debra Shahid DO   ondansetron (ZOFRAN) 4 MG tablet Take 1 tablet by mouth 3 times daily as needed for Nausea or Vomiting 6/30/22   Debra Shahid DO   miconazole (MICOTIN) 2 % powder Apply topically 2 times daily. 6/29/22   Leonor Raman MD   Misc. Devices KIT Rollator. Use as directed.  6/10/22   Debra Shahid DO   nitroGLYCERIN (NITROSTAT) 0.4 MG SL tablet PUT 1 TAB UNDER TONGUE EVERY 5 MIN AS NEEDED CHEST PAIN UP TO 3. IF NO RELIEF AFTER 1 DOSE, CALL 911 4/25/22   Jacki Goldsmith MD   famotidine (PEPCID) 20 MG tablet Take 1 tablet by mouth daily 3/22/22   Sanya Isaacs MD   vitamin B-12 (CYANOCOBALAMIN) 1000 MCG tablet Take 1,000 mcg by mouth daily    Historical Provider, MD   fluticasone (FLONASE) 50 MCG/ACT nasal spray 2 sprays by Each Nostril route daily 7/2/19   Debra Shahid DO   Handicap Placard MISC by Does not apply route Length: 5 years 5/7/19   Debra Shahid DO   HYDROcodone-acetaminophen (NORCO)  MG per tablet Take 1 tablet by mouth every 6 hours as needed. . 1/3/17   Historical Provider, MD   Coenzyme Q10 (CO Q 10) 100 MG CAPS Take by mouth daily    Historical Provider, MD       Allergies:  Benazepril hcl, Feldene [piroxicam], Hytrin [terazosin hcl], Iv contrast [iodides], Acetaminophen, Celebrex [celecoxib], Cephalexin, Hydrochlorothiazide, Iodinated casein, Monopril [fosinopril sodium], Protonix [pantoprazole sodium], Quinapril hcl, Ultracet [tramadol-acetaminophen], and Ziac [bisoprolol-hydrochlorothiazide]    Social History:    Tobacco:  reports that she has never smoked. She has never used smokeless tobacco.   Alcohol:  reports no history of alcohol use. Family History:       Problem Relation Age of Onset    Heart Disease Mother     Heart Disease Father     Cancer Father     Cancer Sister     Diabetes Brother     Stroke Brother        PHYSICAL EXAM:    MUSCULOSKELETAL:  RIGHT HIP:  redness absent  warmth absent  swelling present  tenderness present and noted lateral hip into the groin. Hip is flexed and externally rotated today. She has a right knee effusion but states that is about normal for her. Sensation intact to touch in the leg. Distal pulses intact. range of motion limited at hip but moving foot and ankle.     DATA:    Admission weight: 117 lb (53.1 kg)  5' 1\" (154.9 cm)  VITALS:  BP (!) 192/84   Pulse 72   Temp 98.4 °F (36.9 °C) (Oral)   Resp 18   Ht 5' 1\" (1.549 m)   Wt 112 lb (50.8 kg)   SpO2 98%   BMI 21.16 kg/m²   CBC:   Recent Labs     11/06/22  1502 11/07/22  0557   WBC 7.1 9.7   HGB 9.9* 9.1*    247     BMP:    Recent Labs     11/06/22  1502 11/07/22  0557   * 133*   K 4.1 4.0   CL 97* 99   CO2 31 29   BUN 28* 26*   CREATININE 1.7* 1.5*   GLUCOSE 135* 103*     INR:   Recent Labs     11/06/22  1502 11/07/22  0557   INR 1.17* 1.21*       Radiology:   XR KNEE RIGHT (3 VIEWS)   Final Result   Advanced right knee osteoarthritis with intra-articular loose body, large   knee joint effusion, and slight lateral patellar tilt. No acute fracture or   dislocation. CT LUMBAR SPINE TRAUMA RECONSTRUCTION   Final Result   1. Comminuted, displaced right intertrochanteric femur fracture. 2. Right sacral ala insufficiency fracture, favored to be subacute. 3.  No acute solid organ injury otherwise identified in the abdomen or   pelvis, within the limits of a noncontrast exam.      4.  Distended bladder. 5.  Moderate stool burden throughout the colon. 6.  Chronic findings in the lower thoracic and lumbar spine without acute   lumbar fracture identified. CT ABDOMEN PELVIS WO CONTRAST Additional Contrast? None   Final Result   1. Comminuted, displaced right intertrochanteric femur fracture. 2. Right sacral ala insufficiency fracture, favored to be subacute. 3.  No acute solid organ injury otherwise identified in the abdomen or   pelvis, within the limits of a noncontrast exam.      4.  Distended bladder. 5.  Moderate stool burden throughout the colon. 6.  Chronic findings in the lower thoracic and lumbar spine without acute   lumbar fracture identified. CT HEAD WO CONTRAST   Final Result   1. Chronic findings in the brain without acute CT abnormality identified. 2.  No acute osseous abnormality identified in the cervical spine. 3.  Age indeterminate mild superior endplate deformity of T4.         CT CERVICAL SPINE WO CONTRAST   Final Result   1. Chronic findings in the brain without acute CT abnormality identified. 2.  No acute osseous abnormality identified in the cervical spine. 3.  Age indeterminate mild superior endplate deformity of T4. XR CHEST PORTABLE   Final Result   1.  No evident acute findings in the chest with evaluation for fractures   partially limited by bony demineralization   2. Pulmonary vascular congestion and mild cardiomegaly. 3. Possible trace left pleural effusion. XR HIP RIGHT (2-3 VIEWS)   Final Result   Displaced and angulated intertrochanteric right femur fracture. IMPRESSION/RECOMMENDATIONS:    Assessment: right hip IT fracture    Plan:  1) Will tentatively plan for TFN today with Dr Chelsey Singh pending clearance from medical team and her cardiologist.  Expect her to be high risk regardless and she has chosen to proceed despite that risk given the risk/benefit of surgery versus non-op management. Will obtain consent today. Keep NPO. Continue pain control. Will follow.       Full consult to follow    KEE Ramirez

## 2022-11-07 NOTE — PROGRESS NOTES
AM Shift assessment completed. Pt is alert and oriented. Vital signs are WNL. Respirations are even & easy. C/O right hip pain; Will give PRN when available. Patient states pain is a 9/10 and excruciating. Patient surgical consent signed and placed on soft chart. Pt denies needs at this time. SR up x 2 and bed in low position. Call light is within reach. Will monitor. .Bedside Mobility Assessment Tool (BMAT):     Assessment Level 1- Sit and Shake    1. From a semi-reclined position, ask patient to sit up and rotate to a seated position at the side of the bed. Can use the bedrail. 2. Ask patient to reach out and grab your hand and shake making sure patient reaches across his/her midline. Pass- Patient is able to come to a seated position, maintain core strength. Maintains seated balance while reaching across midline. Move on to Assessment Level 2. Assessment Level 2- Stretch and Point   1. With patient in seated position at the side of the bed, have patient place both feet on the floor (or stool) with knees no higher than hips. 2. Ask patient to stretch one leg and straighten the knee, then bend the ankle/flex and point the toes. If appropriate, repeat with the other leg. Fail- Patient is unable to complete task. Patient is MOBILITY LEVEL 2. Assessment Level 3- Stand   1. Ask patient to elevate off the bed or chair (seated to standing) using an assistive device (cane, bedrail). 2. Patient should be able to raise buttocks off be and hold for a count of five. May repeat once. Fail- Patient unable to demonstrate standing stability. Patient is MOBILITY LEVEL 3. Assessment Level 4- Walk   1. Ask patient to march in place at bedside. 2. Then ask patient to advance step and return each foot. Some medical conditions may render a patient from stepping backwards, use your best clinical judgement. Fail- Patient not able to complete tasks OR requires use of assistive device.  Patient is MOBILITY LEVEL 3.        Mobility Level- 1

## 2022-11-07 NOTE — CONSULTS
CARDIOLOGY CONSULTATION        Patient Name: David Langley  Date of admission: 11/6/2022  2:27 PM  Admission Dx: Closed displaced intertrochanteric fracture of right femur, initial encounter ([facility] [address] Darcy Ganser  Fall from standing, initial encounter [W19. XXXA]  Hip fracture requiring operative repair, right, closed, initial encounter ([facility] [address] Jessie Pérez  Requesting Physician: Dyan Lund MD  Primary Care physician: Layman Room, DO    Reason for Consultation/Chief Complaint: Pre-operative risk assessment     History of Present Illness:     David Langley is a 80 y.o. patient with a prior medical history notable for coronary artery disease status post prior PCI, VT, pAF, embolic CVA, chronic kidney disease, congestive heart failure, severe MR, Severe PH, hyperlipidemia, hypertension, who presented to the hospital with complaints of fall/hip fracture. Cardiology consulted for the above. She follows with Dr. Tawanda Stacy of our practice for coronary artery disease, status post PCI, VT, MR, CM. Last evaluated 9/13/22 at which time noted stable. Continued on coreg/losartan. Evaluated with EP NP prior in august: \"She recently presented to the hospital in June 2022 with complaints of BLE edema, abnormal labs, 14 pound weight gain. Her BNP was noted to be greater than 11,000. She was noted to go into AF/RVR. She underwent DCCV with Dr. Mey Carroll (6/24/2022) and was placed on amiodarone afterwards. \" Noted NSR by ILR. ED course/Vital signs on presentation: EKG NSR, occ PVC, LBBB (known). CXR with pulmonary vascular congestion/cardiomegaly. Trop neg. Chronic anemia. Stable Scr. High BP's in setting of persistent discomfort. Placed on 2L O2 for desaturation with dilaudid. Today, she states she's been doing well until this incident. Tripped on way out of bathroom. Did hit head, no LOC, cognizant throughout. Notes persistent R hip pain, dilaudid helping make more manageable.   Over the prior months, The patient denies chest pain/pressure/heaviness. Denies shortness of breath at rest and progressive dyspnea with exertion. Denies paroxysmal nocturnal dyspnea, orthopnea, increasing lower extremity edema or weight gain. States she stays about 117-118 lbs by home scale. Denies palpitations, dizziness. Past Medical History:   has a past medical history of Arthritis, Blood circulation, collateral, CAD (coronary artery disease), CHF (congestive heart failure) ([address] Confusion, GERD (gastroesophageal reflux disease), History of heart artery stent, Hyperlipidemia, Hypertension, Mitral valve prolapse, Myocardial infarct, old, and Thyroid disease. Surgical History:   has a past surgical history that includes Hysterectomy; Thyroidectomy; Colonoscopy; other surgical history (08/27/2018); pr office/outpt visit,procedure only (Right, 8/27/2018); eye surgery (Left, 09/04/2018); and pr office/outpt visit,procedure only (Left, 9/4/2018). Social History:   reports that she has never smoked. She has never used smokeless tobacco. She reports that she does not drink alcohol and does not use drugs. Family History:  family history includes Cancer in her father and sister; Diabetes in her brother; Heart Disease in her father and mother; Stroke in her brother. Home Medications:  Were reviewed and are listed in nursing record and/or below  Prior to Admission medications    Medication Sig Start Date End Date Taking?  Authorizing Provider   furosemide (LASIX) 40 MG tablet TAKE 1 TABLET BY MOUTH DAILY IN THE MORNING 10/17/22   DAVE Block CNP   apixaban (ELIQUIS) 2.5 MG TABS tablet Take 1 tablet by mouth 2 times daily 10/17/22   DAVE Harry CNP   aspirin 81 MG chewable tablet Take 1 tablet by mouth daily 10/17/22   DAVE Harry CNP   amiodarone (PACERONE) 100 MG tablet Take 1 tablet by mouth daily 8/24/22   DAVE Harry CNP   Cyanocobalamin (VITAMIN B-12) 1000 MCG extended release tablet TAKE 1 TABLET (1,000 MCG) BY MOUTH DAILY (AM) 7/19/22   Farrukh Hernandez MD   vitamin D3 (CHOLECALCIFEROL) 25 MCG (1000 UT) TABS tablet TAKE 1 TABLET (1,000 IU) BY MOUTH DAILY IN THE MORNING 7/19/22   Farrukh Hernandez MD   atorvastatin (LIPITOR) 80 MG tablet TAKE 1 TABLET BY MOUTH ONE TIME A DAY (EVENING) 7/19/22   Farrukh Hernandez MD   metoprolol succinate (TOPROL XL) 50 MG extended release tablet Take 1 tablet by mouth in the morning. 7/18/22   DAVE Moreno CNP   lidocaine 4 % external patch Place 1 patch onto the skin in the morning. As needed for pain. 7/18/22   Debra Shahid DO   ondansetron (ZOFRAN) 4 MG tablet Take 1 tablet by mouth 3 times daily as needed for Nausea or Vomiting 6/30/22   Debra Shahid DO   miconazole (MICOTIN) 2 % powder Apply topically 2 times daily. 6/29/22   Fredy Lane MD   Misc. Devices KIT Rollator. Use as directed. 6/10/22   Debra Shahid DO   nitroGLYCERIN (NITROSTAT) 0.4 MG SL tablet PUT 1 TAB UNDER TONGUE EVERY 5 MIN AS NEEDED CHEST PAIN UP TO 3. IF NO RELIEF AFTER 1 DOSE, CALL 911 4/25/22   Farrukh Hernandez MD   famotidine (PEPCID) 20 MG tablet Take 1 tablet by mouth daily 3/22/22   Tere tSubbs MD   vitamin B-12 (CYANOCOBALAMIN) 1000 MCG tablet Take 1,000 mcg by mouth daily    Historical Provider, MD   fluticasone (FLONASE) 50 MCG/ACT nasal spray 2 sprays by Each Nostril route daily 7/2/19   Debra Shahid DO   Handicap Placard MISC by Does not apply route Length: 5 years 5/7/19   Debra Shahid DO   HYDROcodone-acetaminophen (NORCO)  MG per tablet Take 1 tablet by mouth every 6 hours as needed.  . 1/3/17   Historical Provider, MD   Coenzyme Q10 (CO Q 10) 100 MG CAPS Take by mouth daily    Historical Provider, MD        CURRENT Medications:  HYDROmorphone (DILAUDID) injection 1 mg, Q4H PRN  clindamycin (CLEOCIN) 600 mg in dextrose 5 % 50 mL IVPB, On Call to OR  amiodarone (CORDARONE) tablet 100 mg, Daily  aspirin chewable tablet 81 mg, Daily  atorvastatin (LIPITOR) tablet 80 mg, Nightly  famotidine (PEPCID) tablet 20 mg, Daily  fluticasone (FLONASE) 50 MCG/ACT nasal spray 2 spray, Daily  metoprolol succinate (TOPROL XL) extended release tablet 50 mg, Daily  vitamin B-12 (CYANOCOBALAMIN) tablet 1,000 mcg, Daily  Vitamin D (CHOLECALCIFEROL) tablet 1,000 Units, Daily  morphine (PF) injection 2 mg, Q3H PRN  0.9 % sodium chloride infusion, Continuous  sodium chloride flush 0.9 % injection 5-40 mL, 2 times per day  sodium chloride flush 0.9 % injection 5-40 mL, PRN  0.9 % sodium chloride infusion, PRN  ondansetron (ZOFRAN-ODT) disintegrating tablet 4 mg, Q8H PRN   Or  ondansetron (ZOFRAN) injection 4 mg, Q6H PRN  polyethylene glycol (GLYCOLAX) packet 17 g, Daily PRN  acetaminophen (TYLENOL) tablet 650 mg, Q6H PRN   Or  acetaminophen (TYLENOL) suppository 650 mg, Q6H PRN        Allergies:  Benazepril hcl, Feldene [piroxicam], Hytrin [terazosin hcl], Iv contrast [iodides], Acetaminophen, Celebrex [celecoxib], Cephalexin, Hydrochlorothiazide, Iodinated casein, Monopril [fosinopril sodium], Protonix [pantoprazole sodium], Quinapril hcl, Ultracet [tramadol-acetaminophen], and Ziac [bisoprolol-hydrochlorothiazide]     Review of Systems:   A 14 point review of symptoms completed. Pertinent positives identified in the HPI, all other review of symptoms negative as below.       Objective:     Vitals:    11/06/22 2248 11/07/22 0150 11/07/22 0623 11/07/22 1000   BP:  (!) 192/84  (!) 183/94   Pulse:  72  72   Resp: 18 18 18 18   Temp:  98.4 °F (36.9 °C)  98.2 °F (36.8 °C)   TempSrc:  Oral  Oral   SpO2:  98%  96%   Weight:       Height:          Weight: 112 lb (50.8 kg)       PHYSICAL EXAM:    General:  Elderly woman, no acute distress, mild discomfort owing to hip pain    Head:  Normocephalic, atraumatic   Eyes:  Conjunctiva/corneas clear, anicteric sclerae    Nose: Nares normal, no drainage or sinus tenderness   Throat: No abnormalities of the lips, oral mucosa or tongue. Neck: Trachea midline. Neck supple with no lymphadenopathy, thyroid not enlarged, symmetric, no tenderness/mass/nodules, no Jugular venous pressure elevation/neck vv collapse with inspiration. Lungs:   Clear to auscultation bilaterally, no wheezes, no rales, no respiratory distress   Chest Wall:  No deformity or tenderness to palpation   Heart:  Regular rate and rhythm, normal S1, normal caliber S2, II/VI DONA LLSB, II/VI holosystolic ejection murmur at apex without thrill,  no rub, no S3/S4, +heave   Abdomen:   Soft, non-tender, with normoactive bowel sounds. No masses, no hepatosplenomegaly   Extremities: No cyanosis, clubbing or pitting edema. RLE ext rotated/shortened in comparison with left. Vascular: 2+ radial, 1+ dorsalis pedis and posterior tibial pulses bilaterally. Brisk carotid upstrokes without carotid bruit. Skin: Skin color, texture, turgor are normal with no rashes or ulceration. Pysch: Euthymic mood, appropriate affect   Neurologic: Oriented to person, place and time. No slurred speech or facial asymmetry. No motor or sensory deficits on gross examination.          Labs:   CBC:   Lab Results   Component Value Date/Time    WBC 9.7 11/07/2022 05:57 AM    RBC 3.17 11/07/2022 05:57 AM    HGB 9.1 11/07/2022 05:57 AM    HCT 27.5 11/07/2022 05:57 AM    MCV 86.6 11/07/2022 05:57 AM    RDW 19.9 11/07/2022 05:57 AM     11/07/2022 05:57 AM     CMP:  Lab Results   Component Value Date/Time     11/07/2022 05:57 AM    K 4.0 11/07/2022 05:57 AM    K 3.4 07/06/2022 05:20 AM    CL 99 11/07/2022 05:57 AM    CO2 29 11/07/2022 05:57 AM    BUN 26 11/07/2022 05:57 AM    CREATININE 1.5 11/07/2022 05:57 AM    GFRAA 30 09/21/2022 10:45 AM    AGRATIO 1.4 11/06/2022 03:02 PM    LABGLOM 34 11/07/2022 05:57 AM    GLUCOSE 103 11/07/2022 05:57 AM    PROT 6.7 11/06/2022 03:02 PM    CALCIUM 8.8 11/07/2022 05:57 AM    BILITOT <0.2 11/06/2022 03:02 PM    ALKPHOS 109 11/06/2022 03:02 PM    AST 22 11/06/2022 03:02 PM    ALT 10 11/06/2022 03:02 PM     PT/INR:  No results found for: PTINR  HgBA1c:  Lab Results   Component Value Date    LABA1C 5.5 03/19/2022     Lab Results   Component Value Date    TROPONINI <0.01 11/06/2022       Lab Results   Component Value Date    CHOL 104 06/24/2022    CHOL 150 03/19/2022    CHOL 129 08/20/2021     Lab Results   Component Value Date    TRIG 80 06/24/2022    TRIG 65 03/19/2022    TRIG 104 08/20/2021     Lab Results   Component Value Date    HDL 45 06/24/2022    HDL 84 (H) 03/19/2022    HDL 75 (H) 03/19/2022     Lab Results   Component Value Date    LDLCALC 43 06/24/2022    LDLCALC 53 03/19/2022    LDLCALC 46 03/19/2022     Lab Results   Component Value Date    LABVLDL 16 06/24/2022    LABVLDL 13 03/19/2022    LABVLDL 12 03/19/2022     No results found for: CHOLHDLRATIO     Cardiac Data:     EKG: personally reviewed as above. Telemetry personally reviewed: 1 brief 4 beat run NSVT, sinus predominates     Echo: 8/22/22   The left ventricular systolic function is low normal to mildly reduced with   an ejection fraction of 40-45 %. There is hypokinesis of the inferior and basal/mid septal walls. There is mild concentric left ventricular hypertrophy. Grade II diastolic dysfunction with elevated left ventricular filling   pressure. The left atrium is severely dilated. Severe mitral regurgitation. Systolic flow reversal in pulmonary veins. Moderate tricuspid regurgitation. Systolic pulmonary artery pressure (SPAP) is estimated at 61 mmHg consistent   with severe pulmonary hypertension (Right atrial pressure of 3 mmHg). Stress Test: 6/2022      Summary    There is a small and very mild basal anteroseptal wall fixed defect, most    likely an artifact (RV insertion point). There is a moderate size, moderate intensity, worse with rest images basal    inferolateral wall defect, most likely an artifact (diaphragmatic    attenuation).     There is no evidence of myocardial ischemia or scar. There is moderate LV dilatation with borderline normal systolic function. Left ventricular ejection fraction of 55 %. There are no regional wall motion abnormalities. Overall findings represent a low risk scan. Echo 3/22   Conclusions      Summary   Overall left ventricular systolic function is reduced with an estimated   ejection fraction of 25-30%. There is diffuse hypokinesis of the left ventricle. Diastolic filling parameters suggests grade III diastolic dysfunction. The left atrium is severely dilated. Normal right ventricular size and function. Estimated pulmonary artery systolic pressure is at 51 mmHg assuming a right   atrial pressure of 8 mmHg. A bubble study was performed and fails to show evidence of shunting. Echo 2018  Summary   Compared to last echo on 3/20/2014, 5/20/2016, EF has decreased. Normal left ventricle size and wall thickness. The left ventricular systolic function is moderately reduced with an   ejection fraction of 35-40%. There is hypokinesis of the basal inferior, basal inferolateral and basal   inferoseptal walls. Left ventricular diastolic filling pressure are elevated. Frequent premature beats throughout the study. Moderate posterior mitral annulus calcification. Mild mitral stenosis. Moderate mitral regurgitation. The left atrium is severely dilated. Aortic valve appears sclerotic but opens adequately. Trace aortic regurgitation. Cardiac catheterization: 2018     RESULTS:  HEMODYNAMICS:  Left ventricular end-diastolic pressure equals 20. There  is no significant gradient across the aortic valve by pullback post  cineangiography. LEFT VENTRICULOGRAM:  Left ventriculogram demonstrates global  hypokinesis. Of note,  there was no area of akinesis. The anterior  wall did contract, but again was hypokinetic. Estimated ejection  fraction 40%.      LEFT MAIN:  Left main is a short vessel that gave almost immediate rise  to LAD and circumflex. It is free of significant obstructive disease. LEFT ANTERIOR DESCENDING:  The LAD courses to and wraps around the apex. It is a large vessel. It gives off multiple small diagonal branches. In the proximal portion of vessel, there is a ruptured plaque. There is  80% narrowing in this area. There is diffuse disease, but no  significant focal obstructive disease. LEFT CIRCUMFLEX:  Circumflex consists of high rising marginal branch, a  very large second marginal branch courses on the AV groove and then  gives off small posterolateral branch. The circumflex is free of  significant obstructive disease. RIGHT CORONARY ARTERY:  Right coronary artery is a large dominant  vessel. It gives off a large PDA and several distal posterolateral  branches. No significant obstructive disease in the right coronary  artery. LEFT ANTERIOR DESCENDING POST INTERVENTION:  There is an 80% stenosis of  the ruptured plaque. Post intervention, there was minimal if any  residual stenosis. IMPRESSION:  1.  LV dysfunction consistent with cardiomyopathy. 2.  Ventricular tachycardia. 3.  Abnormal Myoview. 4.  LV dysfunction with estimated ejection fraction of 40% with global  hypokinesis. 5.  Successful primary stent to LAD. Additional studies:     Impression and Plan:      Pre-operative risk assessment, high risk  -The patient is elevated risk for major adverse cardiac events for noncardiac surgery. May proceed to the operating room at the discretion of the attending surgeon. Please call with any questions or concerns that may arise.  -agree with spinal anesthesia to mitigate risk  -avoid high afterload. We need to get her BP down  -euvoelmic to vol deplete on entering surgery later today    Coronary artery disease with prior MI, status post proximal LAD stent 2018  -aspirin, statin, BB; continue aspirin and BB throughout as able.      Cardiomyopathy with recovery of EF to 45% by last echo  -Metoprolol XL once daily  -no ACE/ARB/ARNI; allergy to benazapril (shortness of breath) prior  -improvement in EF on serial echo    Severe MR  Moderate TR  Severe PH  -continue medical management given co-morbidities    Embolic CVA  Paroxysmal atrial fibrillation  status post DCCV 6/24/22  S/p ILR  -[id number] mg oral daily; continue as able to suppress post-op arrhythmia  -eliquis has been held; restart as soon as possible  -continue beta blocker throughout chelsey-op period       Surgery anticipate today. High risk. Discussed R/B/A and she is electing to proceed. Anticipate possible post-operative arrhythmia, difficulty with fluid balance and worsening PH/overload in setting high afterload. Plan accordingly with anesthesia as able. We will plan to assess post-op and follow along. Discussed the above with family and patient who are comfortable proceeding later today. Call with any questions/concerns that arise. [id number] Score for Atrial Fibrillation Stroke Risk   Risk   Factors  Component Value   C CHF Yes 1   H HTN Yes 1   A2 Age >= 76 Yes,  (80 y.o.) 2   D DM No 0   S2 Prior Stroke/TIA Yes 2   V Vascular Disease No 0   A Age 74-69 No,  (80 y.o.) 0   Sc Sex female 1    [id number]  Score  7   Score last updated 11/7/22 04:84 AM EST    Click here for a link to the UpToDate guideline \"Atrial Fibrillation: Anticoagulation therapy to prevent embolization    Disclaimer: Risk Score calculation is dependent on accuracy of patient problem list and past encounter diagnosis.         Patient Active Problem List   Diagnosis    Other and unspecified angina pectoris    Coronary atherosclerosis of native coronary artery    Other chronic pulmonary heart diseases    Mitral valve disorder    Acute combined systolic and diastolic heart failure (HCC)    Chronic low back pain    Essential hypertension    AGUILA (acute kidney injury) ([facility] [address]    Renal insufficiency    Gout    Pain in joint, hand    Mixed hyperlipidemia    Primary insomnia    Closed stable burst fracture of first lumbar vertebra (HCC)    GERD (gastroesophageal reflux disease)    Palpitations    V-tach    Abnormal myocardial perfusion study    Ischemic cardiomyopathy    Cardiomyopathy ([facility] [address]    PVD (posterior vitreous detachment), both eyes    Posterior subcapsular polar age-related cataract of both eyes    Confusion    Chronic kidney disease, stage IV (severe) ([address]    Visual field defect    Stroke of unknown etiology ([facility] [address]    Encounter for loop recorder check    Atrial fibrillation (HCC)    CHF (congestive heart failure), NYHA class I, acute on chronic, combined ([address]    On amiodarone therapy    On continuous oral anticoagulation    Near syncope    Bradycardia    Hypokalemia    Hyponatremia    Closed displaced intertrochanteric fracture of right femur ([address]    Fall from standing    History of CVA (cerebrovascular accident)         I will address the patient's cardiac risk factors and adjusted pharmacologic treatment as needed. In addition, I have reinforced the need for patient directed risk factor modification. All questions and concerns were addressed to the patient/family. Alternatives to my treatment were discussed. Thank you for allowing us to participate in the care of Kim Cerna. Please call me with any questions [phone].     Paola Gautam MD, [facility] [place]  Cardiovascular Disease  [facility]  [phone] [facility]  [phone] [address] [place] [address]  11/7/2022 11:30 AM

## 2022-11-07 NOTE — ANESTHESIA POSTPROCEDURE EVALUATION
Department of Anesthesiology  Postprocedure Note    Patient: Kiarra Rodriguez  MRN: [id number]  YOB: 1935  Date of evaluation: 11/7/2022      Procedure Summary     Date: 11/07/22 Room / Location: [place] [id number] / [facility]    Anesthesia Start: 1501 Anesthesia Stop: 1642    Procedure: RIGHT HIP INTRAMEDULLARY NAILING (Right: Hip) Diagnosis:       Closed fracture of right hip, initial encounter ([facility] [address]      (Closed fracture of right hip, initial encounter ([facility] [address] Yanet Meyer)    Surgeons: Meghan Loya MD Responsible Provider: Dina Grimaldo MD    Anesthesia Type: general ASA Status: 3          Anesthesia Type: No value filed.     Ismael Phase I: Ismael Score: 8    Isamel Phase II:        Anesthesia Post Evaluation    Patient location during evaluation: PACU  Level of consciousness: awake  Airway patency: patent  Nausea & Vomiting: no nausea  Complications: no  Cardiovascular status: blood pressure returned to baseline  Respiratory status: acceptable  Hydration status: euvolemic

## 2022-11-07 NOTE — PLAN OF CARE
Problem: Discharge Planning  Goal: Discharge to home or other facility with appropriate resources  Outcome: Progressing  Flowsheets (Taken 11/6/2022 2234)  Discharge to home or other facility with appropriate resources:   Identify barriers to discharge with patient and caregiver   Identify discharge learning needs (meds, wound care, etc)   Refer to discharge planning if patient needs post-hospital services based on physician order or complex needs related to functional status, cognitive ability or social support system   Arrange for needed discharge resources and transportation as appropriate     Problem: Pain  Goal: Verbalizes/displays adequate comfort level or baseline comfort level  Outcome: Progressing     Problem: Safety - Adult  Goal: Free from fall injury  Outcome: Progressing     Problem: Skin/Tissue Integrity  Goal: Absence of new skin breakdown  Description: 1. Monitor for areas of redness and/or skin breakdown  2. Assess vascular access sites hourly  3. Every 4-6 hours minimum:  Change oxygen saturation probe site  4. Every 4-6 hours:  If on nasal continuous positive airway pressure, respiratory therapy assess nares and determine need for appliance change or resting period.   Outcome: Progressing

## 2022-11-07 NOTE — H&P
Hospital Medicine History & Physical      PCP: Eugene Foley DO    Date of Admission: 11/6/2022    Date of Service: Pt seen/examined on 11/7/2022     Chief Complaint:    Chief Complaint   Patient presents with    Fall     Pt tripped in wisdom at home; hit back of no LOC; taking blood thinners; c/o right hip pain, rotation to right hip noted          History Of Present Illness: The patient is a 80 y.o. female with pmhx of CAD, atrial fibrillation, CHF, HTN, HLD, GERD  who presented to [facility] ED with complaint of fall at home. She was walking at home and tripped over her feet, fell onto the ground onto her right hip. She did hit her head on the wall but denies LOC. She had instant right hip pain and was unable to get up. Her son lives at home with her and was able to help lift her up. Denies any other injuries. No neck or back pain. Denies headache, vision changes, dizziness. No numbness or tingling of extremities. She has had some falls at home in the past but none recently, she does use a walker to get around.      Past Medical History:        Diagnosis Date    Arthritis     Blood circulation, collateral     CAD (coronary artery disease)     CHF (congestive heart failure) (HCC)     Confusion 8/31/2020    GERD (gastroesophageal reflux disease)     History of heart artery stent 12/2018    Hyperlipidemia     Hypertension     Mitral valve prolapse     Myocardial infarct, old     Thyroid disease        Past Surgical History:        Procedure Laterality Date    COLONOSCOPY      EYE SURGERY Left 09/04/2018    PHACO EMULSIFICATION OF CATARACT WITH INTRAOCULAR LENS IMPLANT LEFT EYE     HYSTERECTOMY (CERVIX STATUS UNKNOWN)      OTHER SURGICAL HISTORY  08/27/2018    phacoemulsification of cataract with intraocular lens implant right eye    LA OFFICE/OUTPT VISIT,PROCEDURE ONLY Right 8/27/2018    PHACO EMULSIFICATION OF CATARACT WITH INTRAOCULAR LENS IMPLANT RIGHT EYE performed by Chasidy Price MD at [address] [place] [address] OFFICE/OUTPT VISIT,PROCEDURE ONLY Left 9/4/2018    PHACO EMULSIFICATION OF CATARACT WITH INTRAOCULAR LENS IMPLANT LEFT EYE performed by Scott Smith MD at [facility]         Medications Prior to Admission:    Prior to Admission medications    Medication Sig Start Date End Date Taking? Authorizing Provider   furosemide (LASIX) 40 MG tablet TAKE 1 TABLET BY MOUTH DAILY IN THE MORNING 10/17/22   Mamtaster Gregoryen, APRN - CNP   apixaban (ELIQUIS) 2.5 MG TABS tablet Take 1 tablet by mouth 2 times daily 10/17/22   Elease Folds, APRN - CNP   aspirin 81 MG chewable tablet Take 1 tablet by mouth daily 10/17/22   Elease Folds, APRN - CNP   amiodarone (PACERONE) 100 MG tablet Take 1 tablet by mouth daily 8/24/22   Elease Folds, DAVE - CNP   Cyanocobalamin (VITAMIN B-12) 1000 MCG extended release tablet TAKE 1 TABLET (1,000 MCG) BY MOUTH DAILY (AM) 7/19/22   Blayne Calle MD   vitamin D3 (CHOLECALCIFEROL) 25 MCG (1000 UT) TABS tablet TAKE 1 TABLET (1,000 IU) BY MOUTH DAILY IN THE MORNING 7/19/22   Blayne Calle MD   atorvastatin (LIPITOR) 80 MG tablet TAKE 1 TABLET BY MOUTH ONE TIME A DAY (EVENING) 7/19/22   Blayne Calle MD   metoprolol succinate (TOPROL XL) 50 MG extended release tablet Take 1 tablet by mouth in the morning. 7/18/22   DAVE Burgess CNP   lidocaine 4 % external patch Place 1 patch onto the skin in the morning. As needed for pain. 7/18/22   Debra Shahdi, DO   ondansetron (ZOFRAN) 4 MG tablet Take 1 tablet by mouth 3 times daily as needed for Nausea or Vomiting 6/30/22   Debra Shahid, DO   miconazole (MICOTIN) 2 % powder Apply topically 2 times daily. 6/29/22   Aleksandar Hair MD   Misc. Devices KIT Rollator. Use as directed.  6/10/22   Debra Shahid DO   nitroGLYCERIN (NITROSTAT) 0.4 MG SL tablet PUT 1 TAB UNDER TONGUE EVERY 5 MIN AS NEEDED CHEST PAIN UP TO 3. IF NO RELIEF AFTER 1 DOSE, CALL 911 4/25/22   Blayne Calle MD   famotidine (PEPCID) 20 MG tablet Take 1 tablet by mouth daily 3/22/22   Adilia Stock MD   vitamin B-12 (CYANOCOBALAMIN) 1000 MCG tablet Take 1,000 mcg by mouth daily    Historical Provider, MD   fluticasone (FLONASE) 50 MCG/ACT nasal spray 2 sprays by Each Nostril route daily 7/2/19   Debra Shahid DO   Handicap Placard MISC by Does not apply route Length: 5 years 5/7/19   Debra Shahid DO   HYDROcodone-acetaminophen (NORCO)  MG per tablet Take 1 tablet by mouth every 6 hours as needed. . 1/3/17   Historical Provider, MD   Coenzyme Q10 (CO Q 10) 100 MG CAPS Take by mouth daily    Historical Provider, MD       Allergies:  Benazepril hcl, Feldene [piroxicam], Hytrin [terazosin hcl], Iv contrast [iodides], Acetaminophen, Celebrex [celecoxib], Cephalexin, Hydrochlorothiazide, Iodinated casein, Monopril [fosinopril sodium], Protonix [pantoprazole sodium], Quinapril hcl, Ultracet [tramadol-acetaminophen], and Ziac [bisoprolol-hydrochlorothiazide]    Social History:  The patient currently lives at home with  and son     TOBACCO:   reports that she has never smoked. She has never used smokeless tobacco.  ETOH:   reports no history of alcohol use.       Family History:   Positive as follows:        Problem Relation Age of Onset    Heart Disease Mother     Heart Disease Father     Cancer Father     Cancer Sister     Diabetes Brother     Stroke Brother        REVIEW OF SYSTEMS:       Constitutional: Negative for fever   HENT: Negative for sore throat   Eyes: Negative for redness   Respiratory: Negative  for dyspnea, cough   Cardiovascular: Negative for chest pain   Gastrointestinal: Negative for vomiting, diarrhea   Genitourinary: Negative for hematuria   Musculoskeletal: +right hip pain   Skin: Negative for rash   Neurological: Negative for syncope   Hematological: Negative for adenopathy   Psychiatric/Behavorial: Negative for anxiety    PHYSICAL EXAM:    BP (!) 192/84   Pulse 72   Temp 98.4 °F (36.9 °C) (Oral)   Resp 18   Ht 5' 1\" (1.549 m)   Wt 112 lb (50.8 kg)   SpO2 98%   BMI 21.16 kg/m²     Gen: No distress. Alert. +Elderly female   Eyes: PERRL. No sclera icterus. No conjunctival injection. Neck: No JVD. No Carotid Bruit. Trachea midline. Resp: No accessory muscle use. No crackles. No wheezes. No rhonchi. CV: Regular rate. Regular rhythm. No murmur. No rub. No edema. Peripheral Pulses: +2 palpable, equal bilaterally   GI: Non-tender. Non-distended. No masses. No organomegaly. Normal bowel sounds. No hernia. Skin: Warm and dry. No nodule on exposed extremities. No rash on exposed extremities. M/S: No cyanosis. No joint deformity. No clubbing. ++right lower extremity shortening and external rotation, tenderness to palpation of right hip, +right knee with significant joint swelling, +sensation and pulses intact bilaterally, ROM not examined 2/2 to pain  Neuro: Awake. Grossly nonfocal    Psych: Oriented x 3. No anxiety or agitation.      Lab Results   Component Value Date    WBC 9.7 11/07/2022    HGB 9.1 (L) 11/07/2022    HCT 27.5 (L) 11/07/2022    MCV 86.6 11/07/2022     11/07/2022     Lab Results   Component Value Date     (L) 11/07/2022    K 4.0 11/07/2022    CL 99 11/07/2022    CO2 29 11/07/2022    BUN 26 (H) 11/07/2022    CREATININE 1.5 (H) 11/07/2022    GLUCOSE 103 (H) 11/07/2022    CALCIUM 8.8 11/07/2022    PROT 6.7 11/06/2022    LABALBU 3.3 (L) 11/07/2022    BILITOT <0.2 11/06/2022    ALKPHOS 109 11/06/2022    AST 22 11/06/2022    ALT 10 11/06/2022    LABGLOM 34 (A) 11/07/2022    GFRAA 30 (A) 09/21/2022    AGRATIO 1.4 11/06/2022    GLOB 2.6 08/20/2021           CARDIAC ENZYMES  Recent Labs     11/06/22  1502   TROPONINI <0.01       U/A:    Lab Results   Component Value Date/Time    NITRITE neg 01/23/2014 03:00 PM    COLORU Yellow 11/06/2022 09:01 PM    WBCUA 0-2 11/06/2022 09:01 PM    RBCUA 0-2 11/06/2022 09:01 PM    BACTERIA Rare 11/06/2022 09:01 PM    CLARITYU Clear 11/06/2022 09:01 PM    SPECGRAV 1.015 11/06/2022 09:01 PM    LEUKOCYTESUR Negative 11/06/2022 09:01 PM    BLOODU Negative 11/06/2022 09:01 PM    GLUCOSEU Negative 11/06/2022 09:01 PM    AMORPHOUS 1+ 01/12/2014 11:30 PM       CULTURES  COVID and Flu not detected     EKG:  Sinus rhythm with occasional Premature ventricular complexesLeft bundle branch blockAbnormal ECGWhen compared with ECG of 06-JUL-2022 06:43,Premature ventricular complexes are now PresentConfirmed by Gregorio Carrillo MD, [address] ([id number]) on 11/6/2022 8:52:08 PM    RADIOLOGY  XR KNEE RIGHT (3 VIEWS)   Final Result   Advanced right knee osteoarthritis with intra-articular loose body, large   knee joint effusion, and slight lateral patellar tilt. No acute fracture or   dislocation. CT LUMBAR SPINE TRAUMA RECONSTRUCTION   Final Result   1. Comminuted, displaced right intertrochanteric femur fracture. 2. Right sacral ala insufficiency fracture, favored to be subacute. 3.  No acute solid organ injury otherwise identified in the abdomen or   pelvis, within the limits of a noncontrast exam.      4.  Distended bladder. 5.  Moderate stool burden throughout the colon. 6.  Chronic findings in the lower thoracic and lumbar spine without acute   lumbar fracture identified. CT ABDOMEN PELVIS WO CONTRAST Additional Contrast? None   Final Result   1. Comminuted, displaced right intertrochanteric femur fracture. 2. Right sacral ala insufficiency fracture, favored to be subacute. 3.  No acute solid organ injury otherwise identified in the abdomen or   pelvis, within the limits of a noncontrast exam.      4.  Distended bladder. 5.  Moderate stool burden throughout the colon. 6.  Chronic findings in the lower thoracic and lumbar spine without acute   lumbar fracture identified. CT HEAD WO CONTRAST   Final Result   1. Chronic findings in the brain without acute CT abnormality identified. 2.  No acute osseous abnormality identified in the cervical spine.       3.  Age indeterminate mild superior endplate deformity of T4.         CT CERVICAL SPINE WO CONTRAST   Final Result   1. Chronic findings in the brain without acute CT abnormality identified. 2.  No acute osseous abnormality identified in the cervical spine. 3.  Age indeterminate mild superior endplate deformity of T4. XR CHEST PORTABLE   Final Result   1. No evident acute findings in the chest with evaluation for fractures   partially limited by bony demineralization   2. Pulmonary vascular congestion and mild cardiomegaly. 3. Possible trace left pleural effusion. XR HIP RIGHT (2-3 VIEWS)   Final Result   Displaced and angulated intertrochanteric right femur fracture. Pertinent previous results reviewed   Echocardiogram from 8/22/22  Summary  The left ventricular systolic function is low normal to mildly reduced with an ejection fraction of 40-45 %. There is hypokinesis of the inferior and basal/mid septal walls. There is mild concentric left ventricular hypertrophy. Grade II diastolic dysfunction with elevated left ventricular filling pressure. The left atrium is severely dilated. Severe mitral regurgitation. Systolic flow reversal in pulmonary veins. Moderate tricuspid regurgitation. Systolic pulmonary artery pressure (SPAP) is estimated at 61 mmHg consistent with severe pulmonary hypertension (Right atrial  pressure of 3 mmHg). Kal Anthony MD have reviewed the chart on Rea Smith and personally interviewed and examined patient, reviewed the data (labs and imaging) and after discussion with my PA formulated the plan. Agree with note with the following edits. HPI:     Patient is a pleasant 80-year-old white female with a past medical history of coronary artery disease, chronic atrial fibrillation, CHF-systolic, hypertension, hyperlipidemia, GERD who came to the emergency room after she had a mechanical fall at home.   There is no loss of consciousness. She tripped and fell on her right hip. She hit her head but there is no LOC. She had right hip pain and she was unable to get up. She was brought to the emergency room. Work-up in the ER showed a right hip fracture. Patient continues to have pain in the right hip. No chest pain. I reviewed the patient's Past Medical History, Past Surgical History, Medications, and Allergies. Physical exam:    BP (!) 183/94   Pulse 72   Temp 98.2 °F (36.8 °C) (Oral)   Resp 18   Ht 5' 1\" (1.549 m)   Wt 112 lb (50.8 kg)   SpO2 96%   BMI 21.16 kg/m²     Gen: No distress. Alert. Eyes: PERRL. No sclera icterus. No conjunctival injection. ENT: No discharge. Pharynx clear. Neck: Trachea midline. Normal thyroid. Resp: No accessory muscle use. No crackles. No wheezes. No rhonchi. No dullness on percussion. CV: Regular rate. Regular rhythm. No murmur or rub. No edema. GI: Non-tender. Non-distended. No masses. No organomegaly. Normal bowel sounds. No hernia. Skin: Warm and dry. No nodule on exposed extremities. No rash on exposed extremities. Lymph: No cervical LAD. No supraclavicular LAD. M/S: Right lower extremity is shortened, abducted and internally rotated. Neuro: Awake. Reflexes 2+ symmetric bilaterally. Moves all 4 extremities, non focal  Psych: Oriented x 3. No anxiety or agitation. ASSESSMENT/PLAN:  #Mechanical fall   #Right displaced hip fracture, intertrochanteric   -CT head and spine neg for acute pathology   -NPO   -holding eliquis   -dilaudid prn for pain control   -ortho consulted  -cardiology consulted for clearance prior to surgery. Medically cleared if cardiology gives clearance. Patient understands she is higher risk.  Her Revised cardiac risk index is 3.  -PT/OT when able     #Right knee OA with loose body, large effusion and lateral patellar lift   -ortho consulted  -PT/OT when ready   -dilaudid prn for pain     #HTN   -on toprol xl     #CKD Stage IV -Cr baseline ~1.8-2  -1.7 on presentation   -continue to monitor     #Normocytic Anemia, chronic   -Hgb around baseline   -hx of Iron deficiency but not on PO supplement at home   -repeat iron studies pending   -continue to monitor     #Hyponatremia   -IVF   -continue to monitor     #Atrial fibrillation   -s/p cardioversion   -NSR   -on amiodarone and toprol XL   -on eliquis for AC, holding 2/2 to possible surgery     #Ischemic cardiomyopathy   #Chronic combined systolic and diastolic HF   -echo with EF 40-45%  -on BB, ASA, statin     #CAD s/p PCI   -on ASA, statin    #HX of CVA   -s/p loop recorder showed a fib   -on ASA, statin     #HLD   -on statin     #GERD   -on pepcid     DVT Prophylaxis: SCDs  Diet: Diet NPO  Code Status: Full Code    KEE Jonas  11/07/22  9:59 AM    Rafat Baez MD 11/7/2022 10:23 AM

## 2022-11-08 ENCOUNTER — TELEPHONE (OUTPATIENT)
Dept: ORTHOPEDIC SURGERY | Age: 87
End: 2022-11-08

## 2022-11-08 LAB
ALBUMIN SERPL-MCNC: 3.1 G/DL (ref 3.4–5)
ANION GAP SERPL CALCULATED.3IONS-SCNC: 8 MMOL/L (ref 3–16)
BASOPHILS ABSOLUTE: 0 K/UL (ref 0–0.2)
BASOPHILS RELATIVE PERCENT: 0.1 %
BUN BLDV-MCNC: 26 MG/DL (ref 7–20)
CALCIUM SERPL-MCNC: 8.3 MG/DL (ref 8.3–10.6)
CHLORIDE BLD-SCNC: 106 MMOL/L (ref 99–110)
CO2: 25 MMOL/L (ref 21–32)
CREAT SERPL-MCNC: 1.6 MG/DL (ref 0.6–1.2)
EOSINOPHILS ABSOLUTE: 0 K/UL (ref 0–0.6)
EOSINOPHILS RELATIVE PERCENT: 0 %
GFR SERPL CREATININE-BSD FRML MDRD: 31 ML/MIN/{1.73_M2}
GLUCOSE BLD-MCNC: 142 MG/DL (ref 70–99)
HCT VFR BLD CALC: 26.6 % (ref 36–48)
HEMOGLOBIN: 8.8 G/DL (ref 12–16)
LYMPHOCYTES ABSOLUTE: 0.6 K/UL (ref 1–5.1)
LYMPHOCYTES RELATIVE PERCENT: 6.2 %
MCH RBC QN AUTO: 28.7 PG (ref 26–34)
MCHC RBC AUTO-ENTMCNC: 33 G/DL (ref 31–36)
MCV RBC AUTO: 87.1 FL (ref 80–100)
MONOCYTES ABSOLUTE: 0.7 K/UL (ref 0–1.3)
MONOCYTES RELATIVE PERCENT: 6.9 %
NEUTROPHILS ABSOLUTE: 8.6 K/UL (ref 1.7–7.7)
NEUTROPHILS RELATIVE PERCENT: 86.8 %
PDW BLD-RTO: 18.6 % (ref 12.4–15.4)
PHOSPHORUS: 5.2 MG/DL (ref 2.5–4.9)
PLATELET # BLD: 174 K/UL (ref 135–450)
PMV BLD AUTO: 8.2 FL (ref 5–10.5)
POTASSIUM SERPL-SCNC: 4.7 MMOL/L (ref 3.5–5.1)
RBC # BLD: 3.05 M/UL (ref 4–5.2)
SODIUM BLD-SCNC: 139 MMOL/L (ref 136–145)
WBC # BLD: 9.9 K/UL (ref 4–11)

## 2022-11-08 PROCEDURE — 2580000003 HC RX 258: Performed by: STUDENT IN AN ORGANIZED HEALTH CARE EDUCATION/TRAINING PROGRAM

## 2022-11-08 PROCEDURE — 97530 THERAPEUTIC ACTIVITIES: CPT

## 2022-11-08 PROCEDURE — 1200000000 HC SEMI PRIVATE

## 2022-11-08 PROCEDURE — 6370000000 HC RX 637 (ALT 250 FOR IP): Performed by: INTERNAL MEDICINE

## 2022-11-08 PROCEDURE — 97166 OT EVAL MOD COMPLEX 45 MIN: CPT

## 2022-11-08 PROCEDURE — 85025 COMPLETE CBC W/AUTO DIFF WBC: CPT

## 2022-11-08 PROCEDURE — 6370000000 HC RX 637 (ALT 250 FOR IP): Performed by: STUDENT IN AN ORGANIZED HEALTH CARE EDUCATION/TRAINING PROGRAM

## 2022-11-08 PROCEDURE — 99233 SBSQ HOSP IP/OBS HIGH 50: CPT | Performed by: INTERNAL MEDICINE

## 2022-11-08 PROCEDURE — 97162 PT EVAL MOD COMPLEX 30 MIN: CPT

## 2022-11-08 PROCEDURE — 6360000002 HC RX W HCPCS: Performed by: STUDENT IN AN ORGANIZED HEALTH CARE EDUCATION/TRAINING PROGRAM

## 2022-11-08 PROCEDURE — 97535 SELF CARE MNGMENT TRAINING: CPT

## 2022-11-08 PROCEDURE — 6360000002 HC RX W HCPCS: Performed by: INTERNAL MEDICINE

## 2022-11-08 PROCEDURE — APPNB60 APP NON BILLABLE TIME 46-60 MINS: Performed by: PHYSICIAN ASSISTANT

## 2022-11-08 PROCEDURE — 94761 N-INVAS EAR/PLS OXIMETRY MLT: CPT

## 2022-11-08 PROCEDURE — 2700000000 HC OXYGEN THERAPY PER DAY

## 2022-11-08 PROCEDURE — 80069 RENAL FUNCTION PANEL: CPT

## 2022-11-08 PROCEDURE — 97110 THERAPEUTIC EXERCISES: CPT

## 2022-11-08 PROCEDURE — 36415 COLL VENOUS BLD VENIPUNCTURE: CPT

## 2022-11-08 RX ADMIN — HYDROMORPHONE HYDROCHLORIDE 0.5 MG: 1 INJECTION, SOLUTION INTRAMUSCULAR; INTRAVENOUS; SUBCUTANEOUS at 08:39

## 2022-11-08 RX ADMIN — HYDROCODONE BITARTRATE AND ACETAMINOPHEN 2 TABLET: 5; 325 TABLET ORAL at 11:29

## 2022-11-08 RX ADMIN — ATORVASTATIN CALCIUM 80 MG: 40 TABLET, FILM COATED ORAL at 22:16

## 2022-11-08 RX ADMIN — APIXABAN 2.5 MG: 5 TABLET, FILM COATED ORAL at 22:16

## 2022-11-08 RX ADMIN — HYDROMORPHONE HYDROCHLORIDE 0.5 MG: 1 INJECTION, SOLUTION INTRAMUSCULAR; INTRAVENOUS; SUBCUTANEOUS at 23:48

## 2022-11-08 RX ADMIN — Medication 1000 UNITS: at 08:44

## 2022-11-08 RX ADMIN — SODIUM CHLORIDE: 9 INJECTION, SOLUTION INTRAVENOUS at 13:42

## 2022-11-08 RX ADMIN — METOPROLOL SUCCINATE 50 MG: 50 TABLET, EXTENDED RELEASE ORAL at 08:45

## 2022-11-08 RX ADMIN — FAMOTIDINE 20 MG: 20 TABLET ORAL at 08:44

## 2022-11-08 RX ADMIN — APIXABAN 2.5 MG: 5 TABLET, FILM COATED ORAL at 08:45

## 2022-11-08 RX ADMIN — HYDROCODONE BITARTRATE AND ACETAMINOPHEN 2 TABLET: 5; 325 TABLET ORAL at 22:17

## 2022-11-08 RX ADMIN — ASPIRIN 81 MG: 81 TABLET, CHEWABLE ORAL at 08:44

## 2022-11-08 RX ADMIN — AMIODARONE HYDROCHLORIDE 100 MG: 200 TABLET ORAL at 08:44

## 2022-11-08 RX ADMIN — FLUTICASONE PROPIONATE 2 SPRAY: 50 SPRAY, METERED NASAL at 08:46

## 2022-11-08 RX ADMIN — HYDROCODONE BITARTRATE AND ACETAMINOPHEN 2 TABLET: 5; 325 TABLET ORAL at 03:43

## 2022-11-08 RX ADMIN — HYDROMORPHONE HYDROCHLORIDE 0.5 MG: 1 INJECTION, SOLUTION INTRAMUSCULAR; INTRAVENOUS; SUBCUTANEOUS at 17:08

## 2022-11-08 RX ADMIN — CEFAZOLIN 2000 MG: 2 INJECTION, POWDER, FOR SOLUTION INTRAMUSCULAR; INTRAVENOUS at 06:42

## 2022-11-08 RX ADMIN — Medication 1000 MCG: at 08:44

## 2022-11-08 ASSESSMENT — PAIN SCALES - GENERAL
PAINLEVEL_OUTOF10: 8
PAINLEVEL_OUTOF10: 7
PAINLEVEL_OUTOF10: 8
PAINLEVEL_OUTOF10: 8
PAINLEVEL_OUTOF10: 7
PAINLEVEL_OUTOF10: 3
PAINLEVEL_OUTOF10: 8
PAINLEVEL_OUTOF10: 8

## 2022-11-08 ASSESSMENT — PAIN DESCRIPTION - DESCRIPTORS
DESCRIPTORS: ACHING
DESCRIPTORS: THROBBING
DESCRIPTORS: ACHING;SORE;DISCOMFORT
DESCRIPTORS: ACHING;STABBING
DESCRIPTORS: ACHING

## 2022-11-08 ASSESSMENT — PAIN DESCRIPTION - LOCATION
LOCATION: HIP;LEG
LOCATION: HIP

## 2022-11-08 ASSESSMENT — PAIN - FUNCTIONAL ASSESSMENT
PAIN_FUNCTIONAL_ASSESSMENT: ACTIVITIES ARE NOT PREVENTED
PAIN_FUNCTIONAL_ASSESSMENT: PREVENTS OR INTERFERES SOME ACTIVE ACTIVITIES AND ADLS
PAIN_FUNCTIONAL_ASSESSMENT: PREVENTS OR INTERFERES SOME ACTIVE ACTIVITIES AND ADLS
PAIN_FUNCTIONAL_ASSESSMENT: ACTIVITIES ARE NOT PREVENTED
PAIN_FUNCTIONAL_ASSESSMENT: ACTIVITIES ARE NOT PREVENTED
PAIN_FUNCTIONAL_ASSESSMENT: PREVENTS OR INTERFERES SOME ACTIVE ACTIVITIES AND ADLS

## 2022-11-08 ASSESSMENT — PAIN DESCRIPTION - DIRECTION: RADIATING_TOWARDS: RLE

## 2022-11-08 ASSESSMENT — PAIN DESCRIPTION - ORIENTATION
ORIENTATION: LEFT
ORIENTATION: RIGHT

## 2022-11-08 ASSESSMENT — PAIN DESCRIPTION - FREQUENCY
FREQUENCY: INTERMITTENT
FREQUENCY: INTERMITTENT

## 2022-11-08 ASSESSMENT — ENCOUNTER SYMPTOMS
RESPIRATORY NEGATIVE: 1
GASTROINTESTINAL NEGATIVE: 1

## 2022-11-08 ASSESSMENT — PAIN DESCRIPTION - ONSET: ONSET: ON-GOING

## 2022-11-08 ASSESSMENT — PAIN SCALES - WONG BAKER: WONGBAKER_NUMERICALRESPONSE: 0

## 2022-11-08 ASSESSMENT — PAIN DESCRIPTION - PAIN TYPE
TYPE: SURGICAL PAIN
TYPE: SURGICAL PAIN

## 2022-11-08 NOTE — PROGRESS NOTES
PM Shift report given to Jerod Rivera RN at Bedside. Patient is stable. All end of shift needs have been met. No further assistance needed at this time.

## 2022-11-08 NOTE — PROGRESS NOTES
Inpatient Occupational Therapy  Evaluation and Treatment    Unit: 2 [place]  Date:  11/8/2022  Patient Name:    Maureen Grant  Admitting diagnosis:  Closed displaced intertrochanteric fracture of right femur, initial encounter [facility]) Brie Álvarez  Fall from standing, initial encounter [W19. XXXA]  Hip fracture requiring operative repair, right, closed, initial encounter ([facility] [address] Shonda Moore  Admit Date:  11/6/2022  Precautions/Restrictions/WB Status/ Lines/ Wounds/ Oxygen: Fall risk, Bed/chair alarm, Lines -Supplemental O2 (2) and Purewick catheter, Telemetry, and WB Restrictions (WBAT  )  RIGHT HIP INTRAMEDULLARY NAILING- 11-7-22 , WBAT Rt LE      Treatment Time:  4374-5871   Treatment Number: 1   Timed code treatment minutes 50 minutes   Total Treatment minutes:   60   minutes    Patient Goals for Therapy:  \" go home  \"      Discharge Recommendations: SNF    . Per ortho KEE Ruggiero -  Pt does not want to go to SNF but family discussed with me today and they are unable to care for her in this condition at home. Her  has advanced dementia and unable to care for himself so family has used all resources to help him currently. The daughters live in [place] and are her to help with  but states there is no way they could do this for their mother right now as well. Multiple medical issues with family members as well per the daughter. DME needs for discharge:  rolling walker        Therapy recommendations for staff:   Assist of 2 with use of rolling walker (RW) for all transfers to/from [facility]  to/from chair with  gait belt     History of Present Illness: per H&P   80 y.o. female with history seen below presenting with fall at home. Patient states she was coming out of her bathroom and believes that her foot slipped on the ground and she came down onto her right hip.   States she has moderate to severe pain of her right hip is constant, stabbing, worse with palpation and movement without relieving factors she states her hip is externally rotated. States she did hit her head slightly denies loss of consciousness, significant headache, blurred vision, focal neurodeficits or motor or sensory changes. Denies neck pain. States she does have some lower back pain that is similar to prior. Denies any motor or sensory changes in lower extremity, bowel or bladder changes. States she does have some mild discomfort along the right lower quadrant of the abdomen after the fall. Denies nausea vomiting, change in bowel habits, change in urination urination. Denies any chest pain, shortness of breath, fevers, cough, sputum production. pmhx of CAD, atrial fibrillation, CHF, HTN, HLD, GERD  who presented to [facility] ED with complaint of fall at home. HX of CVA   Home Health S4 Level Recommendation:  Level 1 Standard  AM-PAC Score: 16    Preadmission Environment    Pt. Lives with spouse, son- works , a dtr is there all the time ( 2 of them rotate -from out of town- sometimes the dtr will stay for weeks   Home environment:  one story home with basement   Steps to enter first floor:  3-4 with railing  steps to enter  Steps to second floor: N/A  Bathroom: tub/shower unit, walk in shower, comfort height toilet, grab bars, and shower seat   Equipment owned: RW, wc (manual ), shower chair/bench, BSC, SPC, hospital bed, and reacher, rollator     Preadmission Status:  Pt. Able to drive: Yes  Pt Fully independent with ADLs: Yes  Pt. Required assistance from family for: Cleaning, Cooking, and Laundry   Pt. independent for transfers and gait and walked with [address] or the cane sometimes   History of falls Yes    Pain  Yes  Ratin/10  Location:[facility]t [facility]   Pain Medicine Status: Received pain med prior to tx      Cognition    A&O x4   Able to follow 2 step commands    Subjective  Patient lying supine in bed with no family present. Pt agreeable to this OT eval & tx.      Upper Extremity ROM:    WFL    Upper Extremity Strength:     [facility] Upper Extremity Sensation    WFL    Upper Extremity Proprioception:  WFL    Coordination and Tone  WFL    Balance  Functional Sitting Balance:  Diminished  Functional Standing Balance:Impaired    Bed mobility:    Supine to sit:   Mod A  and 2 persons  Sit to supine:   Not Tested  Rolling:    Not Tested  Scooting in sitting: Mod A   Scooting to head of bed:   Not Tested    Bridging:   Not Tested    Transfers:    Sit to stand:  Min A  and 2 persons  Stand to sit:  Min A   Bed to chair:   Min A  and 2 persons with RW   Standard toilet: Not Tested  Bed to BSC:  Min A  with RW and gait belt     Dressing:      UE:   Not Tested  LE:    Mod A  and Max A     Bathing:    UE:  Not Tested  LE:  Not Tested    Eating:   Independent    Toileting:  Min A  with max assist for wiping     Activity Tolerance   Pt completed therapy session with pain     BP (mmHg)  HR (bpm)  SpO2 (%)    Semifowlers before activity   NT  88  96% on 2L    Seated EOB  189/82  84 99% on 2l    Seated after activity            Positioning Needs:   Pt reclined in chair, alarm set, positioned in proper neutral alignment and pressure relief provided. Exercise / Activities Initiated:   N/A    Patient/Family Education:   Role of OT  Recommendations for DC  Safe RW use/hand placement    Assessment of Deficits: Pt seen for Occupational therapy evaluation in acute care setting. Pt demonstrated decreased Activity tolerance, ADLs, Balance , Bed mobility, Safety Awareness, and Transfers. Pt functioning below baseline and will likely benefit from skilled occupational therapy services to maximize safety and independence. Goal(s) : To be met in 3 Visits:  1). Bed to toilet/BSC: CGA with RW and gait belt     To be met in 5 Visits:  1). Supine to/from Sit:  Min A   2). Upper Body Bathing:   Supervision  3). Lower Body Bathing: Mod A   4). Upper Body Dressing:  Supervision  5). Lower Body Dressing: Mod A   6).  Pt to demonstrate UE exs x 15 reps with minimal cues    Rehabilitation Potential:  Fair for goals listed above. Strengths for achieving goals include: Pt cooperative  Barriers to achieving goals include:  Complexity of condition     Plan: To be seen 3-5 x/wk while in acute care setting for therapeutic exercises, bed mobility, transfers, dressing, bathing, family/patient education, ADL/IADL retraining, energy conservation training.      Gris Cruz OTR/L [id number]            If patient discharges from this facility prior to next visit, this note will serve as the Discharge Summary

## 2022-11-08 NOTE — ACP (ADVANCE CARE PLANNING)
Advance Care Planning     General Advance Care Planning (ACP) Conversation    Date of Conversation: 11/6/2022  Conducted with: Patient with Decision Making Capacity    Healthcare Decision Maker:  Pt stated she needed to think about who she would want to be her decision maker. She is aware that without POA papers in place, it would fall to her next of kin which would be her . She stated she has a living will and  requested copies. She is aware Spiritual care would be consulted for advance directives; Claudia Gutierrez RN aware    Content/Action Overview: Has ACP document(s) NOT on file - requested patient to provide  Reviewed DNR/DNI and patient elects Full Code (Attempt Resuscitation)  ventilation preferences  Pt stated she would want a Vent if medically necessary. She stated she only wants it short term but did not state how long. She is aware to make sure her family knows her wishes which she stated they do.      Length of Voluntary ACP Conversation in minutes:  <16 minutes (Non-Billable)    Christine Holt MSW, KAYLENE

## 2022-11-08 NOTE — PROGRESS NOTES
[address]  Cardiology  Progress Note    Admission date:  2022    Reason for follow up visit: Pre op evaluation, history of CHF    HPI/CC: Kiarra Rodriguez is a 80 y.o. female who presented 2022 following a fall and found to have a hip fracture. Cardiologoy consulted for pre op evaluation given history of CHF, CAD with prior PCI, VT, PAF, severe MR. She had R hip nailing 2022. Rhythm has been sinus,    Subjective: Feels ok today though having significant R leg pain, BP elevated. No chest pain or shortness of breath. Vitals:  Blood pressure (!) 163/68, pulse 77, temperature 97.7 °F (36.5 °C), temperature source Oral, resp. rate 18, height 5' 1\" (1.549 m), weight 115 lb (52.2 kg), SpO2 93 %, not currently breastfeeding.   Temp  Av.8 °F (36.6 °C)  Min: 97.2 °F (36.2 °C)  Max: 99.3 °F (37.4 °C)  Pulse  Av.1  Min: 64  Max: 82  BP  Min: 121/71  Max: 193/73  SpO2  Av.7 %  Min: 92 %  Max: 100 %    24 hour I/O    Intake/Output Summary (Last 24 hours) at 2022 1326  Last data filed at 2022 1222  Gross per 24 hour   Intake 1846.33 ml   Output 1300 ml   Net 546.33 ml     Current Facility-Administered Medications   Medication Dose Route Frequency Provider Last Rate Last Admin    hydrALAZINE (APRESOLINE) injection 10 mg  10 mg IntraVENous T4N PRN Meghan Loya MD        0.9 % sodium chloride infusion   IntraVENous PRN Meghan Loya MD        apixaban Chinmay Armstrong) tablet 2.5 mg  2.5 mg Oral BID Meghan Loya MD   2.5 mg at 22 0845    HYDROmorphone (DILAUDID) injection 0.25 mg  0.25 mg IntraVENous Q4H PRN Heidi Yen MD   0.25 mg at 22 2217    Or    HYDROmorphone (DILAUDID) injection 0.5 mg  0.5 mg IntraVENous Q4H PRN Heidi Yen MD   0.5 mg at 22 0839    HYDROcodone-acetaminophen (NORCO) 5-325 MG per tablet 2 tablet  2 tablet Oral Q6H PRN Heidi Yen MD   2 tablet at 22 1129    amiodarone (CORDARONE) tablet 100 mg  100 mg Oral Daily Onesimo Jimenez MD   163 mg at 11/08/22 0844    aspirin chewable tablet 81 mg  81 mg Oral Daily Onesimo Jimenez MD   81 mg at 11/08/22 0844    atorvastatin (LIPITOR) tablet 80 mg  80 mg Oral Nightly Onesimo Jimenez MD   80 mg at 11/07/22 1915    famotidine (PEPCID) tablet 20 mg  20 mg Oral Daily Onesimo Jimenez MD   20 mg at 11/08/22 0844    fluticasone (FLONASE) 50 MCG/ACT nasal spray 2 spray  2 spray Each Nostril Daily Onesimo Jimenez MD   2 spray at 11/08/22 0846    metoprolol succinate (TOPROL XL) extended release tablet 50 mg  50 mg Oral Daily Onesimo Jimenez MD   50 mg at 11/08/22 0845    vitamin B-12 (CYANOCOBALAMIN) tablet 1,000 mcg  1,000 mcg Oral Daily Onesimo Jimenez MD   9,318 mcg at 11/08/22 0844    Vitamin D (CHOLECALCIFEROL) tablet 1,000 Units  1,000 Units Oral Daily Onesimo Jimenez MD   4,264 Units at 11/08/22 0844    0.9 % sodium chloride infusion   IntraVENous Continuous Onesimo Jimenez MD 75 mL/hr at 11/07/22 2209 New Bag at 11/07/22 2209    sodium chloride flush 0.9 % injection 5-40 mL  5-40 mL IntraVENous 2 times per day Onesimo Jimenez MD   10 mL at 11/06/22 2248    sodium chloride flush 0.9 % injection 5-40 mL  5-40 mL IntraVENous PRN Onesimo Jimenez MD   10 mL at 11/07/22 2217    0.9 % sodium chloride infusion   IntraVENous PRN Onesimo Jimenez MD        ondansetron (ZOFRAN-ODT) disintegrating tablet 4 mg  4 mg Oral H4U PRN Onesimo Jimenez MD   4 mg at 11/07/22 2002    Or    ondansetron (ZOFRAN) injection 4 mg  4 mg IntraVENous L0Q PRN Onesimo Jimenez MD   4 mg at 11/06/22 1945    polyethylene glycol (GLYCOLAX) packet 17 g  17 g Oral Daily PRN Onesimo Jimenez MD        acetaminophen (TYLENOL) tablet 650 mg  650 mg Oral K0D PRN Onesimo Jimenez MD   235 mg at 11/07/22 1915    Or    acetaminophen (TYLENOL) suppository 650 mg  650 mg Rectal Y4U PRN Onesimo Jimenez MD         Review of Systems   Constitutional: Negative. Respiratory: Negative. Cardiovascular: Negative. Gastrointestinal: Negative. Musculoskeletal:  Positive for arthralgias. Neurological: Negative. Objective:     Telemetry monitor: SR    Physical Exam:  Constitutional:  Comfortable and alert, NAD, appears stated age  Eyes: PERRL, sclera nonicteric  Neck:  Supple, no masses, no thyroidmegaly, no significant JVD  Skin:  Warm and dry; no rash or lesions  Heart:  Regular, normal apex, S1 and S2 normal, +murmur  Lungs:  Normal respiratory effort; clear; no wheezing/rhonchi/rales  Abdomen: soft, non tender, + bowel sounds  Extremities:  No edema or cyanosis; no clubbing  Neuro: alert and oriented, moves legs and arms equally, normal mood and affect    Data Reviewed:    Echo 8/2022: The left ventricular systolic function is low normal to mildly reduced with   an ejection fraction of 40-45 %. There is hypokinesis of the inferior and basal/mid septal walls. There is mild concentric left ventricular hypertrophy. Grade II diastolic dysfunction with elevated left ventricular filling   pressure. The left atrium is severely dilated. Severe mitral regurgitation. Systolic flow reversal in pulmonary veins. Moderate tricuspid regurgitation. Systolic pulmonary artery pressure (SPAP) is estimated at 61 mmHg consistent   with severe pulmonary hypertension (Right atrial pressure of 3 mmHg). Stress test 6/2022 There is a small and very mild basal anteroseptal wall fixed defect, most likely an artifact (RV insertion point). There is a moderate size, moderate intensity, worse with rest images basal inferolateral wall defect, most likely an artifact  (diaphragmatic attenuation). There is no evidence of myocardial ischemia or scar. There is moderate LV dilatation with borderline normal systolic function. Left ventricular ejection fraction of 55 %. There are no regional wall motion abnormalities. Overall findings represent a low risk scan.      Coronary angiogram 2018:  HEMODYNAMICS:  Left ventricular end-diastolic pressure equals 20. There  is no significant gradient across the aortic valve by pullback post  cineangiography. LEFT VENTRICULOGRAM:  Left ventriculogram demonstrates global  hypokinesis. Of note,  there was no area of akinesis. The anterior  wall did contract, but again was hypokinetic. Estimated ejection  fraction 40%. LEFT MAIN:  Left main is a short vessel that gave almost immediate rise  to LAD and circumflex. It is free of significant obstructive disease. LEFT ANTERIOR DESCENDING:  The LAD courses to and wraps around the apex. It is a large vessel. It gives off multiple small diagonal branches. In the proximal portion of vessel, there is a ruptured plaque. There is  80% narrowing in this area. There is diffuse disease, but no  significant focal obstructive disease. LEFT CIRCUMFLEX:  Circumflex consists of high rising marginal branch, a  very large second marginal branch courses on the AV groove and then  gives off small posterolateral branch. The circumflex is free of  significant obstructive disease. RIGHT CORONARY ARTERY:  Right coronary artery is a large dominant  vessel. It gives off a large PDA and several distal posterolateral  branches. No significant obstructive disease in the right coronary  artery. LEFT ANTERIOR DESCENDING POST INTERVENTION:  There is an 80% stenosis of  the ruptured plaque. Post intervention, there was minimal if any  residual stenosis. IMPRESSION:  1.  LV dysfunction consistent with cardiomyopathy. 2.  Ventricular tachycardia. 3.  Abnormal Myoview. 4.  LV dysfunction with estimated ejection fraction of 40% with global  hypokinesis. 5.  Successful primary stent to LAD.     Lab Reviewed:     Renal Profile:  Lab Results   Component Value Date/Time    CREATININE 1.6 11/08/2022 05:16 AM    BUN 26 11/08/2022 05:16 AM     11/08/2022 05:16 AM    K 4.7 11/08/2022 05:16 AM    K 3.4 07/06/2022 05:20 AM    CL 106 11/08/2022 05:16 AM    CO2 25 11/08/2022 05:16 AM     CBC:    Lab Results   Component Value Date/Time    WBC 9.9 11/08/2022 05:16 AM    RBC 3.05 11/08/2022 05:16 AM    HGB 8.8 11/08/2022 05:16 AM    HCT 26.6 11/08/2022 05:16 AM    MCV 87.1 11/08/2022 05:16 AM    RDW 18.6 11/08/2022 05:16 AM     11/08/2022 05:16 AM     BNP:    Lab Results   Component Value Date/Time    PROBNP 4,207 08/11/2022 01:00 PM    PROBNP 9,949 06/29/2022 05:18 AM    PROBNP 9,566 06/26/2022 04:40 AM    PROBNP 11,182 06/23/2022 07:12 PM    PROBNP 13,884 05/29/2022 11:49 AM     Fasting Lipid Panel:    Lab Results   Component Value Date/Time    CHOL 104 06/24/2022 04:52 AM    HDL 45 06/24/2022 04:52 AM    TRIG 80 06/24/2022 04:52 AM     Cardiac Enzymes:  CK/MbTroponin  Lab Results   Component Value Date/Time    TROPONINI <0.01 11/06/2022 03:02 PM     PT/ INR   Lab Results   Component Value Date/Time    INR 1.21 11/07/2022 05:57 AM    INR 1.17 11/06/2022 03:02 PM    INR 3.20 06/24/2022 04:15 PM    PROTIME 15.2 11/07/2022 05:57 AM    PROTIME 14.7 11/06/2022 03:02 PM    PROTIME 16.7 05/29/2022 11:49 AM     PTT No results found for: PTT   Lab Results   Component Value Date/Time    MG 2.20 08/11/2022 01:00 PM    No results found for: TSH    All labs and imaging reviewed today    Assessment:  Pre op cardiac risk assessment: no surgical complications so far  CAD: s/p PCI LAD 2018  HFrEF: appears compensated currently  Ischemic cardiomyopathy: improved, EF 45% on echo 8/2022 from 25% prior  Severe mitral regurgitation  Paroxysmal atrial fibrillation: stable, in sinus   - s/p CV 6/24/2022   - [id number] score >2 on eliquis   - s/p ILR  Pulmonary HTN  CKD  History of CVA    Plan:   1. Did well with surgery however BP remains uncontrolled likely due to significant pain; recommend adequate pain control  2. Eliquis restarted  3. Continue toprol and amiodarone; appears ARB stopped at one point due to hyperkalemia  4.  PRN hydralazine    Saloni Hare, APRN-CNP  [facility]  [phone]

## 2022-11-08 NOTE — PROGRESS NOTES
Progress Note    Admit Date:  11/6/2022    Subjective:  Ms. Vane Rodriguez had right hip IM nailing. Today is post op day#1. Pain is better. BP is high. Objective:   BP (!) 193/73   Pulse 67   Temp 97.6 °F (36.4 °C) (Oral)   Resp 16   Ht 5' 1\" (1.549 m)   Wt 112 lb (50.8 kg)   SpO2 96%   BMI 21.16 kg/m²        Intake/Output Summary (Last 24 hours) at 11/8/2022 8164  Last data filed at 11/8/2022 0818  Gross per 24 hour   Intake 1726.33 ml   Output 1300 ml   Net 426.33 ml       Physical Exam:    General appearance: alert, appears stated age and cooperative  Head: Normocephalic, without obvious abnormality, atraumatic  Eyes: conjunctivae/corneas clear. PERRL, EOM's intact. Neck: no adenopathy, no carotid bruit, no JVD, supple, symmetrical, trachea midline and thyroid not enlarged, symmetric, no tenderness/mass/nodules  Lungs: clear to auscultation bilaterally  Heart: regular rate and rhythm, S1, S2 normal, no murmur, click, rub or gallop  Abdomen: soft, non-tender; bowel sounds normal; no masses,  no organomegaly  Extremities: extremities normal, atraumatic, no cyanosis or edema. Right hip area shows no bleeding or redness.   Pulses: 2+ and symmetric  Skin: Skin color, texture, turgor normal. No rashes or lesions  Neurologic: Grossly normal    Scheduled Meds:   apixaban  2.5 mg Oral BID    amiodarone  100 mg Oral Daily    aspirin  81 mg Oral Daily    atorvastatin  80 mg Oral Nightly    famotidine  20 mg Oral Daily    fluticasone  2 spray Each Nostril Daily    metoprolol succinate  50 mg Oral Daily    vitamin B-12  1,000 mcg Oral Daily    Vitamin D  1,000 Units Oral Daily    sodium chloride flush  5-40 mL IntraVENous 2 times per day       Continuous Infusions:   sodium chloride      sodium chloride 75 mL/hr at 11/07/22 2209    sodium chloride         PRN Meds:  hydrALAZINE, sodium chloride, HYDROmorphone **OR** HYDROmorphone, HYDROcodone 5 mg - acetaminophen, sodium chloride flush, sodium chloride, ondansetron **OR** ondansetron, polyethylene glycol, acetaminophen **OR** acetaminophen      Data:  CBC:   Recent Labs     11/06/22  1502 11/07/22  0557 11/07/22 2026 11/08/22  0516   WBC 7.1 9.7  --  9.9   HGB 9.9* 9.1* 10.2* 8.8*   HCT 29.4* 27.5* 32.7* 26.6*   MCV 86.7 86.6  --  87.1    247  --  174     BMP:   Recent Labs     11/06/22  1502 11/07/22  0557 11/08/22  0516   * 133* 139   K 4.1 4.0 4.7   CL 97* 99 106   CO2 31 29 25   PHOS  --  4.3 5.2*   BUN 28* 26* 26*   CREATININE 1.7* 1.5* 1.6*     LIVER PROFILE:   Recent Labs     11/06/22  1502   AST 22   ALT 10   BILITOT <0.2   ALKPHOS 109     PT/INR:   Recent Labs     11/06/22  1502 11/07/22  0557   PROTIME 14.7* 15.2*   INR 1.17* 1.21*     Cultures:       FLUORO FOR SURGICAL PROCEDURES   Final Result   Intraprocedural fluoroscopic spot images as above. See separate procedure   report for more information. XR KNEE RIGHT (3 VIEWS)   Final Result   Advanced right knee osteoarthritis with intra-articular loose body, large   knee joint effusion, and slight lateral patellar tilt. No acute fracture or   dislocation. CT LUMBAR SPINE TRAUMA RECONSTRUCTION   Final Result   1. Comminuted, displaced right intertrochanteric femur fracture. 2. Right sacral ala insufficiency fracture, favored to be subacute. 3.  No acute solid organ injury otherwise identified in the abdomen or   pelvis, within the limits of a noncontrast exam.      4.  Distended bladder. 5.  Moderate stool burden throughout the colon. 6.  Chronic findings in the lower thoracic and lumbar spine without acute   lumbar fracture identified. CT ABDOMEN PELVIS WO CONTRAST Additional Contrast? None   Final Result   1. Comminuted, displaced right intertrochanteric femur fracture. 2. Right sacral ala insufficiency fracture, favored to be subacute.       3.  No acute solid organ injury otherwise identified in the abdomen or   pelvis, within the limits of a noncontrast exam.      4.  Distended bladder. 5.  Moderate stool burden throughout the colon. 6.  Chronic findings in the lower thoracic and lumbar spine without acute   lumbar fracture identified. CT HEAD WO CONTRAST   Final Result   1. Chronic findings in the brain without acute CT abnormality identified. 2.  No acute osseous abnormality identified in the cervical spine. 3.  Age indeterminate mild superior endplate deformity of T4.         CT CERVICAL SPINE WO CONTRAST   Final Result   1. Chronic findings in the brain without acute CT abnormality identified. 2.  No acute osseous abnormality identified in the cervical spine. 3.  Age indeterminate mild superior endplate deformity of T4. XR CHEST PORTABLE   Final Result   1. No evident acute findings in the chest with evaluation for fractures   partially limited by bony demineralization   2. Pulmonary vascular congestion and mild cardiomegaly. 3. Possible trace left pleural effusion. XR HIP RIGHT (2-3 VIEWS)   Final Result   Displaced and angulated intertrochanteric right femur fracture. Assessment:  Principal Problem:    Closed displaced intertrochanteric fracture of right femur [facility])  Active Problems:    Fall from standing    History of CVA (cerebrovascular accident)    Pre-op evaluation  Resolved Problems:    * No resolved hospital problems. *      Plan:    #Right hip fracture. Post op day#1. S/p IM nailing. Cardiology cleared him for surgery. #Fall from standing.     ##Right knee OA with loose body, large effusion and lateral patellar lift   -ortho consulted  -PT/OT when ready   -dilaudid prn for pain      #HTN   -on toprol xl      #CKD Stage IV   -Cr baseline ~1.8-2  -1.7 on presentation   -continue to monitor      #Normocytic Anemia, chronic   -Hgb around baseline   -hx of Iron deficiency but not on PO supplement at home   -repeat iron studies pending   -continue to monitor      #Hyponatremia resolved.    -IVF   -continue to monitor      #Atrial fibrillation   -s/p cardioversion   -NSR   -on amiodarone and toprol XL   -on eliquis for AC, holding 2/2 to possible surgery      #Ischemic cardiomyopathy   #Chronic combined systolic and diastolic HF   -echo with EF 40-45%  -on BB, ASA, statin      #CAD s/p PCI   -on ASA, statin     #HX of CVA   -s/p loop recorder showed a fib   -on ASA, statin      #HLD   -on statin      #GERD   -on pepcid        Ken Collier MD, MD 11/8/2022 9:52 AM

## 2022-11-08 NOTE — FLOWSHEET NOTE
11/07/22 2006   Vital Signs   Temp 97.3 °F (36.3 °C)   Temp Source Axillary   Heart Rate 75   Heart Rate Source Monitor   Resp 16   BP (!) 168/72   BP Location Left upper arm   MAP (Calculated) 104   Patient Position Semi fowlers   Level of Consciousness 0   MEWS Score 1   Oxygen Therapy   SpO2 94 %   O2 Device Nasal cannula   O2 Flow Rate (L/min) 2 L/min   Assessment complete- see flowsheets. Lab notified to draw post transfusion H&H, blood transfusion complete and VSS, pt remains A&Ox4. PRN pain medication given per pt request within PRN order parameters, see flowsheets and eMAR for associated medication administration information. Pt has call light within reach, aware to call for any needs or changes; telemetry and continuous pulse ox in place. Will continue to monitor.   Callie Clemente RN

## 2022-11-08 NOTE — CARE COORDINATION
[facility]  Received referral regarding Man services from [address] [place] [address] Terrie CONCEPCION to follow for Man until discharge. Notified [facility] of referral and need for frontloaded PT visits with [facility] 11/10.           Electronically signed by José Solano RN on 11/8/2022 at 11:10 AM

## 2022-11-08 NOTE — TELEPHONE ENCOUNTER
General Question     Subject: Patient's daughter requesting a call about PT.   Patient's Daughter: Adrianna Motta Number: [phone]

## 2022-11-08 NOTE — FLOWSHEET NOTE
11/08/22 0818   Vital Signs   Temp 97.6 °F (36.4 °C)   Temp Source Oral   Heart Rate 67   Heart Rate Source Monitor   Resp 16   BP (!) 193/73   BP Location Right upper arm   BP Method Automatic   MAP (Calculated) 113   Patient Position Semi fowlers   Level of Consciousness 0   MEWS Score 1   Pain Assessment   Pain Assessment 0-10   Pain Level 8   Patient's Stated Pain Goal 3   Pain Location Hip   Pain Orientation Right   Pain Descriptors Aching   Functional Pain Assessment Prevents or interferes some active activities and ADLs   Pain Type Surgical pain   Pain Radiating Towards RLE   Pain Frequency Intermittent   Oxygen Therapy   SpO2 96 %   O2 Device Nasal cannula   O2 Flow Rate (L/min) 2 L/min   /73, medications given as ordered. Complain of Right hip pain level 8 on scale 0-10, medicated as ordered. Drsg right hip CDI. Neurovascular check done see flow sheet. Feeding self breakfast. Tolerating food and fluids well. Voiding clear remy urine. Will continue to monitor. Bed in low position, call bell and bedside table within reach. Bed alarm on.

## 2022-11-08 NOTE — OP NOTE
Operative Note      Patient: John Chow  YOB: 1935  MRN: [id number]    Date of Procedure: 11/7/2022    Pre-Op Diagnosis: Closed fracture of right hip, initial encounter ([facility] [S72.001A]    Post-Op Diagnosis:  R Intertrochanteric hip fracture       Procedure(s):  RIGHT HIP INTRAMEDULLARY NAILING    Surgeon(s):  Dee Castellon MD    Assistant:   Surgical Assistant: Maylin Garcia    Anesthesia: General    Estimated Blood Loss (mL): 811     Complications: None    Specimens:   * No specimens in log *    Implants:  Implant Name Type Inv. Item Serial No.  Lot No. LRB No. Used Action   NAIL IM L170MM [id number] 125DEG SHT PROX FEM GRN TI-15MO - [id number]  NAIL IM L170MM [id number] NK 125DEG SHT PROX FEM GRN TI-15MO  DEPUY SYNTHES [facility]-WD [id number] Right 1 Implanted   BLADE IM L85MM DIA10.35MM PROX FEM G TI FARIBA FEN NAKIA FOR - [id number]  BLADE IM L85MM DIA10.35MM PROX FEM G TI FARIBA FEN NAKIA FOR  DEPUY SYN[employer]-WD [id number] Right 1 Implanted   SCREW LK TI 5.0MM X 32MM - [id number] Screw/Plate/Nail/[place] SCREW LK TI 5.0MM X 32MM  SYNTHES-PMM [id number] Right 1 Implanted         Drains: * No LDAs found *    Findings: see below    Detailed Description of Procedure: ThIs an 68-year-old female that had a mechanical fall at home she says she tripped and fell on her kitchen floor she presented to [facility] emergency department where evaluation found an isolated right intertrochanteric hip fracture. I evaluated the patient recommended intramedullary nailing. I discussed this with the patient and family in detail and explained risk-benefit limitations and alternatives to surgery. All involved parties wish to proceed with surgery. The patient was met in the preoperative holding area last-minute questions were answered, the right hip was marked, consent was verified. Ancef and TXA were administered. The patient was taken to the OR where general anesthesia was administered.   She was positioned in the supine position on the fracture table. Reduction of the fracture was accomplished with traction and internal rotation, which was confirmed by fluoro pre operatively. The right lower extremity was prepped and draped in standard orthopedic fashion. Timeout was performed according hospital protocol. The surgery began by marking a 5 cm incision just proximal to the greater trochanter. This incision was made with scalpel and dissection carried through the gluteal fascia down to the tip of the greater trochanter being careful to maintain hemostasis. The threaded guidepin was then used for a starting point on the tip of the greater trochanter. Once ideal positioning of this guidepin was confirmed, the entry reamer was used to enter the canal.  The 11 mm x 125 degree short nail was impacted to its appropriate depth. A percutaneous guide was used to insert a pin into the femoral head which was confirmed to be in the central location on the AP and lateral views. This was overdrilled to prepare the bone for the blade component. This was impacted the appropriate depth. The compression feature of the nailing system was then utilized, and did provide an improved reduction via compression. A percutaneous guide was then used to place the distal locking screw in standard technique. Final fluoroscopy images were taken to confirm appropriate position of hardware. The wound was then irrigated. A final check was made for hemostasis. Closure then ensued with 0 Vicryl for the gluteal fascia 2-0 Vicryl for the subcutaneous tissue and 3-0 Monocryl for the skin. Occlusive dressings were applied. The patient was transferred back to the hospital bed awoken from anesthesia and returned to the PACU in stable condition. Postoperatively we will resume her Eliquis for DVT prophylaxis starting tomorrow. Weight bearing as tolerated with physical therapy.   I anticipate placement to a nursing facility, the family supports placement to a nursing facility as well.     Electronically signed by Melony Berrios MD on 90/1/6202 at 10:05 PM

## 2022-11-08 NOTE — PROGRESS NOTES
Patient's EF (Ejection Fraction) is equal to 40%    Patient's weights and intake/output reviewed:    Patient's Last Weight: 115 lbs obtained by bed scale. Difference of 3 lbs more than last documented weight. Intake/Output Summary (Last 24 hours) at 11/8/2022 1044  Last data filed at 11/8/2022 0818  Gross per 24 hour   Intake 1726.33 ml   Output 1300 ml   Net 426.33 ml         Pt is currently  on room air . Pt denies shortness of breath. Pt without lower extremity edema. Patient and/or Family's stated Goal of Care this Admission:  to, reduce shortness of breath, increase activity tolerance, better understand heart failure and disease management, be more comfortable, reduce lower extremity edema, and unable to assess, will attempt again prior to discharge      Comorbidities Reviewed Yes  Patient has a past medical history of Arthritis, Blood circulation, collateral, CAD (coronary artery disease), CHF (congestive heart failure) ([address] Confusion, GERD (gastroesophageal reflux disease), History of heart artery stent, Hyperlipidemia, Hypertension, Mitral valve prolapse, Myocardial infarct, old, and Thyroid disease.          >>For CHF and Comorbidity documentation on Education Time and Topics, please see Education Tab

## 2022-11-08 NOTE — DISCHARGE INSTRUCTIONS
Resources-Call for potential assistance:     [employer]:   Phone: [phone]    Area Agency on [employer] [address] [place] [address]:  Phone: Ronny Lehman 1683 Senior Services:  Phone: [phone]    [address] [address]:  Phone: [phone]    [place] [employer]:  Phone: [address]: [address] [place] [address]    [facility]   [address]   [phone]    [employer]    [phone]    Home [employer] [address]    Interim [address]   [address] [facility]  [phone]    [facility]   [phone]    [facility] 79   [address]   [phone]    Visiting Donya Garcia   [phone]    [facility]  [phone]    [employer]    Assistance with finding housing such as [facility] 14 SET UP. Care Jeff Elam  [phone]    [facility] Senior Advisors  Sarahi Retana and Jessie Dickerson  [phone]      Dilshad    Office: [phone]  Cell: [address]  Office: [phone]  Cell: [phone]       Keep dressing clean and dry. Change Mepilex every 3-5 days or as needed for excess drainage. Please call physician if increased redness around incision, increased drainage, fevers, or pain becomes significantly worse. Do not immerse in water such as baths but okay to shower with dressing on to protect wound. F/U with Dr Mukesh Oseguera in 91-27 days. Call [facility] for appointment time and date for follow up at [phone].         Weight Bearing on Surgical Side: full    Continue DVT Prophylaxis: Continue home dose of Eliquis for DVT prophylaxis    Pain Medications  You were given oxycodone (Oxycontin, Oxyir)  Wean off pain medications as you deem appropriate as long as pain is under control  Be sure to drink plenty of fluids (recommend water) while taking narcotic pain medications to prevent constipation   You may take an over the counter laxative or stool softener as needed to prevent/treat constipation as well, we recommend Senokot S OTC. We recommend that you consider taking these medications the entire time you are taking pain medication. Cold packs/Ice packs/Machine  May be used as much as necessary to control swelling/inflammation/soreness  Be sure to have a barrier (cloth, clothing, towel) between the site and the ice pack to prevent [address] [facility] office [phone] if  Increased redness, swelling, drainage of any kind, and/or pain to surgery site. As well as new onset fevers and or chills. These could signify an infection. Calf or thigh tenderness to touch as well as increased swelling or redness. This could signify a clot formation. Numbness or tingling to an area around the incision site or below the incision site (toes). Any rash appears, increased  or new onset nausea/vomiting occur. This may indicate a reaction to a medication.

## 2022-11-08 NOTE — PROGRESS NOTES
Department of Orthopedic Surgery  Physician Assistant   Progress Note    Subjective:       Systemic or Specific Complaints:Pain Control    Objective:     Patient Vitals for the past 24 hrs:   BP Temp Temp src Pulse Resp SpO2 Weight   11/08/22 1149 (!) 163/68 97.7 °F (36.5 °C) Oral 77 18 93 % --   11/08/22 1130 (!) 161/72 -- -- -- -- -- --   11/08/22 1129 -- -- -- -- 18 -- --   11/08/22 1041 -- -- -- -- -- -- 115 lb (52.2 kg)   11/08/22 0909 -- -- -- -- 16 -- --   11/08/22 0818 (!) 193/73 97.6 °F (36.4 °C) Oral 67 16 96 % --   11/08/22 0413 -- -- -- -- 16 -- --   11/08/22 0342 (!) 160/78 97.7 °F (36.5 °C) Oral 64 16 97 % --   11/08/22 0105 (!) 158/76 97.6 °F (36.4 °C) Oral 64 16 98 % --   11/07/22 2247 -- -- -- -- 16 -- --   11/07/22 2101 121/71 97.4 °F (36.3 °C) Oral 71 16 100 % --   11/07/22 2032 -- -- -- -- 16 -- --   11/07/22 2015 138/74 97.6 °F (36.4 °C) -- 72 16 97 % --   11/07/22 2006 (!) 168/72 97.3 °F (36.3 °C) Axillary 75 16 94 % --   11/07/22 2002 (!) 173/75 97.8 °F (36.6 °C) Oral 74 16 95 % --   11/07/22 1903 (!) 168/84 98.8 °F (37.1 °C) Oral 72 16 98 % --   11/07/22 1812 (!) 163/71 99.3 °F (37.4 °C) Oral 68 16 97 % --   11/07/22 1740 (!) 153/62 -- -- 67 14 99 % --   11/07/22 1732 -- -- -- -- 14 -- --   11/07/22 1730 (!) 167/67 97.9 °F (36.6 °C) -- 69 18 99 % --   11/07/22 1721 (!) 164/70 97.7 °F (36.5 °C) -- 71 17 99 % --   11/07/22 1715 (!) 177/71 -- -- 70 16 99 % --   11/07/22 1707 -- 97.2 °F (36.2 °C) Temporal 70 15 98 % --   11/07/22 1701 (!) 159/74 -- -- 74 20 99 % --   11/07/22 1649 (!) 165/70 -- -- -- 14 95 % --   11/07/22 1645 (!) 163/76 -- -- 81 -- 95 % --   11/07/22 1640 (!) 149/76 -- -- 82 14 92 % --   11/07/22 1635 (!) 161/65 97.2 °F (36.2 °C) Temporal 82 16 94 % --       General: alert, appears stated age, and cooperative   Wound: Wound clean and dry no evidence of infection. , No Drainage, Wound Intact, and Positive for Edema   Motion: Painful range of Motion in affected extremity   DVT Exam: No evidence of DVT seen on physical exam.     Additional exam: nvi    Data Review  CBC:   Lab Results   Component Value Date/Time    WBC 9.9 11/08/2022 05:16 AM    RBC 3.05 11/08/2022 05:16 AM    HGB 8.8 11/08/2022 05:16 AM    HCT 26.6 11/08/2022 05:16 AM     11/08/2022 05:16 AM       Renal:   Lab Results   Component Value Date/Time     11/08/2022 05:16 AM    K 4.7 11/08/2022 05:16 AM    K 3.4 07/06/2022 05:20 AM     11/08/2022 05:16 AM    CO2 25 11/08/2022 05:16 AM    BUN 26 11/08/2022 05:16 AM    CREATININE 1.6 11/08/2022 05:16 AM    GLUCOSE 142 11/08/2022 05:16 AM    CALCIUM 8.3 11/08/2022 05:16 AM            Assessment:      right hip IT fracture with IM nail. Plan:      1:  PT/OT as able. WBAT. Ice for swelling of the thigh. Pt does not want to go to SNF but family discussed with me today and they are unable to care for her in this condition at home. Her  has advanced dementia and unable to care for himself so family has used all resources to help him currently. The daughters live in [place] and are her to help with  but states there is no way they could do this for their mother right now as well. Multiple medical issues with family members as well per the daughter. Will continue to encourage SNF for her.   2:  Continue Deep venous thrombosis prophylaxis - Can restart Eliquis  3:  Continue Pain Control    KEE Williamson

## 2022-11-08 NOTE — PLAN OF CARE
Problem: Discharge Planning  Goal: Discharge to home or other facility with appropriate resources  11/8/2022 1040 by Netta Perkins RN  Outcome: Progressing  Flowsheets (Taken 11/8/2022 1728)  Discharge to home or other facility with appropriate resources:   Identify barriers to discharge with patient and caregiver   Arrange for needed discharge resources and transportation as appropriate   Identify discharge learning needs (meds, wound care, etc)   Arrange for interpreters to assist at discharge as needed   Refer to discharge planning if patient needs post-hospital services based on physician order or complex needs related to functional status, cognitive ability or social support system  11/7/2022 2334 by Gurvinder Multani RN  Outcome: Progressing     Problem: Pain  Goal: Verbalizes/displays adequate comfort level or baseline comfort level  11/8/2022 1040 by Netta Perkins RN  Outcome: Progressing  11/7/2022 2334 by Gurvinder Multani RN  Outcome: Progressing     Problem: Safety - Adult  Goal: Free from fall injury  11/8/2022 1040 by Netta Perkins RN  Outcome: Progressing  11/7/2022 2334 by Gurvinder Multani RN  Outcome: Progressing     Problem: Skin/Tissue Integrity  Goal: Absence of new skin breakdown  11/8/2022 1040 by Netta Perkins RN  Outcome: Progressing  11/7/2022 2334 by Gurvinder Multani RN  Outcome: Progressing     Problem: Chronic Conditions and Co-morbidities  Goal: Patient's chronic conditions and co-morbidity symptoms are monitored and maintained or improved  11/8/2022 1040 by Netta Perkins RN  Outcome: Progressing  Flowsheets (Taken 11/8/2022 0835)  Care Plan - Patient's Chronic Conditions and Co-Morbidity Symptoms are Monitored and Maintained or Improved:   Monitor and assess patient's chronic conditions and comorbid symptoms for stability, deterioration, or improvement   Collaborate with multidisciplinary team to address chronic and comorbid conditions and prevent exacerbation or deterioration   Update acute care plan with appropriate goals if chronic or comorbid symptoms are exacerbated and prevent overall improvement and discharge  11/7/2022 2334 by Yoandy Thomas RN  Outcome: Progressing

## 2022-11-08 NOTE — PLAN OF CARE
Problem: Pain  Goal: Verbalizes/displays adequate comfort level or baseline comfort level  11/7/2022 2334 by Yashira Webb RN  Outcome: Progressing  11/7/2022 1138 by Andrzej Carranza RN  Outcome: Progressing     Problem: Safety - Adult  Goal: Free from fall injury  11/7/2022 2334 by Yashira Webb RN  Outcome: Progressing  11/7/2022 1138 by Andrzej Carranza RN  Outcome: Progressing     Problem: Skin/Tissue Integrity  Goal: Absence of new skin breakdown  Description: 1. Monitor for areas of redness and/or skin breakdown  2. Assess vascular access sites hourly  3. Every 4-6 hours minimum:  Change oxygen saturation probe site  4. Every 4-6 hours:  If on nasal continuous positive airway pressure, respiratory therapy assess nares and determine need for appliance change or resting period.   11/7/2022 2334 by Yashira Webb RN  Outcome: Progressing  11/7/2022 1138 by Andrzej Carranza RN  Outcome: Progressing     Problem: Chronic Conditions and Co-morbidities  Goal: Patient's chronic conditions and co-morbidity symptoms are monitored and maintained or improved  11/7/2022 2334 by Yashira Webb RN  Outcome: Progressing  11/7/2022 1138 by Andrzej Carranza RN  Outcome: Progressing

## 2022-11-08 NOTE — CARE COORDINATION
Case Management Assessment  Initial Evaluation      Patient Name: Yessy Coker  YOB: 1935  Diagnosis: Closed displaced intertrochanteric fracture of right femur, initial encounter ([facility] [address] Tl Wade  Fall from standing, initial encounter [W19. XXXA]  Hip fracture requiring operative repair, right, closed, initial encounter [facility]) Yarelis Flores  Date / Time: 11/6/2022  2:27 PM    Admission status/Date:11/6/2022 Inpatient   Chart Reviewed: Yes      Patient Interviewed: Yes   Family Interviewed:  No      Hospitalization in the last 30 days:  No    Health Care Decision Maker : pt declined to address; see ACP note from CM on this date. Met with: pt   Interview conducted  (bedside/phone):bedside    Current PCP: Eugene Foley,     Financial  Medicare and united healthcare  Precert required for SNF :  N          3 night stay required -  [employer]  Support Systems/Care Needs: Children, Spouse/Significant Other  Transportation:  self and daughter     Meal Preparation: daughter    Housing  Living Arrangements: pt lives at home with her , son and her 2 daughter's alternate staying with them. She stated that she has 24/7 from family for assistance  Steps: \"a few\"  Intent for return to present living arrangements: Yes  Identified Issues: [address] [facility] with [address] : No Agency:(Services)  Type of Home Care Services: None  Passport/Waiver : No  :                      Phone Number:    Passport/Waiver Services: n/a          Durable Medical Equiptment   DME Provider: n/a  Equipment:   Walker___Cane__x_RTS___ BSC___Shower Chair__x_Hospital Bed___W/C_x___Other__Rolator______  02 at ____Liter(s)---wears(frequency)_______ HHN ___ CPAP___ BiPap___   N/A____    Home O2 Use :  No      Community Service Affiliation  Dialysis:  No    Agency:  Location:  Dialysis Schedule:  Phone:   Fax:     Other Community Services: n/a    DISCHARGE PLAN: Explained Case Management role/services. Chart review completed. Spoke with Nisha Aiken OT and Carla Almanza PT who states they are recommending home 24 hr assist and home PT/OT with a georgette rolling walker which they will document. Met with pt at bedside with Carla Almanza PT present. Pt stated she is normally independent at home with her ADL's and will return home when discharged. She stated that she has 24/7 assist from family. Discussed PT recommendations and provided her with a home care list. She requested referral to [address] [facility]) as she has had them in the past. Verified her address and phone number on the facesheet. Discussed DME companies verbally per her request and she requested referral to [facility] for the georgette rolling walker. Referral called to [place] with [facility]. CM will follow. Please notify CM if needs or concerns arise. Saúl Singletary MSW, LISW-S     Addendum at 2:10pm: Spoke with pt and pt's daughter Veronique at bedside with pt's permission. Pt did fall back to sleep during conversation. [place] aware pt wanting to return home with skilled home care. [place] expressing concerns stating pt's  has dementia, pt's daughter and herself both have health issues. Explained pt stated she has someone with her 24/7 which pt's daughter confirmed but stated they need additional help at home as they can't provide 24/7 care for both pt and pt's . Explained pt currently doesn't have SNF recs and they could pay private for additional home care or pay private for SNF which daughter stated they can't do. Daughter aware pt has to qualify for SNF and be agreeable to SNF. Daughter stated pt's walker won't fit in the bathroom and aware PT didn't recommend any other DME as she inquired about the bed. Explained pt is alert/oriented at this time and OT/PT went off the information pt told them. Discussed family training with PT/OT and she stated she can be reached at [phone].  Also explained CM would update PT/OT of the above information as daughter Gelacio Moreira wants to ensure they have the full picture. Discussed Senior services, [place] and alzheimer's association for pt's  for additional services. Edis PT and Davonte Garcia OT aware of the above. Lily Lyn at [facility] aware to front load PT/OT and add SW if pt returns home.

## 2022-11-08 NOTE — PROGRESS NOTES
Inpatient Physical Therapy Evaluation and Treatment    Unit: [facility]  Date:  11/8/2022  Patient Name:    Hector Akbar  Admitting diagnosis:  Closed displaced intertrochanteric fracture of right femur, initial encounter [facility]) Isela Alexander  Fall from standing, initial encounter [W19. XXXA]  Hip fracture requiring operative repair, right, closed, initial encounter ([facility] [address] Caroline Sagastume  Admit Date:  11/6/2022  Precautions/Restrictions/WB Status/ Lines/ Wounds/ Oxygen: Fall risk, Bed/chair alarm, Lines -IV and Supplemental O2 (2L weaned to RA during session), Telemetry, Continuous pulse oximetry, and WB Restrictions (WBAT)    Treatment Time:  10:30-11:05  Treatment Number:  1   Timed Code Treatment Minutes: 25 minutes  Total Treatment Minutes:  35  minutes    Patient Goals for Therapy: \" go home and have family take care of her \"          Discharge Recommendations: SNF  DME needs for discharge:  Hunter RW       Therapy recommendation for EMS Transport: can transport by wheelchair    Therapy recommendations for staff:   Assist of 2 with use of rolling walker (RW) and gait belt for all transfers to/from [facility]  to/from [facility]    History of Present Illness: Mechanical fall at home, suffered R Intertrochanteric hip fracture. S/P IM nailing of R femur on 71/55 by Dr. Mukesh Oseguera.       pmhx of CAD, atrial fibrillation, CHF, HTN, HLD, GERD , CVA    Home Health S4 Level Recommendation:  Level 1 Standard  AM-PAC Mobility Score       AM-PAC Inpatient Mobility without Stair Climbing Raw Score : 12    Preadmission Environment    Pt.  Lives with spouse and son who works; a dtr is there all the time ( 2 of them rotate -from out of town- sometimes the dtr will stay for weeks)  Home environment:    one story home with basement   Steps to enter first floor:  3-4 with railing  steps to enter  Steps to second floor: N/A  Bathroom: tub/shower unit, walk in shower, comfort height toilet, grab bars, and shower seat   Equipment owned: [employer], Mismi (manual ), shower chair/bench, BSC, SPC, hospital bed, and reacher, rollator      Preadmission Status:  Pt. Able to drive: Yes  Pt Fully independent with ADLs: Yes  Pt. Required assistance from family for: Cleaning, Cooking, and Laundry   Pt. independent for transfers and gait and walked with Giovanna Bob or the cane sometimes   History of falls Yes     Pain  Yes  Ratin/10  Location:Rt LE   Pain Medicine Status: Received pain med prior to tx       Cognition    A&O x4   Able to follow 2 step commands     Subjective  Patient lying supine in bed with no family present. Pt agreeable to this PT eval & tx. Upper Extremity ROM/Strength  Please see OT evaluation. Lower Extremity ROM / Strength   AROM WFL: Yes with exception of surgical joint. BLE strength WFL, but not formally assessed with MMT. with exception of surgical joint. Lower Extremity Sensation    WFL    Lower Extremity Proprioception:   WFL    Coordination and Tone  WFL    Balance  Sitting:  Good - ; SBA  Comments: Antalgic posture, dec WB on operative side    Standing: Fair +; CGA  Comments: Moderate push through on walker with BUE for offweighting surgical leg (~25% WB tolerance). Able to take single UE off walker to assist with toileting/LB dressing tasks for brief bursts - see OT note. Static stance with RLE moderate knee flexion. Bed Mobility   Supine to Sit:    Max A x 1 person for LE advancement; Min A x 1 person to support trunk. Sit to Supine:   Not Tested  Rolling:   Not Tested  Scooting in sitting: Max A   Scooting in supine: Mod A     Transfer Training     Sit to stand:   Not Tested  Stand to sit:   CGA  Bed to Chair:   CGA with use of rolling walker (RW)    Gait gait deferred due to difficulty with transfers; pt ambulated 0 ft. Pt took a few pivot steps for each transfer bed>BSC>chair including a 180* turn. No LOB but mild instability as a function of antalgic posture.      Stair Training deferred, pt unsafe/ not appropriate to complete stairs at this time. Pt plans to have family assist in and out of house with wheelchair. Activity Tolerance   Pt completed therapy session with No adverse symptoms noted w/activity. Pain in surgical joint with all activity. BP (mmHg)  HR (bpm)  SpO2 (%)    Semifowlers before activity   NT  88  96% on 2L    Seated EOB  189/82  84 99% on 2L  98% on RA    Seated after functional activity   82 99% on RA     Positioning Needs   Pt reclined in chair, alarm set, positioned in proper neutral alignment and pressure relief provided. Call light provided and all needs within reach    Exercises Initiated  all completed bilaterally unless indicated and completed in supine position  Ankle Pumps x 2 x 20 reps  Quad sets x 2 x 10 reps  Glut sets 2 x 10 reps    Other  None. Patient/Family Education   Pt educated on role of inpatient PT, POC, importance of continued activity, DC recommendations and calling for assist with mobility. Assessment  Pt seen for Physical Therapy evaluation in acute care setting. Pt demonstrated decreased Activity tolerance, Balance, ROM, Safety, and Strength as well as decreased independence with Ambulation, Bed Mobility , and Transfers. Pt is normally indep with ADLs and household level mobility using a rollator or cane. She is now functioning well below baseline due to post-surgical pain. She requires assist x 2 people for supine>sit. She is able to tolerate stand pivot transfers with partial weight bearing and CGA x 1 person. She wants to d/c home and, per patient report, she has good 24/7 family support. However, case management and ortho PA report that family members have stated they are not able to adequately care for her at home.      Recommending SNF upon discharge as patient functioning well below baseline, demonstrates good rehab potential and unable to return home due to burden of care beyond caregiver ability, home environment not conducive to patient recovery, and limited safety awareness. Goals : To be met in 3 visits:  1). Independent with LE Ex x 10 reps    To be met in 6 visits:  1). Supine to/from sit: Independent  2). Sit to/from stand: Modified Independent  3). Bed to chair: SBA  4). Gait: Ambulate 50 ft.  with  CGA and use of rolling walker (RW)  5). Tolerate B LE exercises 3 sets of 10-15 reps    Rehabilitation Potential: Good  Strengths for achieving goals include:   Pt motivated, PLOF, Family Support, and Pt cooperative   Barriers to achieving goals include:    No Barriers    Plan    To be seen 3-5 x / week  while in acute care setting for therapeutic exercises, bed mobility, transfers, progressive gait training, balance training, and family/patient education. Signature: Farzaneh Meyer PT, DPT    If patient discharges from this facility prior to next visit, this note will serve as the Discharge Summary.

## 2022-11-09 VITALS
HEART RATE: 86 BPM | HEIGHT: 61 IN | WEIGHT: 115 LBS | RESPIRATION RATE: 16 BRPM | BODY MASS INDEX: 21.71 KG/M2 | OXYGEN SATURATION: 96 % | TEMPERATURE: 97.6 F | DIASTOLIC BLOOD PRESSURE: 76 MMHG | SYSTOLIC BLOOD PRESSURE: 164 MMHG

## 2022-11-09 LAB
ALBUMIN SERPL-MCNC: 3.1 G/DL (ref 3.4–5)
ANION GAP SERPL CALCULATED.3IONS-SCNC: 9 MMOL/L (ref 3–16)
BASOPHILS ABSOLUTE: 0 K/UL (ref 0–0.2)
BASOPHILS RELATIVE PERCENT: 0 %
BLOOD BANK DISPENSE STATUS: NORMAL
BLOOD BANK PRODUCT CODE: NORMAL
BPU ID: NORMAL
BUN BLDV-MCNC: 29 MG/DL (ref 7–20)
CALCIUM SERPL-MCNC: 8.5 MG/DL (ref 8.3–10.6)
CHLORIDE BLD-SCNC: 105 MMOL/L (ref 99–110)
CO2: 23 MMOL/L (ref 21–32)
CREAT SERPL-MCNC: 1.3 MG/DL (ref 0.6–1.2)
DESCRIPTION BLOOD BANK: NORMAL
EOSINOPHILS ABSOLUTE: 0 K/UL (ref 0–0.6)
EOSINOPHILS RELATIVE PERCENT: 0 %
GFR SERPL CREATININE-BSD FRML MDRD: 40 ML/MIN/{1.73_M2}
GLUCOSE BLD-MCNC: 117 MG/DL (ref 70–99)
HCT VFR BLD CALC: 22.7 % (ref 36–48)
HEMOGLOBIN: 7.3 G/DL (ref 12–16)
LYMPHOCYTES ABSOLUTE: 0.5 K/UL (ref 1–5.1)
LYMPHOCYTES RELATIVE PERCENT: 4.2 %
MCH RBC QN AUTO: 28.6 PG (ref 26–34)
MCHC RBC AUTO-ENTMCNC: 32.4 G/DL (ref 31–36)
MCV RBC AUTO: 88.2 FL (ref 80–100)
MONOCYTES ABSOLUTE: 0.9 K/UL (ref 0–1.3)
MONOCYTES RELATIVE PERCENT: 6.9 %
NEUTROPHILS ABSOLUTE: 11.5 K/UL (ref 1.7–7.7)
NEUTROPHILS RELATIVE PERCENT: 88.9 %
PDW BLD-RTO: 18.9 % (ref 12.4–15.4)
PHOSPHORUS: 3.4 MG/DL (ref 2.5–4.9)
PLATELET # BLD: 169 K/UL (ref 135–450)
PMV BLD AUTO: 7.9 FL (ref 5–10.5)
POTASSIUM SERPL-SCNC: 4.5 MMOL/L (ref 3.5–5.1)
RBC # BLD: 2.57 M/UL (ref 4–5.2)
SODIUM BLD-SCNC: 137 MMOL/L (ref 136–145)
WBC # BLD: 12.9 K/UL (ref 4–11)

## 2022-11-09 PROCEDURE — 97535 SELF CARE MNGMENT TRAINING: CPT

## 2022-11-09 PROCEDURE — 85025 COMPLETE CBC W/AUTO DIFF WBC: CPT

## 2022-11-09 PROCEDURE — 36415 COLL VENOUS BLD VENIPUNCTURE: CPT

## 2022-11-09 PROCEDURE — 99239 HOSP IP/OBS DSCHRG MGMT >30: CPT | Performed by: INTERNAL MEDICINE

## 2022-11-09 PROCEDURE — 6370000000 HC RX 637 (ALT 250 FOR IP): Performed by: STUDENT IN AN ORGANIZED HEALTH CARE EDUCATION/TRAINING PROGRAM

## 2022-11-09 PROCEDURE — 97530 THERAPEUTIC ACTIVITIES: CPT

## 2022-11-09 PROCEDURE — 6370000000 HC RX 637 (ALT 250 FOR IP): Performed by: INTERNAL MEDICINE

## 2022-11-09 PROCEDURE — 97110 THERAPEUTIC EXERCISES: CPT

## 2022-11-09 PROCEDURE — 80069 RENAL FUNCTION PANEL: CPT

## 2022-11-09 PROCEDURE — 36430 TRANSFUSION BLD/BLD COMPNT: CPT

## 2022-11-09 PROCEDURE — 2580000003 HC RX 258: Performed by: STUDENT IN AN ORGANIZED HEALTH CARE EDUCATION/TRAINING PROGRAM

## 2022-11-09 PROCEDURE — 97116 GAIT TRAINING THERAPY: CPT

## 2022-11-09 RX ORDER — SODIUM CHLORIDE 9 MG/ML
INJECTION, SOLUTION INTRAVENOUS PRN
Status: DISCONTINUED | OUTPATIENT
Start: 2022-11-09 | End: 2022-11-09 | Stop reason: HOSPADM

## 2022-11-09 RX ORDER — OXYCODONE HYDROCHLORIDE AND ACETAMINOPHEN 5; 325 MG/1; MG/1
1 TABLET ORAL EVERY 4 HOURS PRN
Qty: 18 TABLET | Refills: 0 | Status: SHIPPED | OUTPATIENT
Start: 2022-11-09 | End: 2022-11-12

## 2022-11-09 RX ORDER — OXYCODONE HYDROCHLORIDE AND ACETAMINOPHEN 5; 325 MG/1; MG/1
1 TABLET ORAL ONCE
Status: COMPLETED | OUTPATIENT
Start: 2022-11-09 | End: 2022-11-09

## 2022-11-09 RX ADMIN — Medication 10 ML: at 11:19

## 2022-11-09 RX ADMIN — METOPROLOL SUCCINATE 50 MG: 50 TABLET, EXTENDED RELEASE ORAL at 11:18

## 2022-11-09 RX ADMIN — HYDROCODONE BITARTRATE AND ACETAMINOPHEN 2 TABLET: 5; 325 TABLET ORAL at 12:24

## 2022-11-09 RX ADMIN — FAMOTIDINE 20 MG: 20 TABLET ORAL at 11:18

## 2022-11-09 RX ADMIN — Medication 1000 UNITS: at 11:18

## 2022-11-09 RX ADMIN — ASPIRIN 81 MG: 81 TABLET, CHEWABLE ORAL at 11:17

## 2022-11-09 RX ADMIN — OXYCODONE HYDROCHLORIDE AND ACETAMINOPHEN 1 TABLET: 5; 325 TABLET ORAL at 14:55

## 2022-11-09 RX ADMIN — HYDROCODONE BITARTRATE AND ACETAMINOPHEN 2 TABLET: 5; 325 TABLET ORAL at 05:19

## 2022-11-09 RX ADMIN — Medication 1000 MCG: at 11:17

## 2022-11-09 RX ADMIN — AMIODARONE HYDROCHLORIDE 100 MG: 200 TABLET ORAL at 11:17

## 2022-11-09 RX ADMIN — APIXABAN 2.5 MG: 5 TABLET, FILM COATED ORAL at 11:17

## 2022-11-09 RX ADMIN — FLUTICASONE PROPIONATE 2 SPRAY: 50 SPRAY, METERED NASAL at 11:19

## 2022-11-09 ASSESSMENT — PAIN DESCRIPTION - ORIENTATION: ORIENTATION: RIGHT

## 2022-11-09 ASSESSMENT — PAIN DESCRIPTION - LOCATION
LOCATION: HIP
LOCATION: HIP

## 2022-11-09 ASSESSMENT — PAIN SCALES - GENERAL
PAINLEVEL_OUTOF10: 8
PAINLEVEL_OUTOF10: 7

## 2022-11-09 ASSESSMENT — PAIN DESCRIPTION - DESCRIPTORS
DESCRIPTORS: SHARP
DESCRIPTORS: ACHING

## 2022-11-09 ASSESSMENT — ENCOUNTER SYMPTOMS
GASTROINTESTINAL NEGATIVE: 1
RESPIRATORY NEGATIVE: 1

## 2022-11-09 NOTE — DISCHARGE SUMMARY
Name:  Vaishali Chavis  Room:  8863/7846-17  MRN:    [id number]    Discharge Summary      This discharge summary is in conjunction with a complete physical exam done on the day of discharge. Discharging Physician: Srinath Hernandez MD      Admit: 11/6/2022  Discharge:  11/9/2022     Diagnoses this Admission    Principal Problem:    Closed displaced intertrochanteric fracture of right femur [facility])  Active Problems:    Fall from standing    History of CVA (cerebrovascular accident)    Pre-op evaluation  Resolved Problems:    * No resolved hospital problems. *      Procedures (Please Review Full Report for Details)  Right hip intramedullary nailing     Consults    IP CONSULT TO HOSPITALIST  IP CONSULT TO ORTHOPEDIC SURGERY  IP CONSULT TO ORTHOPEDIC SURGERY  IP CONSULT TO CARDIOLOGY  IP CONSULT TO SPIRITUAL SERVICES      HPI:      The patient is a 80 y.o. female with pmhx of CAD, atrial fibrillation, CHF, HTN, HLD, GERD  who presented to [facility] ED with complaint of fall at home. She was walking at home and tripped over her feet, fell onto the ground onto her right hip. She did hit her head on the wall but denies LOC. She had instant right hip pain and was unable to get up. Her son lives at home with her and was able to help lift her up. Denies any other injuries. No neck or back pain. Denies headache, vision changes, dizziness. No numbness or tingling of extremities. She has had some falls at home in the past but none recently, she does use a walker to get around. Physical Exam at Discharge:  BP (!) 167/67   Pulse 94   Temp 98.1 °F (36.7 °C) (Oral)   Resp 16   Ht 5' 1\" (1.549 m)   Wt 115 lb (52.2 kg)   SpO2 96%   BMI 21.73 kg/m²     General appearance: alert, appears stated age and cooperative  Head: Normocephalic, without obvious abnormality, atraumatic  Eyes: conjunctivae/corneas clear. PERRL, EOM's intact.   Neck: no adenopathy, no carotid bruit, no JVD, supple, symmetrical, trachea midline and thyroid not enlarged, symmetric, no tenderness/mass/nodules  Lungs: clear to auscultation bilaterally  Heart: regular rate and rhythm, S1, S2 normal, no murmur, click, rub or gallop  Abdomen: soft, non-tender; bowel sounds normal; no masses,  no organomegaly  Extremities: extremities normal, atraumatic, no cyanosis or edema. Right hip area shows no bleeding or redness. Pulses: 2+ and symmetric  Skin: Skin color, texture, turgor normal. No rashes or lesions  Neurologic: Grossly normal    Hospital Course    #Right hip fracture. Post op day#2. S/p IM nailing. Cardiology cleared her for surgery.  -discharging home to ARF  Oxycodone prn for pain control   - Got one unit of PRBC    #Fall from standing. ##Right knee OA with loose body, large effusion and lateral patellar lift   -ortho consulted  -PT/OT when ready   -dilaudid prn for pain    -continue PO pain medication prn   -discharging to ARF    #HTN   -on toprol xl      #CKD Stage IV   -Cr baseline ~1.8-2  -1.7 on presentation   -continue to monitor   -Cr stable      #Normocytic Anemia, chronic   -Hgb around baseline   -hx of Iron deficiency but not on PO supplement at home   -repeat iron studies pending --> WNLs   -continue to monitor      #Hyponatremia resolved.    -IVF   -continue to monitor   -resolved      #Atrial fibrillation   -s/p cardioversion   -NSR   -on amiodarone and toprol XL   -on eliquis for AC, holding 2/2 to possible surgery   -resume eliquis      #Ischemic cardiomyopathy   #Chronic combined systolic and diastolic HF   -echo with EF 40-45%  -on BB, ASA, statin      #CAD s/p PCI   -on ASA, statin     #HX of CVA   -s/p loop recorder showed a fib   -on ASA, statin      #HLD   -on statin      #GERD   -on pepcid    CBC:   Recent Labs     11/07/22  0557 11/07/22 2026 11/08/22  0516 11/09/22  0525   WBC 9.7  --  9.9 12.9*   HGB 9.1* 10.2* 8.8* 7.3*   HCT 27.5* 32.7* 26.6* 22.7*   MCV 86.6  --  87.1 88.2     --  174 169     BMP: Recent Labs     11/07/22  0557 11/08/22  0516 11/09/22  0525   * 139 137   K 4.0 4.7 4.5   CL 99 106 105   CO2 29 25 23   PHOS 4.3 5.2* 3.4   BUN 26* 26* 29*   CREATININE 1.5* 1.6* 1.3*     LIVER PROFILE:   Recent Labs     11/06/22  1502   AST 22   ALT 10   BILITOT <0.2   ALKPHOS 109     PT/INR:   Recent Labs     11/06/22  1502 11/07/22  0557   PROTIME 14.7* 15.2*   INR 1.17* 1.21*     APTT:   Recent Labs     11/06/22  1502   APTT 33.4     UA:  Recent Labs     11/06/22  2101   COLORU Yellow   PHUR 7.5   WBCUA 0-2   RBCUA 0-2   BACTERIA Rare*   CLARITYU Clear   SPECGRAV 1.015   LEUKOCYTESUR Negative   UROBILINOGEN 0.2   BILIRUBINUR Negative   BLOODU Negative   GLUCOSEU Negative         FLUORO FOR SURGICAL PROCEDURES   Final Result   Intraprocedural fluoroscopic spot images as above. See separate procedure   report for more information. XR KNEE RIGHT (3 VIEWS)   Final Result   Advanced right knee osteoarthritis with intra-articular loose body, large   knee joint effusion, and slight lateral patellar tilt. No acute fracture or   dislocation. CT LUMBAR SPINE TRAUMA RECONSTRUCTION   Final Result   1. Comminuted, displaced right intertrochanteric femur fracture. 2. Right sacral ala insufficiency fracture, favored to be subacute. 3.  No acute solid organ injury otherwise identified in the abdomen or   pelvis, within the limits of a noncontrast exam.      4.  Distended bladder. 5.  Moderate stool burden throughout the colon. 6.  Chronic findings in the lower thoracic and lumbar spine without acute   lumbar fracture identified. CT ABDOMEN PELVIS WO CONTRAST Additional Contrast? None   Final Result   1. Comminuted, displaced right intertrochanteric femur fracture. 2. Right sacral ala insufficiency fracture, favored to be subacute.       3.  No acute solid organ injury otherwise identified in the abdomen or   pelvis, within the limits of a noncontrast exam.      4. Distended bladder. 5.  Moderate stool burden throughout the colon. 6.  Chronic findings in the lower thoracic and lumbar spine without acute   lumbar fracture identified. CT HEAD WO CONTRAST   Final Result   1. Chronic findings in the brain without acute CT abnormality identified. 2.  No acute osseous abnormality identified in the cervical spine. 3.  Age indeterminate mild superior endplate deformity of T4.         CT CERVICAL SPINE WO CONTRAST   Final Result   1. Chronic findings in the brain without acute CT abnormality identified. 2.  No acute osseous abnormality identified in the cervical spine. 3.  Age indeterminate mild superior endplate deformity of T4. XR CHEST PORTABLE   Final Result   1. No evident acute findings in the chest with evaluation for fractures   partially limited by bony demineralization   2. Pulmonary vascular congestion and mild cardiomegaly. 3. Possible trace left pleural effusion. XR HIP RIGHT (2-3 VIEWS)   Final Result   Displaced and angulated intertrochanteric right femur fracture. Discharge Medications     Medication List        START taking these medications      oxyCODONE-acetaminophen 5-325 MG per tablet  Commonly known as: Percocet  Take 1 tablet by mouth every 4 hours as needed for Pain for up to 3 days. Intended supply: 5 days.  Take lowest dose possible to manage pain            CONTINUE taking these medications      amiodarone 100 MG tablet  Commonly known as: PACERONE  Take 1 tablet by mouth daily     apixaban 2.5 MG Tabs tablet  Commonly known as: ELIQUIS  Take 1 tablet by mouth 2 times daily     aspirin 81 MG chewable tablet  Take 1 tablet by mouth daily     atorvastatin 80 MG tablet  Commonly known as: LIPITOR  TAKE 1 TABLET BY MOUTH ONE TIME A DAY (EVENING)     Co Q 10 100 MG Caps     famotidine 20 MG tablet  Commonly known as: PEPCID  Take 1 tablet by mouth daily     fluticasone 50 MCG/ACT nasal spray  Commonly known as: FLONASE  2 sprays by Each Nostril route daily     furosemide 40 MG tablet  Commonly known as: LASIX  TAKE 1 TABLET BY MOUTH DAILY IN THE MORNING     Handicap Placard Misc  by Does not apply route Length: 5 years     lidocaine 4 % external patch  Place 1 patch onto the skin in the morning. As needed for pain. metoprolol succinate 50 MG extended release tablet  Commonly known as: TOPROL XL  Take 1 tablet by mouth in the morning. miconazole 2 % powder  Commonly known as: MICOTIN  Apply topically 2 times daily. Misc. Devices Kit  Rollator. Use as directed. nitroGLYCERIN 0.4 MG SL tablet  Commonly known as: NITROSTAT  PUT 1 TAB UNDER TONGUE EVERY 5 MIN AS NEEDED CHEST PAIN UP TO 3. IF NO RELIEF AFTER 1 DOSE, CALL 911     ondansetron 4 MG tablet  Commonly known as: ZOFRAN  Take 1 tablet by mouth 3 times daily as needed for Nausea or Vomiting     * vitamin B-12 1000 MCG tablet  Commonly known as: CYANOCOBALAMIN     * vitamin B-12 1000 MCG extended release tablet  TAKE 1 TABLET (1,000 MCG) BY MOUTH DAILY (AM)     vitamin D3 25 MCG (1000 UT) Tabs tablet  Commonly known as: CHOLECALCIFEROL  TAKE 1 TABLET (1,000 IU) BY MOUTH DAILY IN THE MORNING           * This list has 2 medication(s) that are the same as other medications prescribed for you. Read the directions carefully, and ask your doctor or other care provider to review them with you. STOP taking these medications      HYDROcodone-acetaminophen  MG per tablet  Commonly known as: [address] [facility]               Where to Get Your Medications        You can get these medications from any pharmacy    Bring a paper prescription for each of these medications  oxyCODONE-acetaminophen 5-325 MG per tablet           Discharge Condition/Location: Stable    Follow Up: Follow up with NH doctor and then PCP after discharge from SNF.     More than 30 mts spent    Kimberly Castillo MD 11/9/2022 11:58 AM

## 2022-11-09 NOTE — PROGRESS NOTES
Perfect serve sen to [employer], R/T pt drop with H/H this AM and pt orders for 1 unit of blood and then d/C order. Per Shae SWEET pt is ok to D/C after blood is given.

## 2022-11-09 NOTE — ACP (ADVANCE CARE PLANNING)
Advance Care Planning     Advance Care Planning Inpatient Note  Spiritual Care Department    Today's Date: 11/9/2022  Unit: [facility]    Received request from patient and family. Upon review of chart and communication with care team, patient's decision making abilities are not in question. . Patient and Healthcare Decision Maker was/were present in the room during visit. Goals of ACP Conversation:  Discuss advance care planning documents  Facilitate a discussion related to patient's goals of care as they align with the patient's values and beliefs. Health Care Decision Makers:       Primary Decision Maker: Alyssa Fuchs Child - [phone]    Secondary Decision Maker: aHrsh Zacarias - Child - [phone]    Supplemental (Other) Decision Maker: [facility] - [phone]  Summary:  Updated Healthcare Decision Maker    Advance Care Planning Documents (Patient Wishes):  Healthcare Power of /Advance Directive Appointment of [address]  Requested patient/family bring a copy of completed HCPOA for our records. Assessment:  Bee Boswell is recovering from a fall and maintains optimism, informed & inspired by her akbar and support from her family. Naty shared stories of her and her  of 74+ years and how God has worked in her life, and even shared a story about how God has answered a prayer this morning about pain \"I was in pain, and then I prayed that God would help, and He did - the pain reduced and I am better now. \"  Shared that she cherishes memories of having all her kids in one place for holidays, her upbringing and her 's decline in his old age with alzheimer's. I prayed with Bee Boswell as requested: for her family, and for many people to know God. Daughter Ca Purcell is in from out of town, from [place]. Bee Boswell has family in town that helps her, though she said a few times \"I have a hard time accepting help. \"  Ca Purcell also takes care of her 's mother who suffers from Dementia at home, we discussed this briefly.     Interventions:  Requested patient/family to submit existing document for our records: Healthcare Power of /Advance Directive Appointment of [address] Preferences Communicated:   No    Outcomes/Plan:  ACP Discussion: Completed    Electronically signed by Corine Martinez on 11/9/2022 at 11:34 AM

## 2022-11-09 NOTE — PROGRESS NOTES
Spoke with Alisson Robison. PA R/T pt pain after PRN norco not effective. order received for pain med. also after pt blood infusion is complete  per MD pt is still ok to d/C.

## 2022-11-09 NOTE — CARE COORDINATION
INTERDISCIPLINARY PLAN OF CARE CONFERENCE and Discharge Order    Date/Time: 2022 11:49 AM  Completed by: Jonas Valerio RN, Case Management      Patient Name:  Saray Jackman  YOB: 1935  Admitting Diagnosis: Closed displaced intertrochanteric fracture of right femur, initial encounter ([facility] [address] Janice Robertson  Fall from standing, initial encounter [W19. XXXA]  Hip fracture requiring operative repair, right, closed, initial encounter ([facility] [address] Raul Enrique     Admit Date/Time:  2022  2:27 PM    Chart reviewed. Interdisciplinary team contacted or reviewed plan related to patient progress and discharge plans. Disciplines included Case Management, Nursing, and Dietitian. Current Status:Inpatient  PT/OT recommendation for discharge plan of care: SNF    Expected D/C Disposition:  Skilled nursing facility  Confirmed plan with patient and/or family Yes confirmed with: Patient and daughter, Lilly Quiros, at bedside. Discharge Plan Comments: Patient plans to rehab at [facility]. Spoke to He Thompson at the [facility] who states can accept at discharge. Home O2 in place on admit: No  Pt informed of need to bring portable home O2 tank on day of discharge for nursing to connect prior to leaving:  Not Indicated  Verbalized agreement/Understanding:  Not Indicated                                                                                                          DISCHARGE ORDER  Date/Time 2022 12:25 PM  Completed by: Jonas Valerio RN, Case Management    Patient Name: Saray Jackman    : 1935      Admit order Date and Status: Inpatient 2022  Noted discharge order. (verify MD's last order for status of admission/Traditional Medicare 3 [place] Inpatient qualifying stay required for SNF)    Confirmed discharge plan with:              Patient:  Yes              When pt confirms DC plan does any support person need to be contacted by CM No. Daughter, Lilly Quiros, at bedside and aware of discharge plan. Discharge to Facility: The [address] phone number for staff giving report: [phone]   Pre-certification completed: [facility] Exemption Notification (HENS) completed: yes   Discharge orders and Continuity of Care faxed to facility:  Per Sol Gann,  will pull information from [facility]. Transportation:               Medical Transport explained with choice list offered to pt/family. Choice:Yes(no preference)  Agency used: [employer]   time:   1400      Pt/family/Nursing/Facility aware of  time: Yes Names: Sukhwinder Panda; Katie Martinez; patient; daughter, Rafael Cardoso  Ambulance form completed: yes      Date Last IMM Given: 11/09/2022    Comments: Discharge order noted. Patient confirms plan for rehab at [facility]. Sol Gann at [facility] aware of discharge order and transport time. Pt is being d/c'd to [facility] today. Pt's O2 sats are 96% on RA. Discharge timeout done with Duke Newsome RN. All discharge needs and concerns addressed. Discharging nurse to complete ANNEMARIE, reconcile AVS, and place final copy with patient's discharge packet. Discharging RN to ensure that written prescriptions for  Level II medications are sent with patient to the facility as per protocol. Addendum at 1329:    Patient with orders for 1 unit blood; transport time changed to 2933-7490. Patient updated at bedside. Sol Gann at [facility] updated.      Camila Pierce, MANUEL

## 2022-11-09 NOTE — FLOWSHEET NOTE
11/09/22 1110   Vitals   Temp 98.1 °F (36.7 °C)   Temp Source Oral   Heart Rate 94   Heart Rate Source Monitor   Resp 16   BP (!) 167/67   MAP (Calculated) 100   BP Location Right upper arm   BP Upper/Lower Upper   Level of Consciousness 0   MEWS Score 1   Oxygen Therapy   SpO2 96 %   O2 Device None (Room air)   Shift assessment complete. Scheduled med's given. See MAR. Patients head-toe complete, VS are logged. pt reports  the norco 5-325 mg 2  tabs every 6 PRN is not helping pain at all. Notified MD .The bed is locked and is in the lowest position. Call light and bedside table are within reach.

## 2022-11-09 NOTE — PROGRESS NOTES
monitor room called and reported pt has been having 51 PVC occasionally thought out shift and   also reports had last and  bigeminy .   pt reported feels fine

## 2022-11-09 NOTE — PROGRESS NOTES
Inpatient Physical Therapy Evaluation and Treatment    Unit: [facility]  Date:  2022  Patient Name:    Marcio Moon  Admitting diagnosis:  Closed displaced intertrochanteric fracture of right femur, initial encounter [facility]) Joe Ward  Fall from standing, initial encounter [W19. XXXA]  Hip fracture requiring operative repair, right, closed, initial encounter ([facility] [address] Bradford Gutierrez  Admit Date:  2022  Precautions/Restrictions/WB Status/ Lines/ Wounds/ Oxygen: Fall risk, Bed/chair alarm, Telemetry, Continuous pulse oximetry, and WB Restrictions (WBAT)    Treatment Time:  08:10-09:03  Treatment Number:  2   Timed Code Treatment Minutes: 53 minutes  Total Treatment Minutes:  53 minutes    Patient Goals for Therapy: \" go home and have family take care of her \"          Discharge Recommendations: SNF  DME needs for discharge:  [facility] RW       Therapy recommendation for EMS Transport: can transport by wheelchair    Therapy recommendations for staff:   Assist of 2 with use of rolling walker (RW) and gait belt for all transfers to/from [facility]  to/from [facility]    History of Present Illness: Mechanical fall at home, suffered R Intertrochanteric hip fracture. S/P IM nailing of R femur on  by Dr. Jorge Pepper.       pmhx of CAD, atrial fibrillation, CHF, HTN, HLD, GERD , CVA    Home Health S4 Level Recommendation:  Level 1 Standard  AM-PAC Mobility Score       AM-PAC Inpatient Mobility without Stair Climbing Raw Score : 12    Pain  Yes  Ratin-7/10 at rest.   Location:Rt LE   Pain Medicine Status: Received pain med prior to tx       Cognition    A&O x4   Able to follow 2 step commands     Subjective  Patient reclined in bed with daughter present. Pt agreeable to this PT tx once done with breakfast. She says \"they want me to go\", \"I guess I need to go\", RE STR. Daughter says her bathroom is inaccessible by walker, and bed requires her to step on stool to get in.  They do not like having her sleep in a recliner because she slumps over.     Balance  Sitting:  Good - ; Supervision  Comments: At EOB and on BSC. Less antalgic posture today. Standing: Fair +; CGA  Comments: Moderate push through on walker with BUE for offweighting surgical leg (>25% WB tolerance). Able to take single or bilat UE off walker to assist with toileting/LB dressing tasks for short bursts - improved standing balance today. Improved ability to actively extend knee in stance. Bed Mobility   Supine to Sit:    Mod A x 1 person for LE advancement; Min A x 1 person to support trunk. (Improved tolerance / less pain vs yesterday)  Sit to Supine:   Not Tested  Rolling:   Not Tested  Scooting in sitting: SBA  Scooting in supine:  Not Tested    Transfer Training     Sit to stand:   CGA from EOB and from [facility] with cues for hand placement  Stand to sit:   CGA to [facility] and to recliner  Bed to Chair:   CGA with use of rolling walker (RW)    Gait gait completed as indicated below  Distance:      4 ft (bed>BSC with 90* turn) + 5 ft (BSC>chair with 180* turn)  Deviations (firm surface/linoleum):  decreased agnieszka, step to pattern, antalgic pattern, and decreased step length bilaterally  Assistive Device Used:    rolling walker (RW)  Level of Assist:    CGA  Comment: Improved WB tolerance today (>25%). Good use of UEs to offweight as needed. Stair Training deferred, pt unsafe/ not appropriate to complete stairs at this time. Pt plans to have family assist in and out of house with wheelchair. Activity Tolerance   Pt completed therapy session with No adverse symptoms noted w/activity. Pain in surgical joint with all activity. BP (mmHg)  HR (bpm)  SpO2 (%)    Semifowlers before activity   97% on RA    Seated EOB NT NT NT    Seated after functional activity NT NT NT     Positioning Needs   Pt reclined in chair, alarm set, positioned in proper neutral alignment and pressure relief provided.    Call light provided and all needs within reach    Exercises Initiated  all completed bilaterally unless indicated and completed in supine position  Ankle Pumps x 20 reps  Quad sets x 10 reps  Glut sets 10 reps  Demo of hip abduction - pt unable to complete indep yet; isometric still. TKE in stance x 4 reps    Other  None. Patient/Family Education   Pt educated on role of inpatient PT, POC, importance of continued activity, DC recommendations and calling for assist with mobility. Assessment  Pt is progressing with all aspects of mobility. She is tolerating activity better and able to sit and stand with improved balance. Pt will benefit from continued skilled therapy to promote return to PLOF. Recommending SNF upon discharge as patient functioning well below baseline, demonstrates good rehab potential and unable to return home due to burden of care beyond caregiver ability, home environment not conducive to patient recovery, and limited safety awareness. Goals : To be met in 3 visits:  1). Independent with LE Ex x 10 reps    To be met in 6 visits:  1). Supine to/from sit: Independent  2). Sit to/from stand: Modified Independent  3). Bed to chair: SBA  4). Gait: Ambulate 50 ft.  with  CGA and use of rolling walker (RW)  5). Tolerate B LE exercises 3 sets of 10-15 reps    Rehabilitation Potential: Good  Strengths for achieving goals include:   Pt motivated, PLOF, Family Support, and Pt cooperative   Barriers to achieving goals include:    No Barriers    Plan    To be seen 3-5 x / week  while in acute care setting for therapeutic exercises, bed mobility, transfers, progressive gait training, balance training, and family/patient education. Signature: Luma Alanis, PT, DPT    If patient discharges from this facility prior to next visit, this note will serve as the Discharge Summary.

## 2022-11-09 NOTE — PROGRESS NOTES
Shift assessment complete; see flow sheet. Scheduled medications administered; See MAR. IV infusing without difficulty. Pt c/o surgical pain 7/10 PRN Norco given at this time. Pt denies any needs at this time.  Pt educated on use of call light and to call out with needs, verbalized understanding, bed in low locked position for pt safety

## 2022-11-09 NOTE — PROGRESS NOTES
[address]  Cardiology  Progress Note    Admission date:  2022    Reason for follow up visit: Pre op evaluation, history of CHF    HPI/CC: Maureen Grant is a 80 y.o. female who presented 2022 following a fall and found to have a hip fracture. Cardiologoy consulted for pre op evaluation given history of CHF, CAD with prior PCI, VT, PAF, severe MR. She had R hip nailing 2022. Rhythm has been sinus. Subjective: Feels fatigued and having R leg pain, BP elevated. No chest pain or shortness of breath. Vitals:  Blood pressure (!) 167/67, pulse 94, temperature 98.1 °F (36.7 °C), temperature source Oral, resp. rate 18, height 5' 1\" (1.549 m), weight 115 lb (52.2 kg), SpO2 96 %, not currently breastfeeding.   Temp  Av.3 °F (36.8 °C)  Min: 98.1 °F (36.7 °C)  Max: 98.6 °F (37 °C)  Pulse  Av.2  Min: 65  Max: 94  BP  Min: 133/67  Max: 169/73  SpO2  Av.6 %  Min: 95 %  Max: 96 %    24 hour I/O    Intake/Output Summary (Last 24 hours) at 2022 1416  Last data filed at 2022 1110  Gross per 24 hour   Intake 721.53 ml   Output 800 ml   Net -78.47 ml       Current Facility-Administered Medications   Medication Dose Route Frequency Provider Last Rate Last Admin    0.9 % sodium chloride infusion   IntraVENous PRN Adore Arriaza MD        hydrALAZINE (APRESOLINE) injection 10 mg  10 mg IntraVENous P4S PRN Crystal Hamilton MD        0.9 % sodium chloride infusion   IntraVENous PRN Crystal Hamilton MD        apixaban Zerita Hussar) tablet 2.5 mg  2.5 mg Oral BID Crystal Hamilton MD   2.5 mg at 22 1117    HYDROmorphone (DILAUDID) injection 0.25 mg  0.25 mg IntraVENous Q4H PRN Jessica Gallegos MD   0.25 mg at 22 2217    Or    HYDROmorphone (DILAUDID) injection 0.5 mg  0.5 mg IntraVENous Q4H PRN Jessica Gallegos MD   0.5 mg at 22 4937    HYDROcodone-acetaminophen (NORCO) 5-325 MG per tablet 2 tablet  2 tablet Oral Q6H PRN Jessica Gallegos MD   2 tablet at 11/09/22 1224    amiodarone (CORDARONE) tablet 100 mg  100 mg Oral Daily Earl Began, MD   551 mg at 11/09/22 1117    aspirin chewable tablet 81 mg  81 mg Oral Daily Earl Began, MD   81 mg at 11/09/22 1117    atorvastatin (LIPITOR) tablet 80 mg  80 mg Oral Nightly Earl Began, MD   80 mg at 11/08/22 2216    famotidine (PEPCID) tablet 20 mg  20 mg Oral Daily Earl Began, MD   20 mg at 11/09/22 1118    fluticasone (FLONASE) 50 MCG/ACT nasal spray 2 spray  2 spray Each Nostril Daily Earl Began, MD   2 spray at 11/09/22 1119    metoprolol succinate (TOPROL XL) extended release tablet 50 mg  50 mg Oral Daily Earl Began, MD   50 mg at 11/09/22 1118    vitamin B-12 (CYANOCOBALAMIN) tablet 1,000 mcg  1,000 mcg Oral Daily Earl Began, MD   9,855 mcg at 11/09/22 1117    Vitamin D (CHOLECALCIFEROL) tablet 1,000 Units  1,000 Units Oral Daily Earl Began, MD   3,254 Units at 11/09/22 1118    0.9 % sodium chloride infusion   IntraVENous Continuous Earl Began, MD 75 mL/hr at 11/08/22 1342 New Bag at 11/08/22 1342    sodium chloride flush 0.9 % injection 5-40 mL  5-40 mL IntraVENous 2 times per day Earl Began, MD   10 mL at 11/09/22 1119    sodium chloride flush 0.9 % injection 5-40 mL  5-40 mL IntraVENous PRN Earl Began, MD   10 mL at 11/07/22 2217    0.9 % sodium chloride infusion   IntraVENous PRN Earl Began, MD        ondansetron (ZOFRAN-ODT) disintegrating tablet 4 mg  4 mg Oral Z0Z PRN Earl Began, MD   4 mg at 11/07/22 2002    Or    ondansetron (ZOFRAN) injection 4 mg  4 mg IntraVENous E5G PRN Earl Began, MD   4 mg at 11/06/22 1945    polyethylene glycol (GLYCOLAX) packet 17 g  17 g Oral Daily PRN Earl Began, MD        acetaminophen (TYLENOL) tablet 650 mg  650 mg Oral Q3Z PRCAIN Elliott Began, MD   072 mg at 11/07/22 1915    Or    acetaminophen (TYLENOL) suppository 650 mg  650 mg Rectal P1R PRCAIN Elliott Began, MD         Review of Systems Constitutional: Negative. Respiratory: Negative. Cardiovascular: Negative. Gastrointestinal: Negative. Musculoskeletal:  Positive for arthralgias. Neurological: Negative. Objective:     Telemetry monitor: SR    Physical Exam:  Constitutional:  Comfortable and alert, NAD, appears stated age  Eyes: PERRL, sclera nonicteric  Neck:  Supple, no masses, no thyroidmegaly, no significant JVD  Skin:  Warm and dry; no rash or lesions  Heart:  Regular, normal apex, S1 and S2 normal, +murmur  Lungs:  Normal respiratory effort; clear; no wheezing/rhonchi/rales  Abdomen: soft, non tender, + bowel sounds  Extremities:  No edema or cyanosis; no clubbing  Neuro: alert and oriented, moves legs and arms equally, normal mood and affect    Data Reviewed:    Echo 8/2022: The left ventricular systolic function is low normal to mildly reduced with   an ejection fraction of 40-45 %. There is hypokinesis of the inferior and basal/mid septal walls. There is mild concentric left ventricular hypertrophy. Grade II diastolic dysfunction with elevated left ventricular filling   pressure. The left atrium is severely dilated. Severe mitral regurgitation. Systolic flow reversal in pulmonary veins. Moderate tricuspid regurgitation. Systolic pulmonary artery pressure (SPAP) is estimated at 61 mmHg consistent   with severe pulmonary hypertension (Right atrial pressure of 3 mmHg). Stress test 6/2022 There is a small and very mild basal anteroseptal wall fixed defect, most likely an artifact (RV insertion point). There is a moderate size, moderate intensity, worse with rest images basal inferolateral wall defect, most likely an artifact  (diaphragmatic attenuation). There is no evidence of myocardial ischemia or scar. There is moderate LV dilatation with borderline normal systolic function. Left ventricular ejection fraction of 55 %. There are no regional wall motion abnormalities.   Overall findings represent a low risk scan. Coronary angiogram 2018:  HEMODYNAMICS:  Left ventricular end-diastolic pressure equals 20. There  is no significant gradient across the aortic valve by pullback post  cineangiography. LEFT VENTRICULOGRAM:  Left ventriculogram demonstrates global  hypokinesis. Of note,  there was no area of akinesis. The anterior  wall did contract, but again was hypokinetic. Estimated ejection  fraction 40%. LEFT MAIN:  Left main is a short vessel that gave almost immediate rise  to LAD and circumflex. It is free of significant obstructive disease. LEFT ANTERIOR DESCENDING:  The LAD courses to and wraps around the apex. It is a large vessel. It gives off multiple small diagonal branches. In the proximal portion of vessel, there is a ruptured plaque. There is  80% narrowing in this area. There is diffuse disease, but no  significant focal obstructive disease. LEFT CIRCUMFLEX:  Circumflex consists of high rising marginal branch, a  very large second marginal branch courses on the AV groove and then  gives off small posterolateral branch. The circumflex is free of  significant obstructive disease. RIGHT CORONARY ARTERY:  Right coronary artery is a large dominant  vessel. It gives off a large PDA and several distal posterolateral  branches. No significant obstructive disease in the right coronary  artery. LEFT ANTERIOR DESCENDING POST INTERVENTION:  There is an 80% stenosis of  the ruptured plaque. Post intervention, there was minimal if any  residual stenosis. IMPRESSION:  1.  LV dysfunction consistent with cardiomyopathy. 2.  Ventricular tachycardia. 3.  Abnormal Myoview. 4.  LV dysfunction with estimated ejection fraction of 40% with global  hypokinesis. 5.  Successful primary stent to LAD.     Lab Reviewed:     Renal Profile:  Lab Results   Component Value Date/Time    CREATININE 1.3 11/09/2022 05:25 AM    BUN 29 11/09/2022 05:25 AM     11/09/2022 05:25 AM    K 4.5 11/09/2022 05:25 AM    K 3.4 07/06/2022 05:20 AM     11/09/2022 05:25 AM    CO2 23 11/09/2022 05:25 AM     CBC:    Lab Results   Component Value Date/Time    WBC 12.9 11/09/2022 05:25 AM    RBC 2.57 11/09/2022 05:25 AM    HGB 7.3 11/09/2022 05:25 AM    HCT 22.7 11/09/2022 05:25 AM    MCV 88.2 11/09/2022 05:25 AM    RDW 18.9 11/09/2022 05:25 AM     11/09/2022 05:25 AM     BNP:    Lab Results   Component Value Date/Time    PROBNP 4,207 08/11/2022 01:00 PM    PROBNP 9,949 06/29/2022 05:18 AM    PROBNP 9,566 06/26/2022 04:40 AM    PROBNP 11,182 06/23/2022 07:12 PM    PROBNP 13,884 05/29/2022 11:49 AM     Fasting Lipid Panel:    Lab Results   Component Value Date/Time    CHOL 104 06/24/2022 04:52 AM    HDL 45 06/24/2022 04:52 AM    TRIG 80 06/24/2022 04:52 AM     Cardiac Enzymes:  CK/MbTroponin  Lab Results   Component Value Date/Time    TROPONINI <0.01 11/06/2022 03:02 PM     PT/ INR   Lab Results   Component Value Date/Time    INR 1.21 11/07/2022 05:57 AM    INR 1.17 11/06/2022 03:02 PM    INR 3.20 06/24/2022 04:15 PM    PROTIME 15.2 11/07/2022 05:57 AM    PROTIME 14.7 11/06/2022 03:02 PM    PROTIME 16.7 05/29/2022 11:49 AM     PTT No results found for: PTT   Lab Results   Component Value Date/Time    MG 2.20 08/11/2022 01:00 PM    No results found for: TSH    All labs and imaging reviewed today    Assessment:  Pre op cardiac risk assessment: no surgical complications so far  CAD: s/p PCI LAD 2018  HFrEF: appears compensated currently  Ischemic cardiomyopathy: improved, EF 45% on echo 8/2022 from 25% prior  Severe mitral regurgitation  Paroxysmal atrial fibrillation: stable, in sinus   - s/p CV 6/24/2022   - [id number] score >2 on eliquis   - s/p ILR  Pulmonary HTN  CKD  History of CVA  Anemia: worse    Plan:   1. Hold eliquis, H/H dropped and receiving PRBCs today  2. Monitor CBC and restart eliquis when stable  3. Continue toprol and amiodarone; appears ARB stopped at one point due to hyperkalemia  4. PRN hydralazine for HTN along with pain control  5. Cardiology will sign off, please call if needed.      DAVE Dominguez-CNP  [facility]  [phone]

## 2022-11-09 NOTE — PLAN OF CARE
Problem: Discharge Planning  Goal: Discharge to home or other facility with appropriate resources  Outcome: Progressing     Problem: Pain  Goal: Verbalizes/displays adequate comfort level or baseline comfort level  Outcome: Progressing  Flowsheets  Taken 11/8/2022 1730 by Christie De La Rosa RN  Verbalizes/displays adequate comfort level or baseline comfort level:   Encourage patient to monitor pain and request assistance   Assess pain using appropriate pain scale   Administer analgesics based on type and severity of pain and evaluate response   Implement non-pharmacological measures as appropriate and evaluate response   Consider cultural and social influences on pain and pain management   Notify Licensed Independent Practitioner if interventions unsuccessful or patient reports new pain  Taken 11/8/2022 1625 by Christie De La Rosa RN  Verbalizes/displays adequate comfort level or baseline comfort level:   Encourage patient to monitor pain and request assistance   Assess pain using appropriate pain scale   Administer analgesics based on type and severity of pain and evaluate response   Implement non-pharmacological measures as appropriate and evaluate response   Consider cultural and social influences on pain and pain management   Notify Licensed Independent Practitioner if interventions unsuccessful or patient reports new pain  Taken 11/8/2022 1400 by Christie De La Rosa RN  Verbalizes/displays adequate comfort level or baseline comfort level:   Encourage patient to monitor pain and request assistance   Assess pain using appropriate pain scale   Administer analgesics based on type and severity of pain and evaluate response   Implement non-pharmacological measures as appropriate and evaluate response   Consider cultural and social influences on pain and pain management   Notify Licensed Independent Practitioner if interventions unsuccessful or patient reports new pain     Problem: Safety - Adult  Goal: Free from fall injury  Outcome: Progressing     Problem: Skin/Tissue Integrity  Goal: Absence of new skin breakdown  Description: 1. Monitor for areas of redness and/or skin breakdown  2. Assess vascular access sites hourly  3. Every 4-6 hours minimum:  Change oxygen saturation probe site  4. Every 4-6 hours:  If on nasal continuous positive airway pressure, respiratory therapy assess nares and determine need for appliance change or resting period.   Outcome: Progressing     Problem: Chronic Conditions and Co-morbidities  Goal: Patient's chronic conditions and co-morbidity symptoms are monitored and maintained or improved  Outcome: Progressing

## 2022-11-09 NOTE — PROGRESS NOTES
Handoff report and transfer of care given at bedside to [facility]. Patient in stable condition, denies needs/concerns at this time. Call light within reach.

## 2022-11-09 NOTE — PROGRESS NOTES
Pt requesting pain medication for surgical pain rating 7/10. PRN  pain medication given, see MAR. SR up x2, Call light and bedside table in easy reach. Denies any other needs at this time.

## 2022-11-09 NOTE — PROGRESS NOTES
Perfect serve sent to MD to verify if he still want pt one hour H/H post infusion per protocol d/t discharge time

## 2022-11-09 NOTE — DISCHARGE INSTR - COC
Continuity of Care Form    Patient Name: Alina Polk   :  1935  MRN:  [id number]    Admit date:  2022  Discharge date:  2022    Code Status Order: Full Code   Advance Directives:     Admitting Physician:  Melissa Cox MD  PCP: Kita Agee DO    Discharging Nurse: [facility] Unit/Room#: 4324/2465-71  Discharging Unit Phone Number: [phone]    Emergency Contact:   Extended Emergency Contact Information  Primary Emergency Contact: [address] [facility] Phone: [phone]  Relation: Child  Secondary Emergency Contact: Nara Benito  Mobile Phone: [phone]  Relation: Child   needed?  No    Past Surgical History:  Past Surgical History:   Procedure Laterality Date    COLONOSCOPY      EYE SURGERY Left 2018    PHACO EMULSIFICATION OF CATARACT WITH INTRAOCULAR LENS IMPLANT LEFT EYE     HIP SURGERY Right 2022    RIGHT HIP INTRAMEDULLARY NAILING performed by Sol Cuevas MD at [address] [facility]      OTHER SURGICAL HISTORY  2018    phacoemulsification of cataract with intraocular lens implant right eye    [facility] OFFICE/OUTPT VISIT,PROCEDURE ONLY Right 2018    PHACO EMULSIFICATION OF CATARACT WITH INTRAOCULAR LENS IMPLANT RIGHT EYE performed by Aldo Calderon MD at [address] OFFICE/OUTPT [facility] Left 2018    PHACO EMULSIFICATION OF CATARACT WITH INTRAOCULAR LENS IMPLANT LEFT EYE performed by Aldo Calderon MD at [address] [facility]         Immunization History:   Immunization History   Administered Date(s) Administered    COVID-19, MODERNA BLUE border, Primary or Immunocompromised, (age 12y+), IM, 100 mcg/0.5mL 2021, 2021    Influenza Vaccine, unspecified formulation 2016    Influenza Virus Vaccine 10/13/2013, 10/02/2015    Influenza, Quadv, 6 mo and older, IM, PF (Flulaval, Fluarix) 2018    Pneumococcal Conjugate 13-valent ([place]) 2018 Pneumococcal Polysaccharide ([id number]) 01/15/2014    Td (Adult), 5 Lf Tetanus Toxoid, Pf (Tenivac, Decavac) 11/20/2021       Active Problems:  Patient Active Problem List   Diagnosis Code    Other and unspecified angina pectoris I20.9    Coronary atherosclerosis of native coronary artery I25.10    Other chronic pulmonary heart diseases I27.89    Mitral valve disorder I05.9    Acute combined systolic and diastolic heart failure (HCC) I50.41    Chronic low back pain M54.50, G89.29    Essential hypertension I10    AGUILA (acute kidney injury) ([facility] [address] N17.9    Renal insufficiency N28.9    Gout M10.9    Pain in joint, hand M25.549    Mixed hyperlipidemia E78.2    Primary insomnia F51.01    Closed stable burst fracture of first lumbar vertebra ([facility]) S32.011A    GERD (gastroesophageal reflux disease) K21.9    Palpitations R00.2    V-tach I47.20    Abnormal myocardial perfusion study R94.39    Ischemic cardiomyopathy I25.5    Cardiomyopathy ([facility] [address] I42.9    PVD (posterior vitreous detachment), both eyes H43.813    Posterior subcapsular polar age-related cataract of both eyes H25.043    Confusion R41.0    Chronic kidney disease, stage IV (severe) ([facility]) N18.4    Visual field defect H53.40    Stroke of unknown etiology ([facility] [address] I63.9    Encounter for loop recorder check Z45.09    Atrial fibrillation ([facility]) I48.91    CHF (congestive heart failure), NYHA class I, acute on chronic, combined ([facility]) I50.43    On amiodarone therapy Z79.899    On continuous oral anticoagulation Z79.01    Near syncope R55    Bradycardia R00.1    Hypokalemia E87.6    Hyponatremia E87.1    Closed displaced intertrochanteric fracture of right femur ([facility] [address] S72.141A    Fall from standing W19. XXXA    History of CVA (cerebrovascular accident) Z86.73    Pre-op evaluation Z01.818       Isolation/Infection:   Isolation            No Isolation          Patient Infection Status       Infection Onset Added Last Indicated Last Indicated By Review Planned Expiration Resolved Resolved By    None active    Resolved    COVID-19 (Rule Out) 22 COVID-19 & Influenza Combo (Ordered)   22 Rule-Out Test Resulted    COVID-19 (Rule Out) 22 COVID-19, Rapid (Ordered)   22 Rule-Out Test Resulted    COVID-19 (Rule Out) 22 COVID-19 & Influenza Combo (Ordered)   22 Rule-Out Test Resulted    COVID-19 20 COVID-19   20             Nurse Assessment:  Last Vital Signs: BP (!) 167/67   Pulse 94   Temp 98.1 °F (36.7 °C) (Oral)   Resp 16   Ht 5' 1\" (1.549 m)   Wt 115 lb (52.2 kg)   SpO2 96%   BMI 21.73 kg/m²     Last documented pain score (0-10 scale): Pain Level: 7  Last Weight:   Wt Readings from Last 1 Encounters:   22 115 lb (52.2 kg)     Mental Status:  alert    IV Access:  - None    Nursing Mobility/ADLs:  Walking   Assisted  Transfer  Assisted  Bathing  Assisted  Dressing  Assisted  Toileting  Assisted  Feeding  Assisted  Med Admin  Assisted  Med Delivery   whole    Wound Care Documentation and Therapy:  Incision 22 Femoral Anterior;Proximal;Right (Active)   Dressing Status Clean;Dry; Intact 22   Dressing/Treatment Foam 22   Drainage Amount None 22   Number of days: 1        Elimination:  Continence: Bowel: Yes  Bladder: Yes  Urinary Catheter: None   Colostomy/Ileostomy/Ileal Conduit: No       Date of Last BM: 10/9/22    Intake/Output Summary (Last 24 hours) at 2022 1157  Last data filed at 2022 1110  Gross per 24 hour   Intake 1521.53 ml   Output 1200 ml   Net 321.53 ml     I/O last 3 completed shifts: In: 3367.9 [P.O.:840; I.V.:2084.5; Blood:293.3; IV Piggyback:150]  Out: 1900 [Urine:1900]    Safety Concerns: At Risk for Falls    Impairments/Disabilities:      Hearing     Nutrition Therapy:  Current Nutrition Therapy:   - Oral Diet:  General    Routes of Feeding: Oral  Liquids:  Thin Liquids  Daily Fluid Restriction: no  Last Modified Barium Swallow with Video (Video Swallowing Test): not done    Treatments at the Time of Hospital Discharge:   Respiratory Treatments: ***  Oxygen Therapy:  is not on home oxygen therapy. Ventilator:    - No ventilator support    Rehab Therapies: Physical Therapy and Occupational Therapy  Weight Bearing Status/Restrictions: No weight bearing restrictions  Other Medical Equipment (for information only, NOT a DME order):  walker  Other Treatments: ***    Patient's personal belongings (please select all that are sent with patient):  Hearing Aides {LEFT/RIGHT/BILATERAL:[id number]}, Dentures {DESC; UPPER/LOWER/BOTH:45629}    RN SIGNATURE:  Electronically signed by Marilyn Padilla RN on 11/9/22 at 4:52 PM EST    CASE MANAGEMENT/SOCIAL WORK SECTION    Inpatient Status Date: Inpatient    Readmission Risk Assessment Score:  Readmission Risk              Risk of Unplanned Readmission:  40           Discharging to Facility/ Agency   Name: [facility]  Address:  Phone: [phone]  Fax:    Dialysis Facility (if applicable)   Name:  Address:  Dialysis Schedule:  Phone:  Fax:    / signature: Electronically signed by Paige Randolph RN on 11/9/22 at 12:40 PM EST    PHYSICIAN SECTION    Prognosis: \      Condition at Discharge: Stable    Rehab Potential (if transferring to Rehab): Good    Recommended Labs or Other Treatments After Discharge: CBC in one week    Physician Certification: I certify the above information and transfer of Yessy Coker  is necessary for the continuing treatment of the diagnosis listed and that she requires [place] for less 30 days.      Update Admission H&P: No change in H&P    PHYSICIAN SIGNATURE:  Electronically signed by Chhaya Potter MD on 11/9/22 at 11:58 AM EST

## 2022-11-09 NOTE — PROGRESS NOTES
Department of Orthopedic Surgery  Physician Assistant   Progress Note/Plan of Care    Subjective:       Systemic or Specific Complaints:Pain Control about the same. Objective:     Patient Vitals for the past 24 hrs:   BP Temp Temp src Pulse Resp SpO2   11/09/22 1110 (!) 167/67 98.1 °F (36.7 °C) Oral 94 16 96 %   11/09/22 0257 (!) 161/77 98.4 °F (36.9 °C) Oral 65 18 95 %   11/08/22 2339 (!) 169/73 98.3 °F (36.8 °C) Oral 78 18 95 %   11/08/22 1926 (!) 148/69 98.2 °F (36.8 °C) Oral 85 18 96 %   11/08/22 1708 -- -- -- -- 18 --   11/08/22 1625 133/67 98.6 °F (37 °C) Oral 84 18 96 %   11/08/22 1400 (!) 145/62 98.1 °F (36.7 °C) Oral 79 18 93 %   11/08/22 1159 -- -- -- -- 18 --   11/08/22 1149 (!) 163/68 97.7 °F (36.5 °C) Oral 77 18 93 %           Data Review  CBC:   Lab Results   Component Value Date/Time    WBC 12.9 11/09/2022 05:25 AM    RBC 2.57 11/09/2022 05:25 AM    HGB 7.3 11/09/2022 05:25 AM    HCT 22.7 11/09/2022 05:25 AM     11/09/2022 05:25 AM       Renal:   Lab Results   Component Value Date/Time     11/09/2022 05:25 AM    K 4.5 11/09/2022 05:25 AM    K 3.4 07/06/2022 05:20 AM     11/09/2022 05:25 AM    CO2 23 11/09/2022 05:25 AM    BUN 29 11/09/2022 05:25 AM    CREATININE 1.3 11/09/2022 05:25 AM    GLUCOSE 117 11/09/2022 05:25 AM    CALCIUM 8.5 11/09/2022 05:25 AM            Assessment:      right hip IT fracture with IM nail. Acute blood loss anemia - expected after surgery. Will monitor Hgb      Plan:      1:  PT/OT as able. WBAT. Ice for swelling of the thigh. Pt seems agreeable to SNF at this time due to lack of help at home currently. May need blood given drop in Hgb with history of cardiac issues. Will review with IM team as well.    2:  Continue Deep venous thrombosis prophylaxis - Can restart Eliquis  3:  Continue Pain Control    KEE [place]

## 2022-11-09 NOTE — PROGRESS NOTES
Occupational Therapy Daily Treatment Note    Unit: 2 [place]  Date:  11/9/2022  Patient Name:    Wilfredo Mahoney  Admitting diagnosis:  Closed displaced intertrochanteric fracture of right femur, initial encounter [facility]) Vadim Alves  Fall from standing, initial encounter [W19. XXXA]  Hip fracture requiring operative repair, right, closed, initial encounter ([facility] [address] Pauline Delvalle  Admit Date:  11/6/2022  Precautions/Restrictions:    Fall risk, Bed/chair alarm, Lines -Supplemental O2 (2) and Purewick catheter, Telemetry, and WB Restrictions (WBAT  )  RIGHT HIP INTRAMEDULLARY NAILING- 11-7-22 , WBAT Rt LE       Discharge Recommendations: SNF  DME needs for discharge: defer to facility       Therapy recommendations for staff:   Assist of 1 with use of rolling walker (RW) for all transfers to/from [facility]  to/from chair    AM-PAC Score: AM-PAC Inpatient Daily Activity Raw Score: 16  Home Health S4 Level: NA       Treatment Time:  815- 900  Treatment number:  2    Timed code treatment minutes: 45 minutes  Total treatment minutes:   45 minutes    History of Present Illness:   per H&P   80 y.o. female with history seen below presenting with fall at home. Patient states she was coming out of her bathroom and believes that her foot slipped on the ground and she came down onto her right hip. States she has moderate to severe pain of her right hip is constant, stabbing, worse with palpation and movement without relieving factors she states her hip is externally rotated. States she did hit her head slightly denies loss of consciousness, significant headache, blurred vision, focal neurodeficits or motor or sensory changes. Denies neck pain. States she does have some lower back pain that is similar to prior. Denies any motor or sensory changes in lower extremity, bowel or bladder changes. States she does have some mild discomfort along the right lower quadrant of the abdomen after the fall.   Denies nausea vomiting, change in bowel habits, change in urination urination. Denies any chest pain, shortness of breath, fevers, cough, sputum production. pmhx of CAD, atrial fibrillation, CHF, HTN, HLD, GERD  who presented to [facility] ED with complaint of fall at home. HX of CVA   Subjective:  Pt in the bed and stated she guesses that she will have to go to rehab     Pain   Yes  Ratin/10   Location: RT hip    Pain Medicine Status: Received pain med prior to tx      Bed Mobility:   Supine to Sit:  Min A   Sit to Supine:  Not Tested  Rolling:           Not Tested  Scooting:        Min A     Transfer Training:   Sit to stand:   Min A of one   Stand to sit:  Min A  with VC for hand placement and safe walker use   Bed to Chair:  CGA and Min A  with RW and gait belt   Bed to BSC:   CGA and Min A  with RW and gait belt   Standard toilet:   Not Tested    Activity Tolerance   Pt completed therapy session with No adverse symptoms noted w/activity    ADL Training:   Upper body dressing: Not Tested  Upper body bathing:  Not Tested  Lower body dressing:  CGA and Min A  to don pull ups   Lower body bathing:  Not Tested  Toileting:   CGA to min assist with RW and gait belt   Grooming/Hygiene:  CGA to wipe from bowel movement when standing     Therapeutic Exercise:   NA    Patient Education:   Role of OT    Positioning Needs:   Pt reclined in chair, alarm set, positioned in proper neutral alignment and pressure relief provided. Family Present:  Yes    Assessment:   Pt with improved movement today required min assist from supine to sit and to scoot in sitting. The pt was min/CGA for transfer to chair/BSC. The pt was CGA for standing to wipe after bowel movement. Min assist to don pull ups. The pt needs continued OT to improve functional IND. GOALS    To be met in 3 Visits:  1). Bed to toilet/BSC: CGA with RW and gait belt      To be met in 5 Visits:  1). Supine to/from Sit:             Min A - Goal met 11-9 new goal Sup   2).  Upper Body Bathing: Supervision  3). Lower Body Bathing: Mod A   4). Upper Body Dressing:       Supervision  5). Lower Body Dressing: Mod A   6).  Pt to demonstrate UE exs x 15 reps with minimal cues       Plan: cont with POC      Armando Cunningham OTR/L [id number]       If patient discharges from this facility prior to next visit, this note will serve as the Discharge Summary

## 2022-11-09 NOTE — PROGRESS NOTES
Discharge order received. Copy of discharge instructions / report given to EMT. Signed prescriptions x given to EMT, IV and telemetry removed. All patient belongings packed and sent with patient upon discharge.  Called facility and gave report to nurse Gonzalez  all questions  answered

## 2022-11-10 ENCOUNTER — TELEPHONE (OUTPATIENT)
Dept: FAMILY MEDICINE CLINIC | Age: 87
End: 2022-11-10

## 2022-11-10 NOTE — TELEPHONE ENCOUNTER
[facility] Transitions Initial Follow Up Call    Outreach made within 2 business days of discharge: Yes    Patient: Susan Anderson Patient : 1935   MRN: [id number]  Reason for Admission: There are no discharge diagnoses documented for the most recent discharge. Discharge Date: 22       Spoke with: Pt's Daughter Marcos Gagnon, the Pt is in SNF at this time and will reach out to our office when she is discharged for a TCM follow up Appt. Discharge department/facility: [facility]    Non-face-to-face services provided:  Obtained and reviewed discharge summary and/or continuity of care documents    TCM Interactive Patient Contact:  Was patient able to fill all prescriptions: Yes  Was patient instructed to bring all medications to the follow-up visit: Yes  Is patient taking all medications as directed in the discharge summary?  Yes  Does patient understand their discharge instructions: Yes  Does patient have questions or concerns that need addressed prior to 7-14 day follow up office visit: no    Scheduled appointment with PCP within 7-14 days    Follow Up  Future Appointments   Date Time Provider Jerome Mahmood   2022  4:00 PM SCHEDULE, [facility] REMOTE TRANSMISSION Willa GRIMES   2023  2:00 PM DAVE Clifton - CNP [place] [facility]   3/14/2023  2:00 PM MD Willa Delarosa LPN

## 2022-11-17 ENCOUNTER — NURSE ONLY (OUTPATIENT)
Dept: CARDIOLOGY CLINIC | Age: 87
End: 2022-11-17
Payer: MEDICARE

## 2022-11-17 DIAGNOSIS — Z86.73 HISTORY OF CVA (CEREBROVASCULAR ACCIDENT): Primary | ICD-10-CM

## 2022-11-17 DIAGNOSIS — Z95.818 IMPLANTABLE LOOP RECORDER PRESENT: ICD-10-CM

## 2022-11-17 PROCEDURE — G2066 INTER DEVC REMOTE 30D: HCPCS | Performed by: INTERNAL MEDICINE

## 2022-11-17 PROCEDURE — 93298 REM INTERROG DEV EVAL SCRMS: CPT | Performed by: INTERNAL MEDICINE

## 2022-11-17 NOTE — PROGRESS NOTES
ILR for CVA. Remote transmission received from patient's monitor at home. Since 09/19/22:  2 symptom events w available ECGs which appear to illustrate NSR with ectopy. 0.0% AT/ AF burden  PVC burden 1.6%. EP physician to review. See PACEART report under Cardiology tab. Will continue to monitor remotely.     (End of 31-day monitoring period 11/17/22)

## 2022-11-22 ENCOUNTER — OFFICE VISIT (OUTPATIENT)
Dept: ORTHOPEDIC SURGERY | Age: 87
End: 2022-11-22

## 2022-11-22 VITALS — HEIGHT: 61 IN | WEIGHT: 115 LBS | BODY MASS INDEX: 21.71 KG/M2

## 2022-11-22 DIAGNOSIS — S72.001A CLOSED FRACTURE OF RIGHT HIP, INITIAL ENCOUNTER (HCC): Primary | ICD-10-CM

## 2022-11-22 DIAGNOSIS — S72.141D CLOSED DISPLACED INTERTROCHANTERIC FRACTURE OF RIGHT FEMUR WITH ROUTINE HEALING, SUBSEQUENT ENCOUNTER: ICD-10-CM

## 2022-11-22 PROCEDURE — 99024 POSTOP FOLLOW-UP VISIT: CPT | Performed by: STUDENT IN AN ORGANIZED HEALTH CARE EDUCATION/TRAINING PROGRAM

## 2022-11-22 NOTE — PROGRESS NOTES
S: Jon Ramriez presents for follow-up 2 weeks status post intramedullary nailing of right intertrochanteric hip fracture by myself on November 7, 2022. She has been at a rehab center. She is ambulating with a walker her pain has been controlled with Percocet. The are planning to discharge her home soon. O: Incisions to the lateral hip well approximated without erythema or drainage. Neurovascular intact distally. A: 2 weeks status post intramedullary nailing of right intertrochanteric hip fracture. P: Continue current postop care transitioning from nursing facility back home. We will follow-up as needed.       Renan Senior MD

## 2022-12-01 ENCOUNTER — TELEPHONE (OUTPATIENT)
Dept: FAMILY MEDICINE CLINIC | Age: 87
End: 2022-12-01

## 2022-12-01 NOTE — TELEPHONE ENCOUNTER
Received phone from Zander with [facility] in regards to se is PCP will follow pt and sign orders for home care please advise

## 2022-12-05 ENCOUNTER — TELEPHONE (OUTPATIENT)
Dept: ORTHOPEDIC SURGERY | Age: 87
End: 2022-12-05

## 2022-12-05 ENCOUNTER — TELEPHONE (OUTPATIENT)
Dept: FAMILY MEDICINE CLINIC | Age: 87
End: 2022-12-05

## 2022-12-05 NOTE — TELEPHONE ENCOUNTER
Prescription Refill     Medication Name:  [facility]  Pharmacy: [facility]  Patient Contact Number:  [phone]    Please call patient once filled

## 2022-12-05 NOTE — TELEPHONE ENCOUNTER
Patient is wanting to request percocet, was prescribed through hospital, The hospital said refills would have to come through you. However she goes through a pain clinic but the clinic can't get her in for an appointment for a couple weeks and she is worried because she can't handle the pain on her own. I told patient we couldn't fill prescriptions that we didn't order and she persisted on asking you. Please advise.

## 2022-12-06 ENCOUNTER — TELEPHONE (OUTPATIENT)
Dept: FAMILY MEDICINE CLINIC | Age: 87
End: 2022-12-06

## 2022-12-06 NOTE — TELEPHONE ENCOUNTER
Incoming call from PT seen patient yesterday for evaluation, FYI only they will see patient Once weekly for 9 weeks.

## 2022-12-06 NOTE — TELEPHONE ENCOUNTER
Yea unfortunately, the Percocet is a controlled substance we do not do controlled narcotics at this office. I would recommend calling the pain office and just asking for short supply until she can get into the pain clinic. If she gets it from an outside provider, that may violate her pain contract.

## 2022-12-06 NOTE — TELEPHONE ENCOUNTER
Sreekanth Saldivar is calling with [facility] for verbal orders for OT in her home for 1 time a week for 8 weeks.  Please advise

## 2022-12-07 PROBLEM — Z01.818 PRE-OP EVALUATION: Status: RESOLVED | Noted: 2022-11-07 | Resolved: 2022-12-07

## 2022-12-14 RX ORDER — ATORVASTATIN CALCIUM 80 MG/1
TABLET, FILM COATED ORAL
Qty: 90 TABLET | Refills: 3 | Status: SHIPPED | OUTPATIENT
Start: 2022-12-14

## 2022-12-14 RX ORDER — FUROSEMIDE 20 MG/1
20 TABLET ORAL DAILY PRN
Qty: 90 TABLET | Refills: 3 | OUTPATIENT
Start: 2022-12-14

## 2022-12-14 NOTE — TELEPHONE ENCOUNTER
Requested Prescriptions     Pending Prescriptions Disp Refills    atorvastatin (LIPITOR) 80 MG tablet [Pharmacy Med Name: ATORVASTATIN 80 MG TABLET] 90 tablet 3     Sig: TAKE 1 TABLET BY MOUTH ONE TIME A DAY          Last Office Visit: 9/13.2022  Next Office Visit: [phone]

## 2022-12-14 NOTE — TELEPHONE ENCOUNTER
Requested Prescriptions     Pending Prescriptions Disp Refills    furosemide (LASIX) 20 MG tablet [Pharmacy Med Name: FUROSEMIDE 20 MG TABLET] 90 tablet 3     Sig: TAKE 1 TABLET BY MOUTH DAILY AS NEEDED (FLUID OVERLOAD)              Last Office Visit: 9/13/2022     Next Office Visit: 02.16.2023    Last labs :11.09.2022

## 2022-12-15 NOTE — TELEPHONE ENCOUNTER
Requested Prescriptions      No prescriptions requested or ordered in this encounter          Last Office Visit: 9/13/2022     Next Office Visit: 1/12/2023

## 2022-12-29 ENCOUNTER — TELEPHONE (OUTPATIENT)
Dept: FAMILY MEDICINE CLINIC | Age: 87
End: 2022-12-29

## 2022-12-29 DIAGNOSIS — M25.512 ACUTE PAIN OF LEFT SHOULDER: Primary | ICD-10-CM

## 2022-12-29 NOTE — TELEPHONE ENCOUNTER
Keren from [place] [id number] calling to request a [facility] X[facility] order of the left shoulder. Pt injured around 11/6/22 and it's not getting any better.     This needs to be faxed to [facility] [phone]- [phone]    I pended order for you but you will need to fill in some information in the order - DX code and reason for [facility]

## 2022-12-30 NOTE — TELEPHONE ENCOUNTER
I spoke with Nate Dong regarding this and she stated that she will try to go to ortho urgent care. She stated that she will call the office next week to schedule an appointment if she is unable to make it to urgent care.

## 2022-12-30 NOTE — TELEPHONE ENCOUNTER
The patient got advise by the provider to for to the Urgent Ortho By her house, the patient stated that she will be going to get the x-ray done.

## 2023-01-03 NOTE — TELEPHONE ENCOUNTER
[facility] called and was needing the xray order faxed to them. Tried faxing the order and the fax number that we have that was given in the initial call it not a working fax number. If they call back please confirm fax.

## 2023-01-05 NOTE — TELEPHONE ENCOUNTER
Shoulder x-ray has been ordered. However it says below that she was going to go to the Ortho after hours office.

## 2023-01-05 NOTE — TELEPHONE ENCOUNTER
A good fax number is [phone]  A good call back number is [phone]     [facility] called needing the papers faxed over to them to schedule the pt.

## 2023-01-06 NOTE — TELEPHONE ENCOUNTER
Spoke with pt she did not go to after hours ortho.  She will be going to the hospital later on for the x ray

## 2023-01-09 ENCOUNTER — HOSPITAL ENCOUNTER (OUTPATIENT)
Dept: GENERAL RADIOLOGY | Age: 88
Discharge: HOME | End: 2023-01-09
Payer: MEDICARE

## 2023-01-09 ENCOUNTER — HOSPITAL ENCOUNTER (OUTPATIENT)
Age: 88
Discharge: HOME | End: 2023-01-09
Payer: MEDICARE

## 2023-01-09 DIAGNOSIS — M25.512 ACUTE PAIN OF LEFT SHOULDER: ICD-10-CM

## 2023-01-09 PROCEDURE — 73030 X-RAY EXAM OF SHOULDER: CPT

## 2023-01-10 NOTE — TELEPHONE ENCOUNTER
Requested Prescriptions     Pending Prescriptions Disp Refills    metoprolol succinate (TOPROL XL) 50 MG extended release tablet [Pharmacy Med Name: METOPROLOL SUCCINATE ER 50 MG ORAL TABLET EXTENDED RELEASE 24 HOUR] 30 tablet 5     Sig: TAKE 1 TABLET BY MOUTH IN THE MORNING          Number: 30    Refills: 5    Last Office Visit: 9/13/2022     Next Office Visit: 1/12/2023

## 2023-01-11 RX ORDER — METOPROLOL SUCCINATE 50 MG/1
50 TABLET, EXTENDED RELEASE ORAL DAILY
Qty: 30 TABLET | Refills: 5 | Status: SHIPPED | OUTPATIENT
Start: 2023-01-11

## 2023-01-11 RX ORDER — FUROSEMIDE 20 MG/1
20 TABLET ORAL DAILY PRN
Qty: 30 TABLET | Refills: 5 | OUTPATIENT
Start: 2023-01-11

## 2023-01-11 RX ORDER — CARVEDILOL 12.5 MG/1
18.75 TABLET ORAL 2 TIMES DAILY WITH MEALS
Qty: 90 TABLET | Refills: 5 | OUTPATIENT
Start: 2023-01-11

## 2023-01-12 ENCOUNTER — NURSE ONLY (OUTPATIENT)
Dept: CARDIOLOGY CLINIC | Age: 88
End: 2023-01-12

## 2023-01-12 DIAGNOSIS — Z45.09 ENCOUNTER FOR LOOP RECORDER CHECK: ICD-10-CM

## 2023-01-12 DIAGNOSIS — Z86.73 HISTORY OF CVA (CEREBROVASCULAR ACCIDENT): Primary | ICD-10-CM

## 2023-01-13 NOTE — PROGRESS NOTES
ILR for CVA. Remote transmission received from patient's monitor at home. Since 11/17/22:  3 symptom events w available ECGs which appear to illustrate NSR with ectopy. 0.0% AT/ AF burden  PVC burden 3.1%. EP physician to review. See PACEART report under Cardiology tab. Will continue to monitor remotely.     (End of 31-day monitoring period 1/12/23)

## 2023-01-23 ENCOUNTER — TELEPHONE (OUTPATIENT)
Dept: FAMILY MEDICINE CLINIC | Age: 88
End: 2023-01-23

## 2023-02-07 ENCOUNTER — TELEPHONE (OUTPATIENT)
Dept: CARDIOLOGY CLINIC | Age: 88
End: 2023-02-07

## 2023-02-07 RX ORDER — FAMOTIDINE 20 MG/1
TABLET, FILM COATED ORAL
Qty: 60 TABLET | Refills: 3 | Status: SHIPPED | OUTPATIENT
Start: 2023-02-07

## 2023-02-07 NOTE — TELEPHONE ENCOUNTER
----- Message from DAVE Hein CNP sent at 1/10/2023  5:53 PM EST -----  Fawn Keane,   I got a refill request for carvedilol 25 mg BID, Can you check with patient and see if she is taking before I order?

## 2023-02-07 NOTE — TELEPHONE ENCOUNTER
I spoke with patient and she stated she does not take carvedilol, she said metoprolol was given instead of this.

## 2023-02-10 NOTE — PROGRESS NOTES
[facility]   Electrophysiology  Office Visit  Date: 2/16/2023    Chief Complaint   Patient presents with    Atrial Fibrillation    Cerebrovascular Accident    Cardiomyopathy    Congestive Heart Failure    Coronary Artery Disease     Cardiac HX: Jaqueline Serna is a 80 y.o. woman with a h/o HTN, HLD, thyroid disease, GERD, CKD stage IV, MVP, CAD, s/p MI, s/p PCI (2018), grade III DD, NICMP, chronic sCHF, EF 25-30%, CVA (3/2022), s/p ILR (3/21/22, Dr. Migel Barbour), pAF on Eliquis, who p/w AF/RVR (6/2022), s/p DCCV to NSR (6/24/2022), placed on amio 200 mg QD. Interval History/HPI: Patient is here for follow-up for paroxysmal AF, NICMP and chronic systolic CHF. Patient has a longstanding history of paroxysmal atrial fibrillation. She has been on Eliquis in the past.  Patient had presented with a CVA in March 2022. She was implanted with an ILR at that time. She had presented in June 2022 with AF/RVR. She underwent a DCCV to NSR on 6/24/2022. She was also found to be in acute on chronic systolic CHF. She was placed on a Lasix drip at that time. Post cardioversion she was placed on amiodarone 200 mg daily. Patient then presented in July 2022 with dizziness, lightheadedness and a fall. She was noted to be hyponatremic with some bradycardia. Her beta-blocker was placed on hold. She fell and broke her hip in November 2022. It was a mechanical fall as she tripped on a rug. She states that since that time she has been complaining of fatigue. She has no energy. She does use a walker to get around and states that when she does not use that she is very fatigued. She admits that she naps throughout the day and naps at night. She lives with her  who has the end-stage of dementia and he wanders so she is tries to not sleep soundly in order to monitor him. She has no chest pain, shortness of breath, PND, orthopnea.   She does occasionally have lower extremity edema and that is worse at the end of the day.  Her blood pressure is well controlled in the office today. Review of her ILR today shows no patient or device activated events. There is no atrial fibrillation. Remains on Eliquis is has no issues with bleeding or dark tarry stools. Home medications:   Current Outpatient Medications on File Prior to Visit   Medication Sig Dispense Refill    oxyCODONE-acetaminophen (PERCOCET)  MG per tablet       famotidine (PEPCID) 20 MG tablet TAKE 1 TABLET BY MOUTH DAILY (AM) 60 tablet 3    metoprolol succinate (TOPROL XL) 50 MG extended release tablet TAKE 1 TABLET BY MOUTH IN THE MORNING 30 tablet 5    atorvastatin (LIPITOR) 80 MG tablet TAKE 1 TABLET BY MOUTH ONE TIME A DAY 90 tablet 3    furosemide (LASIX) 40 MG tablet TAKE 1 TABLET BY MOUTH DAILY IN THE MORNING 90 tablet 3    apixaban (ELIQUIS) 2.5 MG TABS tablet Take 1 tablet by mouth 2 times daily 180 tablet 3    aspirin 81 MG chewable tablet Take 1 tablet by mouth daily 90 tablet 3    amiodarone (PACERONE) 100 MG tablet Take 1 tablet by mouth daily 30 tablet 5    Cyanocobalamin (VITAMIN B-12) 1000 MCG extended release tablet TAKE 1 TABLET (1,000 MCG) BY MOUTH DAILY (AM) 90 tablet 3    vitamin D3 (CHOLECALCIFEROL) 25 MCG (1000 UT) TABS tablet TAKE 1 TABLET (1,000 IU) BY MOUTH DAILY IN THE MORNING 90 tablet 3    lidocaine 4 % external patch Place 1 patch onto the skin in the morning. As needed for pain. 30 patch 3    ondansetron (ZOFRAN) 4 MG tablet Take 1 tablet by mouth 3 times daily as needed for Nausea or Vomiting 30 tablet 5    miconazole (MICOTIN) 2 % powder Apply topically 2 times daily. 45 g 1    Misc. Devices KIT Rollator. Use as directed.  1 kit 0    nitroGLYCERIN (NITROSTAT) 0.4 MG SL tablet PUT 1 TAB UNDER TONGUE EVERY 5 MIN AS NEEDED CHEST PAIN UP TO 3. IF NO RELIEF AFTER 1 DOSE, CALL 911 25 tablet 3    vitamin B-12 (CYANOCOBALAMIN) 1000 MCG tablet Take 1,000 mcg by mouth daily      fluticasone (FLONASE) 50 MCG/ACT nasal spray 2 sprays by Each Nostril route daily 1 Bottle 5    Handicap Placard MISC by Does not apply route Length: 5 years 1 each 0    Coenzyme Q10 (CO Q 10) 100 MG CAPS Take by mouth daily (Patient not taking: Reported on 2/16/2023)       No current facility-administered medications on file prior to visit.        Past Medical History:   Diagnosis Date    Arthritis     Blood circulation, collateral     CAD (coronary artery disease)     CHF (congestive heart failure) ([facility])     Confusion 8/31/2020    GERD (gastroesophageal reflux disease)     History of heart artery stent 12/2018    Hyperlipidemia     Hypertension     Mitral valve prolapse     Myocardial infarct, old     Thyroid disease         Past Surgical History:   Procedure Laterality Date    COLONOSCOPY      EYE SURGERY Left 09/04/2018    PHACO EMULSIFICATION OF CATARACT WITH INTRAOCULAR LENS IMPLANT LEFT EYE     HIP SURGERY Right 11/7/2022    RIGHT HIP INTRAMEDULLARY NAILING performed by Kimo Azar MD at [address] [facility] ([facility])      OTHER SURGICAL HISTORY  08/27/2018    phacoemulsification of cataract with intraocular lens implant right eye    UT OFFICE/OUTPT VISIT,PROCEDURE ONLY Right 8/27/2018    PHACO EMULSIFICATION OF CATARACT WITH INTRAOCULAR LENS IMPLANT RIGHT EYE performed by John Baltazar MD at [address] [facility] OFFICE/OUTPT [address] [facility] Left 9/4/2018    PHACO EMULSIFICATION OF CATARACT WITH INTRAOCULAR LENS IMPLANT LEFT EYE performed by John Baltazar MD at [address] [facility]   Allergen Reactions    Benazepril Hcl Shortness Of Breath and Swelling    Feldene [Piroxicam] Shortness Of Breath    Hytrin [Terazosin Hcl] Shortness Of Breath    Iv Contrast [Iodides] Anaphylaxis    Acetaminophen     Celebrex [Celecoxib]      GI upset    Cephalexin      Nausea    Hydrochlorothiazide     Iodinated Casein     Monopril [Fosinopril Sodium] Other (See Comments)     Pt states makes her weak    Protonix [Pantoprazole Sodium] Other (See Comments)     Kidney failure      Quinapril Hcl     Ultracet [Tramadol-Acetaminophen]      Rash    Ziac [Bisoprolol-Hydrochlorothiazide] Other (See Comments)     Pt does not remember reaction to med ? Weak        Social History:  Reviewed. reports that she has never smoked. She has never used smokeless tobacco. She reports that she does not drink alcohol and does not use drugs. Family History:  Reviewed. family history includes Cancer in her father and sister; Diabetes in her brother; Heart Disease in her father and mother; Stroke in her brother. Review of System:    Constitutional: No fevers, chills. Eyes: No visual changes or diplopia. No scleral icterus. ENT: No Headaches. No mouth sores or sore throat. Cardiovascular: No for chest pain, No for dyspnea on exertion, No for palpitations or No for loss of consciousness. No cough, hemoptysis, No for pleuritic pain, or phlebitis. Respiratory: No for cough or wheezing. No hematemesis. Gastrointestinal: No abdominal pain, blood in stools. Genitourinary: No dysuria, or hematuria. Musculoskeletal: Yes gait disturbance, uses a walker. Integumentary: No rash or pruritis. Neurological: No headache, change in muscle strength, numbness or tingling. Psychiatric: No anxiety, or depression. Endocrine: No temperature intolerance. No excessive thirst, fluid intake, or urination. Hem/Lymph: No abnormal bruising or bleeding, blood clots or swollen lymph nodes. Allergic/Immunologic: No nasal congestion or hives. Physical Examination:  Vitals:    02/16/23 1405   BP: (!) 140/78   Pulse: 62         Wt Readings from Last 3 Encounters:   02/16/23 119 lb (54 kg)   11/22/22 115 lb (52.2 kg)   11/08/22 115 lb (52.2 kg)       Constitutional: Oriented. No distress. Head: Normocephalic and atraumatic. Mouth/Throat: Oropharynx is clear and moist.   Eyes: Conjunctivae clear without jaunduice. PERRL. Neck: Neck supple. No rigidity. No JVD present. Cardiovascular: Normal rate, regular rhythm, S1&S2. Pulmonary/Chest: Bilateral respiratory sounds. No wheezes, No rhonchi. Abdominal: Soft. Bowel sounds present. No distension, No tenderness. Musculoskeletal: No tenderness. Trace to 1+ edema    Lymphadenopathy: Has no cervical adenopathy. Neurological: Alert and oriented. Cranial nerve appears intact, No Gross deficit   Skin: Skin is warm and dry. No rash noted. Psychiatric: Has a normal mood, affect and behavior     Labs:  Reviewed. No results for input(s): NA, K, CL, CO2, PHOS, BUN, CREATININE, CA in the last 72 hours. Invalid input(s):  TSH  No results for input(s): WBC, HGB, HCT, MCV, PLT in the last 72 hours. Lab Results   Component Value Date/Time    TROPONINI <0.01 11/06/2022 03:02 PM     No results found for: BNP  Lab Results   Component Value Date/Time    PROTIME 15.2 11/07/2022 05:57 AM    PROTIME 14.7 11/06/2022 03:02 PM    PROTIME 16.7 05/29/2022 11:49 AM    INR 1.21 11/07/2022 05:57 AM    INR 1.17 11/06/2022 03:02 PM    INR 3.20 06/24/2022 04:15 PM     Lab Results   Component Value Date/Time    CHOL 104 06/24/2022 04:52 AM    HDL 45 06/24/2022 04:52 AM    TRIG 80 06/24/2022 04:52 AM       ECG: Personally reviewed: NSR, HR 62, , , QTc 479    ECHO:  8/22/2022  Summary   The left ventricular systolic function is low normal to mildly reduced with  an ejection fraction of 40-45 %. There is hypokinesis of the inferior and basal/mid septal walls. There is mild concentric left ventricular hypertrophy. Grade II diastolic dysfunction with elevated left ventricular filling   pressure. The left atrium is severely dilated. Severe mitral regurgitation. Systolic flow reversal in pulmonary veins. Moderate tricuspid regurgitation. Systolic pulmonary artery pressure (SPAP) is estimated at 61 mmHg consistent  with severe pulmonary hypertension (Right atrial pressure of 3 mmHg).      Stress Test: 6/20/2022  Summary   There is a small and very mild basal anteroseptal wall fixed defect, most  likely an artifact (RV insertion point). There is a moderate size, moderate intensity, worse with rest images basal  inferolateral wall defect, most likely an artifact (diaphragmatic  attenuation). There is no evidence of myocardial ischemia or scar. There is moderate LV dilatation with borderline normal systolic function. Left ventricular ejection fraction of 55 %. There are no regional wall motion abnormalities. Overall findings represent a low risk scan.      Cardiac Angiography:    Problem List:   Patient Active Problem List    Diagnosis Date Noted    AGUILA (acute kidney injury) ([address] 01/13/2014    Fall from standing 11/07/2022    History of CVA (cerebrovascular accident) 11/07/2022    Closed displaced intertrochanteric fracture of right femur ([address] 11/06/2022    Hypokalemia     Hyponatremia     Near syncope 07/06/2022    Bradycardia 07/06/2022    On amiodarone therapy     On continuous oral anticoagulation     CHF (congestive heart failure), NYHA class I, acute on chronic, combined ([address] 06/23/2022    Atrial fibrillation ([address] 05/29/2022    Encounter for loop recorder check 03/30/2022    Stroke of unknown etiology ([address] 03/19/2022    Visual field defect 03/18/2022    Chronic kidney disease, stage IV (severe) ([address] 08/20/2021    Confusion 08/31/2020    PVD (posterior vitreous detachment), both eyes 07/02/2019    Posterior subcapsular polar age-related cataract of both eyes 07/02/2019    Abnormal myocardial perfusion study 11/06/2018    Ischemic cardiomyopathy 11/06/2018    Cardiomyopathy ([address] 11/06/2018    Palpitations 09/04/2018    V-tach 09/04/2018    GERD (gastroesophageal reflux disease) 01/10/2017    Closed stable burst fracture of first lumbar vertebra ([address] 09/30/2015    Primary insomnia 11/24/2014    Mixed hyperlipidemia 10/29/2014    Gout 04/18/2014    Pain in joint, hand 04/18/2014    Renal insufficiency 01/22/2014    Essential hypertension 04/26/2013    Chronic low back pain 02/22/2013    Other and unspecified angina pectoris 06/21/2011    Coronary atherosclerosis of native coronary artery 06/21/2011    Other chronic pulmonary heart diseases 06/21/2011    Mitral valve disorder 06/21/2011    Acute combined systolic and diastolic heart failure ([facility] 06/21/2011        Assessment:   1. Paroxysmal atrial fibrillation (HCC)    2. On amiodarone therapy    3. On continuous oral anticoagulation    4. Coronary artery disease involving native coronary artery of native heart without angina pectoris    5. NICM (nonischemic cardiomyopathy) ([facility]    6. Chronic systolic CHF (congestive heart failure) ([facility]        Cardiac HX: Tristen Armas is a 80 y.o. woman with a h/o HTN, HLD, thyroid disease, GERD, CKD stage IV, MVP, CAD, s/p MI, s/p PCI (2018), grade III DD, NICMP, chronic sCHF, EF 25-30%, CVA (3/2022), s/p ILR (3/21/22, Dr. Leah Randall), pAF on Eliquis, who p/w AF/RVR, s/p DCCV to NSR (6/24/2022), placed on amio 200 mg QD. [id number] 6. TSH 1.50 (8/20/22). pAF  - In NSR - occasional PVCs, couplets, triplets, runs of NSVT overnight, longest 12 beats in length  - S/p DCCV to NSR 6/24/2022  - Reviewed ILR and there is no AF  - On Eliquis 2.5 mg BID (age, Cr) - no s/s bleeding - continue  - On amio 100 mg QD (6/2022) - screening labs are due  - Due for lung screening in June - order at next OV  - Will recheck TSH and LFTs  - On Toprol XL 50 mg QD  - Reviewed risk factors, pathophysiology, treatment options and lifestyle modification for atrial fibrillation: Blood pressure control, blood sugar control, healthy diet, minimal alcohol intake, no smoking, activity and exercise, manage stress sleep apnea evaluation and symptoms of a stroke.   - Reviewed recent labs  - Echo - [place] - 4.8/96.1  - ECG ordered and results personally reviewed     CAD  - No s/s  - On ASA, statin  - MPI showed no reversible ischemia  - Follows with Dr. Jonathan Boss  - EF 40-45%  - NYHA class II/III  -   - On Toprol XL 50 mg QD  - No ACE/ARB/ARNI/SGL2 due to CKD. EF of  93-44%  ACEi for systolic HF  ASA and Statin for CAD  Anticoagulation for AF and heart failure    No Tobacco use. All questions and concerns were addressed to the patient/family. Alternatives to my treatment were discussed. The note was completed using EMR. Every effort was made to ensure accuracy; however, inadvertent computerized transcription errors may be present. Patient received education regarding their diagnosis, treatment and medications while in the office today. Michael Valero CNP  [address]    I  have spent 38 minutes in care of the patient including direct face to face time, chart preparation, reviewing diagnostic testing, other provider notes and coordinating patient care.

## 2023-02-16 ENCOUNTER — OFFICE VISIT (OUTPATIENT)
Dept: CARDIOLOGY CLINIC | Age: 88
End: 2023-02-16

## 2023-02-16 ENCOUNTER — NURSE ONLY (OUTPATIENT)
Dept: CARDIOLOGY CLINIC | Age: 88
End: 2023-02-16

## 2023-02-16 VITALS
BODY MASS INDEX: 22.48 KG/M2 | WEIGHT: 119 LBS | HEART RATE: 62 BPM | DIASTOLIC BLOOD PRESSURE: 78 MMHG | SYSTOLIC BLOOD PRESSURE: 140 MMHG

## 2023-02-16 DIAGNOSIS — Z79.01 ON CONTINUOUS ORAL ANTICOAGULATION: ICD-10-CM

## 2023-02-16 DIAGNOSIS — I42.8 NICM (NONISCHEMIC CARDIOMYOPATHY) (HCC): ICD-10-CM

## 2023-02-16 DIAGNOSIS — I50.22 CHRONIC SYSTOLIC CHF (CONGESTIVE HEART FAILURE) (HCC): ICD-10-CM

## 2023-02-16 DIAGNOSIS — I48.0 PAROXYSMAL ATRIAL FIBRILLATION (HCC): Primary | ICD-10-CM

## 2023-02-16 DIAGNOSIS — I25.10 CORONARY ARTERY DISEASE INVOLVING NATIVE CORONARY ARTERY OF NATIVE HEART WITHOUT ANGINA PECTORIS: ICD-10-CM

## 2023-02-16 DIAGNOSIS — Z79.899 ON AMIODARONE THERAPY: ICD-10-CM

## 2023-02-16 RX ORDER — OXYCODONE AND ACETAMINOPHEN 10; 325 MG/1; MG/1
TABLET ORAL
COMMUNITY
Start: 2023-02-08

## 2023-02-16 NOTE — PROGRESS NOTES
ILR for CVA. ILRII remote transmission received from patient's monitor at home and shows no new episodes/symptom recordings since last remote on 01.12.2023. Current ECG illustrates NSR/SB. PVC burden 2.6%. (Toprol XL, Eliquis, amiodarone) EP physician to review. See PACEART report under Cardiology tab. Patient will see NPFW today. Will continue to monitor remotely.

## 2023-02-22 ENCOUNTER — TELEPHONE (OUTPATIENT)
Dept: FAMILY MEDICINE CLINIC | Age: 88
End: 2023-02-22

## 2023-02-22 NOTE — TELEPHONE ENCOUNTER
Torrie called in about the paperwork for medicare for the pt, stated she sent over a form that needs to be signed verify it is Dr. Buchanan Host signature.  Please advise

## 2023-02-24 ENCOUNTER — TELEPHONE (OUTPATIENT)
Dept: FAMILY MEDICINE CLINIC | Age: 88
End: 2023-02-24

## 2023-02-24 NOTE — TELEPHONE ENCOUNTER
Form is signed and has been verified that it is not a scam.   Another encounter has been entered about this.

## 2023-03-06 ENCOUNTER — NURSE ONLY (OUTPATIENT)
Dept: CARDIOLOGY CLINIC | Age: 88
End: 2023-03-06
Payer: MEDICARE

## 2023-03-06 DIAGNOSIS — Z86.73 HISTORY OF CVA (CEREBROVASCULAR ACCIDENT): Primary | ICD-10-CM

## 2023-03-06 DIAGNOSIS — Z95.818 IMPLANTABLE LOOP RECORDER PRESENT: ICD-10-CM

## 2023-03-06 PROCEDURE — G2066 INTER DEVC REMOTE 30D: HCPCS | Performed by: INTERNAL MEDICINE

## 2023-03-06 PROCEDURE — 93298 REM INTERROG DEV EVAL SCRMS: CPT | Performed by: INTERNAL MEDICINE

## 2023-03-07 DIAGNOSIS — Z79.899 ON AMIODARONE THERAPY: Primary | ICD-10-CM

## 2023-03-07 RX ORDER — AMIODARONE HYDROCHLORIDE 200 MG/1
TABLET ORAL
Qty: 45 TABLET | Refills: 3 | Status: SHIPPED | OUTPATIENT
Start: 2023-03-07

## 2023-03-07 NOTE — TELEPHONE ENCOUNTER
Can you have patient go get a TSH and LFT? She is due and these need to be checked taylor 6 months while on amio. I have ordered the labs.

## 2023-03-07 NOTE — TELEPHONE ENCOUNTER
Requested Prescriptions     Pending Prescriptions Disp Refills    amiodarone (CORDARONE) 200 MG tablet [Pharmacy Med Name: AMIODARONE  MG ORAL TABLET] 45 tablet 3     Sig: TAKE 1/2 TABLET (100 MG) BY MOUTH DAILY (AM)            Last Office Visit: 2/16/2023     Next Office Visit: 3/14/2023         Last Labs: last TSH 8/11/20202 at [place] [id number]

## 2023-03-08 NOTE — PROGRESS NOTES
ILR for CVA. Remote transmission received from patient's monitor at home. Since 1/12/23:  No arrhythmias/ or events. 0.0% AT/ AF burden  PVC burden 2.5%. EP physician to review. See PACEART report under Cardiology tab. Will continue to monitor remotely.     (End of 31-day monitoring period 3/6/23)

## 2023-03-09 NOTE — TELEPHONE ENCOUNTER
Spoke with patient, she reports that the Home Health nurse checked her labs , results are not on media so patient will call the home health nurse and have result faxed over to our office

## 2023-03-14 ENCOUNTER — OFFICE VISIT (OUTPATIENT)
Dept: CARDIOLOGY CLINIC | Age: 88
End: 2023-03-14
Payer: MEDICARE

## 2023-03-14 VITALS
SYSTOLIC BLOOD PRESSURE: 122 MMHG | HEART RATE: 61 BPM | OXYGEN SATURATION: 99 % | BODY MASS INDEX: 22.86 KG/M2 | DIASTOLIC BLOOD PRESSURE: 80 MMHG | WEIGHT: 121 LBS

## 2023-03-14 DIAGNOSIS — I34.0 NONRHEUMATIC MITRAL VALVE REGURGITATION: ICD-10-CM

## 2023-03-14 DIAGNOSIS — I47.20 VT (VENTRICULAR TACHYCARDIA): ICD-10-CM

## 2023-03-14 DIAGNOSIS — I10 ESSENTIAL HYPERTENSION: ICD-10-CM

## 2023-03-14 DIAGNOSIS — I50.22 CHRONIC SYSTOLIC CONGESTIVE HEART FAILURE (HCC): Primary | ICD-10-CM

## 2023-03-14 DIAGNOSIS — I42.0 DILATED CARDIOMYOPATHY (HCC): ICD-10-CM

## 2023-03-14 DIAGNOSIS — I25.10 CAD IN NATIVE ARTERY: ICD-10-CM

## 2023-03-14 DIAGNOSIS — Z98.61 HISTORY OF PTCA: ICD-10-CM

## 2023-03-14 PROCEDURE — 99214 OFFICE O/P EST MOD 30 MIN: CPT | Performed by: INTERNAL MEDICINE

## 2023-03-14 PROCEDURE — G8484 FLU IMMUNIZE NO ADMIN: HCPCS | Performed by: INTERNAL MEDICINE

## 2023-03-14 PROCEDURE — G8420 CALC BMI NORM PARAMETERS: HCPCS | Performed by: INTERNAL MEDICINE

## 2023-03-14 PROCEDURE — G8427 DOCREV CUR MEDS BY ELIG CLIN: HCPCS | Performed by: INTERNAL MEDICINE

## 2023-03-14 PROCEDURE — 1090F PRES/ABSN URINE INCON ASSESS: CPT | Performed by: INTERNAL MEDICINE

## 2023-03-14 PROCEDURE — 1123F ACP DISCUSS/DSCN MKR DOCD: CPT | Performed by: INTERNAL MEDICINE

## 2023-03-14 PROCEDURE — 1036F TOBACCO NON-USER: CPT | Performed by: INTERNAL MEDICINE

## 2023-03-14 NOTE — PROGRESS NOTES
Subjective:      Patient ID: Harleen Rodriguez is a 80 y.o. female. HPI:  Ms. Sebastian Puckett is here today for  follow up CHF/cardiomyopathy/CAD/HTN/MR/VT. Having problems with kidneys. Had palps. Unchanged. No sob/edema. No tachycardia. No syncope. No chest pain. No pnd or orthopnea. Bp good. Edema good. Watches salt.  Following with renal.        Past Medical History:   Diagnosis Date    Arthritis     Blood circulation, collateral     CAD (coronary artery disease)     CHF (congestive heart failure) (HCC)     Confusion 8/31/2020    GERD (gastroesophageal reflux disease)     History of heart artery stent 12/2018    Hyperlipidemia     Hypertension     Mitral valve prolapse     Myocardial infarct, old     Thyroid disease      Past Surgical History:   Procedure Laterality Date    COLONOSCOPY      EYE SURGERY Left 09/04/2018    PHACO EMULSIFICATION OF CATARACT WITH INTRAOCULAR LENS IMPLANT LEFT EYE     HIP SURGERY Right 11/7/2022    RIGHT HIP INTRAMEDULLARY NAILING performed by Renetta Flanagan MD at [facility] ([address] [place] [address])      OTHER SURGICAL HISTORY  08/27/2018    phacoemulsification of cataract with intraocular lens implant right eye    OR OFFICE/OUTPT VISIT,PROCEDURE ONLY Right 8/27/2018    PHACO EMULSIFICATION OF CATARACT WITH INTRAOCULAR LENS IMPLANT RIGHT EYE performed by Tierra Wills MD at [address] [facility] OFFICE/OUTPT [facility] Left 9/4/2018    PHACO EMULSIFICATION OF CATARACT WITH INTRAOCULAR LENS IMPLANT LEFT EYE performed by Tierra Wills MD at [address]   Allergen Reactions    Benazepril Hcl Shortness Of Breath and Swelling    Feldene [Piroxicam] Shortness Of Breath    Hytrin [Terazosin Hcl] Shortness Of Breath    Iv Contrast [Iodides] Anaphylaxis    Acetaminophen     Celebrex [Celecoxib]      GI upset    Cephalexin      Nausea    Hydrochlorothiazide     Iodinated Casein     Monopril [Fosinopril Sodium] Other (See Comments)     Pt states makes her weak    Protonix [Pantoprazole Sodium] Other (See Comments)     Kidney failure      Quinapril Hcl     Ultracet [Tramadol-Acetaminophen]      Rash    Ziac [Bisoprolol-Hydrochlorothiazide] Other (See Comments)     Pt does not remember reaction to med ? Weak         Social History     Socioeconomic History    Marital status:      Spouse name: Keke Inman    Number of children: Not on file    Years of education: 12    Highest education level: Not on file   Occupational History    Occupation: retired   Tobacco Use    Smoking status: Never    Smokeless tobacco: Never   Vaping Use    Vaping Use: Never used   Substance and Sexual Activity    Alcohol use: No     Alcohol/week: 0.0 standard drinks    Drug use: No    Sexual activity: Never     Comment:  living with spouse. Other Topics Concern    Not on file   Social History Narrative    Not on file     Social Determinants of Health     Financial Resource Strain: Not on file   Food Insecurity: Not on file   Transportation Needs: Not on file   Physical Activity: Not on file   Stress: Not on file   Social Connections: Not on file   Intimate Partner Violence: Not on file   Housing Stability: Not on file        Patient has a family history includes Cancer in her father and sister; Diabetes in her brother; Heart Disease in her father and mother; Stroke in her brother. Patient  has a past medical history of Arthritis, Blood circulation, collateral, CAD (coronary artery disease), CHF (congestive heart failure) ([facility] [address] Confusion, GERD (gastroesophageal reflux disease), History of heart artery stent, Hyperlipidemia, Hypertension, Mitral valve prolapse, Myocardial infarct, old, and Thyroid disease.      Current Outpatient Medications   Medication Sig Dispense Refill    amiodarone (CORDARONE) 200 MG tablet TAKE 1/2 TABLET (100 MG) BY MOUTH DAILY (AM) 45 tablet 3    oxyCODONE-acetaminophen (PERCOCET)  MG per tablet       famotidine (PEPCID) 20 MG tablet TAKE 1 TABLET BY MOUTH DAILY (AM) 60 tablet 3    metoprolol succinate (TOPROL XL) 50 MG extended release tablet TAKE 1 TABLET BY MOUTH IN THE MORNING 30 tablet 5    atorvastatin (LIPITOR) 80 MG tablet TAKE 1 TABLET BY MOUTH ONE TIME A DAY 90 tablet 3    furosemide (LASIX) 40 MG tablet TAKE 1 TABLET BY MOUTH DAILY IN THE MORNING 90 tablet 3    apixaban (ELIQUIS) 2.5 MG TABS tablet Take 1 tablet by mouth 2 times daily 180 tablet 3    aspirin 81 MG chewable tablet Take 1 tablet by mouth daily 90 tablet 3    Cyanocobalamin (VITAMIN B-12) 1000 MCG extended release tablet TAKE 1 TABLET (1,000 MCG) BY MOUTH DAILY (AM) 90 tablet 3    vitamin D3 (CHOLECALCIFEROL) 25 MCG (1000 UT) TABS tablet TAKE 1 TABLET (1,000 IU) BY MOUTH DAILY IN THE MORNING 90 tablet 3    lidocaine 4 % external patch Place 1 patch onto the skin in the morning. As needed for pain. 30 patch 3    ondansetron (ZOFRAN) 4 MG tablet Take 1 tablet by mouth 3 times daily as needed for Nausea or Vomiting 30 tablet 5    miconazole (MICOTIN) 2 % powder Apply topically 2 times daily. 45 g 1    Misc. Devices KIT Rollator. Use as directed. 1 kit 0    nitroGLYCERIN (NITROSTAT) 0.4 MG SL tablet PUT 1 TAB UNDER TONGUE EVERY 5 MIN AS NEEDED CHEST PAIN UP TO 3. IF NO RELIEF AFTER 1 DOSE, CALL 911 25 tablet 3    fluticasone (FLONASE) 50 MCG/ACT nasal spray 2 sprays by Each Nostril route daily 1 Bottle 5    Handicap Placard MISC by Does not apply route Length: 5 years 1 each 0    Coenzyme Q10 (CO Q 10) 100 MG CAPS Take by mouth daily      vitamin B-12 (CYANOCOBALAMIN) 1000 MCG tablet Take 1,000 mcg by mouth daily       No current facility-administered medications for this visit. Vitals:    03/14/23 1438   BP: 122/80   Pulse: 61   SpO2: 99%         Wt 121    Review of Systems   Constitutional: Negative for activity change, appetite change. Some fatigue. Respiratory: Negative for cough, choking, chest tightness and shortness of breath.     Cardiovascular: Negative for chest pain, palpitations and leg swelling. Denies PND or orthopnea. No tachycardia or syncope. Neurological: Negative for dizziness, syncope and light-headedness. Psychiatric/Behavioral: Negative for behavioral problems, confusion and agitation. All other systems reviewed negative as done. Objective:   Physical Exam   Constitutional: She is oriented to person, place, and time. She appears well-developed and well-nourished. No distress. HENT:   Head: Normocephalic and atraumatic. Eyes: Conjunctivae and EOM are normal. Right eye exhibits no discharge. Left eye exhibits no discharge. Neck: Normal range of motion. No JVD present. Cardiovascular: Normal rate, regular rhythm, S1 normal, S2 normal and normal heart sounds. Exam reveals no gallop. No murmur heard. Pulses:       Radial pulses are 2+ on the right side, and 2+ on the left side. Pulmonary/Chest: Effort normal and breath sounds normal. No respiratory distress. She has no wheezes. She has no rales. Abdominal: Soft. Bowel sounds are normal. There is no tenderness. Musculoskeletal: Normal range of motion. She exhibits no edema. Neurological: She is alert and oriented to person, place, and time. Skin: Skin is warm and dry. Psychiatric: She has a normal mood and affect. Her behavior is normal. Thought content normal.       Assessment:       Diagnosis Orders   1. Chronic systolic congestive heart failure ([address]        2. Dilated cardiomyopathy ([address]        3. CAD in native artery        4. History of PTCA        5. VT (ventricular tachycardia)        6. Nonrheumatic mitral valve regurgitation        7. Essential hypertension                Plan:      CV stable. Edema stable. Palps controlled. BP good. No angina/CP-stable. Remains compensated. Continue coreg/losartan/HCTZ for CHF. No  palps. Continue coreg 25 mg bid and losartan 50 mg qd  BP ok. Renal following.    Reviewed previous records and testing including myoview, echo 10/18, cath 11/18 and holter 1/19, showing vent ectopy of 7%. Tries to watch salt. Follow up 6 months.

## 2023-03-29 NOTE — PROGRESS NOTES
Ideatoryt message sent, patient due for follow up. Short supply sent in.      without any focal sensory/motor deficits. grossly non-focal.  Psychiatric: Alert and oriented, Normal mood  Peripheral Pulses: +2 palpable, equal bilaterally       Labs:   No results for input(s): WBC, HGB, HCT, PLT in the last 72 hours. Recent Labs     06/25/22  0423 06/26/22  0440 06/27/22  0448   * 133* 137   K 4.4 4.3 4.2   CL 97* 96* 96*   CO2 25 23 29   BUN 66* 67* 64*   CREATININE 2.9* 2.9* 2.8*   CALCIUM 8.8 9.0 8.9   PHOS 4.7 4.7 5.1*     No results for input(s): AST, ALT, BILIDIR, BILITOT, ALKPHOS in the last 72 hours. No results for input(s): INR in the last 72 hours. No results for input(s): [facility] in the last 72 hours. Urinalysis:      Lab Results   Component Value Date    NITRU Negative 06/23/2022    WBCUA 6-9 06/23/2022    BACTERIA 1+ 06/23/2022    RBCUA 0-2 06/23/2022    BLOODU Negative 06/23/2022    SPECGRAV 1.015 06/23/2022    GLUCOSEU Negative 06/23/2022       Radiology:  US RENAL COMPLETE   Final Result      1. Bilateral cortical renal atrophic changes concordant with chronic medical renal disease and no evidence of hydronephrosis   2. Nonobstructive left renal nephrolithiasis   3. Benign left renal cyst               XR CHEST PORTABLE   Final Result      Patchy bilateral airspace disease. Correlate for pneumonia are less typical for pulmonary edema.        NM MYOCARDIAL SPECT REST EXERCISE OR RX    (Results Pending)         Assessment/Plan:    Active Hospital Problems    Diagnosis     On amiodarone therapy [Z79.899]      Priority: Medium    On continuous oral anticoagulation [Z79.01]      Priority: Medium    CHF (congestive heart failure), NYHA class I, acute on chronic, combined ([facility] [address] [I50.43]      Priority: Medium     #Acute on chronic exacerbation of combined CHF, likely exacerbated by valvular pathology/tachycardia  #Moderate pulmonary hypertension  #AGUILA on CKD, rule out cardiorenal syndrome given EF of 25%  #Chronic anemia-stable at baseline  #Persistent atrial fibrillation with RVR s/p cardioversion on 6/24/22  #Physical deconditioning over the past couple of months with worsening fatigue and somnolence  #Chronic pain  # Forgetfulness,? Dementia--outpatient geriatric evaluation     PLAN:  -  IV lasix gtt management per cardiology and nephrology, monitor BMP, per nephrology might switch to PO later today or tomorrow  -Strict intake and output, daily weights  -Monitor electrolytes and renal function  -CHF team and cardiology following, appreciate input  -Monitor H&H closely given worsening CKD. No evidence of active bleeding  -Continue amiodarone 200mg daily  -Continue on beta-blocker 25 mg BID and Eliquis 2.5 BID  -Continue her home medications appropriately, including for chronic pain with caution    DVT Prophylaxis: Eliquis  Diet: ADULT DIET; Regular; 4 carb choices (60 gm/meal);  Low Fat/Low Chol/High Fiber/2 gm Na  Code Status: Full Code    PT/OT Eval Status: ordered, pending evaluation    Dispo/Plan of care - inpatient pending nephrology and cardiology recommendation in diuretics    Ashli Vizcarra MD

## 2023-05-03 ENCOUNTER — APPOINTMENT (OUTPATIENT)
Dept: CT IMAGING | Age: 88
End: 2023-05-03
Payer: MEDICARE

## 2023-05-03 ENCOUNTER — HOSPITAL ENCOUNTER (EMERGENCY)
Age: 88
Discharge: HOME HEALTH | End: 2023-05-03
Payer: MEDICARE

## 2023-05-03 VITALS
RESPIRATION RATE: 15 BRPM | OXYGEN SATURATION: 96 % | SYSTOLIC BLOOD PRESSURE: 170 MMHG | DIASTOLIC BLOOD PRESSURE: 87 MMHG | HEART RATE: 74 BPM | TEMPERATURE: 98.6 F

## 2023-05-03 DIAGNOSIS — D16.9 OSTEOCHONDROMATOSIS: ICD-10-CM

## 2023-05-03 DIAGNOSIS — M25.421 EFFUSION OF JOINT OF RIGHT UPPER ARM: ICD-10-CM

## 2023-05-03 DIAGNOSIS — M25.511 ACUTE PAIN OF RIGHT SHOULDER: Primary | ICD-10-CM

## 2023-05-03 DIAGNOSIS — M19.011 OSTEOARTHRITIS OF RIGHT SHOULDER, UNSPECIFIED OSTEOARTHRITIS TYPE: ICD-10-CM

## 2023-05-03 DIAGNOSIS — M75.51 BURSITIS OF RIGHT SHOULDER: ICD-10-CM

## 2023-05-03 PROCEDURE — 96372 THER/PROPH/DIAG INJ SC/IM: CPT

## 2023-05-03 PROCEDURE — 6370000000 HC RX 637 (ALT 250 FOR IP): Performed by: NURSE PRACTITIONER

## 2023-05-03 PROCEDURE — 2500000003 HC RX 250 WO HCPCS: Performed by: NURSE PRACTITIONER

## 2023-05-03 PROCEDURE — 6360000002 HC RX W HCPCS: Performed by: NURSE PRACTITIONER

## 2023-05-03 PROCEDURE — 99284 EMERGENCY DEPT VISIT MOD MDM: CPT

## 2023-05-03 PROCEDURE — 73200 CT UPPER EXTREMITY W/O DYE: CPT

## 2023-05-03 RX ORDER — DEXAMETHASONE SODIUM PHOSPHATE 10 MG/ML
8 INJECTION, SOLUTION INTRAMUSCULAR; INTRAVENOUS ONCE
Status: COMPLETED | OUTPATIENT
Start: 2023-05-03 | End: 2023-05-03

## 2023-05-03 RX ORDER — MORPHINE SULFATE 2 MG/ML
2 INJECTION, SOLUTION INTRAMUSCULAR; INTRAVENOUS ONCE
Status: COMPLETED | OUTPATIENT
Start: 2023-05-03 | End: 2023-05-03

## 2023-05-03 RX ORDER — CYCLOBENZAPRINE HCL 10 MG
10 TABLET ORAL 3 TIMES DAILY PRN
Qty: 12 TABLET | Refills: 0 | Status: SHIPPED | OUTPATIENT
Start: 2023-05-03 | End: 2023-05-07

## 2023-05-03 RX ORDER — CYCLOBENZAPRINE HCL 10 MG
10 TABLET ORAL ONCE
Status: COMPLETED | OUTPATIENT
Start: 2023-05-03 | End: 2023-05-03

## 2023-05-03 RX ADMIN — DEXAMETHASONE SODIUM PHOSPHATE 8 MG: 10 INJECTION, SOLUTION INTRAMUSCULAR; INTRAVENOUS at 21:00

## 2023-05-03 RX ADMIN — MORPHINE SULFATE 2 MG: 2 INJECTION, SOLUTION INTRAMUSCULAR; INTRAVENOUS at 20:04

## 2023-05-03 RX ADMIN — CYCLOBENZAPRINE 10 MG: 10 TABLET, FILM COATED ORAL at 20:33

## 2023-05-03 ASSESSMENT — ENCOUNTER SYMPTOMS
EYE REDNESS: 0
FACIAL SWELLING: 0
EYE DISCHARGE: 0
BACK PAIN: 1
COLOR CHANGE: 0
ABDOMINAL PAIN: 0
CHOKING: 0
APNEA: 0

## 2023-05-03 ASSESSMENT — PAIN DESCRIPTION - LOCATION
LOCATION: SHOULDER

## 2023-05-03 ASSESSMENT — PAIN SCALES - GENERAL
PAINLEVEL_OUTOF10: 8
PAINLEVEL_OUTOF10: 8
PAINLEVEL_OUTOF10: 5

## 2023-05-03 ASSESSMENT — PAIN DESCRIPTION - DESCRIPTORS
DESCRIPTORS: DISCOMFORT;ACHING
DESCRIPTORS: SHARP

## 2023-05-03 ASSESSMENT — PAIN DESCRIPTION - ORIENTATION
ORIENTATION: RIGHT

## 2023-05-03 ASSESSMENT — PAIN DESCRIPTION - PAIN TYPE: TYPE: ACUTE PAIN

## 2023-05-03 ASSESSMENT — PAIN - FUNCTIONAL ASSESSMENT: PAIN_FUNCTIONAL_ASSESSMENT: 0-10

## 2023-05-04 ENCOUNTER — CARE COORDINATION (OUTPATIENT)
Dept: CARE COORDINATION | Age: 88
End: 2023-05-04

## 2023-05-04 NOTE — ED PROVIDER NOTES
I was given this patient in signout at 2050. I was asked to follow-up on the CT results and officially discharged the patient. Patient CT results were reviewed and there is no acute findings. Patient was provided with appropriate Ortho referral and encouraged follow-up with her doctor.   Patient ultimately discharged with Alanis to     DAVE Garcia - CNP  05/03/23 7148

## 2023-05-04 NOTE — ED NOTES
Sling placed to rt arm/shoulder per MD orders. Pt educated on hoe to use sling.      [address] Mans[address] Reyes RN  05/03/23 6961

## 2023-05-04 NOTE — CARE COORDINATION
[facility] ED Follow up Call    Reason for ED visit:  Right shoulder pain  Status:     not changed    Did you call your PCP prior to going to the ED? No      Did you receive a discharge instructions from the Emergency Room? Yes  Review of Instructions:     Understands what to report/when to return?:  Yes   Understands discharge instructions?:  Yes   Following discharge instructions?:  Yes   If not why? N/A    Are there any new complaints of pain? No  New Pain Meds? No    Constipation prophylaxis needed? No    If you have a wound is the dressing clean, dry, and intact? N/A  Understands wound care regimen? N/A    Are there any other complaints/concerns that you wish to tell your provider? No    FU appts/Provider:    Future Appointments   Date Time Provider Jerome Mahmood   5/10/2023  8:30 AM SCHEDULE, WILLA [employer] Willa Koch [facility]   9/20/2023  1:30 PM MD Willa Asif [facility]   10/5/2023  3:00 PM SCHEDULE, Ronny Lambert [address] [facility]   10/5/2023  3:30 PM MD Willa Charlton [facility]           New Medications?:   Yes      Medication Reconciliation by phone - Yes  Understands Medications? Yes  Taking Medications? Yes  Can you swallow your pills? Yes    Any further needs in the home i.e. Equipment?   No    Link to services in community?:  No   Which services:  N/A

## 2023-05-04 NOTE — ED PROVIDER NOTES
[address] ED  EMERGENCY DEPARTMENT ENCOUNTER        Pt Name: Magan Begum  MRN: [id number]  [place] 1935  Date of evaluation: 5/3/2023  Provider: DAVE Welch CNP  PCP: Yousif Renteria DO  Note Started: 8:14 PM EDT 5/3/23      AXEL. I have evaluated this patient. My supervising physician was available for consultation. CHIEF COMPLAINT       Chief Complaint   Patient presents with    Shoulder Pain     Patient woke up this AM with right shoulder pain, unknown injury       HISTORY OF PRESENT ILLNESS: 1 or more Elements     History From: patient and daughter         Chief Complaint:right shoulder pain     Magan Begum is a 80 y.o. female who presents to the emergency room complaining of right shoulder pain that she woke up with worse than usual today. She states it mostly hurts her at night and sometimes during the day and sometimes it radiates down her arm to her fingers. Its been ongoing for about 2 to 3 weeks. She takes chronic pain medicine for her back. She last took a Percocet around 5 PM today. She takes 10 mg of Percocet. She states she usually is able to handle the pain. She has no recent injury, no reported recent falls. And did not do anything physical to it. She says she feels it in her wrist and hand. She does have known osteoarthritis in her knees. She states she had a past injury to the left shoulder some months ago but never had any issues with the right shoulder until now. Patient does have a history of right hip fracture back in November and also is currently on Eliquis for history of stroke. .  She is nontoxic in appearance appears uncomfortable, blood pressure is elevated at 187/73 likely due to her pain, afebrile temperature is 98.6, heart rate 73, respirations 15, pulse ox 95% on room air. Nursing Notes were all reviewed and agreed with or any disagreements were addressed in the HPI.     REVIEW OF SYSTEMS :      Review of

## 2023-05-10 ENCOUNTER — NURSE ONLY (OUTPATIENT)
Dept: CARDIOLOGY CLINIC | Age: 88
End: 2023-05-10

## 2023-05-10 DIAGNOSIS — Z86.73 HISTORY OF CVA (CEREBROVASCULAR ACCIDENT): ICD-10-CM

## 2023-05-10 DIAGNOSIS — Z45.09 ENCOUNTER FOR LOOP RECORDER CHECK: Primary | ICD-10-CM

## 2023-05-10 DIAGNOSIS — R55 NEAR SYNCOPE: ICD-10-CM

## 2023-05-10 DIAGNOSIS — I63.9 STROKE OF UNKNOWN ETIOLOGY (HCC): ICD-10-CM

## 2023-05-10 NOTE — PROGRESS NOTES
ILR for CVA. Remote transmission received from patient's ILR monitor at home. Since 03.06.23, 1 Symptom event w ECG illustrating what appears to be NSR w ectopy. PVC burden 2.5%. Pt on Toprol XL, Eliquis, amiodarone. End of 31-day monitoring period 5/10/23. EP physician to review. See PACEART report under Cardiology tab. Will continue to monitor remotely.

## 2023-05-30 ENCOUNTER — HOSPITAL ENCOUNTER (OUTPATIENT)
Age: 88
Discharge: HOME | End: 2023-05-30
Payer: MEDICARE

## 2023-05-30 DIAGNOSIS — Z79.899 ON AMIODARONE THERAPY: ICD-10-CM

## 2023-05-30 LAB
ALBUMIN SERPL-MCNC: 3.5 G/DL (ref 3.4–5)
ALP SERPL-CCNC: 99 U/L (ref 40–129)
ALT SERPL-CCNC: 11 U/L (ref 10–40)
AST SERPL-CCNC: 22 U/L (ref 15–37)
BILIRUB DIRECT SERPL-MCNC: <0.2 MG/DL (ref 0–0.3)
BILIRUB INDIRECT SERPL-MCNC: ABNORMAL MG/DL (ref 0–1)
BILIRUB SERPL-MCNC: 0.3 MG/DL (ref 0–1)
PROT SERPL-MCNC: 6.3 G/DL (ref 6.4–8.2)
TSH SERPL DL<=0.005 MIU/L-ACNC: 1.74 UIU/ML (ref 0.27–4.2)

## 2023-05-30 PROCEDURE — 84443 ASSAY THYROID STIM HORMONE: CPT

## 2023-05-30 PROCEDURE — 36415 COLL VENOUS BLD VENIPUNCTURE: CPT

## 2023-05-30 PROCEDURE — 80076 HEPATIC FUNCTION PANEL: CPT

## 2023-07-14 RX ORDER — METOPROLOL SUCCINATE 50 MG/1
50 TABLET, EXTENDED RELEASE ORAL DAILY
Qty: 30 TABLET | Refills: 5 | Status: SHIPPED | OUTPATIENT
Start: 2023-07-14

## 2023-08-11 RX ORDER — MELATONIN
Qty: 30 TABLET | OUTPATIENT
Start: 2023-08-11

## 2023-08-11 RX ORDER — CYANOCOBALAMIN (VITAMIN B-12) 1000 MCG
TABLET, EXTENDED RELEASE ORAL
Qty: 30 TABLET | OUTPATIENT
Start: 2023-08-11

## 2023-08-11 RX ORDER — MELATONIN
Qty: 90 TABLET | Refills: 3 | Status: SHIPPED | OUTPATIENT
Start: 2023-08-11

## 2023-08-11 RX ORDER — CYANOCOBALAMIN (VITAMIN B-12) 1000 MCG
TABLET, EXTENDED RELEASE ORAL
Qty: 90 TABLET | Refills: 3 | Status: SHIPPED | OUTPATIENT
Start: 2023-08-11

## 2023-08-28 NOTE — TELEPHONE ENCOUNTER
Requested Prescriptions     Pending Prescriptions Disp Refills    atorvastatin (LIPITOR) 80 MG tablet [Pharmacy Med Name: ATORVASTATIN CALCIUM 80 MG ORAL TABLET] 90 tablet 3     Sig: TAKE 1 TABLET BY MOUTH ONE TIME A DAY (EVENING)              Last Office Visit: 3/14/2023     Next Office Visit: 9/20/2023

## 2023-08-29 RX ORDER — ATORVASTATIN CALCIUM 80 MG/1
TABLET, FILM COATED ORAL
Qty: 90 TABLET | Refills: 3 | Status: SHIPPED | OUTPATIENT
Start: 2023-08-29

## 2023-09-13 ENCOUNTER — HOSPITAL ENCOUNTER (OUTPATIENT)
Age: 88
Discharge: HOME | End: 2023-09-13
Payer: MEDICARE

## 2023-09-13 PROCEDURE — 80069 RENAL FUNCTION PANEL: CPT

## 2023-09-13 PROCEDURE — 83970 ASSAY OF PARATHORMONE: CPT

## 2023-09-13 PROCEDURE — 85027 COMPLETE CBC AUTOMATED: CPT

## 2023-09-14 LAB
ALBUMIN SERPL-MCNC: 3.9 G/DL (ref 3.4–5)
ANION GAP SERPL CALCULATED.3IONS-SCNC: 10 MMOL/L (ref 3–16)
BUN SERPL-MCNC: 25 MG/DL (ref 7–20)
CALCIUM SERPL-MCNC: 9.3 MG/DL (ref 8.3–10.6)
CHLORIDE SERPL-SCNC: 98 MMOL/L (ref 99–110)
CO2 SERPL-SCNC: 30 MMOL/L (ref 21–32)
CREAT SERPL-MCNC: 1.9 MG/DL (ref 0.6–1.2)
DEPRECATED RDW RBC AUTO: 14.7 % (ref 12.4–15.4)
GFR SERPLBLD CREATININE-BSD FMLA CKD-EPI: 25 ML/MIN/{1.73_M2}
GLUCOSE SERPL-MCNC: 113 MG/DL (ref 70–99)
HCT VFR BLD AUTO: 31.3 % (ref 36–48)
HGB BLD-MCNC: 10.3 G/DL (ref 12–16)
MCH RBC QN AUTO: 30.5 PG (ref 26–34)
MCHC RBC AUTO-ENTMCNC: 33.1 G/DL (ref 31–36)
MCV RBC AUTO: 92.1 FL (ref 80–100)
PHOSPHATE SERPL-MCNC: 4.1 MG/DL (ref 2.5–4.9)
PLATELET # BLD AUTO: 257 K/UL (ref 135–450)
PMV BLD AUTO: 9.3 FL (ref 5–10.5)
POTASSIUM SERPL-SCNC: 3.9 MMOL/L (ref 3.5–5.1)
PTH-INTACT SERPL-MCNC: 126.3 PG/ML (ref 14–72)
RBC # BLD AUTO: 3.39 M/UL (ref 4–5.2)
SODIUM SERPL-SCNC: 138 MMOL/L (ref 136–145)
WBC # BLD AUTO: 6.1 K/UL (ref 4–11)

## 2023-09-20 ENCOUNTER — OFFICE VISIT (OUTPATIENT)
Dept: CARDIOLOGY CLINIC | Age: 88
End: 2023-09-20
Payer: MEDICARE

## 2023-09-20 VITALS
SYSTOLIC BLOOD PRESSURE: 118 MMHG | DIASTOLIC BLOOD PRESSURE: 80 MMHG | HEART RATE: 60 BPM | WEIGHT: 112 LBS | BODY MASS INDEX: 21.16 KG/M2

## 2023-09-20 DIAGNOSIS — I34.0 NONRHEUMATIC MITRAL VALVE REGURGITATION: ICD-10-CM

## 2023-09-20 DIAGNOSIS — I47.20 VT (VENTRICULAR TACHYCARDIA) (HCC): ICD-10-CM

## 2023-09-20 DIAGNOSIS — I25.10 CAD IN NATIVE ARTERY: ICD-10-CM

## 2023-09-20 DIAGNOSIS — I42.0 DILATED CARDIOMYOPATHY (HCC): ICD-10-CM

## 2023-09-20 DIAGNOSIS — I10 ESSENTIAL HYPERTENSION: ICD-10-CM

## 2023-09-20 DIAGNOSIS — Z98.61 HISTORY OF PTCA: ICD-10-CM

## 2023-09-20 DIAGNOSIS — I50.22 CHRONIC SYSTOLIC CONGESTIVE HEART FAILURE (HCC): Primary | ICD-10-CM

## 2023-09-20 PROCEDURE — G2066 INTER DEVC REMOTE 30D: HCPCS | Performed by: INTERNAL MEDICINE

## 2023-09-20 PROCEDURE — G8420 CALC BMI NORM PARAMETERS: HCPCS | Performed by: INTERNAL MEDICINE

## 2023-09-20 PROCEDURE — G8427 DOCREV CUR MEDS BY ELIG CLIN: HCPCS | Performed by: INTERNAL MEDICINE

## 2023-09-20 PROCEDURE — 93298 REM INTERROG DEV EVAL SCRMS: CPT | Performed by: INTERNAL MEDICINE

## 2023-09-20 PROCEDURE — 1090F PRES/ABSN URINE INCON ASSESS: CPT | Performed by: INTERNAL MEDICINE

## 2023-09-20 PROCEDURE — 1123F ACP DISCUSS/DSCN MKR DOCD: CPT | Performed by: INTERNAL MEDICINE

## 2023-09-20 PROCEDURE — 99214 OFFICE O/P EST MOD 30 MIN: CPT | Performed by: INTERNAL MEDICINE

## 2023-09-20 PROCEDURE — 1036F TOBACCO NON-USER: CPT | Performed by: INTERNAL MEDICINE

## 2023-09-20 NOTE — PROGRESS NOTES
Subjective:      Patient ID: Uche Izaguirre is a 80 y.o. female. HPI:  Ms. Luana Álvarez is here today for  follow up CHF/cardiomyopathy/CAD/HTN/MR/VT. Having problems with kidneys. Had palps. Unchanged. No sob/edema. No tachycardia. No syncope. No chest pain. No pnd or orthopnea. Bp good. Edema good. Watches salt.  Following with renal.        Past Medical History:   Diagnosis Date    Arthritis     Blood circulation, collateral     CAD (coronary artery disease)     CHF (congestive heart failure) (HCC)     Confusion 8/31/2020    GERD (gastroesophageal reflux disease)     History of heart artery stent 12/2018    Hyperlipidemia     Hypertension     Mitral valve prolapse     Myocardial infarct, old     Thyroid disease      Past Surgical History:   Procedure Laterality Date    COLONOSCOPY      EYE SURGERY Left 09/04/2018    PHACO EMULSIFICATION OF CATARACT WITH INTRAOCULAR LENS IMPLANT LEFT EYE     HIP SURGERY Right 11/7/2022    RIGHT HIP INTRAMEDULLARY NAILING performed by Gustavo Quiñones MD at [facility] ([facility])      OTHER SURGICAL HISTORY  08/27/2018    phacoemulsification of cataract with intraocular lens implant right eye    [facility] OFFICE/OUTPT VISIT,PROCEDURE ONLY Right 8/27/2018    PHACO EMULSIFICATION OF CATARACT WITH INTRAOCULAR LENS IMPLANT RIGHT EYE performed by Ninoska Fierro MD at [facility] OFFICE/OUTPT [address] Left 9/4/2018    PHACO EMULSIFICATION OF CATARACT WITH INTRAOCULAR LENS IMPLANT LEFT EYE performed by Ninoska Fierro MD at [address] [facility] [address]   Allergen Reactions    Benazepril Hcl Shortness Of Breath and Swelling    Feldene [Piroxicam] Shortness Of Breath    Hytrin [Terazosin Hcl] Shortness Of Breath    Iv Contrast [Iodides] Anaphylaxis    Acetaminophen     Celebrex [Celecoxib]      GI upset    Cephalexin      Nausea    Hydrochlorothiazide     Iodinated Casein     Monopril [Fosinopril Sodium] Other (See Comments)     Pt

## 2023-10-10 ENCOUNTER — OFFICE VISIT (OUTPATIENT)
Dept: FAMILY MEDICINE CLINIC | Age: 88
End: 2023-10-10

## 2023-10-10 VITALS
OXYGEN SATURATION: 98 % | SYSTOLIC BLOOD PRESSURE: 110 MMHG | RESPIRATION RATE: 16 BRPM | DIASTOLIC BLOOD PRESSURE: 70 MMHG | WEIGHT: 113 LBS | HEART RATE: 66 BPM | BODY MASS INDEX: 21.34 KG/M2 | HEIGHT: 61 IN

## 2023-10-10 VITALS
HEART RATE: 66 BPM | OXYGEN SATURATION: 98 % | BODY MASS INDEX: 21.35 KG/M2 | WEIGHT: 113 LBS | DIASTOLIC BLOOD PRESSURE: 70 MMHG | SYSTOLIC BLOOD PRESSURE: 110 MMHG

## 2023-10-10 DIAGNOSIS — N18.4 CHRONIC KIDNEY DISEASE, STAGE IV (SEVERE) (HCC): ICD-10-CM

## 2023-10-10 DIAGNOSIS — D50.9 IRON DEFICIENCY ANEMIA, UNSPECIFIED IRON DEFICIENCY ANEMIA TYPE: ICD-10-CM

## 2023-10-10 DIAGNOSIS — I20.9 ANGINA PECTORIS, UNSPECIFIED (HCC): ICD-10-CM

## 2023-10-10 DIAGNOSIS — I10 ESSENTIAL HYPERTENSION: Primary | ICD-10-CM

## 2023-10-10 DIAGNOSIS — F51.01 PRIMARY INSOMNIA: ICD-10-CM

## 2023-10-10 DIAGNOSIS — Z00.00 MEDICARE ANNUAL WELLNESS VISIT, SUBSEQUENT: Primary | ICD-10-CM

## 2023-10-10 DIAGNOSIS — M79.601 BILATERAL ARM PAIN: ICD-10-CM

## 2023-10-10 DIAGNOSIS — E78.2 MIXED HYPERLIPIDEMIA: ICD-10-CM

## 2023-10-10 DIAGNOSIS — Z95.811 PRESENCE OF HEART ASSIST DEVICE (HCC): ICD-10-CM

## 2023-10-10 DIAGNOSIS — M79.602 BILATERAL ARM PAIN: ICD-10-CM

## 2023-10-10 RX ORDER — TIZANIDINE 2 MG/1
2 TABLET ORAL NIGHTLY PRN
Qty: 30 TABLET | Refills: 5 | Status: SHIPPED | OUTPATIENT
Start: 2023-10-10

## 2023-10-10 ASSESSMENT — PATIENT HEALTH QUESTIONNAIRE - PHQ9
SUM OF ALL RESPONSES TO PHQ QUESTIONS 1-9: 2
SUM OF ALL RESPONSES TO PHQ QUESTIONS 1-9: 0
6. FEELING BAD ABOUT YOURSELF - OR THAT YOU ARE A FAILURE OR HAVE LET YOURSELF OR YOUR FAMILY DOWN: 0
1. LITTLE INTEREST OR PLEASURE IN DOING THINGS: 0
2. FEELING DOWN, DEPRESSED OR HOPELESS: 0
SUM OF ALL RESPONSES TO PHQ QUESTIONS 1-9: 2
5. POOR APPETITE OR OVEREATING: 0
9. THOUGHTS THAT YOU WOULD BE BETTER OFF DEAD, OR OF HURTING YOURSELF: 0
SUM OF ALL RESPONSES TO PHQ9 QUESTIONS 1 & 2: 0
SUM OF ALL RESPONSES TO PHQ9 QUESTIONS 1 & 2: 0
SUM OF ALL RESPONSES TO PHQ QUESTIONS 1-9: 0
4. FEELING TIRED OR HAVING LITTLE ENERGY: 1
3. TROUBLE FALLING OR STAYING ASLEEP: 1
10. IF YOU CHECKED OFF ANY PROBLEMS, HOW DIFFICULT HAVE THESE PROBLEMS MADE IT FOR YOU TO DO YOUR WORK, TAKE CARE OF THINGS AT HOME, OR GET ALONG WITH OTHER PEOPLE: 0
8. MOVING OR SPEAKING SO SLOWLY THAT OTHER PEOPLE COULD HAVE NOTICED. OR THE OPPOSITE, BEING SO FIGETY OR RESTLESS THAT YOU HAVE BEEN MOVING AROUND A LOT MORE THAN USUAL: 0
2. FEELING DOWN, DEPRESSED OR HOPELESS: 0
SUM OF ALL RESPONSES TO PHQ QUESTIONS 1-9: 0
1. LITTLE INTEREST OR PLEASURE IN DOING THINGS: 0
SUM OF ALL RESPONSES TO PHQ QUESTIONS 1-9: 2
7. TROUBLE CONCENTRATING ON THINGS, SUCH AS READING THE NEWSPAPER OR WATCHING TELEVISION: 0
SUM OF ALL RESPONSES TO PHQ QUESTIONS 1-9: 2
SUM OF ALL RESPONSES TO PHQ QUESTIONS 1-9: 0

## 2023-10-10 ASSESSMENT — ANXIETY QUESTIONNAIRES
3. WORRYING TOO MUCH ABOUT DIFFERENT THINGS: 0
7. FEELING AFRAID AS IF SOMETHING AWFUL MIGHT HAPPEN: 0
GAD7 TOTAL SCORE: 0
5. BEING SO RESTLESS THAT IT IS HARD TO SIT STILL: 0
1. FEELING NERVOUS, ANXIOUS, OR ON EDGE: 0
IF YOU CHECKED OFF ANY PROBLEMS ON THIS QUESTIONNAIRE, HOW DIFFICULT HAVE THESE PROBLEMS MADE IT FOR YOU TO DO YOUR WORK, TAKE CARE OF THINGS AT HOME, OR GET ALONG WITH OTHER PEOPLE: NOT DIFFICULT AT ALL
4. TROUBLE RELAXING: 0
2. NOT BEING ABLE TO STOP OR CONTROL WORRYING: 0
6. BECOMING EASILY ANNOYED OR IRRITABLE: 0

## 2023-10-10 ASSESSMENT — ENCOUNTER SYMPTOMS: BLOOD IN STOOL: 0

## 2023-10-10 NOTE — PROGRESS NOTES
No current facility-administered medications for this visit. Assessment:    1. Essential hypertension    2. Mixed hyperlipidemia    3. Primary insomnia    4. Iron deficiency anemia, unspecified iron deficiency anemia type    5. Chronic kidney disease, stage IV (severe) ([address])    6. Bilateral arm pain    7. Presence of heart assist device ([address])    8. Angina pectoris, unspecified ([address])        Plan:    1. Essential hypertension  Stable. Continue current medications. - CBC Auto Differential  - Comprehensive Metabolic Panel    2. Mixed hyperlipidemia  Stable. Continue current medications. - CBC Auto Differential  - Comprehensive Metabolic Panel    3. Primary insomnia  She is off Ambien. Discussed sleep hygiene. Hopefully the muscle relaxer can help with sedation and insomnia. 4.  Iron deficiency anemia, unspecified iron deficiency anemia type  She is positive go she got IV iron infusions during hospital treatments and feels she needs another round. She has no rectal bleeding. She does have chronic kidney disease as well. Will refer to [facility]. 5.  Chronic kidney disease, stage IV ([address])  Continue follow-up with nephrology. 6.  Bilateral arm pain  Give a muscle relaxer to help with some muscle pain and to help with sedation/insomnia. Presence of heart assist device and angina pectoris are stable. She sees cardiology yearly. Return in about 1 year (around 10/10/2024) for Hypertension, Hyperlipidemia, insomnia.

## 2023-10-19 ENCOUNTER — OFFICE VISIT (OUTPATIENT)
Dept: CARDIOLOGY CLINIC | Age: 88
End: 2023-10-19
Payer: MEDICARE

## 2023-10-19 VITALS
WEIGHT: 114 LBS | SYSTOLIC BLOOD PRESSURE: 132 MMHG | HEART RATE: 60 BPM | DIASTOLIC BLOOD PRESSURE: 60 MMHG | BODY MASS INDEX: 21.54 KG/M2

## 2023-10-19 DIAGNOSIS — I48.0 PAROXYSMAL ATRIAL FIBRILLATION (HCC): Primary | ICD-10-CM

## 2023-10-19 PROCEDURE — G8427 DOCREV CUR MEDS BY ELIG CLIN: HCPCS | Performed by: INTERNAL MEDICINE

## 2023-10-19 PROCEDURE — 1036F TOBACCO NON-USER: CPT | Performed by: INTERNAL MEDICINE

## 2023-10-19 PROCEDURE — 99214 OFFICE O/P EST MOD 30 MIN: CPT | Performed by: INTERNAL MEDICINE

## 2023-10-19 PROCEDURE — 1123F ACP DISCUSS/DSCN MKR DOCD: CPT | Performed by: INTERNAL MEDICINE

## 2023-10-19 PROCEDURE — G8420 CALC BMI NORM PARAMETERS: HCPCS | Performed by: INTERNAL MEDICINE

## 2023-10-19 PROCEDURE — 1090F PRES/ABSN URINE INCON ASSESS: CPT | Performed by: INTERNAL MEDICINE

## 2023-10-19 PROCEDURE — G8484 FLU IMMUNIZE NO ADMIN: HCPCS | Performed by: INTERNAL MEDICINE

## 2023-10-19 RX ORDER — AMIODARONE HYDROCHLORIDE 100 MG/1
100 TABLET ORAL EVERY OTHER DAY
Qty: 30 TABLET | Refills: 5 | Status: SHIPPED | OUTPATIENT
Start: 2023-10-19

## 2023-10-19 NOTE — PATIENT INSTRUCTIONS
Discussed risks and benefits of Amiodarone therapy. Decreased to 1 tablet every other day. Continue Eliquis and Toprol   Follow up with EP NP in 6 months.

## 2023-11-07 RX ORDER — APIXABAN 2.5 MG/1
2.5 TABLET, FILM COATED ORAL 2 TIMES DAILY
Qty: 180 TABLET | Refills: 3 | Status: SHIPPED | OUTPATIENT
Start: 2023-11-07

## 2023-11-07 NOTE — TELEPHONE ENCOUNTER
Requested Prescriptions     Pending Prescriptions Disp Refills    ELIQUIS 2.5 MG TABS tablet [Pharmacy Med Name: ELIQUIS 2.5 MG TABLET] 180 tablet 3     Sig: TAKE 1 TABLET BY MOUTH TWICE A DAY          Last Office Visit: 10/19/2023     Next Office Visit: 3/27/2024         Last Labs: [phone]

## 2023-11-13 ENCOUNTER — TELEPHONE (OUTPATIENT)
Dept: CARDIOLOGY CLINIC | Age: 88
End: 2023-11-13

## 2023-11-13 RX ORDER — ASPIRIN 81 MG
81 TABLET,CHEWABLE ORAL DAILY
Qty: 90 TABLET | Refills: 3 | Status: SHIPPED | OUTPATIENT
Start: 2023-11-13

## 2023-11-13 RX ORDER — FAMOTIDINE 20 MG/1
TABLET, FILM COATED ORAL
Qty: 60 TABLET | Refills: 3 | OUTPATIENT
Start: 2023-11-13

## 2023-11-13 RX ORDER — FUROSEMIDE 40 MG/1
TABLET ORAL
Qty: 90 TABLET | Refills: 3 | Status: SHIPPED | OUTPATIENT
Start: 2023-11-13

## 2023-11-13 RX ORDER — AMIODARONE HYDROCHLORIDE 100 MG/1
50 TABLET ORAL DAILY
Qty: 45 TABLET | Refills: 3 | Status: SHIPPED | OUTPATIENT
Start: 2023-11-13

## 2023-11-13 RX ORDER — FAMOTIDINE 20 MG/1
TABLET, FILM COATED ORAL
Qty: 60 TABLET | Refills: 3 | Status: SHIPPED | OUTPATIENT
Start: 2023-11-13

## 2023-11-13 NOTE — TELEPHONE ENCOUNTER
Refill Request     CONFIRM preferred pharmacy with the patient. If Mail Order Rx - Pend for 90 day refill. Last Seen: Last Seen Department: 10/10/2023  Last Seen by PCP: 10/10/2023    Last Written: 2/7/2023    If no future appointment scheduled:  Review the last OV with PCP and review information for follow-up visit,  Route STAFF MESSAGE with patient name to the [facility] for scheduling with the following information:            -  Timing of next visit           -  Visit type ie Physical, OV, etc           -  Diagnoses/Reason ie. COPD, HTN - Do not use MEDICATION, Follow-up or CHECK UP - Give reason for visit      Next Appointment:   Future Appointments   Date Time Provider [address]   3/27/2024  1:30 PM MD Willa Richardson   4/18/2024  1:30 PM DAVE Cho - CNP [place] [facility]       Message sent to [employer] to schedule appt with patient?   NO      Requested Prescriptions      No prescriptions requested or ordered in this encounter

## 2023-11-13 NOTE — TELEPHONE ENCOUNTER
Spoke with pt and pharmacy. Advised that she can take amiodarone 50 mg daily instead. Sent updated Rx to pharmacy. Pt verbalized understanding.

## 2023-11-13 NOTE — TELEPHONE ENCOUNTER
Devyn Torres from [address] [place] [address] called stating she would like a call regarding how patient takes her prescription for amiodarone (PACERONE) 100 MG tablet. Patient states previously dosage was daily but changed to every other day and will not remember to take the medication.  Please call pharmacy and patient to discuss taking medication

## 2023-11-13 NOTE — TELEPHONE ENCOUNTER
Requested Prescriptions     Pending Prescriptions Disp Refills    ASPIRIN LOW DOSE 81 MG chewable tablet [Pharmacy Med Name: ASPIRIN LOW DOSE 81 MG ORAL TABLET CHEWABLE] 90 tablet 3     Sig: TAKE 1 TABLET BY MOUTH DAILY WITH FOOD          Last Office Visit: 10/19/2023     Next Office Visit: 04.18.2024        Last Labs: [phone]

## 2023-11-29 PROCEDURE — G2066 INTER DEVC REMOTE 30D: HCPCS | Performed by: INTERNAL MEDICINE

## 2023-11-29 PROCEDURE — 93298 REM INTERROG DEV EVAL SCRMS: CPT | Performed by: INTERNAL MEDICINE

## 2023-12-05 ENCOUNTER — HOSPITAL ENCOUNTER (OUTPATIENT)
Age: 88
Discharge: HOME | End: 2023-12-05
Payer: MEDICARE

## 2023-12-05 LAB
ALBUMIN SERPL-MCNC: 4 G/DL (ref 3.4–5)
ANION GAP SERPL CALCULATED.3IONS-SCNC: 12 MMOL/L (ref 3–16)
BUN SERPL-MCNC: 24 MG/DL (ref 7–20)
CALCIUM SERPL-MCNC: 9.6 MG/DL (ref 8.3–10.6)
CHLORIDE SERPL-SCNC: 97 MMOL/L (ref 99–110)
CO2 SERPL-SCNC: 29 MMOL/L (ref 21–32)
CREAT SERPL-MCNC: 1.6 MG/DL (ref 0.6–1.2)
GFR SERPLBLD CREATININE-BSD FMLA CKD-EPI: 31 ML/MIN/{1.73_M2}
GLUCOSE SERPL-MCNC: 91 MG/DL (ref 70–99)
PHOSPHATE SERPL-MCNC: 3.3 MG/DL (ref 2.5–4.9)
POTASSIUM SERPL-SCNC: 4.4 MMOL/L (ref 3.5–5.1)
SODIUM SERPL-SCNC: 138 MMOL/L (ref 136–145)

## 2023-12-05 PROCEDURE — 80069 RENAL FUNCTION PANEL: CPT

## 2024-01-04 PROCEDURE — 93298 REM INTERROG DEV EVAL SCRMS: CPT | Performed by: INTERNAL MEDICINE

## 2024-01-12 NOTE — TELEPHONE ENCOUNTER
Requested Prescriptions     Pending Prescriptions Disp Refills    metoprolol succinate (TOPROL XL) 50 MG extended release tablet [Pharmacy Med Name: METOPROLOL SUCCINATE ER 50 MG ORAL TABLET EXTENDED RELEASE 24 HOUR] 30 tablet 5     Sig: TAKE 1 TABLET BY MOUTH IN THE MORNING          Number: 30    Refills: 5    Last Office Visit: 10/19/2023     Next Office Visit: 3/27/2024

## 2024-01-15 RX ORDER — METOPROLOL SUCCINATE 50 MG/1
50 TABLET, EXTENDED RELEASE ORAL DAILY
Qty: 30 TABLET | Refills: 5 | Status: SHIPPED | OUTPATIENT
Start: 2024-01-15

## 2024-02-06 RX ORDER — APIXABAN 2.5 MG/1
2.5 TABLET, FILM COATED ORAL 2 TIMES DAILY
Qty: 180 TABLET | Refills: 3 | Status: SHIPPED | OUTPATIENT
Start: 2024-02-06

## 2024-02-06 NOTE — TELEPHONE ENCOUNTER
Requested Prescriptions     Pending Prescriptions Disp Refills    ELIQUIS 2.5 MG TABS tablet [Pharmacy Med Name: ELIQUIS 2.5 MG TABLET] 180 tablet 3     Sig: TAKE 1 TABLET BY MOUTH TWICE A DAY              Last Office Visit: 10/19/2023     Next Office Visit: 3/27/2024         Last Labs: 12.05.2023

## 2024-02-12 PROCEDURE — 93298 REM INTERROG DEV EVAL SCRMS: CPT | Performed by: INTERNAL MEDICINE

## 2024-03-03 ENCOUNTER — HOSPITAL ENCOUNTER (OUTPATIENT)
Age: 89
Discharge: HOME | End: 2024-03-03
Payer: MEDICARE

## 2024-03-03 LAB
ALBUMIN SERPL-MCNC: 3.9 G/DL (ref 3.4–5)
ANION GAP SERPL CALCULATED.3IONS-SCNC: 9 MMOL/L (ref 3–16)
BUN SERPL-MCNC: 24 MG/DL (ref 7–20)
CALCIUM SERPL-MCNC: 8.9 MG/DL (ref 8.3–10.6)
CHLORIDE SERPL-SCNC: 102 MMOL/L (ref 99–110)
CO2 SERPL-SCNC: 29 MMOL/L (ref 21–32)
CREAT SERPL-MCNC: 1.4 MG/DL (ref 0.6–1.2)
GFR SERPLBLD CREATININE-BSD FMLA CKD-EPI: 36 ML/MIN/{1.73_M2}
GLUCOSE SERPL-MCNC: 85 MG/DL (ref 70–99)
PHOSPHATE SERPL-MCNC: 3.3 MG/DL (ref 2.5–4.9)
POTASSIUM SERPL-SCNC: 3.9 MMOL/L (ref 3.5–5.1)
SODIUM SERPL-SCNC: 140 MMOL/L (ref 136–145)
URATE SERPL-MCNC: 7.7 MG/DL (ref 2.6–6)

## 2024-03-03 PROCEDURE — 82728 ASSAY OF FERRITIN: CPT

## 2024-03-03 PROCEDURE — 84550 ASSAY OF BLOOD/URIC ACID: CPT

## 2024-03-03 PROCEDURE — 83540 ASSAY OF IRON: CPT

## 2024-03-03 PROCEDURE — 82570 ASSAY OF URINE CREATININE: CPT

## 2024-03-03 PROCEDURE — 80069 RENAL FUNCTION PANEL: CPT

## 2024-03-03 PROCEDURE — 83550 IRON BINDING TEST: CPT

## 2024-03-03 PROCEDURE — 84156 ASSAY OF PROTEIN URINE: CPT

## 2024-03-04 LAB
CREAT UR-MCNC: 35.4 MG/DL (ref 28–259)
FERRITIN SERPL IA-MCNC: 616 NG/ML (ref 15–150)
IRON SATN MFR SERPL: 44 % (ref 15–50)
IRON SERPL-MCNC: 100 UG/DL (ref 37–145)
PROT UR-MCNC: 5 MG/DL
PROT/CREAT UR-RTO: 0.1 MG/DL
TIBC SERPL-MCNC: 225 UG/DL (ref 260–445)

## 2024-03-21 PROCEDURE — 93298 REM INTERROG DEV EVAL SCRMS: CPT | Performed by: INTERNAL MEDICINE

## 2024-03-27 ENCOUNTER — OFFICE VISIT (OUTPATIENT)
Dept: CARDIOLOGY CLINIC | Age: 89
End: 2024-03-27
Payer: MEDICARE

## 2024-03-27 VITALS
BODY MASS INDEX: 23.24 KG/M2 | SYSTOLIC BLOOD PRESSURE: 160 MMHG | WEIGHT: 123 LBS | HEART RATE: 80 BPM | DIASTOLIC BLOOD PRESSURE: 90 MMHG

## 2024-03-27 DIAGNOSIS — I25.10 CAD IN NATIVE ARTERY: ICD-10-CM

## 2024-03-27 DIAGNOSIS — I47.20 VT (VENTRICULAR TACHYCARDIA) (HCC): ICD-10-CM

## 2024-03-27 DIAGNOSIS — Z98.61 HISTORY OF PTCA: ICD-10-CM

## 2024-03-27 DIAGNOSIS — I34.0 NONRHEUMATIC MITRAL VALVE REGURGITATION: ICD-10-CM

## 2024-03-27 DIAGNOSIS — Z95.818 STATUS POST PLACEMENT OF IMPLANTABLE LOOP RECORDER: ICD-10-CM

## 2024-03-27 DIAGNOSIS — I42.0 DILATED CARDIOMYOPATHY (HCC): ICD-10-CM

## 2024-03-27 DIAGNOSIS — I48.0 PAROXYSMAL ATRIAL FIBRILLATION (HCC): ICD-10-CM

## 2024-03-27 DIAGNOSIS — I50.22 CHRONIC SYSTOLIC CONGESTIVE HEART FAILURE (HCC): Primary | ICD-10-CM

## 2024-03-27 DIAGNOSIS — I10 ESSENTIAL HYPERTENSION: ICD-10-CM

## 2024-03-27 PROCEDURE — G8427 DOCREV CUR MEDS BY ELIG CLIN: HCPCS | Performed by: INTERNAL MEDICINE

## 2024-03-27 PROCEDURE — 1090F PRES/ABSN URINE INCON ASSESS: CPT | Performed by: INTERNAL MEDICINE

## 2024-03-27 PROCEDURE — 1036F TOBACCO NON-USER: CPT | Performed by: INTERNAL MEDICINE

## 2024-03-27 PROCEDURE — G8420 CALC BMI NORM PARAMETERS: HCPCS | Performed by: INTERNAL MEDICINE

## 2024-03-27 PROCEDURE — 1123F ACP DISCUSS/DSCN MKR DOCD: CPT | Performed by: INTERNAL MEDICINE

## 2024-03-27 PROCEDURE — G8484 FLU IMMUNIZE NO ADMIN: HCPCS | Performed by: INTERNAL MEDICINE

## 2024-03-27 PROCEDURE — 99214 OFFICE O/P EST MOD 30 MIN: CPT | Performed by: INTERNAL MEDICINE

## 2024-03-27 NOTE — PROGRESS NOTES
History of heart artery stent, Hyperlipidemia, Hypertension, Mitral valve prolapse, Myocardial infarct, old, and Thyroid disease.     Current Outpatient Medications   Medication Sig Dispense Refill    ELIQUIS 2.5 MG TABS tablet TAKE 1 TABLET BY MOUTH TWICE A  tablet 3    metoprolol succinate (TOPROL XL) 50 MG extended release tablet TAKE 1 TABLET BY MOUTH IN THE MORNING 30 tablet 5    ASPIRIN LOW DOSE 81 MG chewable tablet TAKE 1 TABLET BY MOUTH DAILY WITH FOOD 90 tablet 3    furosemide (LASIX) 40 MG tablet TAKE 1 TABLET BY MOUTH DAILY IN THE MORNING 90 tablet 3    amiodarone (PACERONE) 100 MG tablet Take 0.5 tablets by mouth daily 45 tablet 3    famotidine (PEPCID) 20 MG tablet TAKE 1 TABLET BY MOUTH DAILY (AM) 60 tablet 3    tiZANidine (ZANAFLEX) 2 MG tablet Take 1 tablet by mouth nightly as needed (muscle spasms) 30 tablet 5    atorvastatin (LIPITOR) 80 MG tablet TAKE 1 TABLET BY MOUTH ONE TIME A DAY (EVENING) 90 tablet 3    Cyanocobalamin (VITAMIN B-12) 1000 MCG extended release tablet TAKE 1 TABLET (1,000 MCG) BY MOUTH DAILY (AM) 90 tablet 3    vitamin D3 (CHOLECALCIFEROL) 25 MCG (1000 UT) TABS tablet TAKE 1 TABLET (1,000 IU) BY MOUTH DAILY IN THE MORNING WITH FOOD PREFERABLY WITH A MEAL 90 tablet 3    oxyCODONE-acetaminophen (PERCOCET)  MG per tablet       ondansetron (ZOFRAN) 4 MG tablet Take 1 tablet by mouth 3 times daily as needed for Nausea or Vomiting 30 tablet 5    miconazole (MICOTIN) 2 % powder Apply topically 2 times daily. 45 g 1    nitroGLYCERIN (NITROSTAT) 0.4 MG SL tablet PUT 1 TAB UNDER TONGUE EVERY 5 MIN AS NEEDED CHEST PAIN UP TO 3. IF NO RELIEF AFTER 1 DOSE, CALL 911 25 tablet 3    vitamin B-12 (CYANOCOBALAMIN) 1000 MCG tablet Take 1 tablet by mouth daily      fluticasone (FLONASE) 50 MCG/ACT nasal spray 2 sprays by Each Nostril route daily 1 Bottle 5    Coenzyme Q10 (CO Q 10) 100 MG CAPS Take by mouth daily       No current facility-administered medications for this visit.

## 2024-06-10 DIAGNOSIS — K21.9 GASTROESOPHAGEAL REFLUX DISEASE, UNSPECIFIED WHETHER ESOPHAGITIS PRESENT: Primary | ICD-10-CM

## 2024-06-10 RX ORDER — FAMOTIDINE 20 MG/1
TABLET, FILM COATED ORAL
Qty: 60 TABLET | Refills: 3 | Status: SHIPPED | OUTPATIENT
Start: 2024-06-10

## 2024-06-10 NOTE — TELEPHONE ENCOUNTER
Refill Request     CONFIRM preferred pharmacy with the patient.    If Mail Order Rx - Pend for 90 day refill.      Last Seen: Last Seen Department: 10/10/2023  Last Seen by PCP: 10/10/2023    Last Written: 11/13/2023    If no future appointment scheduled:  Review the last OV with PCP and review information for follow-up visit,  Route STAFF MESSAGE with patient name to the  Pool for scheduling with the following information:            -  Timing of next visit           -  Visit type ie Physical, OV, etc           -  Diagnoses/Reason ie. COPD, HTN - Do not use MEDICATION, Follow-up or CHECK UP - Give reason for visit      Next Appointment:   Future Appointments   Date Time Provider Department Center   6/25/2024  2:00 PM Chrissie King APRN - CNP [facility]       Message sent to  to schedule appt with patient?  NO      Requested Prescriptions     Pending Prescriptions Disp Refills    famotidine (PEPCID) 20 MG tablet [Pharmacy Med Name: FAMOTIDINE 20 MG ORAL TABLET] 60 tablet 3     Sig: TAKE 1 TABLET BY MOUTH ONCE DAILY (AM)

## 2024-06-19 NOTE — PROGRESS NOTES
ILR for CVA. [address] transmission received from patient's monitor at home d/t AF & tachy events recorded. Remote LinqII report shows Tachy events w 99.8% AT/AF burden. Available ECGs (including Current ECG) shows what appears to be AF w RVR (up to 188bpm) and ectopy. PVC burden 5.5%. Pt on Coreg, Eliquis. EP physician/[facility] to review (pt seeing NPCG today). See PACEART report under Cardiology tab. Will continue to monitor remotely. [FreeTextEntry1] : This is a 75-year-old female with past medical history of CAD, type 1 diabetes, TIA, hyperlipidemia, hypertension, MS with left sided weakness who presents for opinion for left lateral malleolus ulceration that opened 1.5 years ago.  She underwent SFA-popliteal stent placement > 10 years ago with Dr. Middleton and recent angiogram 4/5/24 with failed attempt to pass occlusion.  Patient endorses left foot redness and swelling.  She is mobile and ambulates with a walker.  She has an off load shoe at home but does not wear 2/2 to discomfort.

## 2024-07-03 RX ORDER — METOPROLOL SUCCINATE 50 MG/1
50 TABLET, EXTENDED RELEASE ORAL DAILY
Qty: 30 TABLET | Refills: 5 | Status: SHIPPED | OUTPATIENT
Start: 2024-07-03

## 2024-07-03 NOTE — TELEPHONE ENCOUNTER
Requested Prescriptions     Pending Prescriptions Disp Refills    metoprolol succinate (TOPROL XL) 50 MG extended release tablet [Pharmacy Med Name: METOPROLOL SUCCINATE ER 50 MG ORAL TABLET EXTENDED RELEASE 24 HOUR] 30 tablet 5     Sig: TAKE 1 TABLET BY MOUTH IN THE MORNING            Checked Correct Pharmacy: Yes    Any changes since last refill? No     Number: 30    Refills: 5    Last Office Visit: 3/27/2024     Next Office Visit: 8/15/2024     Last Refill: 01/15/2024    Last Labs: 03/03/2024

## 2024-07-15 ENCOUNTER — HOSPITAL ENCOUNTER (OUTPATIENT)
Age: 89
Discharge: HOME | End: 2024-07-15
Payer: MEDICARE

## 2024-07-15 PROCEDURE — 80069 RENAL FUNCTION PANEL: CPT

## 2024-07-15 PROCEDURE — 85025 COMPLETE CBC W/AUTO DIFF WBC: CPT

## 2024-07-15 PROCEDURE — 36415 COLL VENOUS BLD VENIPUNCTURE: CPT

## 2024-07-16 LAB
ALBUMIN SERPL-MCNC: 3.8 G/DL (ref 3.4–5)
ANION GAP SERPL CALCULATED.3IONS-SCNC: 9 MMOL/L (ref 3–16)
BASOPHILS # BLD: 0.1 K/UL (ref 0–0.2)
BASOPHILS NFR BLD: 1.2 %
BUN SERPL-MCNC: 29 MG/DL (ref 7–20)
CALCIUM SERPL-MCNC: 9.3 MG/DL (ref 8.3–10.6)
CHLORIDE SERPL-SCNC: 100 MMOL/L (ref 99–110)
CO2 SERPL-SCNC: 29 MMOL/L (ref 21–32)
CREAT SERPL-MCNC: 2.1 MG/DL (ref 0.6–1.2)
DEPRECATED RDW RBC AUTO: 14 % (ref 12.4–15.4)
EOSINOPHIL # BLD: 0.3 K/UL (ref 0–0.6)
EOSINOPHIL NFR BLD: 4.4 %
GFR SERPLBLD CREATININE-BSD FMLA CKD-EPI: 22 ML/MIN/{1.73_M2}
GLUCOSE SERPL-MCNC: 81 MG/DL (ref 70–99)
HCT VFR BLD AUTO: 33.6 % (ref 36–48)
HGB BLD-MCNC: 11.2 G/DL (ref 12–16)
LYMPHOCYTES # BLD: 1.7 K/UL (ref 1–5.1)
LYMPHOCYTES NFR BLD: 29.7 %
MCH RBC QN AUTO: 30 PG (ref 26–34)
MCHC RBC AUTO-ENTMCNC: 33.3 G/DL (ref 31–36)
MCV RBC AUTO: 89.9 FL (ref 80–100)
MONOCYTES # BLD: 0.5 K/UL (ref 0–1.3)
MONOCYTES NFR BLD: 9.5 %
NEUTROPHILS # BLD: 3.2 K/UL (ref 1.7–7.7)
NEUTROPHILS NFR BLD: 55.2 %
PHOSPHATE SERPL-MCNC: 4.3 MG/DL (ref 2.5–4.9)
PLATELET # BLD AUTO: 201 K/UL (ref 135–450)
PMV BLD AUTO: 9.5 FL (ref 5–10.5)
POTASSIUM SERPL-SCNC: 4.2 MMOL/L (ref 3.5–5.1)
RBC # BLD AUTO: 3.74 M/UL (ref 4–5.2)
SODIUM SERPL-SCNC: 138 MMOL/L (ref 136–145)
WBC # BLD AUTO: 5.7 K/UL (ref 4–11)

## 2024-07-17 ENCOUNTER — HOSPITAL ENCOUNTER (OUTPATIENT)
Age: 89
Discharge: HOME | End: 2024-07-17
Payer: MEDICARE

## 2024-07-17 LAB
CREAT UR-MCNC: 35.8 MG/DL (ref 28–259)
PROT UR-MCNC: 5 MG/DL
PROT/CREAT UR-RTO: 0.1 MG/DL

## 2024-07-17 PROCEDURE — 84156 ASSAY OF PROTEIN URINE: CPT

## 2024-07-17 PROCEDURE — 82570 ASSAY OF URINE CREATININE: CPT

## 2024-07-31 RX ORDER — ATORVASTATIN CALCIUM 80 MG/1
TABLET, FILM COATED ORAL
Qty: 90 TABLET | Refills: 3 | Status: SHIPPED | OUTPATIENT
Start: 2024-07-31

## 2024-07-31 NOTE — TELEPHONE ENCOUNTER
Requested Prescriptions     Pending Prescriptions Disp Refills    atorvastatin (LIPITOR) 80 MG tablet [Pharmacy Med Name: ATORVASTATIN CALCIUM 80 MG ORAL TABLET] 90 tablet 3     Sig: TAKE 1 TABLET BY MOUTH ONE TIME A DAY (EVENING)              Number: 90    Refills: 3    Last Office Visit: 3/27/2024     Next Office Visit: 08/15/2024     Last Refill: 08/29/2023    Last Labs:   07/17/2024 Protein/ Creatinine  07/15/2024 Renal / CBC   03/03/2024 Uric Acid/ Renal Ferritin/ Iron TIBC

## 2024-08-08 PROCEDURE — 93298 REM INTERROG DEV EVAL SCRMS: CPT | Performed by: INTERNAL MEDICINE

## 2024-09-10 ENCOUNTER — OFFICE VISIT (OUTPATIENT)
Dept: CARDIOLOGY CLINIC | Age: 89
End: 2024-09-10
Payer: MEDICARE

## 2024-09-10 VITALS
WEIGHT: 125.2 LBS | HEART RATE: 60 BPM | BODY MASS INDEX: 23.66 KG/M2 | DIASTOLIC BLOOD PRESSURE: 80 MMHG | SYSTOLIC BLOOD PRESSURE: 164 MMHG

## 2024-09-10 DIAGNOSIS — I50.22 CHRONIC SYSTOLIC CONGESTIVE HEART FAILURE (HCC): ICD-10-CM

## 2024-09-10 DIAGNOSIS — I42.8 NICM (NONISCHEMIC CARDIOMYOPATHY) (HCC): ICD-10-CM

## 2024-09-10 DIAGNOSIS — Z79.899 ON AMIODARONE THERAPY: ICD-10-CM

## 2024-09-10 DIAGNOSIS — Z79.01 ON CONTINUOUS ORAL ANTICOAGULATION: ICD-10-CM

## 2024-09-10 DIAGNOSIS — I49.3 PVC (PREMATURE VENTRICULAR CONTRACTION): ICD-10-CM

## 2024-09-10 DIAGNOSIS — D68.69 SECONDARY HYPERCOAGULABLE STATE (HCC): ICD-10-CM

## 2024-09-10 DIAGNOSIS — I25.10 CORONARY ARTERY DISEASE INVOLVING NATIVE CORONARY ARTERY OF NATIVE HEART WITHOUT ANGINA PECTORIS: ICD-10-CM

## 2024-09-10 DIAGNOSIS — I48.0 PAROXYSMAL ATRIAL FIBRILLATION (HCC): Primary | ICD-10-CM

## 2024-09-10 DIAGNOSIS — I48.0 PAROXYSMAL ATRIAL FIBRILLATION (HCC): ICD-10-CM

## 2024-09-10 DIAGNOSIS — Z95.818 STATUS POST PLACEMENT OF IMPLANTABLE LOOP RECORDER: ICD-10-CM

## 2024-09-10 LAB
ALBUMIN SERPL-MCNC: 4.3 G/DL (ref 3.4–5)
ALP SERPL-CCNC: 106 U/L (ref 40–129)
ALT SERPL-CCNC: 16 U/L (ref 10–40)
ANION GAP SERPL CALCULATED.3IONS-SCNC: 11 MMOL/L (ref 3–16)
AST SERPL-CCNC: 23 U/L (ref 15–37)
BILIRUB DIRECT SERPL-MCNC: <0.1 MG/DL (ref 0–0.3)
BILIRUB INDIRECT SERPL-MCNC: NORMAL MG/DL (ref 0–1)
BILIRUB SERPL-MCNC: <0.2 MG/DL (ref 0–1)
BUN SERPL-MCNC: 29 MG/DL (ref 7–20)
CALCIUM SERPL-MCNC: 9.3 MG/DL (ref 8.3–10.6)
CHLORIDE SERPL-SCNC: 102 MMOL/L (ref 99–110)
CO2 SERPL-SCNC: 28 MMOL/L (ref 21–32)
CREAT SERPL-MCNC: 1.7 MG/DL (ref 0.6–1.2)
GFR SERPLBLD CREATININE-BSD FMLA CKD-EPI: 29 ML/MIN/{1.73_M2}
GLUCOSE SERPL-MCNC: 94 MG/DL (ref 70–99)
MAGNESIUM SERPL-MCNC: 2.4 MG/DL (ref 1.8–2.4)
POTASSIUM SERPL-SCNC: 4.1 MMOL/L (ref 3.5–5.1)
PROT SERPL-MCNC: 6.6 G/DL (ref 6.4–8.2)
SODIUM SERPL-SCNC: 141 MMOL/L (ref 136–145)
TSH SERPL DL<=0.005 MIU/L-ACNC: 1.48 UIU/ML (ref 0.27–4.2)

## 2024-09-10 PROCEDURE — 93000 ELECTROCARDIOGRAM COMPLETE: CPT | Performed by: NURSE PRACTITIONER

## 2024-09-10 PROCEDURE — G2211 COMPLEX E/M VISIT ADD ON: HCPCS | Performed by: NURSE PRACTITIONER

## 2024-09-10 PROCEDURE — 1123F ACP DISCUSS/DSCN MKR DOCD: CPT | Performed by: NURSE PRACTITIONER

## 2024-09-10 PROCEDURE — G8420 CALC BMI NORM PARAMETERS: HCPCS | Performed by: NURSE PRACTITIONER

## 2024-09-10 PROCEDURE — 1090F PRES/ABSN URINE INCON ASSESS: CPT | Performed by: NURSE PRACTITIONER

## 2024-09-10 PROCEDURE — 1036F TOBACCO NON-USER: CPT | Performed by: NURSE PRACTITIONER

## 2024-09-10 PROCEDURE — 99215 OFFICE O/P EST HI 40 MIN: CPT | Performed by: NURSE PRACTITIONER

## 2024-09-10 PROCEDURE — G8427 DOCREV CUR MEDS BY ELIG CLIN: HCPCS | Performed by: NURSE PRACTITIONER

## 2024-09-10 RX ORDER — NITROGLYCERIN 0.4 MG/1
0.4 TABLET SUBLINGUAL EVERY 5 MIN PRN
Qty: 25 TABLET | Refills: 3 | Status: SHIPPED | OUTPATIENT
Start: 2024-09-10

## 2024-09-13 ENCOUNTER — OFFICE VISIT (OUTPATIENT)
Dept: CARDIOLOGY CLINIC | Age: 89
End: 2024-09-13
Payer: MEDICARE

## 2024-09-13 VITALS
HEART RATE: 67 BPM | BODY MASS INDEX: 23.69 KG/M2 | WEIGHT: 125.4 LBS | SYSTOLIC BLOOD PRESSURE: 140 MMHG | DIASTOLIC BLOOD PRESSURE: 84 MMHG

## 2024-09-13 DIAGNOSIS — I50.22 CHRONIC HFREF (HEART FAILURE WITH REDUCED EJECTION FRACTION) (HCC): Primary | ICD-10-CM

## 2024-09-13 PROBLEM — I50.43 CHF (CONGESTIVE HEART FAILURE), NYHA CLASS I, ACUTE ON CHRONIC, COMBINED (HCC): Status: RESOLVED | Noted: 2022-06-23 | Resolved: 2024-09-13

## 2024-09-13 LAB
AMIODARONE SERPL-MCNC: <0.3 UG/ML (ref 0.5–2)
DESETHYLAMIODARONE SERPL-MCNC: <0.3 UG/ML

## 2024-09-13 PROCEDURE — G8427 DOCREV CUR MEDS BY ELIG CLIN: HCPCS | Performed by: INTERNAL MEDICINE

## 2024-09-13 PROCEDURE — 1090F PRES/ABSN URINE INCON ASSESS: CPT | Performed by: INTERNAL MEDICINE

## 2024-09-13 PROCEDURE — 1036F TOBACCO NON-USER: CPT | Performed by: INTERNAL MEDICINE

## 2024-09-13 PROCEDURE — 1123F ACP DISCUSS/DSCN MKR DOCD: CPT | Performed by: INTERNAL MEDICINE

## 2024-09-13 PROCEDURE — G8420 CALC BMI NORM PARAMETERS: HCPCS | Performed by: INTERNAL MEDICINE

## 2024-09-13 PROCEDURE — 99215 OFFICE O/P EST HI 40 MIN: CPT | Performed by: INTERNAL MEDICINE

## 2024-09-15 PROCEDURE — 93298 REM INTERROG DEV EVAL SCRMS: CPT | Performed by: INTERNAL MEDICINE

## 2024-10-07 RX ORDER — FUROSEMIDE 40 MG
TABLET ORAL
Qty: 90 TABLET | Refills: 3 | Status: ON HOLD | OUTPATIENT
Start: 2024-10-07

## 2024-10-07 RX ORDER — AMIODARONE HYDROCHLORIDE 100 MG/1
TABLET ORAL
Qty: 45 TABLET | Refills: 3 | Status: ON HOLD | OUTPATIENT
Start: 2024-10-07

## 2024-10-07 NOTE — TELEPHONE ENCOUNTER
Requested Prescriptions     Pending Prescriptions Disp Refills    amiodarone (PACERONE) 100 MG tablet [Pharmacy Med Name: AMIODARONE  MG ORAL TABLET] 45 tablet 3     Sig: TAKE 0.5 TABLETS (50 MG) BY MOUTH ONCE DAILY (AM)            Checked Correct Pharmacy: Yes    Any changes since last refill? No     Number: 45    Refills: 3    Last Office Visit: 9/13/2024     Next Office Visit: 10/7/2024     Last Refill: 11/13/2023    Last Labs: 09/10/2024

## 2024-10-07 NOTE — TELEPHONE ENCOUNTER
Requested Prescriptions     Pending Prescriptions Disp Refills    furosemide (LASIX) 40 MG tablet [Pharmacy Med Name: FUROSEMIDE 40 MG ORAL TABLET] 90 tablet 3     Sig: TAKE 1 TABLET BY MOUTH DAILY IN THE MORNING            Checked Correct Pharmacy: Yes    Any changes since last refill? No     Number: 90    Refills: 3    Last Office Visit: 9/13/2024     Next Office Visit: 3/6/2025         Last Labs: 09.10.2024

## 2024-10-11 ENCOUNTER — HOSPITAL ENCOUNTER (EMERGENCY)
Age: 89
Discharge: ANOTHER HOSPITAL | End: 2024-10-11
Attending: EMERGENCY MEDICINE
Payer: MEDICARE

## 2024-10-11 ENCOUNTER — APPOINTMENT (OUTPATIENT)
Dept: CT IMAGING | Age: 89
End: 2024-10-11
Attending: INTERNAL MEDICINE
Payer: MEDICARE

## 2024-10-11 ENCOUNTER — APPOINTMENT (OUTPATIENT)
Dept: CT IMAGING | Age: 89
End: 2024-10-11
Payer: MEDICARE

## 2024-10-11 ENCOUNTER — HOSPITAL ENCOUNTER (INPATIENT)
Age: 89
LOS: 5 days | Discharge: NURSING, REHAB OR LONG-TERM CARE | End: 2024-10-16
Attending: INTERNAL MEDICINE | Admitting: INTERNAL MEDICINE
Payer: MEDICARE

## 2024-10-11 VITALS
HEIGHT: 62 IN | DIASTOLIC BLOOD PRESSURE: 69 MMHG | HEART RATE: 63 BPM | RESPIRATION RATE: 18 BRPM | TEMPERATURE: 97.5 F | SYSTOLIC BLOOD PRESSURE: 136 MMHG | BODY MASS INDEX: 23 KG/M2 | OXYGEN SATURATION: 95 % | WEIGHT: 125 LBS

## 2024-10-11 DIAGNOSIS — R56.9 SEIZURE-LIKE ACTIVITY (HCC): ICD-10-CM

## 2024-10-11 DIAGNOSIS — M54.50 ACUTE LOW BACK PAIN WITHOUT SCIATICA, UNSPECIFIED BACK PAIN LATERALITY: ICD-10-CM
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
## 2024-10-11 DIAGNOSIS — I50.9 OTHER CONGESTIVE HEART FAILURE (HCC): Primary | ICD-10-CM

## 2024-10-11 DIAGNOSIS — D64.9 ANEMIA, UNSPECIFIED TYPE: ICD-10-CM

## 2024-10-11 DIAGNOSIS — S06.5XAA SDH (SUBDURAL HEMATOMA): ICD-10-CM

## 2024-10-11 PROBLEM — S06.5X0A TRAUMATIC SUBDURAL HEMATOMA WITHOUT LOSS OF CONSCIOUSNESS: Status: ACTIVE | Noted: 2024-10-11

## 2024-10-11 PROBLEM — I60.9 SUBARACHNOID HEMORRHAGE (HCC): Status: ACTIVE | Noted: 2024-10-11

## 2024-10-11 LAB
ALBUMIN SERPL-MCNC: 3.6 G/DL (ref 3.4–5)
ALBUMIN/GLOB SERPL: 1.2 {RATIO} (ref 1.1–2.2)
ALP SERPL-CCNC: 114 U/L (ref 40–129)
ALT SERPL-CCNC: 13 U/L (ref 10–40)
ANION GAP SERPL CALCULATED.3IONS-SCNC: 11 MMOL/L (ref 3–16)
ANION GAP SERPL CALCULATED.3IONS-SCNC: 12 MMOL/L (ref 3–16)
APTT BLD: 35.4 SEC (ref 22.1–36.4)
AST SERPL-CCNC: 26 U/L (ref 15–37)
BASOPHILS # BLD: 0.1 K/UL (ref 0–0.2)
BASOPHILS NFR BLD: 0.8 %
BILIRUB SERPL-MCNC: 0.6 MG/DL (ref 0–1)
BILIRUB UR QL STRIP.AUTO: NEGATIVE
BUN SERPL-MCNC: 25 MG/DL (ref 7–20)
BUN SERPL-MCNC: 27 MG/DL (ref 7–20)
CALCIUM SERPL-MCNC: 8.8 MG/DL (ref 8.3–10.6)
CALCIUM SERPL-MCNC: 8.9 MG/DL (ref 8.3–10.6)
CHLORIDE SERPL-SCNC: 91 MMOL/L (ref 99–110)
CHLORIDE SERPL-SCNC: 96 MMOL/L (ref 99–110)
CHOLEST SERPL-MCNC: 119 MG/DL (ref 0–199)
CLARITY UR: CLEAR
CO2 SERPL-SCNC: 27 MMOL/L (ref 21–32)
CO2 SERPL-SCNC: 29 MMOL/L (ref 21–32)
COLOR UR: YELLOW
CREAT SERPL-MCNC: 1.6 MG/DL (ref 0.6–1.2)
CREAT SERPL-MCNC: 1.7 MG/DL (ref 0.6–1.2)
DEPRECATED RDW RBC AUTO: 13.7 % (ref 12.4–15.4)
DEPRECATED RDW RBC AUTO: 13.9 % (ref 12.4–15.4)
EKG ATRIAL RATE: 86 BPM
EKG DIAGNOSIS: NORMAL
EKG P AXIS: 89 DEGREES
EKG P-R INTERVAL: 214 MS
EKG Q-T INTERVAL: 428 MS
EKG QRS DURATION: 168 MS
EKG QTC CALCULATION (BAZETT): 512 MS
EKG R AXIS: -31 DEGREES
EKG T AXIS: 112 DEGREES
EKG VENTRICULAR RATE: 86 BPM
EOSINOPHIL # BLD: 0.1 K/UL (ref 0–0.6)
EOSINOPHIL NFR BLD: 1.5 %
EPI CELLS #/AREA URNS HPF: ABNORMAL /HPF (ref 0–5)
ETHANOLAMINE SERPL-MCNC: NORMAL MG/DL (ref 0–0.08)
GFR SERPLBLD CREATININE-BSD FMLA CKD-EPI: 29 ML/MIN/{1.73_M2}
GFR SERPLBLD CREATININE-BSD FMLA CKD-EPI: 31 ML/MIN/{1.73_M2}
GLUCOSE BLD-MCNC: 115 MG/DL (ref 70–99)
GLUCOSE SERPL-MCNC: 106 MG/DL (ref 70–99)
GLUCOSE SERPL-MCNC: 95 MG/DL (ref 70–99)
GLUCOSE UR STRIP.AUTO-MCNC: NEGATIVE MG/DL
HCT VFR BLD AUTO: 27.2 % (ref 36–48)
HCT VFR BLD AUTO: 29 % (ref 36–48)
HDLC SERPL-MCNC: 57 MG/DL (ref 40–60)
HGB BLD-MCNC: 9.1 G/DL (ref 12–16)
HGB BLD-MCNC: 9.7 G/DL (ref 12–16)
HGB UR QL STRIP.AUTO: NEGATIVE
INR PPP: 1.28 (ref 0.85–1.15)
KETONES UR STRIP.AUTO-MCNC: NEGATIVE MG/DL
LDLC SERPL CALC-MCNC: 50 MG/DL
LEUKOCYTE ESTERASE UR QL STRIP.AUTO: ABNORMAL
LYMPHOCYTES # BLD: 1.6 K/UL (ref 1–5.1)
LYMPHOCYTES NFR BLD: 22.2 %
MAGNESIUM SERPL-MCNC: 2.3 MG/DL (ref 1.8–2.4)
MCH RBC QN AUTO: 30 PG (ref 26–34)
MCH RBC QN AUTO: 30 PG (ref 26–34)
MCHC RBC AUTO-ENTMCNC: 33.2 G/DL (ref 31–36)
MCHC RBC AUTO-ENTMCNC: 33.4 G/DL (ref 31–36)
MCV RBC AUTO: 89.7 FL (ref 80–100)
MCV RBC AUTO: 90.4 FL (ref 80–100)
MONOCYTES # BLD: 0.9 K/UL (ref 0–1.3)
MONOCYTES NFR BLD: 12.3 %
NEUTROPHILS # BLD: 4.6 K/UL (ref 1.7–7.7)
NEUTROPHILS NFR BLD: 63.2 %
NITRITE UR QL STRIP.AUTO: NEGATIVE
PERFORMED ON: ABNORMAL
PH UR STRIP.AUTO: 7 [PH] (ref 5–8)
PLATELET # BLD AUTO: 252 K/UL (ref 135–450)
PLATELET # BLD AUTO: 261 K/UL (ref 135–450)
PMV BLD AUTO: 9.1 FL (ref 5–10.5)
PMV BLD AUTO: 9.3 FL (ref 5–10.5)
POTASSIUM SERPL-SCNC: 3.6 MMOL/L (ref 3.5–5.1)
POTASSIUM SERPL-SCNC: 4.5 MMOL/L (ref 3.5–5.1)
PROT SERPL-MCNC: 6.5 G/DL (ref 6.4–8.2)
PROT UR STRIP.AUTO-MCNC: NEGATIVE MG/DL
PROTHROMBIN TIME: 16.2 SEC (ref 11.9–14.9)
RBC # BLD AUTO: 3.01 M/UL (ref 4–5.2)
RBC # BLD AUTO: 3.23 M/UL (ref 4–5.2)
RBC #/AREA URNS HPF: ABNORMAL /HPF (ref 0–4)
RENAL EPI CELLS #/AREA UR COMP ASSIST: ABNORMAL /HPF (ref 0–1)
SODIUM SERPL-SCNC: 130 MMOL/L (ref 136–145)
SODIUM SERPL-SCNC: 136 MMOL/L (ref 136–145)
SP GR UR STRIP.AUTO: 1.01 (ref 1–1.03)
TRIGL SERPL-MCNC: 62 MG/DL (ref 0–150)
UA COMPLETE W REFLEX CULTURE PNL UR: ABNORMAL
UA DIPSTICK W REFLEX MICRO PNL UR: YES
URN SPEC COLLECT METH UR: ABNORMAL
UROBILINOGEN UR STRIP-ACNC: 0.2 E.U./DL
VLDLC SERPL CALC-MCNC: 12 MG/DL
WBC # BLD AUTO: 7 K/UL (ref 4–11)
WBC # BLD AUTO: 7.3 K/UL (ref 4–11)
WBC #/AREA URNS HPF: ABNORMAL /HPF (ref 0–5)

## 2024-10-11 PROCEDURE — 85027 COMPLETE CBC AUTOMATED: CPT

## 2024-10-11 PROCEDURE — 6370000000 HC RX 637 (ALT 250 FOR IP): Performed by: EMERGENCY MEDICINE

## 2024-10-11 PROCEDURE — 36415 COLL VENOUS BLD VENIPUNCTURE: CPT

## 2024-10-11 PROCEDURE — 6370000000 HC RX 637 (ALT 250 FOR IP)

## 2024-10-11 PROCEDURE — 95713 VEEG 2-12 HR CONT MNTR: CPT

## 2024-10-11 PROCEDURE — 93005 ELECTROCARDIOGRAM TRACING: CPT | Performed by: EMERGENCY MEDICINE

## 2024-10-11 PROCEDURE — 99285 EMERGENCY DEPT VISIT HI MDM: CPT

## 2024-10-11 PROCEDURE — 2580000003 HC RX 258: Performed by: EMERGENCY MEDICINE

## 2024-10-11 PROCEDURE — 99291 CRITICAL CARE FIRST HOUR: CPT | Performed by: STUDENT IN AN ORGANIZED HEALTH CARE EDUCATION/TRAINING PROGRAM

## 2024-10-11 PROCEDURE — 83036 HEMOGLOBIN GLYCOSYLATED A1C: CPT

## 2024-10-11 PROCEDURE — 99291 CRITICAL CARE FIRST HOUR: CPT

## 2024-10-11 PROCEDURE — 95700 EEG CONT REC W/VID EEG TECH: CPT

## 2024-10-11 PROCEDURE — 85025 COMPLETE CBC W/AUTO DIFF WBC: CPT

## 2024-10-11 PROCEDURE — 80053 COMPREHEN METABOLIC PANEL: CPT

## 2024-10-11 PROCEDURE — 82077 ASSAY SPEC XCP UR&BREATH IA: CPT

## 2024-10-11 PROCEDURE — 70450 CT HEAD/BRAIN W/O DYE: CPT

## 2024-10-11 PROCEDURE — 93010 ELECTROCARDIOGRAM REPORT: CPT | Performed by: INTERNAL MEDICINE

## 2024-10-11 PROCEDURE — 2500000003 HC RX 250 WO HCPCS

## 2024-10-11 PROCEDURE — 96365 THER/PROPH/DIAG IV INF INIT: CPT

## 2024-10-11 PROCEDURE — 6360000002 HC RX W HCPCS: Performed by: EMERGENCY MEDICINE

## 2024-10-11 PROCEDURE — 2580000003 HC RX 258

## 2024-10-11 PROCEDURE — 80048 BASIC METABOLIC PNL TOTAL CA: CPT

## 2024-10-11 PROCEDURE — 83735 ASSAY OF MAGNESIUM: CPT

## 2024-10-11 PROCEDURE — 6360000002 HC RX W HCPCS: Performed by: STUDENT IN AN ORGANIZED HEALTH CARE EDUCATION/TRAINING PROGRAM

## 2024-10-11 PROCEDURE — 96367 TX/PROPH/DG ADDL SEQ IV INF: CPT

## 2024-10-11 PROCEDURE — 72125 CT NECK SPINE W/O DYE: CPT

## 2024-10-11 PROCEDURE — 81001 URINALYSIS AUTO W/SCOPE: CPT

## 2024-10-11 PROCEDURE — 85610 PROTHROMBIN TIME: CPT

## 2024-10-11 PROCEDURE — 80061 LIPID PANEL: CPT

## 2024-10-11 PROCEDURE — 6360000002 HC RX W HCPCS

## 2024-10-11 PROCEDURE — 71250 CT THORAX DX C-: CPT

## 2024-10-11 PROCEDURE — 51798 US URINE CAPACITY MEASURE: CPT

## 2024-10-11 PROCEDURE — 85730 THROMBOPLASTIN TIME PARTIAL: CPT

## 2024-10-11 PROCEDURE — 1200000000 HC SEMI PRIVATE

## 2024-10-11 PROCEDURE — 92610 EVALUATE SWALLOWING FUNCTION: CPT

## 2024-10-11 RX ORDER — MAGNESIUM SULFATE 1 G/100ML
1000 INJECTION INTRAVENOUS PRN
Status: DISCONTINUED | OUTPATIENT
Start: 2024-10-11 | End: 2024-10-11

## 2024-10-11 RX ORDER — FLUTICASONE PROPIONATE 50 MCG
2 SPRAY, SUSPENSION (ML) NASAL DAILY
Status: DISCONTINUED | OUTPATIENT
Start: 2024-10-11 | End: 2024-10-11

## 2024-10-11 RX ORDER — FUROSEMIDE 40 MG/1
40 TABLET ORAL DAILY
Status: DISCONTINUED | OUTPATIENT
Start: 2024-10-11 | End: 2024-10-16 | Stop reason: HOSPADM

## 2024-10-11 RX ORDER — LEVETIRACETAM 500 MG/5ML
500 INJECTION, SOLUTION, CONCENTRATE INTRAVENOUS EVERY 12 HOURS
Status: DISCONTINUED | OUTPATIENT
Start: 2024-10-11 | End: 2024-10-16 | Stop reason: HOSPADM

## 2024-10-11 RX ORDER — LABETALOL HYDROCHLORIDE 5 MG/ML
10 INJECTION, SOLUTION INTRAVENOUS EVERY 4 HOURS PRN
Status: DISCONTINUED | OUTPATIENT
Start: 2024-10-11 | End: 2024-10-16 | Stop reason: HOSPADM

## 2024-10-11 RX ORDER — SODIUM CHLORIDE 9 MG/ML
INJECTION, SOLUTION INTRAVENOUS PRN
Status: DISCONTINUED | OUTPATIENT
Start: 2024-10-11 | End: 2024-10-16 | Stop reason: HOSPADM

## 2024-10-11 RX ORDER — LEVETIRACETAM 500 MG/5ML
250 INJECTION, SOLUTION, CONCENTRATE INTRAVENOUS EVERY 12 HOURS
Status: DISCONTINUED | OUTPATIENT
Start: 2024-10-11 | End: 2024-10-11

## 2024-10-11 RX ORDER — SODIUM CHLORIDE 0.9 % (FLUSH) 0.9 %
5-40 SYRINGE (ML) INJECTION EVERY 12 HOURS SCHEDULED
Status: DISCONTINUED | OUTPATIENT
Start: 2024-10-11 | End: 2024-10-16 | Stop reason: HOSPADM

## 2024-10-11 RX ORDER — AMIODARONE HYDROCHLORIDE 200 MG/1
50 TABLET ORAL DAILY
Status: DISCONTINUED | OUTPATIENT
Start: 2024-10-11 | End: 2024-10-16 | Stop reason: HOSPADM

## 2024-10-11 RX ORDER — LANOLIN ALCOHOL/MO/W.PET/CERES
1000 CREAM (GRAM) TOPICAL DAILY
Status: DISCONTINUED | OUTPATIENT
Start: 2024-10-11 | End: 2024-10-16 | Stop reason: HOSPADM

## 2024-10-11 RX ORDER — METOPROLOL SUCCINATE 50 MG/1
50 TABLET, EXTENDED RELEASE ORAL DAILY
Status: DISCONTINUED | OUTPATIENT
Start: 2024-10-11 | End: 2024-10-15

## 2024-10-11 RX ORDER — SODIUM CHLORIDE 9 MG/ML
50 INJECTION, SOLUTION INTRAVENOUS ONCE
Status: COMPLETED | OUTPATIENT
Start: 2024-10-11 | End: 2024-10-11

## 2024-10-11 RX ORDER — ONDANSETRON 2 MG/ML
4 INJECTION INTRAMUSCULAR; INTRAVENOUS EVERY 6 HOURS PRN
Status: DISCONTINUED | OUTPATIENT
Start: 2024-10-11 | End: 2024-10-16 | Stop reason: HOSPADM

## 2024-10-11 RX ORDER — ATORVASTATIN CALCIUM 80 MG/1
80 TABLET, FILM COATED ORAL DAILY
Status: DISCONTINUED | OUTPATIENT
Start: 2024-10-11 | End: 2024-10-16 | Stop reason: HOSPADM

## 2024-10-11 RX ORDER — METOPROLOL TARTRATE 1 MG/ML
5 INJECTION, SOLUTION INTRAVENOUS EVERY 4 HOURS PRN
Status: DISCONTINUED | OUTPATIENT
Start: 2024-10-11 | End: 2024-10-11

## 2024-10-11 RX ORDER — HYDRALAZINE HYDROCHLORIDE 20 MG/ML
10 INJECTION INTRAMUSCULAR; INTRAVENOUS ONCE
Status: DISCONTINUED | OUTPATIENT
Start: 2024-10-11 | End: 2024-10-11 | Stop reason: HOSPADM

## 2024-10-11 RX ORDER — SODIUM CHLORIDE 0.9 % (FLUSH) 0.9 %
5-40 SYRINGE (ML) INJECTION PRN
Status: DISCONTINUED | OUTPATIENT
Start: 2024-10-11 | End: 2024-10-16 | Stop reason: HOSPADM

## 2024-10-11 RX ORDER — SODIUM CHLORIDE 9 MG/ML
INJECTION, SOLUTION INTRAVENOUS CONTINUOUS
Status: DISCONTINUED | OUTPATIENT
Start: 2024-10-11 | End: 2024-10-11

## 2024-10-11 RX ORDER — LIDOCAINE 4 G/G
1 PATCH TOPICAL DAILY
Status: DISCONTINUED | OUTPATIENT
Start: 2024-10-11 | End: 2024-10-16 | Stop reason: HOSPADM

## 2024-10-11 RX ORDER — ONDANSETRON 4 MG/1
4 TABLET, ORALLY DISINTEGRATING ORAL EVERY 8 HOURS PRN
Status: DISCONTINUED | OUTPATIENT
Start: 2024-10-11 | End: 2024-10-16 | Stop reason: HOSPADM

## 2024-10-11 RX ORDER — OXYCODONE AND ACETAMINOPHEN 10; 325 MG/1; MG/1
1 TABLET ORAL EVERY 6 HOURS PRN
Status: DISCONTINUED | OUTPATIENT
Start: 2024-10-11 | End: 2024-10-16 | Stop reason: HOSPADM

## 2024-10-11 RX ORDER — CYANOCOBALAMIN (VITAMIN B-12) 1000 MCG
1000 TABLET, EXTENDED RELEASE ORAL DAILY
Status: DISCONTINUED | OUTPATIENT
Start: 2024-10-11 | End: 2024-10-11 | Stop reason: SDUPTHER

## 2024-10-11 RX ORDER — VITAMIN B COMPLEX
1000 TABLET ORAL DAILY
Status: DISCONTINUED | OUTPATIENT
Start: 2024-10-11 | End: 2024-10-16 | Stop reason: HOSPADM

## 2024-10-11 RX ORDER — OXYCODONE HYDROCHLORIDE 5 MG/1
10 TABLET ORAL ONCE
Status: COMPLETED | OUTPATIENT
Start: 2024-10-11 | End: 2024-10-11

## 2024-10-11 RX ADMIN — OXYCODONE 10 MG: 5 TABLET ORAL at 07:10

## 2024-10-11 RX ADMIN — FAMOTIDINE 20 MG: 10 INJECTION, SOLUTION INTRAVENOUS at 10:30

## 2024-10-11 RX ADMIN — OXYCODONE AND ACETAMINOPHEN 1 TABLET: 10; 325 TABLET ORAL at 16:34

## 2024-10-11 RX ADMIN — SODIUM CHLORIDE 50 ML: 9 INJECTION, SOLUTION INTRAVENOUS at 01:45

## 2024-10-11 RX ADMIN — LEVETIRACETAM 500 MG: 100 INJECTION INTRAVENOUS at 15:03

## 2024-10-11 RX ADMIN — CYANOCOBALAMIN TAB 1000 MCG 1000 MCG: 1000 TAB at 10:30

## 2024-10-11 RX ADMIN — SODIUM CHLORIDE, PRESERVATIVE FREE 10 ML: 5 INJECTION INTRAVENOUS at 10:36

## 2024-10-11 RX ADMIN — LEVETIRACETAM 1000 MG: 100 INJECTION INTRAVENOUS at 02:19

## 2024-10-11 RX ADMIN — SODIUM CHLORIDE: 9 INJECTION, SOLUTION INTRAVENOUS at 12:27

## 2024-10-11 RX ADMIN — LABETALOL HYDROCHLORIDE 10 MG: 5 INJECTION, SOLUTION INTRAVENOUS at 09:47

## 2024-10-11 RX ADMIN — Medication 1000 UNITS: at 10:30

## 2024-10-11 RX ADMIN — OXYCODONE AND ACETAMINOPHEN 1 TABLET: 10; 325 TABLET ORAL at 23:12

## 2024-10-11 RX ADMIN — ATORVASTATIN CALCIUM 80 MG: 80 TABLET, FILM COATED ORAL at 10:30

## 2024-10-11 RX ADMIN — PROTHROMBIN COMPLEX CONCENTRATE (HUMAN) 2000 UNITS: 25.5; 16.5; 24; 22; 22; 26 POWDER, FOR SOLUTION INTRAVENOUS at 01:35

## 2024-10-11 ASSESSMENT — PAIN DESCRIPTION - LOCATION
LOCATION: BACK
LOCATION: BACK

## 2024-10-11 ASSESSMENT — PAIN SCALES - GENERAL
PAINLEVEL_OUTOF10: 10
PAINLEVEL_OUTOF10: 5
PAINLEVEL_OUTOF10: 7
PAINLEVEL_OUTOF10: 0
PAINLEVEL_OUTOF10: 8
PAINLEVEL_OUTOF10: 7
PAINLEVEL_OUTOF10: 9

## 2024-10-11 ASSESSMENT — PAIN DESCRIPTION - ORIENTATION
ORIENTATION: LOWER;MID
ORIENTATION: LOWER

## 2024-10-11 ASSESSMENT — PAIN DESCRIPTION - FREQUENCY
FREQUENCY: CONTINUOUS
FREQUENCY: CONTINUOUS

## 2024-10-11 ASSESSMENT — PAIN DESCRIPTION - DESCRIPTORS
DESCRIPTORS: DISCOMFORT
DESCRIPTORS: ACHING

## 2024-10-11 ASSESSMENT — PAIN - FUNCTIONAL ASSESSMENT
PAIN_FUNCTIONAL_ASSESSMENT: PREVENTS OR INTERFERES SOME ACTIVE ACTIVITIES AND ADLS
PAIN_FUNCTIONAL_ASSESSMENT: PREVENTS OR INTERFERES SOME ACTIVE ACTIVITIES AND ADLS
PAIN_FUNCTIONAL_ASSESSMENT: 0-10

## 2024-10-11 ASSESSMENT — PAIN DESCRIPTION - ONSET
ONSET: ON-GOING
ONSET: ON-GOING

## 2024-10-11 ASSESSMENT — PAIN DESCRIPTION - PAIN TYPE
TYPE: CHRONIC PAIN;ACUTE PAIN
TYPE: CHRONIC PAIN

## 2024-10-11 NOTE — ED NOTES
Patient Oxygen decreased to 89%. Patient placed on 2L nasal cannula. Patient oxygen increased to 93%. MD Dials aware.

## 2024-10-11 NOTE — ED PROVIDER NOTES
[facility]  ED  EMERGENCY DEPARTMENTENCOUNTER      Pt Name: Naty Dykes  MRN: [id number]  Birthdate 1935  Date ofevaluation: 10/11/2024  Provider: Milli Collins MD    CHIEF COMPLAINT       Chief Complaint   Patient presents with    Fall     Pt arrives from EMS initial call was \"daughter thought she was having stroke\". Per ems patient was unable to move left side arm or leg. Patient is alert and oriented at time of triage. Patient NIH with  at bedside 0. Patient reports fall two days ago hitting left side of back and back of head. Patient endorses pain on left side. Patient is on blood thinners.          HISTORY OF PRESENT ILLNESS   (Location/Symptom, Timing/Onset,Context/Setting, Quality, Duration, Modifying Factors, Severity)  Note limiting factors.   Naty Dykes is a 88 y.o. female  who  has a past medical history of Arthritis, Blood circulation, collateral, CAD (coronary artery disease), CHF (congestive heart failure) ([facility]), Confusion, GERD (gastroesophageal reflux disease), History of heart artery stent, Hyperlipidemia, Hypertension, Mitral valve prolapse, Myocardial infarct, old, and Thyroid disease.  and paroxysmal atrial fibrillation on Eliquis who presents to the emergency department complaining of back pain and left rib pain after mechanical fall 2 days ago.  Patient states that she fell hitting her left side her back, and head on 10/9/2024.  She denies LOC.  She states that she is having some mild pain of her right ribs, headache, and low back pain after the fall.  She states that she woke up on 10/10/24 at 0900 and felt normal.  She states around 1000, she started having worsening back pain that made it difficult to move her legs.  Her daughter felt that the patient was having a stroke and was not moving the left side of her body, so called EMS.  EMS stated that initially the patient was having difficulty moving her left side but on the way to the hospital she started to

## 2024-10-11 NOTE — PROGRESS NOTES
CONTINUOUS EEG    Name:  Naty Dykes  Medical Record Number:  [id number]  Age: 88 y.o.   Gender: female  : 1935  Today's Date:  10/11/2024  Room:  [address] [place] [address]  Vital Signs   BP (!) 146/60   Pulse 59   Temp 98.4 °F (36.9 °C) (Oral)   Resp 12   Ht 1.575 m (5' 2\")   Wt 54.6 kg (120 lb 5.9 oz)   SpO2 92%   BMI 22.02 kg/m²           Continuous EEG Testing Start Time:  15:50.      Comments: Mariia  at [facility] contacted 15:54 for monitoring. Dr Reyes at [facility] contacted 16:21 for reading. Impedence on all leads are within normal limits. Today is the initial set up day for cvEEG. Patient head is NOT wrapped. Brennan BOYLE  is aware that this patients cvEEG will be repositioned on 10/12/2024 at 15:50. Brennan BOYLE was notified at 16:22 by this EEG technician. Patient's head was placed on blanket roll upon completion of cvEEG placement.      Plan of Care: Begin monitoring.    Electronically signed by Charu Soto on 10/11/2024 at 4:17 PM

## 2024-10-11 NOTE — H&P
ICU HISTORY AND PHYSICAL       Admit Date:  10/11/2024                            Hospital Day: 0  ICU Day: 0      CC: headache    History obtained from:  chart review, the patient, and family    SUBJECTIVE   HPI:    Ms. Naty Dykes is a 88 y.o. female with a medical hx significant for CVA (), A fib (on eliquis), HFrEF (40-45%), coronary artery disease (s/p PCI ), hyperlipidemia, CKD stage 4 and thyroid disease (s/p thyroidectomy), otherwise as listed in the MHx table below, who presented from home to the [facility] ED on 10/10 with seizures    Daughter and granddaughter at bedside to aid with history. Patient reportedly fell a week ago getting out of the car and hit the R side her head but had no acute complaints. Later that day patient went to [facility] and her family had to pick her up due to emesis episode she had while at [facility]. Her family states this is not in the norm for her. Family also noted she has been more confused in the past week and has been reportedly hallucinating. She claims to be seeing her brother around the home, who has been  for 25 years now. Patient also having memory decline and unable to follow a conversation, which has worsened in the past week. Then last night as she was getting into bed she began having a seizure episode that lasted about 15-20secs, which prompted family to take her to the ED.     On my interview with patient, she is A&O x3. Complaining of mild headache on R side of head this AM. Also, complaining of some vision changes but she is unsure if they are acute or chronic. Complaining of R rib pain and back pain as well, which is chronic. Denies fever, chills, chest pain, SOB, vomiting.    Code status discussed in length and patient would like to be FULL CODE.    ED Course:  On arrival to the ED, she was found to be AF, HR 97, /82, 95% RA.  Labs were significant for:  H/H 9.7/29.0  PT 16.2/ INR 1.28  Na 130, Cl 91, Cr 1.7  Imaging   EKG NSR with  80mg   - PT/OT/ SLP  - NPO, advance diet as tolerated pending SLP eval    #Seizures, suspected  Patient admitted after witnessed seizures at home. Concern that patient may be continuing to having seizures.  - NCC consulted, appreciate recs  - cEEG  - keppra 250mg IV BID  - seizure precautions    #Hx of CVA (2022)  - Hold ASA  - continue lipitor 80mg    CV  #HTN  #HLD  Patient hypertensive on arrival. SBP >170s.  - SBP <160mmHg  - PRN labetalol q4h  - continue lipitor 80mg  - lipid panel pending    #HFrEF (40-45%), GIIDD  #CAD (s/p PCI 2018)  Echo 8/2022 shows ejection fraction of 40-45 %.  - continue home lasix 40mg    #pAF (on eliquis)  Patient on 5mg eliquis at home. Patient given reversal with [facility] at [facility].  - hold eliquis  - continue amio 50mg and metoprolol succ 50mg daily    Nephro  #CKD IV  Patient has a hx of CKD stage 4. Follows with Dr. Kwan. Cr 1.7 on admission. Baseline appears to be around 1.5-1.7.  - daily RFP    MSK  #Arthritis  Patient has chronic back pain and knee pain. Has had previous surgery on hip and knee. Takes percocet 10mg at home PRN for pain.  - percocet 10mg PRN  - lidocaine patch    Glycemic goal:  140-180 mg/dL  Infusions: no continuous infusions  Abx: N/A   Diet:  Diet NPO  GI PPx:  Pepcid 20 mg IV bid  Bowel Regimen: N/A  DVT PPx: SCDs    Code Status:  Full Code  Disposition:   Prior to admission:  From [facility]  Current:  ICU  At discharge:  MAMIE Wagner MD, PGY-1  Internal Medicine Resident  Contact via Findline

## 2024-10-11 NOTE — PROGRESS NOTES
[facility] - Clinical Pharmacy Note - Renal Dosing    Famotidine ordered for SUP. Per [facility] Renal Dose Adjustment Policy, Famotidine will be changed to 20 mg IV daily.     Estimated Creatinine Clearance: Estimated Creatinine Clearance: 19 mL/min (A) (based on SCr of 1.6 mg/dL (H)).  Dialysis Status, AGUILA, CKD: SCr today 1.6  BMI: Body mass index is 22.02 kg/m².    Rationale for Adjustment: Agent is renally eliminated.    Pharmacy will continue to monitor renal function and adjust dose as necessary.      Please call with any questions.    Pavithra Trimble PharmD., BCPS   10/11/2024 3:31 PM  Wireless: [phone]

## 2024-10-11 NOTE — PLAN OF CARE
Intervention: Swallow Evaluation/treatment  SLP completed evaluation. Please refer to notes in EMR.    Electronically signed by:  Queenie Braragan M.A., CCC-SLP  [id number]  Speech-Language Pathologist  Pg #: [phone]

## 2024-10-11 NOTE — PROGRESS NOTES
Patient admitted to room 4520 from [facility]. Oriented to room & call light. Respirations easy unlabored. BP elevated, Labetalol given PRN for blood pressure of 169/80.  Neuro assessment completed, in MAR. Bed in lowest position and locked. Exit alarms in place. Non slip socks on. ID bracelet on and correct per patient verbally reporting name and date of birth. Call light and needed items within reach.

## 2024-10-11 NOTE — CONSULTS
Neurocritical Care Consultation Note      Patient: Naty Dykes MRN: [id number]    YOB: 1935  Age: 88 y.o.  Sex: female   Unit: [facility] ICU TOWER Room/Bed: 4520/4520-01 Location: [facility]    Date of Consultation: 10/11/2024  Date of Admission: 10/11/2024  8:25 AM ( LOS: 0 days )  Admitting Physician: FAISAL ADAMS    Primary Care Physician: Debra Shahid DO   Consult Requested By: ICU     Reason for Consult: \"SDH\"    ASSESSMENT & RECOMMENDATIONS     Assessment  88F with PMH of AFIb on Eliquis, CAD with stent who presented with traumatic R SDH and SAH. Repeat CT head was stable  Initially, patient presented with 3/5 in LUE, patient was more lethargic in the afternoon and LUE WDP. Repeat CT head was stable. Her motor strength also improved after the repeat head CT head.  Suspect subclinical SZ.     Recommendations  Neuro q1  Keppra 500 BID  cEEG  SBP < 160  Pt> 100 and INR< 1.4  PT/OT      SUBJECTIVE     Chief Complaint:   SZ-like activity    History of Present Illness:  Naty Dykes is a 88 y.o. F with PMH of Afib on Eliquis, arthritis, CAD with stent, CHF, Stroke in 2022, HLD, MVP, who presented to OSH with SZ-like activity. Patient was getting out of car last Thursday, she felt pop in her right knee. She hit her right sided of her head. Denies LOC, no chest pain, shortness of breath, dizziness or weakness prior that episode. Since then, family stated that she had some visual hallucinations. Last night, patient had one episode of SZ-like activity for a minute and family decided to call EMS. Per EMS, patient initially had difficulty of moving LUE but improved upon arrival to ED. CT shows R SDH with SAH. PCC was given for eliquis reversal. 1g keppra was given    Patient has history of stroke in 2022, and developed double vision and decreased visual field since.   Past Medical History:     has a past medical history of Arthritis, Blood circulation, collateral, CAD (coronary  status: A&O x3  -Memory: Recent & remote memory intact  -Attention: Normal  -Fund of Knowledge: Good  -Speech & Language: no aphasia; no dysarthria  -Cranial nerves: pupils 4mm->2mm bilaterally; decreased L visual field ( chronic) ; unable to visualize fundi;  no nystagmus; dysconjugate gaze, limited R eye adduction  sensation intact V1, V2, V3; no facial asymmetry; hearing intact bilaterally; palate elevates symmetrically; SCMs & trapezii intact bilaterally; tongue midline  -Sensory: intact to lt touch throughout  -Motor:   RUE: 5/5,   RLE: limited exam due to knee pain and hx of tibia fx, 5/5 in dorsi/planta flexion   LUE: 3/5,   LLE: limited exam due to hip pain and hx of fx, 5/5 in dorsi/planta flexion   -Tone: Normal throughout  -Coordination: FNF intact  -Gait & Station: deferred for pt safety  -Other: no adventitious movements noted    Imaging:  All reports below personally reviewed & actual images reviewed where indicated. Pertinent positives & negatives are addressed in Assessment & Plan section of note  No orders to display     Laboratory Review:   All results below personally reviewed. Pertinent positives & negatives are addressed in Assessment & Plan section of note  Recent Results (from the past 72 hour(s))   POCT Glucose    Collection Time: 10/11/24 12:26 AM   Result Value Ref Range    POC Glucose 115 (H) 70 - 99 mg/dl    Performed on ACCU-CHEK    CBC with Auto Differential    Collection Time: 10/11/24 12:45 AM   Result Value Ref Range    WBC 7.3 4.0 - 11.0 K/uL    RBC 3.23 (L) 4.00 - 5.20 M/uL    Hemoglobin 9.7 (L) 12.0 - 16.0 g/dL    Hematocrit 29.0 (L) 36.0 - 48.0 %    MCV 89.7 80.0 - 100.0 fL    MCH 30.0 26.0 - 34.0 pg    MCHC 33.4 31.0 - 36.0 g/dL    RDW 13.7 12.4 - 15.4 %    Platelets 252 135 - 450 K/uL    MPV 9.3 5.0 - 10.5 fL    Neutrophils % 63.2 %    Lymphocytes % 22.2 %    Monocytes % 12.3 %    Eosinophils % 1.5 %    Basophils % 0.8 %    Neutrophils Absolute 4.6 1.7 - 7.7 K/uL    Lymphocytes  Oral Daily    [Held by provider] amiodarone  50 mg Oral Daily    vitamin B-12  1,000 mcg Oral Daily    fluticasone  2 spray Each Nostril Daily    [Held by provider] furosemide  40 mg Oral Daily    Vitamin D  1,000 Units Oral Daily    levETIRAcetam  250 mg IntraVENous Q12H       Continuous Infusions:  sodium chloride         PRN Meds:  sodium chloride flush, 5-40 mL, PRN  sodium chloride, , PRN  ondansetron, 4 mg, Q8H PRN   Or  ondansetron, 4 mg, Q6H PRN  labetalol, 10 mg, Q4H PRN              Discussed at length with patient, family and nursing staff    Time: CCT 50 mins     [place] ( Elias Alford DO  Neurocritical Care  [phone]  October 11, 2024

## 2024-10-11 NOTE — CONSULTS
Called [facility] access @ 0324 for [facility] Neurosurgery  PER:  Milli Collins MD  RE:  SDH  Connected to Neuro critical care NP @ 0330  Initiated transfer, service not available (Neurosurgery)  0402- phone call from Dr. Jacob called from [facility] patient  Bed assignment: 4520  N2N: [phone]  Transport being arranged. [employer] eta @ 8493

## 2024-10-11 NOTE — PROGRESS NOTES
Speech Language Pathology  Facility/Department:[facility] ICU  Dysphagia Evaluation  Name: Naty Dykes  : 1935  MRN: [id number]                                                       Patient Diagnosis(es):   Patient Active Problem List    Diagnosis Date Noted    AGUILA (acute kidney injury) ([facility]) 2014    Fall from standing 2022    History of CVA (cerebrovascular accident) 2022    Closed displaced intertrochanteric fracture of right femur ([facility]) 2022    Hypokalemia     Hyponatremia     Near syncope 2022    Bradycardia 2022    On amiodarone therapy     On continuous oral anticoagulation     Atrial fibrillation ([facility]) 2022    Subdural hematoma 10/11/2024    Subarachnoid hemorrhage ([facility]) 10/11/2024    Chronic HFrEF (heart failure with reduced ejection fraction) ([facility]) 2024    Secondary hypercoagulable state ([facility]) 09/10/2024    Presence of heart assist device ([facility]) 10/10/2023    Encounter for loop recorder check 2022    Stroke of unknown etiology ([facility]) 2022    Visual field defect 2022    Chronic kidney disease, stage IV (severe) ([facility]) 2021    Confusion 2020    PVD (posterior vitreous detachment), both eyes 2019    Posterior subcapsular polar age-related cataract of both eyes 2019    Abnormal myocardial perfusion study 2018    Ischemic cardiomyopathy 2018    Cardiomyopathy ([facility]) 2018    Palpitations 2018    V-tach ([facility]) 2018    GERD (gastroesophageal reflux disease) 01/10/2017    Closed stable burst fracture of first lumbar vertebra ([facility]) 2015    Primary insomnia 2014    Mixed hyperlipidemia 10/29/2014    Gout 2014    Pain in joint, hand 2014    Renal insufficiency 2014    Essential hypertension 2013    Chronic low back pain 2013    Other and unspecified angina pectoris 2011    Coronary atherosclerosis of native coronary artery 2011    Other  Status/oxygen requirements: 1L O2 via nasal cannula   Vision/Hearing: wears glasses, no [place]   Patient Complaint: none   Patient Positioning: seated upright in bed  Prior Level of Function: Per family report pt lives with family. Pt was partially independent with all IADLs and ADLs, with some assistance as needed. Consumes softer foods at baseline 2/2 reduced dentition.    Prior Dysphagia History:   None per pt or daughter present. Per chart review pt was seen by this SLP department on 3/19/22 with swallow evaluation completed and rec's for easy to chew solids and thin liquids.     Bedside Swallowing Evaluation Impression (10/11)  Pt presents with mild-moderate oral dysphagia, likely in the setting of reduced dentition and inconsistent alertness/lethargy. Pt seated upright asleep upon start of evaluation. Daughter and RN confirmed fluctuating alertness throughout today. Team aware. Pt did eventually awake enough to participate in trials of PO. Pt required feed assist 2/2 difficulty locating utensils when SLP placed in front of pt's visual field. Pt demonstrated slowed oral manipulation and mastication. Cues throughout to remain attentive to bolus. Anterior mastication and reduced bolus control resulted in anterior loss of partially masticated solid. Min residue remaining throughout with regular solids. Multiple liquid washes provided to clear. Pt appeared to swallow all PO. X2 instances of weak throat clearing. No additional overt s/s associated with aspiration. No wet vocal quality and no globus sensation indicated by pt. WBC is currently WNL. Based on assessment SLP recommends oral diet initiation of Minced and Moist Solids and Thin Liquids with strict 1:1 feed assist ONLY when awake/alert to safely accept PO. Pt and daughter in agreement and demonstrated comprehension. Relayed to RN and MD. Ongoing SLP services indicated to target dysphagia and determine need for further assessment via MBS.     Instrumental

## 2024-10-11 NOTE — PROGRESS NOTES
Elia Veloz called to let this EEG tech know that SLP will be working with pt for about 30mins before I arrive to setup cvEEG.

## 2024-10-11 NOTE — ACP (ADVANCE CARE PLANNING)
ADVANCED CARE PLANNING    Name:Naty Dykes       :  1935              MRN:  [id number]      Purpose of Encounter: Advanced care planning in light of subdural hematoma.  Parties in attendance: :Naty GONZALES Marie Dykes MD, Family members: Daughter.  Decisional Capacity:Yes    Diagnosis: Principal Problem:    Subdural hematoma  Active Problems:    Subarachnoid hemorrhage (HCC)    Seizure (HCC)    Traumatic subdural hematoma without loss of consciousness  Resolved Problems:    * No resolved hospital problems. *    Patients Medical Story: Patient is a 88-year-old woman with past medical history of coronary artery disease, GERD, hyperlipidemia, paroxysmal A-fib, hypothyroidism, remote history of CVA who presents with possible seizure after a mechanical fall.  Patient is admitted with a subdural hematoma.  Goals of care discussed with the patient and her daughter.  Patient has 2 daughters both of which are the POA.  When asked about full code and DNR patient states that she would like everything to be done even if that means to bring her back if her heart stops.  Patient wants intubation and CPR.  Patient states if she is in a vegetative state her family is aware that they should withdraw care  Goals of Care Determinations: Patient wishes to focus on see above  Plan: Will notify Debra Shahid DO of change in care plan. Will look at further interventions as needed.   Code Status: At this time patient wishes to be Full Code  Time Spent with Patient: 20 minutes      Electronically signed by Marie Brock MD on 10/11/2024 at 3:12 PM  Thank you Debra Shahid DO for the opportunity to be involved in this patient's care.

## 2024-10-11 NOTE — PLAN OF CARE
[facility]  Transfer center PS received  Case reviewed with ER physician  Reason for Transfer:  Naty Dykes 88 y.o. female with seizure like activity on presentation- imaging shows SDH- NS recommend ICU admission and transfer to [facility]. Patient accepted and ICU resident team notified.      Patient has been accepted for transfer to [facility] [place].   Once patient arrive please page ON CALL HOSPITALIST so patient can be seen.   If unable to reach physician on [employer] please call hospitalist phone [phone])     PCP: Debra Shahid DO     Thanks  FAISAL ADAMS MD  Hospitalist

## 2024-10-11 NOTE — PROGRESS NOTES
4 Eyes Skin Assessment     NAME:  Naty Dykes  YOB: 1935  MEDICAL RECORD NUMBER:  [id number]    The patient is being assessed for  Admission    I agree that at least one RN has performed a thorough Head to Toe Skin Assessment on the patient. ALL assessment sites listed below have been assessed.      Areas assessed by both nurses:    Head, Face, Ears, Shoulders, Back, Chest, Arms, Elbows, Hands, Sacrum. Buttock, Coccyx, Ischium, Legs. Feet and Heels, and Under Medical Devices         Does the Patient have a Wound? Yes, abrasion to L upper hip & L lower leg, bruise on R hip, blanchable and nonblanchable redness on buttocks       Michel Prevention initiated by RN: Yes  Wound Care Orders initiated by RN: Yes    Pressure Injury (Stage 3,4, Unstageable, DTI, NWPT, and Complex wounds) if present, place Wound referral order by RN under : No    New Ostomies, if present place, Ostomy referral order under : No     Nurse 1 eSignature: Electronically signed by Marybeth Beck RN on 10/11/24 at 10:15 AM EDT    **SHARE this note so that the co-signing nurse can place an eSignature**    Nurse 2 eSignature: Electronically signed by Geovanna Maurer RN on 10/11/24 at 6:28 PM EDT

## 2024-10-11 NOTE — PLAN OF CARE
Problem: Chronic Conditions and Co-morbidities  Goal: Patient's chronic conditions and co-morbidity symptoms are monitored and maintained or improved  Outcome: Progressing  Flowsheets (Taken 10/11/2024 0838)  Care Plan - Patient's Chronic Conditions and Co-Morbidity Symptoms are Monitored and Maintained or Improved:   Monitor and assess patient's chronic conditions and comorbid symptoms for stability, deterioration, or improvement   Collaborate with multidisciplinary team to address chronic and comorbid conditions and prevent exacerbation or deterioration   Update acute care plan with appropriate goals if chronic or comorbid symptoms are exacerbated and prevent overall improvement and discharge     Problem: Discharge Planning  Goal: Discharge to home or other facility with appropriate resources  Outcome: Progressing  Flowsheets (Taken 10/11/2024 0838)  Discharge to home or other facility with appropriate resources:   Identify barriers to discharge with patient and caregiver   Arrange for needed discharge resources and transportation as appropriate   Identify discharge learning needs (meds, wound care, etc)   Arrange for interpreters to assist at discharge as needed   Refer to discharge planning if patient needs post-hospital services based on physician order or complex needs related to functional status, cognitive ability or social support system     Problem: Skin/Tissue Integrity  Goal: Absence of new skin breakdown  Description: 1.  Monitor for areas of redness and/or skin breakdown  2.  Assess vascular access sites hourly  3.  Every 4-6 hours minimum:  Change oxygen saturation probe site  4.  Every 4-6 hours:  If on nasal continuous positive airway pressure, respiratory therapy assess nares and determine need for appliance change or resting period.  Outcome: Progressing     Problem: Safety - Adult  Goal: Free from fall injury  Outcome: Progressing     Problem: ABCDS Injury Assessment  Goal: Absence of physical

## 2024-10-11 NOTE — ED NOTES
Call placed to [facility] radiology about pending CT results. [facility] states \"they are being dictated now and should result in about 15 minutes.\"

## 2024-10-11 NOTE — CONSULTS
10) 100 MG CAPS Take by mouth daily          Current Facility-Administered Medications   Medication Dose Route Frequency Provider Last Rate Last Admin    sodium chloride flush 0.9 % injection 5-40 mL  5-40 mL IntraVENous 2 times per day Zoe Wagner MD   10 mL at 10/11/24 1036    sodium chloride flush 0.9 % injection 5-40 mL  5-40 mL IntraVENous PRN Zoe Wagner MD        0.9 % sodium chloride infusion   IntraVENous PRN Zoe Wagner MD        ondansetron (ZOFRAN-ODT) disintegrating tablet 4 mg  4 mg Oral Q8H PRN Zoe Wagner MD        Or    ondansetron (ZOFRAN) injection 4 mg  4 mg IntraVENous Q6H PRN Zoe Wagner MD        famotidine (PEPCID) 20 mg in sodium chloride (PF) 0.9 % 10 mL injection  20 mg IntraVENous BID Zoe Wagner MD   20 mg at 10/11/24 1030    atorvastatin (LIPITOR) tablet 80 mg  80 mg Oral Daily Zoe Wagner MD   80 mg at 10/11/24 1030    metoprolol succinate (TOPROL XL) extended release tablet 50 mg  50 mg Oral Daily Zoe Wagner MD        amiodarone (CORDARONE) tablet 50 mg  50 mg Oral Daily Zoe Wagner MD        furosemide (LASIX) tablet 40 mg  40 mg Oral Daily Zoe Wagner MD        Vitamin D (CHOLECALCIFEROL) tablet 1,000 Units  1,000 Units Oral Daily Zoe Wagner MD   1,000 Units at 10/11/24 1030    labetalol (NORMODYNE;TRANDATE) injection 10 mg  10 mg IntraVENous Q4H PRN Zoe Wagner MD   10 mg at 10/11/24 0947    levETIRAcetam (KEPPRA) injection 250 mg  250 mg IntraVENous Q12H Elis Alford DO        oxyCODONE-acetaminophen (PERCOCET)  MG per tablet 1 tablet  1 tablet Oral Q6H PRN Zoe Wagner MD        vitamin B-12 (CYANOCOBALAMIN) tablet 1,000 mcg  1,000 mcg Oral Daily Zoe Wagner MD   1,000 mcg at 10/11/24 1030    lidocaine 4 % external patch 1 patch  1 patch TransDERmal Daily Zoe Wagner MD   1 patch at 10/11/24 1039    0.9 % sodium chloride infusion   IntraVENous Continuous Zoe Wagner MD 75 mL/hr at 10/11/24 1227 New Bag at 10/11/24 1227     03/30/2022    Stroke of unknown etiology ([facility]) 03/19/2022    Visual field defect 03/18/2022    Chronic kidney disease, stage IV (severe) ([facility]) 08/20/2021    Confusion 08/31/2020    PVD (posterior vitreous detachment), both eyes 07/02/2019    Posterior subcapsular polar age-related cataract of both eyes 07/02/2019    Abnormal myocardial perfusion study 11/06/2018    Ischemic cardiomyopathy 11/06/2018    Cardiomyopathy ([facility]) 11/06/2018    Palpitations 09/04/2018    V-tach ([facility]) 09/04/2018    GERD (gastroesophageal reflux disease) 01/10/2017    Closed stable burst fracture of first lumbar vertebra ([facility]) 09/30/2015    Primary insomnia 11/24/2014    Mixed hyperlipidemia 10/29/2014    Gout 04/18/2014    Pain in joint, hand 04/18/2014    Renal insufficiency 01/22/2014    Essential hypertension 04/26/2013    Chronic low back pain 02/22/2013    Other and unspecified angina pectoris 06/21/2011    Coronary atherosclerosis of native coronary artery 06/21/2011    Other chronic pulmonary heart diseases 06/21/2011    Mitral valve disorder 06/21/2011       Assessment:  Patient is a 88 y.o. female w/confusion and back pain s/p fall a week ago. Right-sided and anterior extra-axial hemorrhagic fluid collections with mild leftward midline shift and mass effect, and Mild right parietal sulcal subarachnoid hemorrhage seen on CT Head. Hemorrhages are stable on multiple scans.    Plan:  No emergent neurosurgical intervention indicated  Neurologic exams frequency: Defer to NCC  For change in exam MUST contact neurosurgery team along with critical care or primary team  SDH:  - Follow up head CT stable. No neurosurgical intervention indicated and No further imaging unless there is a decline in neurologic exam.  - Neurocritical Care following, appreciate recs  - Follow up in 2 weeks with repeat CT Head  Seizure:  - Patient having waxing and waning neuro exam, concern for focal seizure activity  - Neurocritical Care managing AED's and ordered

## 2024-10-11 NOTE — PROGRESS NOTES
Patient developed changes in neuro assessment. Very drowsy, unable to answer questions and on/off weakness of arms/legs. Dr. Alford notified and at bedside. Stat head CT ordered. Patient being brought to CT now.

## 2024-10-12 LAB
ANION GAP SERPL CALCULATED.3IONS-SCNC: 9 MMOL/L (ref 3–16)
BUN SERPL-MCNC: 20 MG/DL (ref 7–20)
CALCIUM SERPL-MCNC: 8.6 MG/DL (ref 8.3–10.6)
CHLORIDE SERPL-SCNC: 100 MMOL/L (ref 99–110)
CO2 SERPL-SCNC: 27 MMOL/L (ref 21–32)
CREAT SERPL-MCNC: 1.3 MG/DL (ref 0.6–1.2)
DEPRECATED RDW RBC AUTO: 13.5 % (ref 12.4–15.4)
EST. AVERAGE GLUCOSE BLD GHB EST-MCNC: 108.3 MG/DL
GFR SERPLBLD CREATININE-BSD FMLA CKD-EPI: 39 ML/MIN/{1.73_M2}
GLUCOSE SERPL-MCNC: 79 MG/DL (ref 70–99)
HBA1C MFR BLD: 5.4 %
HCT VFR BLD AUTO: 25.5 % (ref 36–48)
HGB BLD-MCNC: 8.4 G/DL (ref 12–16)
MAGNESIUM SERPL-MCNC: 2.2 MG/DL (ref 1.8–2.4)
MCH RBC QN AUTO: 30.4 PG (ref 26–34)
MCHC RBC AUTO-ENTMCNC: 33.1 G/DL (ref 31–36)
MCV RBC AUTO: 91.8 FL (ref 80–100)
PLATELET # BLD AUTO: 227 K/UL (ref 135–450)
PMV BLD AUTO: 9 FL (ref 5–10.5)
POTASSIUM SERPL-SCNC: 4.8 MMOL/L (ref 3.5–5.1)
RBC # BLD AUTO: 2.78 M/UL (ref 4–5.2)
SODIUM SERPL-SCNC: 136 MMOL/L (ref 136–145)
WBC # BLD AUTO: 5.9 K/UL (ref 4–11)

## 2024-10-12 PROCEDURE — 6360000002 HC RX W HCPCS: Performed by: STUDENT IN AN ORGANIZED HEALTH CARE EDUCATION/TRAINING PROGRAM

## 2024-10-12 PROCEDURE — 2580000003 HC RX 258

## 2024-10-12 PROCEDURE — 1200000000 HC SEMI PRIVATE

## 2024-10-12 PROCEDURE — 95716 VEEG EA 12-26HR CONT MNTR: CPT

## 2024-10-12 PROCEDURE — 95720 EEG PHY/QHP EA INCR W/VEEG: CPT | Performed by: PSYCHIATRY & NEUROLOGY

## 2024-10-12 PROCEDURE — 2500000003 HC RX 250 WO HCPCS

## 2024-10-12 PROCEDURE — 80048 BASIC METABOLIC PNL TOTAL CA: CPT

## 2024-10-12 PROCEDURE — 83735 ASSAY OF MAGNESIUM: CPT

## 2024-10-12 PROCEDURE — 85027 COMPLETE CBC AUTOMATED: CPT

## 2024-10-12 PROCEDURE — 99232 SBSQ HOSP IP/OBS MODERATE 35: CPT | Performed by: NURSE PRACTITIONER

## 2024-10-12 PROCEDURE — 99231 SBSQ HOSP IP/OBS SF/LOW 25: CPT | Performed by: NURSE PRACTITIONER

## 2024-10-12 PROCEDURE — 36415 COLL VENOUS BLD VENIPUNCTURE: CPT

## 2024-10-12 PROCEDURE — 6370000000 HC RX 637 (ALT 250 FOR IP)

## 2024-10-12 RX ORDER — HEPARIN SODIUM 5000 [USP'U]/ML
5000 INJECTION, SOLUTION INTRAVENOUS; SUBCUTANEOUS EVERY 8 HOURS SCHEDULED
Status: DISCONTINUED | OUTPATIENT
Start: 2024-10-12 | End: 2024-10-16 | Stop reason: HOSPADM

## 2024-10-12 RX ADMIN — METOPROLOL SUCCINATE 50 MG: 50 TABLET, EXTENDED RELEASE ORAL at 09:07

## 2024-10-12 RX ADMIN — OXYCODONE AND ACETAMINOPHEN 1 TABLET: 10; 325 TABLET ORAL at 21:17

## 2024-10-12 RX ADMIN — ATORVASTATIN CALCIUM 80 MG: 80 TABLET, FILM COATED ORAL at 09:06

## 2024-10-12 RX ADMIN — FUROSEMIDE 40 MG: 40 TABLET ORAL at 09:06

## 2024-10-12 RX ADMIN — SODIUM CHLORIDE, PRESERVATIVE FREE 20 MG: 5 INJECTION INTRAVENOUS at 09:07

## 2024-10-12 RX ADMIN — CYANOCOBALAMIN TAB 1000 MCG 1000 MCG: 1000 TAB at 09:07

## 2024-10-12 RX ADMIN — Medication 1000 UNITS: at 09:07

## 2024-10-12 RX ADMIN — OXYCODONE AND ACETAMINOPHEN 1 TABLET: 10; 325 TABLET ORAL at 06:03

## 2024-10-12 RX ADMIN — SODIUM CHLORIDE, PRESERVATIVE FREE 10 ML: 5 INJECTION INTRAVENOUS at 09:22

## 2024-10-12 RX ADMIN — LEVETIRACETAM 500 MG: 100 INJECTION INTRAVENOUS at 03:11

## 2024-10-12 RX ADMIN — OXYCODONE AND ACETAMINOPHEN 1 TABLET: 10; 325 TABLET ORAL at 14:11

## 2024-10-12 RX ADMIN — LEVETIRACETAM 500 MG: 100 INJECTION INTRAVENOUS at 14:11

## 2024-10-12 RX ADMIN — AMIODARONE HYDROCHLORIDE 50 MG: 200 TABLET ORAL at 09:06

## 2024-10-12 ASSESSMENT — PAIN DESCRIPTION - ONSET
ONSET: ON-GOING

## 2024-10-12 ASSESSMENT — PAIN DESCRIPTION - DESCRIPTORS
DESCRIPTORS: DISCOMFORT
DESCRIPTORS: ACHING
DESCRIPTORS: DISCOMFORT
DESCRIPTORS: ACHING

## 2024-10-12 ASSESSMENT — PAIN DESCRIPTION - ORIENTATION
ORIENTATION: MID
ORIENTATION: RIGHT;MID
ORIENTATION: MID
ORIENTATION: RIGHT;MID
ORIENTATION: RIGHT

## 2024-10-12 ASSESSMENT — PAIN SCALES - GENERAL
PAINLEVEL_OUTOF10: 5
PAINLEVEL_OUTOF10: 0
PAINLEVEL_OUTOF10: 7
PAINLEVEL_OUTOF10: 0
PAINLEVEL_OUTOF10: 7
PAINLEVEL_OUTOF10: 8
PAINLEVEL_OUTOF10: 7
PAINLEVEL_OUTOF10: 7
PAINLEVEL_OUTOF10: 6
PAINLEVEL_OUTOF10: 0
PAINLEVEL_OUTOF10: 4
PAINLEVEL_OUTOF10: 8
PAINLEVEL_OUTOF10: 5
PAINLEVEL_OUTOF10: 8

## 2024-10-12 ASSESSMENT — PAIN - FUNCTIONAL ASSESSMENT

## 2024-10-12 ASSESSMENT — PAIN DESCRIPTION - LOCATION
LOCATION: OTHER (COMMENT)
LOCATION: HEAD
LOCATION: OTHER (COMMENT)
LOCATION: HEAD
LOCATION: OTHER (COMMENT)

## 2024-10-12 ASSESSMENT — PAIN DESCRIPTION - FREQUENCY
FREQUENCY: CONTINUOUS

## 2024-10-12 ASSESSMENT — PAIN DESCRIPTION - PAIN TYPE
TYPE: ACUTE PAIN

## 2024-10-12 ASSESSMENT — PAIN SCALES - WONG BAKER
WONGBAKER_NUMERICALRESPONSE: NO HURT
WONGBAKER_NUMERICALRESPONSE: NO HURT

## 2024-10-12 NOTE — PROCEDURES
EEG was reviewed up to 6:30 PM      The interictal EEG was abnormal due to diffuse frontally predominant sharp waves with triphasic wave morphology and delta slowing admixed with  theta frequencies suggestive of severe encephalopathy.  The right hemisphere lateralized periodic discharges is suggestive of cortical irritability in the right hemisphere with risk of seizures.  The continuous right hemisphere polymorphic delta and theta slowing is suggestive of underlying focal structural dysfunction in the right hemisphere.      Electronically signed by Cruz Reyes MD on 10/12/2024 at 6:41 PM

## 2024-10-12 NOTE — PROGRESS NOTES
Comprehensive Nutrition Assessment    RECOMMENDATIONS:  PO Diet: Continue Dysphagia-Minced and Moist;Low Sodium  Nutrition Supplement: Continue Ensure +HP tid  Nutrition Education: No recommendation at this time     NUTRITION ASSESSMENT:   Nutritional summary & status: Positive nutrition screen for weight loss. Pt admitted with subdural hematoma. On a Dysphagia-Minced and Moist ; Low Sodium; thin liquids diet; 1:1 feed assist per SLP recommendations. Meal intake 51-75% x 2 . EMR showing 4% weight loss in past month, not triggering as significant. Pt occupied with staff on attempted visit. Ensure supplement in place tid to promote adequate nutrition. Will monitor acceptance of supplement and intake adequacy.   Admission // PMH: Subdural hematoma//arthritis, CAD, CHF, confusio, GERD, HLD, HTN    MALNUTRITION ASSESSMENT  Context of Malnutrition: Acute Illness   Malnutrition Status: At risk for malnutrition (Comment)  Findings of the 6 clinical characteristics of malnutrition (Minimum of 2 out of 6 clinical characteristics is required to make the diagnosis of moderate or severe Protein Calorie Malnutrition based on AND/ASPEN Guidelines):  Energy Intake:  Mild decrease in energy intake (Comment) (since admit)  Weight Loss:  No significant weight loss (4% loss in 1 month)     Body Fat Loss:  Unable to assess (occupied with other staff)     Muscle Mass Loss:  Unable to assess         NUTRITION DIAGNOSIS   Inadequate oral intake related to inadequate protein-energy intake as evidenced by weight loss    Nutrition Monitoring and Evaluation:   Food/Nutrient Intake Outcomes:  Food and Nutrient Intake, Supplement Intake  Physical Signs/Symptoms Outcomes:  Biochemical Data, Nutrition Focused Physical Findings, Weight     OBJECTIVE DATA: Significant to nutrition assessment  Nutrition Related Findings: No BM noted. BLE trace edema. Labs reviewed.  Wounds: None  Nutrition Goals: PO intake 75% or greater, by next RD assessment      CURRENT NUTRITION THERAPIES  ADULT ORAL NUTRITION SUPPLEMENT; Breakfast, Lunch, Dinner; Standard High Calorie/High Protein Oral Supplement  ADULT DIET; Dysphagia - Minced and Moist; Low Sodium (2 gm)  PO Intake: 51-75%   PO Supplement Intake:Unable to assess (no data)  Additional Sources of Calories/IVF:n/a     COMPARATIVE STANDARDS  Energy (kcal):  5494-8847 (25-30 kcal/CBW)     Protein (g):  65-81 (1.2-1.5 gm/CBW)       Fluid (ml/day):  or per provider    ANTHROPOMETRICS  Current Height: 157.5 cm (5' 2\")  Current Weight - Scale: 54.6 kg (120 lb 5.9 oz)    Admission weight: 54.6 kg (120 lb 5.9 oz)    The patient will be monitored per nutrition standards of care. Consult dietitian if additional nutrition interventions are needed prior to RD reassessment.     George Trent RD  Aaron:  [phone]

## 2024-10-12 NOTE — PROCEDURES
LONG-TERM EEG-VIDEO MONITORING   CLINICAL NEUROPHYSIOLOGY LABORATORY  DEPARTMENT OF NEUROLOGY  [facility]     Patient: Naty Dykes  Age: 88 y.o.  MRN: [id number]    Referring Physician: Milli Collins MD  History: The patient is a 88 y.o. female who presented breakthrough seizure/encephalopathy. This long-term video-EEG monitoring study was performed to determine the nature of the patient's clinical events. The patient is on neuroactive medications.   Naty Dykes   Current Facility-Administered Medications   Medication Dose Route Frequency Provider Last Rate Last Admin    heparin (porcine) injection 5,000 Units  5,000 Units SubCUTAneous 3 times per day Dottie Dang APRN - CNP   5,000 Units at 10/13/24 0638    sodium chloride flush 0.9 % injection 5-40 mL  5-40 mL IntraVENous 2 times per day Zoe Wagner MD   10 mL at 10/13/24 0934    sodium chloride flush 0.9 % injection 5-40 mL  5-40 mL IntraVENous PRN Zoe Wagner MD        0.9 % sodium chloride infusion   IntraVENous PRN Zoe Wagner MD        ondansetron (ZOFRAN-ODT) disintegrating tablet 4 mg  4 mg Oral Q8H PRN Zoe Wagner MD        Or    ondansetron (ZOFRAN) injection 4 mg  4 mg IntraVENous Q6H PRN Zoe Wagner MD        atorvastatin (LIPITOR) tablet 80 mg  80 mg Oral Daily Zoe Wagner MD   80 mg at 10/13/24 0933    metoprolol succinate (TOPROL XL) extended release tablet 50 mg  50 mg Oral Daily Zoe Wagner MD   50 mg at 10/13/24 0933    amiodarone (CORDARONE) tablet 50 mg  50 mg Oral Daily Zoe Wagner MD   50 mg at 10/13/24 0934    furosemide (LASIX) tablet 40 mg  40 mg Oral Daily Zoe Wagner MD   40 mg at 10/13/24 0932    Vitamin D (CHOLECALCIFEROL) tablet 1,000 Units  1,000 Units Oral Daily Zoe Wagner MD   1,000 Units at 10/13/24 0933    labetalol (NORMODYNE;TRANDATE) injection 10 mg  10 mg IntraVENous Q4H PRN Zoe Wagner MD   10 mg at 10/13/24 0942    oxyCODONE-acetaminophen (PERCOCET)  MG per tablet 1 tablet  1

## 2024-10-12 NOTE — PROGRESS NOTES
Current NIHSS 19    Nursing Core Measures for Stroke:   [x]   Education template documentation (STROKE/TIA). Please select only risk factors that are applicable to patient when selecting risk factors.  [x]   Care Plan template documentation (Physiologic Instability - Neurosensory). Selecting this will add care plan rows to the flowsheet under the Neuro section of Head to Toe.  [x]   Verified Swallow Screen completed prior to PO intake of food, drink, medications>          Please verify correct medication route prior to administration for intubated patients, patients who can not swallow or have alternative routes of intake (NG, OG, IA), etc  [x]   VTE Prophylaxis: SCDs ordered/addressed; SCDs: On           (As a reminder, ASA, Plavix, and TPA/TNK are not VTE prophylaxis.)    Reviewed the Following Education with Patient and/or Family:   - Personalized risk factors for patient, along with changes, modifications that will help prevent stroke.  - Signs and Symptoms of Stroke: (Facial droop, weakness/numbness especially on one side, speech difficulty, sudden confusion, sudden loss of vision, sudden severe headache, sudden loss of balance or having difficulty walking, syncope, or seizure)  - How to activate EMS (911)   - Importance of Follow Up Appointments at Discharge   - Importance of Compliance with Medications Prescribed at Discharge  - Available community resources and stroke advocacy groups if needed    Patient and/or family member: verbalized understanding.     Stroke Education booklet given to patient/family (or verified, if given already), which reviews above information. yes         Electronically signed by MARTY ARANGO RN on 10/12/2024 at 10:18 AM

## 2024-10-12 NOTE — PROGRESS NOTES
Speech Therapy Attempt    Patient was attempted to be seen by Speech Therapy Department this date for dysphagia tx and cognitive eval . Patient was unable to be seen due to pt sleeping and family requested pt sleep. Speech Therapy will re-attempt to see patient if/when patient is appropriate and available.  Daughter reported pt is tolerating current diet and liquids with mod cues and assist including small bites/sips and alternating solids and liquids. Overall poor intake noted.  Family requested to have assistance with POC etc. RN notified    Thank you.    No Charge.    Signature:  Yissel Stoddard M.A., CCC-SLP   Speech-Language Pathologist   [facility] PRN  [phone]    Pg. # [phone]

## 2024-10-12 NOTE — PROGRESS NOTES
Physical Therapy/Occupational therapy  No treatment    Orders received, chart reviewed.  Spoke with RN who states family is requesting to allow pt to sleep.  Will f/u later date to initiate therapy evals.    Gabrielle Munoz, PT, DPT  [id number]  Giselle Barkley, MOT, OTR/L, CNS

## 2024-10-12 NOTE — PROGRESS NOTES
V2.0    [facility] [place] Note      Name:  Naty Dykes /Age/Sex: 1935  (88 y.o. female)   MRN & CSN:  [id number] & [id number] Encounter Date/Time: 10/12/2024 9:25 AM EDT   Location:  [facility] [place] PCP: Debra Shahid DO     Attending:Marie Brock MD       Hospital Day: 2    HPI:Patient is a 88-year-old female with a past medical history of coronary artery disease, GERD, hyperlipidemia, paroxysmal A-fib on Eliquis, hypothyroidism who presented to [place] ED after a fall 10/4/2024. Patient had a mechanical fall after she tripped. Patient had worsening back pain after the fall which limited her mobility. Patient had hallucinations with vomiting and appeared to have seizure-like activities hence EMS was called.     Assessment and Recommendations     Hospital course:    Naty Dykes is a 88 y.o. female with pmh of coronary artery disease, CVA, GERD, hyperlipidemia, hypertension, hypothyroidism who presents with Subdural hematoma      Plan:     Subdural hematoma with mild subarachnoid hemorrhage  -CT shows subdural hematoma 1.4 cm with mass effect upon the right frontal parietal and temporal lobe with midline shift  -Neurosurgery consulted, consult appreciated  -Neurocritical care consulted  -Got PCC to reverse Eliquis  -Repeat CT unchanged     Seizure  -Started on Keppra  -Continuous EEG     Low back pain  -CT lumbar and thoracic spine no fracture  -Continue home dose of pain medication     Paroxysmal A-fib  -On Eliquis.  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  -As needed IV blood pressure medication ordered for systolic greater than 160     Coronary artery disease   s/p stent  -Hold aspirin continue statin     Remote history of CVA  -Has some chronic facial droop     Chronic kidney disease stage IV  -Creatinine stable at around 1.7    Anemia-chronic  -Mild decrease in  discharge planning options with Case Management.  [x] Discussed management of the case with: Neurosurgery neurocritical care to continue current management  [] Telemetry personally reviewed and interpreted as documented above    [x] Imaging personally reviewed and interpreted, includes: CT head which shows subdural hematoma changed  [x] Data Review (any 3)  [x] All available Consultant notes from yesterday/today were reviewed  [x] All current labs were reviewed and interpreted for clinical significance   [x] Appropriate follow-up labs were ordered  [] Collateral history obtained from:      Subjective:     Chief Complaint: Subdural hematoma    Naty Dykes is a 88 y.o. female who presents with subdural hematoma     Family at bedside.  Patient very sleepy.  Just fell asleep.  Was alert and oriented earlier.  Has some coordination of issues    Review of Systems:      Pertinent positives and negatives discussed in HPI    Objective:     Intake/Output Summary (Last 24 hours) at 10/12/2024 0925  Last data filed at 10/12/2024 0600  Gross per 24 hour   Intake 1493.46 ml   Output 575 ml   Net 918.46 ml      Vitals:   Vitals:    10/12/24 0600 10/12/24 0603 10/12/24 0700 10/12/24 0800   BP: (!) 166/92 (!) 166/92 (!) 146/100 (!) 152/82   Pulse: 81 75 66 67   Resp: 16 17 15 17   Temp:    98.2 °F (36.8 °C)   TempSrc:    Oral   SpO2:   (!) 77% 98%   Weight:       Height:             Physical Exam:      General: NAD  Eyes: EOMI  ENT: neck supple  Cardiovascular: Regular rate.  Respiratory: Clear to auscultation  Gastrointestinal: Soft, non tender  Genitourinary: no suprapubic tenderness  Musculoskeletal: No edema  Skin: warm, dry  Neuro: Sleepy at present.  Per family was alert and oriented prior  Psych: Unable to assess        Medications:   Medications:    sodium chloride flush  5-40 mL IntraVENous 2 times per day    atorvastatin  80 mg Oral Daily    metoprolol succinate  50 mg Oral Daily    amiodarone  50 mg Oral Daily

## 2024-10-12 NOTE — DISCHARGE INSTRUCTIONS
Neurosurgery Instructions:    Follow-up with Dr. Yung Herzog Neurosurgeon in 2 weeks with a repeat non-contrast head CT (\"Cat Scan\"). Please call [phone] to schedule both the follow up appointment and scan.     Avoid any medications that could thin your blood and make the bleeding in your head worse such as: ibuprofen/advil, naproxen/alleve, warfarin/coumadin, or aspirin until cleared by Dr. Barragan. May use Tylenol, if needed.         Driving and Safety Risks:    You have had an event concerning for a seizure.   No driving until you have been event free for at least 3 months.  Having a seizure while driving puts you at risk for injuring yourself or others.   If you drive while having uncontrolled seizures, you may be held liable for injuries to others.     If you live outside of [place] the driving limitation may be longer. You should check with applicable local laws prior to travel.   You should avoid working at heights including claiming up ladders.   Avoid one on one care of children too young to call 911.   You should avoid swimming alone or taking baths in the bathtub. It is recommended to use only showers.          Extra Heart Failure Education/ Tools/ Resources:     https://[employer].com/publication/?v=985663   --- this is American Heart Association interactive Healthier Living with Heart Failure guidebook.  Please click hyperlink or copy / paste link into search bar. The QR Code is also available below. Use your mouse to scroll through the pages.  Lots of information about weight monitoring, diet tips, activity, meds, etc    Heart Failure Tools and Resources QR Code is below. It includes multiple resources to include symptom tracker, med tracker, further HF info, and access to a HF Support Network online Community    HF [place] Neo  -- this is a free smart phone neo available for iPhone and Android download.  Use your phone to track sodium / fluid intake, zone tool symptom tracking,  weights, medications, etc. Click on this hyperlink  HF [place] Neo   for QR code for easy download or the link is also found in the below HF Tools and Resources.      DASH (Dietary Approach to Stop Hypertension) diet --  https://www.nhlbi.nih.gov/education/dash-eating-plan -- this diet is a flexible eating plan that promotes heart healthy eating style.  Click on hyperlink or copy / paste link into search bar.  Lots of low sodium recipes and tips.    https://www.[employer]/recipes  -- more free recipes

## 2024-10-12 NOTE — PLAN OF CARE
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 Repeat CT head was stable  Initially, patient presented with 3/5 in LUE, patient was more lethargic in the afternoon and LUE WDP. Repeat CT head was stable. Her motor strength also improved after the repeat head CT head.  Suspect subclinical SZ.     - cvEEG in place    General: Alert, no distress, well-nourished  Neurologic  Mental status:   Eyes open to gentle name calling, oriented to person, place, time and situation, attends well to exam, mild dysarthria, follows simple commands    Cranial nerves:   CN2: Visual fields full w/o extinction on confrontational testing   CN 3,4,6: Pupils equal and reactive to light, extraocular muscles intact  CN5: Facial sensation symmetric   CN7: mild L facial weakness  CN8: Hearing symmetric to spoken voice  CN9: Palate elevated symmetrically  CN11: Traps full strength on shoulder shrug  CN12: Tongue midline with protrusion    Motor Exam:  RUE 5/5  LUE 3/5  RLE 5/5  LLE 5/5    Sensory: light touch intact and symmetric in all 4 extremities.  No sensory extinction on bilateral simultaneous stimulation  Cerebellar/coordination: finger nose finger normal without ataxia  Tone: normal in all 4 extremities  Gait: deferred for safety    DAVE Malone - CNP   Neurology & Neurocritical Care   Neurology Line: [phone]  [facility]: [facility] Neurology & Neuro Critical Care NPs

## 2024-10-12 NOTE — PROGRESS NOTES
Patient has not voided this shift. Denies feeling like she has to urinate, bladder scan shows 150mL. PO intake poor earlier during the shift due to lethargy but slowly increasing throughout the night. Neuro NP notified.

## 2024-10-12 NOTE — PROGRESS NOTES
NEUROCRITICAL CARE PROGRESS NOTE       Patient Name: Naty Dykes YOB: 1935   Sex: Female Age: 88 yrs     CC / Reason for Consult: Subdural hematoma / AMS    Subjective / ROS / Updates over last 24 hours:  Patient resting in bed, appears comfortable    ASSESSMENT & RECOMMENDATIONS   Patient is an 87 y/o F w/ tSDH / tSAH, f/u imaging stable  Per family neurologic exam improving  Patient typically awake more in the evening has been sleeping most of day today   No seizures on EEG.     Plan   Neuro checks Q4H  Keppra 500mg BID  cEEG - if no seizures DC tomorrow  SBP <160  PT/OT  DVT prophylaxis - SCD / SQH (started 10/12)  We will follow for EEG results in AM  Please call with questions.     Management and plan discussed with:  Nursing staff  Dr. Rocío Dang, APRN - CNP   NEUROCRITICAL CARE  10/12/2024 12:49 PM  [facility]: [facility] NEUROCRITICAL CARE    HISTORY     Allergies Allergies   Allergen Reactions    Benazepril Hcl Shortness Of Breath and Swelling    Feldene [Piroxicam] Shortness Of Breath    Hytrin [Terazosin Hcl] Shortness Of Breath    Iv Contrast [Iodides] Anaphylaxis    Acetaminophen     Celebrex [Celecoxib]      GI upset    Cephalexin      Nausea    Hydrochlorothiazide     Iodinated Casein     Monopril [Fosinopril Sodium] Other (See Comments)     Pt states makes her weak    Protonix [Pantoprazole Sodium] Other (See Comments)     Kidney failure      Quinapril Hcl     Ultracet [Tramadol-Acetaminophen]      Rash    Ziac [Bisoprolol-Hydrochlorothiazide] Other (See Comments)     Pt does not remember reaction to med ? Weak       Diet ADULT ORAL NUTRITION SUPPLEMENT; Breakfast, Lunch, Dinner; Standard High Calorie/High Protein Oral Supplement  ADULT DIET; Dysphagia - Minced and Moist; Low Sodium (2 gm)   Isolation No active isolations     LABS   Metabolic Panel Recent Labs     10/11/24  0045 10/11/24  0959 10/12/24  0500   * 136 136   K 3.6 4.5 4.8   CL 91* 96* 100   CO2  medication    Language: fluent  Speech: clear  Comprehension intact; follows simple commands    Cranial nerves:   Pupils equal   Gaze conjugate  Face symmetric  Sensation intact  No dysarthria      Motor Exam:  Equal strength throughout, poor effort    Sensory: light touch intact and symmetric in all 4 extremities.   Tone: normal in all 4 extremities

## 2024-10-12 NOTE — PROGRESS NOTES
Pt       NEUROSURGERY     NEGROOMER CARO   [id number]   1935   10/12/2024    Interval History:  Hospital Day #1      Subjective:no acute events    Objective:  BP (!) 152/82   Pulse 67   Temp 98.2 °F (36.8 °C) (Oral)   Resp 17   Ht 1.575 m (5' 2\")   Wt 54.6 kg (120 lb 5.9 oz)   SpO2 98%   BMI 22.02 kg/m²     Labs:  Recent Labs     10/11/24  0045 10/11/24  0959 10/12/24  0500   * 136 136   CL 91* 96* 100   CO2 27 29 27   BUN 27* 25* 20   CREATININE 1.7* 1.6* 1.3*   GLUCOSE 106* 95 79     Recent Labs     10/11/24  0045 10/11/24  1000 10/12/24  0500   WBC 7.3 7.0 5.9   RBC 3.23* 3.01* 2.78*   INR 1.28*  --   --      Physical Exam:   Patient seen and examined  GCS:  4 - Opens eyes on own  4 - Seems confused, disoriented  6 - Follows simple motor commands     General: Well developed. Alert and cooperative in no acute distress.   HENT: atraumatic, neck supple  Eyes: Optic discs: Not tested  Pulmonary: unlabored respiratory effort  Cardiovascular:  Warm well perfused. No peripheral edema  Gastrointestinal: abdomen soft, NT, ND     Neurological:  Mental Status: Awake, alert, oriented to self, hospital, situation, but not date  Attention: Intact  Language: No aphasia or dysarthria noted  Sensation: Intact to all extremities to light touch  Coordination: Intact     Cranial Nerves:  II: Visual acuity not tested, denies new visual changes / diplopia, but does have some chronic left side visual field deficit  III, IV, VI: PERRL, 3 mm bilaterally, EOMI, no nystagmus noted  V: Facial sensation intact bilaterally to touch  VII: Face symmetric  VIII: Hearing intact bilaterally to spoken voice  IX: Palate movement equal bilaterally  XI: Shoulder shrug equal bilaterally  XII: Tongue midline     Musculoskeletal:   Gait: Not tested   Assist devices: None   Tone: Normal  Motor strength: Exam limited 2/2 generalized weakness and pain    Right  Left      Right  Left    Deltoid  4+ 4+   Hip Flex  4+ +4   Biceps  4+ 4+    Knee Extensors  4+ 4+   Triceps  4+ 4+   Knee Flexors  4+ 4+   Wrist Ext  4+ 4+   Ankle Dorsiflex.  4+ 4+   Wrist Flex  4+ 4+   Ankle Plantarflex.  4+ 4+   Handgrip  4+ 4+   Ext Pierce Longus  4+ 4+   Thumb Ext  4+ 4+                  Assessment   88yF with 14mm right convexity acute SDH     Plan:  No emergent neurosurgical intervention indicated  Neurologic exams frequency: Defer to [facility]  SDH:  - Follow up head CT stable. No neurosurgical intervention indicated and No further imaging unless there is a decline in neurologic exam.  - Neurocritical Care following, appreciate recs  - Follow up in 2 weeks with repeat CT Head  Seizure:  - Patient having waxing and waning neuro exam, concern for focal seizure activity  - Neurocritical Care managing AED's and ordered cvEEG which showed no seizures  Pain: Managed by medical team  SCDs for DVT prophylaxis, and OK to start chemoprophylaxis 24 hours from stable scan, if medically indicated  We will sign off at this time.  follow up in 2 weeks in clinic.      DISPO- Dispo timing to be determined by primary team once patient is medically stable for discharge.      JAKY Yanez  Neurosurgery Nurse Practitioner  [phone]  Patient was seen and examined with Dr. Barragan who agrees with above assessment and plan.     Electronically signed by: DAVE Harden CNP, 10/12/2024 9:16 AM   I spent 25 minutes in the care of this patient.  Over 50% of that time was in face-to-face counseling regarding disease process, diagnostic testing, preventative measures, and answering patient and family questions.        _____  I saw and assessed the patient. I agree with the AXEL's note.    Patient looks quite well - calm, awake, alert, oriented.     Agree with temporary cessation of anticoagulation until follow up. No surgical intervention necessary Will monitor her in outpatient setting for appropriate resolution of the SDH and then consider resuming her  anticoagulation.    Follow up in clinin in ~2-4 week with non-con head CT.      Bandar Barragan MD, PhD  Endovascular Neurosurgery  [facility]  [phone] (office direct line)  [phone] ([facility])

## 2024-10-12 NOTE — PLAN OF CARE
Problem: Chronic Conditions and Co-morbidities  Goal: Patient's chronic conditions and co-morbidity symptoms are monitored and maintained or improved  10/12/2024 0900 by North Cummings RN  Outcome: Progressing  Flowsheets (Taken 10/12/2024 0800)  Care Plan - Patient's Chronic Conditions and Co-Morbidity Symptoms are Monitored and Maintained or Improved: Monitor and assess patient's chronic conditions and comorbid symptoms for stability, deterioration, or improvement     Problem: Discharge Planning  Goal: Discharge to home or other facility with appropriate resources  10/12/2024 0900 by North Cummings RN  Outcome: Progressing  Flowsheets (Taken 10/12/2024 0800)  Discharge to home or other facility with appropriate resources: Identify barriers to discharge with patient and caregiver     Problem: Skin/Tissue Integrity  Goal: Absence of new skin breakdown  Description: 1.  Monitor for areas of redness and/or skin breakdown  2.  Assess vascular access sites hourly  3.  Every 4-6 hours minimum:  Change oxygen saturation probe site  4.  Every 4-6 hours:  If on nasal continuous positive airway pressure, respiratory therapy assess nares and determine need for appliance change or resting period.  10/12/2024 0900 by North Cummings RN  Outcome: Progressing     Problem: Safety - Adult  Goal: Free from fall injury  10/12/2024 0900 by North Cummings RN  Outcome: Progressing  Flowsheets (Taken 10/12/2024 0858)  Free From Fall Injury: Instruct family/caregiver on patient safety     Problem: ABCDS Injury Assessment  Goal: Absence of physical injury  10/12/2024 0900 by North Cummings RN  Outcome: Progressing  Flowsheets (Taken 10/12/2024 0858)  Absence of Physical Injury: Implement safety measures based on patient assessment     Problem: Pain  Goal: Verbalizes/displays adequate comfort level or baseline comfort level  10/12/2024 0900 by North Cummings RN  Outcome: Progressing  Flowsheets (Taken 10/12/2024  0800)  Verbalizes/displays adequate comfort level or baseline comfort level: Encourage patient to monitor pain and request assistance     Problem: Neurosensory - Adult  Goal: Achieves stable or improved neurological status  10/12/2024 0900 by North Cummings, RN  Outcome: Progressing  Flowsheets (Taken 10/12/2024 0800)  Achieves stable or improved neurological status: Assess for and report changes in neurological status     Problem: Neurosensory - Adult  Goal: Achieves maximal functionality and self care  10/12/2024 0900 by North Cummings, RN  Outcome: Progressing  Flowsheets (Taken 10/12/2024 0800)  Achieves maximal functionality and self care: Monitor swallowing and airway patency with patient fatigue and changes in neurological status     Problem: Chronic Conditions and Co-morbidities  Goal: Patient's chronic conditions and co-morbidity symptoms are monitored and maintained or improved  10/12/2024 0900 by North Cummings, RN  Outcome: Progressing  Flowsheets (Taken 10/12/2024 0800)  Care Plan - Patient's Chronic Conditions and Co-Morbidity Symptoms are Monitored and Maintained or Improved: Monitor and assess patient's chronic conditions and comorbid symptoms for stability, deterioration, or improvement  10/12/2024 0334 by Maria Del Rosario Delvalle, RN  Outcome: Not Progressing  Flowsheets (Taken 10/11/2024 2000)  Care Plan - Patient's Chronic Conditions and Co-Morbidity Symptoms are Monitored and Maintained or Improved: Monitor and assess patient's chronic conditions and comorbid symptoms for stability, deterioration, or improvement

## 2024-10-12 NOTE — PLAN OF CARE
Problem: Chronic Conditions and Co-morbidities  Goal: Patient's chronic conditions and co-morbidity symptoms are monitored and maintained or improved  Outcome: Not Progressing  Flowsheets (Taken 10/11/2024 2000)  Care Plan - Patient's Chronic Conditions and Co-Morbidity Symptoms are Monitored and Maintained or Improved: Monitor and assess patient's chronic conditions and comorbid symptoms for stability, deterioration, or improvement     Problem: Discharge Planning  Goal: Discharge to home or other facility with appropriate resources  Outcome: Progressing  Flowsheets (Taken 10/11/2024 2000)  Discharge to home or other facility with appropriate resources: Identify barriers to discharge with patient and caregiver     Problem: Skin/Tissue Integrity  Goal: Absence of new skin breakdown  Description: 1.  Monitor for areas of redness and/or skin breakdown  2.  Assess vascular access sites hourly  3.  Every 4-6 hours minimum:  Change oxygen saturation probe site  4.  Every 4-6 hours:  If on nasal continuous positive airway pressure, respiratory therapy assess nares and determine need for appliance change or resting period.  Outcome: Progressing     Problem: Safety - Adult  Goal: Free from fall injury  Outcome: Progressing  Flowsheets (Taken 10/12/2024 0331)  Free From Fall Injury: Instruct family/caregiver on patient safety     Problem: ABCDS Injury Assessment  Goal: Absence of physical injury  Outcome: Progressing     Problem: Pain  Goal: Verbalizes/displays adequate comfort level or baseline comfort level  Outcome: Progressing     Problem: Neurosensory - Adult  Goal: Achieves stable or improved neurological status  Reactivated     Problem: Neurosensory - Adult  Goal: Achieves maximal functionality and self care  Reactivated

## 2024-10-12 NOTE — PROCEDURES
LONG-TERM EEG-VIDEO MONITORING   CLINICAL NEUROPHYSIOLOGY LABORATORY  DEPARTMENT OF NEUROLOGY  [facility]     Patient: Naty Dykes  Age: 88 y.o.  MRN: [id number]    Referring Physician: Milli Collins MD  History: The patient is a 88 y.o. female who presented breakthrough seizure/encephalopathy. This long-term video-EEG monitoring study was performed to determine the nature of the patient's clinical events. The patient is on neuroactive medications.   Naty Dykes   Current Facility-Administered Medications   Medication Dose Route Frequency Provider Last Rate Last Admin    sodium chloride flush 0.9 % injection 5-40 mL  5-40 mL IntraVENous 2 times per day Zoe Wagner MD   10 mL at 10/11/24 1036    sodium chloride flush 0.9 % injection 5-40 mL  5-40 mL IntraVENous PRN Zoe Wagner MD        0.9 % sodium chloride infusion   IntraVENous PRN Zoe Wagner MD        ondansetron (ZOFRAN-ODT) disintegrating tablet 4 mg  4 mg Oral Q8H PRN Zeo Wagner MD        Or    ondansetron (ZOFRAN) injection 4 mg  4 mg IntraVENous Q6H PRN Zoe Wagner MD        atorvastatin (LIPITOR) tablet 80 mg  80 mg Oral Daily Zoe Wagner MD   80 mg at 10/11/24 1030    metoprolol succinate (TOPROL XL) extended release tablet 50 mg  50 mg Oral Daily Zoe Wagner MD        amiodarone (CORDARONE) tablet 50 mg  50 mg Oral Daily oZe Wagner MD        furosemide (LASIX) tablet 40 mg  40 mg Oral Daily Zoe Wagner MD        Vitamin D (CHOLECALCIFEROL) tablet 1,000 Units  1,000 Units Oral Daily Zoe Wagner MD   1,000 Units at 10/11/24 1030    labetalol (NORMODYNE;TRANDATE) injection 10 mg  10 mg IntraVENous Q4H PRN Zoe Wagner MD   10 mg at 10/11/24 0947    oxyCODONE-acetaminophen (PERCOCET)  MG per tablet 1 tablet  1 tablet Oral Q6H PRN Zoe Wagner MD   1 tablet at 10/12/24 0603    vitamin B-12 (CYANOCOBALAMIN) tablet 1,000 mcg  1,000 mcg Oral Daily Zoe Wagner MD   1,000 mcg at 10/11/24 1030    lidocaine 4 % external

## 2024-10-13 LAB
ANION GAP SERPL CALCULATED.3IONS-SCNC: 14 MMOL/L (ref 3–16)
BUN SERPL-MCNC: 19 MG/DL (ref 7–20)
CALCIUM SERPL-MCNC: 9 MG/DL (ref 8.3–10.6)
CHLORIDE SERPL-SCNC: 96 MMOL/L (ref 99–110)
CO2 SERPL-SCNC: 25 MMOL/L (ref 21–32)
CREAT SERPL-MCNC: 1.3 MG/DL (ref 0.6–1.2)
DEPRECATED RDW RBC AUTO: 13.5 % (ref 12.4–15.4)
GFR SERPLBLD CREATININE-BSD FMLA CKD-EPI: 39 ML/MIN/{1.73_M2}
GLUCOSE SERPL-MCNC: 73 MG/DL (ref 70–99)
HCT VFR BLD AUTO: 28 % (ref 36–48)
HGB BLD-MCNC: 9.5 G/DL (ref 12–16)
MAGNESIUM SERPL-MCNC: 2.1 MG/DL (ref 1.8–2.4)
MCH RBC QN AUTO: 30.4 PG (ref 26–34)
MCHC RBC AUTO-ENTMCNC: 33.8 G/DL (ref 31–36)
MCV RBC AUTO: 89.9 FL (ref 80–100)
PLATELET # BLD AUTO: 282 K/UL (ref 135–450)
PMV BLD AUTO: 8.9 FL (ref 5–10.5)
POTASSIUM SERPL-SCNC: 4.1 MMOL/L (ref 3.5–5.1)
RBC # BLD AUTO: 3.12 M/UL (ref 4–5.2)
SODIUM SERPL-SCNC: 135 MMOL/L (ref 136–145)
WBC # BLD AUTO: 7.2 K/UL (ref 4–11)

## 2024-10-13 PROCEDURE — 95720 EEG PHY/QHP EA INCR W/VEEG: CPT | Performed by: PSYCHIATRY & NEUROLOGY

## 2024-10-13 PROCEDURE — 97166 OT EVAL MOD COMPLEX 45 MIN: CPT

## 2024-10-13 PROCEDURE — 97116 GAIT TRAINING THERAPY: CPT

## 2024-10-13 PROCEDURE — 2580000003 HC RX 258

## 2024-10-13 PROCEDURE — 85027 COMPLETE CBC AUTOMATED: CPT

## 2024-10-13 PROCEDURE — 6360000002 HC RX W HCPCS: Performed by: NURSE PRACTITIONER

## 2024-10-13 PROCEDURE — 83735 ASSAY OF MAGNESIUM: CPT

## 2024-10-13 PROCEDURE — 80048 BASIC METABOLIC PNL TOTAL CA: CPT

## 2024-10-13 PROCEDURE — 6360000002 HC RX W HCPCS

## 2024-10-13 PROCEDURE — 1200000000 HC SEMI PRIVATE

## 2024-10-13 PROCEDURE — 95813 EEG EXTND MNTR 61-119 MIN: CPT | Performed by: PSYCHIATRY & NEUROLOGY

## 2024-10-13 PROCEDURE — 6360000002 HC RX W HCPCS: Performed by: STUDENT IN AN ORGANIZED HEALTH CARE EDUCATION/TRAINING PROGRAM

## 2024-10-13 PROCEDURE — 97535 SELF CARE MNGMENT TRAINING: CPT

## 2024-10-13 PROCEDURE — 36415 COLL VENOUS BLD VENIPUNCTURE: CPT

## 2024-10-13 PROCEDURE — 6370000000 HC RX 637 (ALT 250 FOR IP)

## 2024-10-13 PROCEDURE — 99231 SBSQ HOSP IP/OBS SF/LOW 25: CPT | Performed by: NURSE PRACTITIONER

## 2024-10-13 PROCEDURE — 97530 THERAPEUTIC ACTIVITIES: CPT

## 2024-10-13 PROCEDURE — 97162 PT EVAL MOD COMPLEX 30 MIN: CPT

## 2024-10-13 PROCEDURE — 2500000003 HC RX 250 WO HCPCS

## 2024-10-13 RX ADMIN — SODIUM CHLORIDE, PRESERVATIVE FREE 10 ML: 5 INJECTION INTRAVENOUS at 09:34

## 2024-10-13 RX ADMIN — LABETALOL HYDROCHLORIDE 10 MG: 5 INJECTION, SOLUTION INTRAVENOUS at 00:09

## 2024-10-13 RX ADMIN — LEVETIRACETAM 500 MG: 100 INJECTION INTRAVENOUS at 03:05

## 2024-10-13 RX ADMIN — Medication 1000 UNITS: at 09:33

## 2024-10-13 RX ADMIN — OXYCODONE AND ACETAMINOPHEN 1 TABLET: 10; 325 TABLET ORAL at 18:39

## 2024-10-13 RX ADMIN — SODIUM CHLORIDE, PRESERVATIVE FREE 10 ML: 5 INJECTION INTRAVENOUS at 21:30

## 2024-10-13 RX ADMIN — LEVETIRACETAM 500 MG: 100 INJECTION INTRAVENOUS at 15:21

## 2024-10-13 RX ADMIN — HEPARIN SODIUM 5000 UNITS: 5000 INJECTION INTRAVENOUS; SUBCUTANEOUS at 06:38

## 2024-10-13 RX ADMIN — AMIODARONE HYDROCHLORIDE 50 MG: 200 TABLET ORAL at 09:34

## 2024-10-13 RX ADMIN — OXYCODONE AND ACETAMINOPHEN 1 TABLET: 10; 325 TABLET ORAL at 09:59

## 2024-10-13 RX ADMIN — HEPARIN SODIUM 5000 UNITS: 5000 INJECTION INTRAVENOUS; SUBCUTANEOUS at 15:21

## 2024-10-13 RX ADMIN — OXYCODONE AND ACETAMINOPHEN 1 TABLET: 10; 325 TABLET ORAL at 03:03

## 2024-10-13 RX ADMIN — METOPROLOL SUCCINATE 50 MG: 50 TABLET, EXTENDED RELEASE ORAL at 09:33

## 2024-10-13 RX ADMIN — FUROSEMIDE 40 MG: 40 TABLET ORAL at 09:32

## 2024-10-13 RX ADMIN — ATORVASTATIN CALCIUM 80 MG: 80 TABLET, FILM COATED ORAL at 09:33

## 2024-10-13 RX ADMIN — SODIUM CHLORIDE, PRESERVATIVE FREE 20 MG: 5 INJECTION INTRAVENOUS at 09:33

## 2024-10-13 RX ADMIN — LABETALOL HYDROCHLORIDE 10 MG: 5 INJECTION, SOLUTION INTRAVENOUS at 09:42

## 2024-10-13 RX ADMIN — HEPARIN SODIUM 5000 UNITS: 5000 INJECTION INTRAVENOUS; SUBCUTANEOUS at 21:30

## 2024-10-13 RX ADMIN — CYANOCOBALAMIN TAB 1000 MCG 1000 MCG: 1000 TAB at 09:33

## 2024-10-13 ASSESSMENT — PAIN SCALES - GENERAL
PAINLEVEL_OUTOF10: 7
PAINLEVEL_OUTOF10: 5
PAINLEVEL_OUTOF10: 0
PAINLEVEL_OUTOF10: 9
PAINLEVEL_OUTOF10: 0
PAINLEVEL_OUTOF10: 8

## 2024-10-13 ASSESSMENT — PAIN SCALES - WONG BAKER
WONGBAKER_NUMERICALRESPONSE: NO HURT

## 2024-10-13 ASSESSMENT — PAIN DESCRIPTION - DESCRIPTORS
DESCRIPTORS: ACHING
DESCRIPTORS: ACHING;DISCOMFORT
DESCRIPTORS: ACHING;DISCOMFORT
DESCRIPTORS: ACHING

## 2024-10-13 ASSESSMENT — PAIN - FUNCTIONAL ASSESSMENT
PAIN_FUNCTIONAL_ASSESSMENT: PREVENTS OR INTERFERES SOME ACTIVE ACTIVITIES AND ADLS
PAIN_FUNCTIONAL_ASSESSMENT: PREVENTS OR INTERFERES SOME ACTIVE ACTIVITIES AND ADLS
PAIN_FUNCTIONAL_ASSESSMENT: ACTIVITIES ARE NOT PREVENTED
PAIN_FUNCTIONAL_ASSESSMENT: ACTIVITIES ARE NOT PREVENTED

## 2024-10-13 ASSESSMENT — PAIN DESCRIPTION - ONSET
ONSET: ON-GOING

## 2024-10-13 ASSESSMENT — PAIN DESCRIPTION - PAIN TYPE
TYPE: ACUTE PAIN

## 2024-10-13 ASSESSMENT — PAIN DESCRIPTION - LOCATION
LOCATION: BACK
LOCATION: HEAD
LOCATION: BACK
LOCATION: HEAD

## 2024-10-13 ASSESSMENT — PAIN DESCRIPTION - FREQUENCY
FREQUENCY: CONTINUOUS

## 2024-10-13 ASSESSMENT — PAIN DESCRIPTION - ORIENTATION
ORIENTATION: LOWER;MID
ORIENTATION: MID
ORIENTATION: LOWER
ORIENTATION: MID

## 2024-10-13 NOTE — PROGRESS NOTES
Physical Therapy  Facility/Department: [facility] ICU  Physical Therapy Initial Assessment    Name: Naty Dykes  : 1935  MRN: [id number]  Date of Service: 10/13/2024    Discharge Recommendations:  IP Rehab   PT Equipment Recommendations  Other: defer  At baseline this patient was independent with functional mobility & ADL's. The current hospitalization has resulted in the patient now being limited with all aspects of mobility, negatively impacting the patient's functional independence, ability to safely access their home environment, and ability to complete valued daily tasks and life roles. Naty yDkes is motivated for recovery and demonstrates the ability to tolerate inpatient rehabilitation 3 hours/day, 5 days/week for optimal return to functional independence, quality of life, and decreased caregiver burden.       Patient Diagnosis(es): SDH  Past Medical History:  has a past medical history of Arthritis, Blood circulation, collateral, CAD (coronary artery disease), CHF (congestive heart failure) ([facility]), Confusion, GERD (gastroesophageal reflux disease), History of heart artery stent, Hyperlipidemia, Hypertension, Mitral valve prolapse, Myocardial infarct, old, Seizure (HCC), and Thyroid disease.  Past Surgical History:  has a past surgical history that includes Hysterectomy; Thyroidectomy; Colonoscopy; other surgical history (2018); pr office/outpt visit,procedure only (Right, 2018); eye surgery (Left, 2018); pr office/outpt visit,procedure only (Left, 2018); and hip surgery (Right, 2022).    Assessment  Body Structures, Functions, Activity Limitations Requiring Skilled Therapeutic Intervention: Decreased functional mobility ;Decreased strength;Decreased safe awareness;Decreased cognition;Decreased endurance;Decreased balance;Increased pain  Assessment: Pt is 88 y.o. with diagnosis of SDH presenting with decreased functional mobility.  Pt is currently requiring physical assist

## 2024-10-13 NOTE — CARE COORDINATION
Case Management Assessment  Initial Evaluation    Date/Time of Evaluation: 10/13/2024 9:36 AM  Assessment Completed by: Alondra Doherty RN    If patient is discharged prior to next notation, then this note serves as note for discharge by case management.    Patient Name: Naty Dykes                   YOB: 1935  Diagnosis: Subdural hematoma [S06.5XAA]  Subarachnoid hemorrhage (HCC) [I60.9]                   Date / Time: 10/11/2024  8:25 AM    Patient Admission Status: Inpatient   Readmission Risk (Low < 19, Mod (19-27), High > 27): Readmission Risk Score: 17.3    Current PCP: Debra Shahid, DO  PCP verified by CM? Yes    Chart Reviewed: Yes      History Provided by: Child/Family  Patient Orientation: Other (see comment) (Asleep)    Patient Cognition: Other (see comment) (Unable to assess)    Hospitalization in the last 30 days (Readmission):  No    If yes, Readmission Assessment in CM Navigator will be completed.    Advance Directives:      Code Status: Full Code   Patient's Primary Decision Maker is: Legal Next of Kin    Primary Decision Maker: Ela Villa - Child - [phone]    Secondary Decision Maker: PhilconnieNara - Child - [phone]    Supplemental (Other) Decision Maker: Yessi Hyde - Grandchild - [phone]    Discharge Planning:    Patient lives with: Children Type of Home: House  Primary Care Giver: Family  Patient Support Systems include: Children   Current Financial resources: Medicare  Current community resources: None  Current services prior to admission: None            Current DME:              Type of Home Care services:  PT, OT, Nursing Services    ADLS  Prior functional level: Independent in ADLs/IADLs, Other (see comment) (Does not drive , family transports to appt and errands)  Current functional level: Assistance with the following:, Bathing, Dressing, Cooking, Housework, Shopping, Mobility, Toileting, Feeding    PT AM-PAC:   /24  OT AM-PAC:   /24    Family can  provide assistance at DC: Yes  Would you like Case Management to discuss the discharge plan with any other family members/significant others, and if so, who? Yes (Patient has daughter/PO -- Yahaira at bedside)  Plans to Return to Present Housing: Yes  Other Identified Issues/Barriers to RETURNING to current housing:     Potential Assistance needed at discharge: Home Care            Potential DME:    Patient expects to discharge to: House  Plan for transportation at discharge:      Financial    Payor: MEDICARE / Plan: MEDICARE PART A AND B / Product Type: *No Product type* /     Does insurance require precert for SNF: No    Potential assistance Purchasing Medications: No  Meds-to-Beds request:        [facility] - [place], [place] - [address] - P [phone] - F [phone]  [address]  [address]  [facility] [address]  Phone: [phone] Fax: [phone]      Notes:    Factors facilitating achievement of predicted outcomes: Family support and Caregiver support    Barriers to discharge: Confusion and Cognitive deficit    Additional Case Management Notes: Met with the family at bedside. Jsaon Syed is POA. States the patient lives at home with them, has family to provide care needed. Does not plan any SNF or LTC placement.   Normally uses a walker. Independent with ADLs and some light chores.  Family manage medications, able to afford cost of prescriptions.  Pending PT eval.     Per Dr. Brock, family would like Home Care Services.   Referral called to [facility].       The Plan for Transition of Care is related to the following treatment goals of Subdural hematoma [S06.5XAA]  Subarachnoid hemorrhage (HCC) [I60.9]    IF APPLICABLE: The Patient and/or patient representative Naty and her family were provided with a choice of provider and agrees with the discharge plan. Freedom of choice list with basic dialogue that supports the patient's individualized plan of care/goals and shares the quality  data associated with the providers was provided to: Patient Representative   Patient Representative Name: Yahaira     The Patient and/or Patient Representative Agree with the Discharge Plan? Yes    Alondra Doherty RN  Case Management Department  Ph:  Fax:

## 2024-10-13 NOTE — PROGRESS NOTES
Occupational Therapy  Facility/Department: [facility] ICU  Occupational Therapy Initial Assessment/Treatment     Name: Naty Dykes  : 1935  MRN: [id number]  Date of Service: 10/13/2024    Discharge Recommendations:  IP Rehab  OT Equipment Recommendations  Other: defer     At baseline this patient was independent with functional mobility & ADL's. The current hospitalization has resulted in the patient now being limited with all aspects of mobility, negatively impacting the patient's functional independence, ability to safely access their home environment, and ability to complete valued daily tasks and life roles. Naty Dykes is motivated for recovery and demonstrates the ability to tolerate inpatient rehabilitation 3 hours/day, 5 days/week for optimal return to functional independence, quality of life, and decreased caregiver burden.     Past Medical History:  has a past medical history of Arthritis, Blood circulation, collateral, CAD (coronary artery disease), CHF (congestive heart failure) ([facility]), Confusion, GERD (gastroesophageal reflux disease), History of heart artery stent, Hyperlipidemia, Hypertension, Mitral valve prolapse, Myocardial infarct, old, Seizure (HCC), and Thyroid disease.  Past Surgical History:  has a past surgical history that includes Hysterectomy; Thyroidectomy; Colonoscopy; other surgical history (2018); pr office/outpt visit,procedure only (Right, 2018); eye surgery (Left, 2018); pr office/outpt visit,procedure only (Left, 2018); and hip surgery (Right, 2022).    Treatment Diagnosis: Decreased ADL status and functional mobility 2/2 subdural hematoma      Assessment    Performance deficits / Impairments: Decreased functional mobility ;Decreased endurance;Decreased ADL status;Decreased balance;Decreased strength;Decreased ROM;Decreased safe awareness;Decreased high-level IADLs;Decreased cognition  Assessment: Pt is an 87 yo female who presents to [facility] 2/2 fall  0856         Time Out 0952         Minutes 56         Timed Code Treatment Minutes: 41 Minutes       Yessi Phillips, OT

## 2024-10-13 NOTE — PLAN OF CARE
Problem: Chronic Conditions and Co-morbidities  Goal: Patient's chronic conditions and co-morbidity symptoms are monitored and maintained or improved  Outcome: Progressing  Flowsheets (Taken 10/12/2024 2000)  Care Plan - Patient's Chronic Conditions and Co-Morbidity Symptoms are Monitored and Maintained or Improved: Monitor and assess patient's chronic conditions and comorbid symptoms for stability, deterioration, or improvement     Problem: ABCDS Injury Assessment  Goal: Absence of physical injury  Outcome: Progressing  Flowsheets (Taken 10/13/2024 0119)  Absence of Physical Injury: Implement safety measures based on patient assessment     Problem: Pain  Goal: Verbalizes/displays adequate comfort level or baseline comfort level  Outcome: Progressing  Flowsheets  Taken 10/12/2024 2000 by Karon Al RN  Verbalizes/displays adequate comfort level or baseline comfort level:   Encourage patient to monitor pain and request assistance   Administer analgesics based on type and severity of pain and evaluate response   Assess pain using appropriate pain scale   Implement non-pharmacological measures as appropriate and evaluate response   Consider cultural and social influences on pain and pain management  Taken 10/12/2024 1600 by North Cummings RN  Verbalizes/displays adequate comfort level or baseline comfort level: Encourage patient to monitor pain and request assistance

## 2024-10-13 NOTE — PROGRESS NOTES
NEUROCRITICAL CARE PROGRESS NOTE       Patient Name: Naty Dykes YOB: 1935   Sex: Female Age: 88 yrs     CC / Reason for Consult: Subdural hematoma / AMS    Subjective / ROS / Updates over last 24 hours:  Patient more alert today  EEG - report pending this morning, but w/ improvement will be okay to take down today     ASSESSMENT & RECOMMENDATIONS   Patient is an 89 y/o F w/ tSDH / tSAH, f/u imaging stable  Per family neurologic exam improving  Patient typically awake more in the evening has been sleeping most of day today   No seizures on EEG.     Plan   Neuro checks Q4H  Keppra 500mg BID - stay on keppra until seen in follow up w/ neurology  cEEG - okay to discontinue today  F/u EEG read  SBP <160  PT/OT  DVT prophylaxis - SCD / SQH (started 10/12)  Okay for patient to discharge from neurology standpoint  We will sign off  F/u w/ neurology in 2-3 months  Please call with questions.     Management and plan discussed with:  Nursing staff  Dr. Rocío Dang, APRN - CNP   NEUROCRITICAL CARE  10/13/2024 7:30 AM  [facility]: [facility] NEUROCRITICAL CARE    HISTORY     Allergies Allergies   Allergen Reactions    Benazepril Hcl Shortness Of Breath and Swelling    Feldene [Piroxicam] Shortness Of Breath    Hytrin [Terazosin Hcl] Shortness Of Breath    Iv Contrast [Iodides] Anaphylaxis    Acetaminophen     Celebrex [Celecoxib]      GI upset    Cephalexin      Nausea    Hydrochlorothiazide     Iodinated Casein     Monopril [Fosinopril Sodium] Other (See Comments)     Pt states makes her weak    Protonix [Pantoprazole Sodium] Other (See Comments)     Kidney failure      Quinapril Hcl     Ultracet [Tramadol-Acetaminophen]      Rash    Ziac [Bisoprolol-Hydrochlorothiazide] Other (See Comments)     Pt does not remember reaction to med ? Weak       Diet ADULT ORAL NUTRITION SUPPLEMENT; Breakfast, Lunch, Dinner; Standard High Calorie/High Protein Oral Supplement  ADULT DIET; Dysphagia - Minced and  Moist; Low Sodium (2 gm)   Isolation No active isolations     LABS   Metabolic Panel Recent Labs     10/11/24  0045 10/11/24  0959 10/12/24  0500 10/13/24  0423   * 136 136 135*   K 3.6 4.5 4.8 4.1   CL 91* 96* 100 96*   CO2 27 29 27 25   BUN 27* 25* 20 19   CREATININE 1.7* 1.6* 1.3* 1.3*   GLUCOSE 106* 95 79 73   CALCIUM 8.8 8.9 8.6 9.0   MG  --  2.30 2.20 2.10   ALKPHOS 114  --   --   --    ALT 13  --   --   --    AST 26  --   --   --       CBC / Coags Recent Labs     10/11/24  0045 10/11/24  1000 10/12/24  0500 10/13/24  0423   WBC 7.3 7.0 5.9 7.2   RBC 3.23* 3.01* 2.78* 3.12*   HGB 9.7* 9.1* 8.4* 9.5*   HCT 29.0* 27.2* 25.5* 28.0*    261 227 282   INR 1.28*  --   --   --       Other Lab Results   Component Value Date/Time    LABA1C 5.4 10/11/2024 10:00 AM    TRIG 62 10/11/2024 09:59 AM    TSH 1.48 09/10/2024 02:39 PM    [id number] 221 08/20/2021 02:17 PM    FOLATE >20.00 08/20/2021 02:17 PM    COVID19 NOT DETECTED 11/06/2022 04:02 PM    COVID19 DETECTED 11/25/2020 05:28 PM     Lab Results   Component Value Date/Time    LACTA 1.1 05/30/2022 12:45 AM        CURRENT SCHEDULED MEDICATIONS   Inpatient Medications     heparin (porcine), 5,000 Units, SubCUTAneous, 3 times per day    sodium chloride flush, 5-40 mL, IntraVENous, 2 times per day    atorvastatin, 80 mg, Oral, Daily    metoprolol succinate, 50 mg, Oral, Daily    amiodarone, 50 mg, Oral, Daily    furosemide, 40 mg, Oral, Daily    Vitamin D, 1,000 Units, Oral, Daily    vitamin B-12, 1,000 mcg, Oral, Daily    lidocaine, 1 patch, TransDERmal, Daily    levETIRAcetam, 500 mg, IntraVENous, Q12H    famotidine (PEPCID) injection, 20 mg, IntraVENous, Daily   Infusions    sodium chloride        Antibiotics   Recent Abx Admin        No antibiotic orders with administrations found.                      DIAGNOSTICS   IMAGES:  No new imaging to review today      PHYSICAL EXAMINATION     PHYSICAL EXAM:  Vitals:    10/13/24 0400 10/13/24 0500 10/13/24 0600  10/13/24 0700   BP: (!) 167/61 133/89 (!) 156/57    Pulse: 65 59 60 64   Resp: 18 15 15 17   Temp: 99.5 °F (37.5 °C)      TempSrc: Oral      SpO2: 92% 94%     Weight:       Height:             General: sleepy, wakes to voice / light touch  Neurologic  Mental status:  Orientation: person, place, year  Attention: intact    Language: fluent  Speech: clear  Comprehension intact; follows simple commands    Cranial nerves:   Pupils equal   Gaze conjugate  Face symmetric  Sensation intact  No dysarthria      Motor Exam:  Equal strength throughout, poor effort    Sensory: light touch intact and symmetric in all 4 extremities.   Tone: normal in all 4 extremities

## 2024-10-13 NOTE — PROCEDURES
LONG-TERM EEG-VIDEO MONITORING   CLINICAL NEUROPHYSIOLOGY LABORATORY  DEPARTMENT OF NEUROLOGY  [facility]     Patient: Naty Dykes  Age: 88 y.o.  MRN: [id number]    Referring Physician: Milli Collins MD  History: The patient is a 88 y.o. female who presented breakthrough seizure/encephalopathy. This long-term video-EEG monitoring study was performed to determine the nature of the patient's clinical events. The patient is on neuroactive medications.   Naty Dykes   Current Facility-Administered Medications   Medication Dose Route Frequency Provider Last Rate Last Admin    heparin (porcine) injection 5,000 Units  5,000 Units SubCUTAneous 3 times per day Dottie Dang APRN - CNP   5,000 Units at 10/13/24 1521    sodium chloride flush 0.9 % injection 5-40 mL  5-40 mL IntraVENous 2 times per day Zoe Wagner MD   10 mL at 10/13/24 0934    sodium chloride flush 0.9 % injection 5-40 mL  5-40 mL IntraVENous PRN Zoe Wagner MD        0.9 % sodium chloride infusion   IntraVENous PRN Zoe Wagner MD        ondansetron (ZOFRAN-ODT) disintegrating tablet 4 mg  4 mg Oral Q8H PRN Zoe Wagner MD        Or    ondansetron (ZOFRAN) injection 4 mg  4 mg IntraVENous Q6H PRN Zoe Wagner MD        atorvastatin (LIPITOR) tablet 80 mg  80 mg Oral Daily Zoe Wagner MD   80 mg at 10/13/24 0933    metoprolol succinate (TOPROL XL) extended release tablet 50 mg  50 mg Oral Daily Zoe Wagner MD   50 mg at 10/13/24 0933    amiodarone (CORDARONE) tablet 50 mg  50 mg Oral Daily Zoe Wagner MD   50 mg at 10/13/24 0934    furosemide (LASIX) tablet 40 mg  40 mg Oral Daily Zoe Wagner MD   40 mg at 10/13/24 0932    Vitamin D (CHOLECALCIFEROL) tablet 1,000 Units  1,000 Units Oral Daily Zoe Wagner MD   1,000 Units at 10/13/24 0933    labetalol (NORMODYNE;TRANDATE) injection 10 mg  10 mg IntraVENous Q4H PRN Zoe Wagner MD   10 mg at 10/13/24 0942    oxyCODONE-acetaminophen (PERCOCET)  MG per tablet 1 tablet  1  sharp wave discharges with triphasic wave morphology, rarely this progresses to generalized periodic discharges .  There are occasional, medium to large amplitude, 1 to 1.5 Hz, right hemisphere lateralized periodic discharges.  There is continuous right hemisphere polymorphic delta and theta slowing.  There was poor anterior posterior amplitude gradient.  Sleep structures were not seen.  The EKG channel revealed no abnormalities.    Ictal EEG Recording / Patient Events: During this period the patient had no events or seizures.    Summary: During this day of recording no events were recorded. The interictal EEG was abnormal due to diffuse frontally predominant sharp waves; generalized periodic discharges with triphasic wave morphology and delta slowing admixed with  theta frequencies suggestive of severe encephalopathy with cortical irritability.  The right hemisphere lateralized periodic discharges is suggestive of cortical irritability in the right hemisphere with risk of seizures.  The continuous right hemisphere polymorphic delta and theta slowing is suggestive of underlying focal structural dysfunction in the right hemisphere.  Monitoring was continued in order to record the patient's typical events. The EKG channel revealed no abnormalities.    Cruz Reyes MD  [facility] [place]

## 2024-10-13 NOTE — PROGRESS NOTES
V2.0    [facility] Progress Note      Name:  Naty Dykes /Age/Sex: 1935  (88 y.o. female)   MRN & CSN:  [id number] & [id number] Encounter Date/Time: 10/13/2024 9:25 AM EDT   Location:  [facility]- PCP: Debra Shahid DO     Attending:Marie Brock MD       Hospital Day: 3    HPI:Patient is a 88-year-old female with a past medical history of coronary artery disease, GERD, hyperlipidemia, paroxysmal A-fib on Eliquis, hypothyroidism who presented to [place] ED after a fall 10/4/2024. Patient had a mechanical fall after she tripped. Patient had worsening back pain after the fall which limited her mobility. Patient had hallucinations with vomiting and appeared to have seizure-like activities hence EMS was called.     Assessment and Recommendations     Hospital course:    Naty Dykes is a 88 y.o. female with pmh of coronary artery disease, CVA, GERD, hyperlipidemia, hypertension, hypothyroidism who presents with Subdural hematoma.  CT showed a 1.4 cm subdural hematoma with mass effect upon the right frontal parietal and temporal lobe with midline shift.  Eliquis was reversed in the ED.  Repeat CT was unchanged.  Neurosurgery and neurocritical care was consulted.  Patient will need a repeat CT in 2 to 4 weeks.  Anticoagulation to be started on left outpatient follow-up with neurosurgery      Plan:     Subdural hematoma with mild subarachnoid hemorrhage-will need outpatient follow-up with neurosurgery  -CT shows subdural hematoma 1.4 cm with mass effect upon the right frontal parietal and temporal lobe with midline shift  -Neurosurgery consulted, consult appreciated, will need outpatient follow-up with repeat CT in 2 to 4 weeks.    -Neurocritical care consulted  -Got PCC to reverse Eliquis  -Repeat CT unchanged       Seizure-continue seizure medications with outpatient follow-up with neurology  -Started on Keppra, continue on discharge  -Continuous EEG noted, no seizures seen  -Neurocritical care  along the right lateral convexity overlying the right temporal, frontal and parietal lobes measuring up to 1.4 cm.  Smaller subdural measuring 3 mm along the anterior inferior falx which extends anterior to the parasagittal left frontal lobe inferiorly. There is a small amount of subarachnoid hemorrhage overlying the right frontal lobe there is a small amount of subarachnoid hemorrhage versus contusion involving the right temporal lobe.  There is 4 mm right to left midline shift.  Additional small subdural hematoma along the right tentorium. Moderate chronic ischemic white matter changes.  No evidence of acute infarction. ORBITS: The visualized portion of the orbits demonstrate no acute abnormality. SINUSES: The visualized paranasal sinuses and mastoid air cells demonstrate no acute abnormality. SOFT TISSUES/SKULL:  No acute abnormality of the visualized skull or soft tissues.     Right hemispheric subdural hematoma measuring 1.4 cm in width with mass effect upon the right frontal, parietal and temporal lobes and 4 mm right to left midline shift. Small subdural hematoma involving the anterior inferior falx extending anterior to the left parasagittal frontal lobe.  Additional small subdural along the right tentorium. Mild subarachnoid hemorrhage over the right frontal lobe and right temporal lobe. Critical results were called by Dr. Marty Kraft to Dr. Collins on 10/11/2024 at 01:36.       CBC:   Recent Labs     10/11/24  1000 10/12/24  0500 10/13/24  0423   WBC 7.0 5.9 7.2   HGB 9.1* 8.4* 9.5*    227 282     BMP:    Recent Labs     10/11/24  0959 10/12/24  0500 10/13/24  0423    136 135*   K 4.5 4.8 4.1   CL 96* 100 96*   CO2 29 27 25   BUN 25* 20 19   CREATININE 1.6* 1.3* 1.3*   GLUCOSE 95 79 73     Hepatic:   Recent Labs     10/11/24  0045   AST 26   ALT 13   BILITOT 0.6   ALKPHOS 114     Lipids:   Lab Results   Component Value Date/Time    CHOL 119 10/11/2024 09:59 AM    HDL 57 10/11/2024 09:59 AM     TRIG 62 10/11/2024 09:59 AM     Hemoglobin A1C:   Lab Results   Component Value Date/Time    LABA1C 5.4 10/11/2024 10:00 AM     TSH:   Lab Results   Component Value Date/Time    TSH 1.48 09/10/2024 02:39 PM     Troponin: No results found for: \"TROPONINT\"  Lactic Acid: No results for input(s): \"LACTA\" in the last 72 hours.  BNP: No results for input(s): \"PROBNP\" in the last 72 hours.  UA:  Lab Results   Component Value Date/Time    NITRU Negative 10/11/2024 06:27 PM    COLORU Yellow 10/11/2024 06:27 PM    PHUR 7.0 10/11/2024 06:27 PM    PHUR 7.5 11/06/2022 09:01 PM    WBCUA 3-5 10/11/2024 06:27 PM    RBCUA 0-2 10/11/2024 06:27 PM    BACTERIA Rare 11/06/2022 09:01 PM    CLARITYU Clear 10/11/2024 06:27 PM    SPECGRAV 1.010 01/23/2014 03:00 PM    LEUKOCYTESUR TRACE 10/11/2024 06:27 PM    UROBILINOGEN 0.2 10/11/2024 06:27 PM    BILIRUBINUR Negative 10/11/2024 06:27 PM    BLOODU Negative 10/11/2024 06:27 PM    GLUCOSEU Negative 10/11/2024 06:27 PM    KETUA Negative 10/11/2024 06:27 PM    AMORPHOUS 1+ 01/12/2014 11:30 PM     Urine Cultures:   Lab Results   Component Value Date/Time    LABURIN >100,000 CFU/ml 05/29/2022 12:18 PM     Blood Cultures:   Lab Results   Component Value Date/Time    BC No Growth after 4 days of incubation. 05/29/2022 12:55 PM     Lab Results   Component Value Date/Time    BLOODCULT2 No Growth after 4 days of incubation. 05/29/2022 01:18 PM     Organism:   Lab Results   Component Value Date/Time    ORG Escherichia coli 05/29/2022 12:18 PM         Electronically signed by Marie Brock MD on 10/13/2024 at 7:22 AM  Comment: Please note this report has been produced using speech recognition software and may contain errors related to that system including errors in grammar, punctuation, and spelling, as well as words and phrases that may be inappropriate. If there are any questions or concerns, please feel free to contact the dictating provider for clarification.

## 2024-10-13 NOTE — PROGRESS NOTES
Brief note  Patient seen and examined  Family at bedside reports she is making more and more appropriate conversation    cEEG  The interictal EEG was abnormal due to diffuse frontally predominant sharp waves with triphasic wave morphology and delta slowing admixed with theta frequencies suggestive of severe encephalopathy. The right hemisphere lateralized periodic discharges is suggestive of cortical irritability in the right hemisphere with risk of seizures. The continuous right hemisphere polymorphic delta and theta slowing is suggestive of underlying focal structural dysfunction in the right hemisphere.     Exam  -Mental status: A&O x4; pleasant & appropriate  -Speech & Language: no aphasia; no dysarthria; follows commands  -Cranial nerves: pupils symmetric; EOMI by tracking, no notable dysconjugate gaze; eyes midline; no facial asymmetry  -Motor: moving all extremities symmetrically and fully; a bit less brisk in LUE but this improves as she wakes up  -Other: no adventitious movements noted  Other Systems  -General Appearance: well-developed, well-nourished, no apparent distress  -Neck: supple  -Lungs: breathing unlabored, regular, no audible wheezes  -CV: pulses strong x4 extremities  -Abd: flat    Ela Mckenna NP  [facility]

## 2024-10-14 LAB
ANION GAP SERPL CALCULATED.3IONS-SCNC: 15 MMOL/L (ref 3–16)
BUN SERPL-MCNC: 23 MG/DL (ref 7–20)
CALCIUM SERPL-MCNC: 9.2 MG/DL (ref 8.3–10.6)
CHLORIDE SERPL-SCNC: 93 MMOL/L (ref 99–110)
CO2 SERPL-SCNC: 23 MMOL/L (ref 21–32)
CREAT SERPL-MCNC: 1.4 MG/DL (ref 0.6–1.2)
DEPRECATED RDW RBC AUTO: 13.6 % (ref 12.4–15.4)
GFR SERPLBLD CREATININE-BSD FMLA CKD-EPI: 36 ML/MIN/{1.73_M2}
GLUCOSE SERPL-MCNC: 62 MG/DL (ref 70–99)
HCT VFR BLD AUTO: 32.6 % (ref 36–48)
HGB BLD-MCNC: 10.8 G/DL (ref 12–16)
MAGNESIUM SERPL-MCNC: 2.3 MG/DL (ref 1.8–2.4)
MCH RBC QN AUTO: 30.2 PG (ref 26–34)
MCHC RBC AUTO-ENTMCNC: 33.3 G/DL (ref 31–36)
MCV RBC AUTO: 90.7 FL (ref 80–100)
PLATELET # BLD AUTO: 289 K/UL (ref 135–450)
PMV BLD AUTO: 9.2 FL (ref 5–10.5)
POTASSIUM SERPL-SCNC: 4.6 MMOL/L (ref 3.5–5.1)
RBC # BLD AUTO: 3.59 M/UL (ref 4–5.2)
SODIUM SERPL-SCNC: 131 MMOL/L (ref 136–145)
WBC # BLD AUTO: 7 K/UL (ref 4–11)

## 2024-10-14 PROCEDURE — 6360000002 HC RX W HCPCS: Performed by: STUDENT IN AN ORGANIZED HEALTH CARE EDUCATION/TRAINING PROGRAM

## 2024-10-14 PROCEDURE — 2580000003 HC RX 258

## 2024-10-14 PROCEDURE — 6370000000 HC RX 637 (ALT 250 FOR IP)

## 2024-10-14 PROCEDURE — 83735 ASSAY OF MAGNESIUM: CPT

## 2024-10-14 PROCEDURE — 36415 COLL VENOUS BLD VENIPUNCTURE: CPT

## 2024-10-14 PROCEDURE — 80048 BASIC METABOLIC PNL TOTAL CA: CPT

## 2024-10-14 PROCEDURE — 6370000000 HC RX 637 (ALT 250 FOR IP): Performed by: INTERNAL MEDICINE

## 2024-10-14 PROCEDURE — 97530 THERAPEUTIC ACTIVITIES: CPT

## 2024-10-14 PROCEDURE — 1200000000 HC SEMI PRIVATE

## 2024-10-14 PROCEDURE — 92526 ORAL FUNCTION THERAPY: CPT

## 2024-10-14 PROCEDURE — 6360000002 HC RX W HCPCS

## 2024-10-14 PROCEDURE — 6360000002 HC RX W HCPCS: Performed by: NURSE PRACTITIONER

## 2024-10-14 PROCEDURE — 97110 THERAPEUTIC EXERCISES: CPT

## 2024-10-14 PROCEDURE — 2500000003 HC RX 250 WO HCPCS

## 2024-10-14 PROCEDURE — 85027 COMPLETE CBC AUTOMATED: CPT

## 2024-10-14 RX ORDER — LEVETIRACETAM 500 MG/1
500 TABLET ORAL 2 TIMES DAILY
Qty: 60 TABLET | Refills: 3 | Status: ON HOLD | OUTPATIENT
Start: 2024-10-14

## 2024-10-14 RX ORDER — SENNOSIDES A AND B 8.6 MG/1
1 TABLET, FILM COATED ORAL NIGHTLY
Status: DISCONTINUED | OUTPATIENT
Start: 2024-10-14 | End: 2024-10-16 | Stop reason: HOSPADM

## 2024-10-14 RX ORDER — SENNOSIDES A AND B 8.6 MG/1
1 TABLET, FILM COATED ORAL NIGHTLY
Qty: 30 TABLET | Refills: 0 | Status: ON HOLD | OUTPATIENT
Start: 2024-10-14 | End: 2024-11-13

## 2024-10-14 RX ADMIN — AMIODARONE HYDROCHLORIDE 50 MG: 200 TABLET ORAL at 09:18

## 2024-10-14 RX ADMIN — LEVETIRACETAM 500 MG: 100 INJECTION INTRAVENOUS at 15:05

## 2024-10-14 RX ADMIN — DICLOFENAC SODIUM TOPICAL GEL, 1% 2 G: 10 GEL TOPICAL at 16:00

## 2024-10-14 RX ADMIN — HEPARIN SODIUM 5000 UNITS: 5000 INJECTION INTRAVENOUS; SUBCUTANEOUS at 20:44

## 2024-10-14 RX ADMIN — SODIUM CHLORIDE, PRESERVATIVE FREE 20 MG: 5 INJECTION INTRAVENOUS at 09:15

## 2024-10-14 RX ADMIN — OXYCODONE AND ACETAMINOPHEN 1 TABLET: 10; 325 TABLET ORAL at 20:43

## 2024-10-14 RX ADMIN — HEPARIN SODIUM 5000 UNITS: 5000 INJECTION INTRAVENOUS; SUBCUTANEOUS at 05:39

## 2024-10-14 RX ADMIN — SODIUM CHLORIDE, PRESERVATIVE FREE 10 ML: 5 INJECTION INTRAVENOUS at 20:49

## 2024-10-14 RX ADMIN — HEPARIN SODIUM 5000 UNITS: 5000 INJECTION INTRAVENOUS; SUBCUTANEOUS at 15:04

## 2024-10-14 RX ADMIN — Medication 1000 UNITS: at 09:19

## 2024-10-14 RX ADMIN — LABETALOL HYDROCHLORIDE 10 MG: 5 INJECTION, SOLUTION INTRAVENOUS at 05:39

## 2024-10-14 RX ADMIN — LABETALOL HYDROCHLORIDE 10 MG: 5 INJECTION, SOLUTION INTRAVENOUS at 19:15

## 2024-10-14 RX ADMIN — SENNOSIDES 8.6 MG: 8.6 TABLET, FILM COATED ORAL at 20:44

## 2024-10-14 RX ADMIN — ATORVASTATIN CALCIUM 80 MG: 80 TABLET, FILM COATED ORAL at 09:19

## 2024-10-14 RX ADMIN — LABETALOL HYDROCHLORIDE 10 MG: 5 INJECTION, SOLUTION INTRAVENOUS at 00:13

## 2024-10-14 RX ADMIN — DICLOFENAC SODIUM TOPICAL GEL, 1% 2 G: 10 GEL TOPICAL at 20:44

## 2024-10-14 RX ADMIN — OXYCODONE AND ACETAMINOPHEN 1 TABLET: 10; 325 TABLET ORAL at 09:30

## 2024-10-14 RX ADMIN — FUROSEMIDE 40 MG: 40 TABLET ORAL at 09:20

## 2024-10-14 RX ADMIN — OXYCODONE AND ACETAMINOPHEN 1 TABLET: 10; 325 TABLET ORAL at 00:13

## 2024-10-14 RX ADMIN — SODIUM CHLORIDE, PRESERVATIVE FREE 10 ML: 5 INJECTION INTRAVENOUS at 09:17

## 2024-10-14 RX ADMIN — CYANOCOBALAMIN TAB 1000 MCG 1000 MCG: 1000 TAB at 09:18

## 2024-10-14 RX ADMIN — METOPROLOL SUCCINATE 50 MG: 50 TABLET, EXTENDED RELEASE ORAL at 09:19

## 2024-10-14 RX ADMIN — LEVETIRACETAM 500 MG: 100 INJECTION INTRAVENOUS at 02:58

## 2024-10-14 ASSESSMENT — PAIN DESCRIPTION - FREQUENCY: FREQUENCY: CONTINUOUS

## 2024-10-14 ASSESSMENT — PAIN SCALES - GENERAL
PAINLEVEL_OUTOF10: 4
PAINLEVEL_OUTOF10: 4
PAINLEVEL_OUTOF10: 0
PAINLEVEL_OUTOF10: 5
PAINLEVEL_OUTOF10: 4
PAINLEVEL_OUTOF10: 5
PAINLEVEL_OUTOF10: 5
PAINLEVEL_OUTOF10: 4
PAINLEVEL_OUTOF10: 0
PAINLEVEL_OUTOF10: 0
PAINLEVEL_OUTOF10: 7
PAINLEVEL_OUTOF10: 9
PAINLEVEL_OUTOF10: 9
PAINLEVEL_OUTOF10: 4

## 2024-10-14 ASSESSMENT — PAIN SCALES - WONG BAKER
WONGBAKER_NUMERICALRESPONSE: NO HURT

## 2024-10-14 ASSESSMENT — PAIN DESCRIPTION - LOCATION
LOCATION: ARM
LOCATION: BACK;SHOULDER
LOCATION: ARM
LOCATION: BACK

## 2024-10-14 ASSESSMENT — PAIN DESCRIPTION - ORIENTATION
ORIENTATION: LEFT
ORIENTATION: RIGHT
ORIENTATION: RIGHT
ORIENTATION: LEFT

## 2024-10-14 ASSESSMENT — PAIN DESCRIPTION - ONSET: ONSET: ON-GOING

## 2024-10-14 ASSESSMENT — PAIN DESCRIPTION - PAIN TYPE: TYPE: ACUTE PAIN

## 2024-10-14 ASSESSMENT — PAIN - FUNCTIONAL ASSESSMENT
PAIN_FUNCTIONAL_ASSESSMENT: ACTIVITIES ARE NOT PREVENTED
PAIN_FUNCTIONAL_ASSESSMENT: ACTIVITIES ARE NOT PREVENTED

## 2024-10-14 ASSESSMENT — PAIN DESCRIPTION - DESCRIPTORS
DESCRIPTORS: ACHING;DISCOMFORT
DESCRIPTORS: ACHING;DISCOMFORT

## 2024-10-14 NOTE — PLAN OF CARE
Problem: Chronic Conditions and Co-morbidities  Goal: Patient's chronic conditions and co-morbidity symptoms are monitored and maintained or improved  10/14/2024 1006 by Aisha Velasquez RN  Outcome: Progressing  Care Plan - Patient's Chronic Conditions and Co-Morbidity Symptoms are Monitored and Maintained or Improved:   Monitor and assess patient's chronic conditions and comorbid symptoms for stability, deterioration, or improvement   Collaborate with multidisciplinary team to address chronic and comorbid conditions and prevent exacerbation or deterioration   Update acute care plan with appropriate goals if chronic or comorbid symptoms are exacerbated and prevent overall improvement and discharge     Problem: Discharge Planning  Goal: Discharge to home or other facility with appropriate resources  10/14/2024 1006 by Aisha Velasquez RN  Outcome: Progressing  Discharge to home or other facility with appropriate resources:   Identify barriers to discharge with patient and caregiver   Arrange for needed discharge resources and transportation as appropriate   Identify discharge learning needs (meds, wound care, etc)     Problem: Skin/Tissue Integrity  Goal: Absence of new skin breakdown  Description: 1.  Monitor for areas of redness and/or skin breakdown  2.  Assess vascular access sites hourly  3.  Every 4-6 hours minimum:  Change oxygen saturation probe site  4.  Every 4-6 hours:  If on nasal continuous positive airway pressure, respiratory therapy assess nares and determine need for appliance change or resting period.  10/14/2024 1006 by Aisha Velasquez RN  Outcome: Progressing    Problem: Safety - Adult  Goal: Free from fall injury  10/14/2024 1006 by Aisha Velasquez RN  Outcome: Progressing  Free From Fall Injury: Instruct family/caregiver on patient safety   Patient is a fall risk. Fall risk protocol in place as per fall risk score, see assessment for details. Bed is locked and in lowest possible position, side

## 2024-10-14 NOTE — PROGRESS NOTES
Physical Therapy  Facility/Department: [facility] ICU  Daily Treatment Note  NAME: Naty Dykes  : 1935  MRN: [id number]    Date of Service: 10/14/2024    Discharge Recommendations:  IP Rehab, Subacute/Skilled Nursing Facility   PT Equipment Recommendations  Other: defer      Assessment  Assessment: Pt continues to function well below baseline. She requires max A for bed mobility & transfers & assist x 2 to take a few steps with RW. Safety concerns for pt to go home upon D/C. Recommend further inpt PT. Will follow per plan of care.  Activity Tolerance: Patient tolerated treatment well;Patient limited by fatigue;Patient limited by endurance;Patient limited by pain  Other: defer    Plan  Physical Therapy Plan  General Plan: 5-7 times per week  Current Treatment Recommendations: Strengthening;ROM;Balance training;Functional mobility training;Transfer training;Endurance training;Gait training;Stair training;Neuromuscular re-education;Home exercise program;Safety education & training;Patient/Caregiver education & training;Equipment evaluation, education, & procurement;Therapeutic activities    Restrictions  Position Activity Restriction  Other position/activity restrictions: up with assist, seizure precautions     Subjective   Subjective  Subjective: Pt supine in bed & agreeable to PT.  Pain: pt c/o 10/10 left UE pain. RN aware, pain meds had been given.    Objective     Bed Mobility Training  Bed Mobility Training: Yes  Supine to Sit: Maximum assistance;Additional time (HOB elevated)  Scooting: Total assistance (x 1-2 seated)  Balance  Sitting - Static:  (SBA)  Standing - Static:  (CGA/min A with RW)  Standing - Dynamic:  (min A x 2 with RW)  Transfer Training  Transfer Training: Yes  Interventions: Verbal cues;Tactile cues;Safety awareness training  Sit to Stand: Maximum assistance (from bed & BSC)  Stand to Sit: Maximum assistance  Stand Pivot Transfers: Assist X2;Additional time (min A x 2. bed->BSC, then  BSC->chair with RW)  Gait  Gait Training:  (pt took a few small steps with RW during each stand pivot transfer as above. slow & effortful. verbal cues required. pt fatigued)     PT Exercises  Exercise Treatment: seated x 10 bilat LE: ankle pumps, heel slides, hip abd, SLR. pt required cues to initiate & to remain on task. pt having difficulty following instructions. this is not baseline cognition per daughters.  Other Specialty Interventions  Other Treatments/Modalities: pt toileted on BSC. required total assist to change brief & pants & for chelsey-care.  Safety Devices  Type of Devices: Left in chair;Chair alarm in place;Call light within reach;Nurse notified      Goals  Short Term Goals  Time Frame for Short Term Goals: Discharge (all ongoing)  Short Term Goal 1: Pt will perform all bed mobility with supervision  Short Term Goal 2: Pt will perform sit to/from stand with RW and Tricia  Short Term Goal 3: Pt will ambulate 15' with RW and ModA  Patient Goals   Patient Goals : none stated    Education  Patient Education  Education Given To: Patient;Family  Education Provided: Plan of Care;Home Exercise Program;Transfer Training  Education Method: Verbal  Education Outcome: Verbalized understanding;Continued education needed    AM-PAC - Mobility    AM-PAC Basic Mobility - Inpatient   How much help is needed turning from your back to your side while in a flat bed without using bedrails?: A Lot  How much help is needed moving from lying on your back to sitting on the side of a flat bed without using bedrails?: A Lot  How much help is needed moving to and from a bed to a chair?: A Lot  How much help is needed standing up from a chair using your arms?: A Lot  How much help is needed walking in hospital room?: Total  How much help is needed climbing 3-5 steps with a railing?: Total  AM-PAC Inpatient Mobility Raw Score : 10  AM-PAC Inpatient T-Scale Score : 32.29  Mobility Inpatient CMS 0-100% Score: 76.75  Mobility Inpatient

## 2024-10-14 NOTE — PLAN OF CARE
Problem: Chronic Conditions and Co-morbidities  Goal: Patient's chronic conditions and co-morbidity symptoms are monitored and maintained or improved  Outcome: Progressing  Flowsheets (Taken 10/13/2024 2000)  Care Plan - Patient's Chronic Conditions and Co-Morbidity Symptoms are Monitored and Maintained or Improved:   Monitor and assess patient's chronic conditions and comorbid symptoms for stability, deterioration, or improvement   Collaborate with multidisciplinary team to address chronic and comorbid conditions and prevent exacerbation or deterioration   Update acute care plan with appropriate goals if chronic or comorbid symptoms are exacerbated and prevent overall improvement and discharge     Problem: Discharge Planning  Goal: Discharge to home or other facility with appropriate resources  Outcome: Progressing  Flowsheets (Taken 10/13/2024 2000)  Discharge to home or other facility with appropriate resources:   Identify barriers to discharge with patient and caregiver   Arrange for needed discharge resources and transportation as appropriate   Identify discharge learning needs (meds, wound care, etc)     Problem: Skin/Tissue Integrity  Goal: Absence of new skin breakdown  Description: 1.  Monitor for areas of redness and/or skin breakdown  2.  Assess vascular access sites hourly  3.  Every 4-6 hours minimum:  Change oxygen saturation probe site  4.  Every 4-6 hours:  If on nasal continuous positive airway pressure, respiratory therapy assess nares and determine need for appliance change or resting period.  Outcome: Progressing     Problem: Safety - Adult  Goal: Free from fall injury  Outcome: Progressing  Flowsheets (Taken 10/14/2024 0217)  Free From Fall Injury: Instruct family/caregiver on patient safety     Problem: ABCDS Injury Assessment  Goal: Absence of physical injury  Outcome: Progressing  Flowsheets (Taken 10/13/2024 0119 by Karon Al RN)  Absence of Physical Injury: Implement  labs, and treatment plans   Recommend appropriate diets, oral nutritional supplements, and vitamin/mineral supplements   Order, calculate, and assess calorie counts as needed      28-Jul-2024 19:57

## 2024-10-14 NOTE — DISCHARGE SUMMARY
CONTINUE taking these medications      amiodarone 100 MG tablet  Commonly known as: PACERONE  TAKE 0.5 TABLETS (50 MG) BY MOUTH ONCE DAILY (AM)     atorvastatin 80 MG tablet  Commonly known as: LIPITOR  TAKE 1 TABLET BY MOUTH ONE TIME A DAY (EVENING)     Co Q 10 100 MG Caps     famotidine 20 MG tablet  Commonly known as: PEPCID  TAKE 1 TABLET BY MOUTH ONCE DAILY (AM)     furosemide 40 MG tablet  Commonly known as: LASIX  TAKE 1 TABLET BY MOUTH DAILY IN THE MORNING     metoprolol succinate 50 MG extended release tablet  Commonly known as: TOPROL XL  Take 1 tablet by mouth daily     nitroGLYCERIN 0.4 MG SL tablet  Commonly known as: NITROSTAT  Place 1 tablet under the tongue every 5 minutes as needed for Chest pain up to max of 3 total doses. If no relief after 1 dose, call 911.     ondansetron 4 MG tablet  Commonly known as: ZOFRAN  Take 1 tablet by mouth 3 times daily as needed for Nausea or Vomiting     oxyCODONE-acetaminophen  MG per tablet  Commonly known as: PERCOCET     vitamin B-12 1000 MCG extended release tablet  TAKE 1 TABLET (1,000 MCG) BY MOUTH DAILY (AM)     vitamin D3 25 MCG (1000 UT) Tabs tablet  Commonly known as: CHOLECALCIFEROL  TAKE 1 TABLET (1,000 IU) BY MOUTH DAILY IN THE MORNING WITH FOOD PREFERABLY WITH A MEAL            STOP taking these medications      apixaban 2.5 MG Tabs tablet  Commonly known as: Eliquis     Aspirin Low Dose 81 MG chewable tablet  Generic drug: aspirin     fluticasone 50 MCG/ACT nasal spray  Commonly known as: FLONASE     miconazole 2 % powder  Commonly known as: MICOTIN     tiZANidine 2 MG tablet  Commonly known as: ZANAFLEX               Where to Get Your Medications        These medications were sent to [id number] - [place], [place] - [address] - P [phone] - F [phone] [address] [facility] [address]      Phone: [phone]   levETIRAcetam 500 MG tablet        Objective Findings at Discharge:   BP (!) 150/72   by Kalia Vasquez    CT HEAD WO CONTRAST    Result Date: 10/11/2024  EXAMINATION: CT OF THE HEAD WITHOUT CONTRAST  10/11/2024 6:39 a.m. TECHNIQUE: CT of the head was performed without the administration of intravenous contrast. Automated exposure control, iterative reconstruction, and/or weight based adjustment of the mA/kV was utilized to reduce the radiation dose to as low as reasonably achievable. COMPARISON: Films from the same day HISTORY: ORDERING SYSTEM PROVIDED HISTORY: SDH TECHNOLOGIST PROVIDED HISTORY: Reason for exam:->SDH Has a \"code stroke\" or \"stroke alert\" been called?->No Reason for Exam: SDH FINDINGS: BRAIN/VENTRICLES: Ventricles are stable in size, shape, and position. There is redemonstration of known predominantly right-sided subdural hematoma.  Midline component measures 3-4 mm, similar to prior. Subdural component along the right parietal lobe which previously measured 1.4 cm in short axis, appears similar. Midline shift to the left measures 3-4 mm, similar to prior. There is underlying atrophy and periventricular-scattered frontal parietal small-vessel ischemic change. Small amount of intraventricular hemorrhage in the left occipital horn are also appears similar as does scattered subarachnoid hemorrhage in the region of the right temporal lobe. ORBITS: The visualized portion of the orbits demonstrate no acute abnormality. SINUSES: No significant mastoid opacification noted.  No air-fluid level seen in the sinuses. SOFT TISSUES/SKULL:  No acute abnormality of the visualized skull or soft tissues.     No significant change in subdural hematoma, with mass effect and midline shift to the left, similar to prior. Scattered areas of subarachnoid hemorrhage are also seen, similar to prior. Small amount hemorrhage in the left occipital horn also appears similar Underlying atrophy with scattered small vessel ischemic change     CT CHEST ABDOMEN PELVIS WO CONTRAST Additional Contrast? None    Result  extending anterior to the left parasagittal frontal lobe.  Additional small subdural along the right tentorium. Mild subarachnoid hemorrhage over the right frontal lobe and right temporal lobe. Critical results were called by Dr. Marty Kraft to Dr. Collins on 10/11/2024 at 01:36.       CBC:   Recent Labs     10/12/24  0500 10/13/24  0423 10/14/24  0517   WBC 5.9 7.2 7.0   HGB 8.4* 9.5* 10.8*    282 289     BMP:    Recent Labs     10/12/24  0500 10/13/24  0423 10/14/24  0517    135* 131*   K 4.8 4.1 4.6    96* 93*   CO2 27 25 23   BUN 20 19 23*   CREATININE 1.3* 1.3* 1.4*   GLUCOSE 79 73 62*     Hepatic: No results for input(s): \"AST\", \"ALT\", \"BILITOT\", \"ALKPHOS\" in the last 72 hours.    Invalid input(s): \"ALB\"  Lipids:   Lab Results   Component Value Date/Time    CHOL 119 10/11/2024 09:59 AM    HDL 57 10/11/2024 09:59 AM    TRIG 62 10/11/2024 09:59 AM     Hemoglobin A1C:   Lab Results   Component Value Date/Time    LABA1C 5.4 10/11/2024 10:00 AM     TSH:   Lab Results   Component Value Date/Time    TSH 1.48 09/10/2024 02:39 PM     Troponin: No results found for: \"TROPONINT\"  Lactic Acid: No results for input(s): \"LACTA\" in the last 72 hours.  BNP: No results for input(s): \"PROBNP\" in the last 72 hours.  UA:  Lab Results   Component Value Date/Time    NITRU Negative 10/11/2024 06:27 PM    COLORU Yellow 10/11/2024 06:27 PM    PHUR 7.0 10/11/2024 06:27 PM    PHUR 7.5 11/06/2022 09:01 PM    WBCUA 3-5 10/11/2024 06:27 PM    RBCUA 0-2 10/11/2024 06:27 PM    BACTERIA Rare 11/06/2022 09:01 PM    CLARITYU Clear 10/11/2024 06:27 PM    SPECGRAV 1.010 01/23/2014 03:00 PM    LEUKOCYTESUR TRACE 10/11/2024 06:27 PM    UROBILINOGEN 0.2 10/11/2024 06:27 PM    BILIRUBINUR Negative 10/11/2024 06:27 PM    BLOODU Negative 10/11/2024 06:27 PM    GLUCOSEU Negative 10/11/2024 06:27 PM    KETUA Negative 10/11/2024 06:27 PM    AMORPHOUS 1+ 01/12/2014 11:30 PM     Urine Cultures:   Lab Results   Component Value Date/Time

## 2024-10-14 NOTE — CARE COORDINATION
IV Labetalol given to patient for BP. ARU is waiting for BP to stabilize before they can take patient. ARU cannot give IV Labetalol. Updated [facility] that patient is going to  ARU. CM will continue to follow patient until discharge.  Electronically signed by Erlinda Pearson RN on 10/14/2024 at 1:11 PM

## 2024-10-14 NOTE — PROGRESS NOTES
Speech Language Pathology  Facility/Department:[facility] ICU  Dysphagia treat- late entry for 930    Name: Naty Dykes  : 1935  MRN: [id number]                                                     Patient Diagnosis(es):   Patient Active Problem List    Diagnosis Date Noted    AGUILA (acute kidney injury) ([facility]) 2014    Fall from standing 2022    History of CVA (cerebrovascular accident) 2022    Closed displaced intertrochanteric fracture of right femur ([facility]) 2022    Hypokalemia     Hyponatremia     Near syncope 2022    Bradycardia 2022    On amiodarone therapy     On continuous oral anticoagulation     Atrial fibrillation ([facility]) 2022    Subdural hematoma 10/11/2024    Subarachnoid hemorrhage ([facility]) 10/11/2024    Seizure ([facility]) 10/11/2024    Traumatic subdural hematoma without loss of consciousness 10/11/2024    Chronic HFrEF (heart failure with reduced ejection fraction) ([facility]) 2024    Secondary hypercoagulable state ([facility]) 09/10/2024    Presence of heart assist device ([facility]) 10/10/2023    Encounter for loop recorder check 2022    Stroke of unknown etiology ([facility]) 2022    Visual field defect 2022    Chronic kidney disease, stage IV (severe) ([facility]) 2021    Confusion 2020    PVD (posterior vitreous detachment), both eyes 2019    Posterior subcapsular polar age-related cataract of both eyes 2019    Abnormal myocardial perfusion study 2018    Ischemic cardiomyopathy 2018    Cardiomyopathy ([facility]) 2018    Palpitations 2018    V-tach ([facility]) 2018    GERD (gastroesophageal reflux disease) 01/10/2017    Closed stable burst fracture of first lumbar vertebra ([facility]) 2015    Primary insomnia 2014    Mixed hyperlipidemia 10/29/2014    Gout 2014    Pain in joint, hand 2014    Renal insufficiency 2014    Essential hypertension 2013    Chronic low back pain 2013    Other and    None per pt or daughter present. Per chart review pt was seen by this SLP department on 3/19/22 with swallow evaluation completed and rec's for easy to chew solids and thin liquids.     Bedside Swallowing Evaluation Impression (10/11)  Pt presents with mild-moderate oral dysphagia, likely in the setting of reduced dentition and inconsistent alertness/lethargy. Pt seated upright asleep upon start of evaluation. Daughter and RN confirmed fluctuating alertness throughout today. Team aware. Pt did eventually awake enough to participate in trials of PO. Pt required feed assist 2/2 difficulty locating utensils when SLP placed in front of pt's visual field. Pt demonstrated slowed oral manipulation and mastication. Cues throughout to remain attentive to bolus. Anterior mastication and reduced bolus control resulted in anterior loss of partially masticated solid. Min residue remaining throughout with regular solids. Multiple liquid washes provided to clear. Pt appeared to swallow all PO. X2 instances of weak throat clearing. No additional overt s/s associated with aspiration. No wet vocal quality and no globus sensation indicated by pt. WBC is currently WNL. Based on assessment SLP recommends oral diet initiation of Minced and Moist Solids and Thin Liquids with strict 1:1 feed assist ONLY when awake/alert to safely accept PO. Pt and daughter in agreement and demonstrated comprehension. Relayed to RN and MD. Ongoing SLP services indicated to target dysphagia and determine need for further assessment via MBS.     Instrumental assessment results  Not indicated this date. Will continue to follow and complete as needed.     Speech, Language, Cognitive Evaluation  Recommend full assessment as able/appropriate to complete.       Treatment:  Dysphagia Goals:  The pt will be seen  2-3 x to address the following goals:   Goal 1: Patient will tolerate least restrictive diet with adequate oral prep and without overt signs of  aspiration or associated decline in respiratory status  10/14: family reports they would like to give pt bread products and some other items not allowed on diet. They state they pt at baseline for diet toleration at this time and they have good awareness of what pt can consume safely.  Pt analyzed with limited PO including small pieces of banana bread provided by family, liquids via straw and meds per RN. Pt exhibited very slow but adequate mastication with soft solid, cleared oral cavity effectively. No overt signs of aspiration with several trials of thin liquids. No respiratory decline per chart review  Goal met, cont to ensure consistency    Goal 2: Patient/caregiver will demonstrate understanding of dysphagia recommendations/concerns.  10/14: educated family extensively re rationale for current diet.  Family states they are concerned that pt isn't eating and they want her to have quality of life vs quantity.  After discussion re diet textures, they would like pt to remain on minced/moist texture however be able to items from home that they feel pt would eat. Reviewed strategies to improve safety of swallow. Family demonstrates good awareness and understanding of education that was provided. Family instructed to notify staff if any concerns emerge  Goal met, cont to ensure consistency    Speech Goals:  1- pt will participate in speech/lang/cog eval as appropriate  10/14: speech is intelligible, pt communicating needs appropriately. Pt very focused on arm pain and not able to fully participate in speech eval.   Will cont as appropriate  Cont goal    Education  As above    Total treatment time: 25 minutes dysphagia treat    Plan: Continue per POC  Recommended Diet: Minced and Moist Solids, Thin Liquids  Ok per speech for family to bring in items from home  Medication administration: PO    Strategies:   - 1:1 feed assist   - sit upright  - small bites/sips  - alternate solids/liquids     Discharge Recommendation:

## 2024-10-14 NOTE — DISCHARGE INSTR - COC
Continuity of Care Form    Patient Name: Naty Dykes   :  1935  MRN:  [id number]    Admit date:  10/11/2024  Discharge date:  ***    Code Status Order: Full Code   Advance Directives:   Advance Care Flowsheet Documentation             Admitting Physician:  Solitario Jacob MD  PCP: Debra Shahid DO    Discharging Nurse: ***  Discharging Hospital Unit/Room#: 4520/4520-01  Discharging Unit Phone Number: ***    Emergency Contact:   Extended Emergency Contact Information  Primary Emergency Contact: Ela Villa  Home Phone: [phone]  Relation: Child  Secondary Emergency Contact: Nara Benito  Mobile Phone: [phone]  Relation: Child   needed? No    Past Surgical History:  Past Surgical History:   Procedure Laterality Date    COLONOSCOPY      EYE SURGERY Left 2018    PHACO EMULSIFICATION OF CATARACT WITH INTRAOCULAR LENS IMPLANT LEFT EYE     HIP SURGERY Right 2022    RIGHT HIP INTRAMEDULLARY NAILING performed by Noah Basurto MD at [facility]    HYSTERECTOMY (CERVIX STATUS UNKNOWN)      OTHER SURGICAL HISTORY  2018    phacoemulsification of cataract with intraocular lens implant right eye    DE OFFICE/OUTPT VISIT,PROCEDURE ONLY Right 2018    PHACO EMULSIFICATION OF CATARACT WITH INTRAOCULAR LENS IMPLANT RIGHT EYE performed by Elan Chaney MD at [facility]    DE OFFICE/OUTPT VISIT,PROCEDURE ONLY Left 2018    PHACO EMULSIFICATION OF CATARACT WITH INTRAOCULAR LENS IMPLANT LEFT EYE performed by Elan Chaney MD at [facility] OR    THYROIDECTOMY         Immunization History:   Immunization History   Administered Date(s) Administered    COVID-19, MODERNA BLUE border, Primary or Immunocompromised, (age 12y+), IM, 100 mcg/0.5mL 2021, 2021    Influenza A ([id number]) Vaccine PF IM 2009    Influenza Vaccine, unspecified formulation 2016    Influenza Virus Vaccine 10/13/2013, 10/02/2015    Influenza Whole 10/13/2013    Influenza, FLUZONE High Dose (age 65 y+),

## 2024-10-14 NOTE — CONSULTS
Consult Note  Physical Medicine and Rehabilitation    Patient: Naty Dykes  [id number]  Date: 10/14/2024      Chief Complaint: seizures    History of Present Illness/Hospital Course:    Patient is a 88 y.o. female with pmHX of CVA (2022), pAfib on eliquis, HFrEF (40-45% EF), CAD s/p MINH placement (2018), HLD, CKD stage 4 and hypothyroidism who presents to ED on 10/10 with seizures.    Per family, patient fell a week prior to presentation and hit the R side of the head but had no complaints initially. Patient later had an episode of vomiting the same day as well as appearing more confused. In the ED, patient was noted to have R sided head pain, R sided rib pain and chronic back pain. Patient denied fever, chest pain, and SOB. CT head in the ED showed R subdural hematoma and mild subarachnoid hemorrhage over the R frontal lobe. Repeat CT 6 hours later showed no change. Patient was started on Keppra. No neurosurgical intervention was required thus far.    Prior Level of Function:  Independent for mobility, ADLs and most IADLs. Requiring help with some IADLs from children. Uses walker for mobility    Current Level of Function:  Decreased mobility, ADLs, and IADLs.     Pertinent Social History:  Lives at home with son and daughter    Past Medical History:   Diagnosis Date    Arthritis     Blood circulation, collateral     CAD (coronary artery disease)     CHF (congestive heart failure) ([facility])     Confusion 8/31/2020    GERD (gastroesophageal reflux disease)     History of heart artery stent 12/2018    Hyperlipidemia     Hypertension     Mitral valve prolapse     Myocardial infarct, old     Seizure (HCC) 10/11/2024    Thyroid disease        Past Surgical History:   Procedure Laterality Date    COLONOSCOPY      EYE SURGERY Left 09/04/2018    PHACO EMULSIFICATION OF CATARACT WITH INTRAOCULAR LENS IMPLANT LEFT EYE     HIP SURGERY Right 11/7/2022    RIGHT HIP INTRAMEDULLARY NAILING performed by Noah Basurto MD at [facility]  hip flexion. 4/5 strengths to ankle dorsi/plantarflexion  PSYCH: Stable mood, normal judgement, normal affect       Lab Results   Component Value Date    WBC 7.0 10/14/2024    HGB 10.8 (L) 10/14/2024    HCT 32.6 (L) 10/14/2024    MCV 90.7 10/14/2024     10/14/2024     Lab Results   Component Value Date    INR 1.28 (H) 10/11/2024    INR 1.21 (H) 11/07/2022    INR 1.17 (H) 11/06/2022    PROTIME 16.2 (H) 10/11/2024    PROTIME 15.2 (H) 11/07/2022    PROTIME 14.7 (H) 11/06/2022     Lab Results   Component Value Date    CREATININE 1.4 (H) 10/14/2024    BUN 23 (H) 10/14/2024     (L) 10/14/2024    K 4.6 10/14/2024    CL 93 (L) 10/14/2024    CO2 23 10/14/2024     Lab Results   Component Value Date    ALT 13 10/11/2024    AST 26 10/11/2024    ALKPHOS 114 10/11/2024    BILITOT 0.6 10/11/2024       Most recent echocardiogram revealed:  Echocardiogram 8/22/2022   Summary    The left ventricular systolic function is low normal to mildly reduced with    an ejection fraction of 40-45 %.    There is hypokinesis of the inferior and basal/mid septal walls.    There is mild concentric left ventricular hypertrophy.    Grade II diastolic dysfunction with elevated left ventricular filling    pressure.    The left atrium is severely dilated.    Severe mitral regurgitation.    Systolic flow reversal in pulmonary veins.    Moderate tricuspid regurgitation.    Systolic pulmonary artery pressure (SPAP) is estimated at 61 mmHg consistent    with severe pulmonary hypertension (Right atrial pressure of 3 mmHg).       Most recent imaging studies revealed:  CT HEAD WO CONTRAST   Final Result      Right-sided and anterior extra-axial hemorrhagic fluid collections with mild   leftward midline shift and mass effect on the occipital horn of the right   lateral ventricle.      Mild right parietal sulcal subarachnoid hemorrhage.      Electronically signed by Kalia Vasquez            Assessment:  1. Subdural hemoatoma with mild subarachnoid

## 2024-10-14 NOTE — PROGRESS NOTES
V2.0    [facility] [place]      Name:  Naty Dykes /Age/Sex: 1935  (88 y.o. female)   MRN & CSN:  [id number] & [id number] Encounter Date/Time: 10/14/2024 9:25 AM EDT   Location:  [facility]- PCP: Debra Shahid DO     Attending:Marie Brock MD       Hospital Day: 4    HPI:Patient is a 88-year-old female with a past medical history of coronary artery disease, GERD, hyperlipidemia, paroxysmal A-fib on Eliquis, hypothyroidism who presented to [place] ED after a fall 10/4/2024. Patient had a mechanical fall after she tripped. Patient had worsening back pain after the fall which limited her mobility. Patient had hallucinations with vomiting and appeared to have seizure-like activities hence EMS was called.     Assessment and Recommendations     Hospital course:    Naty Dykes is a 88 y.o. female with pmh of coronary artery disease, CVA, GERD, hyperlipidemia, hypertension, hypothyroidism who presents with Subdural hematoma.  CT showed a 1.4 cm subdural hematoma with mass effect upon the right frontal parietal and temporal lobe with midline shift.  Eliquis was reversed in the ED.  Repeat CT was unchanged.  Neurosurgery and neurocritical care was consulted.  Patient will need a repeat CT in 2 to 4 weeks.  Anticoagulation to be started on left outpatient follow-up with neurosurgery.  Patient blood pressure gets elevated when she is in pain but comes back down when pain is controlled      Plan:     Subdural hematoma with mild subarachnoid hemorrhage-will need outpatient follow-up with neurosurgery  -CT shows subdural hematoma 1.4 cm with mass effect upon the right frontal parietal and temporal lobe with midline shift  -Neurosurgery consulted, consult appreciated, will need outpatient follow-up with repeat CT in 2 to 4 weeks.    -Neurocritical care consulted  -Got PCC to reverse Eliquis  -Repeat CT unchanged       Seizure-continue seizure medications with outpatient follow-up with  Decision Support Exception - unselect if not a suspected or confirmed emergency medical condition->Emergency Medical Condition (MA) Reason for Exam: fall into a car 2 nights ago FINDINGS: BRAIN/VENTRICLES: There is a subdural hematoma along the right lateral convexity overlying the right temporal, frontal and parietal lobes measuring up to 1.4 cm.  Smaller subdural measuring 3 mm along the anterior inferior falx which extends anterior to the parasagittal left frontal lobe inferiorly. There is a small amount of subarachnoid hemorrhage overlying the right frontal lobe there is a small amount of subarachnoid hemorrhage versus contusion involving the right temporal lobe.  There is 4 mm right to left midline shift.  Additional small subdural hematoma along the right tentorium. Moderate chronic ischemic white matter changes.  No evidence of acute infarction. ORBITS: The visualized portion of the orbits demonstrate no acute abnormality. SINUSES: The visualized paranasal sinuses and mastoid air cells demonstrate no acute abnormality. SOFT TISSUES/SKULL:  No acute abnormality of the visualized skull or soft tissues.     Right hemispheric subdural hematoma measuring 1.4 cm in width with mass effect upon the right frontal, parietal and temporal lobes and 4 mm right to left midline shift. Small subdural hematoma involving the anterior inferior falx extending anterior to the left parasagittal frontal lobe.  Additional small subdural along the right tentorium. Mild subarachnoid hemorrhage over the right frontal lobe and right temporal lobe. Critical results were called by Dr. Marty Kraft to Dr. Collins on 10/11/2024 at 01:36.       CBC:   Recent Labs     10/12/24  0500 10/13/24  0423 10/14/24  0517   WBC 5.9 7.2 7.0   HGB 8.4* 9.5* 10.8*    282 289     BMP:    Recent Labs     10/12/24  0500 10/13/24  0423 10/14/24  0517    135* 131*   K 4.8 4.1 4.6    96* 93*   CO2 27 25 23   BUN 20 19 23*   CREATININE 1.3*

## 2024-10-14 NOTE — PROGRESS NOTES
Current NIHSS 5    Nursing Core Measures for Stroke:   [x]   Education template documentation (STROKE/TIA). Please select only risk factors that are applicable to patient when selecting risk factors.  [x]   Care Plan template documentation (Physiologic Instability - Neurosensory). Selecting this will add care plan rows to the flowsheet under the Neuro section of Head to Toe.  [x]   Verified Swallow Screen completed prior to PO intake of food, drink, medications  [x]   VTE Prophylaxis: SCDs ordered/addressed; SCDs: N/A Heparin           (As a reminder, ASA, Plavix, and TPA/TNK are not VTE prophylaxis.)    Reviewed the Following Education with Patient and/or Family:   - Personalized risk factors for patient, along with changes, modifications that will help prevent stroke.  - Signs and Symptoms of Stroke: (Facial droop, weakness/numbness especially on one side, speech difficulty, sudden confusion, sudden loss of vision, sudden severe headache, sudden loss of balance or having difficulty walking, syncope, or seizure)  - How to activate EMS (911)   - Importance of Follow Up Appointments at Discharge   - Importance of Compliance with Medications Prescribed at Discharge  - Available community resources and stroke advocacy groups if needed    Patient and/or family member: verbalized understanding.     Stroke Education booklet given to patient/family (or verified, if given already), which reviews above information. yes         Electronically signed by Marek Gonzales RN on 10/14/2024 at 6:45 AM

## 2024-10-15 ENCOUNTER — APPOINTMENT (OUTPATIENT)
Age: 89
End: 2024-10-15
Attending: INTERNAL MEDICINE
Payer: MEDICARE

## 2024-10-15 LAB
ANION GAP SERPL CALCULATED.3IONS-SCNC: 12 MMOL/L (ref 3–16)
BASOPHILS # BLD: 0.1 K/UL (ref 0–0.2)
BASOPHILS NFR BLD: 1.1 %
BUN SERPL-MCNC: 26 MG/DL (ref 7–20)
CALCIUM SERPL-MCNC: 9.1 MG/DL (ref 8.3–10.6)
CHLORIDE SERPL-SCNC: 95 MMOL/L (ref 99–110)
CO2 SERPL-SCNC: 27 MMOL/L (ref 21–32)
CREAT SERPL-MCNC: 1.5 MG/DL (ref 0.6–1.2)
DEPRECATED RDW RBC AUTO: 13.4 % (ref 12.4–15.4)
DEPRECATED RDW RBC AUTO: 13.7 % (ref 12.4–15.4)
ECHO AO ASC DIAM: 3.1 CM
ECHO AO ASCENDING AORTA INDEX: 2.08 CM/M2
ECHO AO ROOT DIAM: 3.2 CM
ECHO AO ROOT INDEX: 2.15 CM/M2
ECHO AV AREA PEAK VELOCITY: 2.3 CM2
ECHO AV AREA/BSA PEAK VELOCITY: 1.5 CM2/M2
ECHO AV PEAK GRADIENT: 7 MMHG
ECHO AV PEAK VELOCITY: 1.3 M/S
ECHO AV VELOCITY RATIO: 0.77
ECHO BSA: 1.48 M2
ECHO IVC INSP: 0.2 CM
ECHO IVC PROX: 1.1 CM
ECHO LA AREA 2C: 30 CM2
ECHO LA AREA 4C: 31.3 CM2
ECHO LA MAJOR AXIS: 7 CM
ECHO LA MINOR AXIS: 6.7 CM
ECHO LA VOL BP: 114 ML (ref 22–52)
ECHO LA VOL MOD A2C: 109 ML (ref 22–52)
ECHO LA VOL MOD A4C: 115 ML (ref 22–52)
ECHO LA VOL/BSA BIPLANE: 77 ML/M2 (ref 16–34)
ECHO LA VOLUME INDEX MOD A2C: 73 ML/M2 (ref 16–34)
ECHO LA VOLUME INDEX MOD A4C: 77 ML/M2 (ref 16–34)
ECHO LV E' LATERAL VELOCITY: 8.4 CM/S
ECHO LV E' SEPTAL VELOCITY: 4.2 CM/S
ECHO LV EDV A2C: 169 ML
ECHO LV EDV A4C: 159 ML
ECHO LV EDV INDEX A4C: 107 ML/M2
ECHO LV EDV NDEX A2C: 113 ML/M2
ECHO LV EJECTION FRACTION A2C: 42 %
ECHO LV EJECTION FRACTION A4C: 38 %
ECHO LV EJECTION FRACTION BIPLANE: 40 % (ref 55–100)
ECHO LV ESV A2C: 98 ML
ECHO LV ESV A4C: 98 ML
ECHO LV ESV INDEX A2C: 66 ML/M2
ECHO LV ESV INDEX A4C: 66 ML/M2
ECHO LV FRACTIONAL SHORTENING: 13 % (ref 28–44)
ECHO LV INTERNAL DIMENSION DIASTOLE INDEX: 3.56 CM/M2
ECHO LV INTERNAL DIMENSION DIASTOLIC: 5.3 CM (ref 3.9–5.3)
ECHO LV INTERNAL DIMENSION SYSTOLIC INDEX: 3.09 CM/M2
ECHO LV INTERNAL DIMENSION SYSTOLIC: 4.6 CM
ECHO LV IVSD: 0.8 CM (ref 0.6–0.9)
ECHO LV MASS 2D: 150.1 G (ref 67–162)
ECHO LV MASS INDEX 2D: 100.7 G/M2 (ref 43–95)
ECHO LV POSTERIOR WALL DIASTOLIC: 0.8 CM (ref 0.6–0.9)
ECHO LV RELATIVE WALL THICKNESS RATIO: 0.3
ECHO LVOT AREA: 2.8 CM2
ECHO LVOT DIAM: 1.9 CM
ECHO LVOT MEAN GRADIENT: 2 MMHG
ECHO LVOT PEAK GRADIENT: 4 MMHG
ECHO LVOT PEAK VELOCITY: 1 M/S
ECHO LVOT STROKE VOLUME INDEX: 35 ML/M2
ECHO LVOT SV: 52.1 ML
ECHO LVOT VTI: 18.4 CM
ECHO MV A VELOCITY: 1.27 M/S
ECHO MV E DECELERATION TIME (DT): 165 MS
ECHO MV E VELOCITY: 0.75 M/S
ECHO MV E/A RATIO: 0.59
ECHO MV E/E' LATERAL: 8.93
ECHO MV E/E' RATIO (AVERAGED): 13.39
ECHO MV E/E' SEPTAL: 17.86
ECHO MV REGURGITANT ALIASING (NYQUIST) VELOCITY: 24 CM/S
ECHO MV REGURGITANT RADIUS PISA: 0.5 CM
ECHO PV MAX VELOCITY: 1.4 M/S
ECHO PV PEAK GRADIENT: 8 MMHG
ECHO RA AREA 4C: 18.5 CM2
ECHO RA END SYSTOLIC VOLUME APICAL 4 CHAMBER INDEX BSA: 37 ML/M2
ECHO RA VOLUME: 55 ML
ECHO RV BASAL DIMENSION: 4.1 CM
ECHO RV FREE WALL PEAK S': 7.5 CM/S
ECHO RV MID DIMENSION: 2.3 CM
ECHO RV TAPSE: 1.5 CM (ref 1.7–?)
ECHO TV REGURGITANT MAX VELOCITY: 2.44 M/S
ECHO TV REGURGITANT PEAK GRADIENT: 24 MMHG
EKG ATRIAL RATE: 76 BPM
EKG DIAGNOSIS: NORMAL
EKG P AXIS: 22 DEGREES
EKG P-R INTERVAL: 182 MS
EKG Q-T INTERVAL: 472 MS
EKG QRS DURATION: 164 MS
EKG QTC CALCULATION (BAZETT): 531 MS
EKG R AXIS: -33 DEGREES
EKG T AXIS: 95 DEGREES
EKG VENTRICULAR RATE: 76 BPM
EOSINOPHIL # BLD: 0 K/UL (ref 0–0.6)
EOSINOPHIL NFR BLD: 0.5 %
GFR SERPLBLD CREATININE-BSD FMLA CKD-EPI: 33 ML/MIN/{1.73_M2}
GLUCOSE SERPL-MCNC: 96 MG/DL (ref 70–99)
HCT VFR BLD AUTO: 28.8 % (ref 36–48)
HCT VFR BLD AUTO: 29.3 % (ref 36–48)
HGB BLD-MCNC: 9.7 G/DL (ref 12–16)
HGB BLD-MCNC: 9.8 G/DL (ref 12–16)
LYMPHOCYTES # BLD: 1.1 K/UL (ref 1–5.1)
LYMPHOCYTES NFR BLD: 16.1 %
MAGNESIUM SERPL-MCNC: 2.2 MG/DL (ref 1.8–2.4)
MCH RBC QN AUTO: 29.8 PG (ref 26–34)
MCH RBC QN AUTO: 30.4 PG (ref 26–34)
MCHC RBC AUTO-ENTMCNC: 33.2 G/DL (ref 31–36)
MCHC RBC AUTO-ENTMCNC: 33.9 G/DL (ref 31–36)
MCV RBC AUTO: 89.5 FL (ref 80–100)
MCV RBC AUTO: 89.8 FL (ref 80–100)
MONOCYTES # BLD: 0.8 K/UL (ref 0–1.3)
MONOCYTES NFR BLD: 11.5 %
NEUTROPHILS # BLD: 4.9 K/UL (ref 1.7–7.7)
NEUTROPHILS NFR BLD: 70.8 %
PLATELET # BLD AUTO: 357 K/UL (ref 135–450)
PLATELET # BLD AUTO: 362 K/UL (ref 135–450)
PMV BLD AUTO: 8.3 FL (ref 5–10.5)
PMV BLD AUTO: 8.8 FL (ref 5–10.5)
POTASSIUM SERPL-SCNC: 4 MMOL/L (ref 3.5–5.1)
RBC # BLD AUTO: 3.21 M/UL (ref 4–5.2)
RBC # BLD AUTO: 3.27 M/UL (ref 4–5.2)
SODIUM SERPL-SCNC: 134 MMOL/L (ref 136–145)
TROPONIN, HIGH SENSITIVITY: 80 NG/L (ref 0–14)
TROPONIN, HIGH SENSITIVITY: 83 NG/L (ref 0–14)
TROPONIN, HIGH SENSITIVITY: 83 NG/L (ref 0–14)
WBC # BLD AUTO: 6.9 K/UL (ref 4–11)
WBC # BLD AUTO: 7 K/UL (ref 4–11)

## 2024-10-15 PROCEDURE — 92526 ORAL FUNCTION THERAPY: CPT

## 2024-10-15 PROCEDURE — 6370000000 HC RX 637 (ALT 250 FOR IP): Performed by: INTERNAL MEDICINE

## 2024-10-15 PROCEDURE — 2500000003 HC RX 250 WO HCPCS

## 2024-10-15 PROCEDURE — 6360000002 HC RX W HCPCS: Performed by: NURSE PRACTITIONER

## 2024-10-15 PROCEDURE — 6360000002 HC RX W HCPCS: Performed by: STUDENT IN AN ORGANIZED HEALTH CARE EDUCATION/TRAINING PROGRAM

## 2024-10-15 PROCEDURE — 93306 TTE W/DOPPLER COMPLETE: CPT | Performed by: INTERNAL MEDICINE

## 2024-10-15 PROCEDURE — 93005 ELECTROCARDIOGRAM TRACING: CPT | Performed by: INTERNAL MEDICINE

## 2024-10-15 PROCEDURE — 97530 THERAPEUTIC ACTIVITIES: CPT

## 2024-10-15 PROCEDURE — 93010 ELECTROCARDIOGRAM REPORT: CPT | Performed by: INTERNAL MEDICINE

## 2024-10-15 PROCEDURE — 51798 US URINE CAPACITY MEASURE: CPT

## 2024-10-15 PROCEDURE — 2580000003 HC RX 258

## 2024-10-15 PROCEDURE — 83735 ASSAY OF MAGNESIUM: CPT

## 2024-10-15 PROCEDURE — 85025 COMPLETE CBC W/AUTO DIFF WBC: CPT

## 2024-10-15 PROCEDURE — 85027 COMPLETE CBC AUTOMATED: CPT

## 2024-10-15 PROCEDURE — 97535 SELF CARE MNGMENT TRAINING: CPT

## 2024-10-15 PROCEDURE — 84484 ASSAY OF TROPONIN QUANT: CPT

## 2024-10-15 PROCEDURE — 80048 BASIC METABOLIC PNL TOTAL CA: CPT

## 2024-10-15 PROCEDURE — 36415 COLL VENOUS BLD VENIPUNCTURE: CPT

## 2024-10-15 PROCEDURE — 6370000000 HC RX 637 (ALT 250 FOR IP)

## 2024-10-15 PROCEDURE — 93306 TTE W/DOPPLER COMPLETE: CPT

## 2024-10-15 PROCEDURE — 1200000000 HC SEMI PRIVATE

## 2024-10-15 PROCEDURE — 99223 1ST HOSP IP/OBS HIGH 75: CPT | Performed by: INTERNAL MEDICINE

## 2024-10-15 RX ORDER — CARVEDILOL 25 MG/1
25 TABLET ORAL 2 TIMES DAILY WITH MEALS
Status: CANCELLED | OUTPATIENT
Start: 2024-10-15

## 2024-10-15 RX ORDER — BISACODYL 5 MG/1
5 TABLET, DELAYED RELEASE ORAL DAILY
Status: CANCELLED | OUTPATIENT
Start: 2024-10-15

## 2024-10-15 RX ORDER — CARVEDILOL 25 MG/1
25 TABLET ORAL 2 TIMES DAILY WITH MEALS
Status: DISCONTINUED | OUTPATIENT
Start: 2024-10-15 | End: 2024-10-16

## 2024-10-15 RX ORDER — SODIUM CHLORIDE 0.9 % (FLUSH) 0.9 %
5-40 SYRINGE (ML) INJECTION PRN
Status: CANCELLED | OUTPATIENT
Start: 2024-10-15

## 2024-10-15 RX ORDER — HEPARIN SODIUM 5000 [USP'U]/ML
5000 INJECTION, SOLUTION INTRAVENOUS; SUBCUTANEOUS 2 TIMES DAILY
Status: CANCELLED | OUTPATIENT
Start: 2024-10-15

## 2024-10-15 RX ORDER — SODIUM CHLORIDE 0.9 % (FLUSH) 0.9 %
5-40 SYRINGE (ML) INJECTION EVERY 12 HOURS SCHEDULED
Status: CANCELLED | OUTPATIENT
Start: 2024-10-15

## 2024-10-15 RX ORDER — ONDANSETRON 4 MG/1
4 TABLET, ORALLY DISINTEGRATING ORAL EVERY 8 HOURS PRN
Status: CANCELLED | OUTPATIENT
Start: 2024-10-15

## 2024-10-15 RX ORDER — ATORVASTATIN CALCIUM 80 MG/1
80 TABLET, FILM COATED ORAL DAILY
Status: CANCELLED | OUTPATIENT
Start: 2024-10-16

## 2024-10-15 RX ORDER — SENNOSIDES A AND B 8.6 MG/1
1 TABLET, FILM COATED ORAL NIGHTLY
Status: CANCELLED | OUTPATIENT
Start: 2024-10-15

## 2024-10-15 RX ORDER — POLYETHYLENE GLYCOL 3350 17 G/17G
17 POWDER, FOR SOLUTION ORAL DAILY PRN
Status: CANCELLED | OUTPATIENT
Start: 2024-10-15

## 2024-10-15 RX ORDER — LANOLIN ALCOHOL/MO/W.PET/CERES
1000 CREAM (GRAM) TOPICAL DAILY
Status: CANCELLED | OUTPATIENT
Start: 2024-10-16

## 2024-10-15 RX ORDER — AMIODARONE HYDROCHLORIDE 200 MG/1
50 TABLET ORAL DAILY
Status: CANCELLED | OUTPATIENT
Start: 2024-10-16

## 2024-10-15 RX ORDER — ACETAMINOPHEN 325 MG/1
650 TABLET ORAL EVERY 4 HOURS PRN
Status: CANCELLED | OUTPATIENT
Start: 2024-10-15

## 2024-10-15 RX ORDER — LEVETIRACETAM 500 MG/1
500 TABLET ORAL 2 TIMES DAILY
Status: CANCELLED | OUTPATIENT
Start: 2024-10-15

## 2024-10-15 RX ORDER — OXYCODONE AND ACETAMINOPHEN 10; 325 MG/1; MG/1
1 TABLET ORAL EVERY 6 HOURS PRN
Status: CANCELLED | OUTPATIENT
Start: 2024-10-15

## 2024-10-15 RX ORDER — FUROSEMIDE 40 MG/1
40 TABLET ORAL DAILY
Status: CANCELLED | OUTPATIENT
Start: 2024-10-16

## 2024-10-15 RX ORDER — VITAMIN B COMPLEX
1000 TABLET ORAL DAILY
Status: CANCELLED | OUTPATIENT
Start: 2024-10-16

## 2024-10-15 RX ORDER — PANTOPRAZOLE SODIUM 40 MG/1
40 TABLET, DELAYED RELEASE ORAL
Status: CANCELLED | OUTPATIENT
Start: 2024-10-16

## 2024-10-15 RX ORDER — LIDOCAINE 4 G/G
1 PATCH TOPICAL DAILY
Status: CANCELLED | OUTPATIENT
Start: 2024-10-16

## 2024-10-15 RX ORDER — ONDANSETRON 2 MG/ML
4 INJECTION INTRAMUSCULAR; INTRAVENOUS EVERY 6 HOURS PRN
Status: CANCELLED | OUTPATIENT
Start: 2024-10-15

## 2024-10-15 RX ORDER — CARVEDILOL 25 MG/1
25 TABLET ORAL 2 TIMES DAILY WITH MEALS
Qty: 60 TABLET | Refills: 3 | Status: SHIPPED | OUTPATIENT
Start: 2024-10-15 | End: 2024-10-16 | Stop reason: HOSPADM

## 2024-10-15 RX ADMIN — CARVEDILOL 25 MG: 25 TABLET, FILM COATED ORAL at 21:09

## 2024-10-15 RX ADMIN — CYANOCOBALAMIN TAB 1000 MCG 1000 MCG: 1000 TAB at 09:56

## 2024-10-15 RX ADMIN — CARVEDILOL 25 MG: 25 TABLET, FILM COATED ORAL at 10:14

## 2024-10-15 RX ADMIN — DICLOFENAC SODIUM TOPICAL GEL, 1% 2 G: 10 GEL TOPICAL at 21:05

## 2024-10-15 RX ADMIN — HEPARIN SODIUM 5000 UNITS: 5000 INJECTION INTRAVENOUS; SUBCUTANEOUS at 21:47

## 2024-10-15 RX ADMIN — SENNOSIDES 8.6 MG: 8.6 TABLET, FILM COATED ORAL at 21:08

## 2024-10-15 RX ADMIN — LEVETIRACETAM 500 MG: 100 INJECTION INTRAVENOUS at 14:54

## 2024-10-15 RX ADMIN — FUROSEMIDE 40 MG: 40 TABLET ORAL at 09:57

## 2024-10-15 RX ADMIN — OXYCODONE AND ACETAMINOPHEN 1 TABLET: 10; 325 TABLET ORAL at 21:06

## 2024-10-15 RX ADMIN — SODIUM CHLORIDE, PRESERVATIVE FREE 10 ML: 5 INJECTION INTRAVENOUS at 10:21

## 2024-10-15 RX ADMIN — OXYCODONE AND ACETAMINOPHEN 1 TABLET: 10; 325 TABLET ORAL at 10:13

## 2024-10-15 RX ADMIN — OXYCODONE AND ACETAMINOPHEN 1 TABLET: 10; 325 TABLET ORAL at 02:24

## 2024-10-15 RX ADMIN — LEVETIRACETAM 500 MG: 100 INJECTION INTRAVENOUS at 04:03

## 2024-10-15 RX ADMIN — ATORVASTATIN CALCIUM 80 MG: 80 TABLET, FILM COATED ORAL at 09:57

## 2024-10-15 RX ADMIN — Medication 1000 UNITS: at 09:56

## 2024-10-15 RX ADMIN — HEPARIN SODIUM 5000 UNITS: 5000 INJECTION INTRAVENOUS; SUBCUTANEOUS at 14:54

## 2024-10-15 RX ADMIN — DICLOFENAC SODIUM TOPICAL GEL, 1% 2 G: 10 GEL TOPICAL at 10:01

## 2024-10-15 RX ADMIN — SODIUM CHLORIDE, PRESERVATIVE FREE 20 MG: 5 INJECTION INTRAVENOUS at 09:57

## 2024-10-15 RX ADMIN — AMIODARONE HYDROCHLORIDE 50 MG: 200 TABLET ORAL at 09:56

## 2024-10-15 RX ADMIN — HEPARIN SODIUM 5000 UNITS: 5000 INJECTION INTRAVENOUS; SUBCUTANEOUS at 06:19

## 2024-10-15 ASSESSMENT — PAIN DESCRIPTION - LOCATION
LOCATION: ELBOW
LOCATION: BACK
LOCATION: BACK
LOCATION: BACK;ARM
LOCATION: ARM
LOCATION: CHEST

## 2024-10-15 ASSESSMENT — PAIN DESCRIPTION - DESCRIPTORS
DESCRIPTORS: CRUSHING
DESCRIPTORS: ACHING
DESCRIPTORS: ACHING

## 2024-10-15 ASSESSMENT — PAIN SCALES - GENERAL
PAINLEVEL_OUTOF10: 6
PAINLEVEL_OUTOF10: 2
PAINLEVEL_OUTOF10: 4
PAINLEVEL_OUTOF10: 2
PAINLEVEL_OUTOF10: 7
PAINLEVEL_OUTOF10: 7
PAINLEVEL_OUTOF10: 0
PAINLEVEL_OUTOF10: 9
PAINLEVEL_OUTOF10: 0
PAINLEVEL_OUTOF10: 2
PAINLEVEL_OUTOF10: 4
PAINLEVEL_OUTOF10: 2
PAINLEVEL_OUTOF10: 4
PAINLEVEL_OUTOF10: 3
PAINLEVEL_OUTOF10: 4
PAINLEVEL_OUTOF10: 3
PAINLEVEL_OUTOF10: 4
PAINLEVEL_OUTOF10: 2
PAINLEVEL_OUTOF10: 4
PAINLEVEL_OUTOF10: 4
PAINLEVEL_OUTOF10: 0

## 2024-10-15 ASSESSMENT — PAIN DESCRIPTION - ORIENTATION
ORIENTATION: MID
ORIENTATION: LEFT
ORIENTATION: LEFT;INNER

## 2024-10-15 ASSESSMENT — PAIN SCALES - WONG BAKER
WONGBAKER_NUMERICALRESPONSE: NO HURT
WONGBAKER_NUMERICALRESPONSE: NO HURT

## 2024-10-15 NOTE — SIGNIFICANT EVENT
AXEL called me to evaluate patient for chest pain  On my evaluation patient sleeping comfortably, she has received percocet earlier for chest pain  EKG is unchanged from baseline  Troponin came back at 83- in the setting of CKD  Will repeat troponin and consult cardiology.   Patient is HD stable.   H/o known CAD with PTCA/stent

## 2024-10-15 NOTE — PROGRESS NOTES
Speech Language Pathology  Facility/Department:[facility] ICU  Dysphagia Treatment Note    Name: Naty Dykes  : 1935  MRN: [id number]                                                     Patient Diagnosis(es):   Patient Active Problem List    Diagnosis Date Noted    AGUILA (acute kidney injury) ([facility]) 2014    Fall from standing 2022    History of CVA (cerebrovascular accident) 2022    Closed displaced intertrochanteric fracture of right femur ([facility]) 2022    Hypokalemia     Hyponatremia     Near syncope 2022    Bradycardia 2022    On amiodarone therapy     On continuous oral anticoagulation     Atrial fibrillation ([facility]) 2022    Subdural hematoma 10/11/2024    Subarachnoid hemorrhage ([facility]) 10/11/2024    Seizure ([facility]) 10/11/2024    Traumatic subdural hematoma without loss of consciousness 10/11/2024    Chronic HFrEF (heart failure with reduced ejection fraction) ([facility]) 2024    Secondary hypercoagulable state ([facility]) 09/10/2024    Presence of heart assist device ([facility]) 10/10/2023    Encounter for loop recorder check 2022    Stroke of unknown etiology ([facility]) 2022    Visual field defect 2022    Chronic kidney disease, stage IV (severe) ([facility]) 2021    Confusion 2020    PVD (posterior vitreous detachment), both eyes 2019    Posterior subcapsular polar age-related cataract of both eyes 2019    Abnormal myocardial perfusion study 2018    Ischemic cardiomyopathy 2018    Cardiomyopathy ([facility]) 2018    Palpitations 2018    V-tach ([facility]) 2018    GERD (gastroesophageal reflux disease) 01/10/2017    Closed stable burst fracture of first lumbar vertebra ([facility]) 2015    Primary insomnia 2014    Mixed hyperlipidemia 10/29/2014    Gout 2014    Pain in joint, hand 2014    Renal insufficiency 2014    Essential hypertension 2013    Chronic low back pain 2013    Other and unspecified  status  10/14: family reports they would like to give pt bread products and some other items not allowed on diet. They state they pt at baseline for diet toleration at this time and they have good awareness of what pt can consume safely.  Pt analyzed with limited PO including small pieces of banana bread provided by family, liquids via straw and meds per RN. Pt exhibited very slow but adequate mastication with soft solid, cleared oral cavity effectively. No overt signs of aspiration with several trials of thin liquids. No respiratory decline per chart review  Goal met, cont to ensure consistency  10/15: Pt upright in chair with family at bedside. Family reporting pt is not masticating and initiating a swallow. Upon examination, pt with very small amount of minced and moist solid in L buccal cavity, which SLP removed with a lingual sweep. Pt with fluctuating alertness, but when alert, was requesting PO. SLP did present pt with small bolus of minced and moist solid and pt immediately began mastication. Pt then with what appeared to be difficulty with A-P transit as bolus was anteriorly located in oral cavity. SLP then presented pt a sip of thin liquids and pt was then able to swallow. When swallowing thin liquids, pt with head groping movement back and forth appearing to have difficulty with initiating swallow. Pt with some audible swallows as well, but not consistent. Single instance of coughing with thin liquids. With further trials of PO, pt required cues to remain alert and for continued mastication and swallow. Single instance of pt taking a sip of thin liquids via straw and then immediately not alert enough and pt with significant amount of anterior loss. Family members reported pt does do somewhat better with initiating a swallow with food items like ice cream. Recommend possible instrumental swallow study when pt is more alert and able to participate more. Cont goal      Goal 2: Patient/caregiver will

## 2024-10-15 NOTE — PROGRESS NOTES
V2.0    [facility] [place]      Name:  Naty Dykes /Age/Sex: 1935  (88 y.o. female)   MRN & CSN:  [id number] & [id number] Encounter Date/Time: 10/15/2024 9:25 AM EDT   Location[facility]  [facility]- PCP: Debra Shahid DO     Attending:Marie Brock MD       Hospital Day: 5    HPI:Patient is a 88-year-old female with a past medical history of coronary artery disease, GERD, hyperlipidemia, paroxysmal A-fib on Eliquis, hypothyroidism who presented to [place] ED after a fall 10/4/2024. Patient had a mechanical fall after she tripped. Patient had worsening back pain after the fall which limited her mobility. Patient had hallucinations with vomiting and appeared to have seizure-like activities hence EMS was called.     Assessment and Recommendations     Hospital course:    Naty Dykes is a 88 y.o. female with pmh of coronary artery disease, CVA, GERD, hyperlipidemia, hypertension, hypothyroidism who presents with Subdural hematoma.  CT showed a 1.4 cm subdural hematoma with mass effect upon the right frontal parietal and temporal lobe with midline shift.  Eliquis was reversed in the ED.  Repeat CT was unchanged.  Neurosurgery and neurocritical care was consulted.  Patient will need a repeat CT in 2 to 4 weeks.  Anticoagulation to be started on left outpatient follow-up with neurosurgery.  Patient blood pressure gets elevated when she is in pain but comes back down when pain is controlled      Plan:     Hypertension  -Patient got multiple doses of IV labetalol yesterday.  Prior to that patient did not need much medication  -DC metoprolol and switch to Coreg  -Add Imdur if blood pressure trends up    Chest pain-none at present  -Patient had an episode of chest pain with troponin elevated to 80 in the setting of chronic kidney disease  -EKG shows chronic left bundle branch block with PVCs  -Cardiology consult    Subdural hematoma with mild subarachnoid hemorrhage-will need outpatient follow-up with  neurosurgery  -CT shows subdural hematoma 1.4 cm with mass effect upon the right frontal parietal and temporal lobe with midline shift  -Neurosurgery consulted, consult appreciated, will need outpatient follow-up with repeat CT in 2 to 4 weeks.    -Neurocritical care consulted  -Got PCC to reverse Eliquis  -Repeat CT unchanged       Seizure-continue seizure medications with outpatient follow-up with neurology  -Started on Keppra, continue on discharge  -Continuous EEG noted, no seizures seen  -Neurocritical care consult appreciated     Low back pain  -CT lumbar and thoracic spine no fracture  -Continue home dose of pain medication.     Paroxysmal A-fib  -On Eliquis.  Hold for now.  Follow-up with neurosurgery for repeat CT.  Depending on the finding anticoagulation to be resumed  -On amiodarone     Heart failure reduced EF with mitral valve prolapse with severe MR   -On Lasix and Toprol-XL-blood pressure mildly elevated but dropped low off and on,     Coronary artery disease   s/p stent  -Hold aspirin continue statin     Remote history of CVA  -Has some chronic facial droop     Chronic kidney disease stage IV  -Creatinine stable at around 1.7    Anemia-chronic          Hypothyroidism  -Continue home medication    Discussed with family.  Family worried as patient has some cognitive impairment at present.  Unable to figure out the call light system.  Worried about her blood pressure.  Explained to the family that we would not be treating her blood pressure very aggressively.  And that in rehab nurses will be available to help    I spent > 55  minutes in the care of this patient.  Over 50% of that time was in face-to-face counseling regarding disease process, diagnostic testing, preventative measures, and answering patient and family questions.     Diet ADULT ORAL NUTRITION SUPPLEMENT; Breakfast, Lunch, Dinner; Standard High Calorie/High Protein Oral Supplement  ADULT DIET; Dysphagia - Minced and Moist; Low Fat/Low  coli 05/29/2022 12:18 PM         Electronically signed by Marie Brock MD on 10/15/2024 at 9:22 AM  Comment: Please note this report has been produced using speech recognition software and may contain errors related to that system including errors in grammar, punctuation, and spelling, as well as words and phrases that may be inappropriate. If there are any questions or concerns, please feel free to contact the dictating provider for clarification.

## 2024-10-15 NOTE — CONSULTS
[facility]    Cardiology Consult/H&P  Consulting Cardiologist Jared Rosa MD , [facility]  Referring Provider:  Debra Shahid DO    10/15/2024, 10:17 AM    No chief complaint on file.     Primary Cardiologist:  Asked by Solitario Jacob MD/Debra Shahid DO to evaluate his patient    Reason for consult chest pain short of breath elevated troponin and chest pains    History of Present Illness:   Naty Dykes is a 88 y.o. female is being seen in the ICU where she was admitted after having a fall.  Apparently has a subdural hematoma from recent falls.  Is now been taken off the Eliquis.  Apparently woke up with chest pains last night troponin levels of 83.  Did have a stress test in 2022 was a low risk scan.  She has a history of atrial fibrillation currently in sinus rhythm.  Severe mitral valve insufficiency and history of CAD with PCI in 2018.  Today she is elderly awake and alert.  She is able to have a fairly decent memory.  Not having any chest pains currently.    Troponin levels were 80,83 and 83    Subdural hematoma from recent fall  Hypertension  Hyperlipidemia  CAD with PCI 2018  Mitral valve insufficiency with severe MVR  History of CVA  History of atrial fibrillation  Cardiomyopathy 40 to 45% in 2022       Past Medical History:   has a past medical history of Arthritis, Blood circulation, collateral, CAD (coronary artery disease), CHF (congestive heart failure) ([facility]), Confusion, GERD (gastroesophageal reflux disease), History of heart artery stent, Hyperlipidemia, Hypertension, Mitral valve prolapse, Myocardial infarct, old, Seizure (HCC), and Thyroid disease.    Surgical History:   has a past surgical history that includes Hysterectomy; Thyroidectomy; Colonoscopy; other surgical history (08/27/2018); pr office/outpt visit,procedure only (Right, 8/27/2018); eye surgery (Left, 09/04/2018); pr office/outpt visit,procedure only (Left, 9/4/2018); and hip surgery  (Right, 11/7/2022).     Social History:   reports that she has never smoked. She has never used smokeless tobacco. She reports that she does not drink alcohol and does not use drugs.     Family History:  family history includes Cancer in her father and sister; Diabetes in her brother; Heart Disease in her father and mother; Stroke in her brother.     Home Medications:  Prior to Admission medications    Medication Sig Start Date End Date Taking? Authorizing Provider   carvedilol (COREG) 25 MG tablet Take 1 tablet by mouth 2 times daily (with meals) 10/15/24  Yes Marie Brock MD   levETIRAcetam (KEPPRA) 500 MG tablet Take 1 tablet by mouth 2 times daily 10/14/24  Yes Marie Brock MD   diclofenac sodium (VOLTAREN) 1 % GEL Apply 2 g topically 2 times daily 10/14/24  Yes Marie Brock MD   senna (SENOKOT) 8.6 MG tablet Take 1 tablet by mouth nightly 10/14/24 11/13/24 Yes Marie Brock MD   amiodarone (PACERONE) 100 MG tablet TAKE 0.5 TABLETS (50 MG) BY MOUTH ONCE DAILY (AM) 10/7/24  Yes Concetta Grayson APRN - CNP   furosemide (LASIX) 40 MG tablet TAKE 1 TABLET BY MOUTH DAILY IN THE MORNING 10/7/24  Yes Concetta Grayson APRN - CNP   atorvastatin (LIPITOR) 80 MG tablet TAKE 1 TABLET BY MOUTH ONE TIME A DAY (EVENING) 7/31/24  Yes Chrissie King APRN - CNP   famotidine (PEPCID) 20 MG tablet TAKE 1 TABLET BY MOUTH ONCE DAILY (AM) 6/10/24  Yes Debra Shahid,    Cyanocobalamin (VITAMIN B-12) 1000 MCG extended release tablet TAKE 1 TABLET (1,000 MCG) BY MOUTH DAILY (AM) 8/11/23  Yes Brett Hines MD   vitamin D3 (CHOLECALCIFEROL) 25 MCG (1000 UT) TABS tablet TAKE 1 TABLET (1,000 IU) BY MOUTH DAILY IN THE MORNING WITH FOOD PREFERABLY WITH A MEAL 8/11/23  Yes Brett Hines MD   oxyCODONE-acetaminophen (PERCOCET)  MG per tablet Take 1 tablet by mouth as needed. 2/8/23  Yes Provider, MD Milena   ondansetron (ZOFRAN) 4 MG tablet Take 1 tablet by mouth 3 times daily as needed for Nausea or  troponin levels.  Repeat the EKG.  Overall I do not see a need to go to the cardiac Cath Lab at this time but will increase her anti-ischemic medication as some isosorbide at 30 mg/day.  Will follow.    Thanks for allowing me the opportunity  to participate in the evaluation and care of your patients. Please call if we can assist further [phone]    This chart was likely completed using voice recognition technology and may contain unintended grammatical , phraseology,and/or punctuation errors  Jared Rosa MD , FACC  10/15/014519:17 AM

## 2024-10-15 NOTE — CARE COORDINATION
Awaiting BP stabilization and pain control before going to  ARU. CM will continue to follow patient until discharge.  Electronically signed by Erlinda Pearson RN on 10/15/2024 at 11:00 AM

## 2024-10-15 NOTE — PROGRESS NOTES
Occupational Therapy  Facility/Department: [facility] ICU  Daily Treatment Note  NAME: Naty Dykes  : 1935  MRN: [id number]    Date of Service: 10/15/2024    Discharge Recommendations:  IP Rehab  OT Equipment Recommendations  Other: defer    At baseline this patient was [place] to I with functional mobility & ADL's. The current hospitalization has resulted in the patient now being limited with all aspects of mobility, negatively impacting the patient's functional independence, ability to safely access their home environment, and ability to complete valued daily tasks and life roles. Naty Dykes is motivated for recovery and demonstrates the ability to tolerate inpatient rehabilitation 3 hours/day, 5 days/week for optimal return to functional independence, quality of life, and decreased caregiver burden.    Patient Diagnosis(es): The encounter diagnosis was Other congestive heart failure (HCC).     Assessment   Assessment: Pt agreeable to OT tx and tolerates session well. Pt presents below baseline and demos decreased activity tolerance, decreased balance, L side weakness, decreased cognition, and L neglect/visual deficits impacting ADL/IADL performance. Pt requires assist of 2 for functional transfers. Pt requires maxA for LB/toileting ADLs. Pt requires step by step sequencing with transfers and redirection to task 2/2 cognition. Pt with noted left neglect requiring max multimodal cues for attention to L side/objects on L side during session. Pt will benefit from continued skilled OT to address above deficits and maximize independence/safety with functional activity. COnt per POC.  Activity Tolerance: Patient tolerated treatment well;Patient limited by fatigue  Discharge Recommendations: IP Rehab  Other: defer     Plan  Occupational Therapy Plan  Times Per Week: 5-7  Current Treatment Recommendations: Strengthening;Balance training;Pain management;Self-Care / ADL;Safety education & training;Functional mobility  training;Endurance training;Patient/Caregiver education & training;Gait training;Equipment evaluation, education, & procurement    Restrictions  Position Activity Restriction  Other position/activity restrictions: up with assist, seizure precautions    Subjective  Patient is a 88-year-old female with a past medical history of coronary artery disease, GERD, hyperlipidemia, paroxysmal A-fib on Eliquis, hypothyroidism who presented to [facility] ED after a fall 10/4/2024. Patient had a mechanical fall after she tripped. Patient had worsening back pain after the fall which limited her mobility. Patient had hallucinations with vomiting and appeared to have seizure-like activities hence EMS was called. CT showed a 1.4 cm subdural hematoma with mass effect upon the right frontal parietal and temporal lobe with midline shift.   Subjective  Subjective: Pt found sitting in chair, pleasant and agreeable to OT tx.  Pain: Pt reports pain to L elbow joint, not rated, repositioned for comfort  Orientation  Overall Orientation Status: Within Functional Limits  Cognition  Overall Cognitive Status: Exceptions  Arousal/Alertness: Delayed responses to stimuli  Following Commands: Follows multistep commands with increased time;Follows multistep commands with repitition  Attention Span: Attends with cues to redirect  Memory: Decreased recall of precautions;Decreased recall of recent events  Safety Judgement: Decreased awareness of need for assistance;Decreased awareness of need for safety  Problem Solving: Assistance required to generate solutions  Insights: Decreased awareness of deficits  Initiation: Requires cues for some  Sequencing: Requires cues for some  Cognition Comment: Pt requiring max v/c and tactile cues for transfers 2/2 to left sided neglect       Objective Treatment included functional transfer training, ADL's and pt. education.       Bed Mobility Training  Bed Mobility Training: No  Balance  Sitting: High guard

## 2024-10-15 NOTE — PROGRESS NOTES
Physical Therapy  Facility/Department: [facility] ICU  Physical Therapy treatment     Name: Naty Dykes  : 1935  MRN: [id number]  Date of Service: 10/15/2024    Discharge Recommendations:  IP Rehab (vs SNF)   PT Equipment Recommendations  Other: defer      Patient Diagnosis(es): The encounter diagnosis was Other congestive heart failure (HCC).  Past Medical History:  has a past medical history of Arthritis, Blood circulation, collateral, CAD (coronary artery disease), CHF (congestive heart failure) (HCC), Confusion, GERD (gastroesophageal reflux disease), History of heart artery stent, Hyperlipidemia, Hypertension, Mitral valve prolapse, Myocardial infarct, old, Seizure (HCC), and Thyroid disease.  Past Surgical History:  has a past surgical history that includes Hysterectomy; Thyroidectomy; Colonoscopy; other surgical history (2018); pr office/outpt visit,procedure only (Right, 2018); eye surgery (Left, 2018); pr office/outpt visit,procedure only (Left, 2018); and hip surgery (Right, 2022).    Assessment  Assessment: Pt is 88 y.o. with diagnosis of SDH presenting with decreased functional mobility. Pt functioning significantly below her reported baseline level of function requiring 2 person assistance for transfers and short gait with a RW. Pt demo significant L sided neglect. Safety concerns for pt returning home as she is a significant fall risk at this time. Pt will continue to benefit from skilled PT at d/c to maximize independence and functional mobility. Pt plans to attend ARU at [facility] D/ rec: ARU vs SNF. Will continue to follow pt during inpt stay.  Treatment Diagnosis: decreased functional  mobility due to SDH  Therapy Prognosis: Good  Decision Making: Medium Complexity  Requires PT Follow-Up: Yes  Activity Tolerance  Activity Tolerance: Patient limited by fatigue;Patient limited by endurance;Patient limited by pain;Patient tolerated evaluation without  SPO2: 95 following activity.   Gross Assessment  AROM: Within functional limits  PROM: Within functional limits       Strength RLE  Strength RLE: WFL (>3/5 MMT globally)  Strength LLE  Strength LLE: WFL (>3/5 MMT globally)             Transfers  Sit to Stand: Maximum Assistance;Moderate Assistance (Max A + mod A. max v/c for hand psotioning on chair and RW. Pt demo leaning back posteriorly and required asssitance to fully extend hips and maintain balance. X4 trials)  Stand to Sit: Moderate Assistance (mod A X2. incresed time and effort. max v/c for hand postioning on surface transferring to. poor eccentric control. X4 trials.)  Ambulation  Surface: Level tile  Device: Rolling Walker  Assistance: Moderate assistance (Mod A x2)  Quality of Gait: decreased agnieszka, decreased B step height/length, poor RW management, forward trunk flexion, max v/c for initiating LE movement  Distance: 2' chair to BSC. 2' from BSC back to chair  Comments: Pt required constant v/c to initiate LE movement and required mod A to maneuver the RW. Pt struggling to turn to the left due to life sided neglect.     Balance  Sitting - Static: Fair  Sitting - Dynamic: Fair  Standing - Static: Poor (Mod A to correct posterior trunk lean with RW)  Standing - Dynamic: Poor (mod A x2 for small steps with RW.)       -[facility] - Mobility    AM-PAC Basic Mobility - Inpatient   How much help is needed turning from your back to your side while in a flat bed without using bedrails?: A Lot  How much help is needed moving from lying on your back to sitting on the side of a flat bed without using bedrails?: A Lot  How much help is needed moving to and from a bed to a chair?: A Lot  How much help is needed standing up from a chair using your arms?: A Lot  How much help is needed walking in hospital room?: A Lot  How much help is needed climbing 3-5 steps with a railing?: Total  -PAC Inpatient Mobility Raw Score : 11  AM-PAC Inpatient T-Scale Score :

## 2024-10-15 NOTE — PLAN OF CARE
Problem: Chronic Conditions and Co-morbidities  Goal: Patient's chronic conditions and co-morbidity symptoms are monitored and maintained or improved  10/15/2024 1125 by Talya Little RN  Outcome: Progressing  Flowsheets (Taken 10/15/2024 0800)  Care Plan - Patient's Chronic Conditions and Co-Morbidity Symptoms are Monitored and Maintained or Improved:   Monitor and assess patient's chronic conditions and comorbid symptoms for stability, deterioration, or improvement   Collaborate with multidisciplinary team to address chronic and comorbid conditions and prevent exacerbation or deterioration   Update acute care plan with appropriate goals if chronic or comorbid symptoms are exacerbated and prevent overall improvement and discharge     Problem: Discharge Planning  Goal: Discharge to home or other facility with appropriate resources  10/15/2024 1125 by Talya Little RN  Outcome: Progressing  Flowsheets (Taken 10/15/2024 0800)  Discharge to home or other facility with appropriate resources:   Identify barriers to discharge with patient and caregiver   Arrange for needed discharge resources and transportation as appropriate   Refer to discharge planning if patient needs post-hospital services based on physician order or complex needs related to functional status, cognitive ability or social support system   Identify discharge learning needs (meds, wound care, etc)     Problem: Skin/Tissue Integrity  Goal: Absence of new skin breakdown  Description: 1.  Monitor for areas of redness and/or skin breakdown  2.  Assess vascular access sites hourly  3.  Every 4-6 hours minimum:  Change oxygen saturation probe site  4.  Every 4-6 hours:  If on nasal continuous positive airway pressure, respiratory therapy assess nares and determine need for appliance change or resting period.  10/15/2024 1125 by Talya Little, RN  Outcome: Progressing     Problem: Safety - Adult  Goal: Free from fall injury  10/15/2024 1125 by Sidney  MANUEL Babin  Outcome: Progressing     Problem: ABCDS Injury Assessment  Goal: Absence of physical injury  10/15/2024 1125 by Talya Little RN  Outcome: Progressing     Problem: Pain  Goal: Verbalizes/displays adequate comfort level or baseline comfort level  10/15/2024 1125 by Talya Little RN  Outcome: Progressing     Problem: Neurosensory - Adult  Goal: Achieves stable or improved neurological status  10/15/2024 1125 by Talya Little RN  Outcome: Progressing  Flowsheets (Taken 10/15/2024 0800)  Achieves stable or improved neurological status:   Assess for and report changes in neurological status   Initiate measures to prevent increased intracranial pressure   Maintain blood pressure and fluid volume within ordered parameters to optimize cerebral perfusion and minimize risk of hemorrhage     Problem: Neurosensory - Adult  Goal: Achieves maximal functionality and self care  10/15/2024 1125 by Talya Little RN  Outcome: Progressing  Flowsheets (Taken 10/15/2024 0800)  Achieves maximal functionality and self care:   Monitor swallowing and airway patency with patient fatigue and changes in neurological status   Encourage and assist patient to increase activity and self care with guidance from physical therapy/occupational therapy   Encourage visually impaired, hearing impaired and aphasic patients to use assistive/communication devices     Problem: Nutrition Deficit:  Goal: Optimize nutritional status  10/15/2024 1125 by Talya Little RN  Outcome: Progressing     Problem: Respiratory - Adult  Goal: Achieves optimal ventilation and oxygenation  10/15/2024 1125 by Talya Little RN  Outcome: Progressing  Flowsheets (Taken 10/15/2024 0800)  Achieves optimal ventilation and oxygenation:   Assess for changes in respiratory status   Assess for changes in mentation and behavior   Position to facilitate oxygenation and minimize respiratory effort   Oxygen supplementation based on oxygen saturation or arterial blood

## 2024-10-15 NOTE — PROGRESS NOTES
Department of Physical Medicine & Rehabilitation  Progress Note    Patient Identification:  Naty Dykes  [id number]  : 1935  Admit date: 10/11/2024    Chief Complaint: Subdural hematoma    Subjective:   No acute events overnight. Patient seen this am. Patient somnolent. Hemodynamically stable and without complaints.    ROS: No chest pain, SOB    Objective:  Patient Vitals for the past 24 hrs:   BP Temp Temp src Pulse Resp SpO2 Weight   10/15/24 1015 -- -- -- -- -- -- 50.8 kg (111 lb 15.9 oz)   10/15/24 1000 (!) 175/87 98.2 °F (36.8 °C) Oral 72 14 97 % --   10/15/24 0700 (!) 140/69 -- -- 66 12 99 % --   10/15/24 0600 123/65 -- -- 65 12 95 % --   10/15/24 0500 137/62 -- -- 75 16 98 % --   10/15/24 0400 (!) 112/49 -- -- 70 13 99 % --   10/15/24 0300 122/65 -- -- 73 10 97 % --   10/15/24 0254 -- -- -- -- 14 -- --   10/15/24 0241 (!) 168/79 -- -- 75 11 97 % --   10/15/24 0224 -- -- -- -- 17 -- --   10/15/24 0200 136/76 -- -- 70 14 97 % --   10/15/24 0100 123/63 -- -- 73 16 99 % --   10/15/24 0000 128/62 -- -- 76 18 99 % --   10/14/24 2300 136/60 -- -- 73 16 97 % --   10/14/24 2200 117/70 -- -- 72 15 98 % --   10/14/24 2113 -- -- -- -- 18 -- --   10/14/24 2100 (!) 146/74 -- -- 77 19 95 % --   10/14/24 2000 (!) 160/69 -- -- 78 20 98 % --   10/14/24 1900 (!) 167/132 -- -- -- -- -- --   10/14/24 1513 110/60 98.6 °F (37 °C) Oral 75 16 94 % --   10/14/24 1400 (!) 132/96 -- -- -- -- -- --   10/14/24 1300 128/75 -- -- -- -- -- --   10/14/24 1159 (!) 150/72 98.7 °F (37.1 °C) Oral 77 17 92 % --     CONST: Alert. No acute distress  EYES: No icterus noted.  HENT: Atraumatic, normocephalic;  NECK: Trachea midline, neck supple.  CV: Regular rate and rhythm, no murmur rub or gallop noted  RESP: Lungs clear to auscultation bilaterally, no rales wheezes or ronchi, no retractions. Respirations unlabored.   GI: Soft, nontender, nondistended.   EXT: No significant edema appreciated to BLE  NEURO:   Patient somnolent. Limited  MCHC 33.9 31.0 - 36.0 g/dL    RDW 13.7 12.4 - 15.4 %    Platelets 362 135 - 450 K/uL    MPV 8.8 5.0 - 10.5 fL    Neutrophils % 70.8 %    Lymphocytes % 16.1 %    Monocytes % 11.5 %    Eosinophils % 0.5 %    Basophils % 1.1 %    Neutrophils Absolute 4.9 1.7 - 7.7 K/uL    Lymphocytes Absolute 1.1 1.0 - 5.1 K/uL    Monocytes Absolute 0.8 0.0 - 1.3 K/uL    Eosinophils Absolute 0.0 0.0 - 0.6 K/uL    Basophils Absolute 0.1 0.0 - 0.2 K/uL   Troponin    Collection Time: 10/15/24  3:15 AM   Result Value Ref Range    Troponin, High Sensitivity 83 (H) 0 - 14 ng/L   Troponin    Collection Time: 10/15/24  5:40 AM   Result Value Ref Range    Troponin, High Sensitivity 83 (H) 0 - 14 ng/L   Troponin    Collection Time: 10/15/24  7:34 AM   Result Value Ref Range    Troponin, High Sensitivity 80 (H) 0 - 14 ng/L       Therapy progress:  PT  Position Activity Restriction  Other position/activity restrictions: up with assist, seizure precautions  Objective     Sit to Stand: Maximum Assistance (from EOB and recliner to RW, cues for hand placement, facilitation for anterior weight shifting)  Stand to Sit: Maximum Assistance (cues for hand placement and to fully align with surface)  Device: Rolling Walker  Assistance: Moderate assistance  Distance: 2' (EOB to recliner)  OT  PT Equipment Recommendations  Other: defer     Assessment        SLP          Body mass index is 20.48 kg/m².    Assessment:  1. Subdural hemoatoma with mild subarachnoid hemorrhage  OP f/u with nsgy and neurology  Started on Keppra   2. Chronic back pain  3. Paroxysmal afib              Holding home Eliquis until nsgy follow-up outpatient              Continue amiodarone  4. HFrEF with MVP and severe MR              Currently on Lasix and Toprol  5. HTN  6. CAD s/p MINH              Continue statin              Currently holding aspirin.  7. Hx of CVA  8. CKD stage IV  9. Hypothyroidism  10. Chronic anemia   11. GERD - on Pepcid daily     Impairments - Decreased

## 2024-10-15 NOTE — PROGRESS NOTES
Speech-Language Pathology  Attempt Note    1012: Attempted to see patient for dysphagia f/u. Pt currently receiving nursing care and RN requesting SLP come back later. SLP will re-attempt as pt able and as schedule allows.         Ros Warner MA, CCC-SLP  [id number]  Speech-Language Pathologist  Pg. #[phone]

## 2024-10-15 NOTE — SIGNIFICANT EVENT
APRN notified by RN of patient with complaint of new onset chest pain.  Pain is described as midsternal crushing chest pain.  Of note, patient has been off Eliquis secondary to subdural hematoma.  Eliquis was reversed on 10/11/2024.  Patient was on Eliquis secondary to history of A-fib with also known history of CAD.  -Blood pressure 168/79, pulse 75, respirations 11 with SpO2 97% on room air.  Pain level 9 out of 10  -Will get EKG, troponin  -Onsite nocturnist Dr. Jacob will evaluate  -Continue telemetry  -Vital signs per unit routine  -Further pending results of above    DAVE Barrios - NP

## 2024-10-16 ENCOUNTER — HOSPITAL ENCOUNTER (INPATIENT)
Age: 89
LOS: 16 days | Discharge: HOME HEALTH | DRG: 949 | End: 2024-11-01
Attending: PHYSICAL MEDICINE & REHABILITATION | Admitting: PHYSICAL MEDICINE & REHABILITATION
Payer: MEDICARE

## 2024-10-16 VITALS
WEIGHT: 116.84 LBS | HEIGHT: 62 IN | DIASTOLIC BLOOD PRESSURE: 55 MMHG | OXYGEN SATURATION: 93 % | BODY MASS INDEX: 21.5 KG/M2 | TEMPERATURE: 97.7 F | HEART RATE: 59 BPM | SYSTOLIC BLOOD PRESSURE: 92 MMHG | RESPIRATION RATE: 17 BRPM

## 2024-10-16 DIAGNOSIS — S06.5XAA SDH (SUBDURAL HEMATOMA): Primary | ICD-10-CM

## 2024-10-16 LAB
ANION GAP SERPL CALCULATED.3IONS-SCNC: 10 MMOL/L (ref 3–16)
BUN SERPL-MCNC: 35 MG/DL (ref 7–20)
CALCIUM SERPL-MCNC: 8.9 MG/DL (ref 8.3–10.6)
CHLORIDE SERPL-SCNC: 96 MMOL/L (ref 99–110)
CO2 SERPL-SCNC: 28 MMOL/L (ref 21–32)
CREAT SERPL-MCNC: 1.5 MG/DL (ref 0.6–1.2)
DEPRECATED RDW RBC AUTO: 13.6 % (ref 12.4–15.4)
GFR SERPLBLD CREATININE-BSD FMLA CKD-EPI: 33 ML/MIN/{1.73_M2}
GLUCOSE SERPL-MCNC: 102 MG/DL (ref 70–99)
HCT VFR BLD AUTO: 27.4 % (ref 36–48)
HGB BLD-MCNC: 9.2 G/DL (ref 12–16)
MAGNESIUM SERPL-MCNC: 2.3 MG/DL (ref 1.8–2.4)
MAGNESIUM SERPL-MCNC: 2.4 MG/DL (ref 1.8–2.4)
MCH RBC QN AUTO: 30.1 PG (ref 26–34)
MCHC RBC AUTO-ENTMCNC: 33.4 G/DL (ref 31–36)
MCV RBC AUTO: 90.3 FL (ref 80–100)
PLATELET # BLD AUTO: 305 K/UL (ref 135–450)
PMV BLD AUTO: 8.5 FL (ref 5–10.5)
POTASSIUM SERPL-SCNC: 3.6 MMOL/L (ref 3.5–5.1)
POTASSIUM SERPL-SCNC: 4 MMOL/L (ref 3.5–5.1)
RBC # BLD AUTO: 3.04 M/UL (ref 4–5.2)
SODIUM SERPL-SCNC: 134 MMOL/L (ref 136–145)
WBC # BLD AUTO: 6.2 K/UL (ref 4–11)

## 2024-10-16 PROCEDURE — 6360000002 HC RX W HCPCS: Performed by: NURSE PRACTITIONER

## 2024-10-16 PROCEDURE — 2500000003 HC RX 250 WO HCPCS

## 2024-10-16 PROCEDURE — 97530 THERAPEUTIC ACTIVITIES: CPT

## 2024-10-16 PROCEDURE — 84132 ASSAY OF SERUM POTASSIUM: CPT

## 2024-10-16 PROCEDURE — 80048 BASIC METABOLIC PNL TOTAL CA: CPT

## 2024-10-16 PROCEDURE — 2580000003 HC RX 258: Performed by: PHYSICAL MEDICINE & REHABILITATION

## 2024-10-16 PROCEDURE — 97535 SELF CARE MNGMENT TRAINING: CPT

## 2024-10-16 PROCEDURE — 6370000000 HC RX 637 (ALT 250 FOR IP): Performed by: INTERNAL MEDICINE

## 2024-10-16 PROCEDURE — 6370000000 HC RX 637 (ALT 250 FOR IP): Performed by: PHYSICAL MEDICINE & REHABILITATION

## 2024-10-16 PROCEDURE — 36415 COLL VENOUS BLD VENIPUNCTURE: CPT

## 2024-10-16 PROCEDURE — 2580000003 HC RX 258

## 2024-10-16 PROCEDURE — 6360000002 HC RX W HCPCS: Performed by: STUDENT IN AN ORGANIZED HEALTH CARE EDUCATION/TRAINING PROGRAM

## 2024-10-16 PROCEDURE — 94799 UNLISTED PULMONARY SVC/PX: CPT

## 2024-10-16 PROCEDURE — 6370000000 HC RX 637 (ALT 250 FOR IP)

## 2024-10-16 PROCEDURE — 85027 COMPLETE CBC AUTOMATED: CPT

## 2024-10-16 PROCEDURE — 83735 ASSAY OF MAGNESIUM: CPT

## 2024-10-16 PROCEDURE — 6360000002 HC RX W HCPCS: Performed by: PHYSICAL MEDICINE & REHABILITATION

## 2024-10-16 PROCEDURE — 99232 SBSQ HOSP IP/OBS MODERATE 35: CPT | Performed by: INTERNAL MEDICINE

## 2024-10-16 PROCEDURE — 97116 GAIT TRAINING THERAPY: CPT

## 2024-10-16 PROCEDURE — 92526 ORAL FUNCTION THERAPY: CPT

## 2024-10-16 PROCEDURE — 94761 N-INVAS EAR/PLS OXIMETRY MLT: CPT

## 2024-10-16 PROCEDURE — 94150 VITAL CAPACITY TEST: CPT

## 2024-10-16 PROCEDURE — 1280000000 HC REHAB R&B

## 2024-10-16 RX ORDER — LEVETIRACETAM 500 MG/1
500 TABLET ORAL 2 TIMES DAILY
Status: DISPENSED | OUTPATIENT
Start: 2024-10-16

## 2024-10-16 RX ORDER — VITAMIN B COMPLEX
1000 TABLET ORAL DAILY
Status: DISPENSED | OUTPATIENT
Start: 2024-10-17

## 2024-10-16 RX ORDER — CARVEDILOL 6.25 MG/1
12.5 TABLET ORAL 2 TIMES DAILY WITH MEALS
Status: CANCELLED | OUTPATIENT
Start: 2024-10-16

## 2024-10-16 RX ORDER — ONDANSETRON 2 MG/ML
4 INJECTION INTRAMUSCULAR; INTRAVENOUS EVERY 6 HOURS PRN
Status: DISCONTINUED | OUTPATIENT
Start: 2024-10-16 | End: 2024-10-16

## 2024-10-16 RX ORDER — SODIUM CHLORIDE 0.9 % (FLUSH) 0.9 %
5-40 SYRINGE (ML) INJECTION EVERY 12 HOURS SCHEDULED
Status: DISCONTINUED | OUTPATIENT
Start: 2024-10-16 | End: 2024-10-24

## 2024-10-16 RX ORDER — CARVEDILOL 6.25 MG/1
12.5 TABLET ORAL 2 TIMES DAILY WITH MEALS
Status: DISCONTINUED | OUTPATIENT
Start: 2024-10-16 | End: 2024-10-16 | Stop reason: HOSPADM

## 2024-10-16 RX ORDER — SENNOSIDES A AND B 8.6 MG/1
1 TABLET, FILM COATED ORAL NIGHTLY
Status: DISPENSED | OUTPATIENT
Start: 2024-10-16

## 2024-10-16 RX ORDER — HYDROMORPHONE HYDROCHLORIDE 1 MG/ML
0.25 INJECTION, SOLUTION INTRAMUSCULAR; INTRAVENOUS; SUBCUTANEOUS EVERY 4 HOURS PRN
Status: DISCONTINUED | OUTPATIENT
Start: 2024-10-16 | End: 2024-10-16

## 2024-10-16 RX ORDER — ONDANSETRON 4 MG/1
4 TABLET, ORALLY DISINTEGRATING ORAL EVERY 8 HOURS PRN
Status: DISCONTINUED | OUTPATIENT
Start: 2024-10-16 | End: 2024-10-16

## 2024-10-16 RX ORDER — AMIODARONE HYDROCHLORIDE 200 MG/1
50 TABLET ORAL DAILY
Status: DISPENSED | OUTPATIENT
Start: 2024-10-17

## 2024-10-16 RX ORDER — HEPARIN SODIUM 5000 [USP'U]/ML
5000 INJECTION, SOLUTION INTRAVENOUS; SUBCUTANEOUS 2 TIMES DAILY
Status: DISPENSED | OUTPATIENT
Start: 2024-10-16

## 2024-10-16 RX ORDER — SODIUM CHLORIDE 0.9 % (FLUSH) 0.9 %
5-40 SYRINGE (ML) INJECTION PRN
Status: ACTIVE | OUTPATIENT
Start: 2024-10-16

## 2024-10-16 RX ORDER — ACETAMINOPHEN 325 MG/1
650 TABLET ORAL EVERY 4 HOURS PRN
Status: DISPENSED | OUTPATIENT
Start: 2024-10-16

## 2024-10-16 RX ORDER — PANTOPRAZOLE SODIUM 40 MG/1
40 TABLET, DELAYED RELEASE ORAL
Status: DISPENSED | OUTPATIENT
Start: 2024-10-17

## 2024-10-16 RX ORDER — ATORVASTATIN CALCIUM 80 MG/1
80 TABLET, FILM COATED ORAL DAILY
Status: DISPENSED | OUTPATIENT
Start: 2024-10-17

## 2024-10-16 RX ORDER — FUROSEMIDE 40 MG/1
40 TABLET ORAL DAILY
Status: DISPENSED | OUTPATIENT
Start: 2024-10-17

## 2024-10-16 RX ORDER — LANOLIN ALCOHOL/MO/W.PET/CERES
1000 CREAM (GRAM) TOPICAL DAILY
Status: DISPENSED | OUTPATIENT
Start: 2024-10-17

## 2024-10-16 RX ORDER — BISACODYL 5 MG/1
5 TABLET, DELAYED RELEASE ORAL DAILY
Status: DISPENSED | OUTPATIENT
Start: 2024-10-17

## 2024-10-16 RX ORDER — CARVEDILOL 12.5 MG/1
12.5 TABLET ORAL 2 TIMES DAILY WITH MEALS
Status: DISCONTINUED | OUTPATIENT
Start: 2024-10-16 | End: 2024-10-25

## 2024-10-16 RX ORDER — CARVEDILOL 12.5 MG/1
12.5 TABLET ORAL 2 TIMES DAILY WITH MEALS
Qty: 60 TABLET | Refills: 3 | Status: ON HOLD
Start: 2024-10-16

## 2024-10-16 RX ORDER — OXYCODONE AND ACETAMINOPHEN 10; 325 MG/1; MG/1
1 TABLET ORAL EVERY 6 HOURS PRN
Status: DISPENSED | OUTPATIENT
Start: 2024-10-16

## 2024-10-16 RX ORDER — LIDOCAINE 4 G/G
1 PATCH TOPICAL DAILY
Status: DISPENSED | OUTPATIENT
Start: 2024-10-17

## 2024-10-16 RX ORDER — POLYETHYLENE GLYCOL 3350 17 G/17G
17 POWDER, FOR SOLUTION ORAL DAILY PRN
Status: DISPENSED | OUTPATIENT
Start: 2024-10-16

## 2024-10-16 RX ADMIN — HEPARIN SODIUM 5000 UNITS: 5000 INJECTION INTRAVENOUS; SUBCUTANEOUS at 15:00

## 2024-10-16 RX ADMIN — OXYCODONE AND ACETAMINOPHEN 1 TABLET: 10; 325 TABLET ORAL at 20:25

## 2024-10-16 RX ADMIN — SODIUM CHLORIDE, PRESERVATIVE FREE 10 ML: 5 INJECTION INTRAVENOUS at 09:51

## 2024-10-16 RX ADMIN — DICLOFENAC SODIUM TOPICAL GEL, 1% 2 G: 10 GEL TOPICAL at 21:41

## 2024-10-16 RX ADMIN — FUROSEMIDE 40 MG: 40 TABLET ORAL at 09:49

## 2024-10-16 RX ADMIN — LEVETIRACETAM 500 MG: 500 TABLET, FILM COATED ORAL at 20:25

## 2024-10-16 RX ADMIN — DICLOFENAC SODIUM TOPICAL GEL, 1% 2 G: 10 GEL TOPICAL at 09:50

## 2024-10-16 RX ADMIN — LEVETIRACETAM 500 MG: 100 INJECTION INTRAVENOUS at 15:00

## 2024-10-16 RX ADMIN — HYDROMORPHONE HYDROCHLORIDE 0.25 MG: 1 INJECTION, SOLUTION INTRAMUSCULAR; INTRAVENOUS; SUBCUTANEOUS at 01:22

## 2024-10-16 RX ADMIN — ATORVASTATIN CALCIUM 80 MG: 80 TABLET, FILM COATED ORAL at 09:49

## 2024-10-16 RX ADMIN — AMIODARONE HYDROCHLORIDE 50 MG: 200 TABLET ORAL at 09:49

## 2024-10-16 RX ADMIN — CYANOCOBALAMIN TAB 1000 MCG 1000 MCG: 1000 TAB at 09:49

## 2024-10-16 RX ADMIN — SENNOSIDES 8.6 MG: 8.6 TABLET, FILM COATED ORAL at 20:25

## 2024-10-16 RX ADMIN — SODIUM CHLORIDE, PRESERVATIVE FREE 10 ML: 5 INJECTION INTRAVENOUS at 20:29

## 2024-10-16 RX ADMIN — CARVEDILOL 12.5 MG: 6.25 TABLET, FILM COATED ORAL at 09:52

## 2024-10-16 RX ADMIN — OXYCODONE AND ACETAMINOPHEN 1 TABLET: 10; 325 TABLET ORAL at 04:30

## 2024-10-16 RX ADMIN — LEVETIRACETAM 500 MG: 100 INJECTION INTRAVENOUS at 03:16

## 2024-10-16 RX ADMIN — OXYCODONE AND ACETAMINOPHEN 1 TABLET: 10; 325 TABLET ORAL at 11:28

## 2024-10-16 RX ADMIN — Medication 1000 UNITS: at 09:49

## 2024-10-16 RX ADMIN — HEPARIN SODIUM 5000 UNITS: 5000 INJECTION INTRAVENOUS; SUBCUTANEOUS at 06:20

## 2024-10-16 RX ADMIN — HEPARIN SODIUM 5000 UNITS: 5000 INJECTION INTRAVENOUS; SUBCUTANEOUS at 20:26

## 2024-10-16 RX ADMIN — SODIUM CHLORIDE, PRESERVATIVE FREE 20 MG: 5 INJECTION INTRAVENOUS at 09:50

## 2024-10-16 ASSESSMENT — PAIN SCALES - WONG BAKER
WONGBAKER_NUMERICALRESPONSE: NO HURT
WONGBAKER_NUMERICALRESPONSE: HURTS LITTLE MORE
WONGBAKER_NUMERICALRESPONSE: HURTS LITTLE MORE
WONGBAKER_NUMERICALRESPONSE: NO HURT

## 2024-10-16 ASSESSMENT — PAIN DESCRIPTION - DESCRIPTORS
DESCRIPTORS: ACHING
DESCRIPTORS_2: ACHING
DESCRIPTORS: ACHING;SHARP

## 2024-10-16 ASSESSMENT — PAIN SCALES - GENERAL
PAINLEVEL_OUTOF10: 3
PAINLEVEL_OUTOF10: 3
PAINLEVEL_OUTOF10: 9
PAINLEVEL_OUTOF10: 3
PAINLEVEL_OUTOF10: 4
PAINLEVEL_OUTOF10: 0
PAINLEVEL_OUTOF10: 4
PAINLEVEL_OUTOF10: 4
PAINLEVEL_OUTOF10: 9
PAINLEVEL_OUTOF10: 7
PAINLEVEL_OUTOF10: 3
PAINLEVEL_OUTOF10: 7
PAINLEVEL_OUTOF10: 4
PAINLEVEL_OUTOF10: 0
PAINLEVEL_OUTOF10: 3
PAINLEVEL_OUTOF10: 5
PAINLEVEL_OUTOF10: 10
PAINLEVEL_OUTOF10: 0
PAINLEVEL_OUTOF10: 7
PAINLEVEL_OUTOF10: 6

## 2024-10-16 ASSESSMENT — PAIN - FUNCTIONAL ASSESSMENT
PAIN_FUNCTIONAL_ASSESSMENT: PREVENTS OR INTERFERES WITH MANY ACTIVE NOT PASSIVE ACTIVITIES
PAIN_FUNCTIONAL_ASSESSMENT: PREVENTS OR INTERFERES SOME ACTIVE ACTIVITIES AND ADLS
PAIN_FUNCTIONAL_ASSESSMENT_SITE2: PREVENTS OR INTERFERES SOME ACTIVE ACTIVITIES AND ADLS
PAIN_FUNCTIONAL_ASSESSMENT: PREVENTS OR INTERFERES WITH ALL ACTIVE AND SOME PASSIVE ACTIVITIES

## 2024-10-16 ASSESSMENT — PAIN DESCRIPTION - INTENSITY
RATING_2: 4

## 2024-10-16 ASSESSMENT — PAIN DESCRIPTION - FREQUENCY
FREQUENCY: CONTINUOUS

## 2024-10-16 ASSESSMENT — PAIN DESCRIPTION - LOCATION
LOCATION: BACK
LOCATION: BACK
LOCATION: NECK;BACK
LOCATION: BACK
LOCATION: BACK;LEG
LOCATION: BACK
LOCATION: ARM
LOCATION_2: WRIST

## 2024-10-16 ASSESSMENT — PAIN DESCRIPTION - ORIENTATION
ORIENTATION: RIGHT;POSTERIOR
ORIENTATION_2: RIGHT;LEFT
ORIENTATION: MID;LOWER
ORIENTATION: LEFT;RIGHT
ORIENTATION: RIGHT
ORIENTATION: RIGHT

## 2024-10-16 ASSESSMENT — PAIN DESCRIPTION - PAIN TYPE
TYPE: CHRONIC PAIN
TYPE: CHRONIC PAIN
TYPE: ACUTE PAIN
TYPE: ACUTE PAIN

## 2024-10-16 ASSESSMENT — PAIN DESCRIPTION - ONSET
ONSET: ON-GOING

## 2024-10-16 NOTE — PROGRESS NOTES
Physical Therapy  Facility/Department: [facility] ICU  Physical Therapy treatment     Name: Naty Dykes  : 1935  MRN: [id number]  Date of Service: 10/16/2024    Discharge Recommendations:  IP Rehab   PT Equipment Recommendations  Other: defer      Patient Diagnosis(es): The encounter diagnosis was Other congestive heart failure (HCC).  Past Medical History:  has a past medical history of Arthritis, Blood circulation, collateral, CAD (coronary artery disease), CHF (congestive heart failure) (HCC), Confusion, GERD (gastroesophageal reflux disease), History of heart artery stent, Hyperlipidemia, Hypertension, Mitral valve prolapse, Myocardial infarct, old, Seizure (HCC), and Thyroid disease.  Past Surgical History:  has a past surgical history that includes Hysterectomy; Thyroidectomy; Colonoscopy; other surgical history (2018); pr office/outpt visit,procedure only (Right, 2018); eye surgery (Left, 2018); pr office/outpt visit,procedure only (Left, 2018); and hip surgery (Right, 2022).    Assessment  Assessment: Pt is 88 y.o. with diagnosis of SDH presenting with decreased functional mobility. Pt demo improved tolerance to activity this session and improved alertness. Pt continues to demo functioning below reported baseline level of function requiring CGA for bed mobility, min to mod A X1 for transfers/gait with a RW. The pt will continue to benefit from skilled PT at d/c to improve independence and functional mobility. Pt is a significant fall risk; safety concerns for the pt returning home at this time. Pt pursuing ARU at [facility] D/ rec: ARU. Will continue to follow up with pt during inpt stay.  Treatment Diagnosis: decreased functional  mobility due to SDH  Therapy Prognosis: Good  Decision Making: Medium Complexity  Requires PT Follow-Up: Yes  Activity Tolerance  Activity Tolerance: Patient tolerated evaluation without incident;Patient limited by endurance    Plan  Physical Therapy  slightly elevated.)  Scooting: Contact guard assistance (Pt able to scoot to EOB. Pt requiring 2 person Max A  to scoot backwards in chair.)    Transfers  Stand to Sit: Moderate Assistance;Minimal Assistance (Mod A x1. Progressed to min A X1 for sit to stand exercises x3 trials. RW, increased time and effort.)  Bed to Chair: Moderate assistance (Mod A X1. steps with RW. Mod v/c for sequencing of the movement and assitance needed to maneuver the RW.)    Ambulation  Surface: Level tile  Device: Rolling Walker  Assistance: Minimal assistance  Quality of Gait: decreased agnieszka, decreased B step height/length, poor RW management, forward trunk flexion, mod v/c for initiatiing steps.  Distance: 2' to chair from bed. 5' forward.  Comments: Pt required constant v/c to maintain hand placement on the RW, mod A to maneuver the RW. Pt reporting feeling tired taking steps forward.     Balance  Sitting - Static: Fair  Sitting - Dynamic: Fair  Standing - Static: Fair (CGA with RW. Pt demo decreased posterior trunk lean)  Standing - Dynamic: Poor (Pt requiring min to mod A with RW to take small steps)  Exercise Treatment: 3x sit to stand with min A and mod v/c for hand placement on RW                 AM-PAC - Mobility    AM-PAC Basic Mobility - Inpatient   How much help is needed turning from your back to your side while in a flat bed without using bedrails?: A Little  How much help is needed moving from lying on your back to sitting on the side of a flat bed without using bedrails?: A Little  How much help is needed moving to and from a bed to a chair?: A Lot  How much help is needed standing up from a chair using your arms?: A Lot  How much help is needed walking in hospital room?: A Lot  How much help is needed climbing 3-5 steps with a railing?: Total  AM-PAC Inpatient Mobility Raw Score : 13  AM-PAC Inpatient T-Scale Score : 36.74  Mobility Inpatient CMS 0-100% Score: 64.91  Mobility Inpatient CMS G-Code Modifier :

## 2024-10-16 NOTE — CARE COORDINATION
Case Management Assessment            Discharge Note                    Date / Time of Note: 10/16/2024 1:03 PM                  Discharge Note Completed by: Milli Mckinney RN    Patient Name: Naty Dykes   YOB: 1935  Diagnosis: Subdural hematoma [S06.5XAA]  Subarachnoid hemorrhage (HCC) [I60.9]   Date / Time: 10/11/2024  8:25 AM    Current PCP: Debra Shahid DO  Clinic patient: No    Advance Directives:  Code Status: Full Code    Financial:  Payor: MEDICARE / Plan: MEDICARE PART A AND B / Product Type: *No Product type* /      Ph[facility]    [email or web address] [place], [place] - [address] [place] [address] - P [phone] - F [phone]  [address] [place] [address]  [address]  [facility] [address]  Phone: [phone] Fax: [phone]    ADLS:  Current PT AM-PAC Score: 13 /24  Current OT AM-PAC Score: 13 /24    DISCHARGE Disposition: [facility]    IMM Completed:   Yes, Case management has presented and reviewed IMM letter #2 to the patient and/or family/ POA. Patient and/or family/POA verbalized understanding of their medicare rights and appeal process if needed. Patient and/or family/POA verbalized understanding of DC within 4 hours of signing IMM letter #2. Patient and/or family/POA has signed and placed today's date (10/16/2024) and time (1255) on IMM letter #2 on the the appropriate lines. Patient and/or family/POA, provided copy of letter and they are aware that original copy of IMM letter #2 is available prior to discharge from the paper chart on the unit.  Electronic documentation has been entered into epic for IMM letter #2 and original paper copy has been added to the paper chart at the nurses station.    IMM Letter given to Patient/Family/Significant other/Guardian/POA/by:: Milli Mckinney RN  2ND IMM  IMM Letter date given:: 10/16/24  IMM Letter time given:: 1255    Ms. Dykes is in agreement with discharge within the 4 hour window of presentation of the 2nd

## 2024-10-16 NOTE — PROGRESS NOTES
Department of Physical Medicine & Rehabilitation  Progress Note    Patient Identification:  Naty Dykes  [id number]  : 1935  Admit date: 10/11/2024    Chief Complaint: Subdural hematoma    Subjective:   No acute events overnight. Patient seen this am. Hemodynamically stable and without complaints.    ROS: No chest pain, SOB    Objective:  Patient Vitals for the past 24 hrs:   BP Temp Temp src Pulse Resp SpO2 Weight   10/16/24 1000 (!) 140/70 -- -- 70 26 -- --   10/16/24 0800 (!) 111/48 97.7 °F (36.5 °C) Oral 84 21 93 % --   10/16/24 0739 -- -- -- -- -- -- 53 kg (116 lb 13.5 oz)   10/16/24 0700 (!) 111/53 -- -- 50 11 -- --   10/16/24 0600 (!) 93/48 -- -- 53 10 94 % --   10/16/24 0500 (!) 101/49 -- -- 56 17 91 % --   10/16/24 0400 (!) 120/58 -- -- 63 20 -- --   10/16/24 0300 (!) 109/56 -- -- 57 13 90 % --   10/16/24 0251 -- -- -- 58 17 91 % --   10/16/24 0200 116/69 -- -- 58 11 99 % --   10/16/24 0152 -- -- -- -- 12 -- --   10/16/24 0122 -- -- -- -- 16 -- --   10/16/24 0100 (!) 109/59 -- -- 61 13 99 % --   10/16/24 0000 106/66 -- -- 58 16 96 % --   10/15/24 2300 (!) 106/54 -- -- 56 20 -- --   10/15/24 2200 112/85 -- -- 63 19 91 % --   10/15/24 2136 -- -- -- -- 15 -- --   10/15/24 2109 131/61 -- -- 69 -- -- --   10/15/24 2100 131/61 -- -- 68 15 96 % --   10/15/24 2000 (!) 140/73 -- -- 70 18 95 % --   10/15/24 1600 (!) 89/50 -- -- 61 13 93 % --     CONST: Alert. No acute distress  EYES: No icterus noted.  HENT: Atraumatic, normocephalic;  NECK: Trachea midline, neck supple.  CV: Regular rate and rhythm, no murmur rub or gallop noted  RESP: Lungs clear to auscultation bilaterally, no rales wheezes or ronchi, no retractions. Respirations unlabored.   GI: Soft, nontender, nondistended.   EXT: No significant edema appreciated to BLE  NEURO:   Patient somnolent. Limited exam.  Unable to assess EOMI. CN V, CN VII, CNXI-XII intact  4/5 strengths to BUE. 3/5 strengths to bilateral hip flexion. 4/5 strengths to ankle  the RW.)  Device: Rolling Walker  Assistance: Minimal assistance  Distance: 2' to chair from bed. 5' forward.  OT  PT Equipment Recommendations  Other: defer     Assessment        SLP          Body mass index is 21.37 kg/m².    Assessment:  1. Subdural hemoatoma with mild subarachnoid hemorrhage  OP f/u with nsgy and neurology  Started on Keppra   2. Chronic back pain  3. Paroxysmal afib              Holding home Eliquis until nsgy follow-up outpatient              Continue amiodarone  4. HFrEF with MVP and severe MR              Currently on Lasix and Coreg  5. HTN  6. CAD s/p MINH              Continue statin              Currently holding aspirin.  7. Hx of CVA  8. CKD stage IV  9. Hypothyroidism  10. Chronic anemia   11. GERD - on Pepcid daily     Impairments - Decreased functional mobility, Decreased ADLs     Recommendations:     Patient with new functional deficits and ongoing medical complexity. Demonstrates ability to tolerate 3 hours therapy/day. Patient is a good candidate for acute inpatient rehab when medically appropriate.     Admit to ARU     Thank you for this consult. Please contact me with any questions or concerns.      Baltazar Russell MD  Internal Medicine, PGY-1  10/16/2024  11:47 AM      This patient has been seen, examined, and discussed with the resident. I was part of the key critical services provided to the patient. I agree with the residents documentation. This note may have been altered to reflect my own examination findings, impression, and recommendations.     Houston Barth D.O. M.P.H  PM&R  10/16/2024  12:14 PM

## 2024-10-16 NOTE — PROGRESS NOTES
[facility]  Cardiology Daily Progress Note  10/16/2024,10:49 AM      Jared Rosa MD ,[facility]  Debra Shahid DO  Primary cardiologist:    Admit Date:  10/11/2024  Hospital Day: Hospital Day: 6  Subjective:  Ms. Dykes is seen in the ICU and she seems to be relatively stable.  Sinus rhythm with frequent PACs and some PVCs.  Hemodynamics are stable.  Still fairly confused but at times pretty sharp.  The creatinine today is 1.5.  The LDL was 50 and current therapy.    Objective:   BP (!) 140/70   Pulse 70   Temp 97.7 °F (36.5 °C) (Oral)   Resp 26   Ht 1.575 m (5' 2\")   Wt 53 kg (116 lb 13.5 oz)   SpO2 93%   BMI 21.37 kg/m²     Intake/Output Summary (Last 24 hours) at 10/16/2024 1049  Last data filed at 10/16/2024 1000  Gross per 24 hour   Intake 340 ml   Output --   Net 340 ml       TELEMETRY: Sinus rhythm at 75/min.  Frequent PACs & PVCs.     Physical Examination:    Vitals:    10/16/24 0700 10/16/24 0739 10/16/24 0800 10/16/24 1000   BP: (!) 111/53  (!) 111/48 (!) 140/70   Pulse: 50  84 70   Resp: 11  21 26   Temp:   97.7 °F (36.5 °C)    TempSrc:   Oral    SpO2:   93%    Weight:  53 kg (116 lb 13.5 oz)     Height:            Constitutional and General Appearance:  Healthy. And alert .  HEENT: eyes and ears intact. No nasal masses  THYROID: not enlarged  LUNGS:  Clear to auscultation and percussion  HEART and VASCULAR:  The apical impulses not displaced  Heart tones are crisp and normal  Cervical veins are not engorged  The carotid upstroke is normal in amplitude and contour without delay or bruit  Peripheral pulses are symmetrical and full  There is no clubbing, cyanosis of the extremities.  No peripheral edema  Femoral Arteries: 2+ and equal  Pedal Pulses: 2+ and equal   ABDOMEN::  No masses or tenderness  Liver/Spleen: No Abnormalities Noted  NEUROLOGICAL:  . Moves all extremities to command. Cranial nerves 2-12 are in tact.  PSYCHIATRIC: alert and lucid and  with increased LAP.    Right Ventricle: Right ventricle size is normal. Mildly reduced systolic function.    Mitral Valve: Moderate annular calcification. Mild regurgitation.    Tricuspid Valve: Mild regurgitation.    Left Atrium: Left atrium is severely dilated.    Right Atrium: Right atrium is moderately dilated.    Image quality is adequate.  Assessment:  Patient Active Problem List    Diagnosis Date Noted    AGUILA (acute kidney injury) ([facility]) 01/13/2014    Fall from standing 11/07/2022    History of CVA (cerebrovascular accident) 11/07/2022    Closed displaced intertrochanteric fracture of right femur ([facility]) 11/06/2022    Hypokalemia     Hyponatremia     Near syncope 07/06/2022    Bradycardia 07/06/2022    On amiodarone therapy     On continuous oral anticoagulation     Atrial fibrillation ([facility]) 05/29/2022    Subdural hematoma 10/11/2024    Subarachnoid hemorrhage ([facility]) 10/11/2024    Seizure ([facility]) 10/11/2024    Traumatic subdural hematoma without loss of consciousness 10/11/2024    Chronic HFrEF (heart failure with reduced ejection fraction) ([facility]) 09/13/2024    Secondary hypercoagulable state ([facility]) 09/10/2024    Presence of heart assist device ([facility]) 10/10/2023    Encounter for loop recorder check 03/30/2022    Stroke of unknown etiology ([facility]) 03/19/2022    Visual field defect 03/18/2022    Chronic kidney disease, stage IV (severe) ([facility]) 08/20/2021    Confusion 08/31/2020    PVD (posterior vitreous detachment), both eyes 07/02/2019    Posterior subcapsular polar age-related cataract of both eyes 07/02/2019    Abnormal myocardial perfusion study 11/06/2018    Ischemic cardiomyopathy 11/06/2018    Cardiomyopathy ([facility]) 11/06/2018    Palpitations 09/04/2018    V-tach ([facility]) 09/04/2018    GERD (gastroesophageal reflux disease) 01/10/2017    Closed stable burst fracture of first lumbar vertebra ([facility]) 09/30/2015    Primary insomnia 11/24/2014    Mixed hyperlipidemia 10/29/2014    Gout 04/18/2014    Pain in joint, hand  04/18/2014    Renal insufficiency 01/22/2014    Essential hypertension 04/26/2013    Chronic low back pain 02/22/2013    Other and unspecified angina pectoris 06/21/2011    Coronary atherosclerosis of native coronary artery 06/21/2011    Other chronic pulmonary heart diseases 06/21/2011    Mitral valve disorder 06/21/2011       Plan:   Continue current medications.    Core Measures:  Discharge instructions:   LVEF documented:   ACEI for LV dysfunction:   Some arrhythmia issues that are of concern but nothing needs to be acutely treated.  Cardiomyopathy with ejection fraction 40 to 45%.  At this time she is of the opinion she wishes no interventional or invasive procedures and that seems to be appropriate.  Continue conservative medical management.    Thanks for allowing me  the opportunity to participate in the evaluation and care of your patient. If there are questions please call me [phone]    This note was likely completed using voice recognition technology and may contain unintended grammatical, phraseology and/or punctuation  errors      Jared Rosa MD ,FACC  10/16/2024 10:49 AM

## 2024-10-16 NOTE — PLAN OF CARE
decreased cardiac output     Problem: Metabolic/Fluid and Electrolytes - Adult  Goal: Electrolytes maintained within normal limits  Outcome: Adequate for Discharge  Flowsheets (Taken 10/16/2024 0800)  Electrolytes maintained within normal limits:   Monitor labs and assess patient for signs and symptoms of electrolyte imbalances   Monitor response to electrolyte replacements, including repeat lab results as appropriate   Administer electrolyte replacement as ordered     Problem: Metabolic/Fluid and Electrolytes - Adult  Goal: Hemodynamic stability and optimal renal function maintained  Outcome: Adequate for Discharge  Flowsheets (Taken 10/16/2024 0800)  Hemodynamic stability and optimal renal function maintained:   Monitor labs and assess for signs and symptoms of volume excess or deficit   Monitor intake, output and patient weight   Monitor urine specific gravity, serum osmolarity and serum sodium as indicated or ordered

## 2024-10-16 NOTE — PROGRESS NOTES
NURSING ASSESSMENT: ARU ADMISSION  [facility]    Patient:Naty Dykes     Rehab Dx/Hx: SDH (subdural hematoma) [S06.5XAA]   :1935  MRN:[id number]  Date of Admit: 10/16/2024  Room #: 3110/3110-01    Subjective:   Patient admitted to room 3110 @1755 from ICU via Wheelchair.   Alert and oriented x4.  Oriented to room and call light system. Oriented to rehab routine and therapy schedules. Informed about care conferences and ordering of meals.      Is this a Stroke Patient?  []  Yes.   If yes, was the PATH-s assessment given to the patient/family?  []  Yes     []   No     [x]   No      Drug / Medication Review:   Medications were reviewed by RN at time of admission  [x]  No potential or actual clinically significant medication issues were noted.      []   Yes, a clinically significant medication issue was identified                 []  Adverse Drug Event:                  []  Allergy:                  []  Side Effect:                  []  Ineffective Therapy:                  []  Drug Interaction:                 []  Duplicated Therapy:                 []  Untreated Indication:                  []  Non-adherence:                 []  Other:                  Nursing/Pharmacy contacted the physician:     Date:              Time:                  Actions recommended by physician were completed:   Date :            Time:    4 Eyes Skin Assessment   The patient is being assessed for: Admission     I agree that 2 RN's have performed a thorough Head to Toe Skin Assessment on the patient. ALL assessment sites listed below have been assessed.       Areas assessed by both nurses:   [x]   Head, Face, and Ears Unremarkable  [x]   Shoulders, Back, and Chest, Abdomen- scattered bruising abd  [x]   Arms, Elbows, and Hands scattered bruising  [x]   Coccyx, Sacrum, and Ischium- blanchable redness to bilat buttocks  [x]   Legs, Feet, and Heel- scattered abrasions     Does the Patient have Skin Breakdown?    No         Michel Prevention initiated:  Yes   Wound Care Orders initiated:  Not Applicable      [facility] nurse consulted for Pressure Injury (Stage 3,4, Unstageable, DTI, NWPT, Complex wounds)and New or Established Ostomies:  Not Applicable    Primary Nurse eSignature: Electronically signed by Hilary Ravi RN on 10/16/2024 at 7:02 PM    Co-signer eSignature:   Electronically signed by SOPHIE NATION RN on 10/16/24 at 7:21 PM EDT

## 2024-10-16 NOTE — PROGRESS NOTES
V2.0    [facility] Progress Note      Name:  Naty Dykes /Age/Sex: 1935  (88 y.o. female)   MRN & CSN:  [id number] & [id number] Encounter Date/Time: 10/16/2024 9:25 AM EDT   Location:  [facility]- PCP: Debra Shahid DO     Attending:Marie Brock MD       Hospital Day: 6    HPI:Patient is a 88-year-old female with a past medical history of coronary artery disease, GERD, hyperlipidemia, paroxysmal A-fib on Eliquis, hypothyroidism who presented to [place] ED after a fall 10/4/2024. Patient had a mechanical fall after she tripped. Patient had worsening back pain after the fall which limited her mobility. Patient had hallucinations with vomiting and appeared to have seizure-like activities hence EMS was called.     Assessment and Recommendations     Hospital course:    Naty Dykes is a 88 y.o. female with pmh of coronary artery disease, CVA, GERD, hyperlipidemia, hypertension, hypothyroidism who presents with Subdural hematoma.  CT showed a 1.4 cm subdural hematoma with mass effect upon the right frontal parietal and temporal lobe with midline shift.  Eliquis was reversed in the ED.  Repeat CT was unchanged.  Neurosurgery and neurocritical care was consulted.  Patient will need a repeat CT in 2 to 4 weeks.  Anticoagulation to be started on left outpatient follow-up with neurosurgery.  Patient blood pressure gets elevated when she is in pain but comes back down when pain is controlled.  Given her bleed patient metoprolol was switched to Coreg.      Plan:     Hypertension-blood pressure trending down  -Patient was switched to Coreg.  Will cut back dose.  Patient's pain is causing her high blood pressure.  Patient also got a dose of IV pain medication  -DC metoprolol and switch to Coreg  -Add Imdur if blood pressure trends up    Chest pain-none at present  -Patient had an episode of chest pain with troponin elevated to 80 in the setting of chronic kidney disease  -EKG shows chronic left bundle branch  intravenous contrast. Multiplanar reformatted images are provided for review. Automated exposure control, iterative reconstruction, and/or weight based adjustment of the mA/kV was utilized to reduce the radiation dose to as low as reasonably achievable.; CT of the lumbar spine was performed without the administration of intravenous contrast. Multiplanar reformatted images are provided for review.  Adjustment of mA and/or kV according to patient size was utilized.  Automated exposure control, iterative reconstruction, and/or weight based adjustment of the mA/kV was utilized to reduce the radiation dose to as low as reasonably achievable.; CT of the chest, abdomen and pelvis was performed without the administration of intravenous contrast. Multiplanar reformatted images are provided for review. Automated exposure control, iterative reconstruction, and/or weight based adjustment of the mA/kV was utilized to reduce the radiation dose to as low as reasonably achievable. COMPARISON: CT abdomen and pelvis November 6, 2022 HISTORY: ORDERING SYSTEM PROVIDED HISTORY: fall, back pain FINDINGS: Mediastinum: Left thyroid lobe is enlarged with 2.1 cm left thyroid nodule. Patient appears to have undergone partial right thyroidectomy.  Esophagus is unremarkable.  Moderate global cardiomegaly with mitral annular calcification and multi-vessel coronary artery calcification.  Mild scattered calcified plaque in the thoracic aorta.  Aberrant right subclavian artery is present. Calcified right hilar mediastinal lymph nodes consistent with prior granulomatous disease. Lungs/Pleura: Minimal tree in bud opacities and mild consolidation in the posteroinferior right upper lobe.  Atelectasis in the lingula and left lung base.  There are few micro nodules measuring 1-2 mm.  No pleural effusion or pneumothorax. Abdominal organs: Gallbladder, spleen, pancreas, adrenal glands are unremarkable.  Kidneys are atrophic.  Calcification in the parker  approximate 60% height loss. Chronic deformity of the inferior T12 endplate with mild height loss.  Severe degenerative changes of the lumbar spine including multilevel disc space narrowing, marginal spondylosis and facet arthropathy. Internal fixation of the right intertrochanteric fracture.  No evidence of periprosthetic fracture or loosening.  No evidence of acute rib fracture. Sternum is intact.  Degenerative changes of both shoulders.     No acute posttraumatic injury of the chest, abdomen or pelvis. No acute posttraumatic injury of the thoracic or lumbar spine. Chronic findings as above.     CT C-Spine W/O Contrast    Result Date: 10/11/2024  EXAMINATION: CT OF THE CERVICAL SPINE WITHOUT CONTRAST 10/11/2024 1:04 am TECHNIQUE: CT of the cervical spine was performed without the administration of intravenous contrast. Multiplanar reformatted images are provided for review. Automated exposure control, iterative reconstruction, and/or weight based adjustment of the mA/kV was utilized to reduce the radiation dose to as low as reasonably achievable. COMPARISON: November 6, 2022 HISTORY: ORDERING SYSTEM PROVIDED HISTORY: fall TECHNOLOGIST PROVIDED HISTORY: Reason for exam:->fall Decision Support Exception - unselect if not a suspected or confirmed emergency medical condition->Emergency Medical Condition (MA) FINDINGS: BONES/ALIGNMENT: There is no acute fracture or traumatic malalignment. DEGENERATIVE CHANGES: Moderate degenerative changes similar to prior examination. SOFT TISSUES: There is no prevertebral soft tissue swelling.     No acute abnormality of the cervical spine. Moderate degenerative changes similar to prior examination.     CT Head W/O Contrast    Result Date: 10/11/2024  EXAMINATION: CT OF THE HEAD WITHOUT CONTRAST  10/11/2024 1:02 am TECHNIQUE: CT of the head was performed without the administration of intravenous contrast. Automated exposure control, iterative reconstruction, and/or weight based

## 2024-10-16 NOTE — PLAN OF CARE
ARU PATIENT TREATMENT PLAN  [facility]  [address] [place] [address]  [phone]    Naty J Donis    : 1935  [id number]  MRN: [id number]  PHYSICIAN:  Houston Barth DO  Primary Problem    Patient Active Problem List   Diagnosis    Other and unspecified angina pectoris    Coronary atherosclerosis of native coronary artery    Other chronic pulmonary heart diseases    Mitral valve disorder    Chronic low back pain    Essential hypertension    AGUILA (acute kidney injury) ([facility])    Renal insufficiency    Gout    Pain in joint, hand    Mixed hyperlipidemia    Primary insomnia    Closed stable burst fracture of first lumbar vertebra ([facility])    GERD (gastroesophageal reflux disease)    Palpitations    V-tach ([facility])    Abnormal myocardial perfusion study    Ischemic cardiomyopathy    Cardiomyopathy ([facility])    PVD (posterior vitreous detachment), both eyes    Posterior subcapsular polar age-related cataract of both eyes    Confusion    Chronic kidney disease, stage IV (severe) ([facility])    Visual field defect    Stroke of unknown etiology ([facility])    Encounter for loop recorder check    Atrial fibrillation ([facility])    On amiodarone therapy    On continuous oral anticoagulation    Near syncope    Bradycardia    Hypokalemia    Hyponatremia    Closed displaced intertrochanteric fracture of right femur ([facility])    Fall from standing    History of CVA (cerebrovascular accident)    Presence of heart assist device ([facility])    Secondary hypercoagulable state ([facility])    Chronic HFrEF (heart failure with reduced ejection fraction) ([facility])    Subdural hematoma    Subarachnoid hemorrhage ([facility])    Seizure ([facility])    Traumatic subdural hematoma without loss of consciousness    SDH (subdural hematoma)    Moderate malnutrition ([facility])       Rehabilitation Diagnosis:  2.22, Traumatic, closed injury  ADMIT DATE:10/16/2024    Patient Goals: To get back to normal  Admitting Impairments: Decreased functional  addition, dietician/nutritionist may monitor calorie count as well as intake and collaboratively work with SLP on dietary upgrades.  Neuropsychology/Psychology may evaluate and provide necessary support.    Medical issues being managed closely and that require 24hour availability of a physician:  [x] Swallowing Precautions  [x] Bowel/Bladder Fx  [] Weight bearing precautions  [] Wound Care    [x] Pain Mgmt   [x] Infection Protection  [x] DVT Prophylaxis   [x] Fall Precautions  [x] Fluid/Electrolyte/Nutrition Balance  [] Voice Protection   [] Respiratory  [] Other:    Medical Prognosis: [] Good  [x] Fair    [] Guarded   Total expected IRF days 21  Anticipated discharge destination:   [] Home Independently   [] Home Modified Independent  [x] Home with supervision    []SNF     [] Other                                           Physician anticipated functional outcomes: Pt will progress to a level of supervision to MOD I for all functional mobility and ADLs to allow for a safe return home.      IPOC brief synthesis: Patient is a 88 y.o. female with pmHX of CVA (2022), pAfib on eliquis, HFrEF (40-45% EF), CAD s/p MINH placement (2018), HLD, CKD stage 4 and hypothyroidism who presents to ED on 10/10 with seizures.     This initial ARU patient treatment plan of care, together with the IPOC & the Education plan, form the foundation for the patient's plan of care.  Weekly patient care conferences are held to evaluate progress towards the initial treatment plan & goals.    I have reviewed this initial plan of care and agree with its contents:    Title   Name    Date    Time    Physician: Houston Barth D.O. M.P.H  PM&R  10/18/2024  9:32 AM      Case Mgmt:Enzo Centenojosesito MSW 10/18/24 @ 0906    OT: Lisa Maurice, OTR/L 10/17/24 @ 1051    PT: Roshni Wilkerson LPT # [id number] 10/17/2024 @ 1015    RN: Hilary Ravi 10/16/24 @ 1856    ST: Queenie Barragan MA, CCC-SLP 10/17/24 @ 1600    ARU Supervisor: Ander Chu, PT, DPT 10/17/2024 @

## 2024-10-16 NOTE — PROGRESS NOTES
Occupational Therapy  Facility/Department: [facility]  Occupational Therapy Treatment     Name: Naty Dykes  : 1935  MRN: [id number]  Date of Service: 10/16/2024    Discharge Recommendations:  IP Rehab  OT Equipment Recommendations  Other: defer       At baseline this patient was independent with functional mobility & ADL's. The current hospitalization has resulted in the patient now being limited with all aspects of mobility, negatively impacting the patient's functional independence, ability to safely access their home environment, and ability to complete valued daily tasks and life roles. Naty Dykes is motivated for recovery and demonstrates the ability to tolerate inpatient rehabilitation 3 hours/day, 5 days/week for optimal return to functional independence, quality of life, and decreased caregiver burden.     Patient Diagnosis(es): The encounter diagnosis was Other congestive heart failure (HCC).  Past Medical History:  has a past medical history of Arthritis, Blood circulation, collateral, CAD (coronary artery disease), CHF (congestive heart failure) ([facility]), Confusion, GERD (gastroesophageal reflux disease), History of heart artery stent, Hyperlipidemia, Hypertension, Mitral valve prolapse, Myocardial infarct, old, Seizure ([facility]), and Thyroid disease.  Past Surgical History:  has a past surgical history that includes Hysterectomy; Thyroidectomy; Colonoscopy; other surgical history (2018); pr office/outpt visit,procedure only (Right, 2018); eye surgery (Left, 2018); pr office/outpt visit,procedure only (Left, 2018); and hip surgery (Right, 2022).    Treatment Diagnosis: Decreased ADL status and functional mobility 2/2 subdural hematoma      Assessment  Performance deficits / Impairments: Decreased functional mobility ;Decreased endurance;Decreased ADL status;Decreased balance;Decreased strength;Decreased ROM;Decreased safe awareness;Decreased high-level IADLs;Decreased  Orientation Status: Within Functional Limits                  Education Given To: Patient  Education Provided: Role of Therapy;Plan of Care;Family Education;Transfer Training;ADL Adaptive Strategies;Mobility Training;Fall Prevention Strategies  Education Method: Demonstration;Verbal  Barriers to Learning: Cognition  Education Outcome: Verbalized understanding;Continued education needed                     G-Code     OutComes Score                                                  AM-PAC - ADL  AM-PAC Daily Activity - Inpatient   How much help is needed for putting on and taking off regular lower body clothing?: A Lot  How much help is needed for bathing (which includes washing, rinsing, drying)?: A Lot  How much help is needed for toileting (which includes using toilet, bedpan, or urinal)?: A Lot  How much help is needed for putting on and taking off regular upper body clothing?: A Lot  How much help is needed for taking care of personal grooming?: A Lot  How much help for eating meals?: A Little  AM-[facility] Inpatient Daily Activity Raw Score: 13  AM-PAC Inpatient ADL T-Scale Score : 32.03  ADL Inpatient CMS 0-100% Score: 63.03  ADL Inpatient CMS G-Code Modifier : CL    Tinneti Score       Goals  Short Term Goals  Time Frame for Short Term Goals: By D/C  Short Term Goal 1: Pt will complete LE dressing w/ CGA - not met  Short Term Goal 2: Pt will complete functional mobility to the bathroom w/ RW w/ min A - not met  Short Term Goal 3: Pt will complete UE dressing w/ setup - not met      Therapy Time   Individual Concurrent Group Co-treatment   Time In 1050         Time Out 1118         Minutes 28         Timed Code Treatment Minutes: 28 Minutes       Yessi Phillips, OT

## 2024-10-16 NOTE — PLAN OF CARE
Problem: Chronic Conditions and Co-morbidities  Goal: Patient's chronic conditions and co-morbidity symptoms are monitored and maintained or improved  Outcome: Progressing     Problem: Discharge Planning  Goal: Discharge to home or other facility with appropriate resources  Outcome: Progressing  Flowsheets (Taken 10/16/2024 1835)  Discharge to home or other facility with appropriate resources: Identify barriers to discharge with patient and caregiver     Problem: Safety - Adult  Goal: Free from fall injury  Outcome: Progressing  Flowsheets (Taken 10/16/2024 1827)  Free From Fall Injury: Instruct family/caregiver on patient safety     Problem: Pain  Goal: Verbalizes/displays adequate comfort level or baseline comfort level  Outcome: Progressing  Flowsheets (Taken 10/16/2024 1800)  Verbalizes/displays adequate comfort level or baseline comfort level:   Assess pain using appropriate pain scale   Administer analgesics based on type and severity of pain and evaluate response   Implement non-pharmacological measures as appropriate and evaluate response     Problem: Skin/Tissue Integrity  Goal: Absence of new skin breakdown  Description: 1.  Monitor for areas of redness and/or skin breakdown  2.  Assess vascular access sites hourly  3.  Every 4-6 hours minimum:  Change oxygen saturation probe site  4.  Every 4-6 hours:  If on nasal continuous positive airway pressure, respiratory therapy assess nares and determine need for appliance change or resting period.  Outcome: Progressing     Problem: ABCDS Injury Assessment  Goal: Absence of physical injury  Outcome: Progressing

## 2024-10-16 NOTE — PROGRESS NOTES
[facility] Admission Assessment    Ethnicity  \"Are you of , /a, or Barbadian origin?\"  Check all that apply:  [x] A.  No, not of , /a, or Barbadian Origin  [] B.  Yes, Grenadian, Grenadian American, Chicano/a  [] C.  Yes, Chinese  [] D.  Yes, Mick  [] E.  Yes, another , , or Barbadian origin  [] X.  Patient unable to respond  [] Y.  Patient declines to respond    Race  \"What is your race?\"  Check all that apply:  [x] A.  White  [] B.  Black or   [] C.   or   [] D.   Sri Lankan  [] E.  Chinese  [] F.  Chilean  [] G. Malian  [] H.  Vietnamese  [] I.  Belarusian  [] J.  Other   [] K.    [] L.  Cameroonian or Kayla  [] M.  Malawian  [] N.  Other   [] X.  Patient unable to respond  [] Y.  Patient declines to respond  [] Z.  None of the above    Language  A.  \"What is your preferred language?\"   English    B.  \"Do you need or want an  to communicate with a doctor or health care staff?\"  Check only one:  [x] 0.  No  [] 1.  Yes  [] 9.  Unable to determine    Transportation  \"Has lack of transportation kept you from medical appointments, meetings, work, or from getting things needed for daily living?\"Check all that apply:  [] A.  Yes, it has kept me from medical appointments or from getting my medications  [] B.  Yes, it has kept me from non-medical meetings, appointments, work, or from getting things that I need  [x] C.  No  [] X.  Patient unable to respond  [] Y.  Patient declines to respond    Hearing  Ability to hear (with hearing aid or hearing appliances if normally used)  [x]  0.  Adequate - no difficulty in normal conversation, social interaction, listening to TV  []  1.  Minimal difficulty - difficulty in some environments (e.g. when person speaks softly or setting is noisy)  []  2.  Moderate difficulty - speaker has to increase volume and speak distinctly   []  3.  Highly impaired - absence of  useful hearing    Vision  Ability to see in adequate light (with glasses or other visual appliances)  []  0.  Adequate - sees fine detail, such as regular print in newspapers/books  [x]  1.  Impaired - sees large print, but not regular print in newspapers/books  []  2.  Moderately impaired - limited vision; not able to see newspaper headlines but can identify objects  []  3.  Highly impaired - object identification in question, but eyes appear to follow objects  []  4.  Severely impaired - no vision or sees only light, colors, or shapes; eyes do not appear to follow objects    Health Literacy  \"How often do you need to have someone help you when you read instructions, pamphlets, or other written material from your doctor or pharmacy?\"  []  0.  Never  []  1.  Rarely  [x]  2.  Sometimes  []  3.  Often  []  4.  Always  []  7.  Patient declines to respond  []  8.  Patient unable to respond    BIMS - **Must be completed in the flowsheet at admission prior to proceeding with Delirium Assessment**  [x] BIMS completed in flowsheet at admission  [] BIMS not completed due to patient unable to give appropriate related answers, see Staff Assessment in flowsheet    Signs and Symptoms of Delirium  A.  Acute Onset Mental Status Change - Is there evidence of an acute change in mental status from the patient's baseline?  [x] 0.  No  [] 1.  Yes    B.  Inattention - Did the patient have difficulty focusing attention, for example being easily distractible or having difficulty keeping track of what was being said?  [x]  0.  Behavior not present  []  1.  Behavior continuously present, does not fluctuate  []  2.  Behavior present, fluctuates (comes and goes, changes in severity)    C.  Disorganized thinking - Was the patient's thinking disorganized or incoherent (rambling or irrelevant conversation, unclear or illogical flow of ideas, or unpredictable switching from subject to subject)?  [x]  0.  Behavior not present  []  1.  Behavior  score 0-27, or enter 99 if unable to complete (if symptom frequency (column 2) is blank for 3 or more items).        Social Isolation  \"How often do you feel lonely or isolated from those around you?\"  [x] 0.  Never  [] 1.  Rarely  [] 2.  Sometimes  [] 3.  Often  [] 4.  Always  [] 7.  Patient declines to respond  [] 8.  Patient unable to respond    Pain Effect on Sleep  \"Over the past 5 days, how much of the time has pain made it hard for you to sleep at night?\"  []  0.  Does not apply - I have not had any pain or hurting in the past 5 days  []  1.  Rarely or not at all  []  2.  Occasionally  [x]  3.  Frequently  []  4.  Almost constantly  []  8.  Unable to answer    **If the patient answers \"0.  Does not apply\" to this question, skip the next two \"Pain Effect...\" questions**    Pain Interference with Therapy Activities  \"Over the past 5 days, how often have you limited your participation in rehabilitation therapy sessions due to pain?\"  [x]  0.  Does not apply - I have not received rehabilitation therapy in the past 5 days  []  1.  Rarely or not at all  []  2.  Occasionally  []  3.  Frequently  []  4.  Almost constantly  []  8.  Unable to answer    Pain Interference with Day-to-Day Activities:  \"Over the past 5 days, how often have you limited your day-to-day activities (excluding rehabilitation therapy session)?\"  [x]  1.  Rarely or not at all  []  2.  Occasionally  []  3.  Frequently  []  4.  Almost constantly  []  8.  Unable to answer    Nutritional Approaches  Check all of the following nutritional approaches that apply on admission:  []  A.  Parenteral/IV feeding (including IV fluids if needed for hydration, but not as part of dialysis/chemo)  []  B.  Feeding tube (e.g., nasogastric or abdominal (PEG))  [x]  C.  Mechanically altered diet - requires change in texture of food or liquids (e.g., pureed food, thickened liquids)  [x]  D.  Therapeutic diet (e.g., low salt, diabetic, low cholesterol)  []  Z.  None of  the above    High Risk Drug Classes:  Use and Indication    Is taking: Check if the pt is taking any medications by pharmacological classification, not how it is used, in the following classes  Indication noted: If column 1 is checked, check if there is an indication noted for all meds in the drug class Is taking  (check all that apply) Indication noted (check all that apply)   Antipsychotic [] []   Anticoagulant [x] [x]   Antibiotic [] []   Opioid [x] [x]   Antiplatelet [] []   Hypoglycemic (including insulin) [] []   None of the above []     Special Treatments, Procedures, and Programs    Check all of the following treatments, procedures, and programs that apply on admission. On admission (check all that apply)   Cancer Treatments   A1. Chemotherapy []           A2. IV []           A3. Oral []           A10. Other []   B1. Radiation []   Respiratory Therapies   C1. Oxygen Therapy []           C2. Continuous (continuously for at least 14 hours per day) []           C3. Intermittent [x]           C4. High-concentration []   D1. Suctioning (Does not include oral suctioning) []           D2. Scheduled []           D3. As needed []   E1. Tracheostomy Care []   F1. Invasive Mechanical Ventilator (ventilator or respirator) []   G1. Non-invasive Mechanical Ventilator []           G2. BiPAP []           G3. CPAP []   Other   H1. IV Medications (Do not include sub Q pumps, flushes, Dextrose 50% or lactated ringers) []           H2. Vasoactive medications []           H3. Antibiotics []           H4. Anticoagulation []           H10. Other []   I1. Transfusions []   J1. Dialysis []           J2. Hemodialysis []           J3. Peritoneal dialysis []   O1. IV access (including a catheter in a vein) []           O2. Peripheral [x]           O3. Midline []           O4. Central (PICC, tunneled, port) []      None of the above (select if no Cancer, Respiratory, or Other boxes are checked) []     The above items have been reviewed

## 2024-10-16 NOTE — PROGRESS NOTES
Speech Language Pathology  Facility/Department:[facility] ICU  Dysphagia Treatment Note    Name: Naty Dykes  : 1935  MRN: [id number]                                                     Patient Diagnosis(es):   Patient Active Problem List    Diagnosis Date Noted    AGUILA (acute kidney injury) ([facility]) 2014    Fall from standing 2022    History of CVA (cerebrovascular accident) 2022    Closed displaced intertrochanteric fracture of right femur ([facility]) 2022    Hypokalemia     Hyponatremia     Near syncope 2022    Bradycardia 2022    On amiodarone therapy     On continuous oral anticoagulation     Atrial fibrillation ([facility]) 2022    Subdural hematoma 10/11/2024    Subarachnoid hemorrhage ([facility]) 10/11/2024    Seizure ([facility]) 10/11/2024    Traumatic subdural hematoma without loss of consciousness 10/11/2024    Chronic HFrEF (heart failure with reduced ejection fraction) ([facility]) 2024    Secondary hypercoagulable state ([facility]) 09/10/2024    Presence of heart assist device ([facility]) 10/10/2023    Encounter for loop recorder check 2022    Stroke of unknown etiology ([facility]) 2022    Visual field defect 2022    Chronic kidney disease, stage IV (severe) ([facility]) 2021    Confusion 2020    PVD (posterior vitreous detachment), both eyes 2019    Posterior subcapsular polar age-related cataract of both eyes 2019    Abnormal myocardial perfusion study 2018    Ischemic cardiomyopathy 2018    Cardiomyopathy ([facility]) 2018    Palpitations 2018    V-tach ([facility]) 2018    GERD (gastroesophageal reflux disease) 01/10/2017    Closed stable burst fracture of first lumbar vertebra ([facility]) 2015    Primary insomnia 2014    Mixed hyperlipidemia 10/29/2014    Gout 2014    Pain in joint, hand 2014    Renal insufficiency 2014    Essential hypertension 2013    Chronic low back pain 2013    Other and unspecified  reports they would like to give pt bread products and some other items not allowed on diet. They state they pt at baseline for diet toleration at this time and they have good awareness of what pt can consume safely.  Pt analyzed with limited PO including small pieces of banana bread provided by family, liquids via straw and meds per RN. Pt exhibited very slow but adequate mastication with soft solid, cleared oral cavity effectively. No overt signs of aspiration with several trials of thin liquids. No respiratory decline per chart review  Goal met, cont to ensure consistency  10/15: Pt upright in chair with family at bedside. Family reporting pt is not masticating and initiating a swallow. Upon examination, pt with very small amount of minced and moist solid in L buccal cavity, which SLP removed with a lingual sweep. Pt with fluctuating alertness, but when alert, was requesting PO. SLP did present pt with small bolus of minced and moist solid and pt immediately began mastication. Pt then with what appeared to be difficulty with A-P transit as bolus was anteriorly located in oral cavity. SLP then presented pt a sip of thin liquids and pt was then able to swallow. When swallowing thin liquids, pt with head groping movement back and forth appearing to have difficulty with initiating swallow. Pt with some audible swallows as well, but not consistent. Single instance of coughing with thin liquids. With further trials of PO, pt required cues to remain alert and for continued mastication and swallow. Single instance of pt taking a sip of thin liquids via straw and then immediately not alert enough and pt with significant amount of anterior loss. Family members reported pt does do somewhat better with initiating a swallow with food items like ice cream. Recommend possible instrumental swallow study when pt is more alert and able to participate more. Cont goal    10/16: pt with difficulty waking up this morning, daughter  present.  Pt then awakened sufficiently though kept eyes closed,  to consume PO. Pt took medication crushed in oatmeal, followed by liquids. Pt consumed with single instance of cough, after daughter provided liquid prior to pt having swallowed prior sip. No other cough/throat clear or change in voice occurred. Pt cleared oral cavity with liquid wash with no oral residue noted. Pt did not consume increased texture at this time. No respiratory decline per chart review  Cont goal    Goal 2: Patient/caregiver will demonstrate understanding of dysphagia recommendations/concerns.  10/14: educated family extensively re rationale for current diet.  Family states they are concerned that pt isn't eating and they want her to have quality of life vs quantity.  After discussion re diet textures, they would like pt to remain on minced/moist texture however be able to items from home that they feel pt would eat. Reviewed strategies to improve safety of swallow. Family demonstrates good awareness and understanding of education that was provided. Family instructed to notify staff if any concerns emerge  Goal met, cont to ensure consistency  10/15: Provided education to importance of alertness when pt is consuming PO. Also provided education for swallow strategies including for mastication, alternating solids and liquids, ensuring oral clearance. Provided education for using nutritional supplements during meals and calorie packing to ensure adequate caloric intake. Also provided education for strict 1:1 assistance, to which pt's family members both stated they are willing to provide. Cont goal   10/16: pt/daughter educated to purpose of re-assessment. Reviewed s/s of aspiration and strategies to improve safety of swallowing. Instructed to allow pt to swallow prior to subsequent bolus.   Cont goal    Speech Goals:  1- pt will participate in speech/lang/cog eval as appropriate  10/14: speech is intelligible, pt communicating needs

## 2024-10-17 ENCOUNTER — TELEPHONE (OUTPATIENT)
Dept: FAMILY MEDICINE CLINIC | Age: 89
End: 2024-10-17

## 2024-10-17 PROBLEM — E44.0 MODERATE MALNUTRITION (HCC): Chronic | Status: ACTIVE | Noted: 2024-10-17

## 2024-10-17 PROCEDURE — 6360000002 HC RX W HCPCS: Performed by: PHYSICAL MEDICINE & REHABILITATION

## 2024-10-17 PROCEDURE — 92610 EVALUATE SWALLOWING FUNCTION: CPT

## 2024-10-17 PROCEDURE — 97129 THER IVNTJ 1ST 15 MIN: CPT

## 2024-10-17 PROCEDURE — 97535 SELF CARE MNGMENT TRAINING: CPT

## 2024-10-17 PROCEDURE — 97166 OT EVAL MOD COMPLEX 45 MIN: CPT

## 2024-10-17 PROCEDURE — 97530 THERAPEUTIC ACTIVITIES: CPT

## 2024-10-17 PROCEDURE — 1280000000 HC REHAB R&B

## 2024-10-17 PROCEDURE — 92526 ORAL FUNCTION THERAPY: CPT

## 2024-10-17 PROCEDURE — 2580000003 HC RX 258: Performed by: PHYSICAL MEDICINE & REHABILITATION

## 2024-10-17 PROCEDURE — 97162 PT EVAL MOD COMPLEX 30 MIN: CPT | Performed by: PHYSICAL THERAPIST

## 2024-10-17 PROCEDURE — 97530 THERAPEUTIC ACTIVITIES: CPT | Performed by: PHYSICAL THERAPIST

## 2024-10-17 PROCEDURE — 92523 SPEECH SOUND LANG COMPREHEN: CPT

## 2024-10-17 PROCEDURE — 6370000000 HC RX 637 (ALT 250 FOR IP): Performed by: PHYSICAL MEDICINE & REHABILITATION

## 2024-10-17 RX ORDER — COLCHICINE 0.6 MG/1
0.3 TABLET ORAL DAILY
Status: DISPENSED | OUTPATIENT
Start: 2024-10-17

## 2024-10-17 RX ORDER — COLCHICINE 0.6 MG/1
0.6 TABLET ORAL DAILY
Status: DISCONTINUED | OUTPATIENT
Start: 2024-10-17 | End: 2024-10-17

## 2024-10-17 RX ADMIN — ATORVASTATIN CALCIUM 80 MG: 80 TABLET, FILM COATED ORAL at 09:45

## 2024-10-17 RX ADMIN — BISACODYL 5 MG: 5 TABLET, COATED ORAL at 09:47

## 2024-10-17 RX ADMIN — PANTOPRAZOLE SODIUM 40 MG: 40 TABLET, DELAYED RELEASE ORAL at 06:04

## 2024-10-17 RX ADMIN — OXYCODONE AND ACETAMINOPHEN 1 TABLET: 10; 325 TABLET ORAL at 06:08

## 2024-10-17 RX ADMIN — CYANOCOBALAMIN TAB 1000 MCG 1000 MCG: 1000 TAB at 09:46

## 2024-10-17 RX ADMIN — SODIUM CHLORIDE, PRESERVATIVE FREE 10 ML: 5 INJECTION INTRAVENOUS at 19:50

## 2024-10-17 RX ADMIN — DICLOFENAC SODIUM TOPICAL GEL, 1% 2 G: 10 GEL TOPICAL at 09:47

## 2024-10-17 RX ADMIN — HEPARIN SODIUM 5000 UNITS: 5000 INJECTION INTRAVENOUS; SUBCUTANEOUS at 09:59

## 2024-10-17 RX ADMIN — CARVEDILOL 12.5 MG: 12.5 TABLET, FILM COATED ORAL at 09:45

## 2024-10-17 RX ADMIN — COLCHICINE 0.3 MG: 0.6 TABLET, FILM COATED ORAL at 15:27

## 2024-10-17 RX ADMIN — SENNOSIDES 8.6 MG: 8.6 TABLET, FILM COATED ORAL at 19:50

## 2024-10-17 RX ADMIN — LEVETIRACETAM 500 MG: 500 TABLET, FILM COATED ORAL at 09:46

## 2024-10-17 RX ADMIN — OXYCODONE AND ACETAMINOPHEN 1 TABLET: 10; 325 TABLET ORAL at 19:50

## 2024-10-17 RX ADMIN — AMIODARONE HYDROCHLORIDE 50 MG: 200 TABLET ORAL at 09:46

## 2024-10-17 RX ADMIN — HEPARIN SODIUM 5000 UNITS: 5000 INJECTION INTRAVENOUS; SUBCUTANEOUS at 21:00

## 2024-10-17 RX ADMIN — SODIUM CHLORIDE, PRESERVATIVE FREE 10 ML: 5 INJECTION INTRAVENOUS at 15:27

## 2024-10-17 RX ADMIN — CARVEDILOL 12.5 MG: 12.5 TABLET, FILM COATED ORAL at 18:40

## 2024-10-17 RX ADMIN — LEVETIRACETAM 500 MG: 500 TABLET, FILM COATED ORAL at 19:50

## 2024-10-17 RX ADMIN — FUROSEMIDE 40 MG: 40 TABLET ORAL at 09:46

## 2024-10-17 RX ADMIN — Medication 1000 UNITS: at 09:45

## 2024-10-17 RX ADMIN — DICLOFENAC SODIUM TOPICAL GEL, 1% 2 G: 10 GEL TOPICAL at 19:49

## 2024-10-17 ASSESSMENT — PAIN DESCRIPTION - DESCRIPTORS
DESCRIPTORS: ACHING

## 2024-10-17 ASSESSMENT — PAIN - FUNCTIONAL ASSESSMENT
PAIN_FUNCTIONAL_ASSESSMENT: PREVENTS OR INTERFERES SOME ACTIVE ACTIVITIES AND ADLS

## 2024-10-17 ASSESSMENT — PAIN DESCRIPTION - PAIN TYPE
TYPE: CHRONIC PAIN;ACUTE PAIN
TYPE: CHRONIC PAIN;ACUTE PAIN
TYPE: ACUTE PAIN;CHRONIC PAIN

## 2024-10-17 ASSESSMENT — PAIN DESCRIPTION - ONSET
ONSET: ON-GOING

## 2024-10-17 ASSESSMENT — PAIN SCALES - GENERAL
PAINLEVEL_OUTOF10: 5
PAINLEVEL_OUTOF10: 5
PAINLEVEL_OUTOF10: 8
PAINLEVEL_OUTOF10: 7
PAINLEVEL_OUTOF10: 7
PAINLEVEL_OUTOF10: 5
PAINLEVEL_OUTOF10: 3
PAINLEVEL_OUTOF10: 8

## 2024-10-17 ASSESSMENT — PAIN DESCRIPTION - DIRECTION: RADIATING_TOWARDS: NECK

## 2024-10-17 ASSESSMENT — PAIN DESCRIPTION - LOCATION
LOCATION: GENERALIZED
LOCATION: BACK
LOCATION: GENERALIZED
LOCATION: GENERALIZED

## 2024-10-17 ASSESSMENT — PAIN DESCRIPTION - FREQUENCY
FREQUENCY: CONTINUOUS
FREQUENCY: INTERMITTENT
FREQUENCY: CONTINUOUS

## 2024-10-17 ASSESSMENT — PAIN SCALES - WONG BAKER
WONGBAKER_NUMERICALRESPONSE: HURTS LITTLE MORE
WONGBAKER_NUMERICALRESPONSE: HURTS LITTLE MORE
WONGBAKER_NUMERICALRESPONSE: HURTS EVEN MORE

## 2024-10-17 ASSESSMENT — PAIN DESCRIPTION - ORIENTATION
ORIENTATION: LOWER;MID
ORIENTATION: RIGHT;LEFT;ANTERIOR;POSTERIOR
ORIENTATION: RIGHT;LEFT;MID;ANTERIOR;POSTERIOR

## 2024-10-17 NOTE — CONSULTS
Ph: [phone], Fax: [phone]                                     [employer]                                                   [address] [place], [place] [id number]           Reason for admission:    Rehab             Brief Summary:     Naty Dykes is being seen by nephrology for CKD    Interval History and Plan:   Patient seen at bedside  Comfortable  /73. Lowest BP was charted 92/55  On room air  Urine output  Labs reviewed from yesterday.           Cr around her baseline and monitor.   BP is fluctuating and monitor for now on current medications and keep her on fall precautions.   Check orthostatic vitals.   Increase protein in diet  Drink fluid/water to thirst   Follow Na level in AM and will give PRN Urea powder  Check serum osmolality, urine osmolality, urine lytes, renal panel in AM.   Might need to adjust BP medications in case of orthostatic hypotension. Patient does not appear volume overloaded  Avoid NSAIDs and nephrotoxins if possible  Dose medications according to GFR        Thank you for allowing us to participate in this patient's care  In case of any question please call us at our 24 hour answering service [phone] or from 7 AM to 5 PM via [employer], [employer], Epic chat or cell phone.   Dr Sina Kwan MD      Assessment:     Chronic Kidney Disease  Stage 4   Cr around her baseline.       Hypertension  CAD  A fib  CHF with LVEF 4- 45 %     On Lasix 40 mg daily + Amiodarone + Coreg 12.5 mg BID         Hyponatremia          HPI      Patient is a 88 y.o. female  with PMH significant for CKD 4, CAD, GERD, DL, HTN was admitted with SDH after fall and now at rehab and nephrology is consulted to follow and assist in care given CKD 4.       Patient seen at bedside with PT/OT and appear general weak and lethargic but awake and able to answer basic questions. Patient having back pain for which getting pain medications. Denied any SOB, GI or urinary

## 2024-10-17 NOTE — PROGRESS NOTES
Occupational Therapy  Facility/Department: [facility] ACUTE REHAB UNIT  Occupational Therapy Initial Assessment    Name: Naty Dykes  : 1935  MRN: [id number]  Date of Service: 10/17/2024    Discharge Recommendations:  Continue to assess pending progress, Home with Home health OT, 24 hour supervision or assist  OT Equipment Recommendations  Equipment Needed: No       Patient Diagnosis(es): SDH  Past Medical History:  has a past medical history of Arthritis, Blood circulation, collateral, CAD (coronary artery disease), CHF (congestive heart failure) ([facility]), Confusion, GERD (gastroesophageal reflux disease), History of heart artery stent, Hyperlipidemia, Hypertension, Mitral valve prolapse, Myocardial infarct, old, Seizure ([facility]), and Thyroid disease.  Past Surgical History:  has a past surgical history that includes Hysterectomy; Thyroidectomy; Colonoscopy; other surgical history (2018); pr office/outpt visit,procedure only (Right, 2018); eye surgery (Left, 2018); pr office/outpt visit,procedure only (Left, 2018); and hip surgery (Right, 2022).    Treatment Diagnosis: Decreased ADL status and functional mobility 2/2 subdural hematoma      Assessment  Performance deficits / Impairments: Decreased functional mobility ;Decreased endurance;Decreased ADL status;Decreased balance;Decreased strength;Decreased ROM;Decreased safe awareness;Decreased high-level IADLs;Decreased cognition;Decreased fine motor control  Assessment: Pt is an 88 year old female, presented to ED post fall thus resulting in SDH. Pt presents to ARU w/ decreased balance, strength, and endurance and overall deconditioning. Pt required max A for transitional movement w/ use of RW, max A for UE/LE dressing, mod A for bathing, max A for toileting, and min A for grooming. Pt unable to ambulate > 5\" (per PT). Previously, pt was independent w/ self care tasks but required assistance w/ IADL tasks, which she was receiving from  Exceptions: Wears glasses at all times  Hearing  Hearing: Exceptions to WFL  Hearing Exceptions: Hard of hearing/hearing concerns    Cognition  Overall Cognitive Status: Exceptions  Arousal/Alertness: Delayed responses to stimuli  Following Commands: Follows multistep commands with increased time;Follows multistep commands with repitition  Attention Span: Attends with cues to redirect  Memory: Decreased recall of precautions;Decreased recall of recent events  Safety Judgement: Decreased awareness of need for assistance;Decreased awareness of need for safety  Problem Solving: Assistance required to generate solutions  Insights: Decreased awareness of deficits  Initiation: Requires cues for some  Sequencing: Requires cues for some  Cognition Comment: Mumbles at time, difficult to comprehend. Requires increased time to follow simple commands and sequence tasks.  Orientation  Overall Orientation Status: Within Functional Limits  Orientation Level: Oriented to place;Oriented to time;Oriented to situation;Oriented to person                    Education Given To: Patient  Education Provided: Role of Therapy;Plan of Care;Family Education;Transfer Training;ADL Adaptive Strategies;Mobility Training;Fall Prevention Strategies  Education Method: Demonstration;Verbal  Barriers to Learning: Cognition  Education Outcome: Verbalized understanding;Continued education needed       Safety Devices  Type of Devices: Call light within reach;Chair alarm in place;Left in chair       Goals  Short Term Goals  Time Frame for Short Term Goals: 14 days  Short Term Goal 1: Pt will complete LE dressing w/ min A.  Short Term Goal 2: Pt will complete UE dressing w/ min A.  Short Term Goal 3: Pt will complete grooming w/ SPV, in seated.  Short Term Goal 4: Pt will complete toileting w/ min A.  Short Term Goal 5: Pt will complete toilet transfer w/ min A.  Long Term Goals  Time Frame for Long Term Goals : 21 days  Long Term Goal 1: Pt will complete

## 2024-10-17 NOTE — CONSULTS
Comprehensive Nutrition Assessment    RECOMMENDATIONS:  PO Diet: Dysphagia - Minced & Moist, Low Fat/Low Chol/High Fiber/JAYNA  Nutrition Supplement: Ensure+ HP TID  Nutrition Education: Education not appropriate     NUTRITION ASSESSMENT:   Nutritional summary & status: Consult for new admit to ARU. Upon visit, pt sleeping and did not arouse to sound of name being called. Per wt hx in EMR, pt has had 21% wt loss over 10 months which is considered significant according to ASPEN malnutrition criteria. Mild body fat and muscle mass loss noted upon visual NFPE. Per nephrology note today, pt reports fair appetite and trying to keep herself hydrated. Documented PO intake per EMR variable but shows most meals at 26-50% consumed. No documented intake for ONS but Ensure+ HP ordered TID. Will continue to monitor PO intake and any other changes to nutritional status.   Admission // PMH: SDH // CAD, GERD, HLD, HTN, paroxysmal A-fib on Eliquis, hypothyroidism, CHF, CKD stage 4    MALNUTRITION ASSESSMENT  Context of Malnutrition: Chronic Illness   Malnutrition Status: Moderate malnutrition  Findings of the 6 clinical characteristics of malnutrition (Minimum of 2 out of 6 clinical characteristics is required to make the diagnosis of moderate or severe Protein Calorie Malnutrition based on AND/ASPEN Guidelines):  Energy Intake:  75% or less estimated energy requirements for 1 month or longer  Weight Loss:  Greater than 20% over 1 year (21% over 10 months)     Body Fat Loss:  Mild body fat loss Buccal region   Muscle Mass Loss:  Mild muscle mass loss Temples (temporalis)    NUTRITION DIAGNOSIS   Moderate malnutrition, In context of chronic illness related to inadequate protein-energy intake as evidenced by Criteria as identified in malnutrition assessment    Nutrition Monitoring and Evaluation:   Food/Nutrient Intake Outcomes:  Food and Nutrient Intake, Supplement Intake  Physical Signs/Symptoms Outcomes:  Biochemical Data,  Nutrition Focused Physical Findings, Weight     OBJECTIVE DATA: Significant to nutrition assessment  Nutrition Related Findings: LBM 10/15. Labs from 10/16 reviewed; Na+ 134. Gluc . HbA1c 5.4% 10/11.  Wounds: None  Nutrition Goals: PO intake 50% or greater, by next RD assessment     CURRENT NUTRITION THERAPIES  ADULT DIET; Dysphagia - Minced and Moist; Low Fat/Low Chol/High Fiber/JAYNA  ADULT ORAL NUTRITION SUPPLEMENT; Breakfast, Lunch, Dinner; Standard High Calorie/High Protein Oral Supplement  PO Intake: 1-25%, 26-50%, 51-75%   PO Supplement Intake:Unable to assess  Additional Sources of Calories/IVF:n/a     COMPARATIVE STANDARDS  Energy (kcal):  1266 - 1477 (30 - 35 kcal/kg CBW)     Protein (g):  42 - 55 (1.0 - 1.3 gm/kg CBW)       Fluid (ml/day):  1500    ANTHROPOMETRICS  Current Height: 157.5 cm (5' 2.01\")  Current Weight - Scale: 42.2 kg (93 lb 0.6 oz)    Admission weight: 53.5 kg (117 lb 15.1 oz)    The patient will be monitored per nutrition standards of care. Consult dietitian if additional nutrition interventions are needed prior to RD reassessment.     Jennifer Worthy RD  [place]:  [phone]

## 2024-10-17 NOTE — PROGRESS NOTES
Physical Therapy  Facility/Department: [facility] ACUTE REHAB UNIT  Rehabilitation Physical Therapy Initial Assessment/ Daily treatment    NAME: Naty Dykes  : 1935 (88 y.o.)  MRN: [id number]  CODE STATUS: Full Code    Date of Service: 10/17/24      Past Medical History:   Diagnosis Date    Arthritis     Blood circulation, collateral     CAD (coronary artery disease)     CHF (congestive heart failure) ([facility])     Confusion 2020    GERD (gastroesophageal reflux disease)     History of heart artery stent 2018    Hyperlipidemia     Hypertension     Mitral valve prolapse     Myocardial infarct, old     Seizure ([facility]) 10/11/2024    Thyroid disease      Past Surgical History:   Procedure Laterality Date    COLONOSCOPY      EYE SURGERY Left 2018    PHACO EMULSIFICATION OF CATARACT WITH INTRAOCULAR LENS IMPLANT LEFT EYE     HIP SURGERY Right 2022    RIGHT HIP INTRAMEDULLARY NAILING performed [place] [facility] [place]    HYSTERECTOMY (CERVIX STATUS UNKNOWN)      OTHER SURGICAL HISTORY  2018    phacoemulsification of cataract with intraocular lens implant right eye    OR OFFICE/OUTPT VISIT,PROCEDURE ONLY Right 2018    PHACO EMULSIFICATION OF CATARACT WITH INTRAOCULAR LENS IMPLANT RIGHT EYE performed by Elan Chaney [place] [facility] [place]    OR OFFICE/OUTPT VISIT,PROCEDURE ONLY Left 2018    PHACO EMULSIFICATION OF CATARACT WITH INTRAOCULAR LENS IMPLANT LEFT EYE performed by Elan Chaney MD [place] [facility] [place]    THYROIDECTOMY         Chart Reviewed: Yes  Additional Pertinent Hx: Pt is 88 y.o. admitted to [facility] on 10/11/24 s/p fall, pt unable to move L arm and leg, 2 days prior to admission.  CT head: Right hemispheric subdural hematoma measuring 1.4 cm in width with mass  effect upon the right frontal, parietal and temporal lobes and 4 mm right to  left midline shift.     Small subdural hematoma involving the anterior inferior falx extending  anterior to the left parasagittal frontal lobe.   endurance    Assessment  Assessment: Pt is 88 y.o. with diagnosis of SDH presenting with decreased functional mobility.Pt has generalized weakness throughout. Pt reported that she had been sitting up for awhile and was very tired when PT arrived. Pt appears motivated to resume PLOF with less falls. Pt is well below baseline and would benefit from cont therapy to maximize potential and increase functional mobility towards Ind to allow for a safer d/c to home.  Treatment Diagnosis: decreased functional  mobility due to SDH  Therapy Prognosis: Good  Decision Making: Medium Complexity  Discharge Recommendations: 24 hour supervision or assist;Home with Home health PT  PT D/C Equipment  Other: Ongoing assessment at this time  PT Equipment Recommendations  Other: Ongoing assessment at this time    CLINICAL IMPRESSION   Pt is 88 y.o. with diagnosis of SDH presenting with decreased functional mobility.Pt has generalized weakness throughout. Pt reported that she had been sitting up for awhile and was very tired when PT arrived. Pt appears motivated to resume PLOF with less falls. Pt is well below baseline and would benefit from cont therapy to maximize potential and increase functional mobility towards Ind to allow for a safer d/c to home.    GOALS  Patient Goals   Patient Goals : \"Walk again like she use to\"  Short Term Goals  Time Frame for Short Term Goals: 14 days  Short Term Goal 1: S with all bed mobility skills  Short Term Goal 2: Transf with CGA x1  Short Term Goal 3: Amb distances with LRADx 40' with CGA  Short Term Goal 4: demo MI w/ w/c propulsion x 150' at a time  Long Term Goals  Time Frame for Long Term Goals : 21 days  Long Term Goal 1: Ind w/ bed mobility  Long Term Goal 2: Sup w transf excluding floor transf  Long Term Goal 3: Amb with LRAD distances x100' at a time w/ sup    PLAN OF CARE  Physical Therapy Plan  Days Per Week: 5 Days  Hours Per Day: 1 hour  Therapy Duration: 3 Weeks  Current Treatment

## 2024-10-17 NOTE — PROGRESS NOTES
[facility] - Inpatient Rehabilitation Department   Phone: [phone]    Speech Language Pathology Evaluation    [x] Cognitive-Linguistic Evaluation                   [x] Clinical Swallow Evaluation      Patient: Naty Dykes   : 1935   MRN: [id number]   Date of Service:  10/17/2024  Admitting Diagnosis: SDH (subdural hematoma)  Current Admission Summary: Per MD notes: \"Ms. Naty Dykes is a 88 y.o. female with a medical hx significant for CVA (), A fib (on eliquis), HFrEF (40-45%), coronary artery disease (s/p PCI ), hyperlipidemia, CKD stage 4 and thyroid disease (s/p thyroidectomy), otherwise as listed in the MHx table below, who presented from home to the [facility] ED on 10/10 with seizures     Daughter and granddaughter at bedside to aid with history. Patient reportedly fell a week ago getting out of the car and hit the R side her head but had no acute complaints. Later that day patient went to [facility] and her family had to pick her up due to emesis episode she had while at [facility]. Her family states this is not in the norm for her. Family also noted she has been more confused in the past week and has been reportedly hallucinating. She claims to be seeing her brother around the home, who has been  for 25 years now. Patient also having memory decline and unable to follow a conversation, which has worsened in the past week. Then last night as she was getting into bed she began having a seizure episode that lasted about 15-20secs, which prompted family to take her to the ED.      On my interview with patient, she is A&O x3. Complaining of mild headache on R side of head this AM. Also, complaining of some vision changes but she is unsure if they are acute or chronic. Complaining of R rib pain and back pain as well, which is chronic. Denies fever, chills, chest pain, SOB, vomiting.     Code status discussed in length and patient would like to be FULL CODE.\"  Past Medical  History:  has a past medical history of Arthritis, Blood circulation, collateral, CAD (coronary artery disease), CHF (congestive heart failure) ([facility]), Confusion, GERD (gastroesophageal reflux disease), History of heart artery stent, Hyperlipidemia, Hypertension, Mitral valve prolapse, Myocardial infarct, old, Seizure ([facility]), and Thyroid disease.  Past Surgical History:  has a past surgical history that includes Hysterectomy; Thyroidectomy; Colonoscopy; other surgical history (08/27/2018); pr office/outpt visit,procedure only (Right, 8/27/2018); eye surgery (Left, 09/04/2018); pr office/outpt visit,procedure only (Left, 9/4/2018); and hip surgery (Right, 11/7/2022).  Recent Imaging:      CT HEAD WO CONTRAST   Final Result       Right-sided and anterior extra-axial hemorrhagic fluid collections with mild   leftward midline shift and mass effect on the occipital horn of the right   lateral ventricle.       Mild right parietal sulcal subarachnoid hemorrhage.       Electronically signed by Kalia Vasquez     Instrumental Swallow Study: none  Precautions/Restrictions: Fall Risk, Seizure precautions      Pre-Admission Information   Living Status: Lives with son, ? Amount of assistance provided at baseline. Will need to confirm with family. Poor historian.   Medication Management:   [x]Primary   []Secondary []Comment: states daughter assists?  Finance Management: [x]Primary   []Secondary []Comment: states daughter assists?  Active :   []Yes         [x]No: daughter, grandson  Education: 3 years high school  Occupation: crafts  Hobbies/Leisure: read  Hearing: WFL  Vision: Vision Corrective Device: wears glasses for reading      Subjective  General: Pt seated upright in chair however legs partially hanging out of the L side. SLP assisted with repositioning.   Pain: Reported pain, did not state pain rating - RN aware  Safety Interventions: patient left in chair, chair alarm in place, and call light within reach, PCA  progress   [x] Chair alarm   [] Bed alarm    [x] Call light in reach     [] Other    Goals  Dysphagia Goals:  Goal 1: Pt will tolerate least restrictive diet with no respiratory decline and/or s/s associated with aspiration.  Goal 2: Pt will improve oral phase as evidenced by <trace residue/pocketing and no anterior escape.    Maqrkrffg-Phgqydyj-Ztpwde Goals:  Goal 1: Pt will sustain attention to tasks with <5 redirections.  Goal 2: Pt will complete functional recall tasks with 90% accuracy and use of compensatory strategies.  Goal 3: Pt will identify x4 aspects of orientation with MI.  Goal 4: Pt will complete basic executive function tasks with mod, fading to min cues for adequate self monitoring.  Goal 5: Pt will participate in ongoing cognitive-linguistic assessment.      Therapy Session Time      Session 1   Time In 1125   Time Out 1230   Time Code Minutes 15   Individual Minutes 65     Timed Code Treatment Minutes: 15  Total Treatment Minutes: 65    Electronically signed by:  RANDY Sesay.   Speech Language Pathology      Queenie Barragan M.A., CCC-SLP  [id number]  Speech-Language Pathologist    The speech-language pathologist was present, directed the patient's care, made skilled judgment and was responsible for assessment and treatment.

## 2024-10-17 NOTE — PROGRESS NOTES
Oriented daughters, Ela and Nara, to therapy schedule, nourishment room, closest lav, dinning room/refrig, answered questions. Informed them of plan to move patient to room 6 as soon as room opens up. Report of patient attempting to get out of bed unassisted last night, plan to get closer to nurses station for safety. Patient sleeping deeply, open eyes and talked with us with rubbing sternum and later cold towel on face, dtrs Ela and Nara observed the former and Ela observed the later.

## 2024-10-17 NOTE — PROGRESS NOTES
Pt. Recently admitted to unit, [facility] with hx of dementia. Pt tried to get out of bed stating she herd her  calling for to assist him back to bed. RN reoriented pt. Pt states her   a long time ago. Call light and over bed table within reach, bed wheels locked and in lowest position, fall light and over bed table within reach, video surveillance in place. Hourly rounding and frequent visual checks ongoing.   Vitals:    10/16/24 2015   BP: 139/74   Pulse: 62   Temp: 97.4 °F (36.3 °C)   SpO2: 97%

## 2024-10-17 NOTE — TELEPHONE ENCOUNTER
Care Transitions Initial Follow Up Call    Outreach made within 2 business days of discharge: Yes    Patient: Naty Dykes Patient : 1935   MRN: [id number]  Reason for Admission: Fall, hematoma   Discharge Date: 10/16/24       Spoke with: Patient transferred to [facility] and is currently admitted for in-patient rehab.  Once the patient is discharged I will schedule for a Hospital follow up appointment.    Discharge department/facility: [facility]        Scheduled appointment with PCP within 7-14 days    Follow Up  Future Appointments   Date Time Provider Department Center   3/6/2025  2:30 PM SCHEDULE, WILLA DEVICE CHECK Willa Koch [facility]   3/6/2025  3:00 PM Bruce Paul MD [place] [facility]   3/19/2025  2:00 PM James Palomares MD [place] [facility]       Sophie Cisse LPN

## 2024-10-17 NOTE — PLAN OF CARE
Problem: Chronic Conditions and Co-morbidities  Goal: Patient's chronic conditions and co-morbidity symptoms are monitored and maintained or improved  10/17/2024 0528 by Ninfa Agustin RN  Outcome: Progressing  10/16/2024 1842 by Hilary Ravi RN  Outcome: Progressing  Flowsheets (Taken 10/16/2024 1811)  Care Plan - Patient's Chronic Conditions and Co-Morbidity Symptoms are Monitored and Maintained or Improved: Monitor and assess patient's chronic conditions and comorbid symptoms for stability, deterioration, or improvement     Problem: Discharge Planning  Goal: Discharge to home or other facility with appropriate resources  10/17/2024 0528 by Ninfa Agustin RN  Outcome: Progressing      Problem: Safety - Adult  Goal: Free from fall injury  10/17/2024 0528 by Ninfa Agustin RN  Outcome: Progressing    Pt. Admitted to facility with impaired gait and weakness. Fall prevention measures in place to promote safety and reduce the risk of falls: Fall sign posted on door, bed wheels locked and in lowest position, call light and over bed table placed within reach. Hourly rounding and frequent visual checks in place. Will continue to monitor.    Problem: Pain  Goal: Verbalizes/displays adequate comfort level or baseline comfort level  10/17/2024 0528 by Ninfa Agustin RN  Outcome: Progressing  Flowsheets (Taken 10/17/2024 0528)  Verbalizes/displays adequate comfort level or baseline comfort level: Assess pain using appropriate pain scale  10/16/2024 1842 by Hilary Ravi RN  , pain medication administered, see MAR. Pain is being managed with pharmacological and non-pharmacological intervention. Pain level will be decreased or be at a satisfactory level by discharge.    Problem: Skin/Tissue Integrity  Goal: Absence of new skin breakdown  10/17/2024 0528 by Ninfa Agustin RN  Outcome: Progressing  Skin is kept clean and dry. Pt. Turns self, encouraged to turn and reposition to relieve pressure off bony

## 2024-10-17 NOTE — PROGRESS NOTES
The [facility] - Clinical Pharmacy Note - Renal Dosing    Colchicine 0.6mg po daily ordered for gout. Per [facility] Renal Dose Adjustment Policy, Colchicine will be changed to 0.3 mg po daily due to est CrCl < 30.     Estimated Creatinine Clearance: Estimated Creatinine Clearance: 17 mL/min (A) (based on SCr of 1.5 mg/dL (H)).  Dialysis Status, AGUILA, CKD: CKD 4  BMI: Body mass index is 17.01 kg/m².    Pharmacy will continue to monitor renal function and adjust dose as necessary.      Please call with questions--  Thanks--  Pretty Bear, PharmD, BCPS, BCGP  [phone] ([facility])   10/17/2024 11:58 AM

## 2024-10-17 NOTE — H&P
Department of Physical Medicine & Rehabilitation  History & Physical      Patient Identification:  Naty Dykes  : 1935  Admit date: 10/16/2024   Attending provider: Houston Barth DO        Primary care provider: Debra Shahid DO     Chief Complaint: Subdural hematoma     History of Present Illness/Hospital Course:  Patient is a 88 y.o. female with pmHX of CVA (), pAfib on eliquis, HFrEF (40-45% EF), CAD s/p MINH placement (), HLD, CKD stage 4 and hypothyroidism who presents to ED on 10/10 with seizures.     Per family, patient fell a week prior to presentation and hit the R side of the head but had no complaints initially. Patient later had an episode of vomiting the same day as well as appearing more confused. In the ED, patient was noted to have R sided head pain, R sided rib pain and chronic back pain. Patient denied fever, chest pain, and SOB. CT head in the ED showed R subdural hematoma and mild subarachnoid hemorrhage over the R frontal lobe. Repeat CT 6 hours later showed no change. Patient was started on Keppra. No neurosurgical intervention was required thus far.    Prior Level of Function:  Mod Independent for mobility, ADLs, and IADLs    Current Level of Function:  Mod assist     Pertinent Social History:  Support: son and daughter      Past Medical History:   Diagnosis Date    Arthritis     Blood circulation, collateral     CAD (coronary artery disease)     CHF (congestive heart failure) ([facility])     Confusion 2020    GERD (gastroesophageal reflux disease)     History of heart artery stent 2018    Hyperlipidemia     Hypertension     Mitral valve prolapse     Myocardial infarct, old     Seizure (HCC) 10/11/2024    Thyroid disease      Fall in past year: yes    Past Surgical History:   Procedure Laterality Date    COLONOSCOPY      EYE SURGERY Left 2018    PHACO EMULSIFICATION OF CATARACT WITH INTRAOCULAR LENS IMPLANT LEFT EYE     HIP SURGERY Right 2022    RIGHT HIP  Session: 0 min    Social Connections ([facility])   Housing Stability: Low Risk  (10/16/2024)    Housing Stability Vital Sign     Unable to Pay for Housing in the Last Year: No     Number of Times Moved in the Last Year: 0     Homeless in the Last Year: No       Allergies   Allergen Reactions    Benazepril Hcl Shortness Of Breath and Swelling    Feldene [Piroxicam] Shortness Of Breath    Hytrin [Terazosin Hcl] Shortness Of Breath    Iv Contrast [Iodides] Anaphylaxis    Acetaminophen     Celebrex [Celecoxib]      GI upset    Cephalexin      Nausea    Hydrochlorothiazide     Iodinated Casein     Monopril [Fosinopril Sodium] Other (See Comments)     Pt states makes her weak    Protonix [Pantoprazole Sodium] Other (See Comments)     Kidney failure      Quinapril Hcl     Ultracet [Tramadol-Acetaminophen]      Rash    Ziac [Bisoprolol-Hydrochlorothiazide] Other (See Comments)     Pt does not remember reaction to med ? Weak          Current Facility-Administered Medications   Medication Dose Route Frequency Provider Last Rate Last Admin    amiodarone (CORDARONE) tablet 50 mg  50 mg Oral Daily Houston Barth DO   50 mg at 10/17/24 0946    atorvastatin (LIPITOR) tablet 80 mg  80 mg Oral Daily Houston Barth DO   80 mg at 10/17/24 0945    diclofenac sodium (VOLTAREN) 1 % gel 2 g  2 g Topical BID Houston Barth DO   2 g at 10/17/24 0947    furosemide (LASIX) tablet 40 mg  40 mg Oral Daily Houston Barth DO   40 mg at 10/17/24 0946    levETIRAcetam (KEPPRA) tablet 500 mg  500 mg Oral BID Houston Barth DO   500 mg at 10/17/24 0946    lidocaine 4 % external patch 1 patch  1 patch TransDERmal Daily Houston Barth DO        pantoprazole (PROTONIX) tablet 40 mg  40 mg Oral QAM AC Houston Barth DO   40 mg at 10/17/24 0604    oxyCODONE-acetaminophen (PERCOCET)  MG per tablet 1 tablet  1 tablet Oral Q6H PRN Houston Barth DO   1 tablet at 10/17/24 0608    senna (SENOKOT) tablet 8.6 mg  1 tablet Oral Nightly Tab  lymphadenopathy.   ALLERGIC/IMMUNOLOGIC: negative for recurrent infections, angioedema, anaphylaxis and drug reactions.   ENDOCRINE: negative for weight changes and diabetic symptoms including polyuria, polydipsia and polyphagia.   MUSCULOSKELETAL: negative for pain, joint swelling, decreased range of motion.   NEUROLOGICAL: negative for headaches, slurred speech, unilateral weakness.   PSYCHIATRIC/BEHAVIORAL: negative for hallucinations, behavioral problems, confusion and agitation.     All pertinent positives are noted in the HPI.    Physical Examination:  Vitals: Patient Vitals for the past 24 hrs:   BP Temp Temp src Pulse Resp SpO2 Height Weight   10/17/24 0930 (!) 154/73 98.3 °F (36.8 °C) Oral 70 17 96 % -- --   10/17/24 0615 -- -- -- -- -- -- -- 42.2 kg (93 lb 0.6 oz)   10/16/24 2015 139/74 97.4 °F (36.3 °C) Axillary 62 -- 97 % -- --   10/16/24 1827 (!) 140/63 -- -- 62 -- -- -- --   10/16/24 1800 (!) 140/63 97.5 °F (36.4 °C) Oral 62 -- 94 % 1.575 m (5' 2.01\") 53.5 kg (117 lb 15.1 oz)       CONST: Alert. No acute distress  EYES: No icterus noted.  HENT: Atraumatic, normocephalic;  NECK: Trachea midline, neck supple.  CV: Regular rate and rhythm, no murmur rub or gallop noted  RESP: Lungs clear to auscultation bilaterally, no rales wheezes or ronchi, no retractions. Respirations unlabored.   GI: Soft, nontender, nondistended.   EXT: No significant edema appreciated to BLE  NEURO:   Patient somnolent. Limited exam.  Unable to assess EOMI. CN V, CN VII, CNXI-XII intact  4/5 strengths to BUE. 3/5 strengths to bilateral hip flexion. 4/5 strengths to ankle dorsi/plantarflexion  PSYCH: Stable mood, normal judgement, normal affect     Lab Results   Component Value Date    WBC 6.2 10/16/2024    HGB 9.2 (L) 10/16/2024    HCT 27.4 (L) 10/16/2024    MCV 90.3 10/16/2024     10/16/2024     Lab Results   Component Value Date    INR 1.28 (H) 10/11/2024    INR 1.21 (H) 11/07/2022    INR 1.17 (H) 11/06/2022    PROTIME  16.2 (H) 10/11/2024    PROTIME 15.2 (H) 11/07/2022    PROTIME 14.7 (H) 11/06/2022     Lab Results   Component Value Date    CREATININE 1.5 (H) 10/16/2024    BUN 35 (H) 10/16/2024     (L) 10/16/2024    K 3.6 10/16/2024    CL 96 (L) 10/16/2024    CO2 28 10/16/2024     Lab Results   Component Value Date    ALT 13 10/11/2024    AST 26 10/11/2024    ALKPHOS 114 10/11/2024    BILITOT 0.6 10/11/2024         No orders to display             The above laboratory data have been reviewed.   The above imaging data have been reviewed.   The above medical testing have been reviewed.     Body mass index is 17.01 kg/m².    POST ADMISSION PHYSICIAN EVALUATION  The patient has agreed to being admitted to our comprehensive inpatient rehabilitation facility and can tolerate the intensity of service consisting of at least:  --180 minutes of therapy a day, 5 out of 7 days a week.  OR  --15 hours of intensive therapy within a 7 consecutive day period.     The patient/family has a good understanding of our discharge process and will benefit from an interdisciplinary inpatient rehabilitation program. The patient has potential to make improvement and is in need of at least two of the following multidisciplinary therapies including but not limited to physical, occupational, respiratory, and speech, nutritional services, wound care, and prosthetics and orthotics. Given the patients complex condition and risk of further medical complications, rehabilitation services cannot be safely provided at a lower level of care such as a skilled nursing facility. All of the goals listed below were reviewed with the patient and he/she is in agreement.    I have compared the patients medical and functional status at the time of the preadmission screening and the same on this date, and there are no significant changes.    By signing this document, I acknowledge that I have personally performed a full physical examination on this patient within 24 hours

## 2024-10-18 LAB
ANION GAP SERPL CALCULATED.3IONS-SCNC: 14 MMOL/L (ref 3–16)
BASOPHILS # BLD: 0.1 K/UL (ref 0–0.2)
BASOPHILS NFR BLD: 1.2 %
BUN SERPL-MCNC: 35 MG/DL (ref 7–20)
CALCIUM SERPL-MCNC: 9.5 MG/DL (ref 8.3–10.6)
CHLORIDE SERPL-SCNC: 96 MMOL/L (ref 99–110)
CHLORIDE UR-SCNC: 22 MMOL/L
CO2 SERPL-SCNC: 26 MMOL/L (ref 21–32)
CREAT SERPL-MCNC: 1.6 MG/DL (ref 0.6–1.2)
DEPRECATED RDW RBC AUTO: 13.7 % (ref 12.4–15.4)
EOSINOPHIL # BLD: 0.1 K/UL (ref 0–0.6)
EOSINOPHIL NFR BLD: 1.8 %
GFR SERPLBLD CREATININE-BSD FMLA CKD-EPI: 31 ML/MIN/{1.73_M2}
GLUCOSE SERPL-MCNC: 92 MG/DL (ref 70–99)
HCT VFR BLD AUTO: 29.6 % (ref 36–48)
HGB BLD-MCNC: 9.9 G/DL (ref 12–16)
LYMPHOCYTES # BLD: 1.3 K/UL (ref 1–5.1)
LYMPHOCYTES NFR BLD: 19.3 %
MCH RBC QN AUTO: 30.2 PG (ref 26–34)
MCHC RBC AUTO-ENTMCNC: 33.5 G/DL (ref 31–36)
MCV RBC AUTO: 90.3 FL (ref 80–100)
MONOCYTES # BLD: 0.7 K/UL (ref 0–1.3)
MONOCYTES NFR BLD: 10 %
NEUTROPHILS # BLD: 4.4 K/UL (ref 1.7–7.7)
NEUTROPHILS NFR BLD: 67.7 %
OSMOLALITY SERPL: 292 MOSM/KG (ref 278–305)
OSMOLALITY UR: 428 MOSM/KG (ref 390–1070)
PLATELET # BLD AUTO: 394 K/UL (ref 135–450)
PMV BLD AUTO: 8.5 FL (ref 5–10.5)
POTASSIUM SERPL-SCNC: 3.6 MMOL/L (ref 3.5–5.1)
RBC # BLD AUTO: 3.28 M/UL (ref 4–5.2)
SODIUM SERPL-SCNC: 136 MMOL/L (ref 136–145)
SODIUM UR-SCNC: 30 MMOL/L
WBC # BLD AUTO: 6.6 K/UL (ref 4–11)

## 2024-10-18 PROCEDURE — 97130 THER IVNTJ EA ADDL 15 MIN: CPT

## 2024-10-18 PROCEDURE — 97116 GAIT TRAINING THERAPY: CPT | Performed by: PHYSICAL THERAPIST

## 2024-10-18 PROCEDURE — 97535 SELF CARE MNGMENT TRAINING: CPT

## 2024-10-18 PROCEDURE — 6360000002 HC RX W HCPCS: Performed by: PHYSICAL MEDICINE & REHABILITATION

## 2024-10-18 PROCEDURE — 84300 ASSAY OF URINE SODIUM: CPT

## 2024-10-18 PROCEDURE — 6370000000 HC RX 637 (ALT 250 FOR IP): Performed by: PHYSICAL MEDICINE & REHABILITATION

## 2024-10-18 PROCEDURE — 97530 THERAPEUTIC ACTIVITIES: CPT

## 2024-10-18 PROCEDURE — 97530 THERAPEUTIC ACTIVITIES: CPT | Performed by: PHYSICAL THERAPIST

## 2024-10-18 PROCEDURE — 80048 BASIC METABOLIC PNL TOTAL CA: CPT

## 2024-10-18 PROCEDURE — 83930 ASSAY OF BLOOD OSMOLALITY: CPT

## 2024-10-18 PROCEDURE — 97129 THER IVNTJ 1ST 15 MIN: CPT

## 2024-10-18 PROCEDURE — 36415 COLL VENOUS BLD VENIPUNCTURE: CPT

## 2024-10-18 PROCEDURE — 2580000003 HC RX 258: Performed by: PHYSICAL MEDICINE & REHABILITATION

## 2024-10-18 PROCEDURE — 92526 ORAL FUNCTION THERAPY: CPT

## 2024-10-18 PROCEDURE — 83935 ASSAY OF URINE OSMOLALITY: CPT

## 2024-10-18 PROCEDURE — 1280000000 HC REHAB R&B

## 2024-10-18 PROCEDURE — 97110 THERAPEUTIC EXERCISES: CPT | Performed by: PHYSICAL THERAPIST

## 2024-10-18 PROCEDURE — 82436 ASSAY OF URINE CHLORIDE: CPT

## 2024-10-18 PROCEDURE — 85025 COMPLETE CBC W/AUTO DIFF WBC: CPT

## 2024-10-18 RX ORDER — SIMETHICONE 80 MG
80 TABLET,CHEWABLE ORAL 3 TIMES DAILY PRN
Status: DISPENSED | OUTPATIENT
Start: 2024-10-18

## 2024-10-18 RX ADMIN — SODIUM CHLORIDE, PRESERVATIVE FREE 10 ML: 5 INJECTION INTRAVENOUS at 21:34

## 2024-10-18 RX ADMIN — PANTOPRAZOLE SODIUM 40 MG: 40 TABLET, DELAYED RELEASE ORAL at 05:33

## 2024-10-18 RX ADMIN — CARVEDILOL 12.5 MG: 12.5 TABLET, FILM COATED ORAL at 17:23

## 2024-10-18 RX ADMIN — HEPARIN SODIUM 5000 UNITS: 5000 INJECTION INTRAVENOUS; SUBCUTANEOUS at 21:36

## 2024-10-18 RX ADMIN — BISACODYL 5 MG: 5 TABLET, COATED ORAL at 09:20

## 2024-10-18 RX ADMIN — DICLOFENAC SODIUM TOPICAL GEL, 1% 2 G: 10 GEL TOPICAL at 21:34

## 2024-10-18 RX ADMIN — Medication 1000 UNITS: at 09:19

## 2024-10-18 RX ADMIN — CARVEDILOL 12.5 MG: 12.5 TABLET, FILM COATED ORAL at 09:20

## 2024-10-18 RX ADMIN — SIMETHICONE 80 MG: 80 TABLET, CHEWABLE ORAL at 21:35

## 2024-10-18 RX ADMIN — LEVETIRACETAM 500 MG: 500 TABLET, FILM COATED ORAL at 21:35

## 2024-10-18 RX ADMIN — OXYCODONE AND ACETAMINOPHEN 1 TABLET: 10; 325 TABLET ORAL at 23:46

## 2024-10-18 RX ADMIN — OXYCODONE AND ACETAMINOPHEN 1 TABLET: 10; 325 TABLET ORAL at 17:24

## 2024-10-18 RX ADMIN — OXYCODONE AND ACETAMINOPHEN 1 TABLET: 10; 325 TABLET ORAL at 03:47

## 2024-10-18 RX ADMIN — SODIUM CHLORIDE, PRESERVATIVE FREE 10 ML: 5 INJECTION INTRAVENOUS at 13:55

## 2024-10-18 RX ADMIN — LEVETIRACETAM 500 MG: 500 TABLET, FILM COATED ORAL at 09:19

## 2024-10-18 RX ADMIN — SIMETHICONE 80 MG: 80 TABLET, CHEWABLE ORAL at 12:26

## 2024-10-18 RX ADMIN — CYANOCOBALAMIN TAB 1000 MCG 1000 MCG: 1000 TAB at 09:21

## 2024-10-18 RX ADMIN — FUROSEMIDE 40 MG: 40 TABLET ORAL at 09:20

## 2024-10-18 RX ADMIN — ATORVASTATIN CALCIUM 80 MG: 80 TABLET, FILM COATED ORAL at 09:20

## 2024-10-18 RX ADMIN — AMIODARONE HYDROCHLORIDE 50 MG: 200 TABLET ORAL at 09:20

## 2024-10-18 RX ADMIN — DICLOFENAC SODIUM TOPICAL GEL, 1% 2 G: 10 GEL TOPICAL at 09:21

## 2024-10-18 RX ADMIN — COLCHICINE 0.3 MG: 0.6 TABLET, FILM COATED ORAL at 09:20

## 2024-10-18 RX ADMIN — HEPARIN SODIUM 5000 UNITS: 5000 INJECTION INTRAVENOUS; SUBCUTANEOUS at 09:21

## 2024-10-18 ASSESSMENT — PAIN SCALES - WONG BAKER: WONGBAKER_NUMERICALRESPONSE: HURTS LITTLE MORE

## 2024-10-18 ASSESSMENT — PAIN DESCRIPTION - DIRECTION: RADIATING_TOWARDS: HIP

## 2024-10-18 ASSESSMENT — PAIN DESCRIPTION - ORIENTATION
ORIENTATION: RIGHT;LEFT
ORIENTATION: LOWER
ORIENTATION_2: RIGHT;LEFT
ORIENTATION: RIGHT;LEFT
ORIENTATION: POSTERIOR
ORIENTATION: POSTERIOR;MID

## 2024-10-18 ASSESSMENT — PAIN DESCRIPTION - ONSET
ONSET: ON-GOING

## 2024-10-18 ASSESSMENT — PAIN DESCRIPTION - DESCRIPTORS
DESCRIPTORS: ACHING;SORE
DESCRIPTORS: ACHING
DESCRIPTORS: ACHING
DESCRIPTORS_2: ACHING
DESCRIPTORS: ACHING;THROBBING
DESCRIPTORS: ACHING

## 2024-10-18 ASSESSMENT — PAIN DESCRIPTION - PAIN TYPE
TYPE: CHRONIC PAIN
TYPE: CHRONIC PAIN;ACUTE PAIN
TYPE: ACUTE PAIN;CHRONIC PAIN
TYPE: CHRONIC PAIN
TYPE: ACUTE PAIN;CHRONIC PAIN

## 2024-10-18 ASSESSMENT — PAIN DESCRIPTION - LOCATION
LOCATION: BACK
LOCATION: LEG
LOCATION: BACK
LOCATION: BACK
LOCATION_2: WRIST
LOCATION: BACK

## 2024-10-18 ASSESSMENT — PAIN SCALES - GENERAL
PAINLEVEL_OUTOF10: 8
PAINLEVEL_OUTOF10: 7
PAINLEVEL_OUTOF10: 8
PAINLEVEL_OUTOF10: 3
PAINLEVEL_OUTOF10: 6
PAINLEVEL_OUTOF10: 2

## 2024-10-18 ASSESSMENT — PAIN - FUNCTIONAL ASSESSMENT
PAIN_FUNCTIONAL_ASSESSMENT: PREVENTS OR INTERFERES SOME ACTIVE ACTIVITIES AND ADLS
PAIN_FUNCTIONAL_ASSESSMENT: PREVENTS OR INTERFERES SOME ACTIVE ACTIVITIES AND ADLS
PAIN_FUNCTIONAL_ASSESSMENT: ACTIVITIES ARE NOT PREVENTED
PAIN_FUNCTIONAL_ASSESSMENT: ACTIVITIES ARE NOT PREVENTED
PAIN_FUNCTIONAL_ASSESSMENT_SITE2: PREVENTS OR INTERFERES SOME ACTIVE ACTIVITIES AND ADLS
PAIN_FUNCTIONAL_ASSESSMENT: ACTIVITIES ARE NOT PREVENTED

## 2024-10-18 ASSESSMENT — PAIN DESCRIPTION - FREQUENCY
FREQUENCY: INTERMITTENT

## 2024-10-18 ASSESSMENT — PAIN DESCRIPTION - INTENSITY
RATING_2: 3
RATING_2: 6

## 2024-10-18 NOTE — PLAN OF CARE
Problem: Chronic Conditions and Co-morbidities  Goal: Patient's chronic conditions and co-morbidity symptoms are monitored and maintained or improved  Outcome: Progressing  Flowsheets (Taken 10/17/2024 1942)  Care Plan - Patient's Chronic Conditions and Co-Morbidity Symptoms are Monitored and Maintained or Improved:   Collaborate with multidisciplinary team to address chronic and comorbid conditions and prevent exacerbation or deterioration   Monitor and assess patient's chronic conditions and comorbid symptoms for stability, deterioration, or improvement   Update acute care plan with appropriate goals if chronic or comorbid symptoms are exacerbated and prevent overall improvement and discharge     Problem: Discharge Planning  Goal: Discharge to home or other facility with appropriate resources  Outcome: Progressing  Flowsheets (Taken 10/17/2024 1942)  Discharge to home or other facility with appropriate resources: Identify discharge learning needs (meds, wound care, etc)     Problem: Safety - Adult  Goal: Free from fall injury  Outcome: Progressing   Notes: Pt. With confusion and impulsive behavior at times. For safety pt.brought closer to nurses station, education on the use of the call light and when to all for assistance, call light and over bed table within reach, bed wheels  locked and in lowest position, video surveillance in place. Hourly rounding and frequent visual checks in place. No falls reported thus far this shift.    Problem: Pain  Goal: Verbalizes/displays adequate comfort level or baseline comfort level  Outcome: Progressing   Pt. Complaint of pain, pain medication administered, see MAR. Pain is being managed with pharmacological and non-pharmacological intervention. Pain level will be decreased or be at a satisfactory level by discharge.    Problem: Skin/Tissue Integrity  Goal: Absence of new skin breakdown  Outcome: Progressing   Skin is kept clean and dry. Pt. Turns self, encouraged to turn

## 2024-10-18 NOTE — PROGRESS NOTES
Department of Physical Medicine & Rehabilitation  Progress Note    Patient Identification:  Naty Dykes  [id number]  : 1935  Admit date: 10/16/2024    Chief Complaint: SDH (subdural hematoma)    Subjective:   No acute events overnight. Patient seen this am.  Seen in therapy this morning.  She is doing well and able to walk with a walker.  She slept fairly well last night.  Continues to require extensive help with ADLs.  Will continue therapy throughout the weekend.    ROS: No f/c, n/v, cp     Objective:  Patient Vitals for the past 24 hrs:   BP Temp Temp src Pulse Resp SpO2 Weight   10/18/24 0900 130/67 97.5 °F (36.4 °C) Oral 77 18 96 % --   10/18/24 0600 -- -- -- -- -- -- 42 kg (92 lb 9.5 oz)   10/17/24 1942 (!) 147/81 97.6 °F (36.4 °C) Axillary 70 18 98 % --   10/17/24 1840 (!) 159/79 -- -- 64 -- 97 % --   10/17/24 1730 -- -- -- -- -- 95 % --   10/17/24 1530 138/69 -- -- 61 -- -- --     CONST: Alert. No acute distress  EYES: No icterus noted.  HENT: Atraumatic, normocephalic;  NECK: Trachea midline, neck supple.  CV: Regular rate and rhythm, no murmur rub or gallop noted  RESP: Lungs clear to auscultation bilaterally, no rales wheezes or ronchi, no retractions. Respirations unlabored.   GI: Soft, nontender, nondistended.   EXT: No significant edema appreciated to BLE  NEURO:   Patient somnolent. Limited exam.  Unable to assess EOMI. CN V, CN VII, CNXI-XII intact  4/5 strengths to BUE. 3/5 strengths to bilateral hip flexion. 4/5 strengths to ankle dorsi/plantarflexion  PSYCH: Stable mood, normal judgement, normal affect     Laboratory data: Available via EMR.   Last 24 hour lab  Recent Results (from the past 24 hour(s))   Osmolality    Collection Time: 10/18/24  5:59 AM   Result Value Ref Range    Osmolality 292 278 - 305 mOsm/kg   Basic Metabolic Panel w/ Reflex to MG    Collection Time: 10/18/24  5:59 AM   Result Value Ref Range    Sodium 136 136 - 145 mmol/L    Potassium reflex Magnesium 3.6 3.5 -

## 2024-10-18 NOTE — PROGRESS NOTES
Ph: [phone], Fax: [phone]                                     [employer]                                                   [address] [place], [place] [id number]           Reason for admission:    Rehab             Brief Summary:     Naty Dykes is being seen by nephrology for CKD    Interval History and Plan:   Patient seen at bedside with daughter  Comfortable  BP reviewed. Stable.   On room air  Urine output ?  Cr 1.6 stable  Na 136          Cr around her baseline and monitor.   BP is fluctuating and monitor for now on current medications and keep her on fall precautions.   Check orthostatic vitals.   Increase protein in diet  Drink fluid/water to thirst   Avoid NSAIDs and nephrotoxins if possible  Dose medications according to GFR      Thank you for allowing us to participate in this patient's care  In case of any question please call us at our 24 hour answering service [phone] or from 7 AM to 5 PM via Perfect Serve, Voalte, Epic chat or cell phone.   Dr Sina Kwan MD      Assessment:     Chronic Kidney Disease  Stage 4   Cr around her baseline.       Hypertension  CAD  A fib  CHF with LVEF 4- 45 %     On Lasix 40 mg daily + Amiodarone + Coreg 12.5 mg BID         Hyponatremia          HPI      Patient is a 88 y.o. female  with PMH significant for CKD 4, CAD, GERD, DL, HTN was admitted with SDH after fall and now at rehab and nephrology is consulted to follow and assist in care given CKD 4.       Patient seen at bedside with PT/OT and appear general weak and lethargic but awake and able to answer basic questions. Patient having back pain for which getting pain medications. Denied any SOB, GI or urinary symptoms. Per patient appetite is fair and she is trying to keep herself hydrated.     Reviewed records.         ROS:   Negative except as mentioned in HPI      Vitals:     Vitals:    10/17/24 1942   BP: (!) 147/81   Pulse: 70   Resp: 18   Temp: 97.6 °F

## 2024-10-18 NOTE — PLAN OF CARE
Problem: Chronic Conditions and Co-morbidities  Goal: Patient's chronic conditions and co-morbidity symptoms are monitored and maintained or improved  10/18/2024 1612 by Debby Tolbert RN  Outcome: Progressing     Problem: Discharge Planning  Goal: Discharge to home or other facility with appropriate resources  10/18/2024 1612 by Debby Tolbert RN  Outcome: Progressing     Problem: Safety - Adult  Goal: Free from fall injury  10/18/2024 1612 by Debby Tolbert RN  Outcome: Progressing     Problem: Pain  Goal: Verbalizes/displays adequate comfort level or baseline comfort level  10/18/2024 1612 by Debby Tolbert RN  Outcome: Progressing     Problem: Skin/Tissue Integrity  Goal: Absence of new skin breakdown  Description: 1.  Monitor for areas of redness and/or skin breakdown  2.  Assess vascular access sites hourly  3.  Every 4-6 hours minimum:  Change oxygen saturation probe site  4.  Every 4-6 hours:  If on nasal continuous positive airway pressure, respiratory therapy assess nares and determine need for appliance change or resting period.  10/18/2024 1612 by Debby Tolbert RN  Outcome: Progressing     Problem: ABCDS Injury Assessment  Goal: Absence of physical injury  Outcome: Progressing     Problem: Nutrition Deficit:  Goal: Optimize nutritional status  Note: Dtr's encourage and assist patient with meals, intake continues to be low. Dtr Ela thinks bringing in some homemade soup may help, showed her the dinning room fridge and labels.

## 2024-10-18 NOTE — PROGRESS NOTES
Occupational Therapy  Facility/Department: [facility] ACUTE REHAB UNIT  Rehabilitation Occupational Therapy Daily Treatment Note    Date: 10/18/24  Patient Name: Naty Dykes       Room: 3106/3106-01  MRN: [id number]  Account: [id number]   : 1935  (88 y.o.) Gender: female                    Past Medical History:  has a past medical history of Arthritis, Blood circulation, collateral, CAD (coronary artery disease), CHF (congestive heart failure) ([facility]), Confusion, GERD (gastroesophageal reflux disease), History of heart artery stent, Hyperlipidemia, Hypertension, Mitral valve prolapse, Myocardial infarct, old, Seizure (HCC), and Thyroid disease.  Past Surgical History:   has a past surgical history that includes Hysterectomy; Thyroidectomy; Colonoscopy; other surgical history (2018); pr office/outpt visit,procedure only (Right, 2018); eye surgery (Left, 2018); pr office/outpt visit,procedure only (Left, 2018); and hip surgery (Right, 2022).    Restrictions  Restrictions/Precautions: Up as Tolerated, Fall Risk, Seizure  Other position/activity restrictions: up with assist, seizure precautions    Subjective  Subjective: Pt seated in recliner upon OT entry, half asleep, agreeable to session. Denied pain.  Restrictions/Precautions: Up as Tolerated;Fall Risk;Seizure             Objective     Cognition  Arousal/Alertness: Delayed responses to stimuli  Following Commands: Follows multistep commands with increased time;Follows multistep commands with repitition  Attention Span: Attends with cues to redirect  Memory: Decreased recall of precautions;Decreased recall of recent events  Safety Judgement: Decreased awareness of need for assistance;Decreased awareness of need for safety  Problem Solving: Assistance required to generate solutions  Insights: Decreased awareness of deficits  Initiation: Requires cues for some  Sequencing: Requires cues for some  Cognition Comment: More alert and oriented  during session.  Orientation  Orientation Level: Oriented to place;Oriented to time;Oriented to situation;Oriented to person         ADL  Upper Extremity Dressing  Assistance Level: Moderate assistance  Skilled Clinical Factors: Pt unbuttoned jacket w/ increased time and unthreaded BUE. Pt threaded BUE thru bra and required assist to clasp. Pt able to thread BUE thru shirt, assist to pull overhead and adjust around trunk.  Lower Extremity Dressing  Assistance Level: Maximum assistance  Skilled Clinical Factors: Pt stood at grab bars during toileting and pulled brief/pants down, assist to fully remove from chelsey-area. Pt required assist to unthread BLE, thread BLE into new brief/pants, pt stood w/ RW w/ mod A and required assist to pull up brief/pants over buttocks.  Putting On/Taking Off Footwear  Assistance Level: Maximum assistance  Skilled Clinical Factors: Assist to doff/don socks/shoes. Pt able to insert R foot into shoe w/ assist to place.  Toileting  Assistance Level: Maximum assistance  Skilled Clinical Factors: Pt continent of urine on commode. Completed seated chelsey-hygiene. Required assist for pants mgmt.  Toilet Transfers  Technique: Stand pivot  Equipment: Raised toilet seat with arms  Additional Factors: Verbal cues;Cues for hand placement;Increased time to complete  Assistance Level: Moderate assistance  Skilled Clinical Factors: Pt completed t/f w/c <-> raised toilet seat w/ mod A.          Scooting  Assistance Level: Stand by assist  Skilled Clinical Factors: SBA to scoot hips in w/c and recliner.  Transfers  Surface: To chair with arms;From chair with arms;Wheelchair;Raised toilet Seat  Additional Factors: Verbal cues;Hand placement cues;Increased time to complete  Device: Walker  Sit to Stand  Assistance Level: Moderate assistance  Skilled Clinical Factors: Mod A to stand from surfaces w/ hand placement cues.  Stand to Sit  Assistance Level: Moderate assistance  Stand Pivot  Assistance Level:

## 2024-10-18 NOTE — PROGRESS NOTES
ACUTE REHAB UNIT  SPEECH/LANGUAGE PATHOLOGY      [x] Daily  [] Weekly Care Conference Note  [] Discharge    Patient:Naty Dykes      :1935  MRN:[id number]  Rehab Dx/Hx: SDH (subdural hematoma) [S06.5XAA]    Precautions: [] Aspiration  [x] Fall risk  [] Sternal  [x] Seizure [] Hip  [] Weight Bearing [] Other     ST Dx: [] Aphasia  [] Dysarthria  [] Apraxia   [x] Oropharyngeal dysphagia [x] Cognitive Impairment  [] Other:   Date of Admit: 10/16/2024  Room #: 3106/3106-01  Date: 10/18/2024          Current Diet Order:ADULT DIET; Dysphagia - Minced and Moist; Low Fat/Low Chol/High Fiber/JAYNA  ADULT ORAL NUTRITION SUPPLEMENT; Breakfast, Lunch, Dinner; Standard High Calorie/High Protein Oral Supplement      Swallow Strategies: Alternate solids/liquids , Check for pocketing of food L, Check for pocketing of food R, Upright as possible with all PO intake , Assist Feed , Small bites/sips     CBC:  Lab Results   Component Value Date    WBC 6.6 10/18/2024    HGB 9.9 (L) 10/18/2024    HCT 29.6 (L) 10/18/2024    MCV 90.3 10/18/2024     10/18/2024     Living Status: Lives with son, ? Amount of assistance provided at baseline. Will need to confirm with family. Poor historian.   Medication Management:   [x]Primary   []Secondary []Comment: states daughter assists?  Finance Management:          [x]Primary   []Secondary []Comment: states daughter assists?  Active :                        []Yes         [x]No: daughter, grandson  Education: 3 years high school  Occupation: crafts  Hobbies/Leisure: read  Hearing: WFL  Vision: Vision Corrective Device: wears glasses for reading    Barriers toward progress: Cognitive deficit, Impulsivity, Limited safety awareness, and Limited insight into deficits      Date: 10/18/2024      Tx session 1 Tx session 2   Total Timed Code Min 10 30   Total Treatment Minutes 30 30   Individual Treatment Minutes 30 30   Group Treatment Minutes 0 0   Co-Treat Minutes 0 0   Brief Exception:  monitoring. Goal not targeted this session. 4-step visual sequencing: pt given min-mod cues to complete task. Pt able to self-monitor given min cues.     6-step visual sequencing: min-mod cues required throughout task. Pt benefited from presentation of two picture cards to select correct sequence.   Pt will participate in ongoing cognitive-linguistic assessment. Goal not targeted this session. See discussion below with pt's daughter regarding ? Dysarthric component noted and baseline cognitive skills.    Other areas targeted:     Education:   Educated re: recall of swallowing strategies, goals targeted. Educated re: skills targeted, activities completed. Reoriented to call light policy.   Assessment of Patient Learning: Pt states comprehension, however ongoing education and reinforcement is recommended. Pt states comprehension, however ongoing education and reinforcement is recommended.   Safety Devices: [x] Call light within reach  [x] Chair alarm activated and connected to nurse call light system  [] Bed alarm activated   [] Other: [x] Call light within reach  [x] Chair alarm activated and connected to nurse call light system  [] Bed alarm activated  [x] Other: daughter present    Assessment: Pt with mild-moderate oral dysphagia and moderate-severe cognitive-linguistic impairment. Increased attention and alertness noted this date during first session. Cues throughout meal to alternate consistencies. Wet vocal quality noted this date, clinician cued pt to clear throat and swallow. Improved L side visual attention noted, with cues provided to scan. Pt continues to benefit from repetition of commands and cues throughout session to remain attentive and self-monitor.   Plan: Continue as per plan of care.      10/18: SLP discussed with daughter present regarding pt's cognitive-linguistic baseline. Daughter stated was able to complete some ADLs and IADLs prior to hospital admission (with assistance as needed). Expressed  cognitive skills are \"off\" (I.e. observed pt complete 4-6 step sequencing cards and stated pt would \"normally be able to do these things before coming to the hospital\"). Pt's daughter did endorse some concerns with cognitive decline prior to admission and stated likeliness of making an appointment with pt's (now ) husbands physician for further cognitive assessment. Speech clarity reduced in second session this date and pt's daughter expressed \"slurred\" quality is not baseline. SLP reported this AM pt with adequate clarity of speech, however suspect increased possible dysarthric quality this PM 2/2 increased lethargy/fatigue (as this is the same presentation during evaluation yesterday when pt was very fatigued). Pt's daughter in agreement that she's noticed increased slurred quality when fatigued. SLP stated will continue to monitor need to target speech clarity as a structured goal. Daughter in agreement.     Interventions used this date:  [] Speech/Language Treatment  [] Instruction in HEP  [x] Dysphagia Treatment [x] Cognitive Treatment   [] Other:    Discharge recommendations:  [] Home independently  [] Home with assistance [x]  24 hour supervision  [] ECF [] Other  Continued Tx Upon Discharge: ? [x] Yes    [] No    [] TBD based on progress while on ARU     [] Vital Stim indicated     [] Other:   Estimated discharge date: Not yet established    Barriers to home discharge:   [] inability to independently manage medications, will need assist/supervision  [] inability to independently manage finances, will need assist/supervision  [] inability to effectively communicate in emergent situations (ex: calling 911, stating name, reduced intelligibility, etc)  [x] Inability to communicate or demonstrate problem solving due to cognitive-communicative impairment  [x] severity of cognition (mild, mod, severe) with reduced insight negatively impacting safety/independence  [x] inability to recall and utilize swallow

## 2024-10-18 NOTE — PROGRESS NOTES
Physical Therapy  Facility/Department: [facility] ACUTE REHAB UNIT  Rehabilitation Physical Therapy Treatment Note    NAME: Naty Dykes  : 1935 (88 y.o.)  MRN: [id number]  CODE STATUS: Full Code    Date of Service: 10/18/24       Restrictions:  Restrictions/Precautions: Up as Tolerated, Fall Risk, Seizure  Position Activity Restriction  Other position/activity restrictions: up with assist, seizure precautions     SUBJECTIVE  Subjective  Subjective: Pt sitting in w/c when PT arived. Pt finishing up w/ nsg . Pt agreeable to therapy  Pain: Cont to c/o B knee pain ( Nsg applied Voltaren gel on B knees. Pt reported relief. Did not rate pain        Post Treatment Pain Screening         OBJECTIVE  Cognition  Overall Cognitive Status: Exceptions  Arousal/Alertness: Delayed responses to stimuli  Following Commands: Follows multistep commands with increased time;Follows multistep commands with repitition  Attention Span: Attends with cues to redirect  Memory: Decreased recall of precautions;Decreased recall of recent events  Safety Judgement: Decreased awareness of need for assistance;Decreased awareness of need for safety  Problem Solving: Assistance required to generate solutions  Insights: Decreased awareness of deficits  Initiation: Requires cues for some  Sequencing: Requires cues for some  Cognition Comment: Mumbles at time, difficult to comprehend req repetition. Requires increased time to follow simple commands and sequence tasks. More alert on this date  Orientation  Overall Orientation Status: Within Functional Limits  Orientation Level: Oriented to place;Oriented to time;Oriented to situation;Oriented to person    Functional Mobility  Balance  Sitting Balance: Supervision (AEB pt able to perf pericare (following a BM)  on the commode with lat WS and maintaining balance)  Standing Balance: Contact guard assistance (w/ RW . Pt able to assist with LB clothing management for toileting. Pt used 1 hand on RW while  Moderate assistance;Minimal assistance  Skilled Clinical Factors: Req  VC for  sequencing transf including scooting to the EOS, hand / foot placement and the ant WS  Stand to Sit  Assistance Level: Contact guard assist;Minimal assistance  Skilled Clinical Factors: VC for hand placement and controlling descent ( decreased eccentric control)  Stand Pivot  Assistance Level: Minimal assistance  Skilled Clinical Factors: w/c >bed with stand pivot      Environmental Mobility  Ambulation  Surface: Level surface  Device: Rolling walker  Distance: 60'   Additional Factors: Verbal cues;Hand placement cues  Assistance Level: Contact guard assist;Minimal assistance  Gait Deviations: Slow agnieszka;Decreased step length bilateral     Bed Mob: sit> supine with min A/ Mod A to control trunk descent and clear BLES  Pt remained in bed with call light and phone placed within reach. Bed alarm reactivated.       ASSESSMENT/PROGRESS TOWARDS GOALS       Assessment  Assessment: Pt presented with being more alert and argenis increased physical activity on this date. AM session interrupted by toileting but pt still remained alert throughout session. Pt is well below basline and would benefit from cont therapy to maximize potential and increase functional mobility towards Ind to allow for a safer d/c to home.  Activity Tolerance: Patient tolerated treatment well  Discharge Recommendations: Home with Home health PT;24 hour supervision or assist  PT Equipment Recommendations  Other: Ongoing assessment at this time    Goals  Patient Goals   Patient Goals : \"Walk again like she use to\"  Short Term Goals  Time Frame for Short Term Goals: 14 days( all goals are ongoing)  Short Term Goal 1: S with all bed mobility skills  Short Term Goal 2: Transf with CGA x1  Short Term Goal 3: Amb distances with LRADx 40' with CGA  Short Term Goal 4: demo MI w/ w/c propulsion x 150' at a time  Long Term Goals  Time Frame for Long Term Goals : 21 days  Long Term Goal 1:

## 2024-10-18 NOTE — CARE COORDINATION
Case Management Assessment  Initial Evaluation    Date/Time of Evaluation: 10/18/2024 4:10 PM  Assessment Completed by: Karon Hart RN    If patient is discharged prior to next notation, then this note serves as note for discharge by case management.    Patient Name: Naty Dykes                   YOB: 1935  Diagnosis: SDH (subdural hematoma) [S06.5XAA]                   Date / Time: 10/16/2024  5:55 PM    Patient Admission Status: REHAB IP   Readmission Risk (Low < 19, Mod (19-27), High > 27): Readmission Risk Score: 22.1    Current PCP: Debra Shahid, DO  PCP verified by CM? Yes    Chart Reviewed: Yes      History Provided by: Child/Family (Nara)  Patient Orientation: Self    Patient Cognition: Dementia / Early Alzheimer's    Hospitalization in the last 30 days (Readmission):  No    If yes, Readmission Assessment in CM Navigator will be completed.    Advance Directives:      Code Status: Full Code   Patient's Primary Decision Maker is: Legal Next of Kin    Primary Decision Maker: Ela Villa - Child - [phone]    Secondary Decision Maker: PhilconnieNara - Child - [phone]    Supplemental (Other) Decision Maker: Yessi Hyde - Grandchild - [phone]    Discharge Planning:    Patient lives with: Children Type of Home: House  Primary Care Giver: Family  Patient Support Systems include: Children   Current Financial resources: Medicare  Current community resources: None  Current services prior to admission: Durable Medical Equipment            Current DME: Walker            Type of Home Care services:  PT, OT    ADLS  Prior functional level: Assistance with the following:, Bathing, Dressing, Housework, Cooking, Shopping  Current functional level: Assistance with the following:, Bathing, Dressing, Toileting, Cooking, Housework, Shopping, Mobility    PT AM-PAC:   /24  OT AM-PAC:   /24    Family can provide assistance at DC: Yes  Would you like Case Management to discuss the discharge

## 2024-10-19 PROCEDURE — 97530 THERAPEUTIC ACTIVITIES: CPT

## 2024-10-19 PROCEDURE — 97129 THER IVNTJ 1ST 15 MIN: CPT

## 2024-10-19 PROCEDURE — 6360000002 HC RX W HCPCS: Performed by: PHYSICAL MEDICINE & REHABILITATION

## 2024-10-19 PROCEDURE — 1280000000 HC REHAB R&B

## 2024-10-19 PROCEDURE — 6370000000 HC RX 637 (ALT 250 FOR IP): Performed by: PHYSICAL MEDICINE & REHABILITATION

## 2024-10-19 PROCEDURE — 97535 SELF CARE MNGMENT TRAINING: CPT

## 2024-10-19 PROCEDURE — 97116 GAIT TRAINING THERAPY: CPT

## 2024-10-19 PROCEDURE — 97110 THERAPEUTIC EXERCISES: CPT

## 2024-10-19 PROCEDURE — 97130 THER IVNTJ EA ADDL 15 MIN: CPT

## 2024-10-19 PROCEDURE — 2580000003 HC RX 258: Performed by: PHYSICAL MEDICINE & REHABILITATION

## 2024-10-19 PROCEDURE — 92526 ORAL FUNCTION THERAPY: CPT

## 2024-10-19 RX ADMIN — SODIUM CHLORIDE, PRESERVATIVE FREE 10 ML: 5 INJECTION INTRAVENOUS at 10:03

## 2024-10-19 RX ADMIN — FUROSEMIDE 40 MG: 40 TABLET ORAL at 10:01

## 2024-10-19 RX ADMIN — COLCHICINE 0.3 MG: 0.6 TABLET, FILM COATED ORAL at 10:01

## 2024-10-19 RX ADMIN — CYANOCOBALAMIN TAB 1000 MCG 1000 MCG: 1000 TAB at 10:01

## 2024-10-19 RX ADMIN — HEPARIN SODIUM 5000 UNITS: 5000 INJECTION INTRAVENOUS; SUBCUTANEOUS at 10:02

## 2024-10-19 RX ADMIN — SIMETHICONE 80 MG: 80 TABLET, CHEWABLE ORAL at 20:44

## 2024-10-19 RX ADMIN — HEPARIN SODIUM 5000 UNITS: 5000 INJECTION INTRAVENOUS; SUBCUTANEOUS at 20:44

## 2024-10-19 RX ADMIN — CARVEDILOL 12.5 MG: 12.5 TABLET, FILM COATED ORAL at 10:01

## 2024-10-19 RX ADMIN — CARVEDILOL 12.5 MG: 12.5 TABLET, FILM COATED ORAL at 17:21

## 2024-10-19 RX ADMIN — DICLOFENAC SODIUM TOPICAL GEL, 1% 2 G: 10 GEL TOPICAL at 10:04

## 2024-10-19 RX ADMIN — SENNOSIDES 8.6 MG: 8.6 TABLET, FILM COATED ORAL at 20:44

## 2024-10-19 RX ADMIN — OXYCODONE AND ACETAMINOPHEN 1 TABLET: 10; 325 TABLET ORAL at 20:43

## 2024-10-19 RX ADMIN — LEVETIRACETAM 500 MG: 500 TABLET, FILM COATED ORAL at 10:01

## 2024-10-19 RX ADMIN — OXYCODONE AND ACETAMINOPHEN 1 TABLET: 10; 325 TABLET ORAL at 14:11

## 2024-10-19 RX ADMIN — AMIODARONE HYDROCHLORIDE 50 MG: 200 TABLET ORAL at 10:05

## 2024-10-19 RX ADMIN — Medication 1000 UNITS: at 10:01

## 2024-10-19 RX ADMIN — SODIUM CHLORIDE, PRESERVATIVE FREE 10 ML: 5 INJECTION INTRAVENOUS at 20:47

## 2024-10-19 RX ADMIN — ATORVASTATIN CALCIUM 80 MG: 80 TABLET, FILM COATED ORAL at 10:01

## 2024-10-19 RX ADMIN — DICLOFENAC SODIUM TOPICAL GEL, 1% 2 G: 10 GEL TOPICAL at 20:47

## 2024-10-19 RX ADMIN — OXYCODONE AND ACETAMINOPHEN 1 TABLET: 10; 325 TABLET ORAL at 07:50

## 2024-10-19 RX ADMIN — LEVETIRACETAM 500 MG: 500 TABLET, FILM COATED ORAL at 20:44

## 2024-10-19 RX ADMIN — PANTOPRAZOLE SODIUM 40 MG: 40 TABLET, DELAYED RELEASE ORAL at 06:12

## 2024-10-19 RX ADMIN — BISACODYL 5 MG: 5 TABLET, COATED ORAL at 10:01

## 2024-10-19 ASSESSMENT — PAIN SCALES - GENERAL
PAINLEVEL_OUTOF10: 0
PAINLEVEL_OUTOF10: 7
PAINLEVEL_OUTOF10: 7
PAINLEVEL_OUTOF10: 3
PAINLEVEL_OUTOF10: 2
PAINLEVEL_OUTOF10: 8
PAINLEVEL_OUTOF10: 9
PAINLEVEL_OUTOF10: 4
PAINLEVEL_OUTOF10: 4
PAINLEVEL_OUTOF10: 3
PAINLEVEL_OUTOF10: 4

## 2024-10-19 ASSESSMENT — PAIN DESCRIPTION - DESCRIPTORS
DESCRIPTORS: ACHING
DESCRIPTORS: ACHING;SORE
DESCRIPTORS: ACHING;DISCOMFORT;THROBBING
DESCRIPTORS: ACHING;SORE
DESCRIPTORS: ACHING;DISCOMFORT
DESCRIPTORS: ACHING;DISCOMFORT

## 2024-10-19 ASSESSMENT — PAIN DESCRIPTION - ORIENTATION
ORIENTATION: RIGHT
ORIENTATION: RIGHT
ORIENTATION: RIGHT;LEFT;ANTERIOR
ORIENTATION: RIGHT;LEFT;ANTERIOR
ORIENTATION: RIGHT
ORIENTATION: RIGHT

## 2024-10-19 ASSESSMENT — PAIN DESCRIPTION - ONSET
ONSET: ON-GOING

## 2024-10-19 ASSESSMENT — PAIN SCALES - WONG BAKER
WONGBAKER_NUMERICALRESPONSE: HURTS LITTLE MORE
WONGBAKER_NUMERICALRESPONSE: HURTS LITTLE MORE

## 2024-10-19 ASSESSMENT — PAIN - FUNCTIONAL ASSESSMENT
PAIN_FUNCTIONAL_ASSESSMENT: PREVENTS OR INTERFERES SOME ACTIVE ACTIVITIES AND ADLS
PAIN_FUNCTIONAL_ASSESSMENT: PREVENTS OR INTERFERES SOME ACTIVE ACTIVITIES AND ADLS
PAIN_FUNCTIONAL_ASSESSMENT: ACTIVITIES ARE NOT PREVENTED

## 2024-10-19 ASSESSMENT — PAIN DESCRIPTION - LOCATION
LOCATION: KNEE
LOCATION: GENERALIZED
LOCATION: GENERALIZED

## 2024-10-19 ASSESSMENT — PAIN DESCRIPTION - FREQUENCY
FREQUENCY: CONTINUOUS
FREQUENCY: INTERMITTENT
FREQUENCY: INTERMITTENT

## 2024-10-19 ASSESSMENT — PAIN DESCRIPTION - PAIN TYPE
TYPE: CHRONIC PAIN
TYPE: ACUTE PAIN;CHRONIC PAIN
TYPE: CHRONIC PAIN

## 2024-10-19 NOTE — PROGRESS NOTES
decreased eccentric control)      Environmental Mobility  Ambulation  Surface: Level surface  Device: Rolling walker  Distance: 30' x 1 repetition, 20' x 2 repetitions, 40' x 1 repetition  Additional Factors: Verbal cues;Hand placement cues  Assistance Level: Minimal assistance;Contact guard assist  Gait Deviations: Slow agnieszka;Decreased step length bilateral  Skilled Clinical Factors: Forward, downward head position with rounded shlds, Req VC/TC for more erect posture. Limited in ambulation distances 2/2 pt stating \"I just feel like my knees are going to give out. It hurts so bad.\" (RN notified)  Wheelchair  Surface: Level surface  Additional Factors: Verbal cues;Hand placement cues;Increased time to complete  Assistance Required to Manage Parts: Brakes ([place] assist to locate and manage w/c brakes, hand weakness (B))  Propulsion Method: Left upper extremity;Right upper extremity  Propulsion Quality: Slow velocity;Short strokes  Propulsion Distance: 37'  Skilled Clinical Factors: inc'd time for w/c mobility 2/2 weakness and reports of B shoulder pain             PT Exercises  Exercise Treatment: Pt participated in seated BLE therex. Exercises included: 2 sets x 10 repetitions hip flexion, LAQs. 2 sets x10 repetitions BLE heels slides while reclined in chair. x10 repetitions of repetitive sit to stand transfers. Inc'd effort to complete and +time required with cues for improved technique      ASSESSMENT/PROGRESS TOWARDS GOALS       Assessment  Assessment: Pt progressing with therapy and towards goals. She was able to increase her ambulation distances this session however limitations that remain = B knee and hip pain, back pain and global weakness. She requires inc'd time to mobilize and cues for facilitation of a more typical gait pattern, please see details below. Pt will continue to benefit from PT services to address her global weakness, deconditioning, impaired mobility and balance prior to returning  home.  Activity Tolerance: Patient tolerated evaluation without incident  Discharge Recommendations: Home with Home health PT;24 hour supervision or assist  PT Equipment Recommendations  Other: Ongoing assessment at this time    Goals  Patient Goals   Patient Goals : \"Walk again like she use to\"  Short Term Goals  Time Frame for Short Term Goals: 14 days( all goals are ongoing)  Short Term Goal 1: S with all bed mobility skills  Short Term Goal 2: Transf with CGA x1  Short Term Goal 3: Amb distances with LRADx 40' with CGA  Short Term Goal 4: demo MI w/ w/c propulsion x 150' at a time  Long Term Goals  Time Frame for Long Term Goals : 21 days  Long Term Goal 1: Ind w/ bed mobility  Long Term Goal 2: Ind w transf excluding floor transf  Long Term Goal 3: Amb with LRAD distances x1 00' at a time    PLAN OF CARE/SAFETY  Physical Therapy Plan  Days Per Week: 5 Days  Hours Per Day: 1 hour  Therapy Duration: 3 Weeks  Current Treatment Recommendations: Strengthening;ROM;Balance training;Functional mobility training;Transfer training;Endurance training;Gait training;Stair training;Neuromuscular re-education;Home exercise program;Safety education & training;Patient/Caregiver education & training;Equipment evaluation, education, & procurement;Therapeutic activities  Safety Devices  Type of Devices: Call light within reach;Chair alarm in place;Left in chair  Restraints  Restraints Initially in Place: No    EDUCATION  Education  Education Given To: Patient  Education Provided: Safety;Mobility Training;Transfer Training;Fall Prevention Strategies;Role of Therapy  Education Method: Verbal;Demonstration  Barriers to Learning: Cognition  Education Outcome: Verbalized understanding;Demonstrated understanding;Continued education needed        Therapy Time   Individual Concurrent Group Co-treatment   Time In 0730         Time Out 0830         Minutes 60           Timed Code Treatment Minutes: 60 Minutes       Cadence Castro PT,  10/19/24 at 8:39 AM

## 2024-10-19 NOTE — PROGRESS NOTES
Occupational Therapy  Facility/Department: [facility] ACUTE REHAB UNIT  Rehabilitation Occupational Therapy Daily Treatment Note    Date: 10/19/24  Patient Name: Naty Dykes       Room: 3106/3106-01  MRN: [id number]  Account: [id number]   : 1935  (88 y.o.) Gender: female     Past Medical History:  has a past medical history of Arthritis, Blood circulation, collateral, CAD (coronary artery disease), CHF (congestive heart failure) ([facility]), Confusion, GERD (gastroesophageal reflux disease), History of heart artery stent, Hyperlipidemia, Hypertension, Mitral valve prolapse, Myocardial infarct, old, Seizure ([facility]), and Thyroid disease.  Past Surgical History:   has a past surgical history that includes Hysterectomy; Thyroidectomy; Colonoscopy; other surgical history (2018); pr office/outpt visit,procedure only (Right, 2018); eye surgery (Left, 2018); pr office/outpt visit,procedure only (Left, 2018); and hip surgery (Right, 2022).    Restrictions  Restrictions/Precautions: Up as Tolerated, Fall Risk, Seizure  Other position/activity restrictions: up with assist, seizure precautions    Subjective  Subjective: Upon entering the room Pt. recieving feeding assistance from daughter. Pt. reported being fatigued, however NO initial complaints of pain. At end of session Pt. reported 9/10 R-knee pain. Repositioning and bed mobility for comfort to address pain from a non pharmaceutical intervention implemented. Therapist also notified RN who reported Pt. could receive pain medications. The following note details Pt's task performance.  Restrictions/Precautions: Up as Tolerated;Fall Risk;Seizure    Objective     Cognition  Overall Cognitive Status: Exceptions  Arousal/Alertness: Appropriate responses to stimuli  Following Commands: Follows multistep commands with increased time;Follows multistep commands with repitition  Attention Span: Attends with cues to redirect  Memory: Decreased recall of  arms;From chair with arms;Wheelchair;Raised toilet Seat  Additional Factors: Verbal cues;Hand placement cues;Increased time to complete  Device: Walker  Sit to Stand  Assistance Level: Moderate assistance  Skilled Clinical Factors: Assistance required for initial LIFTING to stand from surfaces w/ hand placement cues  Stand to Sit  Assistance Level: Contact guard assist;Supervision  Skilled Clinical Factors: Pt. requires verbal cues for hand placement and controlled descent to various surfaces.  Stand Pivot  Assistance Level: Moderate assistance  Skilled Clinical Factors: Pt completed stand pivot t/f recliner --> w/c w/ mod A.   OT Exercises  Resistive Exercises: OT Exercises  Resistive Exercises: Seated in wheelchair Pt. completed  Theraband (orange - moderate resistant) Exercises to maintain/increase BUE ROM and strength and increase BLE endurance 5 sets x 10 reps:   UB Exercises  *Chest Pulls * Diagonal Pulls  * Triceps Elbow Extension     Assessment  Assessment  Assessment: Despite fatigue Pt. agreeable to participate in this OT session. Pt reported she would be able to participate with some coffee. Therapist made Pt. fresh pot of Coffee. Pt. took a few sips in order to get going for the session with focus(es) on: ADLs (grooming and toileting), functional transfers (sit-to-stand, stand-to-sit, stand-pivots), bed mobility (sitting> supine), functional mobility (household distances), and preparatory therapeutic exercises (resistance exercises). Pt. reports understanding of continuation of movement and exercise tomorrow even though she will not be receiving Therapy. Pt. agreeable to complete 2-3  Theraband Exercises from HEP handout. Pt.will continue to benefit from skilled IP OT services in order to maxmize INDependence with ADLs/IADLs, functional transfers/functional mobility. Continue OT POC.  Activity Tolerance: Patient tolerated treatment well  Discharge Recommendations: Continue to assess pending progress;Home  with Home health OT;24 hour supervision or assist  OT Equipment Recommendations  Equipment Needed: No  Other: defer  Safety Devices  Safety Devices in place: Yes  Type of devices: Bed alarm in place;Left in bed;Nurse notified  Restraints  Initially in place: No    Patient Education  Education  Education Given To: Patient  Education Provided: Role of Therapy;Transfer Training;Plan of Care;Energy Conservation;Safety;ADL Function;Fall Prevention Strategies;Mobility Training;Home Exercise Program  Education Provided Comments: HEP - Upper Body: Thera Band Exercise Program - Basic provided to Pt. on 10.19.2024. Therapist verbalized and wrote down repetitions and frequency of when to complete exercsies. Therapist encouraged Pt. to perform 4-5 exercises on Sunday when she would not be receiving Therapy. Pt acknowledged and agreed.  Education Method: Demonstration;Verbal  Barriers to Learning: Hearing  Education Outcome: Verbalized understanding;Demonstrated understanding;Continued education needed    Plan  Occupational Therapy Plan  Times Per Week: 5x a week, 60 mins daily  Current Treatment Recommendations: Strengthening;Balance training;Pain management;Self-Care / ADL;Safety education & training;Functional mobility training;Endurance training;Patient/Caregiver education & training;Gait training;Equipment evaluation, education, & procurement    Goals  Patient Goals   Patient goals : \"Get back to normal.\"  Short Term Goals  Time Frame for Short Term Goals: 14 days - all ongoing  Short Term Goal 1: Pt will complete LE dressing w/ min A.  Short Term Goal 2: Pt will complete UE dressing w/ min A.  Short Term Goal 3: Pt will complete grooming w/ SPV, in seated.  Short Term Goal 4: Pt will complete toileting w/ min A.  Short Term Goal 5: Pt will complete toilet transfer w/ min A.  Long Term Goals  Time Frame for Long Term Goals : 21 days - all ongoing  Long Term Goal 1: Pt will complete LE dressing w/ SPV.  Long Term Goal 2: Pt

## 2024-10-19 NOTE — PLAN OF CARE
Problem: Chronic Conditions and Co-morbidities  Goal: Patient's chronic conditions and co-morbidity symptoms are monitored and maintained or improved  Outcome: Progressing  Flowsheets (Taken 10/18/2024 2217 by Bettie Xiong RN)  Care Plan - Patient's Chronic Conditions and Co-Morbidity Symptoms are Monitored and Maintained or Improved: Collaborate with multidisciplinary team to address chronic and comorbid conditions and prevent exacerbation or deterioration     Problem: Discharge Planning  Goal: Discharge to home or other facility with appropriate resources  10/19/2024 1043 by Hilary Ravi RN  Outcome: Progressing  Flowsheets (Taken 10/19/2024 1043)  Discharge to home or other facility with appropriate resources:   Identify barriers to discharge with patient and caregiver   Arrange for needed discharge resources and transportation as appropriate     Problem: Safety - Adult  Goal: Free from fall injury  10/19/2024 1043 by Hilary Ravi RN  Outcome: Progressing  Flowsheets (Taken 10/18/2024 2229 by Bettie Xiong RN)  Free From Fall Injury: Instruct family/caregiver on patient safety     Problem: Pain  Goal: Verbalizes/displays adequate comfort level or baseline comfort level  10/19/2024 1043 by Hilary Ravi RN  Outcome: Progressing  Flowsheets  Taken 10/19/2024 1000 by Hilary Ravi RN  Verbalizes/displays adequate comfort level or baseline comfort level:   Assess pain using appropriate pain scale   Administer analgesics based on type and severity of pain and evaluate response   Implement non-pharmacological measures as appropriate and evaluate response  Taken 10/19/2024 0820 by Hilary Ravi RN  Verbalizes/displays adequate comfort level or baseline comfort level: Encourage patient to monitor pain and request assistance  Taken 10/18/2024 2346 by Bettie Xiong RN  Verbalizes/displays adequate comfort level or baseline comfort level: Assess pain using appropriate pain  scale     Problem: Skin/Tissue Integrity  Goal: Absence of new skin breakdown  Description: 1.  Monitor for areas of redness and/or skin breakdown  2.  Assess vascular access sites hourly  3.  Every 4-6 hours minimum:  Change oxygen saturation probe site  4.  Every 4-6 hours:  If on nasal continuous positive airway pressure, respiratory therapy assess nares and determine need for appliance change or resting period.  Outcome: Progressing     Problem: ABCDS Injury Assessment  Goal: Absence of physical injury  Outcome: Progressing  Flowsheets (Taken 10/18/2024 2220 by Bettie Xiong RN)  Absence of Physical Injury: Implement safety measures based on patient assessment     Problem: Nutrition Deficit:  Goal: Optimize nutritional status  Outcome: Progressing  Flowsheets (Taken 10/19/2024 1043)  Nutrient intake appropriate for improving, restoring, or maintaining nutritional needs:   Assess nutritional status and recommend course of action   Monitor oral intake, labs, and treatment plans   Recommend appropriate diets, oral nutritional supplements, and vitamin/mineral supplements

## 2024-10-19 NOTE — PROGRESS NOTES
Department of Physical Medicine & Rehabilitation  Progress Note    Patient Identification:  Naty Dykes  [id number]  : 1935  Admit date: 10/16/2024    Chief Complaint: SDH (subdural hematoma)    Subjective:   Seen in her room this morning.  She is very tired today and resting in her chair.  She was able to complete some therapy this morning but overall continues to need assistance with ADLs and functional mobility.  Slept well last night.  No new pain this morning.  ROS: No f/c, n/v, cp     Objective:  Patient Vitals for the past 24 hrs:   BP Temp Temp src Pulse Resp SpO2   10/19/24 1000 112/77 97.1 °F (36.2 °C) Oral 62 16 94 %   10/19/24 0820 -- -- -- -- 16 --   10/19/24 0750 -- -- -- -- 16 --   10/18/24 2346 -- -- -- -- 16 --   10/18/24 2134 126/68 98.1 °F (36.7 °C) Oral 63 17 94 %   10/18/24 1723 -- -- -- -- 16 --   10/18/24 1715 (!) 141/69 -- -- 71 -- --     CONST: Alert. No acute distress  EYES: No icterus noted.  HENT: Atraumatic, normocephalic;  NECK: Trachea midline, neck supple.  CV: Regular rate and rhythm, no murmur rub or gallop noted  RESP: Lungs clear to auscultation bilaterally, no rales wheezes or ronchi, no retractions. Respirations unlabored.   GI: Soft, nontender, nondistended.   EXT: No significant edema appreciated to BLE  NEURO:   Patient somnolent. Limited exam.  Unable to assess EOMI. CN V, CN VII, CNXI-XII intact  4/5 strengths to BUE. 3/5 strengths to bilateral hip flexion. 4/5 strengths to ankle dorsi/plantarflexion  PSYCH: Stable mood, normal judgement, normal affect     Laboratory data: Available via EMR.   Last 24 hour lab  No results found for this or any previous visit (from the past 24 hour(s)).      Therapy progress:  PT  Position Activity Restriction  Other position/activity restrictions: up with assist, seizure precautions  Objective     Sit to Stand: Maximum Assistance, Moderate Assistance (Max A/ mod x1 with step by step VC for sequencing technique . Pt has a

## 2024-10-19 NOTE — PLAN OF CARE
Problem: Discharge Planning  Goal: Discharge to home or other facility with appropriate resources  10/18/2024 2232 by Bettie Xiong, RN  Outcome: Progressing  Flowsheets (Taken 10/18/2024 2217)  Discharge to home or other facility with appropriate resources: Identify barriers to discharge with patient and caregiver     Problem: Safety - Adult  Goal: Free from fall injury  10/18/2024 2232 by Bettie Xiong, RN  Outcome: Progressing  Flowsheets (Taken 10/18/2024 2229)  Free From Fall Injury: Instruct family/caregiver on patient safety     Problem: Pain  Goal: Verbalizes/displays adequate comfort level or baseline comfort level  10/18/2024 2232 by Bettie Xiong, RN  Outcome: Progressing  Flowsheets (Taken 10/18/2024 2134)  Verbalizes/displays adequate comfort level or baseline comfort level:   Assess pain using appropriate pain scale   Encourage patient to monitor pain and request assistance

## 2024-10-19 NOTE — PROGRESS NOTES
A&O, forgetful at times. C/o pain in low and mid back.  Applied heat and repositioned for comfort. Applied Voltaren ointment to back.  Not due for prn pain med.  2 person stand pivot from chair to bed. VSS.  No SOB noted.  C/o gas pain, gave prn simethicone. Assessment complete. Safety measures in lace, bed alarm on.

## 2024-10-19 NOTE — PROGRESS NOTES
ACUTE REHAB UNIT  SPEECH/LANGUAGE PATHOLOGY      [x] Daily  [] Weekly Care Conference Note  [] Discharge    Patient:Naty Dykes      :1935  MRN:[id number]  Rehab Dx/Hx: SDH (subdural hematoma) [S06.5XAA]    Precautions: [] Aspiration  [x] Fall risk  [] Sternal  [x] Seizure [] Hip  [] Weight Bearing [] Other     ST Dx: [] Aphasia  [] Dysarthria  [] Apraxia   [x] Oropharyngeal dysphagia [x] Cognitive Impairment  [] Other:   Date of Admit: 10/16/2024  Room #: 3106/3106-01  Date: 10/19/2024          Current Diet Order:ADULT DIET; Dysphagia - Minced and Moist; Low Fat/Low Chol/High Fiber/JAYNA  ADULT ORAL NUTRITION SUPPLEMENT; Breakfast, Lunch, Dinner; Standard High Calorie/High Protein Oral Supplement      Swallow Strategies: Alternate solids/liquids , Check for pocketing of food L, Check for pocketing of food R, Upright as possible with all PO intake , Assist Feed , Small bites/sips     CBC:  Lab Results   Component Value Date    WBC 6.6 10/18/2024    HGB 9.9 (L) 10/18/2024    HCT 29.6 (L) 10/18/2024    MCV 90.3 10/18/2024     10/18/2024     Living Status: Lives with son, ? Amount of assistance provided at baseline. Will need to confirm with family. Poor historian.   Medication Management:   [x]Primary   []Secondary []Comment: states daughter assists?  Finance Management:          [x]Primary   []Secondary []Comment: states daughter assists?  Active :                        []Yes         [x]No: daughter, grandson  Education: 3 years high school  Occupation: crafts  Hobbies/Leisure: read  Hearing: WFL  Vision: Vision Corrective Device: wears glasses for reading    Barriers toward progress: Cognitive deficit, Impulsivity, Limited safety awareness, and Limited insight into deficits      Date: 10/19/2024      Tx session 1 Tx session 2   Total Timed Code Min 15 30   Total Treatment Minutes 30 30   Individual Treatment Minutes 30 30   Group Treatment Minutes 0 0   Co-Treat Minutes 0 0   Brief Exception:  IADL tasks including medication management, cooking, and safety awareness tasks.    Pt will participate in ongoing cognitive-linguistic assessment. Goal not targeted this session. Not targeted this session.    Other areas targeted:     Education:   Educated re: recall of swallowing strategies, goals targeted. Educated re: skills targeted, activities completed.    Assessment of Patient Learning: Pt states comprehension, however ongoing education and reinforcement is recommended. Pt states comprehension, however ongoing education and reinforcement is recommended.   Safety Devices: [x] Call light within reach  [x] Chair alarm activated and connected to nurse call light system  [] Bed alarm activated   [] Other: [x] Call light within reach  [x] Chair alarm activated and connected to nurse call light system  [] Bed alarm activated  [] Other   Assessment: Pt with mild-moderate oral dysphagia and moderate-severe cognitive-linguistic impairment. Improved attention noted in structured tasks. Occasional redirective cues noted for conversational tasks. Pt verbally sequencing multi-step tasks during structured portion of assessment.    Plan: Continue as per plan of care.      Note from 10/18: SLP discussed with daughter present regarding pt's cognitive-linguistic baseline. Daughter stated was able to complete some ADLs and IADLs prior to hospital admission (with assistance as needed). Expressed cognitive skills are \"off\" (I.e. observed pt complete 4-6 step sequencing cards and stated pt would \"normally be able to do these things before coming to the hospital\"). Pt's daughter did endorse some concerns with cognitive decline prior to admission and stated likeliness of making an appointment with pt's (now ) husbands physician for further cognitive assessment. Speech clarity reduced in second session this date and pt's daughter expressed \"slurred\" quality is not baseline. SLP reported this AM pt with adequate clarity of  speech, however suspect increased possible dysarthric quality this PM 2/2 increased lethargy/fatigue (as this is the same presentation during evaluation yesterday when pt was very fatigued). Pt's daughter in agreement that she's noticed increased slurred quality when fatigued. SLP stated will continue to monitor need to target speech clarity as a structured goal. Daughter in agreement.     Interventions used this date:  [] Speech/Language Treatment  [] Instruction in HEP  [x] Dysphagia Treatment [x] Cognitive Treatment   [] Other:    Discharge recommendations:  [] Home independently  [] Home with assistance [x]  24 hour supervision  [] ECF [] Other  Continued Tx Upon Discharge: ? [x] Yes    [] No    [] TBD based on progress while on ARU     [] Vital Stim indicated     [] Other:   Estimated discharge date: Not yet established    Barriers to home discharge:   [] inability to independently manage medications, will need assist/supervision  [] inability to independently manage finances, will need assist/supervision  [] inability to effectively communicate in emergent situations (ex: calling 911, stating name, reduced intelligibility, etc)  [x] Inability to communicate or demonstrate problem solving due to cognitive-communicative impairment  [x] severity of cognition (mild, mod, severe) with reduced insight negatively impacting safety/independence  [x] inability to recall and utilize swallow strategies placing pt at risk for aspiration  [] inability to recall and/or comprehend diet recommendations regarding least restrictive diet  [] limited assistance at home upon discharge  [] Other    Electronically signed by:  Marybeth Garcia MS, CCC-SLP  Speech-Language Pathologist  [id number]

## 2024-10-20 VITALS
WEIGHT: 93.7 LBS | TEMPERATURE: 97.9 F | DIASTOLIC BLOOD PRESSURE: 70 MMHG | RESPIRATION RATE: 18 BRPM | SYSTOLIC BLOOD PRESSURE: 134 MMHG | HEIGHT: 62 IN | BODY MASS INDEX: 17.24 KG/M2 | HEART RATE: 61 BPM | OXYGEN SATURATION: 97 %

## 2024-10-20 PROCEDURE — 1280000000 HC REHAB R&B

## 2024-10-20 PROCEDURE — 6370000000 HC RX 637 (ALT 250 FOR IP): Performed by: PHYSICAL MEDICINE & REHABILITATION

## 2024-10-20 PROCEDURE — 2580000003 HC RX 258: Performed by: PHYSICAL MEDICINE & REHABILITATION

## 2024-10-20 PROCEDURE — 6360000002 HC RX W HCPCS: Performed by: PHYSICAL MEDICINE & REHABILITATION

## 2024-10-20 RX ADMIN — FUROSEMIDE 40 MG: 40 TABLET ORAL at 09:05

## 2024-10-20 RX ADMIN — ACETAMINOPHEN 650 MG: 325 TABLET ORAL at 20:26

## 2024-10-20 RX ADMIN — CARVEDILOL 12.5 MG: 12.5 TABLET, FILM COATED ORAL at 09:05

## 2024-10-20 RX ADMIN — PANTOPRAZOLE SODIUM 40 MG: 40 TABLET, DELAYED RELEASE ORAL at 06:23

## 2024-10-20 RX ADMIN — SENNOSIDES 8.6 MG: 8.6 TABLET, FILM COATED ORAL at 20:29

## 2024-10-20 RX ADMIN — DICLOFENAC SODIUM TOPICAL GEL, 1% 2 G: 10 GEL TOPICAL at 09:05

## 2024-10-20 RX ADMIN — CARVEDILOL 12.5 MG: 12.5 TABLET, FILM COATED ORAL at 17:22

## 2024-10-20 RX ADMIN — SODIUM CHLORIDE, PRESERVATIVE FREE 10 ML: 5 INJECTION INTRAVENOUS at 09:04

## 2024-10-20 RX ADMIN — OXYCODONE AND ACETAMINOPHEN 1 TABLET: 10; 325 TABLET ORAL at 09:05

## 2024-10-20 RX ADMIN — SIMETHICONE 80 MG: 80 TABLET, CHEWABLE ORAL at 20:28

## 2024-10-20 RX ADMIN — OXYCODONE AND ACETAMINOPHEN 1 TABLET: 10; 325 TABLET ORAL at 15:14

## 2024-10-20 RX ADMIN — Medication 1000 UNITS: at 09:05

## 2024-10-20 RX ADMIN — AMIODARONE HYDROCHLORIDE 50 MG: 200 TABLET ORAL at 09:04

## 2024-10-20 RX ADMIN — HEPARIN SODIUM 5000 UNITS: 5000 INJECTION INTRAVENOUS; SUBCUTANEOUS at 20:26

## 2024-10-20 RX ADMIN — DICLOFENAC SODIUM TOPICAL GEL, 1% 2 G: 10 GEL TOPICAL at 20:27

## 2024-10-20 RX ADMIN — HEPARIN SODIUM 5000 UNITS: 5000 INJECTION INTRAVENOUS; SUBCUTANEOUS at 09:05

## 2024-10-20 RX ADMIN — ATORVASTATIN CALCIUM 80 MG: 80 TABLET, FILM COATED ORAL at 09:05

## 2024-10-20 RX ADMIN — BISACODYL 5 MG: 5 TABLET, COATED ORAL at 09:05

## 2024-10-20 RX ADMIN — LEVETIRACETAM 500 MG: 500 TABLET, FILM COATED ORAL at 20:26

## 2024-10-20 RX ADMIN — CYANOCOBALAMIN TAB 1000 MCG 1000 MCG: 1000 TAB at 09:05

## 2024-10-20 RX ADMIN — COLCHICINE 0.3 MG: 0.6 TABLET, FILM COATED ORAL at 09:05

## 2024-10-20 RX ADMIN — OXYCODONE AND ACETAMINOPHEN 1 TABLET: 10; 325 TABLET ORAL at 23:09

## 2024-10-20 RX ADMIN — SODIUM CHLORIDE, PRESERVATIVE FREE 10 ML: 5 INJECTION INTRAVENOUS at 20:26

## 2024-10-20 RX ADMIN — LEVETIRACETAM 500 MG: 500 TABLET, FILM COATED ORAL at 09:09

## 2024-10-20 ASSESSMENT — PAIN DESCRIPTION - FREQUENCY
FREQUENCY: CONTINUOUS

## 2024-10-20 ASSESSMENT — PAIN - FUNCTIONAL ASSESSMENT
PAIN_FUNCTIONAL_ASSESSMENT: PREVENTS OR INTERFERES SOME ACTIVE ACTIVITIES AND ADLS
PAIN_FUNCTIONAL_ASSESSMENT: ACTIVITIES ARE NOT PREVENTED
PAIN_FUNCTIONAL_ASSESSMENT: PREVENTS OR INTERFERES SOME ACTIVE ACTIVITIES AND ADLS
PAIN_FUNCTIONAL_ASSESSMENT: ACTIVITIES ARE NOT PREVENTED
PAIN_FUNCTIONAL_ASSESSMENT: ACTIVITIES ARE NOT PREVENTED

## 2024-10-20 ASSESSMENT — PAIN DESCRIPTION - DESCRIPTORS
DESCRIPTORS: ACHING;SORE
DESCRIPTORS: ACHING;SORE
DESCRIPTORS: ACHING
DESCRIPTORS: ACHING;DISCOMFORT
DESCRIPTORS: ACHING

## 2024-10-20 ASSESSMENT — PAIN DESCRIPTION - ORIENTATION
ORIENTATION: POSTERIOR
ORIENTATION: POSTERIOR
ORIENTATION: RIGHT;LEFT
ORIENTATION: POSTERIOR
ORIENTATION: POSTERIOR

## 2024-10-20 ASSESSMENT — PAIN DESCRIPTION - DIRECTION: RADIATING_TOWARDS: NECK

## 2024-10-20 ASSESSMENT — PAIN SCALES - GENERAL
PAINLEVEL_OUTOF10: 8
PAINLEVEL_OUTOF10: 7
PAINLEVEL_OUTOF10: 3
PAINLEVEL_OUTOF10: 10
PAINLEVEL_OUTOF10: 8
PAINLEVEL_OUTOF10: 0
PAINLEVEL_OUTOF10: 8
PAINLEVEL_OUTOF10: 0

## 2024-10-20 ASSESSMENT — PAIN DESCRIPTION - LOCATION
LOCATION: NECK
LOCATION: GENERALIZED
LOCATION: NECK
LOCATION: NECK
LOCATION: BACK

## 2024-10-20 ASSESSMENT — PAIN DESCRIPTION - PAIN TYPE
TYPE: CHRONIC PAIN

## 2024-10-20 ASSESSMENT — PAIN DESCRIPTION - ONSET
ONSET: ON-GOING

## 2024-10-20 NOTE — PROGRESS NOTES
Pt. A&O X 4 with confusion and hx of dementia, VSS, voiced chromic pain, PRN pain med, topical gel and heat packs applied. Simethicone given for gas. Pt. Slept fairly well during this shift call light and over bed table within reach, bed wheels locked and in lowest position fall prevention measures remains in place. No other care needed at this time.     Vitals:    10/19/24 2041   BP: 130/68   Pulse: 65   Resp: 16   Temp: 97.6 °F (36.4 °C)   SpO2: 97%

## 2024-10-20 NOTE — PLAN OF CARE
Problem: Chronic Conditions and Co-morbidities  Goal: Patient's chronic conditions and co-morbidity symptoms are monitored and maintained or improved  10/20/2024 1128 by Hilary Ravi RN  Outcome: Progressing  Flowsheets (Taken 10/19/2024 2041 by Ninfa Agustin, RN)  Care Plan - Patient's Chronic Conditions and Co-Morbidity Symptoms are Monitored and Maintained or Improved:   Collaborate with multidisciplinary team to address chronic and comorbid conditions and prevent exacerbation or deterioration   Monitor and assess patient's chronic conditions and comorbid symptoms for stability, deterioration, or improvement     Problem: Discharge Planning  Goal: Discharge to home or other facility with appropriate resources  10/20/2024 1128 by Hilary Ravi RN  Outcome: Progressing  Flowsheets (Taken 10/19/2024 2041 by Ninfa Agustin, RN)  Discharge to home or other facility with appropriate resources: Identify discharge learning needs (meds, wound care, etc)     Problem: Safety - Adult  Goal: Free from fall injury  10/20/2024 1128 by Hilary Ravi RN  Outcome: Progressing  Flowsheets (Taken 10/18/2024 2229 by Bettie Xiong, RN)  Free From Fall Injury: Instruct family/caregiver on patient safety     Problem: Pain  Goal: Verbalizes/displays adequate comfort level or baseline comfort level  10/20/2024 1128 by Hilary Ravi RN  Outcome: Progressing  Flowsheets (Taken 10/20/2024 0845)  Verbalizes/displays adequate comfort level or baseline comfort level:   Assess pain using appropriate pain scale   Administer analgesics based on type and severity of pain and evaluate response   Implement non-pharmacological measures as appropriate and evaluate response     Problem: Skin/Tissue Integrity  Goal: Absence of new skin breakdown  Description: 1.  Monitor for areas of redness and/or skin breakdown  2.  Assess vascular access sites hourly  3.  Every 4-6 hours minimum:  Change oxygen saturation probe site  4.

## 2024-10-20 NOTE — PROGRESS NOTES
Shift assessments completed this shift, VSS. Patient c/o joint pain/neck pain, 8/10. Heat packs, percocet and voltaren gel given to manage pain/discomfort. Patient up in recliner watching TV. Call light and personal items within reach, safety measures in place.

## 2024-10-20 NOTE — PROGRESS NOTES
Department of Physical Medicine & Rehabilitation  Progress Note    Patient Identification:  Naty Dykes  [id number]  : 1935  Admit date: 10/16/2024    Chief Complaint: SDH (subdural hematoma)    Subjective:   Seen in her chair this morning.  She continues to be very tired and is resting today.  She plans to work with therapy tomorrow.  No new pain overnight.  ROS: No f/c, n/v, cp     Objective:  Patient Vitals for the past 24 hrs:   BP Temp Temp src Pulse Resp SpO2 Weight   10/20/24 0935 -- -- -- -- 16 -- --   10/20/24 0845 (!) 159/69 97.7 °F (36.5 °C) Oral 68 16 99 % --   10/20/24 0615 -- -- -- -- -- -- 42.5 kg (93 lb 11.1 oz)   10/20/24 0600 -- -- -- -- -- -- 42.5 kg (93 lb 11.1 oz)   10/19/24 2041 130/68 97.6 °F (36.4 °C) Oral 65 16 97 % --   10/19/24 1721 131/66 -- -- 68 -- -- --   10/19/24 1441 -- -- -- -- 16 -- --   10/19/24 1411 -- -- -- -- 16 -- --     CONST: Alert. No acute distress  EYES: No icterus noted.  HENT: Atraumatic, normocephalic;  NECK: Trachea midline, neck supple.  CV: Regular rate and rhythm, no murmur rub or gallop noted  RESP: Lungs clear to auscultation bilaterally, no rales wheezes or ronchi, no retractions. Respirations unlabored.   GI: Soft, nontender, nondistended.   EXT: No significant edema appreciated to BLE  NEURO:   Patient somnolent. Limited exam.  Unable to assess EOMI. CN V, CN VII, CNXI-XII intact  4/5 strengths to BUE. 3/5 strengths to bilateral hip flexion. 4/5 strengths to ankle dorsi/plantarflexion  PSYCH: Stable mood, normal judgement, normal affect     Laboratory data: Available via EMR.   Last 24 hour lab  No results found for this or any previous visit (from the past 24 hour(s)).      Therapy progress:  PT  Position Activity Restriction  Other position/activity restrictions: up with assist, seizure precautions  Objective     Sit to Stand: Maximum Assistance, Moderate Assistance (Max A/ mod x1 with step by step VC for sequencing technique . Pt has a posterior

## 2024-10-20 NOTE — PLAN OF CARE
Problem: Chronic Conditions and Co-morbidities  Goal: Patient's chronic conditions and co-morbidity symptoms are monitored and maintained or improved  10/20/2024 0041 by Ninfa Agustin RN  Outcome: Progressing  Care Plan - Patient's Chronic Conditions and Co-Morbidity Symptoms are Monitored and Maintained or Improved: Collaborate with multidisciplinary team to address chronic and comorbid conditions and prevent exacerbation or deterioration     Problem: Discharge Planning  Goal: Discharge to home or other facility with appropriate resources  10/20/2024 0041 by Ninfa Agustin RN  Outcome: Progressing  Discharge to home or other facility with appropriate resources:   Identify barriers to discharge with patient and caregiver   Arrange for needed discharge resources and transportation as appropriate     Problem: Safety - Adult  Goal: Free from fall injury  10/20/2024 0041 by Ninfa Agustin RN  Outcome: Progressing  Pt remains free from falls during this stay on the ARU. 1:1 and education provided on the importance of using call light to ask for assistance prior to transfers and ambulation. Pt voices understanding. Will continue to monitor and re-educate as needed.     Problem: Pain  Goal: Verbalizes/displays adequate comfort level or baseline comfort level  10/20/2024 0041 by Ninfa Agustin RN  Outcome: Progressing  10/19/2024 1043 by Hilary Ravi RN  Outcome: Progressing  Flowsheets  Taken 10/19/2024 1000 by Hilary Ravi RN  Verbalizes/displays adequate comfort level or baseline comfort level:   Assess pain using appropriate pain scale   Administer analgesics based on type and severity of pain and evaluate response   Implement non-pharmacological measures as appropriate and evaluate response  Taken 10/19/2024 0820 by Hilary Ravi, RN  Verbalizes/displays adequate comfort level or baseline comfort level: Encourage patient to monitor pain and request assistance  Taken 10/18/2024 2346 by Tyrese

## 2024-10-21 LAB
ANION GAP SERPL CALCULATED.3IONS-SCNC: 14 MMOL/L (ref 3–16)
BASOPHILS # BLD: 0.1 K/UL (ref 0–0.2)
BASOPHILS NFR BLD: 0.8 %
BUN SERPL-MCNC: 39 MG/DL (ref 7–20)
CALCIUM SERPL-MCNC: 9.6 MG/DL (ref 8.3–10.6)
CHLORIDE SERPL-SCNC: 96 MMOL/L (ref 99–110)
CO2 SERPL-SCNC: 26 MMOL/L (ref 21–32)
CREAT SERPL-MCNC: 1.6 MG/DL (ref 0.6–1.2)
DEPRECATED RDW RBC AUTO: 13.7 % (ref 12.4–15.4)
EOSINOPHIL # BLD: 0.3 K/UL (ref 0–0.6)
EOSINOPHIL NFR BLD: 3.7 %
GFR SERPLBLD CREATININE-BSD FMLA CKD-EPI: 31 ML/MIN/{1.73_M2}
GLUCOSE SERPL-MCNC: 91 MG/DL (ref 70–99)
HCT VFR BLD AUTO: 32.2 % (ref 36–48)
HGB BLD-MCNC: 10.5 G/DL (ref 12–16)
LYMPHOCYTES # BLD: 1.4 K/UL (ref 1–5.1)
LYMPHOCYTES NFR BLD: 20.4 %
MCH RBC QN AUTO: 30 PG (ref 26–34)
MCHC RBC AUTO-ENTMCNC: 32.8 G/DL (ref 31–36)
MCV RBC AUTO: 91.6 FL (ref 80–100)
MONOCYTES # BLD: 0.6 K/UL (ref 0–1.3)
MONOCYTES NFR BLD: 8.8 %
NEUTROPHILS # BLD: 4.6 K/UL (ref 1.7–7.7)
NEUTROPHILS NFR BLD: 66.3 %
PLATELET # BLD AUTO: 395 K/UL (ref 135–450)
PMV BLD AUTO: 8.9 FL (ref 5–10.5)
POTASSIUM SERPL-SCNC: 3.7 MMOL/L (ref 3.5–5.1)
RBC # BLD AUTO: 3.51 M/UL (ref 4–5.2)
SODIUM SERPL-SCNC: 136 MMOL/L (ref 136–145)
WBC # BLD AUTO: 6.9 K/UL (ref 4–11)

## 2024-10-21 PROCEDURE — 97535 SELF CARE MNGMENT TRAINING: CPT

## 2024-10-21 PROCEDURE — 97110 THERAPEUTIC EXERCISES: CPT | Performed by: PHYSICAL THERAPIST

## 2024-10-21 PROCEDURE — 6370000000 HC RX 637 (ALT 250 FOR IP): Performed by: PHYSICAL MEDICINE & REHABILITATION

## 2024-10-21 PROCEDURE — 97530 THERAPEUTIC ACTIVITIES: CPT

## 2024-10-21 PROCEDURE — 85025 COMPLETE CBC W/AUTO DIFF WBC: CPT

## 2024-10-21 PROCEDURE — 92526 ORAL FUNCTION THERAPY: CPT

## 2024-10-21 PROCEDURE — 97130 THER IVNTJ EA ADDL 15 MIN: CPT

## 2024-10-21 PROCEDURE — 2580000003 HC RX 258: Performed by: PHYSICAL MEDICINE & REHABILITATION

## 2024-10-21 PROCEDURE — 1280000000 HC REHAB R&B

## 2024-10-21 PROCEDURE — 97129 THER IVNTJ 1ST 15 MIN: CPT

## 2024-10-21 PROCEDURE — 36415 COLL VENOUS BLD VENIPUNCTURE: CPT

## 2024-10-21 PROCEDURE — 80048 BASIC METABOLIC PNL TOTAL CA: CPT

## 2024-10-21 PROCEDURE — 6360000002 HC RX W HCPCS: Performed by: PHYSICAL MEDICINE & REHABILITATION

## 2024-10-21 PROCEDURE — 97116 GAIT TRAINING THERAPY: CPT | Performed by: PHYSICAL THERAPIST

## 2024-10-21 RX ORDER — ONDANSETRON 4 MG/1
4 TABLET, ORALLY DISINTEGRATING ORAL EVERY 8 HOURS PRN
Status: DISPENSED | OUTPATIENT
Start: 2024-10-21

## 2024-10-21 RX ADMIN — COLCHICINE 0.3 MG: 0.6 TABLET, FILM COATED ORAL at 08:56

## 2024-10-21 RX ADMIN — Medication 1000 UNITS: at 10:20

## 2024-10-21 RX ADMIN — ATORVASTATIN CALCIUM 80 MG: 80 TABLET, FILM COATED ORAL at 08:59

## 2024-10-21 RX ADMIN — OXYCODONE AND ACETAMINOPHEN 1 TABLET: 10; 325 TABLET ORAL at 04:33

## 2024-10-21 RX ADMIN — CARVEDILOL 12.5 MG: 12.5 TABLET, FILM COATED ORAL at 17:45

## 2024-10-21 RX ADMIN — HEPARIN SODIUM 5000 UNITS: 5000 INJECTION INTRAVENOUS; SUBCUTANEOUS at 08:58

## 2024-10-21 RX ADMIN — POLYETHYLENE GLYCOL 3350 17 G: 17 POWDER, FOR SOLUTION ORAL at 21:07

## 2024-10-21 RX ADMIN — SODIUM CHLORIDE, PRESERVATIVE FREE 10 ML: 5 INJECTION INTRAVENOUS at 09:06

## 2024-10-21 RX ADMIN — CYANOCOBALAMIN TAB 1000 MCG 1000 MCG: 1000 TAB at 10:20

## 2024-10-21 RX ADMIN — OXYCODONE AND ACETAMINOPHEN 1 TABLET: 10; 325 TABLET ORAL at 17:47

## 2024-10-21 RX ADMIN — AMIODARONE HYDROCHLORIDE 50 MG: 200 TABLET ORAL at 08:54

## 2024-10-21 RX ADMIN — DICLOFENAC SODIUM TOPICAL GEL, 1% 2 G: 10 GEL TOPICAL at 20:55

## 2024-10-21 RX ADMIN — LEVETIRACETAM 500 MG: 500 TABLET, FILM COATED ORAL at 08:58

## 2024-10-21 RX ADMIN — ONDANSETRON 4 MG: 4 TABLET, ORALLY DISINTEGRATING ORAL at 21:12

## 2024-10-21 RX ADMIN — CARVEDILOL 12.5 MG: 12.5 TABLET, FILM COATED ORAL at 08:56

## 2024-10-21 RX ADMIN — FUROSEMIDE 40 MG: 40 TABLET ORAL at 08:57

## 2024-10-21 RX ADMIN — BISACODYL 5 MG: 5 TABLET, COATED ORAL at 08:57

## 2024-10-21 RX ADMIN — SODIUM CHLORIDE, PRESERVATIVE FREE 10 ML: 5 INJECTION INTRAVENOUS at 21:13

## 2024-10-21 RX ADMIN — DICLOFENAC SODIUM TOPICAL GEL, 1% 2 G: 10 GEL TOPICAL at 09:06

## 2024-10-21 RX ADMIN — SENNOSIDES 8.6 MG: 8.6 TABLET, FILM COATED ORAL at 20:54

## 2024-10-21 RX ADMIN — PANTOPRAZOLE SODIUM 40 MG: 40 TABLET, DELAYED RELEASE ORAL at 04:34

## 2024-10-21 RX ADMIN — LEVETIRACETAM 500 MG: 500 TABLET, FILM COATED ORAL at 20:54

## 2024-10-21 RX ADMIN — HEPARIN SODIUM 5000 UNITS: 5000 INJECTION INTRAVENOUS; SUBCUTANEOUS at 20:54

## 2024-10-21 RX ADMIN — OXYCODONE AND ACETAMINOPHEN 1 TABLET: 10; 325 TABLET ORAL at 10:31

## 2024-10-21 ASSESSMENT — PAIN SCALES - GENERAL
PAINLEVEL_OUTOF10: 8
PAINLEVEL_OUTOF10: 8
PAINLEVEL_OUTOF10: 7
PAINLEVEL_OUTOF10: 8
PAINLEVEL_OUTOF10: 2
PAINLEVEL_OUTOF10: 6
PAINLEVEL_OUTOF10: 0
PAINLEVEL_OUTOF10: 1

## 2024-10-21 ASSESSMENT — PAIN DESCRIPTION - FREQUENCY
FREQUENCY: CONTINUOUS

## 2024-10-21 ASSESSMENT — PAIN DESCRIPTION - LOCATION
LOCATION: BACK
LOCATION: GENERALIZED
LOCATION: BACK

## 2024-10-21 ASSESSMENT — PAIN DESCRIPTION - ORIENTATION
ORIENTATION: LOWER
ORIENTATION: POSTERIOR
ORIENTATION: MID
ORIENTATION: POSTERIOR
ORIENTATION: RIGHT;LEFT;POSTERIOR

## 2024-10-21 ASSESSMENT — PAIN DESCRIPTION - DESCRIPTORS
DESCRIPTORS: ACHING

## 2024-10-21 ASSESSMENT — PAIN SCALES - WONG BAKER
WONGBAKER_NUMERICALRESPONSE: HURTS LITTLE MORE
WONGBAKER_NUMERICALRESPONSE: HURTS A LITTLE BIT

## 2024-10-21 ASSESSMENT — PAIN - FUNCTIONAL ASSESSMENT
PAIN_FUNCTIONAL_ASSESSMENT: PREVENTS OR INTERFERES SOME ACTIVE ACTIVITIES AND ADLS
PAIN_FUNCTIONAL_ASSESSMENT: PREVENTS OR INTERFERES SOME ACTIVE ACTIVITIES AND ADLS
PAIN_FUNCTIONAL_ASSESSMENT: ACTIVITIES ARE NOT PREVENTED
PAIN_FUNCTIONAL_ASSESSMENT: PREVENTS OR INTERFERES SOME ACTIVE ACTIVITIES AND ADLS
PAIN_FUNCTIONAL_ASSESSMENT: PREVENTS OR INTERFERES SOME ACTIVE ACTIVITIES AND ADLS

## 2024-10-21 ASSESSMENT — PAIN DESCRIPTION - ONSET
ONSET: ON-GOING

## 2024-10-21 ASSESSMENT — PAIN DESCRIPTION - PAIN TYPE
TYPE: CHRONIC PAIN

## 2024-10-21 ASSESSMENT — PAIN DESCRIPTION - DIRECTION
RADIATING_TOWARDS: BACK
RADIATING_TOWARDS: BACK

## 2024-10-21 NOTE — PROGRESS NOTES
ACUTE REHAB UNIT  SPEECH/LANGUAGE PATHOLOGY      [x] Daily  [] Weekly Care Conference Note  [] Discharge    Patient:Naty Dykes      :1935  MRN:[id number]  Rehab Dx/Hx: SDH (subdural hematoma) [S06.5XAA]    Precautions: [] Aspiration  [x] Fall risk  [] Sternal  [x] Seizure [] Hip  [] Weight Bearing [] Other     ST Dx: [] Aphasia  [] Dysarthria  [] Apraxia   [x] Oropharyngeal dysphagia [x] Cognitive Impairment  [] Other:   Date of Admit: 10/16/2024  Room #: 3106/3106-01  Date: 10/21/2024          Current Diet Order:ADULT DIET; Dysphagia - Minced and Moist; Low Fat/Low Chol/High Fiber/JAYNA  ADULT ORAL NUTRITION SUPPLEMENT; Breakfast, Lunch, Dinner; Standard High Calorie/High Protein Oral Supplement      Swallow Strategies: Alternate solids/liquids , Check for pocketing of food L, Check for pocketing of food R, Upright as possible with all PO intake , Assist Feed , Small bites/sips     CBC:  Lab Results   Component Value Date    WBC 6.6 10/18/2024    HGB 9.9 (L) 10/18/2024    HCT 29.6 (L) 10/18/2024    MCV 90.3 10/18/2024     10/18/2024     Living Status: Lives with son, Poor historian.   Medication Management:   []Primary   []Secondary [x]Comment: assist PRN per daughter  Finance Management:          []Primary   []Secondary [x]Comment: assist PRN per daughter  Active :                        []Yes         [x]No: daughter, grandson  Education: 3 years high school  Occupation: crafts  Hobbies/Leisure: read  Hearing: WFL  Vision: Vision Corrective Device: wears glasses for reading    Barriers toward progress: Cognitive deficit, Impulsivity, Limited safety awareness, and Limited insight into deficits      Date: 10/21/2024      Tx session 1 Tx session 2   Total Timed Code Min 10 15   Total Treatment Minutes 28 32   Individual Treatment Minutes 28 32   Group Treatment Minutes 0 0   Co-Treat Minutes 0 0   Brief Exception: N/A N/A   Pain Pain in R hip and back rated as 9/10 when questioned but pt does  cognitive-linguistic impairment with reduced attention. Cues throughout meal to alternate consistencies. Improved L side visual attention noted, with cues provided to scan during tasks. Pt continues to benefit from repetition of commands and cues throughout session to remain attentive and self-monitor.    Plan: Continue as per plan of care.      Note from 10/18: SLP discussed with daughter present regarding pt's cognitive-linguistic baseline. Daughter stated was able to complete some ADLs and IADLs prior to hospital admission (with assistance as needed). Expressed cognitive skills are \"off\" (I.e. observed pt complete 4-6 step sequencing cards and stated pt would \"normally be able to do these things before coming to the hospital\"). Pt's daughter did endorse some concerns with cognitive decline prior to admission and stated likeliness of making an appointment with pt's (now ) husbands physician for further cognitive assessment. Speech clarity reduced in second session this date and pt's daughter expressed \"slurred\" quality is not baseline. SLP reported this AM pt with adequate clarity of speech, however suspect increased possible dysarthric quality this PM 2/2 increased lethargy/fatigue (as this is the same presentation during evaluation yesterday when pt was very fatigued). Pt's daughter in agreement that she's noticed increased slurred quality when fatigued. SLP stated will continue to monitor need to target speech clarity as a structured goal. Daughter in agreement.     Interventions used this date:  [] Speech/Language Treatment  [] Instruction in HEP  [x] Dysphagia Treatment [x] Cognitive Treatment   [] Other:    Discharge recommendations:  [] Home independently  [] Home with assistance [x]  24 hour supervision  [] ECF [] Other  Continued Tx Upon Discharge: ? [x] Yes    [] No    [] TBD based on progress while on ARU     [] Vital Stim indicated     [] Other:   Estimated discharge date: Not yet

## 2024-10-21 NOTE — PROGRESS NOTES
Occupational Therapy  Facility/Department: [facility] ACUTE REHAB UNIT  Rehabilitation Occupational Therapy Daily Treatment Note    Date: 10/21/24  Patient Name: Naty Dykes       Room: 3106/3106-01  MRN: [id number]  Account: [id number]   : 1935  (88 y.o.) Gender: female                    Past Medical History:  has a past medical history of Arthritis, Blood circulation, collateral, CAD (coronary artery disease), CHF (congestive heart failure) ([facility]), Confusion, GERD (gastroesophageal reflux disease), History of heart artery stent, Hyperlipidemia, Hypertension, Mitral valve prolapse, Myocardial infarct, old, Seizure (HCC), and Thyroid disease.  Past Surgical History:   has a past surgical history that includes Hysterectomy; Thyroidectomy; Colonoscopy; other surgical history (2018); pr office/outpt visit,procedure only (Right, 2018); eye surgery (Left, 2018); pr office/outpt visit,procedure only (Left, 2018); and hip surgery (Right, 2022).    Restrictions  Restrictions/Precautions: Up as Tolerated, Fall Risk, Seizure  Other position/activity restrictions: up with assist, seizure precautions    Subjective  Subjective: Pt seated in recliner upon OT entry. SLP and RN present. Pt denied pain and agreeable to session.  Restrictions/Precautions: Up as Tolerated;Fall Risk;Seizure             Objective     Cognition  Arousal/Alertness: Appropriate responses to stimuli  Following Commands: Follows multistep commands with increased time;Follows multistep commands with repitition  Attention Span: Attends with cues to redirect  Memory: Decreased recall of precautions;Decreased recall of recent events  Safety Judgement: Good awareness of safety precautions  Problem Solving: Assistance required to generate solutions  Insights: Impaired  Initiation: Requires cues for some  Sequencing: Requires cues for some  Cognition Comment: More alert and oriented, demo'ed good active engagement in  arms;Wheelchair;Raised toilet Seat  Additional Factors: Verbal cues;Hand placement cues;Increased time to complete  Device: Walker  Sit to Stand  Assistance Level: Minimal assistance  Skilled Clinical Factors: Min A to stand from surfaces w/ and w/o use of RW. Hand placement cues required.  Stand to Sit  Assistance Level: Minimal assistance  Skilled Clinical Factors: Min A for controlled descent, hand placement cues required.  Stand Pivot  Assistance Level: Minimal assistance  Skilled Clinical Factors: Pt t/f recliner --> w/c w/ min A.         Assessment  Assessment  Assessment: Pt tolerated session well, alert and participated actively in session. Agreeable to sponge bathing. Pt completed STS and transfers w/ min A, demo'ed ability to stand from w/c w/o RW during 2x LE dressing. Continues to be limited w/ generalized pain and weakness d/t arthritis but did demo ability to doff B socks, thread LLE into brief/pants, and thread BUE into shirt/overhead w/ less physical assistance of therapist. Anticipate carryover may be poor of strategies taught. Pt continues to benefit from skilled OT services in order to maximize fxl independence. Cont OT POC.  Activity Tolerance: Patient tolerated treatment well  Discharge Recommendations: Continue to assess pending progress;Home with Home health OT;24 hour supervision or assist  OT Equipment Recommendations  Equipment Needed: No  Safety Devices  Safety Devices in place: Yes  Type of devices: Left in chair;Call light within reach;Chair alarm in place    Patient Education  Education  Education Given To: Patient  Education Provided: Role of Therapy;Transfer Training;Plan of Care;Energy Conservation;Safety;ADL Function;Fall Prevention Strategies;Mobility Training;Home Exercise Program  Education Method: Demonstration;Verbal  Barriers to Learning: Hearing  Education Outcome: Verbalized understanding;Demonstrated understanding;Continued education needed    Plan  Occupational Therapy

## 2024-10-21 NOTE — PROGRESS NOTES
Physical Therapy  Facility/Department: [facility] ACUTE REHAB UNIT  Rehabilitation Physical Therapy Treatment Note    NAME: Naty Dykes  : 1935 (88 y.o.)  MRN: [id number]  CODE STATUS: Full Code    Date of Service: 10/21/24       Restrictions:  Restrictions/Precautions: Up as Tolerated, Fall Risk, Seizure  Position Activity Restriction  Other position/activity restrictions: up with assist, seizure precautions     SUBJECTIVE  Subjective  Subjective: Pt sleeping  in bedside chair upon PT arrival. Pt agreeable to treatment.  Pain: 7/10 B knee pain and hip pain, RN notified/aware. Also c/o \"gout\" pain B feet        Post Treatment Pain Screening         OBJECTIVE  Cognition  Arousal/Alertness: Appropriate responses to stimuli  Following Commands: Follows multistep commands with increased time;Follows multistep commands with repitition  Attention Span: Attends with cues to redirect  Memory: Decreased recall of precautions;Decreased recall of recent events  Safety Judgement: Good awareness of safety precautions  Problem Solving: Assistance required to generate solutions  Insights: Impaired  Initiation: Requires cues for some  Sequencing: Requires cues for some  Cognition Comment: Pt very talkative throughout session  Orientation  Overall Orientation Status: Within Normal Limits  Orientation Level: Oriented to place;Oriented to time;Oriented to situation;Oriented to person    Functional Mobility  Bed Mobility  Overall Assistance Level: Minimal Assistance;Stand By Assist  Additional Factors: Verbal cues;Increased time to complete;Without handrails;Head of bed raised  Bridging  Assistance Level: Supervision  Skilled Clinical Factors: Used as an ex. VC for tecchnique especailly bending B knees up as much as possible  Roll Left  Assistance Level: Supervision;Stand by assist  Skilled Clinical Factors: Used the mattress and sheets to pull over  Roll Right  Assistance Level: Supervision  Skilled Clinical Factors: Used the  the following ex performed in supine to BLES:  1. Marching in hooklying  2. Bridges with hold of \"3\" ( UES across chest)  3. Alternating hip/knee flex/ext   Micki 10 reps of each            ASSESSMENT/PROGRESS TOWARDS GOALS       Assessment  Assessment: Pt is limited by B knee pain and by \"Gout\" Pt req multiple rests when doing ex in standing both for pain relief and for fatigue. PT instructed pt with ex to be complete throughout the day when pt is not in therapy. Pt's daughters present for therapy. Pt is below baseline and would benefit from cont therapy to maximize potential and increase functional mobility towards Ind to allow for a safer d/c to home.  Activity Tolerance: Patient tolerated treatment well;Patient limited by pain;Patient limited by endurance;Patient limited by fatigue  Discharge Recommendations: Home with Home health PT;24 hour supervision or assist  PT Equipment Recommendations  Other: Ongoing assessment at this time    Goals  Patient Goals   Patient Goals : \"Walk again like she use to\"  Short Term Goals  Time Frame for Short Term Goals: 14 days( all goals are ongoing)  Short Term Goal 1: S with all bed mobility skills  Short Term Goal 2: Transf with CGA x1  Short Term Goal 3: Amb distances with LRADx 40' with CGA  Short Term Goal 4: demo MI w/ w/c propulsion x 150' at a time  Long Term Goals  Time Frame for Long Term Goals : 21 days  Long Term Goal 1: Ind w/ bed mobility  Long Term Goal 2: Ind w transf excluding floor transf  Long Term Goal 3: Amb with LRAD distances x1 00' at a time    PLAN OF CARE/SAFETY  Physical Therapy Plan  Days Per Week: 5 Days  Hours Per Day: 1 hour  Therapy Duration: 3 Weeks  Current Treatment Recommendations: Strengthening;ROM;Balance training;Functional mobility training;Transfer training;Endurance training;Gait training;Stair training;Neuromuscular re-education;Home exercise program;Safety education & training;Patient/Caregiver education & training;Equipment evaluation,

## 2024-10-21 NOTE — PLAN OF CARE
Problem: Chronic Conditions and Co-morbidities  Goal: Patient's chronic conditions and co-morbidity symptoms are monitored and maintained or improved  10/21/2024 1127 by Jessica Doherty RN  Outcome: Progressing  Flowsheets (Taken 10/20/2024 0901 by Hilary Ravi, RN)  Care Plan - Patient's Chronic Conditions and Co-Morbidity Symptoms are Monitored and Maintained or Improved: Monitor and assess patient's chronic conditions and comorbid symptoms for stability, deterioration, or improvement  Note: Continue to monitor daily weights     Problem: Discharge Planning  Goal: Discharge to home or other facility with appropriate resources  10/21/2024 1127 by Jessica Doherty RN  Outcome: Progressing     Problem: Safety - Adult  Goal: Free from fall injury  10/21/2024 1127 by Jessica Doherty RN  Outcome: Progressing  Flowsheets (Taken 10/20/2024 2349 by Bettie Xiong, RN)  Free From Fall Injury: Instruct family/caregiver on patient safety     Problem: Pain  Goal: Verbalizes/displays adequate comfort level or baseline comfort level  Outcome: Progressing  Flowsheets (Taken 10/21/2024 0005 by Bettie Xiong, RN)  Verbalizes/displays adequate comfort level or baseline comfort level: Encourage patient to monitor pain and request assistance     Problem: Skin/Tissue Integrity  Goal: Absence of new skin breakdown  Description: 1.  Monitor for areas of redness and/or skin breakdown  2.  Assess vascular access sites hourly  3.  Every 4-6 hours minimum:  Change oxygen saturation probe site  4.  Every 4-6 hours:  If on nasal continuous positive airway pressure, respiratory therapy assess nares and determine need for appliance change or resting period.  Outcome: Progressing     Problem: ABCDS Injury Assessment  Goal: Absence of physical injury  Outcome: Progressing  Flowsheets (Taken 10/20/2024 2349 by Bettie Xiong, RN)  Absence of Physical Injury: Implement safety measures based on patient assessment     Problem:

## 2024-10-21 NOTE — PROGRESS NOTES
Ph: [phone], Fax: [phone]                                     [facility]                                                   [address] [place], [place] [id number]           Reason for admission:    Rehab             Brief Summary:     Naty Dykes is being seen by nephrology for CKD    Interval History and Plan:   Patient seen at bedside   Comfortable  BP reviewed. Stable overall.   Patient denies dizziness with standing.   On room air  Urine output 2 x   Labs pending.             BP is stable on current medications and patient appear euvolemic.   Check orthostatic vitals.   Increase protein in diet  Drink fluid/water to thirst   Avoid NSAIDs and nephrotoxins if possible  Replete lytes as needed  Dose medications according to GFR    D/W RN      Thank you for allowing us to participate in this patient's care  In case of any question please call us at our 24 hour answering service [phone] or from 7 AM to 5 PM via Perfect Serve, Voalte, Epic chat or cell phone.   Dr Sina Kwan MD      Assessment:     Chronic Kidney Disease  Stage 4   Cr around her baseline.       Hypertension  CAD  A fib  CHF with LVEF 4- 45 %     On Lasix 40 mg daily + Amiodarone + Coreg 12.5 mg BID         Hyponatremia          HPI      Patient is a 88 y.o. female  with PMH significant for CKD 4, CAD, GERD, DL, HTN was admitted with SDH after fall and now at rehab and nephrology is consulted to follow and assist in care given CKD 4.       Patient seen at bedside with PT/OT and appear general weak and lethargic but awake and able to answer basic questions. Patient having back pain for which getting pain medications. Denied any SOB, GI or urinary symptoms. Per patient appetite is fair and she is trying to keep herself hydrated.     Reviewed records.         ROS:   Negative except as mentioned in HPI      Vitals:     Vitals:    10/21/24 0433   BP:    Pulse:    Resp: 18   Temp:    SpO2:

## 2024-10-21 NOTE — PROGRESS NOTES
Department of Physical Medicine & Rehabilitation  Progress Note    Patient Identification:  Naty Dykes  [id number]  : 1935  Admit date: 10/16/2024    Chief Complaint: SDH (subdural hematoma)    Subjective:   Seen in her room this morning.  No new complaints overnight.  She is making progress daily in therapy and feels a lot better.  She did sleep a lot over the weekend and is slightly more tired today.  She is mod assist to get into bed.  ROS: No f/c, n/v, cp     Objective:  Patient Vitals for the past 24 hrs:   BP Temp Temp src Pulse Resp SpO2 Height   10/21/24 1031 -- -- -- -- 18 -- --   10/21/24 0845 (!) 148/64 97 °F (36.1 °C) Oral 76 18 98 % --   10/21/24 0433 -- -- -- -- 18 -- --   10/21/24 0400 -- -- -- -- -- -- 1.575 m (5' 2\")   10/20/24 2309 -- -- -- -- 18 -- --   10/20/24 1945 134/70 97.9 °F (36.6 °C) Oral 61 17 97 % --   10/20/24 1722 117/69 -- -- 61 -- -- --   10/20/24 1544 -- -- -- -- 16 -- --   10/20/24 1514 -- -- -- -- 16 -- --     CONST: Alert. No acute distress  EYES: No icterus noted.  HENT: Atraumatic, normocephalic;  NECK: Trachea midline, neck supple.  CV: Regular rate and rhythm, no murmur rub or gallop noted  RESP: Lungs clear to auscultation bilaterally, no rales wheezes or ronchi, no retractions. Respirations unlabored.   GI: Soft, nontender, nondistended.   EXT: No significant edema appreciated to BLE  NEURO:   Patient somnolent. Limited exam.  Unable to assess EOMI. CN V, CN VII, CNXI-XII intact  4/5 strengths to BUE. 3/5 strengths to bilateral hip flexion. 4/5 strengths to ankle dorsi/plantarflexion  PSYCH: Stable mood, normal judgement, normal affect     Laboratory data: Available via EMR.   Last 24 hour lab  No results found for this or any previous visit (from the past 24 hour(s)).      Therapy progress:  PT  Position Activity Restriction  Other position/activity restrictions: up with assist, seizure precautions  Objective     Sit to Stand: Maximum Assistance, Moderate

## 2024-10-21 NOTE — PATIENT CARE CONFERENCE
Inpatient Rehabilitation  Weekly Team Conference Note  The [address] [place] [address]  [phone]  Patient Name: Naty Dykes        MRN: [id number]    : 1935  (88 y.o.)  Gender: female   Houston Barth DO  SDH (subdural hematoma)  The team conference for this patient was held on 10/22/2024 at 11:00am by:  Houston Barth DO    Current/Goal QM SCORES  QM Current/Goal Score   Eating CARE Score: 3 / Discharge Goal: Independent   Oral Hygiene CARE Score: 4 / Discharge Goal: Independent   Shower/Bathing CARE Score: 3 / Discharge Goal: Supervision or touching assistance   UB Dressing CARE Score: 3 / Discharge Goal: Independent   LB Dressing CARE Score: 3 / Discharge Goal: Supervision or touching assistance   Putting on/off Footwear CARE Score: 3 / Discharge Goal: Independent   Toileting Hygiene CARE Score: 3 / Discharge Goal: Supervision or touching assistance   Bladder Continence Bladder Continence: Always continent    Bowel Continence      Toilet Transfers CARE Score: 3 / Discharge Goal: Supervision or touching assistance   Shower/Bathe Self  CARE Score: 3 / Discharge Goal: Supervision or touching assistance   Rolling Left and Right CARE Score: 4 / Discharge Goal: Independent   Sit to Lying CARE Score: 2 / Discharge Goal: Independent   Lying to Sitting on Bedside CARE Score: 3 / Discharge Goal: Supervision or touching assistance   Sit to Stand CARE Score: 3 / Discharge Goal: Supervision or touching assistance   Chair/Bed to Chair Transfer CARE Score: 4 /     Car Transfers CARE Score: 2 / Discharge Goal: Supervision or touching assistance   Walk 10 Feet CARE Score: 3 /     Walk 50 Feet with Two Turns CARE Score: 4 / Discharge Goal: Supervision or touching assistance   Walk 150 Feet CARE Score: 88 / Discharge Goal: Not Applicable   Walk 10 Feet on Uneven Surfaces CARE Score: 88 / Discharge Goal: Not Applicable   1 Step (Curb) CARE Score: 88 / Discharge  HDL 57 10/11/2024 09:59 AM    TRIG 62 10/11/2024 09:59 AM     Recent Labs     10/21/24  1055   WBC 6.9   HGB 10.5*   HCT 32.2*      MCV 91.6     Recent Labs     10/21/24  1055      K 3.7   CL 96*   CO2 26   BUN 39*   CREATININE 1.6*     No results for input(s): \"AST\", \"ALT\", \"BILIDIR\", \"BILITOT\", \"ALKPHOS\" in the last 72 hours.    Invalid input(s): \"ALB\"  No results for input(s): \"MG\" in the last 72 hours.  Recent Labs     10/21/24  1055   WBC 6.9   HGB 10.5*   HCT 32.2*        Recent Labs     10/21/24  1055      K 3.7   CL 96*   CO2 26   BUN 39*   CREATININE 1.6*   CALCIUM 9.6     No results for input(s): \"AST\", \"ALT\", \"BILIDIR\", \"BILITOT\", \"ALKPHOS\" in the last 72 hours.  No results for input(s): \"INR\" in the last 72 hours.  No results for input(s): \"CKTOTAL\", \"TROPONINI\" in the last 72 hours.    PHYSICAL THERAPY:  Bed Mobility: Scooting: Moderate assistance, Minimal assistance    Transfers:  Sit to Stand: Maximum Assistance, Moderate Assistance (Max A/ mod x1 with step by step VC for sequencing technique . Pt has a posterior lean and has difficulty with ant WS especially w/ hand placement on the surface that she is ascending from)  Stand to Sit: Moderate Assistance (Req Mod A x1 with VC for technique including hand placement and stepping back to touch chair before descending. Pt w/  poor eccentric control)  Bed to Chair: Moderate assistance    WB Status: No WB restrictions noted in chart  Ambulation  Surface: Level tile  Device: Rolling Walker  Assistance: Minimal assistance, Moderate assistance  Quality of Gait: decreased agnieszka, decreased B step height/length, poor RW management, forward trunk flexion, mod v/c for initiatiing steps.  Gait Deviations: Slow Agnieszka, Decreased step height  Distance: 5 steps  Comments: Pt req VC to initiate stepping and to advance the RW. Min /mod A to maneuver the RW. Pt consistently reported that she was very weak and her LES \"were going to give

## 2024-10-21 NOTE — PROGRESS NOTES
Comprehensive Nutrition Assessment    RECOMMENDATIONS:  PO Diet: Dysphagia- Minced & Moist, Low Fat/Low Chol/High Fiber/JAYNA  Nutrition Supplement: Ensure+ HP TID  Nutrition Education: Education not appropriate     NUTRITION ASSESSMENT:   Nutritional summary & status: Follow-up. Pt w/ confusion at times, hx of dementia. Unable to speak w/ pt today as she was working w/ therapies. Per flowsheets and RN notes, pt experiencing bloating and gas pain at times; given mylicon PRN. Documented PO intake per EMR remains variable but still poor, most meals at 0-50% consumed. Documented ONS intake limited but 1 at 51-75% consumed and 2 at 1-25% consumed. Will consider recommending appetite stimulant if PO intake remains poor. Will continue to monitor PO intake as well as any other changes to nutritional status.   Admission // PMH: SDH // CAD, GERD, HLD, HTN, paroxysmal A-fib on Eliquis, hypothyroidism, CHF, CKD stage 4    MALNUTRITION ASSESSMENT  Context of Malnutrition: Chronic Illness   Malnutrition Status: Moderate malnutrition  Findings of the 6 clinical characteristics of malnutrition (Minimum of 2 out of 6 clinical characteristics is required to make the diagnosis of moderate or severe Protein Calorie Malnutrition based on AND/ASPEN Guidelines):  Energy Intake:  75% or less estimated energy requirements for 1 month or longer  Weight Loss:  Greater than 20% over 1 year (21% over 10 months)     Body Fat Loss:  Mild body fat loss Buccal region   Muscle Mass Loss:  Mild muscle mass loss Temples (temporalis)    NUTRITION DIAGNOSIS   Moderate malnutrition, In context of chronic illness related to inadequate protein-energy intake as evidenced by Criteria as identified in malnutrition assessment    Nutrition Monitoring and Evaluation:   Food/Nutrient Intake Outcomes:  Food and Nutrient Intake, Supplement Intake  Physical Signs/Symptoms Outcomes:  Biochemical Data, GI Status, Nutrition Focused Physical Findings, Weight

## 2024-10-21 NOTE — PLAN OF CARE
Problem: Chronic Conditions and Co-morbidities  Goal: Patient's chronic conditions and co-morbidity symptoms are monitored and maintained or improved  10/20/2024 2352 by Bettie Xiong RN  Outcome: Progressing      Problem: Discharge Planning  Goal: Discharge to home or other facility with appropriate resources  10/20/2024 2352 by Bettie Xiong, RN  Outcome: Progressing     Problem: Safety - Adult  Goal: Free from fall injury  10/20/2024 2352 by Bettie Xiong, RN  Outcome: Progressing  Flowsheets (Taken 10/20/2024 2349)  Free From Fall Injury: Instruct family/caregiver on patient safety

## 2024-10-21 NOTE — PROGRESS NOTES
A&O.  Forgetful at times.  C/o chronic pain in back.  Applied heat.  Repositioned for comfort.  Applied pillows to lumbar and under hip.  No SOB at rest and with HOB at 30 degrees.  C/o gas pain.  Gave prn simethicone.  VSS.  Assessment complete.  Safety measures in place.

## 2024-10-22 PROCEDURE — 97110 THERAPEUTIC EXERCISES: CPT

## 2024-10-22 PROCEDURE — 6360000002 HC RX W HCPCS: Performed by: PHYSICAL MEDICINE & REHABILITATION

## 2024-10-22 PROCEDURE — 1280000000 HC REHAB R&B

## 2024-10-22 PROCEDURE — 97530 THERAPEUTIC ACTIVITIES: CPT | Performed by: PHYSICAL THERAPIST

## 2024-10-22 PROCEDURE — 97129 THER IVNTJ 1ST 15 MIN: CPT

## 2024-10-22 PROCEDURE — 97116 GAIT TRAINING THERAPY: CPT | Performed by: PHYSICAL THERAPIST

## 2024-10-22 PROCEDURE — 97130 THER IVNTJ EA ADDL 15 MIN: CPT

## 2024-10-22 PROCEDURE — 97530 THERAPEUTIC ACTIVITIES: CPT

## 2024-10-22 PROCEDURE — 92526 ORAL FUNCTION THERAPY: CPT

## 2024-10-22 PROCEDURE — 97110 THERAPEUTIC EXERCISES: CPT | Performed by: PHYSICAL THERAPIST

## 2024-10-22 PROCEDURE — 6370000000 HC RX 637 (ALT 250 FOR IP): Performed by: PHYSICAL MEDICINE & REHABILITATION

## 2024-10-22 RX ADMIN — AMIODARONE HYDROCHLORIDE 50 MG: 200 TABLET ORAL at 10:45

## 2024-10-22 RX ADMIN — CYANOCOBALAMIN TAB 1000 MCG 1000 MCG: 1000 TAB at 10:45

## 2024-10-22 RX ADMIN — OXYCODONE AND ACETAMINOPHEN 1 TABLET: 10; 325 TABLET ORAL at 12:57

## 2024-10-22 RX ADMIN — LEVETIRACETAM 500 MG: 500 TABLET, FILM COATED ORAL at 21:08

## 2024-10-22 RX ADMIN — Medication 1000 UNITS: at 10:44

## 2024-10-22 RX ADMIN — CARVEDILOL 12.5 MG: 12.5 TABLET, FILM COATED ORAL at 10:44

## 2024-10-22 RX ADMIN — SIMETHICONE 80 MG: 80 TABLET, CHEWABLE ORAL at 14:10

## 2024-10-22 RX ADMIN — BISACODYL 5 MG: 5 TABLET, COATED ORAL at 11:02

## 2024-10-22 RX ADMIN — HEPARIN SODIUM 5000 UNITS: 5000 INJECTION INTRAVENOUS; SUBCUTANEOUS at 21:08

## 2024-10-22 RX ADMIN — COLCHICINE 0.3 MG: 0.6 TABLET, FILM COATED ORAL at 10:43

## 2024-10-22 RX ADMIN — OXYCODONE AND ACETAMINOPHEN 1 TABLET: 10; 325 TABLET ORAL at 21:08

## 2024-10-22 RX ADMIN — ONDANSETRON 4 MG: 4 TABLET, ORALLY DISINTEGRATING ORAL at 23:17

## 2024-10-22 RX ADMIN — DICLOFENAC SODIUM TOPICAL GEL, 1% 2 G: 10 GEL TOPICAL at 11:01

## 2024-10-22 RX ADMIN — ATORVASTATIN CALCIUM 80 MG: 80 TABLET, FILM COATED ORAL at 10:44

## 2024-10-22 RX ADMIN — DICLOFENAC SODIUM TOPICAL GEL, 1% 2 G: 10 GEL TOPICAL at 21:07

## 2024-10-22 RX ADMIN — LEVETIRACETAM 500 MG: 500 TABLET, FILM COATED ORAL at 10:44

## 2024-10-22 RX ADMIN — OXYCODONE AND ACETAMINOPHEN 1 TABLET: 10; 325 TABLET ORAL at 05:59

## 2024-10-22 RX ADMIN — HEPARIN SODIUM 5000 UNITS: 5000 INJECTION INTRAVENOUS; SUBCUTANEOUS at 10:37

## 2024-10-22 RX ADMIN — FUROSEMIDE 40 MG: 40 TABLET ORAL at 10:45

## 2024-10-22 RX ADMIN — PANTOPRAZOLE SODIUM 40 MG: 40 TABLET, DELAYED RELEASE ORAL at 05:59

## 2024-10-22 RX ADMIN — CARVEDILOL 12.5 MG: 12.5 TABLET, FILM COATED ORAL at 18:00

## 2024-10-22 RX ADMIN — SENNOSIDES 8.6 MG: 8.6 TABLET, FILM COATED ORAL at 21:08

## 2024-10-22 ASSESSMENT — PAIN DESCRIPTION - FREQUENCY
FREQUENCY: CONTINUOUS

## 2024-10-22 ASSESSMENT — PAIN SCALES - GENERAL
PAINLEVEL_OUTOF10: 7
PAINLEVEL_OUTOF10: 7
PAINLEVEL_OUTOF10: 6
PAINLEVEL_OUTOF10: 7
PAINLEVEL_OUTOF10: 7
PAINLEVEL_OUTOF10: 9
PAINLEVEL_OUTOF10: 8

## 2024-10-22 ASSESSMENT — PAIN SCALES - WONG BAKER: WONGBAKER_NUMERICALRESPONSE: HURTS EVEN MORE

## 2024-10-22 ASSESSMENT — PAIN - FUNCTIONAL ASSESSMENT
PAIN_FUNCTIONAL_ASSESSMENT: PREVENTS OR INTERFERES SOME ACTIVE ACTIVITIES AND ADLS
PAIN_FUNCTIONAL_ASSESSMENT: PREVENTS OR INTERFERES SOME ACTIVE ACTIVITIES AND ADLS
PAIN_FUNCTIONAL_ASSESSMENT: ACTIVITIES ARE NOT PREVENTED
PAIN_FUNCTIONAL_ASSESSMENT: PREVENTS OR INTERFERES SOME ACTIVE ACTIVITIES AND ADLS

## 2024-10-22 ASSESSMENT — PAIN DESCRIPTION - PAIN TYPE
TYPE: CHRONIC PAIN

## 2024-10-22 ASSESSMENT — PAIN DESCRIPTION - DESCRIPTORS
DESCRIPTORS: ACHING

## 2024-10-22 ASSESSMENT — PAIN DESCRIPTION - ONSET
ONSET: ON-GOING

## 2024-10-22 ASSESSMENT — PAIN DESCRIPTION - ORIENTATION
ORIENTATION: LOWER
ORIENTATION: RIGHT;LEFT;ANTERIOR;POSTERIOR
ORIENTATION: RIGHT
ORIENTATION: RIGHT

## 2024-10-22 ASSESSMENT — PAIN DESCRIPTION - DIRECTION: RADIATING_TOWARDS: RIGHT KNEE

## 2024-10-22 ASSESSMENT — PAIN DESCRIPTION - LOCATION
LOCATION: BACK
LOCATION: KNEE
LOCATION: HIP
LOCATION: GENERALIZED

## 2024-10-22 NOTE — PROGRESS NOTES
ACUTE REHAB UNIT  SPEECH/LANGUAGE PATHOLOGY      [x] Daily  [x] Weekly Care Conference Note  [] Discharge    Patient:Naty Dykes      :1935  MRN:[id number]  Rehab Dx/Hx: SDH (subdural hematoma) [S06.5XAA]    Precautions: [] Aspiration  [x] Fall risk  [] Sternal  [x] Seizure [] Hip  [] Weight Bearing [] Other     ST Dx: [] Aphasia  [] Dysarthria  [] Apraxia   [x] Oropharyngeal dysphagia [x] Cognitive Impairment  [] Other:   Date of Admit: 10/16/2024  Room #: 3106/3106-01  Date: 10/22/2024          Current Diet Order:ADULT DIET; Dysphagia - Minced and Moist; Low Fat/Low Chol/High Fiber/JAYNA  ADULT ORAL NUTRITION SUPPLEMENT; Breakfast, Lunch, Dinner; Standard High Calorie/High Protein Oral Supplement      Swallow Strategies: Alternate solids/liquids , Check for pocketing of food L, Check for pocketing of food R, Upright as possible with all PO intake , Assist Feed , Small bites/sips     CBC:  Lab Results   Component Value Date    WBC 6.9 10/21/2024    HGB 10.5 (L) 10/21/2024    HCT 32.2 (L) 10/21/2024    MCV 91.6 10/21/2024     10/21/2024     Living Status: Lives with son, Poor historian.   Medication Management:   []Primary   []Secondary [x]Comment: assist PRN per daughter  Finance Management:          []Primary   []Secondary [x]Comment: assist PRN per daughter  Active :                        []Yes         [x]No: daughter, grandson  Education: 3 years high school  Occupation: crafts  Hobbies/Leisure: read  Hearing: WFL  Vision: Vision Corrective Device: wears glasses for reading    Barriers toward progress: Cognitive deficit, Impulsivity, Limited safety awareness, and Limited insight into deficits      Date: 10/22/2024       Tx session 1 Tx session 2 Weekly Summary   Total Timed Code Min 25 20    Total Treatment Minutes 45 20    Individual Treatment Minutes 45 20    Group Treatment Minutes 0 0    Co-Treat Minutes 0 0    Brief Exception: N/A N/A    Pain Reported knee pain None stated    Pain  Pt unable to independently self-monitor.  PROGRESSING; CONTINUE    Pt will participate in ongoing cognitive-linguistic assessment. Goal not targeted this session. Not targeted this session. PROGRESSING; CONTINUE    Other areas targeted:      Education:   Educated re: skills targeted, activities completed. Educated re: skills targeted, activities completed.    Assessment of Patient Learning: Pt stated comprehension, however ongoing education and reinforcement is recommended. Pt stated comprehension, however ongoing education and reinforcement is recommended this date.    Safety Devices: [x] Call light within reach  [x] Chair alarm activated and connected to nurse call light system  [] Bed alarm activated   [] Other: [x] Call light within reach  [x] Chair alarm activated and connected to nurse call light system  [] Bed alarm activated  [] Other:    Assessment: Pt with mild-moderate oral dysphagia and moderate-severe cognitive-linguistic impairment with reduced attention. Improved L side visual attention noted, with cues provided to scan during tasks and choices during activities to assist with planning/execution. Pt continues to benefit from repetition of commands and cues throughout session to remain attentive and self-monitor.     Plan: Continue as per plan of care.       Note from 10/18: SLP discussed with daughter present regarding pt's cognitive-linguistic baseline. Daughter stated was able to complete some ADLs and IADLs prior to hospital admission (with assistance as needed). Expressed cognitive skills are \"off\" (I.e. observed pt complete 4-6 step sequencing cards and stated pt would \"normally be able to do these things before coming to the hospital\"). Pt's daughter did endorse some concerns with cognitive decline prior to admission and stated likeliness of making an appointment with pt's (now ) husbands physician for further cognitive assessment. Speech clarity reduced in second session this date and  CCC-SLP  [id number]  Speech-Language Pathologist    The speech-language pathologist was present, directed the patient's care, made skilled judgment and was responsible for assessment and treatment.

## 2024-10-22 NOTE — CARE COORDINATION
SW met with patient at bedside and called daughter Ela     [phone]   to provide updates and give anticipated discharge date of 11/1. Patient was agreeable with anticipated discharge date and has no more questions at this time. SW following.        Electronically signed by FANTASMA Gaines on 10/22/2024 at 11:41 AM

## 2024-10-22 NOTE — PATIENT CARE CONFERENCE
Team Conference In Room Rounding Note     In room rounding was completed today with RN, PT/OT/ST, LSW and ARU Supervisor present. LSW called patient's daughter, Ela, to allow them to be present and active during the conversation. Naty Dykes was educated on their discharge date, 11/1/2024, and the recommendation for Home with home healthcare therapies and nursing after discharge. Physical Therapy informed the patient and family on their current assist level and plan of care. Occupational Therapy provided information to the patient and family on their current assist level for ADLs and the plan of care. Speech Language Pathology educated them on cognitive deficits, diet level, and plan of care that they are currently addressing. The RN staff provided education on medication management, current co-morbidities that are being addressed by the physicians and the patient's plan of care for their stay.    The interdisciplinary team identified the following barriers to address prior to discharge:  Decreased balance, endurance and strength  Needing supervision for walking at this time.  Needing supervision for bathing and dressing  Dysphagia  Decreased cognition    Education/recommendations to assist at discharge included:  Recommending increased supervision upon d/c to ensure a safe return home.   Recommending supervision for bathing and use of a shower bench for increased safety.   Recommending continued assistance with medication and finance management.

## 2024-10-22 NOTE — PLAN OF CARE
Problem: Chronic Conditions and Co-morbidities  Goal: Patient's chronic conditions and co-morbidity symptoms are monitored and maintained or improved  10/22/2024 0058 by Ninfa Agustin RN  Outcome: Progressing  Flowsheets (Taken 10/21/2024 2052)  Care Plan - Patient's Chronic Conditions and Co-Morbidity Symptoms are Monitored and Maintained or Improved:   Monitor and assess patient's chronic conditions and comorbid symptoms for stability, deterioration, or improvement   Collaborate with multidisciplinary team to address chronic and comorbid conditions and prevent exacerbation or deterioration   Update acute care plan with appropriate goals if chronic or comorbid symptoms are exacerbated and prevent overall improvement and discharge.    Problem: Discharge Planning  Goal: Discharge to home or other facility with appropriate resources  10/22/2024 0058 by Ninfa Agustin RN  Outcome: Progressing  Flowsheets (Taken 10/21/2024 2052)  Discharge to home or other facility with appropriate resources: Identify barriers to discharge with patient and caregiver.    Problem: Safety - Adult  Goal: Free from fall injury  10/22/2024 0058 by Ninfa Agustin, RN  Outcome: Progressing  Pt. Remains at risk for falls due to impaired gait and weakness. Fall prevention measures on-going: Fall sign posted on door, bed wheels locked and in lowest position, safe transfer using GB/Wheel Chair, prompt attention to the use of the call light. Hourly rounding and frequent visual checks on-going.     Problem: Pain  Goal: Verbalizes/displays adequate comfort level or baseline comfort level  10/22/2024 0058 by Ninfa Agustin, RN  Outcome: Progressing  Pt. Complaint of pain, pain medication administered, see MAR. Pain is being managed with pharmacological and non-pharmacological intervention. Pain level will be decreased or be at a satisfactory level by discharge.  Problem: ABCDS Injury Assessment  Goal: Absence of physical injury  10/22/2024

## 2024-10-22 NOTE — PROGRESS NOTES
Occupational Therapy  Facility/Department: [facility] ACUTE REHAB UNIT  Rehabilitation Occupational Therapy Daily Treatment Note    Date: 10/22/24  Patient Name: Naty Dykes       Room: 3106/3106-01  MRN: [id number]  Account: [id number]   : 1935  (88 y.o.) Gender: female                    Past Medical History:  has a past medical history of Arthritis, Blood circulation, collateral, CAD (coronary artery disease), CHF (congestive heart failure) ([facility]), Confusion, GERD (gastroesophageal reflux disease), History of heart artery stent, Hyperlipidemia, Hypertension, Mitral valve prolapse, Myocardial infarct, old, Seizure (HCC), and Thyroid disease.  Past Surgical History:   has a past surgical history that includes Hysterectomy; Thyroidectomy; Colonoscopy; other surgical history (2018); pr office/outpt visit,procedure only (Right, 2018); eye surgery (Left, 2018); pr office/outpt visit,procedure only (Left, 2018); and hip surgery (Right, 2022).    Restrictions  Restrictions/Precautions: Up as Tolerated, Fall Risk, Seizure  Other position/activity restrictions: up with assist, seizure precautions    Subjective  Subjective: Pt seated in recliner upon OT entry. Reported 7/10 generalized pain. Addressed via stretching.  Restrictions/Precautions: Up as Tolerated;Fall Risk;Seizure             Objective     Cognition  Arousal/Alertness: Appropriate responses to stimuli  Following Commands: Follows multistep commands with increased time;Follows multistep commands with repitition  Attention Span: Attends with cues to redirect  Memory: Decreased recall of precautions;Decreased recall of recent events  Safety Judgement: Good awareness of safety precautions  Problem Solving: Assistance required to generate solutions  Insights: Impaired  Initiation: Requires cues for some  Sequencing: Requires cues for some  Cognition Comment: Pt very talkative throughout session. Difficult to redirect at  peak of Habematolel.     Assessment  Assessment  Assessment: Pt tolerated session well, declined ADL needs this session. Demo'ed ability to ambulate to/from therapy gym w/ RW w/ CGA, required cues for upright posture and step length, pt w/ significantly slow pace. Pt very tangential and conversational this session, requiring cues for redirection and attention to tasks. Participated in B shoulder stretching exercises d/t baseline arthrtitic pain and then pt was able to participate in simulated dressing tasks to further increase independence. Pt continues to benefit from skilled OT services in order to maximize fxl independence. Cont OT POC.  Activity Tolerance: Patient tolerated treatment well  Discharge Recommendations: Continue to assess pending progress;Home with Home health OT;24 hour supervision or assist  OT Equipment Recommendations  Equipment Needed: No  Safety Devices  Safety Devices in place: Yes  Type of devices: Left in chair;Call light within reach;Chair alarm in place    Patient Education  Education  Education Given To: Patient  Education Provided: Role of Therapy;Transfer Training;Plan of Care;Energy Conservation;Safety;ADL Function;Fall Prevention Strategies;Mobility Training;Home Exercise Program  Education Method: Demonstration;Verbal  Barriers to Learning: Hearing  Education Outcome: Verbalized understanding;Demonstrated understanding;Continued education needed    Plan  Occupational Therapy Plan  Times Per Week: 5x a week, 60 mins daily  Current Treatment Recommendations: Strengthening;Balance training;Pain management;Self-Care / ADL;Safety education & training;Functional mobility training;Endurance training;Patient/Caregiver education & training;Gait training;Equipment evaluation, education, & procurement    Goals  Patient Goals   Patient goals : \"Get back to normal.\"  Short Term Goals  Time Frame for Short Term Goals: 14 days - all ongoing  Short Term Goal 1: Pt will complete LE dressing w/ min  A.  Short Term Goal 2: Pt will complete UE dressing w/ min A.  Short Term Goal 3: Pt will complete grooming w/ SPV, in seated.  Short Term Goal 4: Pt will complete toileting w/ min A. - goal met 10/21/24  Short Term Goal 5: Pt will complete toilet transfer w/ min A. - goal met 10/21/24  Long Term Goals  Time Frame for Long Term Goals : 21 days - all ongoing  Long Term Goal 1: Pt will complete LE dressing w/ SPV.  Long Term Goal 2: Pt will complete UE dressing independently.  Long Term Goal 3: Pt will complete grooming independently, in seated.  Long Term Goal 4: Pt will complete toileting w/ SPV.  Long Term Goal 5: Pt will complete toilet transfer w/ SPV.              Therapy Time   Individual Concurrent Group Co-treatment   Time In 0915         Time Out 1015         Minutes 60             Timed Code Treatment Minutes:  60 min     Total Treatment Minutes: 60 min     Lisa Maurice OT

## 2024-10-22 NOTE — PROGRESS NOTES
Physical Therapy  Facility/Department: [facility] ACUTE REHAB UNIT  Rehabilitation Physical Therapy Treatment Note    NAME: Naty Dykes  : 1935 (88 y.o.)  MRN: [id number]  CODE STATUS: Full Code    Date of Service: 10/22/24       Restrictions:  Restrictions/Precautions: Up as Tolerated, Fall Risk, Seizure  Position Activity Restriction  Other position/activity restrictions: up with assist, seizure precautions     SUBJECTIVE  Subjective  Subjective: Pt  in bedside chair upon PT arrival. Pt agreeable to treatment.  Pain: c/o  B knee pain RN notified/aware.        Post Treatment Pain Screening         OBJECTIVE  Cognition  Arousal/Alertness: Appropriate responses to stimuli  Following Commands: Follows multistep commands with increased time;Follows multistep commands with repitition  Attention Span: Attends with cues to redirect  Memory: Decreased recall of precautions;Decreased recall of recent events  Safety Judgement: Good awareness of safety precautions  Problem Solving: Assistance required to generate solutions  Insights: Impaired  Initiation: Requires cues for some  Sequencing: Requires cues for some  Cognition Comment: Pt very talkative throughout session. Difficult to redirect at times.  Orientation  Overall Orientation Status: Within Normal Limits    Functional Mobility  Bed Mobility  Additional Factors: Verbal cues;Increased time to complete;Without handrails;Head of bed raised  Bridging  Assistance Level: Supervision  Skilled Clinical Factors: Used as an ex. VC for tecchnique especailly bending B knees up as much as possible  Roll Left  Assistance Level: Supervision  Skilled Clinical Factors: Used the mattress and sheets to pull over  Roll Right  Assistance Level: Supervision  Skilled Clinical Factors: Used the mattress and sheets to pull over  Sit to Supine  Assistance Level: Minimal assistance  Skilled Clinical Factors: Step by step VC for technique plus A with maneuvering the RLE onto  Plan  Days Per Week: 5 Days  Hours Per Day: 1 hour  Therapy Duration: 3 Weeks  Current Treatment Recommendations: Strengthening;ROM;Balance training;Functional mobility training;Transfer training;Endurance training;Gait training;Stair training;Neuromuscular re-education;Home exercise program;Safety education & training;Patient/Caregiver education & training;Equipment evaluation, education, & procurement;Therapeutic activities  Safety Devices  Type of Devices: Call light within reach;Chair alarm in place;Left in chair  Restraints  Restraints Initially in Place: No    EDUCATION  Education  Education Given To: Patient  Education Provided: Safety;Mobility Training;Transfer Training;Fall Prevention Strategies;Energy Conservation;Home Exercise Program  Education Provided Comments: PT instructed pt with the importance of an erect posture for balance and improved breathing. PT instructed pt with ex to be performed in supine for a HEP  Education Method: Verbal;Demonstration  Barriers to Learning: Cognition  Education Outcome: Verbalized understanding;Demonstrated understanding;Continued education needed        Therapy Time   Individual Individual  Group Co-treatment   Time In 0745  1340       Time Out 0830  1410       Minutes 45  30          Total Minutes: 45+30=75       Roshni Wilkerson, PT, 10/22/24 at 4:56 PM

## 2024-10-22 NOTE — PROGRESS NOTES
Department of Physical Medicine & Rehabilitation  Progress Note    Patient Identification:  Naty Dykes  [id number]  : 1935  Admit date: 10/16/2024    Chief Complaint: SDH (subdural hematoma)    Subjective:   Seen in her room today.  No new complaints overnight.  She is making progress in therapy and continues to participate well.  Plan for discharge on Friday, 2024.  She is agreeable to this today.  Team conference today.      ROS: No f/c, n/v, cp     Objective:  Patient Vitals for the past 24 hrs:   BP Temp Temp src Pulse Resp SpO2 Weight   10/22/24 1015 138/73 98.8 °F (37.1 °C) Oral 96 16 93 % --   10/22/24 0538 -- -- -- -- -- -- 55.1 kg (121 lb 7.6 oz)   10/21/24 2052 137/73 97.9 °F (36.6 °C) Oral 60 16 94 % --   10/21/24 1747 -- -- -- -- 16 -- --   10/21/24 1730 (!) 157/65 -- -- 61 -- -- --     CONST: Alert. No acute distress  EYES: No icterus noted.  HENT: Atraumatic, normocephalic;  NECK: Trachea midline, neck supple.  CV: Regular rate and rhythm, no murmur rub or gallop noted  RESP: Lungs clear to auscultation bilaterally, no rales wheezes or ronchi, no retractions. Respirations unlabored.   GI: Soft, nontender, nondistended.   EXT: No significant edema appreciated to BLE  NEURO:   Patient somnolent. Limited exam.  Unable to assess EOMI. CN V, CN VII, CNXI-XII intact  4/5 strengths to BUE. 3/5 strengths to bilateral hip flexion. 4/5 strengths to ankle dorsi/plantarflexion  PSYCH: Stable mood, normal judgement, normal affect     Laboratory data: Available via EMR.   Last 24 hour lab  No results found for this or any previous visit (from the past 24 hour(s)).      Therapy progress:  PT  Position Activity Restriction  Other position/activity restrictions: up with assist, seizure precautions  Objective     Sit to Stand: Maximum Assistance, Moderate Assistance (Max A/ mod x1 with step by step VC for sequencing technique . Pt has a posterior lean and has difficulty with ant WS especially w/ hand

## 2024-10-22 NOTE — PROGRESS NOTES
Ph: [phone], Fax: [phone]                                     [facility]                                                   [address] [place], [place] [id number]           Reason for admission:    Rehab             Brief Summary:     Naty Dykes is being seen by nephrology for CKD    Interval History and Plan:   Patient seen at bedside   Comfortable  BP stable.   No orthostatic hypotension.   On room air  Labs were stable yesterday.           BP is stable on current medications and patient appear euvolemic.   Increase protein in diet  Drink fluid/water to thirst   Avoid NSAIDs and nephrotoxins if possible  Replete lytes as needed  Dose medications according to GFR      Thank you for allowing us to participate in this patient's care  In case of any question please call us at our 24 hour answering service [phone] or from 7 AM to 5 PM via Perfect [employer], Voalte, Epic chat or cell phone.   Dr Sina Kwan MD      Assessment:     Chronic Kidney Disease  Stage 4   Cr around her baseline.       Hypertension  CAD  A fib  CHF with LVEF 4- 45 %     On Lasix 40 mg daily + Amiodarone + Coreg 12.5 mg BID         Hyponatremia          HPI      Patient is a 88 y.o. female  with PMH significant for CKD 4, CAD, GERD, DL, HTN was admitted with SDH after fall and now at rehab and nephrology is consulted to follow and assist in care given CKD 4.       Patient seen at bedside with PT/OT and appear general weak and lethargic but awake and able to answer basic questions. Patient having back pain for which getting pain medications. Denied any SOB, GI or urinary symptoms. Per patient appetite is fair and she is trying to keep herself hydrated.     Reviewed records.         ROS:   Negative except as mentioned in HPI      Vitals:     Vitals:    10/22/24 1015   BP: 138/73   Pulse: 96   Resp: 16   Temp: 98.8 °F (37.1 °C)   SpO2: 93%         Physical Examination:     General appearance:  NAD. Alert and awake  Respiratory: No respiratory distress on room air.  Cardiovascular: Edema no  Abdomen: Soft.   Other relevant findings:       Medications:       colchicine  0.3 mg Oral Daily    amiodarone  50 mg Oral Daily    atorvastatin  80 mg Oral Daily    diclofenac sodium  2 g Topical BID    furosemide  40 mg Oral Daily    levETIRAcetam  500 mg Oral BID    lidocaine  1 patch TransDERmal Daily    pantoprazole  40 mg Oral QAM AC    senna  1 tablet Oral Nightly    sodium chloride flush  5-40 mL IntraVENous 2 times per day    vitamin B-12  1,000 mcg Oral Daily    Vitamin D  1,000 Units Oral Daily    heparin (porcine)  5,000 Units SubCUTAneous BID    bisacodyl  5 mg Oral Daily    carvedilol  12.5 mg Oral BID WC         Labs:     Lab Results   Component Value Date    CREATININE 1.6 (H) 10/21/2024    BUN 39 (H) 10/21/2024     10/21/2024    K 3.7 10/21/2024    CL 96 (L) 10/21/2024    CO2 26 10/21/2024    CALCIUM 9.6 10/21/2024    PHOS 4.3 07/15/2024     Lab Results   Component Value Date    WBC 6.9 10/21/2024    HGB 10.5 (L) 10/21/2024    HCT 32.2 (L) 10/21/2024    MCV 91.6 10/21/2024     10/21/2024         Past Medical History:     Past Medical History:   Diagnosis Date    Arthritis     Blood circulation, collateral     CAD (coronary artery disease)     CHF (congestive heart failure) ([facility])     Confusion 8/31/2020    GERD (gastroesophageal reflux disease)     History of heart artery stent 12/2018    Hyperlipidemia     Hypertension     Mitral valve prolapse     Myocardial infarct, old     Seizure ([facility]) 10/11/2024    Thyroid disease        Past Surgical History:     Past Surgical History:   Procedure Laterality Date    COLONOSCOPY      EYE SURGERY Left 09/04/2018    PHACO EMULSIFICATION OF CATARACT WITH INTRAOCULAR LENS IMPLANT LEFT EYE     HIP SURGERY Right 11/7/2022    RIGHT HIP INTRAMEDULLARY NAILING performed [place] Noah Basurto [place] [facility] [place]    HYSTERECTOMY (CERVIX STATUS UNKNOWN)      OTHER  SURGICAL HISTORY  08/27/2018    phacoemulsification of cataract with intraocular lens implant right eye    [place] OFFICE/OUTPT VISIT,PROCEDURE ONLY Right 8/27/2018    PHACO EMULSIFICATION OF CATARACT WITH INTRAOCULAR LENS IMPLANT RIGHT EYE performed by Elan Chaney MD at [facility] [place]    [place] OFFICE/OUTPT VISIT,PROCEDURE ONLY Left 9/4/2018    PHACO EMULSIFICATION OF CATARACT WITH INTRAOCULAR LENS IMPLANT LEFT EYE performed by Elan Chaney MD at [facility] [place]    THYROIDECTOMY

## 2024-10-23 LAB
ANION GAP SERPL CALCULATED.3IONS-SCNC: 12 MMOL/L (ref 3–16)
BASOPHILS # BLD: 0.1 K/UL (ref 0–0.2)
BASOPHILS NFR BLD: 0.7 %
BUN SERPL-MCNC: 38 MG/DL (ref 7–20)
CALCIUM SERPL-MCNC: 9.4 MG/DL (ref 8.3–10.6)
CHLORIDE SERPL-SCNC: 96 MMOL/L (ref 99–110)
CO2 SERPL-SCNC: 27 MMOL/L (ref 21–32)
CREAT SERPL-MCNC: 1.4 MG/DL (ref 0.6–1.2)
DEPRECATED RDW RBC AUTO: 14.2 % (ref 12.4–15.4)
EOSINOPHIL # BLD: 0.1 K/UL (ref 0–0.6)
EOSINOPHIL NFR BLD: 1.5 %
GFR SERPLBLD CREATININE-BSD FMLA CKD-EPI: 36 ML/MIN/{1.73_M2}
GLUCOSE SERPL-MCNC: 90 MG/DL (ref 70–99)
HCT VFR BLD AUTO: 30.9 % (ref 36–48)
HGB BLD-MCNC: 10.4 G/DL (ref 12–16)
LYMPHOCYTES # BLD: 1.1 K/UL (ref 1–5.1)
LYMPHOCYTES NFR BLD: 10.5 %
MCH RBC QN AUTO: 30.6 PG (ref 26–34)
MCHC RBC AUTO-ENTMCNC: 33.7 G/DL (ref 31–36)
MCV RBC AUTO: 90.9 FL (ref 80–100)
MONOCYTES # BLD: 0.7 K/UL (ref 0–1.3)
MONOCYTES NFR BLD: 6.6 %
NEUTROPHILS # BLD: 8.1 K/UL (ref 1.7–7.7)
NEUTROPHILS NFR BLD: 80.7 %
PLATELET # BLD AUTO: 361 K/UL (ref 135–450)
PMV BLD AUTO: 7.9 FL (ref 5–10.5)
POTASSIUM SERPL-SCNC: 3.9 MMOL/L (ref 3.5–5.1)
RBC # BLD AUTO: 3.4 M/UL (ref 4–5.2)
SODIUM SERPL-SCNC: 135 MMOL/L (ref 136–145)
WBC # BLD AUTO: 10 K/UL (ref 4–11)

## 2024-10-23 PROCEDURE — 97110 THERAPEUTIC EXERCISES: CPT | Performed by: PHYSICAL THERAPIST

## 2024-10-23 PROCEDURE — 85025 COMPLETE CBC W/AUTO DIFF WBC: CPT

## 2024-10-23 PROCEDURE — 97129 THER IVNTJ 1ST 15 MIN: CPT

## 2024-10-23 PROCEDURE — 6370000000 HC RX 637 (ALT 250 FOR IP): Performed by: PHYSICAL MEDICINE & REHABILITATION

## 2024-10-23 PROCEDURE — 6360000002 HC RX W HCPCS: Performed by: PHYSICAL MEDICINE & REHABILITATION

## 2024-10-23 PROCEDURE — 97116 GAIT TRAINING THERAPY: CPT | Performed by: PHYSICAL THERAPIST

## 2024-10-23 PROCEDURE — 92526 ORAL FUNCTION THERAPY: CPT

## 2024-10-23 PROCEDURE — 97530 THERAPEUTIC ACTIVITIES: CPT

## 2024-10-23 PROCEDURE — 36415 COLL VENOUS BLD VENIPUNCTURE: CPT

## 2024-10-23 PROCEDURE — 97535 SELF CARE MNGMENT TRAINING: CPT

## 2024-10-23 PROCEDURE — 1280000000 HC REHAB R&B

## 2024-10-23 PROCEDURE — 80048 BASIC METABOLIC PNL TOTAL CA: CPT

## 2024-10-23 PROCEDURE — 97530 THERAPEUTIC ACTIVITIES: CPT | Performed by: PHYSICAL THERAPIST

## 2024-10-23 PROCEDURE — 6370000000 HC RX 637 (ALT 250 FOR IP): Performed by: STUDENT IN AN ORGANIZED HEALTH CARE EDUCATION/TRAINING PROGRAM

## 2024-10-23 RX ADMIN — ONDANSETRON 4 MG: 4 TABLET, ORALLY DISINTEGRATING ORAL at 10:10

## 2024-10-23 RX ADMIN — AMIODARONE HYDROCHLORIDE 50 MG: 200 TABLET ORAL at 11:35

## 2024-10-23 RX ADMIN — CARVEDILOL 12.5 MG: 12.5 TABLET, FILM COATED ORAL at 11:36

## 2024-10-23 RX ADMIN — HEPARIN SODIUM 5000 UNITS: 5000 INJECTION INTRAVENOUS; SUBCUTANEOUS at 07:59

## 2024-10-23 RX ADMIN — HEPARIN SODIUM 5000 UNITS: 5000 INJECTION INTRAVENOUS; SUBCUTANEOUS at 19:55

## 2024-10-23 RX ADMIN — DICLOFENAC SODIUM TOPICAL GEL, 1% 2 G: 10 GEL TOPICAL at 19:57

## 2024-10-23 RX ADMIN — SENNOSIDES 8.6 MG: 8.6 TABLET, FILM COATED ORAL at 19:56

## 2024-10-23 RX ADMIN — PANTOPRAZOLE SODIUM 40 MG: 40 TABLET, DELAYED RELEASE ORAL at 06:09

## 2024-10-23 RX ADMIN — LEVETIRACETAM 500 MG: 500 TABLET, FILM COATED ORAL at 19:56

## 2024-10-23 RX ADMIN — LEVETIRACETAM 500 MG: 500 TABLET, FILM COATED ORAL at 11:35

## 2024-10-23 RX ADMIN — CARVEDILOL 12.5 MG: 12.5 TABLET, FILM COATED ORAL at 17:22

## 2024-10-23 RX ADMIN — OXYCODONE AND ACETAMINOPHEN 1 TABLET: 10; 325 TABLET ORAL at 23:27

## 2024-10-23 RX ADMIN — OXYCODONE AND ACETAMINOPHEN 1 TABLET: 10; 325 TABLET ORAL at 14:36

## 2024-10-23 RX ADMIN — OXYCODONE AND ACETAMINOPHEN 1 TABLET: 10; 325 TABLET ORAL at 03:54

## 2024-10-23 RX ADMIN — Medication 30 G: at 11:43

## 2024-10-23 RX ADMIN — DICLOFENAC SODIUM TOPICAL GEL, 1% 2 G: 10 GEL TOPICAL at 08:00

## 2024-10-23 RX ADMIN — FUROSEMIDE 40 MG: 40 TABLET ORAL at 11:37

## 2024-10-23 ASSESSMENT — PAIN DESCRIPTION - ORIENTATION
ORIENTATION: MID
ORIENTATION: LOWER

## 2024-10-23 ASSESSMENT — PAIN SCALES - WONG BAKER
WONGBAKER_NUMERICALRESPONSE: HURTS A LITTLE BIT
WONGBAKER_NUMERICALRESPONSE: NO HURT
WONGBAKER_NUMERICALRESPONSE: NO HURT

## 2024-10-23 ASSESSMENT — PAIN DESCRIPTION - LOCATION
LOCATION: BACK;NECK
LOCATION: BACK

## 2024-10-23 ASSESSMENT — PAIN SCALES - GENERAL
PAINLEVEL_OUTOF10: 0
PAINLEVEL_OUTOF10: 7
PAINLEVEL_OUTOF10: 7
PAINLEVEL_OUTOF10: 0
PAINLEVEL_OUTOF10: 4
PAINLEVEL_OUTOF10: 6
PAINLEVEL_OUTOF10: 4
PAINLEVEL_OUTOF10: 4
PAINLEVEL_OUTOF10: 6

## 2024-10-23 ASSESSMENT — PAIN DESCRIPTION - FREQUENCY
FREQUENCY: INTERMITTENT
FREQUENCY: CONTINUOUS

## 2024-10-23 ASSESSMENT — PAIN DESCRIPTION - DESCRIPTORS
DESCRIPTORS: ACHING
DESCRIPTORS: ACHING;DISCOMFORT
DESCRIPTORS: ACHING

## 2024-10-23 ASSESSMENT — PAIN DESCRIPTION - PAIN TYPE
TYPE: CHRONIC PAIN
TYPE: CHRONIC PAIN

## 2024-10-23 ASSESSMENT — PAIN DESCRIPTION - ONSET: ONSET: ON-GOING

## 2024-10-23 NOTE — PROGRESS NOTES
Physical Therapy  Facility/Department: [facility] ACUTE REHAB UNIT  Rehabilitation Physical Therapy Treatment Note    NAME: Naty Dykes  : 1935 (88 y.o.)  MRN: [id number]  CODE STATUS: Full Code    Date of Service: 10/23/24       Restrictions:  Restrictions/Precautions: Up as Tolerated, Fall Risk, Seizure  Position Activity Restriction  Other position/activity restrictions: up with assist, seizure precautions     SUBJECTIVE  Subjective  Subjective: Pt sleeping  in w/c  upon PT arrival. Pt agreeable to treatment with heavy encouragement 2/2 to pain level  Pain: c/o  R knee pain and R hip pain.  RN notified/aware. Pain meds administered during therapy session        Post Treatment Pain Screening         OBJECTIVE  Cognition  Arousal/Alertness: Delayed responses to stimuli;Inconsistent responses to stimuli  Following Commands: Follows one step commands with increased time;Follows one step commands with repetition  Attention Span: Attends with cues to redirect  Memory: Decreased recall of precautions;Decreased recall of recent events  Safety Judgement: Decreased awareness of need for assistance  Problem Solving: Assistance required to generate solutions  Insights: Impaired  Initiation: Requires cues for some  Sequencing: Requires cues for some  Cognition Comment: Pt w/ delayed processing and command following this date, requiring increased cues for sequencing of tasks  Orientation  Overall Orientation Status: Impaired (Pt appeared confused initially but after increased activity, confusion appeared to clear up. Pt not oriented to time and situation. Appeared to perseverate on her \"plaid shirt\" which was in laundry.)    Functional Mobility  Bed Mobility  Additional Factors: Verbal cues;Increased time to complete;Head of bed flat;With handrails  Sit to Supine  Assistance Level: Minimal assistance  Skilled Clinical Factors: Step by step VC for technique plus A with maneuvering the RLE onto bed. Pt able to lift LLE aand  potential and increase functional mobility towards Ind to allow for a safer d/c to home.  Activity Tolerance: Patient limited by pain;Patient limited by endurance;Patient limited by fatigue  Discharge Recommendations: Home with Home health PT;24 hour supervision or assist  PT Equipment Recommendations  Other: Ongoing assessment at this time    Goals  Patient Goals   Patient Goals : \"Walk again like she use to\"  Short Term Goals  Time Frame for Short Term Goals: 14 days( all goals are ongoing)  Short Term Goal 1: S with all bed mobility skills. Goal not achieved  Short Term Goal 2: Transf with CGA x1. Goal achieved  Short Term Goal 3: Amb distances with LRADx 40' with CGA. Goal achieved  Short Term Goal 4: demo MI w/ w/c propulsion x 150' at a time( not addressed at this time  Long Term Goals  Time Frame for Long Term Goals : 21 days ( all LTG are ongoing)  Long Term Goal 1: Ind w/ bed mobility  Long Term Goal 2: Ind w transf excluding floor transf  Long Term Goal 3: Amb with LRAD distances x1 00' at a time    PLAN OF CARE/SAFETY  Physical Therapy Plan  Days Per Week: 5 Days  Hours Per Day: 1 hour  Therapy Duration: 3 Weeks  Current Treatment Recommendations: Strengthening;ROM;Balance training;Functional mobility training;Transfer training;Endurance training;Gait training;Stair training;Neuromuscular re-education;Home exercise program;Safety education & training;Patient/Caregiver education & training;Equipment evaluation, education, & procurement;Therapeutic activities  Safety Devices  Type of Devices: Call light within reach;Chair alarm in place;Left in chair  Restraints  Restraints Initially in Place: No    EDUCATION  Education  Education Given To: Patient  Education Provided: Safety;Mobility Training;Transfer Training;Fall Prevention Strategies;Energy Conservation;Home Exercise Program  Education Provided Comments: PT instructed pt with the importance of an erect posture for balance and improved

## 2024-10-23 NOTE — PROGRESS NOTES
Occupational Therapy  Facility/Department: [facility] ACUTE REHAB UNIT  Rehabilitation Occupational Therapy Daily Treatment Note    Date: 10/23/24  Patient Name: Naty Dykes       Room: 3106/3106-01  MRN: [id number]  Account: [id number]   : 1935  (88 y.o.) Gender: female                    Past Medical History:  has a past medical history of Arthritis, Blood circulation, collateral, CAD (coronary artery disease), CHF (congestive heart failure) ([facility]), Confusion, GERD (gastroesophageal reflux disease), History of heart artery stent, Hyperlipidemia, Hypertension, Mitral valve prolapse, Myocardial infarct, old, Seizure ([facility]), and Thyroid disease.  Past Surgical History:   has a past surgical history that includes Hysterectomy; Thyroidectomy; Colonoscopy; other surgical history (2018); pr office/outpt visit,procedure only (Right, 2018); eye surgery (Left, 2018); pr office/outpt visit,procedure only (Left, 2018); and hip surgery (Right, 2022).    Restrictions  Restrictions/Precautions: Up as Tolerated, Fall Risk, Seizure  Other position/activity restrictions: up with assist, seizure precautions    Subjective  Subjective: Pt seated in recliner asleep upon OT entry. Reports having a rough night of sleep/nausea/vomiting. Verbalized 8/10 pain in knee. Requesting nausea medicine, which RN administered. Pain addressed via rest breaks and repositioning.  Restrictions/Precautions: Up as Tolerated;Fall Risk;Seizure             Objective     Cognition  Arousal/Alertness: Delayed responses to stimuli;Inconsistent responses to stimuli  Following Commands: Follows one step commands with increased time;Follows one step commands with repetition  Attention Span: Attends with cues to redirect  Memory: Decreased recall of precautions;Decreased recall of recent events  Safety Judgement: Decreased awareness of need for assistance  Problem Solving: Assistance required to generate solutions  Insights:  grooming independently, in seated.  Long Term Goal 4: Pt will complete toileting w/ SPV.  Long Term Goal 5: Pt will complete toilet transfer w/ SPV.             Therapy Time   Individual Concurrent Group Co-treatment   Time In 1000         Time Out 1100         Minutes 60          Timed Code Treatment Minutes:  60 min     Total Treatment Minutes: 60 min        Lisa Maurice, OT

## 2024-10-23 NOTE — PROGRESS NOTES
Pt in bed, playing on her tablet. Alert & oriented x 4. BP elevated, other VSS. Assessment completed. Pt complaining of right knee pain. Nighttime medications given including Percocet for pain. Assisted pt to the bathroom with gait belt and walker. Pt voided and returned to the bed. Reminded pt to call for assistance with any needs. Call light within reach. Safety measures in place.

## 2024-10-23 NOTE — PROGRESS NOTES
Ph: [phone], Fax: [phone]                                     [facility]                                                   [address] [place] [id number]           Reason for admission:    Rehab             Brief Summary:     Naty Dykes is being seen by nephrology for CKD    Interval History and Plan:   Patient seen at bedside   Comfortable on room air  Had some vomiting last night and appetite is not great  BP stable 132/70  Na 135  Cr 1.4  Rest of lytes stable.           BP is stable on current medications and patient appear euvolemic.   Increase protein in diet  Drink fluid/water to thirst   Oral Urea 30 g x 1   Avoid NSAIDs and nephrotoxins if possible  Replete lytes as needed  Dose medications according to GFR      D/W RN    Thank you for allowing us to participate in this patient's care  In case of any question please call us at our 24 hour answering service [phone] or from 7 AM to 5 PM via Perfect Serve, Voalte, Epic chat or cell phone.   Dr Sina Kwan MD      Assessment:     Chronic Kidney Disease  Stage 4   Cr around her baseline.       Hypertension  CAD  A fib  CHF with LVEF 4- 45 %     On Lasix 40 mg daily + Amiodarone + Coreg 12.5 mg BID         Hyponatremia          HPI      Patient is a 88 y.o. female  with PMH significant for CKD 4, CAD, GERD, DL, HTN was admitted with SDH after fall and now at rehab and nephrology is consulted to follow and assist in care given CKD 4.       Patient seen at bedside with PT/OT and appear general weak and lethargic but awake and able to answer basic questions. Patient having back pain for which getting pain medications. Denied any SOB, GI or urinary symptoms. Per patient appetite is fair and she is trying to keep herself hydrated.     Reviewed records.         ROS:   Negative except as mentioned in HPI      Vitals:     Vitals:    10/23/24 0753   BP: 132/70   Pulse: 70   Resp: 18   Temp: 97.9 °F (36.6  stated

## 2024-10-23 NOTE — PLAN OF CARE
Problem: Chronic Conditions and Co-morbidities  Goal: Patient's chronic conditions and co-morbidity symptoms are monitored and maintained or improved  Flowsheets (Taken 10/23/2024 0113)  Care Plan - Patient's Chronic Conditions and Co-Morbidity Symptoms are Monitored and Maintained or Improved:   Monitor and assess patient's chronic conditions and comorbid symptoms for stability, deterioration, or improvement   Collaborate with multidisciplinary team to address chronic and comorbid conditions and prevent exacerbation or deterioration     Problem: Safety - Adult  Goal: Free from fall injury  Outcome: Progressing  Flowsheets (Taken 10/23/2024 0113)  Free From Fall Injury: Instruct family/caregiver on patient safety     Problem: Discharge Planning  Goal: Discharge to home or other facility with appropriate resources  Flowsheets (Taken 10/23/2024 0113)  Discharge to home or other facility with appropriate resources:   Identify barriers to discharge with patient and caregiver   Identify discharge learning needs (meds, wound care, etc)     Problem: Pain  Goal: Verbalizes/displays adequate comfort level or baseline comfort level  Outcome: Not Progressing  Flowsheets (Taken 10/23/2024 0113)  Verbalizes/displays adequate comfort level or baseline comfort level:   Encourage patient to monitor pain and request assistance   Assess pain using appropriate pain scale   Administer analgesics based on type and severity of pain and evaluate response     Problem: Skin/Tissue Integrity  Goal: Absence of new skin breakdown  Description: 1.  Monitor for areas of redness and/or skin breakdown  Outcome: Progressing    Problem: ABCDS Injury Assessment  Goal: Absence of physical injury  Outcome: Progressing  Flowsheets (Taken 10/23/2024 0113)  Absence of Physical Injury: Implement safety measures based on patient assessment      Problem: Nutrition Deficit:  Goal: Optimize nutritional status  Outcome: Not Progressing  Flowsheets (Taken  10/23/2024 0113)  Nutrient intake appropriate for improving, restoring, or maintaining nutritional needs:   Monitor oral intake, labs, and treatment plans   Assess nutritional status and recommend course of action   Recommend appropriate diets, oral nutritional supplements, and vitamin/mineral supplements

## 2024-10-23 NOTE — PROGRESS NOTES
Department of Physical Medicine & Rehabilitation  Progress Note    Patient Identification:  Naty Dykes  [id number]  : 1935  Admit date: 10/16/2024    Chief Complaint: SDH (subdural hematoma)    Subjective:   Seen in her room this morning with therapy.  She did have some vomiting overnight but currently is feeling better.  She had a shower this morning.  Overall she seemed to be confused overnight with vomiting.  Will monitor labs.      ROS: No f/c, n/v, cp     Objective:  Patient Vitals for the past 24 hrs:   BP Temp Temp src Pulse Resp SpO2 Weight   10/23/24 0753 132/70 97.9 °F (36.6 °C) Oral 70 18 92 % --   10/23/24 0600 -- -- -- -- -- -- 55.2 kg (121 lb 11.1 oz)   10/23/24 0424 -- -- -- -- 17 -- --   10/23/24 0354 -- -- -- -- 17 -- --   10/22/24 2138 -- -- -- -- 17 -- --   10/22/24 2053 (!) 161/80 97.9 °F (36.6 °C) Oral 63 17 93 % --   10/22/24 1745 124/70 -- -- 57 -- -- --   10/22/24 1257 -- -- -- -- 16 -- --     CONST: Alert. No acute distress  EYES: No icterus noted.  HENT: Atraumatic, normocephalic;  NECK: Trachea midline, neck supple.  CV: Regular rate and rhythm, no murmur rub or gallop noted  RESP: Lungs clear to auscultation bilaterally, no rales wheezes or ronchi, no retractions. Respirations unlabored.   GI: Soft, nontender, nondistended.   EXT: No significant edema appreciated to BLE  NEURO:   Patient somnolent. Limited exam.  Unable to assess EOMI. CN V, CN VII, CNXI-XII intact  4/5 strengths to BUE. 3/5 strengths to bilateral hip flexion. 4/5 strengths to ankle dorsi/plantarflexion  PSYCH: Stable mood, normal judgement, normal affect     Laboratory data: Available via EMR.   Last 24 hour lab  Recent Results (from the past 24 hour(s))   Basic Metabolic Panel w/ Reflex to MG    Collection Time: 10/23/24  6:16 AM   Result Value Ref Range    Sodium 135 (L) 136 - 145 mmol/L    Potassium reflex Magnesium 3.9 3.5 - 5.1 mmol/L    Chloride 96 (L) 99 - 110 mmol/L    CO2 27 21 - 32 mmol/L    Anion

## 2024-10-23 NOTE — PLAN OF CARE
Problem: Discharge Planning  Goal: Discharge to home or other facility with appropriate resources  10/23/2024 1032 by Tracy Patel, RN  Outcome: Progressing  Plan of care reviewed with pt. All questions answered at this time.      Problem: Skin/Tissue Integrity  Goal: Absence of new skin breakdown  10/23/2024 1032 by Tracy Patel, RN  Outcome: Progressing  Turn and reposition q 2 hrs.

## 2024-10-23 NOTE — CARE COORDINATION
SW met with patient at bedside to provide updates and give anticipated discharge date of 11/1. Patient was agreeable with anticipated discharge date and has no more questions at this time. [facility] following. [facility] following.     Electronically signed by FANTASMA Gaines on 10/23/2024 at 10:32 AM

## 2024-10-23 NOTE — PROGRESS NOTES
Pt refusing morning medications at this time due to nausea. Refusing PRN medication for nausea. Pt requesting RN to attempt to administer medications a little later this morning. Pt refusing breakfast at this time. Will monitor. Pt up in chair. Call light is in reach. Chair alarm is engaged. VSS.

## 2024-10-23 NOTE — PROGRESS NOTES
of speech, however suspect increased possible dysarthric quality this PM 2/2 increased lethargy/fatigue (as this is the same presentation during evaluation yesterday when pt was very fatigued). Pt's daughter in agreement that she's noticed increased slurred quality when fatigued. SLP stated will continue to monitor need to target speech clarity as a structured goal. Daughter in agreement.     Interventions used this date:  [] Speech/Language Treatment  [] Instruction in HEP  [x] Dysphagia Treatment [x] Cognitive Treatment   [] Other:    Discharge recommendations:  [] Home independently  [] Home with assistance [x]  24 hour supervision  [] ECF [] Other  Continued Tx Upon Discharge: ? [x] Yes    [] No    [] TBD based on progress while on ARU     [] Vital Stim indicated     [] Other:   Estimated discharge date: 11/01/2024    Barriers to home discharge:   [x] inability to independently manage medications, will need assist/supervision: daughter has been assisting at baseline, rec continue   [x] inability to independently manage finances, will need assist/supervision: daughter has been assisting at baseline, rec continue   [] inability to effectively communicate in emergent situations (ex: calling 911, stating name, reduced intelligibility, etc)  [x] Inability to communicate or demonstrate problem solving due to cognitive-communicative impairment  [x] severity of cognition (mild, mod, severe) with reduced insight negatively impacting safety/independence  [x] inability to recall and utilize swallow strategies placing pt at risk for aspiration  [] inability to recall and/or comprehend diet recommendations regarding least restrictive diet  [] limited assistance at home upon discharge  [] Other    Electronically signed by:  Session attempts:  RANDY Sesay.   Speech Language Pathology     Tamie Alvarado M.A., CCC-SLP [id number]  Speech-Language Pathologist    The speech-language pathologist was present,  directed the patient's care, made skilled judgment and was responsible for assessment and treatment.    Session 2:  Queenie Barragan M.A., CCC-SLP  [id number]  Speech-Language Pathologist

## 2024-10-24 LAB
BILIRUB UR QL STRIP.AUTO: NEGATIVE
CLARITY UR: CLEAR
COARSE GRAN CASTS #/AREA URNS LPF: ABNORMAL /LPF (ref 0–2)
COLOR UR: YELLOW
EPI CELLS #/AREA URNS HPF: ABNORMAL /HPF (ref 0–5)
GLUCOSE BLD-MCNC: 122 MG/DL (ref 70–99)
GLUCOSE UR STRIP.AUTO-MCNC: NEGATIVE MG/DL
HGB UR QL STRIP.AUTO: NEGATIVE
HYALINE CASTS #/AREA URNS LPF: ABNORMAL /LPF (ref 0–2)
KETONES UR STRIP.AUTO-MCNC: NEGATIVE MG/DL
LEUKOCYTE ESTERASE UR QL STRIP.AUTO: ABNORMAL
NITRITE UR QL STRIP.AUTO: NEGATIVE
PERFORMED ON: ABNORMAL
PH UR STRIP.AUTO: 6 [PH] (ref 5–8)
PROT UR STRIP.AUTO-MCNC: NEGATIVE MG/DL
RBC #/AREA URNS HPF: ABNORMAL /HPF (ref 0–4)
RENAL EPI CELLS #/AREA UR COMP ASSIST: ABNORMAL /HPF (ref 0–1)
SP GR UR STRIP.AUTO: 1.01 (ref 1–1.03)
UA COMPLETE W REFLEX CULTURE PNL UR: YES
UA DIPSTICK W REFLEX MICRO PNL UR: YES
URN SPEC COLLECT METH UR: ABNORMAL
UROBILINOGEN UR STRIP-ACNC: 0.2 E.U./DL
WBC #/AREA URNS HPF: ABNORMAL /HPF (ref 0–5)

## 2024-10-24 PROCEDURE — 97130 THER IVNTJ EA ADDL 15 MIN: CPT

## 2024-10-24 PROCEDURE — 6360000002 HC RX W HCPCS: Performed by: PHYSICAL MEDICINE & REHABILITATION

## 2024-10-24 PROCEDURE — 97530 THERAPEUTIC ACTIVITIES: CPT | Performed by: PHYSICAL THERAPIST

## 2024-10-24 PROCEDURE — 1280000000 HC REHAB R&B

## 2024-10-24 PROCEDURE — 87086 URINE CULTURE/COLONY COUNT: CPT

## 2024-10-24 PROCEDURE — 81001 URINALYSIS AUTO W/SCOPE: CPT

## 2024-10-24 PROCEDURE — 97129 THER IVNTJ 1ST 15 MIN: CPT

## 2024-10-24 PROCEDURE — 6370000000 HC RX 637 (ALT 250 FOR IP): Performed by: PHYSICAL MEDICINE & REHABILITATION

## 2024-10-24 PROCEDURE — 92526 ORAL FUNCTION THERAPY: CPT

## 2024-10-24 PROCEDURE — 97530 THERAPEUTIC ACTIVITIES: CPT

## 2024-10-24 PROCEDURE — 97535 SELF CARE MNGMENT TRAINING: CPT

## 2024-10-24 PROCEDURE — 97116 GAIT TRAINING THERAPY: CPT | Performed by: PHYSICAL THERAPIST

## 2024-10-24 RX ORDER — CIPROFLOXACIN 500 MG/1
500 TABLET, FILM COATED ORAL EVERY 24 HOURS
Status: DISPENSED | OUTPATIENT
Start: 2024-10-24 | End: 2024-10-31

## 2024-10-24 RX ADMIN — SIMETHICONE 80 MG: 80 TABLET, CHEWABLE ORAL at 23:12

## 2024-10-24 RX ADMIN — AMIODARONE HYDROCHLORIDE 50 MG: 200 TABLET ORAL at 08:28

## 2024-10-24 RX ADMIN — Medication 1000 UNITS: at 08:29

## 2024-10-24 RX ADMIN — HEPARIN SODIUM 5000 UNITS: 5000 INJECTION INTRAVENOUS; SUBCUTANEOUS at 19:59

## 2024-10-24 RX ADMIN — CARVEDILOL 12.5 MG: 12.5 TABLET, FILM COATED ORAL at 08:28

## 2024-10-24 RX ADMIN — CYANOCOBALAMIN TAB 1000 MCG 1000 MCG: 1000 TAB at 08:29

## 2024-10-24 RX ADMIN — LEVETIRACETAM 500 MG: 500 TABLET, FILM COATED ORAL at 19:59

## 2024-10-24 RX ADMIN — SENNOSIDES 8.6 MG: 8.6 TABLET, FILM COATED ORAL at 19:59

## 2024-10-24 RX ADMIN — CIPROFLOXACIN HYDROCHLORIDE 500 MG: 500 TABLET, FILM COATED ORAL at 12:00

## 2024-10-24 RX ADMIN — PANTOPRAZOLE SODIUM 40 MG: 40 TABLET, DELAYED RELEASE ORAL at 05:27

## 2024-10-24 RX ADMIN — LEVETIRACETAM 500 MG: 500 TABLET, FILM COATED ORAL at 08:29

## 2024-10-24 RX ADMIN — DICLOFENAC SODIUM TOPICAL GEL, 1% 2 G: 10 GEL TOPICAL at 08:30

## 2024-10-24 RX ADMIN — BISACODYL 5 MG: 5 TABLET, COATED ORAL at 08:29

## 2024-10-24 RX ADMIN — POLYETHYLENE GLYCOL 3350 17 G: 17 POWDER, FOR SOLUTION ORAL at 17:11

## 2024-10-24 RX ADMIN — HEPARIN SODIUM 5000 UNITS: 5000 INJECTION INTRAVENOUS; SUBCUTANEOUS at 08:29

## 2024-10-24 RX ADMIN — OXYCODONE AND ACETAMINOPHEN 1 TABLET: 10; 325 TABLET ORAL at 17:11

## 2024-10-24 RX ADMIN — ATORVASTATIN CALCIUM 80 MG: 80 TABLET, FILM COATED ORAL at 08:28

## 2024-10-24 RX ADMIN — DICLOFENAC SODIUM TOPICAL GEL, 1% 2 G: 10 GEL TOPICAL at 19:59

## 2024-10-24 RX ADMIN — OXYCODONE AND ACETAMINOPHEN 1 TABLET: 10; 325 TABLET ORAL at 05:27

## 2024-10-24 RX ADMIN — FUROSEMIDE 40 MG: 40 TABLET ORAL at 08:29

## 2024-10-24 RX ADMIN — CARVEDILOL 12.5 MG: 12.5 TABLET, FILM COATED ORAL at 17:11

## 2024-10-24 RX ADMIN — COLCHICINE 0.3 MG: 0.6 TABLET, FILM COATED ORAL at 08:29

## 2024-10-24 ASSESSMENT — PAIN SCALES - GENERAL
PAINLEVEL_OUTOF10: 8
PAINLEVEL_OUTOF10: 5
PAINLEVEL_OUTOF10: 0
PAINLEVEL_OUTOF10: 6
PAINLEVEL_OUTOF10: 5
PAINLEVEL_OUTOF10: 0
PAINLEVEL_OUTOF10: 3
PAINLEVEL_OUTOF10: 0
PAINLEVEL_OUTOF10: 0
PAINLEVEL_OUTOF10: 5

## 2024-10-24 ASSESSMENT — PAIN DESCRIPTION - ONSET
ONSET: ON-GOING

## 2024-10-24 ASSESSMENT — PAIN SCALES - WONG BAKER
WONGBAKER_NUMERICALRESPONSE: HURTS A LITTLE BIT
WONGBAKER_NUMERICALRESPONSE: HURTS A LITTLE BIT

## 2024-10-24 ASSESSMENT — PAIN DESCRIPTION - PAIN TYPE
TYPE: CHRONIC PAIN

## 2024-10-24 ASSESSMENT — PAIN - FUNCTIONAL ASSESSMENT
PAIN_FUNCTIONAL_ASSESSMENT: ACTIVITIES ARE NOT PREVENTED
PAIN_FUNCTIONAL_ASSESSMENT: PREVENTS OR INTERFERES SOME ACTIVE ACTIVITIES AND ADLS
PAIN_FUNCTIONAL_ASSESSMENT: ACTIVITIES ARE NOT PREVENTED
PAIN_FUNCTIONAL_ASSESSMENT: ACTIVITIES ARE NOT PREVENTED

## 2024-10-24 ASSESSMENT — PAIN DESCRIPTION - ORIENTATION
ORIENTATION: RIGHT
ORIENTATION: MID
ORIENTATION: RIGHT;MID
ORIENTATION: MID;LOWER

## 2024-10-24 ASSESSMENT — PAIN DESCRIPTION - DESCRIPTORS
DESCRIPTORS: ACHING;DISCOMFORT

## 2024-10-24 ASSESSMENT — PAIN DESCRIPTION - LOCATION
LOCATION: KNEE
LOCATION: KNEE;NECK
LOCATION: NECK
LOCATION: NECK;BACK

## 2024-10-24 ASSESSMENT — PAIN DESCRIPTION - FREQUENCY
FREQUENCY: CONTINUOUS

## 2024-10-24 NOTE — PROGRESS NOTES
Comprehensive Nutrition Assessment    RECOMMENDATIONS:  PO Diet: Dysphagia- Minced & Moist, Low Fat/Low Chol/High Fiber/JAYNA, 1500 ml  Nutrition Supplement: Will modify Ensure+ HP from TID to once daily  Nutrition Education: Education not appropriate     NUTRITION ASSESSMENT:   Nutritional summary & status: Follow-up. Upon visit, pt resting in chair and states appetite is not very good d/t nausea. Pt inquiring about lunch order, provided physical menu so that she can modify or add what she would like to future orders. Pt states she's been trying to drink Ensure at least once/day, noticed buildup in room so will modify ONS order from TID to once daily. Mentioned appetite stimulant but pt did not seem interested in trying. Likely constipated as LBM 10/18; dulcolax and senokot scheduled, glycolax and fleet enema PRN. Will call RN to confirm LBM and recommend enema if accurate. Documented PO intake per EMR remains poor w/ most meals and ONS <50% consumed. Will continue to monitor BMs, PO intake, and any other changes to nutritional status.   Admission // PMH: SDH // CAD, GERD, HLD, HTN, paroxysmal A-fib on Eliquis, hypothyroidism, CHF, CKD stage 4    MALNUTRITION ASSESSMENT  Context of Malnutrition: Chronic Illness   Malnutrition Status: Moderate malnutrition  Findings of the 6 clinical characteristics of malnutrition (Minimum of 2 out of 6 clinical characteristics is required to make the diagnosis of moderate or severe Protein Calorie Malnutrition based on AND/ASPEN Guidelines):  Energy Intake:  75% or less estimated energy requirements for 1 month or longer  Weight Loss:  Greater than 20% over 1 year (21% over 10 months)     Body Fat Loss:  Mild body fat loss Buccal region, Orbital   Muscle Mass Loss:  Mild muscle mass loss Temples (temporalis), Clavicles (pectoralis & deltoids)    NUTRITION DIAGNOSIS   Moderate malnutrition, In context of chronic illness related to inadequate protein-energy intake as evidenced by

## 2024-10-24 NOTE — PROGRESS NOTES
Department of Physical Medicine & Rehabilitation  Progress Note    Patient Identification:  Naty Dykes  [id number]  : 1935  Admit date: 10/16/2024    Chief Complaint: SDH (subdural hematoma)    Subjective:   Seen in her room this morning.  She lives looking much better today and has no new pain overnight.  She slept well last night.  Continues to participate well in therapy.  Overall she is getting close to her baseline.      ROS: No f/c, n/v, cp     Objective:  Patient Vitals for the past 24 hrs:   BP Temp Temp src Pulse Resp SpO2   10/24/24 0815 116/69 97.4 °F (36.3 °C) Axillary 58 16 93 %   10/24/24 0527 -- -- -- -- 16 --   10/23/24 2327 -- -- -- -- 16 --   10/23/24 1950 (!) 115/53 98 °F (36.7 °C) Oral 70 16 93 %   10/23/24 1720 130/73 -- -- 71 -- --     CONST: Alert. No acute distress  EYES: No icterus noted.  HENT: Atraumatic, normocephalic;  NECK: Trachea midline, neck supple.  CV: Regular rate and rhythm, no murmur rub or gallop noted  RESP: Lungs clear to auscultation bilaterally, no rales wheezes or ronchi, no retractions. Respirations unlabored.   GI: Soft, nontender, nondistended.   EXT: No significant edema appreciated to BLE  NEURO:   Patient somnolent. Limited exam.  Unable to assess EOMI. CN V, CN VII, CNXI-XII intact  4/5 strengths to BUE. 3/5 strengths to bilateral hip flexion. 4/5 strengths to ankle dorsi/plantarflexion  PSYCH: Stable mood, normal judgement, normal affect     Laboratory data: Available via EMR.   Last 24 hour lab  Recent Results (from the past 24 hour(s))   Urinalysis with Reflex to Culture    Collection Time: 10/24/24 11:01 AM    Specimen: Urine   Result Value Ref Range    Color, UA Yellow Straw/Yellow    Clarity, UA Clear Clear    Glucose, Ur Negative Negative mg/dL    Bilirubin, Urine Negative Negative    Ketones, Urine Negative Negative mg/dL    Specific Gravity, UA 1.015 1.005 - 1.030    Blood, Urine Negative Negative    pH, Urine 6.0 5.0 - 8.0    Protein, UA

## 2024-10-24 NOTE — PROGRESS NOTES
Physical Therapy  Facility/Department: [facility] ACUTE REHAB UNIT  Rehabilitation Physical Therapy Treatment Note    NAME: Naty Dykes  : 1935 (88 y.o.)  MRN: [id number]  CODE STATUS: Full Code    Date of Service: 10/24/24       Restrictions:  Restrictions/Precautions: Up as Tolerated, Fall Risk, Seizure  Position Activity Restriction  Other position/activity restrictions: up with assist, seizure precautions     SUBJECTIVE  Subjective  Subjective: Pt sleeping  in w/c  upon PT arrival. Pt agreeable to treatment . Pt reports that she is \"very tired\"  Pain: c/o  R knee pain and R hip pain.  RN notified/aware. Pain patch in place        Post Treatment Pain Screening         OBJECTIVE  Cognition  Overall Cognitive Status: Exceptions  Arousal/Alertness: Delayed responses to stimuli;Inconsistent responses to stimuli  Following Commands: Follows one step commands with increased time;Follows one step commands with repetition  Attention Span: Attends with cues to redirect  Memory: Decreased recall of precautions;Decreased recall of recent events  Safety Judgement: Decreased awareness of need for assistance  Problem Solving: Assistance required to generate solutions  Insights: Impaired  Initiation: Requires cues for some  Sequencing: Requires cues for some  Orientation  Overall Orientation Status: Within Functional Limits  Orientation Level: Oriented to situation;Oriented to place;Oriented to person    Functional Mobility  Bed Mobility  Additional Factors: Verbal cues;Increased time to complete;Head of bed flat;Without handrails  Bridging  Skilled Clinical Factors: Used as an ex. VC for tecchnique especailly bending B knees up  Roll Left  Assistance Level: Modified independent  Skilled Clinical Factors: Req additional  time  Sit to Supine  Assistance Level: Minimal assistance;Contact guard assist  Skilled Clinical Factors: Step by step VC for technique plus A with maneuvering the RLE onto bed but  barely clears the bed..  Pt able to lift LLE and clear the bed  Scooting  Assistance Level: Contact guard assist  Skilled Clinical Factors: VC for more energy efficient technique. Perf in short sitting  Balance  Sitting Balance: Modified independent  (Sat on EOB unsupported)  Transfers  Surface: To chair with arms;Wheelchair;From chair with arms;To bed  Additional Factors: Verbal cues;Hand placement cues;Increased time to complete  Device: Walker (RW)  Sit to Stand  Assistance Level: Minimal assistance  Skilled Clinical Factors: Post lean on this date with increased assist to obtain a more neutral position  Stand to Sit  Assistance Level: Contact guard assist  Skilled Clinical Factors: VC for hand placement and controlling descent ( decreased eccentric control)      Environmental Mobility  Ambulation  Surface: Level surface;Ramp  Device: Rolling walker  Distance: 80' x2, 160'( included amb up and down 50' on ramp in each direction)  Additional Factors: Verbal cues;Hand placement cues;Increased time to complete  Assistance Level: Contact guard assist;Stand by assist  Gait Deviations: Slow agnieszka;Decreased step length bilateral  Skilled Clinical Factors: Forward, downward head position with rounded shlds, Req VC/TC for more erect posture.  Wheelchair  Assistance Required to Manage Parts:  ([place] assist to locate and manage w/c brakes, hand weakness (B))  PT instructed pt with the following ex performed in supine to BLES:  1.Marching in hooklying   2. Bridges with hold of \"3\" ( UES across chest)  3. Alternating hip/knee flex/ext   Micki 10 reps of each                   ASSESSMENT/PROGRESS TOWARDS GOALS       Assessment  Assessment: Pt moving slowly reporting R hip/knee pain. Pt appears fatigued on initial contact but appears to become more alert as session progresses.Req extended rests beween walks. Pt is below baseline and would benefit from cont therapy to maximize potential and increase functional mobility towards Ind to allow for a safer d/c  to home.  Activity Tolerance: Patient limited by pain;Patient limited by endurance;Patient limited by fatigue  Discharge Recommendations: Home with Home health PT;24 hour supervision or assist  PT Equipment Recommendations  Other: Ongoing assessment at this time    Goals  Patient Goals   Patient Goals : \"Walk again like she use to\"  Short Term Goals  Time Frame for Short Term Goals: 14 days( all goals are ongoing)  Short Term Goal 1: S with all bed mobility skills. Goal not achieved  Short Term Goal 2: Transf with CGA x1. Goal achieved  Short Term Goal 3: Amb distances with LRADx 40' with CGA. Goal achieved  Short Term Goal 4: demo MI w/ w/c propulsion x 150' at a time( not addressed at this time  Long Term Goals  Time Frame for Long Term Goals : 21 days ( all LTG are ongoing)  Long Term Goal 1: Ind w/ bed mobility  Long Term Goal 2: Ind w transf excluding floor transf  Long Term Goal 3: Amb with LRAD distances x1 00' at a time    PLAN OF CARE/SAFETY  Physical Therapy Plan  Days Per Week: 5 Days  Hours Per Day: 1 hour  Therapy Duration: 3 Weeks  Current Treatment Recommendations: Strengthening;ROM;Balance training;Functional mobility training;Transfer training;Endurance training;Gait training;Stair training;Neuromuscular re-education;Home exercise program;Safety education & training;Patient/Caregiver education & training;Equipment evaluation, education, & procurement;Therapeutic activities  Safety Devices  Type of Devices: All fall risk precautions in place;Bed alarm in place;Call light within reach;Patient at risk for falls  Restraints  Restraints Initially in Place: No    EDUCATION  Education  Education Given To: Patient  Education Provided: Safety;Mobility Training;Transfer Training;Fall Prevention Strategies;Energy Conservation;Home Exercise Program  Education Provided Comments: PT instructed pt with the importance of an erect posture for balance and improved breathing.  Education Method:

## 2024-10-24 NOTE — PROGRESS NOTES
Cipro 250 mg BID ordered for patient.  This medication is renally eliminated.  Will change to 500 mg q24h per renal dose adjustment policy.     Estimated Creatinine Clearance: 22 mL/min (A) (based on SCr of 1.4 mg/dL (H)).     Pharmacy will continue to monitor renal function and adjust dose as necessary.    Please call with any questions.  Corin Zuniga, PharmD, BCPS  Main pharmacy: [id number]  10/24/2024 11:36 AM       2 seconds or less

## 2024-10-24 NOTE — PLAN OF CARE
Problem: Chronic Conditions and Co-morbidities  Goal: Patient's chronic conditions and co-morbidity symptoms are monitored and maintained or improved  Outcome: Progressing     Problem: Discharge Planning  Goal: Discharge to home or other facility with appropriate resources  10/23/2024 2219 by Brett Vázquez RN  Outcome: Progressing  10/23/2024 1032 by Tracy Patel RN  Outcome: Progressing     Problem: Safety - Adult  Goal: Free from fall injury  Outcome: Progressing  Flowsheets (Taken 10/23/2024 2218)  Free From Fall Injury: Instruct family/caregiver on patient safety     Problem: Pain  Goal: Verbalizes/displays adequate comfort level or baseline comfort level  Outcome: Progressing  Flowsheets (Taken 10/23/2024 1950)  Verbalizes/displays adequate comfort level or baseline comfort level:   Encourage patient to monitor pain and request assistance   Assess pain using appropriate pain scale   Administer analgesics based on type and severity of pain and evaluate response     Problem: Skin/Tissue Integrity  Goal: Absence of new skin breakdown  Description: 1.  Monitor for areas of redness and/or skin breakdown  2.  Assess vascular access sites hourly  3.  Every 4-6 hours minimum:  Change oxygen saturation probe site  4.  Every 4-6 hours:  If on nasal continuous positive airway pressure, respiratory therapy assess nares and determine need for appliance change or resting period.  10/23/2024 2219 by Brett Vázquez RN  Outcome: Progressing  10/23/2024 1032 by Tracy Patel RN  Outcome: Progressing     Problem: ABCDS Injury Assessment  Goal: Absence of physical injury  Outcome: Progressing  Flowsheets (Taken 10/23/2024 2218)  Absence of Physical Injury: Implement safety measures based on patient assessment     Problem: Nutrition Deficit:  Goal: Optimize nutritional status  Outcome: Progressing

## 2024-10-24 NOTE — PROGRESS NOTES
Ph: [phone], Fax: [phone]                                     [facility]                                                   [address] [place], [place] [id number]           Reason for admission:    Rehab             Brief Summary:     Naty Dykes is being seen by nephrology for CKD    Interval History and Plan:   Patient seen at bedside   Comfortable on room air  /69, relatively soft for her age.   Labs MWF          If BP remain soft or decrease further, will adjust medications.   Increase protein in diet  Drink fluid/water to thirst   Avoid NSAIDs and nephrotoxins if possible  Replete lytes as needed  Dose medications according to GFR      Thank you for allowing us to participate in this patient's care  In case of any question please call us at our 24 hour answering service [phone] or from 7 AM to 5 PM via Perfect Serve, Voalte, Epic chat or cell phone.   Dr Sina Kwan MD      Assessment:     Chronic Kidney Disease  Stage 4   Cr around her baseline.       Hypertension  CAD  A fib  CHF with LVEF 4- 45 %     On Lasix 40 mg daily + Amiodarone + Coreg 12.5 mg BID         Hyponatremia          HPI      Patient is a 88 y.o. female  with PMH significant for CKD 4, CAD, GERD, DL, HTN was admitted with SDH after fall and now at rehab and nephrology is consulted to follow and assist in care given CKD 4.       Patient seen at bedside with PT/OT and appear general weak and lethargic but awake and able to answer basic questions. Patient having back pain for which getting pain medications. Denied any SOB, GI or urinary symptoms. Per patient appetite is fair and she is trying to keep herself hydrated.     Reviewed records.         ROS:   Negative except as mentioned in HPI      Vitals:     Vitals:    10/24/24 0815   BP: 116/69   Pulse: 58   Resp: 16   Temp: 97.4 °F (36.3 °C)   SpO2: 93%         Physical Examination:     General appearance: NAD. Alert and

## 2024-10-24 NOTE — PROGRESS NOTES
Occupational Therapy  Facility/Department: [facility] ACUTE REHAB UNIT  Rehabilitation Occupational Therapy Daily Treatment Note    Date: 10/24/24  Patient Name: Naty Dykes       Room: 3106/3106-01  MRN: [id number]  Account: [id number]   : 1935  (88 y.o.) Gender: female                    Past Medical History:  has a past medical history of Arthritis, Blood circulation, collateral, CAD (coronary artery disease), CHF (congestive heart failure) ([facility]), Confusion, GERD (gastroesophageal reflux disease), History of heart artery stent, Hyperlipidemia, Hypertension, Mitral valve prolapse, Myocardial infarct, old, Seizure ([facility]), and Thyroid disease.  Past Surgical History:   has a past surgical history that includes Hysterectomy; Thyroidectomy; Colonoscopy; other surgical history (2018); pr office/outpt visit,procedure only (Right, 2018); eye surgery (Left, 2018); pr office/outpt visit,procedure only (Left, 2018); and hip surgery (Right, 2022).    Restrictions  Restrictions/Precautions: Up as Tolerated, Fall Risk, Seizure  Other position/activity restrictions: up with assist, seizure precautions    Subjective  Subjective: Pt semi supine upon OT entry. Voiced pain during fxl tasks, but did not provide number rating. Addressed in session via rest breaks and repositioning.  Restrictions/Precautions: Up as Tolerated;Fall Risk;Seizure             Objective     Cognition  Overall Cognitive Status: Exceptions  Arousal/Alertness: Delayed responses to stimuli;Inconsistent responses to stimuli  Following Commands: Follows one step commands with increased time;Follows one step commands with repetition  Attention Span: Attends with cues to redirect  Memory: Decreased recall of precautions;Decreased recall of recent events  Safety Judgement: Decreased awareness of need for assistance  Problem Solving: Assistance required to generate solutions  Insights: Impaired  Initiation: Requires cues for  To: Tub transfer bench  Assistance Level: Contact guard assist  Skilled Clinical Factors: Pt ambulated to TTB w/ CGA w/ RW.          Functional Mobility  Device: Rolling walker  Activity: To/From bathroom  Assistance Level: Contact guard assist  Skilled Clinical Factors: Pt ambulated to bathroom w/ RW w/ CGA.  Supine to Sit  Assistance Level: Stand by assist  Skilled Clinical Factors: SBA w/ HOB elevated.  Scooting  Assistance Level: Supervision  Skilled Clinical Factors: SPV + increased time.  Transfers  Surface: From bed;Raised toilet Seat;Wheelchair  Additional Factors: Verbal cues;Hand placement cues;Increased time to complete  Device: Walker  Sit to Stand  Assistance Level: Contact guard assist  Skilled Clinical Factors: CGA w/ increased time and hand placement cues.  Stand to Sit  Assistance Level: Contact guard assist         Assessment  Assessment  Assessment: Pt w/ improved tolerance to session this date, required encouragement for shower but was grateful and reported feeling much better after completion of ADL. Pt ambulated to bathroom w/ RW w/ CGA. Continues to require assist to remove shirt overhead d/t limited ROM of BUE, but pt was able to demo ability to don shirt w/ significantly increased time and verbal cues for strategies. Pt demo'ed abiltiy to cross LLE over RLE to thread into brief/pants this date. Required significantly increased time to thread RLE into appropriate hole of pants. Stood w/ RW w/ assist to pull up posteriorly d/t limited ROM in BUE. But overall, pt demo'ed more active engagement and participation in ADL task this date meeting 2 STG. Pt continues to benefit from skilled OT services in order to maximize fxl independence. Cont OT POC.  Activity Tolerance: Patient tolerated treatment well  Discharge Recommendations: Continue to assess pending progress;Home with Home health OT;24 hour supervision or assist  Safety Devices  Safety Devices in place: Yes  Type of devices: Left in

## 2024-10-24 NOTE — PLAN OF CARE
Problem: Chronic Conditions and Co-morbidities  Goal: Patient's chronic conditions and co-morbidity symptoms are monitored and maintained or improved  10/24/2024 1009 by Hilary Ravi, RN  Outcome: Progressing  Flowsheets (Taken 10/23/2024 0113 by Jami Polanco, RN)  Care Plan - Patient's Chronic Conditions and Co-Morbidity Symptoms are Monitored and Maintained or Improved:   Monitor and assess patient's chronic conditions and comorbid symptoms for stability, deterioration, or improvement   Collaborate with multidisciplinary team to address chronic and comorbid conditions and prevent exacerbation or deterioration     Problem: Discharge Planning  Goal: Discharge to home or other facility with appropriate resources  10/24/2024 1009 by Hilary Ravi, RN  Outcome: Progressing     Problem: Safety - Adult  Goal: Free from fall injury  10/24/2024 1009 by Hilary Ravi, RN  Outcome: Progressing  Flowsheets (Taken 10/23/2024 2218 by Brett Vázquez, RN)  Free From Fall Injury: Instruct family/caregiver on patient safety     Problem: Pain  Goal: Verbalizes/displays adequate comfort level or baseline comfort level  10/24/2024 1009 by Hilary Ravi, RN  Outcome: Not Progressing  Flowsheets (Taken 10/24/2024 0815)  Verbalizes/displays adequate comfort level or baseline comfort level:   Assess pain using appropriate pain scale   Administer analgesics based on type and severity of pain and evaluate response   Implement non-pharmacological measures as appropriate and evaluate response  Note: Patient continues with chronic arthritic pain to joints, which is ongoing. Heat packs, voltaren gel and oral medications given to help minimize discomfort throughout stay.     Problem: Skin/Tissue Integrity  Goal: Absence of new skin breakdown  Description: 1.  Monitor for areas of redness and/or skin breakdown  2.  Assess vascular access sites hourly  3.  Every 4-6 hours minimum:  Change oxygen saturation probe site  4.  Every 4-6  Encourage patient to monitor pain and request assistance   Assess pain using appropriate pain scale   Administer analgesics based on type and severity of pain and evaluate response     Problem: Nutrition Deficit:  Goal: Optimize nutritional status  10/24/2024 1009 by Hilary Ravi, RN  Outcome: Not Progressing  Flowsheets (Taken 10/23/2024 0113 by Jami Polanco, RN)  Nutrient intake appropriate for improving, restoring, or maintaining nutritional needs:   Monitor oral intake, labs, and treatment plans   Assess nutritional status and recommend course of action   Recommend appropriate diets, oral nutritional supplements, and vitamin/mineral supplements  Note: Patient appetite remains poor eating less than 25% of most meals. Does drink occasional supplemental drink. Does better with foods brought from home.  10/23/2024 2219 by Brett Vázquez, RN  Outcome: Progressing

## 2024-10-24 NOTE — PROGRESS NOTES
ACUTE REHAB UNIT  SPEECH/LANGUAGE PATHOLOGY      [x] Daily  [] Weekly Care Conference Note  [] Discharge    Patient:Naty Dykes      :1935  MRN:[id number]  Rehab Dx/Hx: SDH (subdural hematoma) [S06.5XAA]    Precautions: [] Aspiration  [x] Fall risk  [] Sternal  [x] Seizure [] Hip  [] Weight Bearing [] Other     ST Dx: [] Aphasia  [] Dysarthria  [] Apraxia   [x] Oropharyngeal dysphagia [x] Cognitive Impairment  [] Other:   Date of Admit: 10/16/2024  Room #: 3106/3106-01  Date: 10/24/2024          Current Diet Order:ADULT ORAL NUTRITION SUPPLEMENT; Breakfast, Lunch, Dinner; Standard High Calorie/High Protein Oral Supplement  ADULT DIET; Dysphagia - Minced and Moist; Low Fat/Low Chol/High Fiber/JAYNA; 1500 ml      Swallow Strategies: Alternate solids/liquids , Check for pocketing of food L, Check for pocketing of food R, Upright as possible with all PO intake , Assist Feed , Small bites/sips     CBC:  Lab Results   Component Value Date    WBC 10.0 10/23/2024    HGB 10.4 (L) 10/23/2024    HCT 30.9 (L) 10/23/2024    MCV 90.9 10/23/2024     10/23/2024     Living Status: Lives with son, Poor historian.   Medication Management:   []Primary   []Secondary [x]Comment: assist PRN per daughter  Finance Management:          []Primary   []Secondary [x]Comment: assist PRN per daughter  Active :                        []Yes         [x]No: daughter, grandson  Education: 3 years high school  Occupation: crafts  Hobbies/Leisure: read  Hearing: WFL  Vision: Vision Corrective Device: wears glasses for reading    Barriers toward progress: Cognitive deficit, Impulsivity, Limited safety awareness, and Limited insight into deficits      Date: 10/24/2024      Tx session 1 Tx session 2   Total Timed Code Min 45 15   Total Treatment Minutes 60 30   Individual Treatment Minutes 60 30   Group Treatment Minutes 0 0   Co-Treat Minutes 0 0   Brief Exception: N/A N/A   Pain None stated None indicated   Pain Intervention: [] RN   [] Home with assistance [x]  24 hour supervision  [] ECF [] Other  Continued Tx Upon Discharge: ? [x] Yes    [] No    [] TBD based on progress while on ARU     [] Vital Stim indicated     [] Other:   Estimated discharge date: 11/01/2024    Barriers to home discharge:   [x] inability to independently manage medications, will need assist/supervision: daughter has been assisting at baseline, rec continue   [x] inability to independently manage finances, will need assist/supervision: daughter has been assisting at baseline, rec continue   [] inability to effectively communicate in emergent situations (ex: calling 911, stating name, reduced intelligibility, etc)  [x] Inability to communicate or demonstrate problem solving due to cognitive-communicative impairment  [x] severity of cognition (mild, mod, severe) with reduced insight negatively impacting safety/independence  [x] inability to recall and utilize swallow strategies placing pt at risk for aspiration  [] inability to recall and/or comprehend diet recommendations regarding least restrictive diet  [] limited assistance at home upon discharge  [] Other    Electronically signed by:  RANDY Sesay.   Speech Language Pathology     Queenie Barragan M.A., CCC-SLP  [id number]  Speech-Language Pathologist    The speech-language pathologist was present, directed the patient's care, made skilled judgment and was responsible for assessment and treatment.

## 2024-10-25 LAB
ANION GAP SERPL CALCULATED.3IONS-SCNC: 13 MMOL/L (ref 3–16)
BACTERIA UR CULT: NORMAL
BASOPHILS # BLD: 0.1 K/UL (ref 0–0.2)
BASOPHILS NFR BLD: 0.9 %
BUN SERPL-MCNC: 38 MG/DL (ref 7–20)
CALCIUM SERPL-MCNC: 9.3 MG/DL (ref 8.3–10.6)
CHLORIDE SERPL-SCNC: 94 MMOL/L (ref 99–110)
CO2 SERPL-SCNC: 27 MMOL/L (ref 21–32)
CREAT SERPL-MCNC: 1.6 MG/DL (ref 0.6–1.2)
DEPRECATED RDW RBC AUTO: 14.2 % (ref 12.4–15.4)
EOSINOPHIL # BLD: 0.2 K/UL (ref 0–0.6)
EOSINOPHIL NFR BLD: 2.7 %
GFR SERPLBLD CREATININE-BSD FMLA CKD-EPI: 31 ML/MIN/{1.73_M2}
GLUCOSE SERPL-MCNC: 73 MG/DL (ref 70–99)
HCT VFR BLD AUTO: 28.4 % (ref 36–48)
HGB BLD-MCNC: 9.5 G/DL (ref 12–16)
LYMPHOCYTES # BLD: 1.5 K/UL (ref 1–5.1)
LYMPHOCYTES NFR BLD: 17.6 %
MCH RBC QN AUTO: 30.5 PG (ref 26–34)
MCHC RBC AUTO-ENTMCNC: 33.6 G/DL (ref 31–36)
MCV RBC AUTO: 90.6 FL (ref 80–100)
MONOCYTES # BLD: 0.6 K/UL (ref 0–1.3)
MONOCYTES NFR BLD: 7.7 %
NEUTROPHILS # BLD: 5.9 K/UL (ref 1.7–7.7)
NEUTROPHILS NFR BLD: 71.1 %
PLATELET # BLD AUTO: 295 K/UL (ref 135–450)
PMV BLD AUTO: 8.6 FL (ref 5–10.5)
POTASSIUM SERPL-SCNC: 3.8 MMOL/L (ref 3.5–5.1)
RBC # BLD AUTO: 3.14 M/UL (ref 4–5.2)
SODIUM SERPL-SCNC: 134 MMOL/L (ref 136–145)
WBC # BLD AUTO: 8.2 K/UL (ref 4–11)

## 2024-10-25 PROCEDURE — 97530 THERAPEUTIC ACTIVITIES: CPT

## 2024-10-25 PROCEDURE — 97130 THER IVNTJ EA ADDL 15 MIN: CPT

## 2024-10-25 PROCEDURE — 85025 COMPLETE CBC W/AUTO DIFF WBC: CPT

## 2024-10-25 PROCEDURE — 97129 THER IVNTJ 1ST 15 MIN: CPT

## 2024-10-25 PROCEDURE — 92526 ORAL FUNCTION THERAPY: CPT

## 2024-10-25 PROCEDURE — 80048 BASIC METABOLIC PNL TOTAL CA: CPT

## 2024-10-25 PROCEDURE — 1280000000 HC REHAB R&B

## 2024-10-25 PROCEDURE — 6370000000 HC RX 637 (ALT 250 FOR IP): Performed by: PHYSICAL MEDICINE & REHABILITATION

## 2024-10-25 PROCEDURE — 36415 COLL VENOUS BLD VENIPUNCTURE: CPT

## 2024-10-25 PROCEDURE — 6360000002 HC RX W HCPCS: Performed by: PHYSICAL MEDICINE & REHABILITATION

## 2024-10-25 PROCEDURE — 97535 SELF CARE MNGMENT TRAINING: CPT

## 2024-10-25 PROCEDURE — 6370000000 HC RX 637 (ALT 250 FOR IP): Performed by: STUDENT IN AN ORGANIZED HEALTH CARE EDUCATION/TRAINING PROGRAM

## 2024-10-25 RX ORDER — CARVEDILOL 6.25 MG/1
6.25 TABLET ORAL 2 TIMES DAILY WITH MEALS
Status: DISPENSED | OUTPATIENT
Start: 2024-10-25

## 2024-10-25 RX ADMIN — SIMETHICONE 80 MG: 80 TABLET, CHEWABLE ORAL at 12:59

## 2024-10-25 RX ADMIN — DICLOFENAC SODIUM TOPICAL GEL, 1% 2 G: 10 GEL TOPICAL at 20:39

## 2024-10-25 RX ADMIN — LEVETIRACETAM 500 MG: 500 TABLET, FILM COATED ORAL at 08:28

## 2024-10-25 RX ADMIN — LEVETIRACETAM 500 MG: 500 TABLET, FILM COATED ORAL at 20:38

## 2024-10-25 RX ADMIN — Medication 1000 UNITS: at 08:28

## 2024-10-25 RX ADMIN — AMIODARONE HYDROCHLORIDE 50 MG: 200 TABLET ORAL at 08:28

## 2024-10-25 RX ADMIN — CIPROFLOXACIN HYDROCHLORIDE 500 MG: 500 TABLET, FILM COATED ORAL at 11:37

## 2024-10-25 RX ADMIN — Medication 30 G: at 16:47

## 2024-10-25 RX ADMIN — OXYCODONE AND ACETAMINOPHEN 1 TABLET: 10; 325 TABLET ORAL at 01:36

## 2024-10-25 RX ADMIN — FUROSEMIDE 40 MG: 40 TABLET ORAL at 08:28

## 2024-10-25 RX ADMIN — ONDANSETRON 4 MG: 4 TABLET, ORALLY DISINTEGRATING ORAL at 20:38

## 2024-10-25 RX ADMIN — SENNOSIDES 8.6 MG: 8.6 TABLET, FILM COATED ORAL at 20:38

## 2024-10-25 RX ADMIN — DICLOFENAC SODIUM TOPICAL GEL, 1% 2 G: 10 GEL TOPICAL at 08:29

## 2024-10-25 RX ADMIN — OXYCODONE AND ACETAMINOPHEN 1 TABLET: 10; 325 TABLET ORAL at 20:38

## 2024-10-25 RX ADMIN — PANTOPRAZOLE SODIUM 40 MG: 40 TABLET, DELAYED RELEASE ORAL at 05:04

## 2024-10-25 RX ADMIN — ATORVASTATIN CALCIUM 80 MG: 80 TABLET, FILM COATED ORAL at 08:29

## 2024-10-25 RX ADMIN — HEPARIN SODIUM 5000 UNITS: 5000 INJECTION INTRAVENOUS; SUBCUTANEOUS at 08:27

## 2024-10-25 RX ADMIN — CARVEDILOL 6.25 MG: 6.25 TABLET, FILM COATED ORAL at 16:51

## 2024-10-25 RX ADMIN — ONDANSETRON 4 MG: 4 TABLET, ORALLY DISINTEGRATING ORAL at 13:43

## 2024-10-25 RX ADMIN — CYANOCOBALAMIN TAB 1000 MCG 1000 MCG: 1000 TAB at 08:28

## 2024-10-25 RX ADMIN — OXYCODONE AND ACETAMINOPHEN 1 TABLET: 10; 325 TABLET ORAL at 08:28

## 2024-10-25 RX ADMIN — COLCHICINE 0.3 MG: 0.6 TABLET, FILM COATED ORAL at 08:29

## 2024-10-25 RX ADMIN — BISACODYL 5 MG: 5 TABLET, COATED ORAL at 08:30

## 2024-10-25 RX ADMIN — CARVEDILOL 12.5 MG: 12.5 TABLET, FILM COATED ORAL at 08:28

## 2024-10-25 RX ADMIN — HEPARIN SODIUM 5000 UNITS: 5000 INJECTION INTRAVENOUS; SUBCUTANEOUS at 20:37

## 2024-10-25 ASSESSMENT — PAIN SCALES - GENERAL
PAINLEVEL_OUTOF10: 4
PAINLEVEL_OUTOF10: 8
PAINLEVEL_OUTOF10: 10
PAINLEVEL_OUTOF10: 10
PAINLEVEL_OUTOF10: 5
PAINLEVEL_OUTOF10: 6
PAINLEVEL_OUTOF10: 10
PAINLEVEL_OUTOF10: 1
PAINLEVEL_OUTOF10: 0

## 2024-10-25 ASSESSMENT — PAIN DESCRIPTION - FREQUENCY
FREQUENCY: CONTINUOUS
FREQUENCY: CONTINUOUS
FREQUENCY: INTERMITTENT

## 2024-10-25 ASSESSMENT — PAIN DESCRIPTION - PAIN TYPE
TYPE: CHRONIC PAIN
TYPE: CHRONIC PAIN;ACUTE PAIN

## 2024-10-25 ASSESSMENT — PAIN DESCRIPTION - LOCATION
LOCATION: GENERALIZED
LOCATION: KNEE;NECK
LOCATION: NECK;BACK
LOCATION: HEAD;NECK

## 2024-10-25 ASSESSMENT — PAIN DESCRIPTION - ONSET
ONSET: GRADUAL
ONSET: ON-GOING
ONSET: ON-GOING

## 2024-10-25 ASSESSMENT — PAIN DESCRIPTION - DESCRIPTORS
DESCRIPTORS: ACHING;DISCOMFORT
DESCRIPTORS: SHARP
DESCRIPTORS: ACHING;DISCOMFORT
DESCRIPTORS: ACHING;DISCOMFORT

## 2024-10-25 ASSESSMENT — PAIN DESCRIPTION - ORIENTATION
ORIENTATION: MID;LOWER
ORIENTATION: RIGHT;LEFT;MID
ORIENTATION: RIGHT;LEFT
ORIENTATION: ANTERIOR;POSTERIOR

## 2024-10-25 ASSESSMENT — PAIN SCALES - WONG BAKER
WONGBAKER_NUMERICALRESPONSE: NO HURT
WONGBAKER_NUMERICALRESPONSE: HURTS WHOLE LOT
WONGBAKER_NUMERICALRESPONSE: HURTS A LITTLE BIT

## 2024-10-25 ASSESSMENT — PAIN DESCRIPTION - DIRECTION
RADIATING_TOWARDS: KNEE
RADIATING_TOWARDS: GENERALIZED

## 2024-10-25 ASSESSMENT — PAIN - FUNCTIONAL ASSESSMENT
PAIN_FUNCTIONAL_ASSESSMENT: ACTIVITIES ARE NOT PREVENTED

## 2024-10-25 NOTE — PLAN OF CARE
Problem: Chronic Conditions and Co-morbidities  Goal: Patient's chronic conditions and co-morbidity symptoms are monitored and maintained or improved  10/25/2024 1002 by Hilary Ravi RN  Outcome: Progressing  Flowsheets (Taken 10/24/2024 0825)  Care Plan - Patient's Chronic Conditions and Co-Morbidity Symptoms are Monitored and Maintained or Improved: Monitor and assess patient's chronic conditions and comorbid symptoms for stability, deterioration, or improvement     Problem: Discharge Planning  Goal: Discharge to home or other facility with appropriate resources  10/25/2024 1002 by Hilary Ravi RN  Outcome: Progressing  Flowsheets (Taken 10/25/2024 1002)  Discharge to home or other facility with appropriate resources:   Identify barriers to discharge with patient and caregiver   Arrange for needed discharge resources and transportation as appropriate     Problem: Safety - Adult  Goal: Free from fall injury  10/25/2024 1002 by Hilary Ravi RN  Outcome: Progressing  Flowsheets (Taken 10/24/2024 2236 by Brett Vázquez, RN)  Free From Fall Injury: Instruct family/caregiver on patient safety     Problem: Pain  Goal: Verbalizes/displays adequate comfort level or baseline comfort level  10/25/2024 1002 by Hilary Ravi, RN  Outcome: Not Progressing  Flowsheets (Taken 10/25/2024 0745 by Milli Beth)  Verbalizes/displays adequate comfort level or baseline comfort level:   Assess pain using appropriate pain scale   Administer analgesics based on type and severity of pain and evaluate response   Implement non-pharmacological measures as appropriate and evaluate response  Note: Patient has chronic arthritic pain, treated with voltaren gel, lidocaine patch, oxy and tylenol. Patient also uses heat packs throughout the day     Problem: Skin/Tissue Integrity  Goal: Absence of new skin breakdown  Description: 1.  Monitor for areas of redness and/or skin breakdown  2.  Assess vascular access sites

## 2024-10-25 NOTE — PROGRESS NOTES
Physical Therapy  Attempt    13:30: PT attempted to see pt for scheduled therapy session, pt resting in wheelchair with head down stating \"I feel sick.\" Pt reports feeling nauseous and \"I feel like I'm about to throw up.\" Pt refusing therapy at this time stating \"can we push it back a little?\" PT assisted pt back to bed via SPT from  with RW and min A, req'd min A for RLE mgmt into bed. Pt left in bed with all needs met, alarm on. PT notified RN who expressed plan to follow up with patient.    14:00: PT attempted to see patient again, pt continues to report significant nausea and refuses therapy at this time. Edu pt will need to make up missed minutes prior to DC, pt verbalized understanding.    Time spent: 10min  Variance: -50min    Linda Kerr PT, DPT, NCS, CSRS

## 2024-10-25 NOTE — PLAN OF CARE
Problem: Chronic Conditions and Co-morbidities  Goal: Patient's chronic conditions and co-morbidity symptoms are monitored and maintained or improved  10/24/2024 2237 by Brett Vázquez RN  Outcome: Progressing  10/24/2024 1009 by Hilary Ravi RN  Outcome: Progressing  Flowsheets  Taken 10/24/2024 0825 by Hilary Ravi RN  Care Plan - Patient's Chronic Conditions and Co-Morbidity Symptoms are Monitored and Maintained or Improved: Monitor and assess patient's chronic conditions and comorbid symptoms for stability, deterioration, or improvement  Taken 10/23/2024 0113 by Jami Polanco RN  Care Plan - Patient's Chronic Conditions and Co-Morbidity Symptoms are Monitored and Maintained or Improved:   Monitor and assess patient's chronic conditions and comorbid symptoms for stability, deterioration, or improvement   Collaborate with multidisciplinary team to address chronic and comorbid conditions and prevent exacerbation or deterioration     Problem: Discharge Planning  Goal: Discharge to home or other facility with appropriate resources  10/24/2024 2237 by Brett Vázquez RN  Outcome: Progressing  10/24/2024 1009 by Hilary Ravi RN  Outcome: Progressing  Flowsheets (Taken 10/24/2024 0825)  Discharge to home or other facility with appropriate resources:   Identify barriers to discharge with patient and caregiver   Arrange for needed discharge resources and transportation as appropriate     Problem: Safety - Adult  Goal: Free from fall injury  10/24/2024 2237 by Brett Vázquez RN  Outcome: Progressing  Flowsheets (Taken 10/24/2024 2236)  Free From Fall Injury: Instruct family/caregiver on patient safety  10/24/2024 1009 by Hilary Ravi RN  Outcome: Progressing  Flowsheets (Taken 10/23/2024 2218 by Brett Vázquez RN)  Free From Fall Injury: Instruct family/caregiver on patient safety     Problem: Pain  Goal: Verbalizes/displays adequate comfort level or baseline comfort level  10/24/2024 2237 by Gurpreet

## 2024-10-25 NOTE — PROGRESS NOTES
Ph: [phone], Fax: [phone]                                     [facility]                                                   [address] [place], [place] [address]           Reason for admission:    Rehab             Brief Summary:     Naty Dykes is being seen by nephrology for CKD    Interval History and Plan:   Patient seen at bedside with nurse      Decreased Coreg dose to 6.25 mg BID and follow BP  Follow BP  Increase protein in diet  Drink fluid/water to thirst   Urea 30 g x 1   Renal panel BUN and sodium stable   Avoid NSAIDs and nephrotoxins if possible  Replete lytes as needed  Dose medications according to GFR      Thank you for allowing us to participate in this patient's care  In case of any question please call us at our 24 hour answering service [phone] or from 7 AM to 5 PM via Perfect Serve, Altocomalte, Epic chat or cell phone.   Dr Colby Feliciano MD      Assessment:     Chronic Kidney Disease  Stage 4   Cr around her baseline.       Hypertension  CAD  A fib  CHF with LVEF 4- 45 %     On Lasix 40 mg daily + Amiodarone + Coreg 12.5 mg BID         Hyponatremia          HPI      Patient is a 88 y.o. female  with PMH significant for CKD 4, CAD, GERD, DL, HTN was admitted with SDH after fall and now at rehab and nephrology is consulted to follow and assist in care given CKD 4.       Patient seen at bedside with PT/OT and appear general weak and lethargic but awake and able to answer basic questions. Patient having back pain for which getting pain medications. Denied any SOB, GI or urinary symptoms. Per patient appetite is fair and she is trying to keep herself hydrated.     Reviewed records.         ROS:   Negative except as mentioned in HPI      Vitals:     Vitals:    10/25/24 1651   BP: 130/77   Pulse: 62   Resp:    Temp:    SpO2:          Physical Examination:     General appearance: NAD. Alert and awake  Respiratory: No respiratory distress on room  right eye    RI OFFICE/OUTPT VISIT,PROCEDURE ONLY Right 8/27/2018    PHACO EMULSIFICATION OF CATARACT WITH INTRAOCULAR LENS IMPLANT RIGHT EYE performed by Elan Chaney MD at [facility] [place]    RI OFFICE/OUTPT VISIT,PROCEDURE ONLY Left 9/4/2018    PHACO EMULSIFICATION OF CATARACT WITH INTRAOCULAR LENS IMPLANT LEFT EYE performed by Elan Chaney MD at [facility] [place] [place]    THYROIDECTOMY

## 2024-10-25 NOTE — PROGRESS NOTES
Patient alert & oriented x 4, c/o pain 10/10 to neck, back and right knee. PRN oxycodone given, voltaren gel and heat packs applied. VS /81   Pulse 71   Temp 97.6 °F (36.4 °C) (Tympanic)   Resp 16   Ht 1.575 m (5' 2\")   Wt 51.3 kg (113 lb 1.5 oz)   SpO2 95%   BMI 20.69 kg/m² . Patient participating in therapy with some difficulty this afternoon. Patient c/o nausea, no vomiting thus far. Patient in bed, resting with both eyes closed at this time. Call light and personal items within reach, safety measures in place.

## 2024-10-25 NOTE — PROGRESS NOTES
ACUTE REHAB UNIT  SPEECH/LANGUAGE PATHOLOGY      [x] Daily  [] Weekly Care Conference Note  [] Discharge    Patient:Naty Dykes      :1935  MRN:[id number]  Rehab Dx/Hx: SDH (subdural hematoma) [S06.5XAA]    Precautions: [] Aspiration  [x] Fall risk  [] Sternal  [x] Seizure [] Hip  [] Weight Bearing [] Other     ST Dx: [] Aphasia  [] Dysarthria  [] Apraxia   [x] Oropharyngeal dysphagia [x] Cognitive Impairment  [] Other:   Date of Admit: 10/16/2024  Room #: 3106/3106-01  Date: 10/25/2024          Current Diet Order:ADULT DIET; Dysphagia - Minced and Moist; Low Fat/Low Chol/High Fiber/JAYNA; 1500 ml  ADULT ORAL NUTRITION SUPPLEMENT; Lunch; Standard High Calorie/High Protein Oral Supplement      Swallow Strategies: Alternate solids/liquids , Check for pocketing of food L, Check for pocketing of food R, Upright as possible with all PO intake , Assist Feed , Small bites/sips     CBC:  Lab Results   Component Value Date    WBC 8.2 10/25/2024    HGB 9.5 (L) 10/25/2024    HCT 28.4 (L) 10/25/2024    MCV 90.6 10/25/2024     10/25/2024     Living Status: Lives with son, Poor historian.   Medication Management:   []Primary   []Secondary [x]Comment: assist PRN per daughter  Finance Management:          []Primary   []Secondary [x]Comment: assist PRN per daughter  Active :                        []Yes         [x]No: daughter, grandson  Education: 3 years high school  Occupation: crafts  Hobbies/Leisure: read  Hearing: WFL  Vision: Vision Corrective Device: wears glasses for reading    Barriers toward progress: Cognitive deficit, Impulsivity, Limited safety awareness, and Limited insight into deficits      Date: 10/25/2024      Tx session 1 Tx session 2   Total Timed Code Min 10 45   Total Treatment Minutes 23 45   Individual Treatment Minutes 23 45   Group Treatment Minutes 0 0   Co-Treat Minutes 0 0   Brief Exception: N/A N/A   Pain None stated None indicated   Pain Intervention: [] RN notified  []  monitoring. Goal not targeted this session. Path finding task: pt able to independently locate the general direction of the final destination. While traveling in this direction, pt with fixated gaze on R side despite SLP providing prompt \"maybe it could be on the L side\". Pt required min to mod cues for problem-solving in order to find the destination.     Preferred leisure activity: pt required min cues for planning throughout task. Pt then required mod cues for transitioning step to step. Pt with no self-monitoring/awareness of actions at times as pt was attempting to complete but the objects were not executing the intended task. Pt required cues at times for visual perceptual/spatial deficits. Pt also endorses poor visual acuity in R eye and was observed shutting it at times.    Pt will participate in ongoing cognitive-linguistic assessment. Goal not targeted this session. Goal not targeted this session.    Other areas targeted:     Education:   Educated re: skills targeted, rationale for swallow exercises vs swallow strategies, activities completed. Educated to rationale for tx session and skills targeted.    Assessment of Patient Learning: Pt stated comprehension. Ongoing education and reinforcement is recommended. Pt states comprehension, recommend ongoing education.    Safety Devices: [x] Call light within reach  [x] Chair alarm activated and connected to nurse call light system  [] Bed alarm activated   [] Other: [x] Call light within reach  [x] Chair alarm activated and connected to nurse call light system  [] Bed alarm activated   [x] Other: student nurse at bedside   Assessment: Pt with mild-moderate oral dysphagia. Pt continues to benefit from recalling strategies prior to eating meal to encourage generalization. Improved attention and reduced fatigue noted this date. Pt with difficulty transitioning from one task to the next during second session and required cues for problem-solving. Pt also with  assisting at baseline, rec continue   [] inability to effectively communicate in emergent situations (ex: calling 911, stating name, reduced intelligibility, etc)  [x] Inability to communicate or demonstrate problem solving due to cognitive-communicative impairment  [x] severity of cognition (mild, mod, severe) with reduced insight negatively impacting safety/independence  [x] inability to recall and utilize swallow strategies placing pt at risk for aspiration  [] inability to recall and/or comprehend diet recommendations regarding least restrictive diet  [] limited assistance at home upon discharge  [] Other    Electronically signed by:  RANDY Sesay.   Speech Language Pathology     Tamie Alvarado M.A., CCC-SLP [id number]  Speech-Language Pathologist    The speech-language pathologist was present, directed the patient's care, made skilled judgment and was responsible for assessment and treatment.    Tx session 2:  Ros Warner MA, CCC-SLP  [id number]  Speech-Language Pathologist  Pg. #[phone]

## 2024-10-25 NOTE — PROGRESS NOTES
Occupational Therapy  Facility/Department: [facility] ACUTE REHAB UNIT  Rehabilitation Occupational Therapy Daily Treatment Note    Date: 10/25/24  Patient Name: Naty Dykes       Room: 3106/3106-01  MRN: [id number]  Account: [id number]   : 1935  (88 y.o.) Gender: female                    Past Medical History:  has a past medical history of Arthritis, Blood circulation, collateral, CAD (coronary artery disease), CHF (congestive heart failure) ([facility]), Confusion, GERD (gastroesophageal reflux disease), History of heart artery stent, Hyperlipidemia, Hypertension, Mitral valve prolapse, Myocardial infarct, old, Seizure ([facility]), and Thyroid disease.  Past Surgical History:   has a past surgical history that includes Hysterectomy; Thyroidectomy; Colonoscopy; other surgical history (2018); pr office/outpt visit,procedure only (Right, 2018); eye surgery (Left, 2018); pr office/outpt visit,procedure only (Left, 2018); and hip surgery (Right, 2022).    Restrictions  Restrictions/Precautions: Up as Tolerated, Fall Risk, Seizure  Other position/activity restrictions: up with assist, seizure precautions    Subjective  Subjective: Pt seated in w/c upon OT entry. Reports pain in neck and 8/10 pain in knee. Addressed via taking pain medication and application of lidocaine patch/voltaren gel + rest breaks as needed. Pt declined toileting needs.  Restrictions/Precautions: Up as Tolerated;Fall Risk;Seizure             Objective     Cognition  Arousal/Alertness: Delayed responses to stimuli;Inconsistent responses to stimuli  Following Commands: Follows one step commands with increased time;Follows one step commands with repetition  Attention Span: Attends with cues to redirect  Memory: Decreased recall of precautions;Decreased recall of recent events  Safety Judgement: Decreased awareness of need for assistance  Problem Solving: Assistance required to generate solutions  Insights: Impaired  Initiation:  Requires cues for some  Sequencing: Requires cues for some  Orientation  Overall Orientation Status: Within Functional Limits         ADL  Grooming/Oral Hygiene  Assistance Level: Set-up;Increased time to complete  Skilled Clinical Factors: Pt completed oral care, hair brushing, and face washing in seated at sink w/ set up and increased time.  Upper Extremity Dressing  Assistance Level: Verbal cues;Increased time to complete;Supervision  Skilled Clinical Factors: Pt doffed sweatshirt. Threaded BUE thru new shirt, cues to push up towards elbows and leaning anteriorly to pull overhead. Able to pull down over back w/ increased time and cues.  Lower Extremity Dressing  Assistance Level: Contact guard assist;Increased time to complete;Verbal cues  Skilled Clinical Factors: Pt pulled pants down in stance w/ RW. Unthreaded BLE. Threaded BLE thru new pants using crossover technique, threading RLE first. Stood w/ RW to pull up w/ CGA.  Putting On/Taking Off Footwear  Assistance Level: Minimal assistance  Skilled Clinical Factors: Pt doffed B socks. Pt donned L shoe, assist to son R shoe over heel after pt attempted.          Functional Mobility  Activity: To/From therapy gym  Assistance Level: Contact guard assist  Skilled Clinical Factors: Pt ambulated to/ from therapy gym w/ use of RW w/ CGA, slow and effortful. Verbal cues provided for greater step length.  Transfers  Surface: Wheelchair;To chair with arms;From chair with arms  Additional Factors: Verbal cues;Hand placement cues;Increased time to complete  Device: Walker  Sit to Stand  Assistance Level: Contact guard assist;Minimal assistance  Skilled Clinical Factors: Mostly CGA for transitional movement w/ 1-2 instances requiring min A. Continues to require verbal cues for hand placement and body mechanics.  Stand to Sit  Assistance Level: Contact guard assist  Skilled Clinical Factors: CGA for controlled descent, hand placement cues required.     OT Exercises  Static  Learning: Hearing  Education Outcome: Verbalized understanding;Demonstrated understanding    Plan  Occupational Therapy Plan  Times Per Week: 5x a week, 60 mins daily  Current Treatment Recommendations: Strengthening;Balance training;Pain management;Self-Care / ADL;Safety education & training;Functional mobility training;Endurance training;Patient/Caregiver education & training;Gait training;Equipment evaluation, education, & procurement    Goals  Patient Goals   Patient goals : \"Get back to normal.\"  Short Term Goals  Time Frame for Short Term Goals: 14 days - all ongoing  Short Term Goal 1: Pt will complete LE dressing w/ min A. - goal met 10/24/24  Short Term Goal 2: Pt will complete UE dressing w/ min A. - goal met 10/24/24  Short Term Goal 3: Pt will complete grooming w/ SPV, in seated. - goal met 10/25/24  Short Term Goal 4: Pt will complete toileting w/ min A. - goal met 10/21/24  Short Term Goal 5: Pt will complete toilet transfer w/ min A. - goal met 10/21/24  Long Term Goals  Time Frame for Long Term Goals : 21 days - all ongoing  Long Term Goal 1: Pt will complete LE dressing w/ SPV.  Long Term Goal 2: Pt will complete UE dressing independently.  Long Term Goal 3: Pt will complete grooming independently, in seated.  Long Term Goal 4: Pt will complete toileting w/ SPV.  Long Term Goal 5: Pt will complete toilet transfer w/ SPV.             Therapy Time   Individual Concurrent Group Co-treatment   Time In 0830         Time Out 0930         Minutes 60             Timed Code Treatment Minutes:  60 min     Total Treatment Minutes: 60 min     Lisa Maurice OT

## 2024-10-25 NOTE — PROGRESS NOTES
Department of Physical Medicine & Rehabilitation  Progress Note    Patient Identification:  Naty Dykes  [id number]  : 1935  Admit date: 10/16/2024    Chief Complaint: SDH (subdural hematoma)    Subjective:   Seen in therapy this morning.  No new complaints overnight.  She is feeling well overall.  She was able to sleep a little bit better last night.  Shows improvement every day with cognition.  No new pain this morning.      ROS: No f/c, n/v, cp     Objective:  Patient Vitals for the past 24 hrs:   BP Temp Temp src Pulse Resp SpO2 Weight   10/25/24 0900 -- -- -- -- -- -- 51.3 kg (113 lb 1.5 oz)   10/25/24 0858 -- -- -- -- 16 -- --   10/25/24 0745 (!) 142/79 97.4 °F (36.3 °C) Tympanic 61 16 94 % --   10/25/24 0136 -- -- -- -- 16 -- --   10/24/24 1952 113/65 97.6 °F (36.4 °C) Oral 61 16 93 % --   10/24/24 1741 -- -- -- -- 16 -- --   10/24/24 1711 -- -- -- -- 16 -- --   10/24/24 1700 136/63 -- -- 66 -- -- --     CONST: Alert. No acute distress  EYES: No icterus noted.  HENT: Atraumatic, normocephalic;  NECK: Trachea midline, neck supple.  CV: Regular rate and rhythm, no murmur rub or gallop noted  RESP: Lungs clear to auscultation bilaterally, no rales wheezes or ronchi, no retractions. Respirations unlabored.   GI: Soft, nontender, nondistended.   EXT: No significant edema appreciated to BLE  NEURO:   Patient somnolent. Limited exam.  Unable to assess EOMI. CN V, CN VII, CNXI-XII intact  4/5 strengths to BUE. 3/5 strengths to bilateral hip flexion. 4/5 strengths to ankle dorsi/plantarflexion  PSYCH: Stable mood, normal judgement, normal affect     Laboratory data: Available via EMR.   Last 24 hour lab  Recent Results (from the past 24 hour(s))   Urinalysis with Reflex to Culture    Collection Time: 10/24/24 11:01 AM    Specimen: Urine   Result Value Ref Range    Color, UA Yellow Straw/Yellow    Clarity, UA Clear Clear    Glucose, Ur Negative Negative mg/dL    Bilirubin, Urine Negative Negative

## 2024-10-25 NOTE — PROGRESS NOTES
Ph: [phone], Fax: [phone]                                     [facility]                                                   [address] [place], [place] [id number]           Reason for admission:    Rehab             Brief Summary:     Naty Dykes is being seen by nephrology for CKD    Interval History and Plan:   Patient seen at bedside with nurse  Feel nauseous.   Comfortable on room air  BP soft. 108/81  HR 71  Na 134  Cr stable 1.6          Decrease Coreg dose to 6.25 mg BID and follow BP  Increase protein in diet  Drink fluid/water to thirst   Urea 30 g x 1   Renal panel in AM.   Avoid NSAIDs and nephrotoxins if possible  Replete lytes as needed  Dose medications according to GFR      Thank you for allowing us to participate in this patient's care  In case of any question please call us at our 24 hour answering service [phone] or from 7 AM to 5 PM via Perfect Serve, Voalte, Epic chat or cell phone.   Dr Sina Kwan MD      Assessment:     Chronic Kidney Disease  Stage 4   Cr around her baseline.       Hypertension  CAD  A fib  CHF with LVEF 4- 45 %     On Lasix 40 mg daily + Amiodarone + Coreg 12.5 mg BID         Hyponatremia          HPI      Patient is a 88 y.o. female  with PMH significant for CKD 4, CAD, GERD, DL, HTN was admitted with SDH after fall and now at rehab and nephrology is consulted to follow and assist in care given CKD 4.       Patient seen at bedside with PT/OT and appear general weak and lethargic but awake and able to answer basic questions. Patient having back pain for which getting pain medications. Denied any SOB, GI or urinary symptoms. Per patient appetite is fair and she is trying to keep herself hydrated.     Reviewed records.         ROS:   Negative except as mentioned in HPI      Vitals:     Vitals:    10/25/24 1300   BP: 106/81   Pulse: 71   Resp: 16   Temp: 97.6 °F (36.4 °C)   SpO2: 95%         Physical Examination:      General appearance: NAD. Alert and awake  Respiratory: No respiratory distress on room air.  Cardiovascular: Edema no  Abdomen: Soft.   Other relevant findings:       Medications:       ciprofloxacin  500 mg Oral Q24H    colchicine  0.3 mg Oral Daily    amiodarone  50 mg Oral Daily    atorvastatin  80 mg Oral Daily    diclofenac sodium  2 g Topical BID    furosemide  40 mg Oral Daily    levETIRAcetam  500 mg Oral BID    lidocaine  1 patch TransDERmal Daily    pantoprazole  40 mg Oral QAM AC    senna  1 tablet Oral Nightly    vitamin B-12  1,000 mcg Oral Daily    Vitamin D  1,000 Units Oral Daily    heparin (porcine)  5,000 Units SubCUTAneous BID    bisacodyl  5 mg Oral Daily    carvedilol  12.5 mg Oral BID WC         Labs:     Lab Results   Component Value Date    CREATININE 1.6 (H) 10/25/2024    BUN 38 (H) 10/25/2024     (L) 10/25/2024    K 3.8 10/25/2024    CL 94 (L) 10/25/2024    CO2 27 10/25/2024    CALCIUM 9.3 10/25/2024    PHOS 4.3 07/15/2024     Lab Results   Component Value Date    WBC 8.2 10/25/2024    HGB 9.5 (L) 10/25/2024    HCT 28.4 (L) 10/25/2024    MCV 90.6 10/25/2024     10/25/2024         Past Medical History:     Past Medical History:   Diagnosis Date    Arthritis     Blood circulation, collateral     CAD (coronary artery disease)     CHF (congestive heart failure) ([facility])     Confusion 8/31/2020    GERD (gastroesophageal reflux disease)     History of heart artery stent 12/2018    Hyperlipidemia     Hypertension     Mitral valve prolapse     Myocardial infarct, old     Seizure ([facility]) 10/11/2024    Thyroid disease        Past Surgical History:     Past Surgical History:   Procedure Laterality Date    COLONOSCOPY      EYE SURGERY Left 09/04/2018    PHACO EMULSIFICATION OF CATARACT WITH INTRAOCULAR LENS IMPLANT LEFT EYE     HIP SURGERY Right 11/7/2022    RIGHT HIP INTRAMEDULLARY NAILING performed by Noah Basurto MD at [facility] [place]    HYSTERECTOMY (CERVIX STATUS UNKNOWN)      OTHER

## 2024-10-26 LAB
ALBUMIN SERPL-MCNC: 3.3 G/DL (ref 3.4–5)
ANION GAP SERPL CALCULATED.3IONS-SCNC: 10 MMOL/L (ref 3–16)
BUN SERPL-MCNC: 34 MG/DL (ref 7–20)
CALCIUM SERPL-MCNC: 9.8 MG/DL (ref 8.3–10.6)
CHLORIDE SERPL-SCNC: 96 MMOL/L (ref 99–110)
CO2 SERPL-SCNC: 30 MMOL/L (ref 21–32)
CREAT SERPL-MCNC: 1.6 MG/DL (ref 0.6–1.2)
GFR SERPLBLD CREATININE-BSD FMLA CKD-EPI: 31 ML/MIN/{1.73_M2}
GLUCOSE SERPL-MCNC: 85 MG/DL (ref 70–99)
PHOSPHATE SERPL-MCNC: 3.9 MG/DL (ref 2.5–4.9)
POTASSIUM SERPL-SCNC: 4.3 MMOL/L (ref 3.5–5.1)
SODIUM SERPL-SCNC: 136 MMOL/L (ref 136–145)

## 2024-10-26 PROCEDURE — 6370000000 HC RX 637 (ALT 250 FOR IP): Performed by: STUDENT IN AN ORGANIZED HEALTH CARE EDUCATION/TRAINING PROGRAM

## 2024-10-26 PROCEDURE — 97530 THERAPEUTIC ACTIVITIES: CPT

## 2024-10-26 PROCEDURE — 80069 RENAL FUNCTION PANEL: CPT

## 2024-10-26 PROCEDURE — 6360000002 HC RX W HCPCS: Performed by: PHYSICAL MEDICINE & REHABILITATION

## 2024-10-26 PROCEDURE — 97116 GAIT TRAINING THERAPY: CPT

## 2024-10-26 PROCEDURE — 36415 COLL VENOUS BLD VENIPUNCTURE: CPT

## 2024-10-26 PROCEDURE — 97110 THERAPEUTIC EXERCISES: CPT

## 2024-10-26 PROCEDURE — 1280000000 HC REHAB R&B

## 2024-10-26 PROCEDURE — 6370000000 HC RX 637 (ALT 250 FOR IP): Performed by: PHYSICAL MEDICINE & REHABILITATION

## 2024-10-26 RX ADMIN — Medication 1000 UNITS: at 09:23

## 2024-10-26 RX ADMIN — FUROSEMIDE 40 MG: 40 TABLET ORAL at 09:23

## 2024-10-26 RX ADMIN — PANTOPRAZOLE SODIUM 40 MG: 40 TABLET, DELAYED RELEASE ORAL at 05:04

## 2024-10-26 RX ADMIN — HEPARIN SODIUM 5000 UNITS: 5000 INJECTION INTRAVENOUS; SUBCUTANEOUS at 09:25

## 2024-10-26 RX ADMIN — LEVETIRACETAM 500 MG: 500 TABLET, FILM COATED ORAL at 09:24

## 2024-10-26 RX ADMIN — SENNOSIDES 8.6 MG: 8.6 TABLET, FILM COATED ORAL at 20:05

## 2024-10-26 RX ADMIN — OXYCODONE AND ACETAMINOPHEN 1 TABLET: 10; 325 TABLET ORAL at 05:05

## 2024-10-26 RX ADMIN — BISACODYL 5 MG: 5 TABLET, COATED ORAL at 09:25

## 2024-10-26 RX ADMIN — DICLOFENAC SODIUM TOPICAL GEL, 1% 2 G: 10 GEL TOPICAL at 09:32

## 2024-10-26 RX ADMIN — HEPARIN SODIUM 5000 UNITS: 5000 INJECTION INTRAVENOUS; SUBCUTANEOUS at 20:05

## 2024-10-26 RX ADMIN — COLCHICINE 0.3 MG: 0.6 TABLET, FILM COATED ORAL at 09:24

## 2024-10-26 RX ADMIN — OXYCODONE AND ACETAMINOPHEN 1 TABLET: 10; 325 TABLET ORAL at 20:05

## 2024-10-26 RX ADMIN — CIPROFLOXACIN HYDROCHLORIDE 500 MG: 500 TABLET, FILM COATED ORAL at 11:59

## 2024-10-26 RX ADMIN — ATORVASTATIN CALCIUM 80 MG: 80 TABLET, FILM COATED ORAL at 09:23

## 2024-10-26 RX ADMIN — LEVETIRACETAM 500 MG: 500 TABLET, FILM COATED ORAL at 20:06

## 2024-10-26 RX ADMIN — DICLOFENAC SODIUM TOPICAL GEL, 1% 2 G: 10 GEL TOPICAL at 20:06

## 2024-10-26 RX ADMIN — CARVEDILOL 6.25 MG: 6.25 TABLET, FILM COATED ORAL at 09:24

## 2024-10-26 RX ADMIN — CYANOCOBALAMIN TAB 1000 MCG 1000 MCG: 1000 TAB at 09:25

## 2024-10-26 RX ADMIN — CARVEDILOL 6.25 MG: 6.25 TABLET, FILM COATED ORAL at 17:45

## 2024-10-26 RX ADMIN — AMIODARONE HYDROCHLORIDE 50 MG: 200 TABLET ORAL at 09:24

## 2024-10-26 RX ADMIN — ACETAMINOPHEN 650 MG: 325 TABLET ORAL at 12:00

## 2024-10-26 ASSESSMENT — PAIN DESCRIPTION - FREQUENCY
FREQUENCY: INTERMITTENT

## 2024-10-26 ASSESSMENT — PAIN SCALES - GENERAL
PAINLEVEL_OUTOF10: 1
PAINLEVEL_OUTOF10: 1
PAINLEVEL_OUTOF10: 8
PAINLEVEL_OUTOF10: 0
PAINLEVEL_OUTOF10: 8
PAINLEVEL_OUTOF10: 0
PAINLEVEL_OUTOF10: 2
PAINLEVEL_OUTOF10: 5
PAINLEVEL_OUTOF10: 2

## 2024-10-26 ASSESSMENT — PAIN DESCRIPTION - PAIN TYPE
TYPE: CHRONIC PAIN;ACUTE PAIN
TYPE: CHRONIC PAIN
TYPE: CHRONIC PAIN
TYPE: CHRONIC PAIN;ACUTE PAIN

## 2024-10-26 ASSESSMENT — PAIN DESCRIPTION - ORIENTATION
ORIENTATION: RIGHT;LEFT
ORIENTATION_2: ANTERIOR;POSTERIOR
ORIENTATION: MID
ORIENTATION: RIGHT;LEFT
ORIENTATION: MID
ORIENTATION_2: ANTERIOR;POSTERIOR

## 2024-10-26 ASSESSMENT — PAIN DESCRIPTION - LOCATION
LOCATION: BACK
LOCATION: GENERALIZED
LOCATION: LEG
LOCATION: GENERALIZED
LOCATION: BACK
LOCATION_2: GENERALIZED
LOCATION_2: GENERALIZED
LOCATION: LEG
LOCATION: GENERALIZED

## 2024-10-26 ASSESSMENT — PAIN - FUNCTIONAL ASSESSMENT
PAIN_FUNCTIONAL_ASSESSMENT: PREVENTS OR INTERFERES SOME ACTIVE ACTIVITIES AND ADLS
PAIN_FUNCTIONAL_ASSESSMENT_SITE2: ACTIVITIES ARE NOT PREVENTED
PAIN_FUNCTIONAL_ASSESSMENT: ACTIVITIES ARE NOT PREVENTED
PAIN_FUNCTIONAL_ASSESSMENT: ACTIVITIES ARE NOT PREVENTED
PAIN_FUNCTIONAL_ASSESSMENT: PREVENTS OR INTERFERES SOME ACTIVE ACTIVITIES AND ADLS
PAIN_FUNCTIONAL_ASSESSMENT: ACTIVITIES ARE NOT PREVENTED
PAIN_FUNCTIONAL_ASSESSMENT: PREVENTS OR INTERFERES SOME ACTIVE ACTIVITIES AND ADLS
PAIN_FUNCTIONAL_ASSESSMENT_SITE2: ACTIVITIES ARE NOT PREVENTED

## 2024-10-26 ASSESSMENT — PAIN DESCRIPTION - ONSET
ONSET: GRADUAL

## 2024-10-26 ASSESSMENT — PAIN DESCRIPTION - DESCRIPTORS
DESCRIPTORS: ACHING
DESCRIPTORS: ACHING;DISCOMFORT
DESCRIPTORS_2: ACHING
DESCRIPTORS: ACHING
DESCRIPTORS: ACHING;DISCOMFORT
DESCRIPTORS: ACHING
DESCRIPTORS_2: ACHING
DESCRIPTORS: ACHING

## 2024-10-26 ASSESSMENT — PAIN DESCRIPTION - DIRECTION
RADIATING_TOWARDS: GENERALIZED

## 2024-10-26 ASSESSMENT — PAIN DESCRIPTION - INTENSITY
RATING_2: 0
RATING_2: 0

## 2024-10-26 ASSESSMENT — PAIN SCALES - WONG BAKER
WONGBAKER_NUMERICALRESPONSE: NO HURT
WONGBAKER_NUMERICALRESPONSE: NO HURT
WONGBAKER_NUMERICALRESPONSE: HURTS WHOLE LOT
WONGBAKER_NUMERICALRESPONSE: NO HURT
WONGBAKER_NUMERICALRESPONSE: NO HURT
WONGBAKER_NUMERICALRESPONSE: HURTS WHOLE LOT

## 2024-10-26 NOTE — PLAN OF CARE
Problem: Chronic Conditions and Co-morbidities  Goal: Patient's chronic conditions and co-morbidity symptoms are monitored and maintained or improved  Outcome: Progressing  Flowsheets (Taken 10/26/2024 0920)  Care Plan - Patient's Chronic Conditions and Co-Morbidity Symptoms are Monitored and Maintained or Improved: Monitor and assess patient's chronic conditions and comorbid symptoms for stability, deterioration, or improvement     Problem: Discharge Planning  Goal: Discharge to home or other facility with appropriate resources  Outcome: Progressing  Flowsheets (Taken 10/26/2024 0920)  Discharge to home or other facility with appropriate resources: Identify barriers to discharge with patient and caregiver     Problem: Safety - Adult  Goal: Free from fall injury  Outcome: Progressing     Problem: Skin/Tissue Integrity  Goal: Absence of new skin breakdown  Description: 1.  Monitor for areas of redness and/or skin breakdown  2.  Assess vascular access sites hourly  3.  Every 4-6 hours minimum:  Change oxygen saturation probe site  4.  Every 4-6 hours:  If on nasal continuous positive airway pressure, respiratory therapy assess nares and determine need for appliance change or resting period.  Outcome: Progressing     Problem: ABCDS Injury Assessment  Goal: Absence of physical injury  Outcome: Progressing     Problem: Nutrition Deficit:  Goal: Optimize nutritional status  Outcome: Progressing

## 2024-10-26 NOTE — PROGRESS NOTES
Physical Therapy  Facility/Department: [facility] ACUTE REHAB UNIT  Rehabilitation Physical Therapy Treatment Note    NAME: Naty Dykes  : 1935 (88 y.o.)  MRN: [id number]  CODE STATUS: Full Code    Date of Service: 10/26/24       Restrictions:  Restrictions/Precautions: Up as Tolerated, Fall Risk, Seizure  Position Activity Restriction  Other position/activity restrictions: up with assist, seizure precautions     SUBJECTIVE  Subjective  Subjective: Pt  in bedside chair upon PT arrival. Pt agreeable to treatment.  Pain: c/o  B knee pain RN notified/aware.         OBJECTIVE  Cognition  Arousal/Alertness: Delayed responses to stimuli;Inconsistent responses to stimuli  Following Commands: Follows one step commands with increased time;Follows one step commands with repetition  Attention Span: Attends with cues to redirect  Memory: Decreased recall of precautions;Decreased recall of recent events  Safety Judgement: Decreased awareness of need for assistance  Problem Solving: Assistance required to generate solutions  Insights: Impaired  Initiation: Requires cues for some  Sequencing: Requires cues for some  Orientation  Overall Orientation Status: Within Functional Limits    Functional Mobility  Balance  Standing Balance: Contact guard assistance  Sit to Stand  Assistance Level: Contact guard assist;Stand by assist  Skilled Clinical Factors: CGA from recliner, CGA to SBA from wc with inc time/effort to complete  Stand to Sit  Assistance Level: Contact guard assist  Skilled Clinical Factors: VC for hand placement and controlling descent ( decreased eccentric control)      Environmental Mobility  Ambulation  Surface: Level surface  Device: Rolling walker  Distance: 100+120+60ft  Additional Factors: Verbal cues;Hand placement cues;Increased time to complete  Assistance Level: Contact guard assist;Stand by assist  Gait Deviations: Slow agnieszka;Decreased step length bilateral  Skilled Clinical Factors: Forward, downward  training;Functional mobility training;Transfer training;Endurance training;Gait training;Stair training;Neuromuscular re-education;Home exercise program;Safety education & training;Patient/Caregiver education & training;Equipment evaluation, education, & procurement;Therapeutic activities  Safety Devices  Type of Devices: Call light within reach;Chair alarm in place;Left in chair;Gait belt;All fall risk precautions in place  Restraints  Restraints Initially in Place: No    EDUCATION  Education  Education Given To: Patient  Education Provided: Safety;Mobility Training;Transfer Training;Fall Prevention Strategies;Energy Conservation;Home Exercise Program;Plan of Care  Education Method: Verbal;Demonstration  Barriers to Learning: Cognition  Education Outcome: Verbalized understanding;Demonstrated understanding;Continued education needed        Therapy Time   Individual Concurrent Group Co-treatment   Time In 1245         Time Out 1335         Minutes 50            Timed Code Treatment Minutes: 50    Total Treatment Minutes: 50      Linda Kerr PT, DPT, NCS, CSRS

## 2024-10-26 NOTE — PROGRESS NOTES
Ph: [phone], Fax: [phone]                                     [employer]                                                   [address] [place], [place] [id number]           Reason for admission:    Rehab             Brief Summary:     Naty Dykes is being seen by nephrology for CKD    Interval History and Plan:   Patient seen at bedside with nurse      Decreased Coreg dose to 6.25 mg BID and follow BP  Follow BP  Doing better   Increase protein in diet  Drink fluid/water to thirst   Follow off Ure NA   Renal panel BUN and sodium stable   Avoid NSAIDs and nephrotoxins if possible  Replete lytes as needed  Dose medications according to GFR      Thank you for allowing us to participate in this patient's care  In case of any question please call us at our 24 hour answering service [phone] or from 7 AM to 5 PM via Perfect Serve, Voalte, Epic chat or cell phone.   Dr Colby Feliciano MD      Assessment:     Chronic Kidney Disease  Stage 4   Cr around her baseline.       Hypertension  CAD  A fib  CHF with LVEF 4- 45 %     On Lasix 40 mg daily + Amiodarone + Coreg 12.5 mg BID         Hyponatremia          HPI      Patient is a 88 y.o. female  with PMH significant for CKD 4, CAD, GERD, DL, HTN was admitted with SDH after fall and now at rehab and nephrology is consulted to follow and assist in care given CKD 4.       Patient seen at bedside with PT/OT and appear general weak and lethargic but awake and able to answer basic questions. Patient having back pain for which getting pain medications. Denied any SOB, GI or urinary symptoms. Per patient appetite is fair and she is trying to keep herself hydrated.     Reviewed records.         ROS:   Negative except as mentioned in HPI      Vitals:     Vitals:    10/27/24 0734   BP: 138/76   Pulse: 74   Resp: 17   Temp: 97.5 °F (36.4 °C)   SpO2: 92%         Physical Examination:     General appearance: NAD. Alert and awake  Respiratory:  intraocular lens implant right eye    [place] OFFICE/OUTPT VISIT,PROCEDURE ONLY Right 8/27/2018    PHACO EMULSIFICATION OF CATARACT WITH INTRAOCULAR LENS IMPLANT RIGHT EYE performed by Elan Chaney MD at [facility] [place]    LA OFFICE/OUTPT VISIT,PROCEDURE ONLY Left 9/4/2018    PHACO EMULSIFICATION OF CATARACT WITH INTRAOCULAR LENS IMPLANT LEFT EYE performed by Elan Chaney MD at [facility] [place]    THYROIDECTOMY

## 2024-10-26 NOTE — PLAN OF CARE
Problem: Chronic Conditions and Co-morbidities  Goal: Patient's chronic conditions and co-morbidity symptoms are monitored and maintained or improved  Outcome: Progressing  Care Plan - Patient's Chronic Conditions and Co-Morbidity Symptoms are Monitored and Maintained or Improved: Monitor and assess patient's chronic conditions and comorbid symptoms for stability, deterioration, or improvement     Problem: Discharge Planning  Goal: Discharge to home or other facility with appropriate resources  Outcome: Progressing  Discharge to home or other facility with appropriate resources:   Identify barriers to discharge with patient and caregiver   Arrange for needed discharge resources and transportation as appropriate     Problem: Safety - Adult  Goal: Free from fall injury  Outcome: Progressing  Free From Fall Injury: Instruct family/caregiver on patient safety     Problem: Pain  Goal: Verbalizes/displays adequate comfort level or baseline comfort level  Outcome: Progressing  Verbalizes/displays adequate comfort level or baseline comfort level:   Assess pain using appropriate pain scale   Administer analgesics based on type and severity of pain and evaluate response   Implement non-pharmacological measures as appropriate and evaluate response  Note: Patient has chronic arthritic pain, treated with voltaren gel, lidocaine patch, oxy and tylenol. Patient also uses heat packs throughout the day     Problem: Skin/Tissue Integrity  Goal: Absence of new skin breakdown  Description: 1.  Monitor for areas of redness and/or skin breakdown  2.  Assess vascular access sites hourly  3.  Every 4-6 hours minimum:  Change oxygen saturation probe site  4.  Every 4-6 hours:  If on nasal continuous positive airway pressure, respiratory therapy assess nares and determine need for appliance change or resting period.  Outcome: Progressing     Problem: ABCDS Injury Assessment  Goal: Absence of physical injury  Outcome: Progressing  Absence

## 2024-10-26 NOTE — PROGRESS NOTES
Shift assessment completed. VSS, Patient A/O x4, call light and over bed table within reach, safety measures in place, hourly rounding and visual checks in place, will continue to monitor.

## 2024-10-26 NOTE — PROGRESS NOTES
Department of Physical Medicine & Rehabilitation  Progress Note    Patient Identification:  Naty Dykes  [id number]  : 1935  Admit date: 10/16/2024    Chief Complaint: SDH (subdural hematoma)    Subjective:   Resting in her room this morning.  No new complaints overnight.  She is making progress daily.  She slept well last night.  She is distraught to find something on TV today.  No new pain this morning.    ROS: No f/c, n/v, cp     Objective:  Patient Vitals for the past 24 hrs:   BP Temp Temp src Pulse Resp SpO2 Weight   10/26/24 0920 111/67 98 °F (36.7 °C) Oral 64 17 95 % --   10/26/24 0426 -- -- -- -- -- -- 51.3 kg (113 lb 3 oz)   10/25/24 2108 -- -- -- 68 16 -- --   10/25/24 2038 128/74 98 °F (36.7 °C) Oral 65 16 96 % --   10/25/24 1651 130/77 -- -- 62 -- -- --   10/25/24 1300 106/81 97.6 °F (36.4 °C) Tympanic 71 16 95 % --     CONST: Alert. No acute distress  EYES: No icterus noted.  HENT: Atraumatic, normocephalic;  NECK: Trachea midline, neck supple.  CV: Regular rate and rhythm, no murmur rub or gallop noted  RESP: Lungs clear to auscultation bilaterally, no rales wheezes or ronchi, no retractions. Respirations unlabored.   GI: Soft, nontender, nondistended.   EXT: No significant edema appreciated to BLE  NEURO:   Patient somnolent. Limited exam.  Unable to assess EOMI. CN V, CN VII, CNXI-XII intact  4/5 strengths to BUE. 3/5 strengths to bilateral hip flexion. 4/5 strengths to ankle dorsi/plantarflexion  PSYCH: Stable mood, normal judgement, normal affect     Laboratory data: Available via EMR.   Last 24 hour lab  Recent Results (from the past 24 hour(s))   Renal Function Panel    Collection Time: 10/26/24  5:17 AM   Result Value Ref Range    Sodium 136 136 - 145 mmol/L    Potassium 4.3 3.5 - 5.1 mmol/L    Chloride 96 (L) 99 - 110 mmol/L    CO2 30 21 - 32 mmol/L    Anion Gap 10 3 - 16    Glucose 85 70 - 99 mg/dL    BUN 34 (H) 7 - 20 mg/dL    Creatinine 1.6 (H) 0.6 - 1.2 mg/dL    Est, Glom Filt  Rate 31 (A) >60    Calcium 9.8 8.3 - 10.6 mg/dL    Phosphorus 3.9 2.5 - 4.9 mg/dL    Albumin 3.3 (L) 3.4 - 5.0 g/dL         Therapy progress:  PT  Position Activity Restriction  Other position/activity restrictions: up with assist, seizure precautions  Objective     Sit to Stand: Maximum Assistance, Moderate Assistance (Max A/ mod x1 with step by step VC for sequencing technique . Pt has a posterior lean and has difficulty with ant WS especially w/ hand placement on the surface that she is ascending from)  Stand to Sit: Moderate Assistance (Req Mod A x1 with VC for technique including hand placement and stepping back to touch chair before descending. Pt w/  poor eccentric control)  Bed to Chair: Moderate assistance  Device: Rolling Walker  Assistance: Minimal assistance, Moderate assistance  Distance: 5 steps  OT  PT Equipment Recommendations  Other: Ongoing assessment at this time  Toilet - Technique: Stand step  Equipment Used: Standard toilet  Toilet Transfers Comments: Max A t/f w/c <-> commode.  Assessment        SLP          Body mass index is 20.7 kg/m².    Assessment and Plan:  1. Subdural hemoatoma with mild subarachnoid hemorrhage  OP f/u with nsgy and neurology  Started on Keppra      2. Chronic back pain  - showing improvement     3. Paroxysmal afib              Holding home Eliquis until nsgy follow-up outpatient              Continue amiodarone              Consult cardiology     4. HFrEF with MVP and severe MR              Currently on Lasix and coreg     5. HTN  - monitor      6. CAD s/p MINH              Continue statin              Currently holding aspirin.     7. Hx of CVA    8. CKD stage IV  - nephrology following      9. Hypothyroidism  - continue synthroid     10. Chronic anemia   - monitor labs  Impairments: Decreased functional mobility, Decreased ADLs     11. Gout  - fingers  - previous on colchicine         Rehab Progress: Improving  Anticipated Dispo: home  Services/DME: TBD  ELOS:

## 2024-10-27 VITALS
HEART RATE: 69 BPM | HEIGHT: 62 IN | TEMPERATURE: 98 F | SYSTOLIC BLOOD PRESSURE: 138 MMHG | WEIGHT: 112.88 LBS | OXYGEN SATURATION: 95 % | RESPIRATION RATE: 16 BRPM | BODY MASS INDEX: 20.77 KG/M2 | DIASTOLIC BLOOD PRESSURE: 74 MMHG

## 2024-10-27 PROCEDURE — 1280000000 HC REHAB R&B

## 2024-10-27 PROCEDURE — 6370000000 HC RX 637 (ALT 250 FOR IP): Performed by: STUDENT IN AN ORGANIZED HEALTH CARE EDUCATION/TRAINING PROGRAM

## 2024-10-27 PROCEDURE — 6370000000 HC RX 637 (ALT 250 FOR IP): Performed by: PHYSICAL MEDICINE & REHABILITATION

## 2024-10-27 PROCEDURE — 6360000002 HC RX W HCPCS: Performed by: PHYSICAL MEDICINE & REHABILITATION

## 2024-10-27 RX ADMIN — FUROSEMIDE 40 MG: 40 TABLET ORAL at 09:51

## 2024-10-27 RX ADMIN — OXYCODONE AND ACETAMINOPHEN 1 TABLET: 10; 325 TABLET ORAL at 11:56

## 2024-10-27 RX ADMIN — DICLOFENAC SODIUM TOPICAL GEL, 1% 2 G: 10 GEL TOPICAL at 09:57

## 2024-10-27 RX ADMIN — CYANOCOBALAMIN TAB 1000 MCG 1000 MCG: 1000 TAB at 09:52

## 2024-10-27 RX ADMIN — OXYCODONE AND ACETAMINOPHEN 1 TABLET: 10; 325 TABLET ORAL at 03:35

## 2024-10-27 RX ADMIN — CIPROFLOXACIN HYDROCHLORIDE 500 MG: 500 TABLET, FILM COATED ORAL at 10:09

## 2024-10-27 RX ADMIN — CARVEDILOL 6.25 MG: 6.25 TABLET, FILM COATED ORAL at 09:56

## 2024-10-27 RX ADMIN — BISACODYL 5 MG: 5 TABLET, COATED ORAL at 09:51

## 2024-10-27 RX ADMIN — LEVETIRACETAM 500 MG: 500 TABLET, FILM COATED ORAL at 20:09

## 2024-10-27 RX ADMIN — SIMETHICONE 80 MG: 80 TABLET, CHEWABLE ORAL at 20:09

## 2024-10-27 RX ADMIN — AMIODARONE HYDROCHLORIDE 50 MG: 200 TABLET ORAL at 09:52

## 2024-10-27 RX ADMIN — HEPARIN SODIUM 5000 UNITS: 5000 INJECTION INTRAVENOUS; SUBCUTANEOUS at 09:54

## 2024-10-27 RX ADMIN — HEPARIN SODIUM 5000 UNITS: 5000 INJECTION INTRAVENOUS; SUBCUTANEOUS at 20:10

## 2024-10-27 RX ADMIN — PANTOPRAZOLE SODIUM 40 MG: 40 TABLET, DELAYED RELEASE ORAL at 06:02

## 2024-10-27 RX ADMIN — SENNOSIDES 8.6 MG: 8.6 TABLET, FILM COATED ORAL at 20:09

## 2024-10-27 RX ADMIN — ONDANSETRON 4 MG: 4 TABLET, ORALLY DISINTEGRATING ORAL at 20:09

## 2024-10-27 RX ADMIN — SIMETHICONE 80 MG: 80 TABLET, CHEWABLE ORAL at 10:09

## 2024-10-27 RX ADMIN — LEVETIRACETAM 500 MG: 500 TABLET, FILM COATED ORAL at 09:52

## 2024-10-27 RX ADMIN — COLCHICINE 0.3 MG: 0.6 TABLET, FILM COATED ORAL at 09:52

## 2024-10-27 RX ADMIN — OXYCODONE AND ACETAMINOPHEN 1 TABLET: 10; 325 TABLET ORAL at 20:08

## 2024-10-27 RX ADMIN — DICLOFENAC SODIUM TOPICAL GEL, 1% 2 G: 10 GEL TOPICAL at 20:09

## 2024-10-27 RX ADMIN — CARVEDILOL 6.25 MG: 6.25 TABLET, FILM COATED ORAL at 18:34

## 2024-10-27 RX ADMIN — Medication 1000 UNITS: at 09:57

## 2024-10-27 RX ADMIN — ATORVASTATIN CALCIUM 80 MG: 80 TABLET, FILM COATED ORAL at 09:52

## 2024-10-27 ASSESSMENT — PAIN SCALES - GENERAL
PAINLEVEL_OUTOF10: 1
PAINLEVEL_OUTOF10: 8
PAINLEVEL_OUTOF10: 2
PAINLEVEL_OUTOF10: 8
PAINLEVEL_OUTOF10: 1
PAINLEVEL_OUTOF10: 8
PAINLEVEL_OUTOF10: 0
PAINLEVEL_OUTOF10: 2

## 2024-10-27 ASSESSMENT — PAIN - FUNCTIONAL ASSESSMENT
PAIN_FUNCTIONAL_ASSESSMENT: PREVENTS OR INTERFERES SOME ACTIVE ACTIVITIES AND ADLS
PAIN_FUNCTIONAL_ASSESSMENT: ACTIVITIES ARE NOT PREVENTED
PAIN_FUNCTIONAL_ASSESSMENT_SITE2: ACTIVITIES ARE NOT PREVENTED

## 2024-10-27 ASSESSMENT — PAIN DESCRIPTION - FREQUENCY
FREQUENCY: INTERMITTENT

## 2024-10-27 ASSESSMENT — PAIN DESCRIPTION - LOCATION
LOCATION: GENERALIZED
LOCATION: GENERALIZED
LOCATION_2: GENERALIZED
LOCATION: BACK;LEG
LOCATION: BACK
LOCATION: GENERALIZED
LOCATION: BACK

## 2024-10-27 ASSESSMENT — PAIN DESCRIPTION - PAIN TYPE
TYPE: ACUTE PAIN;CHRONIC PAIN
TYPE: CHRONIC PAIN;ACUTE PAIN

## 2024-10-27 ASSESSMENT — PAIN SCALES - WONG BAKER
WONGBAKER_NUMERICALRESPONSE: NO HURT
WONGBAKER_NUMERICALRESPONSE: HURTS WHOLE LOT
WONGBAKER_NUMERICALRESPONSE: NO HURT
WONGBAKER_NUMERICALRESPONSE: NO HURT
WONGBAKER_NUMERICALRESPONSE: HURTS WHOLE LOT

## 2024-10-27 ASSESSMENT — PAIN DESCRIPTION - INTENSITY: RATING_2: 0

## 2024-10-27 ASSESSMENT — PAIN DESCRIPTION - ONSET
ONSET: GRADUAL

## 2024-10-27 ASSESSMENT — PAIN DESCRIPTION - DESCRIPTORS
DESCRIPTORS_2: ACHING
DESCRIPTORS: ACHING
DESCRIPTORS: ACHING;DISCOMFORT
DESCRIPTORS: ACHING;DISCOMFORT
DESCRIPTORS: ACHING
DESCRIPTORS: ACHING

## 2024-10-27 ASSESSMENT — PAIN DESCRIPTION - ORIENTATION
ORIENTATION: RIGHT;LEFT;MID
ORIENTATION_2: ANTERIOR;POSTERIOR
ORIENTATION: RIGHT;LEFT
ORIENTATION: RIGHT;LEFT
ORIENTATION: LOWER;MID
ORIENTATION: RIGHT;LEFT
ORIENTATION: MID;LOWER

## 2024-10-27 ASSESSMENT — PAIN DESCRIPTION - DIRECTION
RADIATING_TOWARDS: GENERALIZED

## 2024-10-27 NOTE — PLAN OF CARE
Problem: Chronic Conditions and Co-morbidities  Goal: Patient's chronic conditions and co-morbidity symptoms are monitored and maintained or improved  Outcome: Progressing  Flowsheets (Taken 10/27/2024 0734)  Care Plan - Patient's Chronic Conditions and Co-Morbidity Symptoms are Monitored and Maintained or Improved: Monitor and assess patient's chronic conditions and comorbid symptoms for stability, deterioration, or improvement     Problem: Discharge Planning  Goal: Discharge to home or other facility with appropriate resources  Outcome: Progressing     Problem: Safety - Adult  Goal: Free from fall injury  Outcome: Progressing     Problem: Pain  Goal: Verbalizes/displays adequate comfort level or baseline comfort level  Outcome: Progressing  Flowsheets  Taken 10/27/2024 0734 by Neema Andino RN  Verbalizes/displays adequate comfort level or baseline comfort level: Encourage patient to monitor pain and request assistance  Taken 10/27/2024 0405 by Mandy Hodgson, RN  Verbalizes/displays adequate comfort level or baseline comfort level: Encourage patient to monitor pain and request assistance  Taken 10/27/2024 0335 by Mandy Hodgson RN  Verbalizes/displays adequate comfort level or baseline comfort level: Encourage patient to monitor pain and request assistance     Problem: Skin/Tissue Integrity  Goal: Absence of new skin breakdown  Description: 1.  Monitor for areas of redness and/or skin breakdown  2.  Assess vascular access sites hourly  3.  Every 4-6 hours minimum:  Change oxygen saturation probe site  4.  Every 4-6 hours:  If on nasal continuous positive airway pressure, respiratory therapy assess nares and determine need for appliance change or resting period.  Outcome: Progressing     Problem: ABCDS Injury Assessment  Goal: Absence of physical injury  Outcome: Progressing     Problem: Nutrition Deficit:  Goal: Optimize nutritional status  Outcome: Progressing

## 2024-10-27 NOTE — PLAN OF CARE
Problem: Chronic Conditions and Co-morbidities  Goal: Patient's chronic conditions and co-morbidity symptoms are monitored and maintained or improved  Outcome: Progressing  Care Plan - Patient's Chronic Conditions and Co-Morbidity Symptoms are Monitored and Maintained or Improved: Monitor and assess patient's chronic conditions and comorbid symptoms for stability, deterioration, or improvement     Problem: Discharge Planning  Goal: Discharge to home or other facility with appropriate resources  Outcome: Progressing  Discharge to home or other facility with appropriate resources: Identify barriers to discharge with patient and caregiver     Problem: Safety - Adult  Goal: Free from fall injury  Outcome: Progressing     Problem: Pain  Goal: Verbalizes/displays adequate comfort level or baseline comfort level  Outcome: Progressing  Verbalizes/displays adequate comfort level or baseline comfort level: Encourage patient to monitor pain and request assistance       Problem: Skin/Tissue Integrity  Goal: Absence of new skin breakdown  Description: 1.  Monitor for areas of redness and/or skin breakdown  2.  Assess vascular access sites hourly  3.  Every 4-6 hours minimum:  Change oxygen saturation probe site  4.  Every 4-6 hours:  If on nasal continuous positive airway pressure, respiratory therapy assess nares and determine need for appliance change or resting period.  Outcome: Progressing     Problem: ABCDS Injury Assessment  Goal: Absence of physical injury  Outcome: Progressing     Problem: Nutrition Deficit:  Goal: Optimize nutritional status  Outcome: Progressing

## 2024-10-27 NOTE — PROGRESS NOTES
Patient is visiting with Granddaughter at bedside currently and had two (2) PRN medications:Percocet for 5/10 pain on zero to ten (0-10) pain scale early day and Simethicone for gas PO PRN. She agreed that drinking caffeine in excess was not helpful to the symptoms of nausea. No emesis at this time will continue to monitor.

## 2024-10-27 NOTE — PROGRESS NOTES
Department of Physical Medicine & Rehabilitation  Progress Note    Patient Identification:  Naty Dykes  [id number]  : 1935  Admit date: 10/16/2024    Chief Complaint: SDH (subdural hematoma)    Subjective:   Seen in her room this morning.  No new complaints overnight.  She is happy to have a day off of therapy.  She slept well last night and has no new pain.    ROS: No f/c, n/v, cp     Objective:  Patient Vitals for the past 24 hrs:   BP Temp Temp src Pulse Resp SpO2   10/27/24 0956 138/76 -- -- 74 -- --   10/27/24 0734 138/76 97.5 °F (36.4 °C) Oral 74 17 92 %   10/26/24 2035 -- -- -- 72 16 --   10/26/24 2005 128/72 98.2 °F (36.8 °C) Oral 69 16 96 %   10/26/24 1745 132/70 -- -- 68 -- --     CONST: Alert. No acute distress  EYES: No icterus noted.  HENT: Atraumatic, normocephalic;  NECK: Trachea midline, neck supple.  CV: Regular rate and rhythm, no murmur rub or gallop noted  RESP: Lungs clear to auscultation bilaterally, no rales wheezes or ronchi, no retractions. Respirations unlabored.   GI: Soft, nontender, nondistended.   EXT: No significant edema appreciated to BLE  NEURO:   Patient somnolent. Limited exam.  Unable to assess EOMI. CN V, CN VII, CNXI-XII intact  4/5 strengths to BUE. 3/5 strengths to bilateral hip flexion. 4/5 strengths to ankle dorsi/plantarflexion  PSYCH: Stable mood, normal judgement, normal affect     Laboratory data: Available via EMR.   Last 24 hour lab  No results found for this or any previous visit (from the past 24 hour(s)).        Therapy progress:  PT  Position Activity Restriction  Other position/activity restrictions: up with assist, seizure precautions  Objective     Sit to Stand: Maximum Assistance, Moderate Assistance (Max A/ mod x1 with step by step VC for sequencing technique . Pt has a posterior lean and has difficulty with ant WS especially w/ hand placement on the surface that she is ascending from)  Stand to Sit: Moderate Assistance (Req Mod A x1 with VC for

## 2024-10-28 LAB
ANION GAP SERPL CALCULATED.3IONS-SCNC: 12 MMOL/L (ref 3–16)
BASOPHILS # BLD: 0.1 K/UL (ref 0–0.2)
BASOPHILS NFR BLD: 1.2 %
BUN SERPL-MCNC: 26 MG/DL (ref 7–20)
CALCIUM SERPL-MCNC: 9.6 MG/DL (ref 8.3–10.6)
CHLORIDE SERPL-SCNC: 98 MMOL/L (ref 99–110)
CO2 SERPL-SCNC: 26 MMOL/L (ref 21–32)
CREAT SERPL-MCNC: 1.5 MG/DL (ref 0.6–1.2)
DEPRECATED RDW RBC AUTO: 14.3 % (ref 12.4–15.4)
EOSINOPHIL # BLD: 0.2 K/UL (ref 0–0.6)
EOSINOPHIL NFR BLD: 2.2 %
GFR SERPLBLD CREATININE-BSD FMLA CKD-EPI: 33 ML/MIN/{1.73_M2}
GLUCOSE SERPL-MCNC: 116 MG/DL (ref 70–99)
HCT VFR BLD AUTO: 33.7 % (ref 36–48)
HGB BLD-MCNC: 11.1 G/DL (ref 12–16)
LYMPHOCYTES # BLD: 1.2 K/UL (ref 1–5.1)
LYMPHOCYTES NFR BLD: 15.8 %
MCH RBC QN AUTO: 30.1 PG (ref 26–34)
MCHC RBC AUTO-ENTMCNC: 32.9 G/DL (ref 31–36)
MCV RBC AUTO: 91.5 FL (ref 80–100)
MONOCYTES # BLD: 0.5 K/UL (ref 0–1.3)
MONOCYTES NFR BLD: 6.6 %
NEUTROPHILS # BLD: 5.8 K/UL (ref 1.7–7.7)
NEUTROPHILS NFR BLD: 74.2 %
PLATELET # BLD AUTO: 332 K/UL (ref 135–450)
PMV BLD AUTO: 8.5 FL (ref 5–10.5)
POTASSIUM SERPL-SCNC: 3.6 MMOL/L (ref 3.5–5.1)
RBC # BLD AUTO: 3.68 M/UL (ref 4–5.2)
SODIUM SERPL-SCNC: 136 MMOL/L (ref 136–145)
WBC # BLD AUTO: 7.8 K/UL (ref 4–11)

## 2024-10-28 PROCEDURE — 6360000002 HC RX W HCPCS: Performed by: PHYSICAL MEDICINE & REHABILITATION

## 2024-10-28 PROCEDURE — 92507 TX SP LANG VOICE COMM INDIV: CPT

## 2024-10-28 PROCEDURE — 97535 SELF CARE MNGMENT TRAINING: CPT

## 2024-10-28 PROCEDURE — 97129 THER IVNTJ 1ST 15 MIN: CPT

## 2024-10-28 PROCEDURE — 97116 GAIT TRAINING THERAPY: CPT | Performed by: PHYSICAL THERAPIST

## 2024-10-28 PROCEDURE — 85025 COMPLETE CBC W/AUTO DIFF WBC: CPT

## 2024-10-28 PROCEDURE — 97130 THER IVNTJ EA ADDL 15 MIN: CPT

## 2024-10-28 PROCEDURE — 6370000000 HC RX 637 (ALT 250 FOR IP): Performed by: STUDENT IN AN ORGANIZED HEALTH CARE EDUCATION/TRAINING PROGRAM

## 2024-10-28 PROCEDURE — 97530 THERAPEUTIC ACTIVITIES: CPT | Performed by: PHYSICAL THERAPIST

## 2024-10-28 PROCEDURE — 93298 REM INTERROG DEV EVAL SCRMS: CPT | Performed by: INTERNAL MEDICINE

## 2024-10-28 PROCEDURE — 92526 ORAL FUNCTION THERAPY: CPT

## 2024-10-28 PROCEDURE — 97110 THERAPEUTIC EXERCISES: CPT

## 2024-10-28 PROCEDURE — 97530 THERAPEUTIC ACTIVITIES: CPT

## 2024-10-28 PROCEDURE — 1280000000 HC REHAB R&B

## 2024-10-28 PROCEDURE — 97110 THERAPEUTIC EXERCISES: CPT | Performed by: PHYSICAL THERAPIST

## 2024-10-28 PROCEDURE — 36415 COLL VENOUS BLD VENIPUNCTURE: CPT

## 2024-10-28 PROCEDURE — 80048 BASIC METABOLIC PNL TOTAL CA: CPT

## 2024-10-28 PROCEDURE — 6370000000 HC RX 637 (ALT 250 FOR IP): Performed by: PHYSICAL MEDICINE & REHABILITATION

## 2024-10-28 RX ADMIN — SIMETHICONE 80 MG: 80 TABLET, CHEWABLE ORAL at 21:08

## 2024-10-28 RX ADMIN — CYANOCOBALAMIN TAB 1000 MCG 1000 MCG: 1000 TAB at 09:20

## 2024-10-28 RX ADMIN — AMIODARONE HYDROCHLORIDE 50 MG: 200 TABLET ORAL at 09:20

## 2024-10-28 RX ADMIN — LEVETIRACETAM 500 MG: 500 TABLET, FILM COATED ORAL at 21:08

## 2024-10-28 RX ADMIN — HEPARIN SODIUM 5000 UNITS: 5000 INJECTION INTRAVENOUS; SUBCUTANEOUS at 09:19

## 2024-10-28 RX ADMIN — COLCHICINE 0.3 MG: 0.6 TABLET, FILM COATED ORAL at 09:20

## 2024-10-28 RX ADMIN — CARVEDILOL 6.25 MG: 6.25 TABLET, FILM COATED ORAL at 09:21

## 2024-10-28 RX ADMIN — ATORVASTATIN CALCIUM 80 MG: 80 TABLET, FILM COATED ORAL at 09:20

## 2024-10-28 RX ADMIN — OXYCODONE AND ACETAMINOPHEN 1 TABLET: 10; 325 TABLET ORAL at 17:17

## 2024-10-28 RX ADMIN — PANTOPRAZOLE SODIUM 40 MG: 40 TABLET, DELAYED RELEASE ORAL at 05:52

## 2024-10-28 RX ADMIN — CARVEDILOL 6.25 MG: 6.25 TABLET, FILM COATED ORAL at 17:17

## 2024-10-28 RX ADMIN — SENNOSIDES 8.6 MG: 8.6 TABLET, FILM COATED ORAL at 21:08

## 2024-10-28 RX ADMIN — DICLOFENAC SODIUM TOPICAL GEL, 1% 2 G: 10 GEL TOPICAL at 09:22

## 2024-10-28 RX ADMIN — Medication 1000 UNITS: at 09:21

## 2024-10-28 RX ADMIN — HEPARIN SODIUM 5000 UNITS: 5000 INJECTION INTRAVENOUS; SUBCUTANEOUS at 21:08

## 2024-10-28 RX ADMIN — FUROSEMIDE 40 MG: 40 TABLET ORAL at 09:20

## 2024-10-28 RX ADMIN — OXYCODONE AND ACETAMINOPHEN 1 TABLET: 10; 325 TABLET ORAL at 09:21

## 2024-10-28 RX ADMIN — LEVETIRACETAM 500 MG: 500 TABLET, FILM COATED ORAL at 09:20

## 2024-10-28 RX ADMIN — CIPROFLOXACIN HYDROCHLORIDE 500 MG: 500 TABLET, FILM COATED ORAL at 11:40

## 2024-10-28 RX ADMIN — DICLOFENAC SODIUM TOPICAL GEL, 1% 2 G: 10 GEL TOPICAL at 21:09

## 2024-10-28 RX ADMIN — BISACODYL 5 MG: 5 TABLET, COATED ORAL at 09:21

## 2024-10-28 ASSESSMENT — PAIN DESCRIPTION - LOCATION
LOCATION: BACK;HIP
LOCATION: NECK;BACK
LOCATION: BACK;KNEE
LOCATION: GENERALIZED

## 2024-10-28 ASSESSMENT — PAIN DESCRIPTION - ORIENTATION
ORIENTATION: LOWER;MID
ORIENTATION: RIGHT;LOWER

## 2024-10-28 ASSESSMENT — PAIN SCALES - GENERAL
PAINLEVEL_OUTOF10: 6
PAINLEVEL_OUTOF10: 5
PAINLEVEL_OUTOF10: 7
PAINLEVEL_OUTOF10: 8
PAINLEVEL_OUTOF10: 7
PAINLEVEL_OUTOF10: 0
PAINLEVEL_OUTOF10: 0

## 2024-10-28 ASSESSMENT — PAIN SCALES - WONG BAKER: WONGBAKER_NUMERICALRESPONSE: HURTS A LITTLE BIT

## 2024-10-28 ASSESSMENT — PAIN DESCRIPTION - PAIN TYPE
TYPE: CHRONIC PAIN
TYPE: CHRONIC PAIN

## 2024-10-28 ASSESSMENT — PAIN DESCRIPTION - DESCRIPTORS
DESCRIPTORS: ACHING;DISCOMFORT
DESCRIPTORS: ACHING;DISCOMFORT
DESCRIPTORS: SHARP
DESCRIPTORS: ACHING

## 2024-10-28 ASSESSMENT — PAIN DESCRIPTION - FREQUENCY
FREQUENCY: CONTINUOUS
FREQUENCY: INTERMITTENT

## 2024-10-28 ASSESSMENT — PAIN - FUNCTIONAL ASSESSMENT
PAIN_FUNCTIONAL_ASSESSMENT: ACTIVITIES ARE NOT PREVENTED

## 2024-10-28 ASSESSMENT — PAIN DESCRIPTION - ONSET
ONSET: GRADUAL
ONSET: ON-GOING

## 2024-10-28 NOTE — PLAN OF CARE
Problem: Chronic Conditions and Co-morbidities  Goal: Patient's chronic conditions and co-morbidity symptoms are monitored and maintained or improved  Outcome: Progressing  Care Plan - Patient's Chronic Conditions and Co-Morbidity Symptoms are Monitored and Maintained or Improved: Monitor and assess patient's chronic conditions and comorbid symptoms for stability, deterioration, or improvement     Problem: Discharge Planning  Goal: Discharge to home or other facility with appropriate resources  Outcome: Progressing     Problem: Safety - Adult  Goal: Free from fall injury  Outcome: Progressing     Problem: Pain  Goal: Verbalizes/displays adequate comfort level or baseline comfort level  Outcome: Progressing  Verbalizes/displays adequate comfort level or baseline comfort level: Encourage patient to monitor pain and request assistance     Problem: Skin/Tissue Integrity  Goal: Absence of new skin breakdown  Description: 1.  Monitor for areas of redness and/or skin breakdown  2.  Assess vascular access sites hourly  3.  Every 4-6 hours minimum:  Change oxygen saturation probe site  4.  Every 4-6 hours:  If on nasal continuous positive airway pressure, respiratory therapy assess nares and determine need for appliance change or resting period.  Outcome: Progressing     Problem: ABCDS Injury Assessment  Goal: Absence of physical injury  Outcome: Progressing     Problem: Nutrition Deficit:  Goal: Optimize nutritional status  Outcome: Progressing

## 2024-10-28 NOTE — PROGRESS NOTES
Ph: [phone], Fax: [phone]                                     [employer]                                                   [address] [place] [address]           Reason for admission:    Rehab             Brief Summary:     Naty Dykes is being seen by nephrology for CKD    Interval History and Plan:   Patient seen at bedside with nurse  Feel nauseous.   Comfortable on room air  /83  Labs pending.         Follow renal panel  Decreased Coreg dose to 6.25 mg BID and follow BP  Increase protein in diet  Drink fluid/water to thirst   Avoid NSAIDs and nephrotoxins if possible  Replete lytes as needed  Dose medications according to GFR      Thank you for allowing us to participate in this patient's care  In case of any question please call us at our 24 hour answering service [phone] or from 7 AM to 5 PM via Perfect Serve, Voalte, Epic chat or cell phone.   Dr Sina Kwan MD      Assessment:     Chronic Kidney Disease  Stage 4   Cr around her baseline.       Hypertension  CAD  A fib  CHF with LVEF 4- 45 %     On Lasix 40 mg daily + Amiodarone + Coreg 12.5 mg BID         Hyponatremia          HPI      Patient is a 88 y.o. female  with PMH significant for CKD 4, CAD, GERD, DL, HTN was admitted with SDH after fall and now at rehab and nephrology is consulted to follow and assist in care given CKD 4.       Patient seen at bedside with PT/OT and appear general weak and lethargic but awake and able to answer basic questions. Patient having back pain for which getting pain medications. Denied any SOB, GI or urinary symptoms. Per patient appetite is fair and she is trying to keep herself hydrated.     Reviewed records.         ROS:   Negative except as mentioned in HPI      Vitals:     Vitals:    10/28/24 0740   BP: 128/66   Pulse: 63   Resp: 15   Temp: 98.4 °F (36.9 °C)   SpO2: 93%         Physical Examination:     General appearance: NAD. Alert and

## 2024-10-28 NOTE — PLAN OF CARE
Problem: Chronic Conditions and Co-morbidities  Goal: Patient's chronic conditions and co-morbidity symptoms are monitored and maintained or improved  Outcome: Progressing  Flowsheets (Taken 10/28/2024 0912)  Care Plan - Patient's Chronic Conditions and Co-Morbidity Symptoms are Monitored and Maintained or Improved:   Monitor and assess patient's chronic conditions and comorbid symptoms for stability, deterioration, or improvement   Collaborate with multidisciplinary team to address chronic and comorbid conditions and prevent exacerbation or deterioration   Update acute care plan with appropriate goals if chronic or comorbid symptoms are exacerbated and prevent overall improvement and discharge     Problem: Discharge Planning  Goal: Discharge to home or other facility with appropriate resources  Outcome: Progressing  Flowsheets (Taken 10/28/2024 0912)  Discharge to home or other facility with appropriate resources:   Identify barriers to discharge with patient and caregiver   Arrange for needed discharge resources and transportation as appropriate   Identify discharge learning needs (meds, wound care, etc)     Problem: Safety - Adult  Goal: Free from fall injury  Outcome: Progressing     Problem: Pain  Goal: Verbalizes/displays adequate comfort level or baseline comfort level  Outcome: Progressing     Problem: Skin/Tissue Integrity  Goal: Absence of new skin breakdown  Description: 1.  Monitor for areas of redness and/or skin breakdown  2.  Assess vascular access sites hourly  3.  Every 4-6 hours minimum:  Change oxygen saturation probe site  4.  Every 4-6 hours:  If on nasal continuous positive airway pressure, respiratory therapy assess nares and determine need for appliance change or resting period.  Outcome: Progressing     Problem: ABCDS Injury Assessment  Goal: Absence of physical injury  Outcome: Progressing     Problem: Nutrition Deficit:  Goal: Optimize nutritional status  Outcome: Progressing

## 2024-10-28 NOTE — PROGRESS NOTES
training;Functional mobility training;Endurance training;Patient/Caregiver education & training;Gait training;Equipment evaluation, education, & procurement    Goals  Patient Goals   Patient goals : \"Get back to normal.\"  Short Term Goals  Time Frame for Short Term Goals: 14 days - all ongoing  Short Term Goal 1: Pt will complete LE dressing w/ min A. - goal met 10/24/24  Short Term Goal 2: Pt will complete UE dressing w/ min A. - goal met 10/24/24  Short Term Goal 3: Pt will complete grooming w/ SPV, in seated. - goal met 10/25/24  Short Term Goal 4: Pt will complete toileting w/ min A. - goal met 10/21/24  Short Term Goal 5: Pt will complete toilet transfer w/ min A. - goal met 10/21/24  Long Term Goals  Time Frame for Long Term Goals : 21 days - all ongoing  Long Term Goal 1: Pt will complete LE dressing w/ SPV.  Long Term Goal 2: Pt will complete UE dressing independently.  Long Term Goal 3: Pt will complete grooming independently, in seated.  Long Term Goal 4: Pt will complete toileting w/ SPV.  Long Term Goal 5: Pt will complete toilet transfer w/ SPV.          Therapy Time   Individual Concurrent Group Co-treatment   Time In 1300         Time Out 1400         Minutes 60             Timed Code Treatment Minutes:  60 min     Total Treatment Minutes: 60 min     Lisa Maurice OT

## 2024-10-28 NOTE — PROGRESS NOTES
time to complete  Device: Walker (RW)  Sit to Stand  Assistance Level: Contact guard assist  Skilled Clinical Factors: VC for scooting to the EOS and for hand placement  Stand to Sit  Assistance Level: Contact guard assist  Skilled Clinical Factors: VC for hand placement and controlling descent ( decreased eccentric control)      Environmental Mobility  Ambulation  Surface: Level surface;Ramp  Device: Rolling walker  Distance: 80' x2, 180'( included amb up and down a 70' ramp in each direction)  Additional Factors: Verbal cues;Hand placement cues;Increased time to complete  Assistance Level: Stand by assist  Gait Deviations: Slow agnieszka;Decreased step length bilateral  Skilled Clinical Factors: Forward, downward head position with rounded shlds, Req VC/TC for more erect posture.. Very slow agnieszka  Wheelchair  Assistance Required to Manage Parts:  ([place] assist to locate and manage w/c brakes, hand weakness (B))    PT instructed pt with  The following standing ex using  BUE support:  1. Toe raises/heel raises  2. Marching   3. Hip abd( alternating)  4. Hamstring curls ( alternating)   Micki 10 reps of each with seated  rests between                ASSESSMENT/PROGRESS TOWARDS GOALS       Assessment  Assessment: Pt was very fatigued when PT initially went in to see pt 2/2 to just finishing OT seeion shortly before. For this reason, pt transf back to bed and PT allowed for a 45 minute nap. Pt moved slowly but was able to walk up and down the ramp which simulates her home Per pt's report)  Pt is below baseline and would benefit from cont therapy to maximize potential and increase functional mobility towards Ind to allow for a safer d/c to home.  Activity Tolerance: Patient limited by pain;Patient limited by endurance;Patient limited by fatigue  Discharge Recommendations: Home with Home health PT;24 hour supervision or assist  PT Equipment Recommendations  Other: Ongoing assessment at this time    Goals  Patient Goals    Patient Goals : \"Walk again like she use to\"  Short Term Goals  Time Frame for Short Term Goals: 14 days( all goals are ongoing)  Short Term Goal 1: S with all bed mobility skills.  Short Term Goal 2: Transf with CGA x1. Goal achieved 10/26  Short Term Goal 3: Amb distances with LRADx 40' with CGA. Goal met 10/26  Short Term Goal 4: demo MI w/ w/c propulsion x 150' at a time( not addressed at this time)  Long Term Goals  Time Frame for Long Term Goals : 21 days ( all LTG are ongoing)  Long Term Goal 1: Ind w/ bed mobility  Long Term Goal 2: Ind w transf excluding floor transf  Long Term Goal 3: Amb with LRAD distances x1 00' at a time    PLAN OF CARE/SAFETY  Physical Therapy Plan  Days Per Week: 5 Days  Hours Per Day: 1 hour  Therapy Duration: 3 Weeks  Current Treatment Recommendations: Strengthening;ROM;Balance training;Functional mobility training;Transfer training;Endurance training;Gait training;Stair training;Neuromuscular re-education;Home exercise program;Safety education & training;Patient/Caregiver education & training;Equipment evaluation, education, & procurement;Therapeutic activities  Safety Devices  Type of Devices: Call light within reach;Chair alarm in place;Left in chair;Gait belt;All fall risk precautions in place  Restraints  Restraints Initially in Place: No    EDUCATION  Education  Education Given To: Patient  Education Provided: Safety;Mobility Training;Transfer Training;Fall Prevention Strategies;Energy Conservation;Home Exercise Program  Education Provided Comments: PT instructed pt with the importance of an erect posture for balance and improved breathing.  Education Method: Verbal;Demonstration  Barriers to Learning: Cognition  Education Outcome: Verbalized understanding;Demonstrated understanding;Continued education needed        Therapy Time   Individual Concurrent Group Co-treatment   Time In 1500         Time Out 1600         Minutes 60                   Roshni Wilkerson PT, 10/28/24 at

## 2024-10-28 NOTE — PROGRESS NOTES
Department of Physical Medicine & Rehabilitation  Progress Note    Patient Identification:  Naty Dykes  [id number]  : 1935  Admit date: 10/16/2024    Chief Complaint: SDH (subdural hematoma)    Subjective:   Resting in her wheelchair today.  No new complaints overnight.  She continues to work with therapy.  She is very tired today after therapy.  Slept well last night.    ROS: No f/c, n/v, cp     Objective:  Patient Vitals for the past 24 hrs:   BP Temp Temp src Pulse Resp SpO2 Weight   10/28/24 0920 -- -- -- -- 16 -- --   10/28/24 0917 (!) 141/83 -- -- 82 -- -- --   10/28/24 0740 128/66 98.4 °F (36.9 °C) Oral 63 15 93 % --   10/28/24 0356 -- -- -- -- -- -- 51.4 kg (113 lb 5.1 oz)   10/27/24 2038 -- -- -- 69 16 -- --   10/27/24 2008 138/74 98 °F (36.7 °C) Oral 72 16 95 % --   10/27/24 1834 (!) 147/78 -- -- 67 -- -- --     CONST: Alert. No acute distress  EYES: No icterus noted.  HENT: Atraumatic, normocephalic;  NECK: Trachea midline, neck supple.  CV: Regular rate and rhythm, no murmur rub or gallop noted  RESP: Lungs clear to auscultation bilaterally, no rales wheezes or ronchi, no retractions. Respirations unlabored.   GI: Soft, nontender, nondistended.   EXT: No significant edema appreciated to BLE  NEURO:   Patient somnolent. Limited exam.  Unable to assess EOMI. CN V, CN VII, CNXI-XII intact  4/5 strengths to BUE. 3/5 strengths to bilateral hip flexion. 4/5 strengths to ankle dorsi/plantarflexion  PSYCH: Stable mood, normal judgement, normal affect     Laboratory data: Available via EMR.   Last 24 hour lab  Recent Results (from the past 24 hour(s))   Basic Metabolic Panel w/ Reflex to MG    Collection Time: 10/28/24  9:29 AM   Result Value Ref Range    Sodium 136 136 - 145 mmol/L    Potassium reflex Magnesium 3.6 3.5 - 5.1 mmol/L    Chloride 98 (L) 99 - 110 mmol/L    CO2 26 21 - 32 mmol/L    Anion Gap 12 3 - 16    Glucose 116 (H) 70 - 99 mg/dL    BUN 26 (H) 7 - 20 mg/dL    Creatinine 1.5 (H) 0.6 -

## 2024-10-28 NOTE — PATIENT CARE CONFERENCE
Inpatient Rehabilitation  Weekly Team Conference Note  The [address] [place] [address]  [phone]  Patient Name: Naty Dykes        MRN: [id number]    : 1935  (88 y.o.)  Gender: female   Houston Barth DO  SDH (subdural hematoma)  The team conference for this patient was held on 10/29/2024 at 11:00am by:  Houston Barth DO    Current/Goal QM SCORES  QM Current/Goal Score   Eating CARE Score: 3 / Discharge Goal: Independent   Oral Hygiene CARE Score: 5 / Discharge Goal: Independent   Shower/Bathing CARE Score: 3 / Discharge Goal: Supervision or touching assistance   UB Dressing CARE Score: 4 / Discharge Goal: Independent   LB Dressing CARE Score: 4 / Discharge Goal: Supervision or touching assistance   Putting on/off Footwear CARE Score: 4 / Discharge Goal: Independent   Toileting Hygiene CARE Score: 3 / Discharge Goal: Supervision or touching assistance   Bladder Continence Bladder Continence: Always continent    Bowel Continence      Toilet Transfers CARE Score: 4 / Discharge Goal: Supervision or touching assistance   Shower/Bathe Self  CARE Score: 3 / Discharge Goal: Supervision or touching assistance   Rolling Left and Right CARE Score: 4 / Discharge Goal: Independent   Sit to Lying CARE Score: 2 / Discharge Goal: Independent   Lying to Sitting on Bedside CARE Score: 3 / Discharge Goal: Supervision or touching assistance   Sit to Stand CARE Score: 3 / Discharge Goal: Supervision or touching assistance   Chair/Bed to Chair Transfer CARE Score: 4 /     Car Transfers CARE Score: 2 / Discharge Goal: Supervision or touching assistance   Walk 10 Feet CARE Score: 3 /     Walk 50 Feet with Two Turns CARE Score: 4 / Discharge Goal: Supervision or touching assistance   Walk 150 Feet CARE Score: 88 / Discharge Goal: Not Applicable   Walk 10 Feet on Uneven Surfaces CARE Score: 88 / Discharge Goal: Not Applicable   1 Step (Curb) CARE Score: 88 / Discharge  THERAPY:  ADL  Feeding: Supervision, Minimal assistance  Feeding Skilled Clinical Factors: SPV for safety, intermittent assist to bring cup fully to mouth (1 episode of spilling coffee during session and almost spilling water)  Grooming: Minimal assistance  Grooming Skilled Clinical Factors: Assist to set up toothbrush, sequence task, and bring cup to mouth for rinsing.  UE Bathing: Stand by assistance  UE Bathing Skilled Clinical Factors: Pt complete UE sponge bathing in seated w/ SBA.  LE Bathing: Moderate assistance  LE Bathing Skilled Clinical Factors: Assist to wash B feet d/t limited reach and chelsey-area/buttocks.  UE Dressing: Maximum assistance  UE Dressing Skilled Clinical Factors: Assist to thread BUE thru shirt, pull overhead, and adjust around trunk.  LE Dressing: Maximum assistance  LE Dressing Skilled Clinical Factors: Assist to thread BLE thru brief/pants, pt stood w/ max A at RW while therapist pulled up over hips. Assist to doff/don footwear.  Toileting: Maximum assistance  Toileting Skilled Clinical Factors: Pt continent of urine on commode. Requried assist to manage pants down/up, completed seated chelsey-hygiene w/ increased time.  Functional Mobility Skilled Clinical Factors: Completed fxl mobility to/from bathroom via w/c.  Skin Care: Protective barrier    Toilet Transfers:  Toilet Transfers  Toilet - Technique: Stand step  Equipment Used: Standard toilet  Toilet Transfer: Maximum assistance  Toilet Transfers Comments: Max A t/f w/c <-> commode.    Tub/ShowerTransfers:          SPEECH THERAPY:  Assessment:  Pt with mild-moderate oral dysphagia and very poor PO intake noted. Pt continues to benefit from recalling strategies prior to eating meal to encourage carryover. Improved attention noted this date. Pt with ? visual perceptual/spatial deficit this date. Dysphonia noted.     NUTRITION:  Please see nutrition note for details.  Weight - Scale: 51.2 kg (112 lb 14 oz) / Body mass index is 20.65

## 2024-10-28 NOTE — PROGRESS NOTES
ACUTE REHAB UNIT  SPEECH/LANGUAGE PATHOLOGY      [x] Daily  [] Weekly Care Conference Note  [] Discharge    Patient:Naty Dyeks      :1935  MRN:[id number]  Rehab Dx/Hx: SDH (subdural hematoma) [S06.5XAA]    Precautions: [] Aspiration  [x] Fall risk  [] Sternal  [x] Seizure [] Hip  [] Weight Bearing [] Other  ST Dx: [] Aphasia  [] Dysarthria  [] Apraxia   [x] Oropharyngeal dysphagia [x] Cognitive Impairment  [] Other:   Date of Admit: 10/16/2024  Room #: 3106/3106-01  Date: 10/28/2024          Current Diet Order:ADULT DIET; Dysphagia - Minced and Moist; Low Fat/Low Chol/High Fiber/JAYNA; 1500 ml  ADULT ORAL NUTRITION SUPPLEMENT; Lunch; Standard High Calorie/High Protein Oral Supplement      Swallow Strategies: Alternate solids/liquids , Check for pocketing of food L, Check for pocketing of food R, Upright as possible with all PO intake , Assist Feed , Small bites/sips     CBC:  Lab Results   Component Value Date    WBC 8.2 10/25/2024    HGB 9.5 (L) 10/25/2024    HCT 28.4 (L) 10/25/2024    MCV 90.6 10/25/2024     10/25/2024     Living Status: Lives with son, Poor historian.   Medication Management:   []Primary   []Secondary [x]Comment: assist PRN per daughter  Finance Management:          []Primary   []Secondary [x]Comment: assist PRN per daughter  Active :                        []Yes         [x]No: daughter, grandson  Education: 3 years high school  Occupation: crafts  Hobbies/Leisure: read  Hearing: WFL  Vision: Vision Corrective Device: wears glasses for reading    Barriers toward progress: Cognitive deficit, Impulsivity, Limited safety awareness, and Limited insight into deficits      Date: 10/28/2024      Tx session 1 Tx session 2   Total Timed Code Min 15 30   Total Treatment Minutes 30 30   Individual Treatment Minutes 30 30   Group Treatment Minutes 0 0   Co-Treat Minutes 0 0   Brief Exception: N/A N/A   Pain None stated None indicated   Pain Intervention: [] RN notified  []

## 2024-10-28 NOTE — PROGRESS NOTES
Patient AOX 4. Assessment completed. VS as shown. BP (!) 141/83   Pulse 82   Temp 98.4 °F (36.9 °C) (Oral)   Resp 16  SpO2 93%   Patient working with therapy without complications. Standard safety measures in place and patient belongings are within reach. Will continue plan of care.       Asia Jacobs RN

## 2024-10-29 PROCEDURE — 6370000000 HC RX 637 (ALT 250 FOR IP): Performed by: STUDENT IN AN ORGANIZED HEALTH CARE EDUCATION/TRAINING PROGRAM

## 2024-10-29 PROCEDURE — 97130 THER IVNTJ EA ADDL 15 MIN: CPT

## 2024-10-29 PROCEDURE — 97535 SELF CARE MNGMENT TRAINING: CPT

## 2024-10-29 PROCEDURE — 97116 GAIT TRAINING THERAPY: CPT | Performed by: PHYSICAL THERAPIST

## 2024-10-29 PROCEDURE — 97110 THERAPEUTIC EXERCISES: CPT | Performed by: PHYSICAL THERAPIST

## 2024-10-29 PROCEDURE — 97129 THER IVNTJ 1ST 15 MIN: CPT

## 2024-10-29 PROCEDURE — 1280000000 HC REHAB R&B

## 2024-10-29 PROCEDURE — 6360000002 HC RX W HCPCS: Performed by: PHYSICAL MEDICINE & REHABILITATION

## 2024-10-29 PROCEDURE — 92526 ORAL FUNCTION THERAPY: CPT

## 2024-10-29 PROCEDURE — 97530 THERAPEUTIC ACTIVITIES: CPT

## 2024-10-29 PROCEDURE — 97530 THERAPEUTIC ACTIVITIES: CPT | Performed by: PHYSICAL THERAPIST

## 2024-10-29 PROCEDURE — 6370000000 HC RX 637 (ALT 250 FOR IP): Performed by: PHYSICAL MEDICINE & REHABILITATION

## 2024-10-29 RX ORDER — CALCIUM CARBONATE 500 MG/1
500 TABLET, CHEWABLE ORAL 3 TIMES DAILY PRN
Status: DISCONTINUED | OUTPATIENT
Start: 2024-10-29 | End: 2024-11-01 | Stop reason: HOSPADM

## 2024-10-29 RX ORDER — HYDROCORTISONE 25 MG/G
CREAM TOPICAL 2 TIMES DAILY
Status: DISCONTINUED | OUTPATIENT
Start: 2024-10-29 | End: 2024-11-01 | Stop reason: HOSPADM

## 2024-10-29 RX ADMIN — HYDROCORTISONE 2.5%: 25 CREAM TOPICAL at 10:56

## 2024-10-29 RX ADMIN — COLCHICINE 0.3 MG: 0.6 TABLET, FILM COATED ORAL at 07:51

## 2024-10-29 RX ADMIN — OXYCODONE AND ACETAMINOPHEN 1 TABLET: 10; 325 TABLET ORAL at 13:52

## 2024-10-29 RX ADMIN — BISACODYL 5 MG: 5 TABLET, COATED ORAL at 07:52

## 2024-10-29 RX ADMIN — ANTACID TABLETS 500 MG: 500 TABLET, CHEWABLE ORAL at 12:30

## 2024-10-29 RX ADMIN — HEPARIN SODIUM 5000 UNITS: 5000 INJECTION INTRAVENOUS; SUBCUTANEOUS at 20:18

## 2024-10-29 RX ADMIN — DICLOFENAC SODIUM TOPICAL GEL, 1% 2 G: 10 GEL TOPICAL at 20:18

## 2024-10-29 RX ADMIN — HEPARIN SODIUM 5000 UNITS: 5000 INJECTION INTRAVENOUS; SUBCUTANEOUS at 07:52

## 2024-10-29 RX ADMIN — OXYCODONE AND ACETAMINOPHEN 1 TABLET: 10; 325 TABLET ORAL at 04:44

## 2024-10-29 RX ADMIN — ONDANSETRON 4 MG: 4 TABLET, ORALLY DISINTEGRATING ORAL at 20:19

## 2024-10-29 RX ADMIN — ACETAMINOPHEN 650 MG: 325 TABLET ORAL at 20:19

## 2024-10-29 RX ADMIN — LEVETIRACETAM 500 MG: 500 TABLET, FILM COATED ORAL at 07:51

## 2024-10-29 RX ADMIN — FUROSEMIDE 40 MG: 40 TABLET ORAL at 07:51

## 2024-10-29 RX ADMIN — AMIODARONE HYDROCHLORIDE 50 MG: 200 TABLET ORAL at 07:51

## 2024-10-29 RX ADMIN — HYDROCORTISONE 2.5%: 25 CREAM TOPICAL at 20:18

## 2024-10-29 RX ADMIN — CARVEDILOL 6.25 MG: 6.25 TABLET, FILM COATED ORAL at 17:14

## 2024-10-29 RX ADMIN — CIPROFLOXACIN HYDROCHLORIDE 500 MG: 500 TABLET, FILM COATED ORAL at 10:56

## 2024-10-29 RX ADMIN — CYANOCOBALAMIN TAB 1000 MCG 1000 MCG: 1000 TAB at 07:52

## 2024-10-29 RX ADMIN — Medication 1000 UNITS: at 07:51

## 2024-10-29 RX ADMIN — DICLOFENAC SODIUM TOPICAL GEL, 1% 2 G: 10 GEL TOPICAL at 07:52

## 2024-10-29 RX ADMIN — CARVEDILOL 6.25 MG: 6.25 TABLET, FILM COATED ORAL at 07:51

## 2024-10-29 RX ADMIN — ATORVASTATIN CALCIUM 80 MG: 80 TABLET, FILM COATED ORAL at 07:51

## 2024-10-29 RX ADMIN — SENNOSIDES 8.6 MG: 8.6 TABLET, FILM COATED ORAL at 20:19

## 2024-10-29 RX ADMIN — LEVETIRACETAM 500 MG: 500 TABLET, FILM COATED ORAL at 20:19

## 2024-10-29 ASSESSMENT — PAIN DESCRIPTION - DESCRIPTORS
DESCRIPTORS: ACHING
DESCRIPTORS: ACHING;DISCOMFORT

## 2024-10-29 ASSESSMENT — PAIN DESCRIPTION - ONSET
ONSET: GRADUAL
ONSET: ON-GOING

## 2024-10-29 ASSESSMENT — PAIN - FUNCTIONAL ASSESSMENT
PAIN_FUNCTIONAL_ASSESSMENT: PREVENTS OR INTERFERES SOME ACTIVE ACTIVITIES AND ADLS
PAIN_FUNCTIONAL_ASSESSMENT: ACTIVITIES ARE NOT PREVENTED
PAIN_FUNCTIONAL_ASSESSMENT: ACTIVITIES ARE NOT PREVENTED
PAIN_FUNCTIONAL_ASSESSMENT: PREVENTS OR INTERFERES SOME ACTIVE ACTIVITIES AND ADLS
PAIN_FUNCTIONAL_ASSESSMENT: ACTIVITIES ARE NOT PREVENTED

## 2024-10-29 ASSESSMENT — PAIN SCALES - GENERAL
PAINLEVEL_OUTOF10: 6
PAINLEVEL_OUTOF10: 0
PAINLEVEL_OUTOF10: 0
PAINLEVEL_OUTOF10: 7
PAINLEVEL_OUTOF10: 3
PAINLEVEL_OUTOF10: 8
PAINLEVEL_OUTOF10: 0
PAINLEVEL_OUTOF10: 1
PAINLEVEL_OUTOF10: 6

## 2024-10-29 ASSESSMENT — PAIN SCALES - WONG BAKER
WONGBAKER_NUMERICALRESPONSE: HURTS A LITTLE BIT
WONGBAKER_NUMERICALRESPONSE: HURTS LITTLE MORE
WONGBAKER_NUMERICALRESPONSE: NO HURT

## 2024-10-29 ASSESSMENT — PAIN DESCRIPTION - LOCATION
LOCATION: BACK;HIP;LEG
LOCATION: BACK;NECK
LOCATION: BACK;KNEE;HIP
LOCATION: BACK
LOCATION: BACK;HIP

## 2024-10-29 ASSESSMENT — PAIN DESCRIPTION - FREQUENCY
FREQUENCY: CONTINUOUS
FREQUENCY: CONTINUOUS
FREQUENCY: INTERMITTENT
FREQUENCY: CONTINUOUS

## 2024-10-29 ASSESSMENT — PAIN DESCRIPTION - PAIN TYPE
TYPE: ACUTE PAIN;CHRONIC PAIN
TYPE: CHRONIC PAIN

## 2024-10-29 ASSESSMENT — PAIN DESCRIPTION - ORIENTATION
ORIENTATION: LOWER;RIGHT
ORIENTATION: RIGHT;LEFT
ORIENTATION: LOWER;MID;RIGHT
ORIENTATION: MID;LOWER

## 2024-10-29 ASSESSMENT — PAIN DESCRIPTION - DIRECTION: RADIATING_TOWARDS: BACK

## 2024-10-29 NOTE — PLAN OF CARE
Problem: Chronic Conditions and Co-morbidities  Goal: Patient's chronic conditions and co-morbidity symptoms are monitored and maintained or improved  Outcome: Progressing  Flowsheets (Taken 10/29/2024 0744)  Care Plan - Patient's Chronic Conditions and Co-Morbidity Symptoms are Monitored and Maintained or Improved:   Monitor and assess patient's chronic conditions and comorbid symptoms for stability, deterioration, or improvement   Collaborate with multidisciplinary team to address chronic and comorbid conditions and prevent exacerbation or deterioration   Update acute care plan with appropriate goals if chronic or comorbid symptoms are exacerbated and prevent overall improvement and discharge     Problem: Discharge Planning  Goal: Discharge to home or other facility with appropriate resources  Outcome: Progressing  Flowsheets (Taken 10/29/2024 0744)  Discharge to home or other facility with appropriate resources:   Identify barriers to discharge with patient and caregiver   Arrange for needed discharge resources and transportation as appropriate   Identify discharge learning needs (meds, wound care, etc)     Problem: Safety - Adult  Goal: Free from fall injury  Outcome: Progressing     Problem: Pain  Goal: Verbalizes/displays adequate comfort level or baseline comfort level  Outcome: Progressing     Problem: Skin/Tissue Integrity  Goal: Absence of new skin breakdown  Description: 1.  Monitor for areas of redness and/or skin breakdown  2.  Assess vascular access sites hourly  3.  Every 4-6 hours minimum:  Change oxygen saturation probe site  4.  Every 4-6 hours:  If on nasal continuous positive airway pressure, respiratory therapy assess nares and determine need for appliance change or resting period.  Outcome: Progressing     Problem: ABCDS Injury Assessment  Goal: Absence of physical injury  Outcome: Progressing     Problem: Nutrition Deficit:  Goal: Optimize nutritional status  Outcome: Progressing

## 2024-10-29 NOTE — PROGRESS NOTES
Department of Physical Medicine & Rehabilitation  Progress Note    Patient Identification:  Naty Dykes  [id number]  : 1935  Admit date: 10/16/2024    Chief Complaint: SDH (subdural hematoma)    Subjective:   Seen in her room today.  No new complaints overnight.  She is asking for hemorrhoid cream this morning.  She is planning on discharging home on 2024.  Team conference today.    ROS: No f/c, n/v, cp     Objective:  Patient Vitals for the past 24 hrs:   BP Temp Temp src Pulse Resp SpO2 Weight   10/29/24 0748 118/72 -- -- 60 -- -- --   10/29/24 0730 121/79 97.5 °F (36.4 °C) Oral 57 16 94 % --   10/29/24 0645 -- -- -- -- -- -- 51.2 kg (112 lb 14 oz)   10/29/24 0444 -- -- -- -- 16 -- --   10/28/24 2103 114/70 98.2 °F (36.8 °C) Oral 63 16 95 % --   10/28/24 1817 -- -- -- -- 16 -- --   10/28/24 1717 -- -- -- -- 16 -- --   10/28/24 1705 110/74 -- -- 74 -- -- --     CONST: Alert. No acute distress  EYES: No icterus noted.  HENT: Atraumatic, normocephalic;  NECK: Trachea midline, neck supple.  CV: Regular rate and rhythm, no murmur rub or gallop noted  RESP: Lungs clear to auscultation bilaterally, no rales wheezes or ronchi, no retractions. Respirations unlabored.   GI: Soft, nontender, nondistended.   EXT: No significant edema appreciated to BLE  NEURO:   Patient somnolent. Limited exam.  Unable to assess EOMI. CN V, CN VII, CNXI-XII intact  4/5 strengths to BUE. 3/5 strengths to bilateral hip flexion. 4/5 strengths to ankle dorsi/plantarflexion  PSYCH: Stable mood, normal judgement, normal affect     Laboratory data: Available via EMR.   Last 24 hour lab  No results found for this or any previous visit (from the past 24 hour(s)).          Therapy progress:  PT  Position Activity Restriction  Other position/activity restrictions: up with assist, seizure precautions  Objective     Sit to Stand: Maximum Assistance, Moderate Assistance (Max A/ mod x1 with step by step VC for sequencing technique . Pt has

## 2024-10-29 NOTE — PROGRESS NOTES
ACUTE REHAB UNIT  SPEECH/LANGUAGE PATHOLOGY      [x] Daily  [x] Weekly Care Conference Note  [] Discharge    Patient:Naty Dykes      :1935  MRN:[id number]  Rehab Dx/Hx: SDH (subdural hematoma) [S06.5XAA]    Precautions: [] Aspiration  [x] Fall risk  [] Sternal  [x] Seizure [] Hip  [] Weight Bearing [] Other  ST Dx: [] Aphasia  [] Dysarthria  [] Apraxia   [x] Oropharyngeal dysphagia [x] Cognitive Impairment  [] Other:   Date of Admit: 10/16/2024  Room #: 3106/3106-01  Date: 10/29/2024          Current Diet Order:ADULT DIET; Dysphagia - Minced and Moist; Low Fat/Low Chol/High Fiber/JAYNA; 1500 ml  ADULT ORAL NUTRITION SUPPLEMENT; Lunch; Other Oral Supplement; Ensure+ HP Milkshake      Swallow Strategies: Alternate solids/liquids , Check for pocketing of food L, Check for pocketing of food R, Upright as possible with all PO intake , Assist Feed , Small bites/sips     CBC:  Lab Results   Component Value Date    WBC 7.8 10/28/2024    HGB 11.1 (L) 10/28/2024    HCT 33.7 (L) 10/28/2024    MCV 91.5 10/28/2024     10/28/2024     Living Status: Lives with son, Poor historian.   Medication Management:   []Primary   []Secondary [x]Comment: assist PRN per daughter  Finance Management:          []Primary   []Secondary [x]Comment: assist PRN per daughter  Active :                        []Yes         [x]No: daughter, grandson  Education: 3 years high school  Occupation: crafts  Hobbies/Leisure: read  Hearing: WFL  Vision: Vision Corrective Device: wears glasses for reading    Barriers toward progress: Cognitive deficit, Impulsivity, Limited safety awareness, and Limited insight into deficits      Date: 10/29/2024       Tx session 1 Tx session 2 Weekly Summary   Total Timed Code Min 10 30    Total Treatment Minutes 30 30    Individual Treatment Minutes 30 30    Group Treatment Minutes 0 0    Co-Treat Minutes 0 0    Brief Exception: N/A N/A    Pain None stated None indicated    Pain Intervention: [] RN      Queenie Barragan M.A., CCC-SLP  [id number]  Speech-Language Pathologist    The speech-language pathologist was present, directed the patient's care, made skilled judgment and was responsible for assessment and treatment.

## 2024-10-29 NOTE — PLAN OF CARE
Problem: Chronic Conditions and Co-morbidities  Goal: Patient's chronic conditions and co-morbidity symptoms are monitored and maintained or improved  10/28/2024 2152 by Brett Vázquez RN  Outcome: Progressing  10/28/2024 1443 by Asia Jacobs RN  Outcome: Progressing  Flowsheets (Taken 10/28/2024 0912)  Care Plan - Patient's Chronic Conditions and Co-Morbidity Symptoms are Monitored and Maintained or Improved:   Monitor and assess patient's chronic conditions and comorbid symptoms for stability, deterioration, or improvement   Collaborate with multidisciplinary team to address chronic and comorbid conditions and prevent exacerbation or deterioration   Update acute care plan with appropriate goals if chronic or comorbid symptoms are exacerbated and prevent overall improvement and discharge     Problem: Discharge Planning  Goal: Discharge to home or other facility with appropriate resources  10/28/2024 2152 by Brett Vázquez RN  Outcome: Progressing  10/28/2024 1443 by Asia Jacobs RN  Outcome: Progressing  Flowsheets (Taken 10/28/2024 0912)  Discharge to home or other facility with appropriate resources:   Identify barriers to discharge with patient and caregiver   Arrange for needed discharge resources and transportation as appropriate   Identify discharge learning needs (meds, wound care, etc)     Problem: Safety - Adult  Goal: Free from fall injury  10/28/2024 2152 by Brett Vázquez RN  Outcome: Progressing  Flowsheets (Taken 10/28/2024 2151)  Free From Fall Injury: Instruct family/caregiver on patient safety  10/28/2024 1443 by Asia aJcobs RN  Outcome: Progressing     Problem: Pain  Goal: Verbalizes/displays adequate comfort level or baseline comfort level  10/28/2024 2152 by Brett Vázquez RN  Outcome: Progressing  Flowsheets (Taken 10/28/2024 2103)  Verbalizes/displays adequate comfort level or baseline comfort level:   Encourage patient to monitor pain and request assistance   Assess pain

## 2024-10-29 NOTE — PROGRESS NOTES
Physical Therapy  Facility/Department: [facility] ACUTE REHAB UNIT  Rehabilitation Physical Therapy Treatment Note    NAME: Naty Dykes  : 1935 (88 y.o.)  MRN: [id number]  CODE STATUS: Full Code    Date of Service: 10/29/24       Restrictions:  Restrictions/Precautions: Up as Tolerated, Fall Risk, Seizure  Position Activity Restriction  Other position/activity restrictions: up with assist, seizure precautions     SUBJECTIVE  Subjective  Subjective: Pt  in bed upon PT arrival. Pt agreeable to treatment.  Pain: c/o pain directly behind pt's R knee. Pt requested a pain patch which nsg reported that she would  bring to her following therapy. ( Pt also c/o hemorrhoid pain when having a BM)        Post Treatment Pain Screening         OBJECTIVE  Cognition  Arousal/Alertness: Delayed responses to stimuli;Inconsistent responses to stimuli  Following Commands: Follows one step commands with increased time;Follows one step commands with repetition  Attention Span: Attends with cues to redirect  Memory: Decreased recall of precautions;Decreased recall of recent events  Safety Judgement: Decreased awareness of need for assistance  Problem Solving: Assistance required to generate solutions  Insights: Impaired  Initiation: Requires cues for some  Sequencing: Requires cues for some  Cognition Comment: Improved sequencing and initiation w/ ADL tasks noted.( specifically toileting)  Orientation  Overall Orientation Status: Within Functional Limits    Functional Mobility  Bed Mobility  Additional Factors: Verbal cues;Increased time to complete;Head of bed flat;With handrails  Roll Left  Assistance Level: Modified independent  Skilled Clinical Factors: Req additional  time. Used the bedrail  Supine to Sit  Assistance Level: Modified independent  Skilled Clinical Factors: Used the bedrail  Scooting  Assistance Level: Modified independent  Skilled Clinical Factors: Perf in short sitting. req additional time especially when scooting

## 2024-10-29 NOTE — PROGRESS NOTES
Occupational Therapy  Facility/Department: [facility] ACUTE REHAB UNIT  Rehabilitation Occupational Therapy Daily Treatment Note    Date: 10/29/24  Patient Name: Naty Dykes       Room: 3106/3106-01  MRN: [id number]  Account: [id number]   : 1935  (88 y.o.) Gender: female                    Past Medical History:  has a past medical history of Arthritis, Blood circulation, collateral, CAD (coronary artery disease), CHF (congestive heart failure) ([facility]), Confusion, GERD (gastroesophageal reflux disease), History of heart artery stent, Hyperlipidemia, Hypertension, Mitral valve prolapse, Myocardial infarct, old, Seizure (HCC), and Thyroid disease.  Past Surgical History:   has a past surgical history that includes Hysterectomy; Thyroidectomy; Colonoscopy; other surgical history (2018); pr office/outpt visit,procedure only (Right, 2018); eye surgery (Left, 2018); pr office/outpt visit,procedure only (Left, 2018); and hip surgery (Right, 2022).    Restrictions  Restrictions/Precautions: Up as Tolerated, Fall Risk, Seizure  Other position/activity restrictions: up with assist, seizure precautions    Subjective  Subjective: Pt seated in recliner upon OT entry. Denied pain but reported generalized discomfort. Addressed via application of voltaren gel on shoulders.  Restrictions/Precautions: Up as Tolerated;Fall Risk;Seizure             Objective     Cognition  Arousal/Alertness: Delayed responses to stimuli;Inconsistent responses to stimuli  Following Commands: Follows one step commands with increased time;Follows one step commands with repetition  Attention Span: Attends with cues to redirect  Memory: Decreased recall of precautions;Decreased recall of recent events  Safety Judgement: Decreased awareness of need for assistance  Problem Solving: Assistance required to generate solutions  Insights: Impaired  Initiation: Requires cues for some  Sequencing: Requires cues for some  Cognition  Function;Fall Prevention Strategies;Mobility Training;Home Exercise Program  Education Method: Demonstration;Verbal  Education Outcome: Verbalized understanding;Demonstrated understanding    Plan  Occupational Therapy Plan  Times Per Week: 5x a week, 60 mins daily  Current Treatment Recommendations: Strengthening;Balance training;Pain management;Self-Care / ADL;Safety education & training;Functional mobility training;Endurance training;Patient/Caregiver education & training;Gait training;Equipment evaluation, education, & procurement    Goals  Patient Goals   Patient goals : \"Get back to normal.\"  Short Term Goals  Time Frame for Short Term Goals: 14 days - all ongoing  Short Term Goal 1: Pt will complete LE dressing w/ min A. - goal met 10/24/24  Short Term Goal 2: Pt will complete UE dressing w/ min A. - goal met 10/24/24  Short Term Goal 3: Pt will complete grooming w/ SPV, in seated. - goal met 10/25/24  Short Term Goal 4: Pt will complete toileting w/ min A. - goal met 10/21/24  Short Term Goal 5: Pt will complete toilet transfer w/ min A. - goal met 10/21/24  Long Term Goals  Time Frame for Long Term Goals : 21 days - all ongoing  Long Term Goal 1: Pt will complete LE dressing w/ SPV.  Long Term Goal 2: Pt will complete UE dressing independently.  Long Term Goal 3: Pt will complete grooming independently, in seated.  Long Term Goal 4: Pt will complete toileting w/ SPV.  Long Term Goal 5: Pt will complete toilet transfer w/ SPV.        Therapy Time   Individual Concurrent Group Co-treatment   Time In 0915         Time Out 1015         Minutes 60          Timed Code Treatment Minutes:  60 min     Total Treatment Minutes: 60 min        Lisa Maurice OT

## 2024-10-29 NOTE — PROGRESS NOTES
Ph: [phone], Fax: [phone]                                     [facility]                                                   [address] [place], [place] [id number]           Reason for admission:    Rehab             Brief Summary:     Naty Dykes is being seen by nephrology for CKD    Interval History and Plan:   Patient seen at bedside  Comfortable on room air  /72  Na 136, Cr stable 1.5 on yesterday's labs          Follow BP on current medications. Coreg dose was decreased.   On Lasix 40 mg daily for CHF. Appear euvolemic. Watch BP and if drop further can consider decreasing dose.   Increase protein in diet  Drink fluid/water to thirst   Avoid NSAIDs and nephrotoxins if possible  Replete lytes as needed  Dose medications according to GFR      Thank you for allowing us to participate in this patient's care  In case of any question please call us at our 24 hour answering service [phone] or from 7 AM to 5 PM via Perfect Serve, Voalte, Epic chat or cell phone.   Dr Sina Kwan MD      Assessment:     Chronic Kidney Disease  Stage 4   Cr around her baseline.       Hypertension  CAD  A fib  CHF with LVEF 4- 45 %     On Lasix 40 mg daily + Amiodarone + Coreg 12.5 mg BID         Hyponatremia          HPI      Patient is a 88 y.o. female  with PMH significant for CKD 4, CAD, GERD, DL, HTN was admitted with SDH after fall and now at rehab and nephrology is consulted to follow and assist in care given CKD 4.       Patient seen at bedside with PT/OT and appear general weak and lethargic but awake and able to answer basic questions. Patient having back pain for which getting pain medications. Denied any SOB, GI or urinary symptoms. Per patient appetite is fair and she is trying to keep herself hydrated.     Reviewed records.         ROS:   Negative except as mentioned in HPI      Vitals:     Vitals:    10/29/24 0748   BP: 118/72   Pulse: 60   Resp:    Temp:

## 2024-10-29 NOTE — CARE COORDINATION
SW met with patient at bedside to provide updates and give anticipated discharge date of 11/1. Patient was agreeable with anticipated discharge date and has no more questions at this time. Patient would like to pursue HHC post discharge. [facility] following. Patient noted that she plans to have her daughter drive her home. [facility] following.      Electronically signed by FANTASMA Gaines on 10/29/2024 at 8:10 AM

## 2024-10-30 LAB
ANION GAP SERPL CALCULATED.3IONS-SCNC: 8 MMOL/L (ref 3–16)
BASOPHILS # BLD: 0 K/UL (ref 0–0.2)
BASOPHILS NFR BLD: 0.8 %
BUN SERPL-MCNC: 30 MG/DL (ref 7–20)
CALCIUM SERPL-MCNC: 9.3 MG/DL (ref 8.3–10.6)
CHLORIDE SERPL-SCNC: 97 MMOL/L (ref 99–110)
CO2 SERPL-SCNC: 31 MMOL/L (ref 21–32)
CREAT SERPL-MCNC: 1.7 MG/DL (ref 0.6–1.2)
DEPRECATED RDW RBC AUTO: 14.7 % (ref 12.4–15.4)
EOSINOPHIL # BLD: 0.2 K/UL (ref 0–0.6)
EOSINOPHIL NFR BLD: 3.6 %
GFR SERPLBLD CREATININE-BSD FMLA CKD-EPI: 28 ML/MIN/{1.73_M2}
GLUCOSE SERPL-MCNC: 77 MG/DL (ref 70–99)
HCT VFR BLD AUTO: 30.8 % (ref 36–48)
HGB BLD-MCNC: 10.3 G/DL (ref 12–16)
LYMPHOCYTES # BLD: 1.5 K/UL (ref 1–5.1)
LYMPHOCYTES NFR BLD: 27.9 %
MCH RBC QN AUTO: 30.6 PG (ref 26–34)
MCHC RBC AUTO-ENTMCNC: 33.6 G/DL (ref 31–36)
MCV RBC AUTO: 91.1 FL (ref 80–100)
MONOCYTES # BLD: 0.5 K/UL (ref 0–1.3)
MONOCYTES NFR BLD: 8.9 %
NEUTROPHILS # BLD: 3.2 K/UL (ref 1.7–7.7)
NEUTROPHILS NFR BLD: 58.8 %
PLATELET # BLD AUTO: 261 K/UL (ref 135–450)
PMV BLD AUTO: 8.9 FL (ref 5–10.5)
POTASSIUM SERPL-SCNC: 4.2 MMOL/L (ref 3.5–5.1)
RBC # BLD AUTO: 3.38 M/UL (ref 4–5.2)
SODIUM SERPL-SCNC: 136 MMOL/L (ref 136–145)
WBC # BLD AUTO: 5.4 K/UL (ref 4–11)

## 2024-10-30 PROCEDURE — 80048 BASIC METABOLIC PNL TOTAL CA: CPT

## 2024-10-30 PROCEDURE — 97130 THER IVNTJ EA ADDL 15 MIN: CPT

## 2024-10-30 PROCEDURE — 6370000000 HC RX 637 (ALT 250 FOR IP): Performed by: STUDENT IN AN ORGANIZED HEALTH CARE EDUCATION/TRAINING PROGRAM

## 2024-10-30 PROCEDURE — 85025 COMPLETE CBC W/AUTO DIFF WBC: CPT

## 2024-10-30 PROCEDURE — 1280000000 HC REHAB R&B

## 2024-10-30 PROCEDURE — 36415 COLL VENOUS BLD VENIPUNCTURE: CPT

## 2024-10-30 PROCEDURE — 6370000000 HC RX 637 (ALT 250 FOR IP): Performed by: PHYSICAL MEDICINE & REHABILITATION

## 2024-10-30 PROCEDURE — 6370000000 HC RX 637 (ALT 250 FOR IP): Performed by: INTERNAL MEDICINE

## 2024-10-30 PROCEDURE — 97110 THERAPEUTIC EXERCISES: CPT | Performed by: PHYSICAL THERAPIST

## 2024-10-30 PROCEDURE — 97535 SELF CARE MNGMENT TRAINING: CPT

## 2024-10-30 PROCEDURE — 6360000002 HC RX W HCPCS: Performed by: PHYSICAL MEDICINE & REHABILITATION

## 2024-10-30 PROCEDURE — 97116 GAIT TRAINING THERAPY: CPT | Performed by: PHYSICAL THERAPIST

## 2024-10-30 PROCEDURE — 97110 THERAPEUTIC EXERCISES: CPT

## 2024-10-30 PROCEDURE — 97530 THERAPEUTIC ACTIVITIES: CPT

## 2024-10-30 PROCEDURE — 97129 THER IVNTJ 1ST 15 MIN: CPT

## 2024-10-30 PROCEDURE — 99222 1ST HOSP IP/OBS MODERATE 55: CPT | Performed by: INTERNAL MEDICINE

## 2024-10-30 RX ORDER — CARVEDILOL 3.12 MG/1
3.12 TABLET ORAL 2 TIMES DAILY WITH MEALS
Status: DISCONTINUED | OUTPATIENT
Start: 2024-10-30 | End: 2024-11-01 | Stop reason: HOSPADM

## 2024-10-30 RX ORDER — FUROSEMIDE 20 MG/1
20 TABLET ORAL DAILY
Status: DISCONTINUED | OUTPATIENT
Start: 2024-10-31 | End: 2024-11-01 | Stop reason: HOSPADM

## 2024-10-30 RX ADMIN — OXYCODONE AND ACETAMINOPHEN 1 TABLET: 10; 325 TABLET ORAL at 20:00

## 2024-10-30 RX ADMIN — HYDROCORTISONE 2.5%: 25 CREAM TOPICAL at 08:15

## 2024-10-30 RX ADMIN — BISACODYL 5 MG: 5 TABLET, COATED ORAL at 08:15

## 2024-10-30 RX ADMIN — PANTOPRAZOLE SODIUM 40 MG: 40 TABLET, DELAYED RELEASE ORAL at 06:10

## 2024-10-30 RX ADMIN — SIMETHICONE 80 MG: 80 TABLET, CHEWABLE ORAL at 20:00

## 2024-10-30 RX ADMIN — SENNOSIDES 8.6 MG: 8.6 TABLET, FILM COATED ORAL at 20:00

## 2024-10-30 RX ADMIN — CIPROFLOXACIN HYDROCHLORIDE 500 MG: 500 TABLET, FILM COATED ORAL at 12:06

## 2024-10-30 RX ADMIN — CARVEDILOL 3.12 MG: 3.12 TABLET, FILM COATED ORAL at 17:28

## 2024-10-30 RX ADMIN — CARVEDILOL 6.25 MG: 6.25 TABLET, FILM COATED ORAL at 08:14

## 2024-10-30 RX ADMIN — HEPARIN SODIUM 5000 UNITS: 5000 INJECTION INTRAVENOUS; SUBCUTANEOUS at 08:15

## 2024-10-30 RX ADMIN — DICLOFENAC SODIUM TOPICAL GEL, 1% 2 G: 10 GEL TOPICAL at 08:16

## 2024-10-30 RX ADMIN — LEVETIRACETAM 500 MG: 500 TABLET, FILM COATED ORAL at 20:00

## 2024-10-30 RX ADMIN — DICLOFENAC SODIUM TOPICAL GEL, 1% 2 G: 10 GEL TOPICAL at 20:01

## 2024-10-30 RX ADMIN — Medication 1000 UNITS: at 08:14

## 2024-10-30 RX ADMIN — FUROSEMIDE 40 MG: 40 TABLET ORAL at 08:15

## 2024-10-30 RX ADMIN — OXYCODONE AND ACETAMINOPHEN 1 TABLET: 10; 325 TABLET ORAL at 08:21

## 2024-10-30 RX ADMIN — LEVETIRACETAM 500 MG: 500 TABLET, FILM COATED ORAL at 08:14

## 2024-10-30 RX ADMIN — ATORVASTATIN CALCIUM 80 MG: 80 TABLET, FILM COATED ORAL at 08:15

## 2024-10-30 RX ADMIN — AMIODARONE HYDROCHLORIDE 50 MG: 200 TABLET ORAL at 08:15

## 2024-10-30 RX ADMIN — CYANOCOBALAMIN TAB 1000 MCG 1000 MCG: 1000 TAB at 08:14

## 2024-10-30 RX ADMIN — HYDROCORTISONE 2.5%: 25 CREAM TOPICAL at 20:01

## 2024-10-30 RX ADMIN — SIMETHICONE 80 MG: 80 TABLET, CHEWABLE ORAL at 12:06

## 2024-10-30 RX ADMIN — HEPARIN SODIUM 5000 UNITS: 5000 INJECTION INTRAVENOUS; SUBCUTANEOUS at 20:00

## 2024-10-30 RX ADMIN — COLCHICINE 0.3 MG: 0.6 TABLET, FILM COATED ORAL at 08:14

## 2024-10-30 ASSESSMENT — PAIN SCALES - GENERAL
PAINLEVEL_OUTOF10: 3
PAINLEVEL_OUTOF10: 8
PAINLEVEL_OUTOF10: 7
PAINLEVEL_OUTOF10: 4

## 2024-10-30 ASSESSMENT — PAIN DESCRIPTION - FREQUENCY
FREQUENCY: INTERMITTENT
FREQUENCY: CONTINUOUS
FREQUENCY: CONTINUOUS

## 2024-10-30 ASSESSMENT — PAIN DESCRIPTION - ONSET
ONSET: ON-GOING

## 2024-10-30 ASSESSMENT — PAIN SCALES - WONG BAKER
WONGBAKER_NUMERICALRESPONSE: HURTS EVEN MORE
WONGBAKER_NUMERICALRESPONSE: HURTS EVEN MORE

## 2024-10-30 ASSESSMENT — PAIN DESCRIPTION - PAIN TYPE
TYPE: CHRONIC PAIN

## 2024-10-30 ASSESSMENT — PAIN DESCRIPTION - LOCATION
LOCATION: BACK;KNEE
LOCATION: BACK
LOCATION: GENERALIZED
LOCATION: BACK

## 2024-10-30 ASSESSMENT — PAIN DESCRIPTION - ORIENTATION
ORIENTATION: LOWER
ORIENTATION: MID;LOWER
ORIENTATION: POSTERIOR;MID;LOWER;LEFT;RIGHT
ORIENTATION: LOWER;RIGHT

## 2024-10-30 ASSESSMENT — PAIN DESCRIPTION - DESCRIPTORS
DESCRIPTORS: ACHING;DISCOMFORT
DESCRIPTORS: ACHING

## 2024-10-30 NOTE — PLAN OF CARE
Problem: Chronic Conditions and Co-morbidities  Goal: Patient's chronic conditions and co-morbidity symptoms are monitored and maintained or improved  10/30/2024 1034 by Sherry Rush RN  Outcome: Progressing     Problem: Discharge Planning  Goal: Discharge to home or other facility with appropriate resources  10/30/2024 1034 by Sherry Rush RN  Outcome: Progressing     Problem: Safety - Adult  Goal: Free from fall injury  10/30/2024 1034 by Sherry Rush RN  Outcome: Progressing     Problem: ABCDS Injury Assessment  Goal: Absence of physical injury  10/30/2024 1034 by Sherry Rush RN  Outcome: Progressing      Labs done recent for [employer] exam NORMAL and extensive  Mammogram screen with CBE  COVID -19 vaccine when available   0

## 2024-10-30 NOTE — PATIENT CARE CONFERENCE
Team Conference In Room Rounding Note     In room rounding was completed today with RN, PT/OT/ST, LSW and ARU Supervisor present. LSW called patient's daughter, Nara,  to allow them to be present and active during the conversation. Naty Dykes was educated on their discharge date, 11/1/2024, and the recommendation for Home with home health therapies and nursing after discharge. Physical Therapy informed the patient and family on their current assist level and plan of care. Occupational Therapy provided information to the patient and family on their current assist level for ADLs and the plan of care. Speech Language Pathology educated them on cognitive deficits, diet level, and plan of care that they are currently addressing. The RN staff provided education on medication management, current co-morbidities that are being addressed by the physicians and the patient's plan of care for their stay.    The interdisciplinary team identified the following barriers to address prior to discharge:  Decreased endurance  Decreased balance  Decreased higher level cognition    Education/recommendations to assist at discharge included:  Recommending increased supervision upon d/c to ensure a safe return home.   Recommending assistance with medication and finance management upon d/c.  Recommending using a 2 wheel walker for all walking upon d/c.

## 2024-10-30 NOTE — CONSULTS
[facility]  Cardiology Inpatient Consult Service                                                                                          Pt Name: Naty Dykes  Age: 88 y.o.  Sex: female  : 1935  Location: 3106/3106-01    Referring Physician: Houston Barth DO  Primary cardiologist : Dr. Palomares    Reason for Consult:     Reason for Consultation/Chief Complaint: Evaluate cardiac meds    HPI:      Naty Dykes is a 88 y.o. female with a past medical history of hypertension, hyperlipidemia, CKD 4, mitral valve prolapse with mitral regurgitation, CAD status post stent in 2018, heart failure with reduced EF, CVA, paroxysmal A-fib, frequent PVCs who presented to the hospital with subdural hematoma.  Currently in rehab.  Cardiology asked to see patient for evaluation of cardiac meds.  Patient denies any chest pain.  No shortness of breath.  Has had low normal blood pressure.  Currently off anticoagulation for paroxysmal A-fib due to subdural hematoma.      Histories     Past Medical History:   has a past medical history of Arthritis, Blood circulation, collateral, CAD (coronary artery disease), CHF (congestive heart failure) ([facility]), Confusion, GERD (gastroesophageal reflux disease), History of heart artery stent, Hyperlipidemia, Hypertension, Mitral valve prolapse, Myocardial infarct, old, Seizure ([facility]), and Thyroid disease.    Surgical History:   has a past surgical history that includes Hysterectomy; Thyroidectomy; Colonoscopy; other surgical history (2018); pr office/outpt visit,procedure only (Right, 2018); eye surgery (Left, 2018); pr office/outpt visit,procedure only (Left, 2018); and hip surgery (Right, 2022).     Social History:   reports that she has never smoked. She has never used smokeless tobacco. She reports that she does not drink alcohol and does not use drugs.     Family History:  Reviewed      Medications:       Home Medications  Were  reviewed and are listed in nursing record. and/or listed below  Prior to Admission medications    Medication Sig Start Date End Date Taking? Authorizing Provider   carvedilol (COREG) 12.5 MG tablet Take 1 tablet by mouth 2 times daily (with meals) 10/16/24  Yes Marie Brock MD   levETIRAcetam (KEPPRA) 500 MG tablet Take 1 tablet by mouth 2 times daily 10/14/24  Yes Marie Brock MD   diclofenac sodium (VOLTAREN) 1 % GEL Apply 2 g topically 2 times daily 10/14/24  Yes Marie Brock MD   senna (SENOKOT) 8.6 MG tablet Take 1 tablet by mouth nightly 10/14/24 11/13/24 Yes Marie Brock MD   amiodarone (PACERONE) 100 MG tablet TAKE 0.5 TABLETS (50 MG) BY MOUTH ONCE DAILY (AM) 10/7/24  Yes Concetta Grayson APRN - CNP   furosemide (LASIX) 40 MG tablet TAKE 1 TABLET BY MOUTH DAILY IN THE MORNING 10/7/24  Yes Concetta Grayson APRN - CNP   nitroGLYCERIN (NITROSTAT) 0.4 MG SL tablet Place 1 tablet under the tongue every 5 minutes as needed for Chest pain up to max of 3 total doses. If no relief after 1 dose, call 911. 9/10/24  Yes Chrissie King APRN - CNP   atorvastatin (LIPITOR) 80 MG tablet TAKE 1 TABLET BY MOUTH ONE TIME A DAY (EVENING) 7/31/24  Yes Chrissie King APRN - CNP   famotidine (PEPCID) 20 MG tablet TAKE 1 TABLET BY MOUTH ONCE DAILY (AM) 6/10/24  Yes Debra Shahid,    Cyanocobalamin (VITAMIN B-12) 1000 MCG extended release tablet TAKE 1 TABLET (1,000 MCG) BY MOUTH DAILY (AM) 8/11/23  Yes Brett Hines MD   vitamin D3 (CHOLECALCIFEROL) 25 MCG (1000 UT) TABS tablet TAKE 1 TABLET (1,000 IU) BY MOUTH DAILY IN THE MORNING WITH FOOD PREFERABLY WITH A MEAL 8/11/23  Yes Brett Hines MD   oxyCODONE-acetaminophen (PERCOCET)  MG per tablet Take 1 tablet by mouth as needed. 2/8/23  Yes Provider, Historical, MD   ondansetron (ZOFRAN) 4 MG tablet Take 1 tablet by mouth 3 times daily as needed for Nausea or Vomiting 6/30/22  Yes Debra Shahid,    Coenzyme Q10 (CO Q 10) 100 MG CAPS Take by

## 2024-10-30 NOTE — PLAN OF CARE
Problem: Chronic Conditions and Co-morbidities  Goal: Patient's chronic conditions and co-morbidity symptoms are monitored and maintained or improved  Outcome: Progressing  Care Plan - Patient's Chronic Conditions and Co-Morbidity Symptoms are Monitored and Maintained or Improved:   Monitor and assess patient's chronic conditions and comorbid symptoms for stability, deterioration, or improvement   Collaborate with multidisciplinary team to address chronic and comorbid conditions and prevent exacerbation or deterioration   Update acute care plan with appropriate goals if chronic or comorbid symptoms are exacerbated and prevent overall improvement and discharge     Problem: Discharge Planning  Goal: Discharge to home or other facility with appropriate resources  Outcome: Progressing  Discharge to home or other facility with appropriate resources:   Identify barriers to discharge with patient and caregiver   Arrange for needed discharge resources and transportation as appropriate   Identify discharge learning needs (meds, wound care, etc)     Problem: Safety - Adult  Goal: Free from fall injury  Outcome: Progressing     Problem: Pain  Goal: Verbalizes/displays adequate comfort level or baseline comfort level  Outcome: Progressing  Flowsheets  Outcome: Progressing     Problem: Skin/Tissue Integrity  Goal: Absence of new skin breakdown  Description: 1.  Monitor for areas of redness and/or skin breakdown  2.  Assess vascular access sites hourly  3.  Every 4-6 hours minimum:  Change oxygen saturation probe site  4.  Every 4-6 hours:  If on nasal continuous positive airway pressure, respiratory therapy assess nares and determine need for appliance change or resting period.  Outcome: Progressing     Problem: ABCDS Injury Assessment  Goal: Absence of physical injury  Outcome: Progressing     Problem: Nutrition Deficit:  Goal: Optimize nutritional status  Outcome: Progressing

## 2024-10-30 NOTE — PROGRESS NOTES
Pt up in chair, reports pain to lower back and right knee prn pain given, VSS. All meds taken without difficulty. Pt assisted to toilet via x1 walker and back to WC without difficulty. Call light within reach and chair alarm engaged.   Vitals:    10/30/24 0812   BP: 105/69   Pulse: 70   Resp: 16   Temp: 98.4 °F (36.9 °C)   SpO2: 96%

## 2024-10-30 NOTE — PROGRESS NOTES
Ph: [phone], Fax: [phone]                                     [facility]                                                   [address] [place], [place] [id number]           Reason for admission:    Rehab             Brief Summary:     Naty Dykes is being seen by nephrology for CKD    Interval History and Plan:   Patient seen at bedside  Comfortable on room air  BP softer side 105/69  Patient denied any dizziness.   Cr 1.7  Na 136  Hb 10.3          Patient already got Lasix and will decrease dose from tomorrow to 20 mg daily.   Coreg dose was already decreased to 6.25 mg BID and patient is on Amiodarone. Will consult cardiology team to consider adjusting medications as BP is relatively soft given her advanced age  Increase protein in diet  Drink fluid/water to thirst   Avoid NSAIDs and nephrotoxins if possible  Replete lytes as needed  Dose medications according to GFR      Thank you for allowing us to participate in this patient's care  In case of any question please call us at our 24 hour answering service [phone] or from 7 AM to 5 PM via Perfect Serve, Voalte, Epic chat or cell phone.   Dr Sina Kwan MD      Assessment:     Chronic Kidney Disease  Stage 4   Cr around her baseline.       Hypertension  CAD  A fib  CHF with LVEF 4- 45 %     On Lasix 40 mg daily + Amiodarone + Coreg 12.5 mg BID         Hyponatremia          HPI      Patient is a 88 y.o. female  with PMH significant for CKD 4, CAD, GERD, DL, HTN was admitted with SDH after fall and now at rehab and nephrology is consulted to follow and assist in care given CKD 4.       Patient seen at bedside with PT/OT and appear general weak and lethargic but awake and able to answer basic questions. Patient having back pain for which getting pain medications. Denied any SOB, GI or urinary symptoms. Per patient appetite is fair and she is trying to keep herself hydrated.     Reviewed records.         ROS:

## 2024-10-30 NOTE — PROGRESS NOTES
Department of Physical Medicine & Rehabilitation  Progress Note    Patient Identification:  Naty Dykes  [id number]  : 1935  Admit date: 10/16/2024    Chief Complaint: SDH (subdural hematoma)    Subjective:   Seen in her room this morning.  No new complaints overnight.  She is making progress in therapy daily.  She does states she feels better overall.  No new pain overnight.    ROS: No f/c, n/v, cp     Objective:  Patient Vitals for the past 24 hrs:   BP Temp Temp src Pulse Resp SpO2 Weight   10/30/24 0812 105/69 98.4 °F (36.9 °C) Oral 70 16 96 % --   10/30/24 0344 -- -- -- -- -- -- 51.3 kg (113 lb 1.5 oz)   10/29/24 2049 -- -- -- 67 16 -- --   10/29/24 2018 138/76 97.6 °F (36.4 °C) Oral 69 16 96 % --   10/29/24 1711 (!) 149/77 -- -- 68 -- -- --   10/29/24 1422 -- -- -- -- 16 -- --   10/29/24 1352 125/70 -- -- 75 16 94 % --     CONST: Alert. No acute distress  EYES: No icterus noted.  HENT: Atraumatic, normocephalic;  NECK: Trachea midline, neck supple.  CV: Regular rate and rhythm, no murmur rub or gallop noted  RESP: Lungs clear to auscultation bilaterally, no rales wheezes or ronchi, no retractions. Respirations unlabored.   GI: Soft, nontender, nondistended.   EXT: No significant edema appreciated to BLE  NEURO:   Patient somnolent. Limited exam.  Unable to assess EOMI. CN V, CN VII, CNXI-XII intact  4/5 strengths to BUE. 3/5 strengths to bilateral hip flexion. 4/5 strengths to ankle dorsi/plantarflexion  PSYCH: Stable mood, normal judgement, normal affect     Laboratory data: Available via EMR.   Last 24 hour lab  Recent Results (from the past 24 hour(s))   Basic Metabolic Panel w/ Reflex to MG    Collection Time: 10/30/24  6:01 AM   Result Value Ref Range    Sodium 136 136 - 145 mmol/L    Potassium reflex Magnesium 4.2 3.5 - 5.1 mmol/L    Chloride 97 (L) 99 - 110 mmol/L    CO2 31 21 - 32 mmol/L    Anion Gap 8 3 - 16    Glucose 77 70 - 99 mg/dL    BUN 30 (H) 7 - 20 mg/dL    Creatinine 1.7 (H) 0.6 -

## 2024-10-30 NOTE — PROGRESS NOTES
Occupational Therapy  Facility/Department: [facility] ACUTE REHAB UNIT  Rehabilitation Occupational Therapy Daily Treatment Note    Date: 10/30/24  Patient Name: Naty Dykes       Room: 3106/3106-01  MRN: [id number]  Account: [id number]   : 1935  (88 y.o.) Gender: female                    Past Medical History:  has a past medical history of Arthritis, Blood circulation, collateral, CAD (coronary artery disease), CHF (congestive heart failure) ([facility]), Confusion, GERD (gastroesophageal reflux disease), History of heart artery stent, Hyperlipidemia, Hypertension, Mitral valve prolapse, Myocardial infarct, old, Seizure (HCC), and Thyroid disease.  Past Surgical History:   has a past surgical history that includes Hysterectomy; Thyroidectomy; Colonoscopy; other surgical history (2018); pr office/outpt visit,procedure only (Right, 2018); eye surgery (Left, 2018); pr office/outpt visit,procedure only (Left, 2018); and hip surgery (Right, 2022).    Restrictions  Restrictions/Precautions: Up as Tolerated, Fall Risk, Seizure  Other position/activity restrictions: up with assist, seizure precautions    Subjective  Subjective: Pt seated in w/c upon OT entry. Reports pain in buttocks d/t hemorrhoids and 7/10 pain in knee. Addressed via rest breaks and repositioning.  Restrictions/Precautions: Up as Tolerated;Fall Risk;Seizure             Objective     Cognition  Arousal/Alertness: Inconsistent responses to stimuli  Following Commands: Follows one step commands with increased time;Follows one step commands with repetition  Attention Span: Attends with cues to redirect  Memory: Decreased recall of precautions;Decreased recall of recent events  Safety Judgement: Decreased awareness of need for assistance  Problem Solving: Assistance required to generate solutions  Insights: Impaired  Initiation: Requires cues for some  Sequencing: Requires cues for some  Cognition Comment: Improved sequencing and      Assessment  Assessment  Assessment: Pt tolerated session well, completed dressing tasks seated on couch to increase greater success sitting on lower surface. Pt continues to require increased time, but was able to complete UE dressing, LE dressing, and footwear SBA-SPV level this date. Completed transitional movement w/ use of RW SBA-CGA level, especially when sitting on lower surface. Participated in seated grooming tasks at sink w/ set up. Pt continues to make improvements daily and is motivated to participate. Pt continues to benefit from skilled OT services in order to maximize fxl independence. Cont OT POC.  Activity Tolerance: Patient tolerated treatment well  Discharge Recommendations: Continue to assess pending progress;Home with Home health OT;24 hour supervision or assist  OT Equipment Recommendations  Equipment Needed: No  Safety Devices  Safety Devices in place: Yes  Type of devices: Left in chair;Call light within reach;Chair alarm in place    Patient Education  Education  Education Given To: Patient  Education Provided: Role of Therapy;Transfer Training;Plan of Care;Energy Conservation;Safety;ADL Function;Fall Prevention Strategies;Mobility Training;Home Exercise Program  Education Method: Demonstration;Verbal  Barriers to Learning: Hearing  Education Outcome: Verbalized understanding;Demonstrated understanding    Plan  Occupational Therapy Plan  Times Per Week: 5x a week, 60 mins daily  Current Treatment Recommendations: Strengthening;Balance training;Pain management;Self-Care / ADL;Safety education & training;Functional mobility training;Endurance training;Patient/Caregiver education & training;Gait training;Equipment evaluation, education, & procurement    Goals  Patient Goals   Patient goals : \"Get back to normal.\"  Short Term Goals  Time Frame for Short Term Goals: 14 days - all ongoing  Short Term Goal 1: Pt will complete LE dressing w/ min A. - goal met 10/24/24  Short Term Goal 2: Pt will

## 2024-10-30 NOTE — PROGRESS NOTES
Physical Therapy  Facility/Department: [facility] ACUTE REHAB UNIT  Rehabilitation Physical Therapy Treatment Note    NAME: Naty Dykes  : 1935 (88 y.o.)  MRN: [id number]  CODE STATUS: Full Code    Date of Service: 10/30/24       Restrictions:  Restrictions/Precautions: Up as Tolerated, Fall Risk, Seizure  Position Activity Restriction  Other position/activity restrictions: up with assist, seizure precautions     SUBJECTIVE  Subjective  Subjective: Pt  in bed upon PT arrival. Pt agreeable to treatment.  Pain: Pt reports that she has pain here and there but nothing \"too bad\"        Post Treatment Pain Screening         OBJECTIVE  Cognition  Overall Cognitive Status: WFL  Arousal/Alertness: Inconsistent responses to stimuli  Following Commands: Follows one step commands with increased time;Follows one step commands with repetition  Attention Span: Attends with cues to redirect  Memory: Decreased recall of precautions;Decreased recall of recent events  Safety Judgement: Decreased awareness of need for assistance  Problem Solving: Assistance required to generate solutions  Insights: Decreased awareness of deficits  Initiation: Requires cues for some  Sequencing: Requires cues for some  Cognition Comment: Pt extremely talkative throughout session while walking and appeared to argenis longer distances with the distraction  Orientation  Overall Orientation Status: Within Functional Limits    Functional Mobility  Bed Mobility  Overall Assistance Level: Modified Independent  Additional Factors: Verbal cues;Increased time to complete;Head of bed flat;With handrails  Bridging  Assistance Level: Independent  Skilled Clinical Factors: Used as an ex.  Roll Left  Assistance Level: Modified independent  Skilled Clinical Factors: Used the bedrail  Sit to Supine  Assistance Level: Modified independent  Skilled Clinical Factors: Req additional time. used the BR  Supine to Sit  Assistance Level: Modified independent  Skilled Clinical  Method: Verbal;Demonstration  Barriers to Learning: Cognition  Education Outcome: Verbalized understanding;Demonstrated understanding;Continued education needed        Therapy Time   Individual Concurrent Group Co-treatment   Time In 1445         Time Out 1545         Minutes 60                   Roshni Wilkerson PT, 10/30/24 at 4:11 PM

## 2024-10-30 NOTE — PROGRESS NOTES
x2 instances.    Math problems with picture: pt required mod cues to complete with accuracy. Pt utilized calculator to assist with organization, however was unable to ID when an error in calculation occurred due to incorrect input of equation. Pt benefited from questions being repeated by clinician.   Pt will participate in ongoing cognitive-linguistic assessment. Not targeted this session. Not targeted this session.   Other areas targeted:     Education:   Educated re: skills targeted, activities completed. Educated re: skills targeted, activities completed.   Assessment of Patient Learning: Pt demonstrated comprehension, however ongoing education and reinforcement is recommended. Pt demonstrated comprehension, however ongoing education and reinforcement is recommended.   Safety Devices: [x] Call light within reach  [x] Chair alarm activated and connected to nurse call light system  [] Bed alarm activated   [] Other: [x] Call light within reach  [] Chair alarm activated and connected to nurse call light system  [x] Bed alarm activated   [] Other:    Assessment: Moderate cognitive-linguistic impairments characterized by reduced attention and executive dysfunction, progressing close to functional baseline. Cues provided to increase self-monitoring skills. Improved sustained/alternating attention noted this date. Pt with mild-moderate oral dysphagia. Pt continues to benefit from recalling strategies.   Plan: Continue as per plan of care.      Note from 10/18: SLP discussed with daughter present regarding pt's cognitive-linguistic baseline. Daughter stated was able to complete some ADLs and IADLs prior to hospital admission (with assistance as needed). Expressed cognitive skills are \"off\" (I.e. observed pt complete 4-6 step sequencing cards and stated pt would \"normally be able to do these things before coming to the hospital\"). Pt's daughter did endorse some concerns with cognitive decline prior to admission and  limited assistance at home upon discharge  [] Other    Electronically signed by:  RANDY Sesay.   Speech Language Pathology     Queenie Barragan M.A., CCC-SLP  [id number]  Speech-Language Pathologist    The speech-language pathologist was present, directed the patient's care, made skilled judgment and was responsible for assessment and treatment.

## 2024-10-30 NOTE — PROGRESS NOTES
Comprehensive Nutrition Assessment    RECOMMENDATIONS:  PO Diet: Dysphagia- Minced & Moist, Low Fat/Low Chol/High Fiber/JAYNA, 1500 ml  Nutrition Supplement: Ensure Milkshake once daily for lunch  Nutrition Education: Education not indicated     NUTRITION ASSESSMENT:   Nutritional summary & status: Follow-up. Since previous assessment, pt now having regular BMs. Ensure+ HP discontinued d/t buildup in pt's room. Upon visit, pt reports okay appetite; states she's drinking fluids and trying to eat her meals. Discussed appetite stimulant again and pt states she would like to hold off since she's going home Friday. Pt consistently requests soup for meals although on fluid restriction of 1500 ml; went through menu options together and ordered something other than soup for lunch today. Documented PO intake per EMR still shows most meals <50% consumed. Offered Ensure Milkshake and pt willing to try; ordered for lunch daily and noted flavor on tray ticket. Will continue to monitor BMs, PO intake, and any other changes to nutritional status.   Admission // PMH: SDH // CAD, GERD, HLD, HTN, paroxysmal A-fib on Eliquis, hypothyroidism, CHF, CKD stage 4    MALNUTRITION ASSESSMENT  Context of Malnutrition: Chronic Illness   Malnutrition Status: Moderate malnutrition  Findings of the 6 clinical characteristics of malnutrition (Minimum of 2 out of 6 clinical characteristics is required to make the diagnosis of moderate or severe Protein Calorie Malnutrition based on AND/ASPEN Guidelines):  Energy Intake:  75% or less estimated energy requirements for 1 month or longer  Weight Loss:  Greater than 20% over 1 year (21% over 10 months)     Body Fat Loss:  Mild body fat loss Buccal region, Orbital   Muscle Mass Loss:  Mild muscle mass loss Temples (temporalis), Clavicles (pectoralis & deltoids)    NUTRITION DIAGNOSIS   Moderate malnutrition, In context of chronic illness related to inadequate protein-energy intake as evidenced by  Criteria as identified in malnutrition assessment    Nutrition Monitoring and Evaluation:   Food/Nutrient Intake Outcomes:  Food and Nutrient Intake, Supplement Intake  Physical Signs/Symptoms Outcomes:  Biochemical Data, GI Status, Constipation, Nutrition Focused Physical Findings, Weight     OBJECTIVE DATA: Significant to nutrition assessment  Nutrition Related Findings: LBM 10/29. Labs reviewed; lytes WNL. Gluc .  Wounds: None  Nutrition Goals: PO intake 50% or greater, by next RD assessment     CURRENT NUTRITION THERAPIES  ADULT DIET; Dysphagia - Minced and Moist; Low Fat/Low Chol/High Fiber/JAYNA; 1500 ml  ADULT ORAL NUTRITION SUPPLEMENT; Lunch; Other Oral Supplement; Ensure Compact Milkshake- chocolate  PO Intake: 0%, 1-25%, 26-50%   PO Supplement Intake:0%, 26-50%  Additional Sources of Calories/IVF:n/a     COMPARATIVE STANDARDS  Energy (kcal):  1334 - 1539 (26 - 30 kcal/kg CBW)     Protein (g):  62 - 77 (1.2 - 1.5 gm/kg CBW)       Fluid (ml/day):  1500    ANTHROPOMETRICS  Current Height: 157.5 cm (5' 2\")  Current Weight - Scale: 51.3 kg (113 lb 1.5 oz)    Admission weight: 53.5 kg (117 lb 15.1 oz)    The patient will be monitored per nutrition standards of care. Consult dietitian if additional nutrition interventions are needed prior to RD reassessment.     Jennifer Worthy RD  Aaron:  [phone]

## 2024-10-31 LAB
ALBUMIN SERPL-MCNC: 3.4 G/DL (ref 3.4–5)
ANION GAP SERPL CALCULATED.3IONS-SCNC: 10 MMOL/L (ref 3–16)
BUN SERPL-MCNC: 32 MG/DL (ref 7–20)
CALCIUM SERPL-MCNC: 9.4 MG/DL (ref 8.3–10.6)
CHLORIDE SERPL-SCNC: 95 MMOL/L (ref 99–110)
CO2 SERPL-SCNC: 32 MMOL/L (ref 21–32)
CREAT SERPL-MCNC: 1.7 MG/DL (ref 0.6–1.2)
GFR SERPLBLD CREATININE-BSD FMLA CKD-EPI: 28 ML/MIN/{1.73_M2}
GLUCOSE SERPL-MCNC: 107 MG/DL (ref 70–99)
PHOSPHATE SERPL-MCNC: 3.9 MG/DL (ref 2.5–4.9)
POTASSIUM SERPL-SCNC: 4.1 MMOL/L (ref 3.5–5.1)
SODIUM SERPL-SCNC: 137 MMOL/L (ref 136–145)

## 2024-10-31 PROCEDURE — 36415 COLL VENOUS BLD VENIPUNCTURE: CPT

## 2024-10-31 PROCEDURE — 97530 THERAPEUTIC ACTIVITIES: CPT

## 2024-10-31 PROCEDURE — 97535 SELF CARE MNGMENT TRAINING: CPT

## 2024-10-31 PROCEDURE — 6370000000 HC RX 637 (ALT 250 FOR IP): Performed by: PHYSICAL MEDICINE & REHABILITATION

## 2024-10-31 PROCEDURE — 1280000000 HC REHAB R&B

## 2024-10-31 PROCEDURE — 6370000000 HC RX 637 (ALT 250 FOR IP): Performed by: INTERNAL MEDICINE

## 2024-10-31 PROCEDURE — 97110 THERAPEUTIC EXERCISES: CPT | Performed by: PHYSICAL THERAPIST

## 2024-10-31 PROCEDURE — 6370000000 HC RX 637 (ALT 250 FOR IP): Performed by: STUDENT IN AN ORGANIZED HEALTH CARE EDUCATION/TRAINING PROGRAM

## 2024-10-31 PROCEDURE — 6360000002 HC RX W HCPCS: Performed by: PHYSICAL MEDICINE & REHABILITATION

## 2024-10-31 PROCEDURE — 80069 RENAL FUNCTION PANEL: CPT

## 2024-10-31 PROCEDURE — 97129 THER IVNTJ 1ST 15 MIN: CPT

## 2024-10-31 PROCEDURE — 97116 GAIT TRAINING THERAPY: CPT | Performed by: PHYSICAL THERAPIST

## 2024-10-31 PROCEDURE — 97530 THERAPEUTIC ACTIVITIES: CPT | Performed by: PHYSICAL THERAPIST

## 2024-10-31 PROCEDURE — 97110 THERAPEUTIC EXERCISES: CPT

## 2024-10-31 PROCEDURE — 97130 THER IVNTJ EA ADDL 15 MIN: CPT

## 2024-10-31 RX ORDER — OXYCODONE AND ACETAMINOPHEN 10; 325 MG/1; MG/1
1 TABLET ORAL EVERY 6 HOURS PRN
Qty: 20 TABLET | Refills: 0 | Status: SHIPPED | OUTPATIENT
Start: 2024-10-31 | End: 2024-11-05

## 2024-10-31 RX ORDER — ATORVASTATIN CALCIUM 80 MG/1
80 TABLET, FILM COATED ORAL DAILY
Qty: 30 TABLET | Refills: 3 | Status: SHIPPED | OUTPATIENT
Start: 2024-11-01

## 2024-10-31 RX ORDER — SIMETHICONE 80 MG
80 TABLET,CHEWABLE ORAL 3 TIMES DAILY PRN
Qty: 180 TABLET | Refills: 3 | Status: SHIPPED | OUTPATIENT
Start: 2024-10-31

## 2024-10-31 RX ORDER — HYDROCORTISONE 25 MG/G
CREAM TOPICAL
Qty: 1 EACH | Refills: 0 | Status: SHIPPED | OUTPATIENT
Start: 2024-10-31

## 2024-10-31 RX ORDER — AMIODARONE HYDROCHLORIDE 100 MG/1
TABLET ORAL
Qty: 45 TABLET | Refills: 3 | Status: SHIPPED | OUTPATIENT
Start: 2024-10-31 | End: 2024-11-01 | Stop reason: HOSPADM

## 2024-10-31 RX ORDER — ONDANSETRON 4 MG/1
4 TABLET, ORALLY DISINTEGRATING ORAL EVERY 8 HOURS PRN
Qty: 30 TABLET | Refills: 1 | Status: SHIPPED | OUTPATIENT
Start: 2024-10-31

## 2024-10-31 RX ORDER — LEVETIRACETAM 500 MG/1
500 TABLET ORAL 2 TIMES DAILY
Qty: 60 TABLET | Refills: 3 | Status: SHIPPED | OUTPATIENT
Start: 2024-10-31

## 2024-10-31 RX ORDER — COLCHICINE 0.6 MG/1
0.3 TABLET ORAL DAILY
Qty: 30 TABLET | Refills: 3 | Status: SHIPPED | OUTPATIENT
Start: 2024-11-01

## 2024-10-31 RX ORDER — FUROSEMIDE 20 MG/1
20 TABLET ORAL DAILY
Qty: 60 TABLET | Refills: 3 | Status: SHIPPED | OUTPATIENT
Start: 2024-11-01

## 2024-10-31 RX ORDER — LIDOCAINE 4 G/G
1 PATCH TOPICAL DAILY
Qty: 2 EACH | Refills: 1 | Status: SHIPPED | OUTPATIENT
Start: 2024-11-01

## 2024-10-31 RX ORDER — CARVEDILOL 3.12 MG/1
3.12 TABLET ORAL 2 TIMES DAILY WITH MEALS
Qty: 60 TABLET | Refills: 3 | Status: SHIPPED | OUTPATIENT
Start: 2024-10-31

## 2024-10-31 RX ADMIN — SIMETHICONE 80 MG: 80 TABLET, CHEWABLE ORAL at 05:56

## 2024-10-31 RX ADMIN — DICLOFENAC SODIUM TOPICAL GEL, 1% 2 G: 10 GEL TOPICAL at 09:13

## 2024-10-31 RX ADMIN — HEPARIN SODIUM 5000 UNITS: 5000 INJECTION INTRAVENOUS; SUBCUTANEOUS at 09:12

## 2024-10-31 RX ADMIN — ANTACID TABLETS 500 MG: 500 TABLET, CHEWABLE ORAL at 14:51

## 2024-10-31 RX ADMIN — COLCHICINE 0.3 MG: 0.6 TABLET, FILM COATED ORAL at 09:12

## 2024-10-31 RX ADMIN — FUROSEMIDE 20 MG: 20 TABLET ORAL at 09:12

## 2024-10-31 RX ADMIN — PANTOPRAZOLE SODIUM 40 MG: 40 TABLET, DELAYED RELEASE ORAL at 05:56

## 2024-10-31 RX ADMIN — Medication 1000 UNITS: at 09:12

## 2024-10-31 RX ADMIN — ATORVASTATIN CALCIUM 80 MG: 80 TABLET, FILM COATED ORAL at 09:12

## 2024-10-31 RX ADMIN — CARVEDILOL 3.12 MG: 3.12 TABLET, FILM COATED ORAL at 16:24

## 2024-10-31 RX ADMIN — ONDANSETRON 4 MG: 4 TABLET, ORALLY DISINTEGRATING ORAL at 14:51

## 2024-10-31 RX ADMIN — SENNOSIDES 8.6 MG: 8.6 TABLET, FILM COATED ORAL at 21:27

## 2024-10-31 RX ADMIN — OXYCODONE AND ACETAMINOPHEN 1 TABLET: 10; 325 TABLET ORAL at 21:34

## 2024-10-31 RX ADMIN — HEPARIN SODIUM 5000 UNITS: 5000 INJECTION INTRAVENOUS; SUBCUTANEOUS at 21:28

## 2024-10-31 RX ADMIN — DICLOFENAC SODIUM TOPICAL GEL, 1% 2 G: 10 GEL TOPICAL at 21:28

## 2024-10-31 RX ADMIN — OXYCODONE AND ACETAMINOPHEN 1 TABLET: 10; 325 TABLET ORAL at 06:01

## 2024-10-31 RX ADMIN — BISACODYL 5 MG: 5 TABLET, COATED ORAL at 09:12

## 2024-10-31 RX ADMIN — OXYCODONE AND ACETAMINOPHEN 1 TABLET: 10; 325 TABLET ORAL at 12:11

## 2024-10-31 RX ADMIN — CYANOCOBALAMIN TAB 1000 MCG 1000 MCG: 1000 TAB at 09:12

## 2024-10-31 RX ADMIN — HYDROCORTISONE 2.5%: 25 CREAM TOPICAL at 09:14

## 2024-10-31 RX ADMIN — ANTACID TABLETS 500 MG: 500 TABLET, CHEWABLE ORAL at 01:07

## 2024-10-31 RX ADMIN — LEVETIRACETAM 500 MG: 500 TABLET, FILM COATED ORAL at 21:28

## 2024-10-31 RX ADMIN — HYDROCORTISONE 2.5%: 25 CREAM TOPICAL at 21:28

## 2024-10-31 RX ADMIN — LEVETIRACETAM 500 MG: 500 TABLET, FILM COATED ORAL at 09:14

## 2024-10-31 RX ADMIN — AMIODARONE HYDROCHLORIDE 50 MG: 200 TABLET ORAL at 09:12

## 2024-10-31 RX ADMIN — CARVEDILOL 3.12 MG: 3.12 TABLET, FILM COATED ORAL at 09:12

## 2024-10-31 ASSESSMENT — PAIN DESCRIPTION - DESCRIPTORS
DESCRIPTORS: ACHING

## 2024-10-31 ASSESSMENT — PAIN DESCRIPTION - FREQUENCY
FREQUENCY: CONTINUOUS
FREQUENCY: CONTINUOUS
FREQUENCY: INTERMITTENT
FREQUENCY: CONTINUOUS
FREQUENCY: CONTINUOUS

## 2024-10-31 ASSESSMENT — PAIN - FUNCTIONAL ASSESSMENT
PAIN_FUNCTIONAL_ASSESSMENT: ACTIVITIES ARE NOT PREVENTED
PAIN_FUNCTIONAL_ASSESSMENT: ACTIVITIES ARE NOT PREVENTED
PAIN_FUNCTIONAL_ASSESSMENT: PREVENTS OR INTERFERES SOME ACTIVE ACTIVITIES AND ADLS

## 2024-10-31 ASSESSMENT — PAIN DESCRIPTION - ORIENTATION
ORIENTATION: RIGHT;LEFT;POSTERIOR
ORIENTATION: MID;LOWER
ORIENTATION: MID
ORIENTATION: LOWER;MID
ORIENTATION: RIGHT;LEFT;POSTERIOR

## 2024-10-31 ASSESSMENT — PAIN SCALES - WONG BAKER
WONGBAKER_NUMERICALRESPONSE: HURTS LITTLE MORE
WONGBAKER_NUMERICALRESPONSE: HURTS EVEN MORE

## 2024-10-31 ASSESSMENT — PAIN SCALES - GENERAL
PAINLEVEL_OUTOF10: 9
PAINLEVEL_OUTOF10: 3
PAINLEVEL_OUTOF10: 7
PAINLEVEL_OUTOF10: 7
PAINLEVEL_OUTOF10: 4
PAINLEVEL_OUTOF10: 7
PAINLEVEL_OUTOF10: 6
PAINLEVEL_OUTOF10: 6

## 2024-10-31 ASSESSMENT — PAIN DESCRIPTION - PAIN TYPE
TYPE: CHRONIC PAIN
TYPE: CHRONIC PAIN
TYPE: ACUTE PAIN
TYPE: CHRONIC PAIN
TYPE: CHRONIC PAIN

## 2024-10-31 ASSESSMENT — PAIN DESCRIPTION - LOCATION
LOCATION: BACK
LOCATION: HEAD;NECK
LOCATION: GENERALIZED
LOCATION: BACK
LOCATION: GENERALIZED

## 2024-10-31 ASSESSMENT — PAIN DESCRIPTION - ONSET
ONSET: ON-GOING
ONSET: PROGRESSIVE
ONSET: ON-GOING

## 2024-10-31 NOTE — CARE COORDINATION
[facility] aware of discharge tomorrow. [facility] has been contacted and aware of DME order for RW. Electronically signed by Erlinda Pearson RN on 10/31/2024 at 3:55 PM    Addendum:   RW delivered by [employer] today. Electronically signed by Erlinda Pearson RN on 10/31/2024 at 5:13 PM

## 2024-10-31 NOTE — PROGRESS NOTES
Ph: [phone], Fax: [phone]                                     [facility]                                                   [address] [place] [id number]           Reason for admission:    Rehab             Brief Summary:     Naty Dykes is being seen by nephrology for CKD    Interval History and Plan:   Patient seen at bedside  Comfortable on room air  BP softer side 106/65  Patient denied any dizziness.   Cr  stable 1.7  Na 137            Decreased Lasix to 20 mg daily.   Appreciate cardiology team help in adjusting medications and Coreg dose were reduced. Follow BP and HR.   Increase protein in diet  Drink fluid/water to thirst   Avoid NSAIDs and nephrotoxins if possible  Replete lytes as needed  Dose medications according to GFR  Plan for D/C tomorrow noted and advised RN to discharge on current medications.   Will follow up in clinic          Thank you for allowing us to participate in this patient's care  In case of any question please call us at our 24 hour answering service [phone] or from 7 AM to 5 PM via Perfect Serve, Voalte, Epic chat or cell phone.   Dr Sina Kwan MD      Assessment:     Chronic Kidney Disease  Stage 4   Cr around her baseline.       Hypertension  CAD  A fib  CHF with LVEF 4- 45 %     On Lasix 40 mg daily + Amiodarone + Coreg 12.5 mg BID         Hyponatremia          HPI      Patient is a 88 y.o. female  with PMH significant for CKD 4, CAD, GERD, DL, HTN was admitted with SDH after fall and now at rehab and nephrology is consulted to follow and assist in care given CKD 4.       Patient seen at bedside with PT/OT and appear general weak and lethargic but awake and able to answer basic questions. Patient having back pain for which getting pain medications. Denied any SOB, GI or urinary symptoms. Per patient appetite is fair and she is trying to keep herself hydrated.     Reviewed records.         ROS:   Negative except as  EYE     HIP SURGERY Right 11/7/2022    RIGHT HIP INTRAMEDULLARY NAILING performed by Noah Basurto MD at [facility] [place]    HYSTERECTOMY (CERVIX STATUS UNKNOWN)      OTHER SURGICAL HISTORY  08/27/2018    phacoemulsification of cataract with intraocular lens implant right eye    OK OFFICE/OUTPT VISIT,PROCEDURE ONLY Right 8/27/2018    PHACO EMULSIFICATION OF CATARACT WITH INTRAOCULAR LENS IMPLANT RIGHT EYE performed by Elan Chaney MD at [facility] [place]    OK OFFICE/OUTPT VISIT,PROCEDURE ONLY Left 9/4/2018    PHACO EMULSIFICATION OF CATARACT WITH INTRAOCULAR LENS IMPLANT LEFT EYE performed by Elan Chaney MD at [facility] [place]    THYROIDECTOMY

## 2024-10-31 NOTE — PROGRESS NOTES
Shift assessments completed, VSS. Patient alert & oriented x 4, working with therapy throughout the day, without difficulty. Patient c/o 9/10 pain to back of neck and head, pain managed with heat packs and prn percocet. Patient resting in bed at this time, call light and personal items within reach, safety measures in place.

## 2024-10-31 NOTE — TELEPHONE ENCOUNTER
Requested Prescriptions     Pending Prescriptions Disp Refills    amiodarone (PACERONE) 100 MG tablet [Pharmacy Med Name: AMIODARONE  MG ORAL TABLET] 45 tablet 3     Sig: TAKE 0.5 TABLETS (50 MG) BY MOUTH ONCE DAILY (AM)            Checked Correct Pharmacy: Yes    Any changes since last refill? No     Number: 45    Refills: 3    Last Office Visit: 9/13/2024     Next Office Visit: 11/15/2024

## 2024-10-31 NOTE — PROGRESS NOTES
Department of Physical Medicine & Rehabilitation  Progress Note    Patient Identification:  Naty Dykes  [id number]  : 1935  Admit date: 10/16/2024    Chief Complaint: SDH (subdural hematoma)    Subjective:   Resting in her room this morning.  No new complaints overnight.  She is planning on discharging home tomorrow.  She is working on DC scoring today.  Slept well last night and has no new pain.  ROS: No f/c, n/v, cp     Objective:  Patient Vitals for the past 24 hrs:   BP Temp Temp src Pulse Resp SpO2 Weight   10/31/24 0900 106/65 97.7 °F (36.5 °C) Oral 68 16 97 % --   10/31/24 0631 -- -- -- -- 16 -- --   10/31/24 0545 -- -- -- -- -- -- 50.6 kg (111 lb 8.8 oz)   10/30/24 1958 125/72 97.9 °F (36.6 °C) Oral 73 18 94 % --   10/30/24 1727 (!) 113/53 -- -- 71 -- -- --     CONST: Alert. No acute distress  EYES: No icterus noted.  HENT: Atraumatic, normocephalic;  NECK: Trachea midline, neck supple.  CV: Regular rate and rhythm, no murmur rub or gallop noted  RESP: Lungs clear to auscultation bilaterally, no rales wheezes or ronchi, no retractions. Respirations unlabored.   GI: Soft, nontender, nondistended.   EXT: No significant edema appreciated to BLE  NEURO:   Patient somnolent. Limited exam.  Unable to assess EOMI. CN V, CN VII, CNXI-XII intact  4/5 strengths to BUE. 3/5 strengths to bilateral hip flexion. 4/5 strengths to ankle dorsi/plantarflexion  PSYCH: Stable mood, normal judgement, normal affect     Laboratory data: Available via EMR.   Last 24 hour lab  Recent Results (from the past 24 hour(s))   Renal Function Panel    Collection Time: 10/31/24  7:51 AM   Result Value Ref Range    Sodium 137 136 - 145 mmol/L    Potassium 4.1 3.5 - 5.1 mmol/L    Chloride 95 (L) 99 - 110 mmol/L    CO2 32 21 - 32 mmol/L    Anion Gap 10 3 - 16    Glucose 107 (H) 70 - 99 mg/dL    BUN 32 (H) 7 - 20 mg/dL    Creatinine 1.7 (H) 0.6 - 1.2 mg/dL    Est, Glom Filt Rate 28 (A) >60    Calcium 9.4 8.3 - 10.6 mg/dL     11/1/24          INEZ AguilarPGabrielH  PM&R  10/31/2024  10:44 AM

## 2024-10-31 NOTE — PROGRESS NOTES
Occupational Therapy  Facility/Department: [facility] ACUTE REHAB UNIT  Rehabilitation Occupational Therapy Daily Treatment Note/Discharge Summary    Date: 10/31/24  Patient Name: Naty Dykes       Room: 3106/3106-01  MRN: [id number]  Account: [id number]   : 1935  (88 y.o.) Gender: female                    Past Medical History:  has a past medical history of Arthritis, Blood circulation, collateral, CAD (coronary artery disease), CHF (congestive heart failure) ([facility]), Confusion, GERD (gastroesophageal reflux disease), History of heart artery stent, Hyperlipidemia, Hypertension, Mitral valve prolapse, Myocardial infarct, old, Seizure (HCC), and Thyroid disease.  Past Surgical History:   has a past surgical history that includes Hysterectomy; Thyroidectomy; Colonoscopy; other surgical history (2018); pr office/outpt visit,procedure only (Right, 2018); eye surgery (Left, 2018); pr office/outpt visit,procedure only (Left, 2018); and hip surgery (Right, 2022).    Restrictions  Restrictions/Precautions: Up as Tolerated, Fall Risk, Seizure  Other position/activity restrictions: up with assist, seizure precautions    Subjective  Subjective: Pt semi supine upon OT entry. Denied pain and refusing a shower, agreeable to sponge bath.  Restrictions/Precautions: Up as Tolerated;Fall Risk;Seizure             Objective     Cognition  Overall Cognitive Status: WFL  Orientation  Overall Orientation Status: Within Functional Limits         ADL  Feeding  Assistance Level: Supervision  Skilled Clinical Factors: SPV for strategies.  Grooming/Oral Hygiene  Assistance Level: Modified independent;Increased time to complete  Skilled Clinical Factors: Pt completed oral care, hair brushing, and face washing in seated at sink mod I w/ increased time.  Upper Extremity Bathing  Assistance Level: Independent  Skilled Clinical Factors: Pt washed UB in seated at sink independently.  Lower Extremity

## 2024-10-31 NOTE — PROGRESS NOTES
Physical Therapy  Facility/Department: [facility] ACUTE REHAB UNIT  Rehabilitation Physical Therapy Treatment Note/ Discharge Summary    NAME: Naty Dykes  : 1935 (88 y.o.)  MRN: [id number]  CODE STATUS: Full Code    Date of Service: 10/31/24       Restrictions:  Restrictions/Precautions: Up as Tolerated, Fall Risk, Seizure  Position Activity Restriction  Other position/activity restrictions: up with assist, seizure precautions     SUBJECTIVE  Subjective  Subjective: Pt sitting in w/c when Pt arrived. Pt agreeable to therapy.  Pain: Intermittent c/o pain in R knee and in back. Did not rate.        Post Treatment Pain Screening         OBJECTIVE  Cognition  Overall Cognitive Status: WFL  Orientation  Overall Orientation Status: Within Functional Limits    Functional Mobility  Bed Mobility  Overall Assistance Level: Independent;Modified Independent (Bed positioned flat with bedrails lowered to simulate home situation)  Additional Factors: Increased time to complete;Without handrails;Head of bed flat  Bridging  Assistance Level: Independent  Skilled Clinical Factors: Used for scooting laterally  Roll Left  Assistance Level: Independent  Roll Right  Assistance Level: Independent  Sit to Supine  Assistance Level: Modified independent  Skilled Clinical Factors: Req additional time.  Supine to Sit  Assistance Level: Independent  Scooting  Assistance Level: Modified independent  Skilled Clinical Factors: req additional time  Balance  Sitting Balance: Independent  Standing Balance: Modified independent  (uses RW)Demo MI with retrieving object from floor w/ use of the RW  Transfers  Surface: To chair with arms;From bed;From chair with arms;Wheelchair;Raised toilet Seat;To bed  Device: Cane (RW)  Sit to Stand  Assistance Level: Modified independent  Skilled Clinical Factors: Uses RW  Stand to Sit  Assistance Level: Modified independent  Skilled Clinical Factors: Req additional time to align the RW w/ intended seat  Bed

## 2024-10-31 NOTE — PLAN OF CARE
Problem: Chronic Conditions and Co-morbidities  Goal: Patient's chronic conditions and co-morbidity symptoms are monitored and maintained or improved  Recent Flowsheet Documentation  Taken 10/30/2024 2000 by Ninfa Agustin RN  Care Plan - Patient's Chronic Conditions and Co-Morbidity Symptoms are Monitored and Maintained or Improved:   Monitor and assess patient's chronic conditions and comorbid symptoms for stability, deterioration, or improvement   Collaborate with multidisciplinary team to address chronic and comorbid conditions and prevent exacerbation or deterioration   Update acute care plan with appropriate goals if chronic or comorbid symptoms are exacerbated and prevent overall improvement and discharge     Problem: Discharge Planning  Goal: Discharge to home or other facility with appropriate resources  Outcome: Progressing  Flowsheets (Taken 10/30/2024 2000)  Discharge to home or other facility with appropriate resources: Identify discharge learning needs (meds, wound care, etc)     Problem: Safety - Adult  Goal: Free from fall injury  Outcome: Progressing  Pt. Admitted to facility with impaired gait and weakness. Fall prevention measures in place to promote safety and reduce the risk of falls: Fall sign posted on door, bed wheels locked and in lowest position, call light and over bed table placed within reach. Hourly rounding and frequent visual checks in place. Will continue to monitor     Problem: Pain  Goal: Verbalizes/displays adequate comfort level or baseline comfort level  Outcome: Progressing   Pt. Complaint of pain, pain medication administered, see MAR. Pain is being managed with pharmacological and non-pharmacological intervention. Pain level will be decreased or be at a satisfactory level by discharge.  Problem: Skin/Tissue Integrity  Goal: Absence of new skin breakdown  Outcome: Progressing     Problem: ABCDS Injury Assessment  Goal: Absence of physical injury  Outcome: Progressing

## 2024-10-31 NOTE — DISCHARGE INSTR - COC
Continuity of Care Form    Patient Name: Naty Dykes   :  1935  MRN:  [id number]    Admit date:  10/16/2024  Discharge date:  24    Code Status Order: Full Code   Advance Directives:   Advance Care Flowsheet Documentation             Admitting Physician:  Houston Barth DO  PCP: Debra Shahid DO    Discharging Nurse: Nicolas   Discharging Hospital Unit/Room#: 3106/3106-01  Discharging Unit Phone Number: [phone]    Emergency Contact:   Extended Emergency Contact Information  Primary Emergency Contact: Ela Villa  Home Phone: [phone]  Relation: Child  Secondary Emergency Contact: Nara Benito  [place] Phone: [phone]  Relation: Child   needed? No    Past Surgical History:  Past Surgical History:   Procedure Laterality Date    COLONOSCOPY      EYE SURGERY Left 2018    PHACO EMULSIFICATION OF CATARACT WITH INTRAOCULAR LENS IMPLANT LEFT EYE     HIP SURGERY Right 2022    RIGHT HIP INTRAMEDULLARY NAILING performed by Noah Basurto MD at [facility] [place]    HYSTERECTOMY (CERVIX STATUS UNKNOWN)      OTHER SURGICAL HISTORY  2018    phacoemulsification of cataract with intraocular lens implant right eye    GA OFFICE/OUTPT VISIT,PROCEDURE ONLY Right 2018    PHACO EMULSIFICATION OF CATARACT WITH INTRAOCULAR LENS IMPLANT RIGHT EYE performed by Elan Chaney MD at [facility] [place]    GA OFFICE/OUTPT VISIT,PROCEDURE ONLY Left 2018    PHACO EMULSIFICATION OF CATARACT WITH INTRAOCULAR LENS IMPLANT LEFT EYE performed by Elan Chaney MD at [facility] [place]    THYROIDECTOMY         Immunization History:   Immunization History   Administered Date(s) Administered    COVID-19, MODERNA BLUE border, Primary or Immunocompromised, (age 12y+), IM, 100 mcg/0.5mL 2021, 2021    Influenza A ([id number]) Vaccine PF IM 2009    Influenza Vaccine, unspecified formulation 2016    Influenza Virus Vaccine 10/13/2013, 10/02/2015    Influenza Whole 10/13/2013    Influenza, FLUZONE  cardiologist is Dr Palomares.         Rehab Therapies: Physical Therapy, Occupational Therapy, and Speech/Language Therapy  Weight Bearing Status/Restrictions: No weight bearing restrictions  Other Medical Equipment (for information only, NOT a DME order):  wheelchair and walker  Other Treatments: NA    Patient's personal belongings (please select all that are sent with patient):  Glasses, Ipad, Bible, [employer] alarm,  a second pair of glasses with cases, clothes and shower accessories.     RN SIGNATURE:  Electronically signed by Asia Jacobs RN on 11/1/24 at 10:28 AM EDT    CASE MANAGEMENT/SOCIAL WORK SECTION    Inpatient Status Date: ***    Readmission Risk Assessment Score:  Readmission Risk              Risk of Unplanned Readmission:  27           Discharging to Facility/ Agency   Name:   Address:  Phone:  Fax:    Dialysis Facility (if applicable)   Name:  Address:  Dialysis Schedule:  Phone:  Fax:    / signature: {Esignature:[id number]}    PHYSICIAN SECTION    Prognosis: Good    Condition at Discharge: Stable    Rehab Potential (if transferring to Rehab): Good    Recommended Labs or Other Treatments After Discharge: PT/OT    Physician Certification: I certify the above information and transfer of Naty Dykes  is necessary for the continuing treatment of the diagnosis listed and that she requires Home Care for greater 30 days.     Update Admission H&P: No change in H&P    PHYSICIAN SIGNATURE:  Electronically signed by Houston Barth DO on 10/31/24 at 11:35 AM EDT   Methotrexate Pregnancy And Lactation Text: This medication is Pregnancy Category X and is known to cause fetal harm. This medication is excreted in breast milk.

## 2024-10-31 NOTE — PROGRESS NOTES
Pt. Up in bed, family at bedside, left at end of visiting time. Shift assessment completed, see vitals below, pt. Voiced ongoing chronic pain, PRN pain med, scheduled voltaren gel and heat applied. Pt. Remained A& O X 4, call light and over bed table within reach, bed wheels locked and in lowest position, fall prevention measures remains in place. Hourly rounding and frequent visual checks ongoing.  Vitals:    10/30/24 1958   BP: 125/72   Pulse: 73   Resp: 18   Temp: 97.9 °F (36.6 °C)   SpO2: 94%

## 2024-10-31 NOTE — PLAN OF CARE
Problem: Chronic Conditions and Co-morbidities  Goal: Patient's chronic conditions and co-morbidity symptoms are monitored and maintained or improved  Outcome: Progressing  Flowsheets (Taken 10/30/2024 2000 by Ninfa Agustin, RN)  Care Plan - Patient's Chronic Conditions and Co-Morbidity Symptoms are Monitored and Maintained or Improved:   Monitor and assess patient's chronic conditions and comorbid symptoms for stability, deterioration, or improvement   Collaborate with multidisciplinary team to address chronic and comorbid conditions and prevent exacerbation or deterioration   Update acute care plan with appropriate goals if chronic or comorbid symptoms are exacerbated and prevent overall improvement and discharge     Problem: Discharge Planning  Goal: Discharge to home or other facility with appropriate resources  10/31/2024 1002 by Hilary Ravi, RN  Outcome: Progressing  Flowsheets (Taken 10/31/2024 1002)  Discharge to home or other facility with appropriate resources:   Identify barriers to discharge with patient and caregiver   Arrange for needed discharge resources and transportation as appropriate     Problem: Safety - Adult  Goal: Free from fall injury  10/31/2024 1002 by Hilary Ravi, RN  Outcome: Progressing  Flowsheets (Taken 10/28/2024 2151 by Brett Vázquez, RN)  Free From Fall Injury: Instruct family/caregiver on patient safety     Problem: Pain  Goal: Verbalizes/displays adequate comfort level or baseline comfort level  10/31/2024 1002 by Hilary Ravi RN  Outcome: Progressing  Flowsheets  Taken 10/31/2024 1002  Verbalizes/displays adequate comfort level or baseline comfort level:   Assess pain using appropriate pain scale   Administer analgesics based on type and severity of pain and evaluate response   Implement non-pharmacological measures as appropriate and evaluate response  Taken 10/31/2024 0900  Verbalizes/displays adequate comfort level or baseline comfort level: Encourage

## 2024-10-31 NOTE — PROGRESS NOTES
ACUTE REHAB UNIT  SPEECH/LANGUAGE PATHOLOGY      [x] Daily  [] Weekly Care Conference Note  [x] Discharge    Patient:Naty Dykes      :1935  MRN:[id number]  Rehab Dx/Hx: SDH (subdural hematoma) [S06.5XAA]    Precautions: [] Aspiration  [x] Fall risk  [] Sternal  [x] Seizure [] Hip  [] Weight Bearing [] Other  ST Dx: [] Aphasia  [] Dysarthria  [] Apraxia   [x] Oropharyngeal dysphagia [x] Cognitive Impairment  [] Other:   Date of Admit: 10/16/2024  Room #: 3106/3106-01  Date: 10/31/2024          Current Diet Order:ADULT DIET; Dysphagia - Minced and Moist; Low Fat/Low Chol/High Fiber/JAYNA; 1500 ml  ADULT ORAL NUTRITION SUPPLEMENT; Lunch; Other Oral Supplement; Ensure Compact Milkshake- chocolate      Swallow Strategies: Alternate solids/liquids , Check for pocketing of food L, Check for pocketing of food R, Upright as possible with all PO intake , Assist Feed , Small bites/sips     CBC:  Lab Results   Component Value Date    WBC 5.4 10/30/2024    HGB 10.3 (L) 10/30/2024    HCT 30.8 (L) 10/30/2024    MCV 91.1 10/30/2024     10/30/2024     Living Status: Lives with son, Poor historian.   Medication Management:   []Primary   []Secondary [x]Comment: assist PRN per daughter  Finance Management:          []Primary   []Secondary [x]Comment: assist PRN per daughter  Active :                        []Yes         [x]No: daughter, grandson  Education: 3 years high school  Occupation: crafts  Hobbies/Leisure: read  Hearing: WFL  Vision: Vision Corrective Device: wears glasses for reading    Barriers toward progress: Cognitive deficit, Impulsivity, Limited safety awareness, and Limited insight into deficits      Date: 10/31/2024       Tx session 1 Tx session 2 Discharge Summary   Total Timed Code Min 30 30    Total Treatment Minutes 30 30    Individual Treatment Minutes 30 30    Group Treatment Minutes 0 0    Co-Treat Minutes 0 0    Brief Exception: N/A N/A    Pain None stated None indicated    Pain

## 2024-10-31 NOTE — PROGRESS NOTES
ARU Discharge Assessment    Transportation  \"Has lack of transportation kept you from medical appointments, meetings, work, or from getting things needed for daily living?\"Check all that apply:  [] A.  Yes, it has kept me from medical appointments or from getting my medications  [] B.  Yes, it has kept me from non-medical meetings, appointments, work, or from getting things that I need  [x] C.  No  [] X.  Patient unable to respond  [] Y.  Patient declines to respond    Provision of Current Reconciled Medication List to Subsequent Provider at Discharge  [] No, current reconciled medication list not provided to the subsequent provider.  [x] Yes, current reconciled medication list provided to the subsequent provider. (**Select route of transmission below**)   [] Via Electronic Health Record   [x] Via Health Information Exchange Organization  [] Verbal (e.g. in person, telephone, video conferencing)  [] Paper-based (e.g. fax, copies, printouts)   [] Other Methods (e.g. texting, email, CDs)    Provision of Current Reconciled Medication List to Patient at Discharge  [] No, current reconciled medication list not provided to the patient, family and/or caregiver.   [x] Yes, current reconciled medication list provided to the patient, family and/or caregiver.  (**Select route of transmission below**)   [x] Via Electronic Health Record (e.g., electronic access to patient portal)   [] Via Health Information Exchange Organization  [x] Verbal (e.g. in person, telephone, video conferencing)  [x] Paper-based (e.g. fax, copies, printouts)   [] Other Methods (e.g. texting, email, CDs)    Health Literacy  \"How often do you need to have someone help you when you read instructions, pamphlets, or other written material from your doctor or pharmacy?\"  []  0.  Never  [x]  1.  Rarely  []  2.  Sometimes  []  3.  Often  []  4.  Always  []  7.  Patient declines to respond  []  8.  Patient unable to respond    BIMS - **Must be completed in the  to pain?\"  []  0.  Does not apply - I have not received rehabilitation therapy in the past 5 days  []  1.  Rarely or not at all  []  2.  Occasionally  [x]  3.  Frequently  []  4.  Almost constantly  []  8.  Unable to answer    Pain Interference with Day-to-Day Activities:  \"Over the past 5 days, how often have you limited your day-to-day activities (excluding rehabilitation therapy session)?\"  []  1.  Rarely or not at all  []  2.  Occasionally  [x]  3.  Frequently  []  4.  Almost constantly  []  8.  Unable to answer    Nutritional Approaches  Last 7 Days - Check all of the following nutritional approaches that were received within the last 7 days:  []  A.  Parenteral/IV feeding  []  B.  Feeding tube (e.g., nasogastric or abdominal (PEG))  [x]  C.  Mechanically altered diet - requires change in texture of food or liquids (e.g., pureed food, thickened liquids)  [x]  D.  Therapeutic diet (e.g., low salt, diabetic, low cholesterol)  []  Z.  None of the above    At time of Discharge - Check all of the following nutritional approaches that were received within the last 3 days of the patient's stay, including the day of discharge:  []  A.  Parenteral/IV feeding (including IV fluids if needed for hydration, but not as part of dialysis/chemo)  []  B.  Feeding tube (e.g., nasogastric or abdominal (PEG))  [x]  C.  Mechanically altered diet - requires change in texture of food or liquids (e.g., pureed food, thickened liquids)  [x]  D.  Therapeutic diet (e.g., low salt, diabetic, low cholesterol  []  Z.  None of the above    High Risk Drug Classes:  Use and Indication (THROUGHOUT LAST 3 DAYS OF STAY)    Is taking: Check if the pt is taking any medications by pharmacological classification, not how it is used, in the following classes  Indication noted: If column 1 is checked, check if there is an indication noted for all meds in the drug class Is taking  (check all that apply) Indication noted (check all that apply)

## 2024-11-01 VITALS
TEMPERATURE: 97.5 F | WEIGHT: 110.45 LBS | SYSTOLIC BLOOD PRESSURE: 133 MMHG | BODY MASS INDEX: 20.33 KG/M2 | HEART RATE: 60 BPM | OXYGEN SATURATION: 96 % | RESPIRATION RATE: 16 BRPM | HEIGHT: 62 IN | DIASTOLIC BLOOD PRESSURE: 72 MMHG

## 2024-11-01 LAB
ANION GAP SERPL CALCULATED.3IONS-SCNC: 10 MMOL/L (ref 3–16)
BASOPHILS # BLD: 0.1 K/UL (ref 0–0.2)
BASOPHILS NFR BLD: 1.8 %
BUN SERPL-MCNC: 29 MG/DL (ref 7–20)
CALCIUM SERPL-MCNC: 9.2 MG/DL (ref 8.3–10.6)
CHLORIDE SERPL-SCNC: 96 MMOL/L (ref 99–110)
CO2 SERPL-SCNC: 32 MMOL/L (ref 21–32)
CREAT SERPL-MCNC: 1.7 MG/DL (ref 0.6–1.2)
DEPRECATED RDW RBC AUTO: 14.9 % (ref 12.4–15.4)
EOSINOPHIL # BLD: 0.2 K/UL (ref 0–0.6)
EOSINOPHIL NFR BLD: 4.1 %
GFR SERPLBLD CREATININE-BSD FMLA CKD-EPI: 28 ML/MIN/{1.73_M2}
GLUCOSE SERPL-MCNC: 79 MG/DL (ref 70–99)
HCT VFR BLD AUTO: 31.4 % (ref 36–48)
HGB BLD-MCNC: 10.6 G/DL (ref 12–16)
LYMPHOCYTES # BLD: 1.3 K/UL (ref 1–5.1)
LYMPHOCYTES NFR BLD: 28 %
MCH RBC QN AUTO: 30.8 PG (ref 26–34)
MCHC RBC AUTO-ENTMCNC: 33.7 G/DL (ref 31–36)
MCV RBC AUTO: 91.4 FL (ref 80–100)
MONOCYTES # BLD: 0.4 K/UL (ref 0–1.3)
MONOCYTES NFR BLD: 9.6 %
NEUTROPHILS # BLD: 2.6 K/UL (ref 1.7–7.7)
NEUTROPHILS NFR BLD: 56.5 %
PLATELET # BLD AUTO: 252 K/UL (ref 135–450)
PMV BLD AUTO: 8.7 FL (ref 5–10.5)
POTASSIUM SERPL-SCNC: 4.2 MMOL/L (ref 3.5–5.1)
RBC # BLD AUTO: 3.44 M/UL (ref 4–5.2)
SODIUM SERPL-SCNC: 138 MMOL/L (ref 136–145)
WBC # BLD AUTO: 4.6 K/UL (ref 4–11)

## 2024-11-01 PROCEDURE — 36415 COLL VENOUS BLD VENIPUNCTURE: CPT

## 2024-11-01 PROCEDURE — 6370000000 HC RX 637 (ALT 250 FOR IP): Performed by: STUDENT IN AN ORGANIZED HEALTH CARE EDUCATION/TRAINING PROGRAM

## 2024-11-01 PROCEDURE — 85025 COMPLETE CBC W/AUTO DIFF WBC: CPT

## 2024-11-01 PROCEDURE — 6370000000 HC RX 637 (ALT 250 FOR IP): Performed by: PHYSICAL MEDICINE & REHABILITATION

## 2024-11-01 PROCEDURE — 6370000000 HC RX 637 (ALT 250 FOR IP): Performed by: INTERNAL MEDICINE

## 2024-11-01 PROCEDURE — 80048 BASIC METABOLIC PNL TOTAL CA: CPT

## 2024-11-01 PROCEDURE — 6360000002 HC RX W HCPCS: Performed by: PHYSICAL MEDICINE & REHABILITATION

## 2024-11-01 RX ORDER — AMIODARONE HYDROCHLORIDE 100 MG/1
50 TABLET ORAL DAILY
Qty: 30 TABLET | Refills: 0 | Status: SHIPPED | OUTPATIENT
Start: 2024-11-02

## 2024-11-01 RX ADMIN — BISACODYL 5 MG: 5 TABLET, COATED ORAL at 08:15

## 2024-11-01 RX ADMIN — LEVETIRACETAM 500 MG: 500 TABLET, FILM COATED ORAL at 08:14

## 2024-11-01 RX ADMIN — CYANOCOBALAMIN TAB 1000 MCG 1000 MCG: 1000 TAB at 08:14

## 2024-11-01 RX ADMIN — CARVEDILOL 3.12 MG: 3.12 TABLET, FILM COATED ORAL at 08:15

## 2024-11-01 RX ADMIN — HEPARIN SODIUM 5000 UNITS: 5000 INJECTION INTRAVENOUS; SUBCUTANEOUS at 08:14

## 2024-11-01 RX ADMIN — DICLOFENAC SODIUM TOPICAL GEL, 1% 2 G: 10 GEL TOPICAL at 08:15

## 2024-11-01 RX ADMIN — FUROSEMIDE 20 MG: 20 TABLET ORAL at 08:14

## 2024-11-01 RX ADMIN — HYDROCORTISONE 2.5%: 25 CREAM TOPICAL at 08:15

## 2024-11-01 RX ADMIN — PANTOPRAZOLE SODIUM 40 MG: 40 TABLET, DELAYED RELEASE ORAL at 06:04

## 2024-11-01 RX ADMIN — Medication 1000 UNITS: at 08:15

## 2024-11-01 RX ADMIN — AMIODARONE HYDROCHLORIDE 50 MG: 200 TABLET ORAL at 08:15

## 2024-11-01 RX ADMIN — COLCHICINE 0.3 MG: 0.6 TABLET, FILM COATED ORAL at 08:14

## 2024-11-01 RX ADMIN — ATORVASTATIN CALCIUM 80 MG: 80 TABLET, FILM COATED ORAL at 08:14

## 2024-11-01 RX ADMIN — OXYCODONE AND ACETAMINOPHEN 1 TABLET: 10; 325 TABLET ORAL at 06:09

## 2024-11-01 ASSESSMENT — PAIN SCALES - WONG BAKER: WONGBAKER_NUMERICALRESPONSE: HURTS LITTLE MORE

## 2024-11-01 ASSESSMENT — PAIN DESCRIPTION - FREQUENCY: FREQUENCY: CONTINUOUS

## 2024-11-01 ASSESSMENT — PAIN - FUNCTIONAL ASSESSMENT: PAIN_FUNCTIONAL_ASSESSMENT: PREVENTS OR INTERFERES SOME ACTIVE ACTIVITIES AND ADLS

## 2024-11-01 ASSESSMENT — PAIN SCALES - GENERAL
PAINLEVEL_OUTOF10: 7
PAINLEVEL_OUTOF10: 5
PAINLEVEL_OUTOF10: 7

## 2024-11-01 ASSESSMENT — PAIN DESCRIPTION - DESCRIPTORS
DESCRIPTORS: ACHING
DESCRIPTORS: ACHING

## 2024-11-01 ASSESSMENT — PAIN DESCRIPTION - LOCATION
LOCATION: BACK
LOCATION: BACK;HIP

## 2024-11-01 ASSESSMENT — PAIN DESCRIPTION - ONSET: ONSET: ON-GOING

## 2024-11-01 ASSESSMENT — PAIN DESCRIPTION - ORIENTATION
ORIENTATION: RIGHT
ORIENTATION: MID;LOWER

## 2024-11-01 ASSESSMENT — PAIN DESCRIPTION - PAIN TYPE: TYPE: CHRONIC PAIN

## 2024-11-01 NOTE — PLAN OF CARE
Problem: Chronic Conditions and Co-morbidities  Goal: Patient's chronic conditions and co-morbidity symptoms are monitored and maintained or improved  11/1/2024 1005 by Asia Jacobs RN  Outcome: Progressing  Flowsheets (Taken 11/1/2024 0807)  Care Plan - Patient's Chronic Conditions and Co-Morbidity Symptoms are Monitored and Maintained or Improved:   Monitor and assess patient's chronic conditions and comorbid symptoms for stability, deterioration, or improvement   Collaborate with multidisciplinary team to address chronic and comorbid conditions and prevent exacerbation or deterioration   Update acute care plan with appropriate goals if chronic or comorbid symptoms are exacerbated and prevent overall improvement and discharge     Problem: Discharge Planning  Goal: Discharge to home or other facility with appropriate resources  11/1/2024 1005 by Asia Jacobs RN  Outcome: Progressing  Flowsheets (Taken 11/1/2024 0807)  Discharge to home or other facility with appropriate resources:   Identify barriers to discharge with patient and caregiver   Arrange for needed discharge resources and transportation as appropriate   Identify discharge learning needs (meds, wound care, etc)     Problem: Safety - Adult  Goal: Free from fall injury  11/1/2024 1005 by Asia Jacobs, RN  Outcome: Progressing     Problem: Pain  Goal: Verbalizes/displays adequate comfort level or baseline comfort level  11/1/2024 1005 by Asia Jacobs, RN  Outcome: Progressing     Problem: Skin/Tissue Integrity  Goal: Absence of new skin breakdown  Description: 1.  Monitor for areas of redness and/or skin breakdown  2.  Assess vascular access sites hourly  3.  Every 4-6 hours minimum:  Change oxygen saturation probe site  4.  Every 4-6 hours:  If on nasal continuous positive airway pressure, respiratory therapy assess nares and determine need for appliance change or resting period.  11/1/2024 1005 by Asia Jacobs, RN  Outcome:

## 2024-11-01 NOTE — PROGRESS NOTES
Pt. Remained & O X 4 with forgetfulness, VSS, voiced continuous chronic ain, PRN pain med and non-pharmacologic intervention in place to treat. Call light and over bed table in place, fall prevention measures remains in place. Hourly rounding and frequent visual checks in place. No other care needed at this time.   Vitals:    10/31/24 2124   BP: 107/62   Pulse: 65   Resp: 16   Temp: 98.1 °F (36.7 °C)   SpO2: 94%

## 2024-11-01 NOTE — PLAN OF CARE
Problem: Chronic Conditions and Co-morbidities  Goal: Patient's chronic conditions and co-morbidity symptoms are monitored and maintained or improved  11/1/2024 1343 by Asia Jacobs RN  Outcome: Adequate for Discharge  11/1/2024 1005 by Asia Jacobs RN  Outcome: Progressing  Flowsheets (Taken 11/1/2024 0807)  Care Plan - Patient's Chronic Conditions and Co-Morbidity Symptoms are Monitored and Maintained or Improved:   Monitor and assess patient's chronic conditions and comorbid symptoms for stability, deterioration, or improvement   Collaborate with multidisciplinary team to address chronic and comorbid conditions and prevent exacerbation or deterioration   Update acute care plan with appropriate goals if chronic or comorbid symptoms are exacerbated and prevent overall improvement and discharge  11/1/2024 0358 by Ninfa Agustin RN  Outcome: Progressing     Problem: Discharge Planning  Goal: Discharge to home or other facility with appropriate resources  11/1/2024 1343 by Asia Jacobs RN  Outcome: Adequate for Discharge  11/1/2024 1005 by Asia Jacobs RN  Outcome: Progressing  Flowsheets (Taken 11/1/2024 0807)  Discharge to home or other facility with appropriate resources:   Identify barriers to discharge with patient and caregiver   Arrange for needed discharge resources and transportation as appropriate   Identify discharge learning needs (meds, wound care, etc)  11/1/2024 0358 by Ninfa Agustin RN  Outcome: Progressing     Problem: Safety - Adult  Goal: Free from fall injury  11/1/2024 1343 by Asia Jacobs RN  Outcome: Adequate for Discharge  11/1/2024 1005 by Asia Jacobs RN  Outcome: Progressing  11/1/2024 0358 by Ninfa Agustin RN  Outcome: Progressing     Problem: Pain  Goal: Verbalizes/displays adequate comfort level or baseline comfort level  11/1/2024 1343 by Asia Jacobs RN  Outcome: Adequate for Discharge  11/1/2024 1005 by Asia Jacobs RN  Outcome:

## 2024-11-01 NOTE — CARE COORDINATION
Case Management Assessment            Discharge Note                    Date / Time of Note: 11/1/2024 9:36 AM                  Discharge Note Completed by: Erlinda Pearson RN    Patient Name: Naty Dykes   YOB: 1935  Diagnosis: SDH (subdural hematoma) [S06.5XAA]   Date / Time: 10/16/2024  5:55 PM    Current PCP: Debra Shahid DO  Clinic patient: No    Hospitalization in the last 30 days: No       Advance Directives:  Code Status: Full Code  [place] DNR form completed and on chart: Not Indicated    Financial:  Payor: MEDICARE / Plan: MEDICARE PART A AND B / Product Type: *No Product type* /      Pharmacy:    [facility] - [address] - P [phone] - F [phone]  [address] [place] [address]  [address]  [facility] [address]  Phone: [phone] Fax: [phone]      Assistance purchasing medications?:    Assistance provided by Case Management: None at this time    Does patient want to participate in local refill/ meds to beds program?:      Meds To Beds General Rules:  1. Can ONLY be done Monday- Friday between 8:30am-5pm  2. Prescription(s) must be in pharmacy by 3pm to be filled same day  3.Copy of patient's insurance/ prescription drug card and patient face sheet must be sent along with the prescription(s)  4. Cost of Rx cannot be added to hospital bill. If financial assistance is needed, please contact unit  or ;  or  CANNOT provide pharmacy voucher for patients co-pays  5. Patients can then  the prescription on their way out of the hospital at discharge, or pharmacy can deliver to the bedside if staff is available. (payment due at time of pick-up or delivery - cash, check, or card accepted)     Able to afford home medications/ co-pay costs: Yes    ADLS:  Current PT AM-PAC Score:   /24  Current OT AM-PAC Score:   /24    DISCHARGE Disposition: Home with Home Health Care: [facility]     LOC at discharge: Not

## 2024-11-01 NOTE — PLAN OF CARE
Problem: Chronic Conditions and Co-morbidities  Goal: Patient's chronic conditions and co-morbidity symptoms are monitored and maintained or improved  Outcome: Progressing     Problem: Discharge Planning  Goal: Discharge to home or other facility with appropriate resources  Outcome: Progressing     Problem: Safety - Adult  Goal: Free from fall injury  Outcome: Progressing   Pt remains free from falls during this stay on the ARU. 1:1 and education provided on the importance of using call light to ask for assistance prior to transfers and ambulation. Pt voices understanding. Will continue to monitor and re-educate as needed.     Problem: Pain  Goal: Verbalizes/displays adequate comfort level or baseline comfort level  Outcome: Progressing  Pt. Complaint of pain, pain medication administered, see MAR. Pain is being managed with pharmacological and non-pharmacological intervention. Pain level will be decreased or be at a satisfactory level by discharge.     Problem: Skin/Tissue Integrity  Goal: Absence of new skin breakdown  Outcome: Progressing  Skin is kept clean and dry. Pt. Turns self, encouraged to turn and reposition to relieve pressure off bony prominences to improve or maintain skin integrity. No new skin issues found. No s/sx of infection noted. Will continue to monitor.     Problem: ABCDS Injury Assessment  Goal: Absence of physical injury  Outcome: Progressing  Pt remains free from accidental injury during this stay on the ARU. Will continue to monitor pt and assess per schedule and prn.

## 2024-11-01 NOTE — DISCHARGE SUMMARY
Physical Medicine & Rehabilitation  Discharge Summary     Patient Identification:  Naty Dykes  : 1935  Admit date: 10/16/2024  Discharge date:   Attending provider: Houston Barth DO        Primary care provider: Debra Shahid DO     Discharge Diagnoses:   Patient Active Problem List   Diagnosis    Other and unspecified angina pectoris    Coronary atherosclerosis of native coronary artery    Other chronic pulmonary heart diseases    Mitral valve disorder    Chronic low back pain    Essential hypertension    AGUILA (acute kidney injury) ([facility])    Renal insufficiency    Gout    Pain in joint, hand    Mixed hyperlipidemia    Primary insomnia    Closed stable burst fracture of first lumbar vertebra ([facility])    GERD (gastroesophageal reflux disease)    Palpitations    V-tach ([facility])    Abnormal myocardial perfusion study    Ischemic cardiomyopathy    Cardiomyopathy ([facility])    PVD (posterior vitreous detachment), both eyes    Posterior subcapsular polar age-related cataract of both eyes    Confusion    Chronic kidney disease, stage IV (severe) ([facility])    Visual field defect    Stroke of unknown etiology ([facility])    Encounter for loop recorder check    Atrial fibrillation ([facility])    On amiodarone therapy    On continuous oral anticoagulation    Near syncope    Bradycardia    Hypokalemia    Hyponatremia    Closed displaced intertrochanteric fracture of right femur ([facility])    Fall from standing    History of CVA (cerebrovascular accident)    Presence of heart assist device ([facility])    Secondary hypercoagulable state ([facility])    Chronic HFrEF (heart failure with reduced ejection fraction) ([facility])    Subdural hematoma    Subarachnoid hemorrhage ([facility])    Seizure ([facility])    Traumatic subdural hematoma without loss of consciousness    SDH (subdural hematoma)    Moderate malnutrition ([facility])       Discharge Functional Status:      Physical therapy:  Supine to Sit: Independent  Sit to Supine: Independent      Sit to Stand: Independent  Chair/Bed  mouth daily  What changed:   medication strength  See the new instructions.  Notes to patient: Treatment for fluid retention and blood pressure  Side Effects: Upset stomach, dizziness, headache, and frequent urination        oxyCODONE-acetaminophen  MG per tablet  Commonly known as: PERCOCET  Take 1 tablet by mouth every 6 hours as needed for Pain for up to 5 days. Max Daily Amount: 4 tablets  What changed:   when to take this  reasons to take this  Notes to patient: Oxycodone (Oxeta*, Oxycontin, Roxicodone)  Use: Pain control  Side effects: drowsiness, dizziness, constipation, nausea, and vomiting             CONTINUE taking these medications      Co Q 10 100 MG Caps     diclofenac sodium 1 % Gel  Commonly known as: VOLTAREN  Apply 2 g topically 2 times daily  Notes to patient: Treatment: pain, arthritis  Side Effects: Skin irritation        famotidine 20 MG tablet  Commonly known as: PEPCID  TAKE 1 TABLET BY MOUTH ONCE DAILY (AM)  Notes to patient: Famotidine (Pepcid)  Treats/prevents ulcers, GERD, excess stomach acid.  Side effects: Constipation, fatigue, weakness, headache.         levETIRAcetam 500 MG tablet  Commonly known as: KEPPRA  Take 1 tablet by mouth 2 times daily  Notes to patient: Use: Prevents seizures  Side effects: Drowsiness, headache, abnormal behavior.        nitroGLYCERIN 0.4 MG SL tablet  Commonly known as: NITROSTAT  Place 1 tablet under the tongue every 5 minutes as needed for Chest pain up to max of 3 total doses. If no relief after 1 dose, call 911.  Notes to patient: It can treat and prevent chest pain (angina) in its oral form.  Side effects: Headache, dizziness, lightheadedness, nausea, flushing, and burning/tingling under the tongue may occur.      senna 8.6 MG tablet  Commonly known as: SENOKOT  Take 1 tablet by mouth nightly  Notes to patient: Stool softner     vitamin D3 25 MCG (1000 UT) Tabs tablet  Commonly known as: CHOLECALCIFEROL  TAKE 1 TABLET (1,000 IU) BY MOUTH DAILY

## 2024-11-01 NOTE — PROGRESS NOTES
Patient Aox4. Assessment completed. VSS as shown. /72   Pulse 60   Temp 97.5 °F (36.4 °C) (Oral)   Resp 16     SpO2 96% . Patient is eager to go home today. She got ready for the day by using the restroom, changing her outfit, brushing her hair and teeth and washing her face. Patient drank her morning coffee and orange juice. She declined the breakfast foods and didn't want anything additional ordered this morning. RN and student nurse packed up patient's belongings and patient is resting comfortably in her chair. Call light and patient belongings are within reach and standard safety measures are in place. Will continue plan of care.     Asia Jacobs RN

## 2024-11-01 NOTE — DISCHARGE INSTRUCTIONS
Extra Heart Failure Education/ Tools/ Resources:     https://[employer].com/publication/?r=464100   --- this is American Heart Association interactive Healthier Living with Heart Failure guidebook.  Please click hyperlink or copy / paste link into search bar. The QR Code is also available below. Use your mouse to scroll through the pages.  Lots of information about weight monitoring, diet tips, activity, meds, etc    Heart Failure Tools and Resources QR Code is below. It includes multiple resources to include symptom tracker, med tracker, further HF info, and access to a HF Support Network online Community    HF Jerome Neo  -- this is a free smart phone neo available for iPhone and Android download.  Use your phone to track sodium / fluid intake, zone tool symptom tracking, weights, medications, etc. Click on this hyperlink  HF Jerome Neo   for QR code for easy download or the link is also found in the below HF Tools and Resources.      DASH (Dietary Approach to Stop Hypertension) diet --  https://www.nhlbi.nih.gov/education/dash-eating-plan -- this diet is a flexible eating plan that promotes heart healthy eating style.  Click on hyperlink or copy / paste link into search bar.  Lots of low sodium recipes and tips.    https://www.[employer]/recipes  -- more free recipes

## 2024-11-01 NOTE — PROGRESS NOTES
Ph: [phone], Fax: [phone]                                     [facility]                                                   [address] [place], [place] [id number]           Reason for admission:    Rehab             Brief Summary:     Naty Dykes is being seen by nephrology for CKD    Interval History and Plan:   Patient seen at bedside with daughter  Comfortable on room air  /72  Cr  stable 1.7  Na 138  Plan for D/C today            Lasix 20 mg daily.   Appreciate cardiology team help in adjusting medications and Coreg dose were reduced. Follow BP and HR.   Increase protein in diet  Drink fluid/water to thirst   Avoid NSAIDs and nephrotoxins if possible  Replete lytes as needed  Dose medications according to GFR  Reviewed D/C medications with RN   Will follow up in clinic. Office contact information provided to RN          Thank you for allowing us to participate in this patient's care  In case of any question please call us at our 24 hour answering service [phone] or from 7 AM to 5 PM via Perfect Serve, EnterMediaalte, Epic chat or cell phone.   Dr Sina Kwan MD      Assessment:     Chronic Kidney Disease  Stage 4   Cr around her baseline.       Hypertension  CAD  A fib  CHF with LVEF 4- 45 %     On Lasix 40 mg daily + Amiodarone + Coreg 12.5 mg BID         Hyponatremia          HPI      Patient is a 88 y.o. female  with PMH significant for CKD 4, CAD, GERD, DL, HTN was admitted with SDH after fall and now at rehab and nephrology is consulted to follow and assist in care given CKD 4.       Patient seen at bedside with PT/OT and appear general weak and lethargic but awake and able to answer basic questions. Patient having back pain for which getting pain medications. Denied any SOB, GI or urinary symptoms. Per patient appetite is fair and she is trying to keep herself hydrated.     Reviewed records.         ROS:   Negative except as mentioned in  11/7/2022    RIGHT HIP INTRAMEDULLARY NAILING performed by Noah Basurto MD at [facility] [place]    HYSTERECTOMY (CERVIX STATUS UNKNOWN)      OTHER SURGICAL HISTORY  08/27/2018    phacoemulsification of cataract with intraocular lens implant right eye    ND OFFICE/OUTPT VISIT,PROCEDURE ONLY Right 8/27/2018    PHACO EMULSIFICATION OF CATARACT WITH INTRAOCULAR LENS IMPLANT RIGHT EYE performed by Elan Chaney MD at [facility] [place]    ND OFFICE/OUTPT VISIT,PROCEDURE ONLY Left 9/4/2018    PHACO EMULSIFICATION OF CATARACT WITH INTRAOCULAR LENS IMPLANT LEFT EYE performed by Elan Chaney MD at [facility] [place] [place]    THYROIDECTOMY

## 2024-11-01 NOTE — PROGRESS NOTES
Pt d/c'd home. Reviewed d/c instructions, home meds, and  f/u information utilizing teach-back method.  Scripts for medications sent over to patient's chosen pharmacy. Patient verbalized understanding. Patient was wheeled off the unit in stable condition to personal vehicle with her sister driving her. Patient belongings were sent with patient including her ipad, , 2 sets of glasses, medtronic fob, bible, clothes and shower accessories.     Asia Jacobs RN

## 2024-11-07 ENCOUNTER — HOSPITAL ENCOUNTER (OUTPATIENT)
Dept: CT IMAGING | Age: 89
Discharge: HOME | End: 2024-11-07
Attending: STUDENT IN AN ORGANIZED HEALTH CARE EDUCATION/TRAINING PROGRAM
Payer: MEDICARE

## 2024-11-07 DIAGNOSIS — I60.9 SUBARACHNOID HEMORRHAGE (HCC): ICD-10-CM

## 2024-11-07 DIAGNOSIS — I62.00 SUBDURAL HEMORRHAGE (HCC): ICD-10-CM

## 2024-11-07 PROCEDURE — 70450 CT HEAD/BRAIN W/O DYE: CPT

## 2024-11-08 ENCOUNTER — TELEPHONE (OUTPATIENT)
Dept: FAMILY MEDICINE CLINIC | Age: 89
End: 2024-11-08

## 2024-11-08 NOTE — TELEPHONE ENCOUNTER
Nurse Billie Padilla with [facility] called and they are needing a verbal for home care orders for the patient Naty Dykes   Please advise.    Billie Padilla # [phone].

## 2024-11-15 ENCOUNTER — OFFICE VISIT (OUTPATIENT)
Dept: CARDIOLOGY CLINIC | Age: 89
End: 2024-11-15

## 2024-11-15 VITALS
BODY MASS INDEX: 19.72 KG/M2 | HEART RATE: 68 BPM | DIASTOLIC BLOOD PRESSURE: 74 MMHG | SYSTOLIC BLOOD PRESSURE: 130 MMHG | WEIGHT: 107.8 LBS

## 2024-11-15 DIAGNOSIS — I50.22 CHRONIC HFREF (HEART FAILURE WITH REDUCED EJECTION FRACTION) (HCC): Primary | ICD-10-CM

## 2024-11-15 RX ORDER — FUROSEMIDE 20 MG/1
20 TABLET ORAL DAILY PRN
Qty: 60 TABLET | Refills: 3
Start: 2024-11-15

## 2024-11-15 RX ORDER — ASPIRIN 81 MG/1
81 TABLET ORAL DAILY
Qty: 90 TABLET | Refills: 0 | Status: SHIPPED | OUTPATIENT
Start: 2024-11-15

## 2024-11-15 NOTE — PROGRESS NOTES
Cc: HFrEF, PAFRVR, severe MR, CAD s/p MI and stent.    HPI:     Patient is a 89-year-old woman with history of HTN, HLP, thyroid abnormalities, CKD 4, mitral valve prolapse with severe MR, CAD status post MI and stent 2018, HFrEF, CVA 03/2022, s/p ILR with subclinical PAF RVR placed on amiodarone and Eliquis s/p DC cardioversion; eliquis was stopped in 10/2024 due to fall with SDH, frequent PVCs.    Echo 08/2022: Normal LV, LVEF 40-45%, hypokinesis of the inferior and septal walls, mild LVH, grade 2 diastolic dysfunction, severe LAE, severe MR, moderate TR, normal RV, RVSP 61 (3).  Compared to echo 03/2022, LVEF 25-30% and echo 10/2018, LVEF 35-40%.    Nuclear stress 06/2022: Basal inferior lateral artifact, otherwise no evidence of ischemia or scar, moderate LVD, LVEF 55%.    Lab 09/2024: TSH, LFTs normal, amiodarone level less than 0.3.  PFTs have not been completed yet.    Device (ILR) interrogation 9/10/2024: NSR, no AF, 11% PVC burden.    Patient had an admission at the [facility] in 10/2024 due to a fall with subsequent subdural hematoma.  Neurosurgery recommended conservative approach, stopped Eliquis.    Echo 10/2024: Normal LV, LVEF 40-45%, grade 2 diastolic dysfunction, normal RV with mild dysfunction, mild MR/TR, severe QUE.    Patient returns to the clinic today with her daughter Ela.  Patient was recently seen by Dr. Bandar Barragan, neurosurgeon, who cleared patient for resumption of anticoagulation or any cardiac procedures (Watchman device placement) if needed.  Patient currently reports no complaints except for occasional episodes of lightheadedness.  Labs were reviewed, creatinine 1.7 (slightly up from baseline), hemoglobin 10 on 11/1/2024.      Histories     Past Medical History:   has a past medical history of Arthritis, Blood circulation, collateral, CAD (coronary artery disease), CHF (congestive heart failure) (HCC), Confusion, GERD (gastroesophageal reflux disease), History of heart

## 2024-12-03 PROCEDURE — 93298 REM INTERROG DEV EVAL SCRMS: CPT | Performed by: INTERNAL MEDICINE

## 2024-12-16 ENCOUNTER — TELEPHONE (OUTPATIENT)
Dept: FAMILY MEDICINE CLINIC | Age: 88
End: 2024-12-16

## 2024-12-16 NOTE — TELEPHONE ENCOUNTER
Spoke with Brie with [facility] and gave her Dr. Shahid's verbal orders for urine and blood work. Also let her know that patient should go to ER if symptoms are severe.

## 2024-12-16 NOTE — TELEPHONE ENCOUNTER
It would be nice to check her urine dipstick, urine culture and CMP and CBC.  If symptoms are severe, she should seek care in the ER.

## 2024-12-16 NOTE — TELEPHONE ENCOUNTER
Billie Padilla with [facility] called the office stating that the patient seems confused and not herself. Family and [facility] is requesting urine test and labs.     Please call Billie back at [phone] with verbal orders.         Also, patient is due for an appt. Billie will let them know.

## 2024-12-17 NOTE — ED PROVIDER NOTES
[id number] RESIDENT NOTE       Date of evaluation: 6/23/2022    Chief Complaint     Leg Swelling (bilateral leg swelling worse than usual ) and Other (decreased kidney funtion with elevated bun/creatinine. States cant get into nephrology until september )      History of Present Illness     Scarlet Shah is a 80 y.o. female with a past medical history of AGUILA, atrial fibrillation on Eliquis, CVA status post ILR placement, hypertension, CKD stage IV, heart failure with reduced ejection fraction who presents with a chief complaint of bilateral leg swelling and abnormal labs. Per patient's daughter who is also her POA, the patient has had a 14 pound weight gain since her last admission 3 weeks ago. She has also had a bump in her creatinine from 2.5-3.0. Patient has complained of increasing fatigue and sleeping all day. Her anemia of CKD is also worsening. Her daughters report that her mental and physical status is declining and they were told by their home physician that she needed to come to the emergency department. Patient denies any fevers, chills, shortness of breath, chest or abdominal pain, nausea or vomiting. Her family is concerned because GFR dropped from 22 in May to 19. They tried to see their nephrologist Dr. Starr Stage but could not get an appointment. Her most recent echo was in 3/19/2022 and showed an ejection fraction of 25 to 30% with diffuse hypokinesis and grade 3 diastolic dysfunction, with no evidence of shunting. For her heart failure she is on Lasix 20 mg twice daily. Her daughters thoroughly control her diet as well as her medications, reporting that she is compliant however still not making much urine. She does not complain of dysuria. Of note she was discharged from the hospital 24 days ago after being treated for atrial fibrillation, multidrug-resistant E. coli UTI status post cefdinir course.      Review of Systems [facility] VISIT NOTE   NAME: Amaris Tolbert                23 month old female   : 2022  PMD: Raymon Caldera MD     SUBJECTIVE:  Chief Complaint:  Chief Complaint   Patient presents with    Fever     Fever-  temp 102 last night with runny nose        HPI:  Amaris Tolbert is a 23 month old female who presents to the [facility] for concerns of rhinorrhea and fevers x 2 days.  Mother reports Tmax of 102 Fahrenheit last night.  She did give the patient antibiotics last night.  No antipyretics today.  Patient has had no vomiting.  Patient did have a loose stool once today.  No blood or mucus noted in stools.  Patient has no cough.  Patient is tolerating p.o. fluids appropriately.  Patient has normal wet diapers.  Patient is up-to-date with childhood vaccinations.  Mother and patient's aunt are sick contact.    Historian: Mother    PAST MEDICAL HISTORY:  Past Medical History:   Diagnosis Date    No known problems      Patient Active Problem List    Diagnosis Date Noted    Breath-holding spell 2024     Priority: Low    History of acute otitis media 10/09/2023     Priority: Low     X 3-4 in first year of life. Seen by Dr. Cohen , observing- to f/u   Recommended sooner follow-up at 9-month visit        PAST SURGICAL HISTORY:  Past Surgical History:   Procedure Laterality Date    No past surgeries       FAMILY HISTORY:  Not contributory unless stated in HPI  SOCIAL HISTORY:  Tobacco Use    Passive exposure: Never    Smokeless tobacco: Never     MEDICATIONS:  Current Outpatient Medications   Medication Sig    hydroCORTisone (CORTIZONE) 1 % cream Apply topically 2 times daily.     No current facility-administered medications for this visit.     ALLERGIES:  ALLERGIES:   Allergen Reactions    Pear   (Food Or Med) RASH     Patient's medications, allergies, past medical, surgical, and social history  were reviewed and updated as appropriate.    REVIEW OF SYSTEMS:  POSITIVE ROS: See above.  NEGATIVE ROS: All  Review of Systems   Constitutional: Positive for activity change, appetite change, fatigue and unexpected weight change. Negative for chills and fever. HENT: Positive for mouth sores. Negative for rhinorrhea, sinus pressure, sinus pain, sneezing and trouble swallowing. Eyes: Negative for visual disturbance. Respiratory: Negative for cough, choking, chest tightness, shortness of breath, wheezing and stridor. Cardiovascular: Positive for leg swelling. Negative for chest pain and palpitations. Gastrointestinal: Positive for nausea. Negative for abdominal distention and abdominal pain. Patient reportedly has a history of constipation, with 1 episode of diarrhea yesterday. Genitourinary: Positive for decreased urine volume. Negative for dysuria. Musculoskeletal: Positive for back pain. Negative for arthralgias, neck pain and neck stiffness. Skin: Negative for rash and wound. Neurological: Negative for dizziness, tremors, syncope, speech difficulty, weakness, light-headedness and headaches. Psychiatric/Behavioral: Positive for confusion (Daughters report patient is increasingly confused). Negative for agitation and behavioral problems. Past Medical, Surgical, Family, and Social History     She has a past medical history of Arthritis, Blood circulation, collateral, CAD (coronary artery disease), CHF (congestive heart failure) ([address] Confusion, GERD (gastroesophageal reflux disease), History of heart artery stent, Hyperlipidemia, Hypertension, Mitral valve prolapse, Myocardial infarct, old, and Thyroid disease. She has a past surgical history that includes Hysterectomy; Thyroidectomy; Colonoscopy; other surgical history (08/27/2018); pr office/outpt visit,procedure only (Right, 8/27/2018); eye surgery (Left, 09/04/2018); and pr office/outpt visit,procedure only (Left, 9/4/2018).   Her family history includes Cancer in her father and sister; Diabetes in her brother; Heart Disease in her other pertinent systems reviewed and negative.      OBJECTIVE:  PHYSICAL EXAM:  Vitals:    12/17/24 1302 12/17/24 1354   Pulse: (!) 150 (!) 156   Resp: 30    Temp: 99.8 °F (37.7 °C)    TempSrc: Temporal    SpO2: 97%    Weight: 13.4 kg (29 lb 8.7 oz)         GENERAL: Well-developed, well-nourished female. Appears in no acute distress. Active and playful throughout exam.   HEAD: Normocephalic. No obvious deformities, step-offs, tenderness or ecchymosis noted.  EYES: Pupils are equally reactive bilaterally. EOMs grossly intact. No conjunctival erythema. No periorbital ecchymosis or swelling noted.  ENT: External ear without any masses or tenderness. Auditory canals clear bilaterally. No hemotympanum noted bilaterally. TM visualized bilaterally, non-erythematous, non-bulging. No mastoid ecchymosis or swelling noted bilaterally. Nasal congestion noted on exam. Oropharynx is pink without any tonsillar erythema or exudates. No uvula deviation. No kissing tonsils.   NECK: Supple, no lymphadenopathy. No meningeal signs. No cervical midline tenderness.  LUNGS: Clear to auscultation bilaterally. No rhonchi, wheezing, rales or coarse breath sounds.   HEART: Tachycardic patient does not have a cough.. No murmurs, rubs or gallops.  ABDOMEN: Soft, nondistended.  Nontender to palpation in all 4 quadrants.  EXTREMITIES: Moving all extremities without difficulty.  NEUROLOGIC: Alert. Interactive and playful throughout exam. Normal speech. Steady gait.  SKIN: Normal color. Warm and dry. No rashes or lesions.      ASSESSMENT:  MDM:  Amaris is a 23 month old female who presents to the [facility] for concerns of rhinorrhea and fevers x 2 days.     Vital signs reviewed.  Patient was afebrile.  Patient was not hypoxic.  Patient was well-appearing and non toxic in appearance.     Physical exam findings within normal limits.    Quadscreen was negative.  Strep swab pending.    1600- Upon discussion with nursing staff, strep swab was not performed  father and mother; Stroke in her brother. She reports that she has never smoked. She has never used smokeless tobacco. She reports that she does not drink alcohol and does not use drugs. Medications     Previous Medications    APIXABAN (ELIQUIS) 2.5 MG TABS TABLET    Take 1 tablet by mouth 2 times daily    ASPIRIN 81 MG CHEWABLE TABLET    Take 1 tablet by mouth daily. ATORVASTATIN (LIPITOR) 80 MG TABLET    TAKE 1 TABLET BY MOUTH ONE TIME A DAY    CARVEDILOL (COREG) 25 MG TABLET    Take 1 tablet by mouth 2 times daily    COENZYME Q10 (CO Q 10) 100 MG CAPS    Take  by mouth daily. DICLOFENAC SODIUM (VOLTAREN) 1 % GEL    Apply 2 g topically 4 times daily    FAMOTIDINE (PEPCID) 20 MG TABLET    Take 1 tablet by mouth daily    FLUTICASONE (FLONASE) 50 MCG/ACT NASAL SPRAY    2 sprays by Each Nostril route daily    FUROSEMIDE (LASIX) 20 MG TABLET    Take 1 tablet by mouth 2 times daily    HANDICAP PLACARD MISC    by Does not apply route Length: 5 years    HYDROCODONE-ACETAMINOPHEN (NORCO)  MG PER TABLET    Take 1 tablet by mouth every 6 hours as needed. Clarisa Barahona MISC. DEVICES KIT    Rollator. Use as directed. NITROGLYCERIN (NITROSTAT) 0.4 MG SL TABLET    PUT 1 TAB UNDER TONGUE EVERY 5 MIN AS NEEDED CHEST PAIN UP TO 3. IF NO RELIEF AFTER 1 DOSE, CALL 911    ONDANSETRON (ZOFRAN) 4 MG TABLET    Take 1 tablet by mouth 3 times daily as needed for Nausea or Vomiting    VITAMIN B-12 (CYANOCOBALAMIN) 1000 MCG TABLET    Take 1,000 mcg by mouth daily    VITAMIN D (CHOLECALCIFEROL) 1000 UNIT TABS TABLET    Take 2 tablets by mouth daily    ZOLPIDEM (AMBIEN) 5 MG TABLET    Take 1 tablet by mouth nightly as needed for Sleep for up to 180 days.        Allergies     She is allergic to benazepril hcl, feldene [piroxicam], hytrin [terazosin hcl], iv contrast [iodides], acetaminophen, celebrex [celecoxib], cephalexin, hydrochlorothiazide, iodinated casein, monopril [fosinopril sodium], protonix [pantoprazole sodium], quinapril prior to discharge.  I did contact the patient's mother to discuss this.  Apologized to mother.  Offered mother return to clinic to have patient retested tested for strep.  Mother states given that her strep test is negative, she has a low suspicion for strep at this time.  Mother's influenza swab was positive for influenza A.  Mother states that she will continue to monitor the patient's symptoms closely and return to the [facility] or follow-up pediatrician if she would like patient to be tested for strep.  Continuation of supportive therapy and fever control discussed.  All questions answered.    LAB RESULTS:  Results for orders placed or performed in visit on 12/17/24   POCT COVID/Flu/RSV Panel via [facility]   Result Value Ref Range    POCT Rapid SARS-COV-2 by PCR Not Detected Not Detected    POCT Influenza A by PCR Not Detected Not Detected    POCT Influenza B By PCR Not Detected Not Detected    RSV By PCR Not Detected Not Detected       Discussed with Collaborating Physician regarding patient presentation, physical exam, lab results and physician is in agreement with the patient's work-up and plan of action.     CLINICAL IMPRESSION:  ED Diagnosis   1. Fever, unspecified fever cause  POCT COVID/Flu/RSV Panel via [facility]    POCT Streptococcus Group A PCR      2. Nasal congestion            FOLLOW UP:  Discharge: Home  Condition: Stable  New Prescriptions    No medications on file        Patient Instructions   Your COVID/RSV/flu testing is negative.  Strep testing is pending at this time. You will be contacted with results in the next 1 to 2 hours.    Please schedule a follow up appointment with your child’s primary care provider in the next 2-3 days.    Return to the ER immediately if your child experiences worsening pain, cough, persistent fevers, recurrent vomiting, lethargy or any other new or concerning symptoms.    Recommendations:  For pain and fevers - recommend alternating Tylenol and Ibuprofen every 4-6 hours.  Please read and follow all instructions on the label. No Ibuprofen until age 6 months.  For a stuffy nose/ congestion: use a nasal suction along with OTC saline nasal spray, cool mist humidifier, slight elevation to the head of the bed.  Increased fluids (pedialyte, gatorade).  Do not give children under age 6 any OTC cough or cold medicines unless advised by healthcare provider.      hcl, toprol xl [metoprolol succinate], ultracet [tramadol-acetaminophen], and ziac [bisoprolol-hydrochlorothiazide]. Physical Exam     INITIAL VITALS: BP: (!) 100/58, Temp: 97.6 °F (36.4 °C), Heart Rate: (!) 102, Resp: 18, SpO2: 99 %   Physical Exam  Constitutional:       Appearance: She is ill-appearing. She is not toxic-appearing. HENT:      Head: Normocephalic. Right Ear: External ear normal.      Left Ear: External ear normal.      Nose: Nose normal.      Mouth/Throat:      Mouth: Mucous membranes are moist.   Eyes:      General:         Right eye: No discharge. Left eye: No discharge. Extraocular Movements: Extraocular movements intact. Conjunctiva/sclera: Conjunctivae normal.      Pupils: Pupils are equal, round, and reactive to light. Cardiovascular:      Rate and Rhythm: Tachycardia present. Rhythm irregular. Pulses: Normal pulses. Heart sounds: Murmur heard. Pulmonary:      Effort: Pulmonary effort is normal. No respiratory distress. Breath sounds: Normal breath sounds. No stridor. No wheezing or rhonchi. Comments: Mild crackles on exam  Chest:      Chest wall: No tenderness. Abdominal:      General: Bowel sounds are normal. There is no distension. Palpations: Abdomen is soft. Tenderness: There is no abdominal tenderness. There is no guarding or rebound. Musculoskeletal:         General: No deformity. Normal range of motion. Cervical back: Normal range of motion. Right lower leg: Edema present. Left lower leg: Edema present. Comments: 2+ pitting edema in the bilateral lower extremities up to the knee. Skin:     General: Skin is warm and dry. Coloration: Skin is not jaundiced. Findings: No bruising, lesion or rash. Neurological:      Mental Status: She is alert and oriented to person, place, and time. Cranial Nerves: No cranial nerve deficit. Sensory: No sensory deficit.       Comments: Patient has preserved strength with 5 out of 5 strength in bilateral upper and lower extremities. Psychiatric:         Mood and Affect: Mood normal.         Behavior: Behavior normal.      Comments: Patient is fatigued but arousable. Diagnostic Results     EKG   A. fib with RVR, no acute ST elevations    RADIOLOGY:  XR CHEST PORTABLE   Final Result      Patchy bilateral airspace disease. Correlate for pneumonia are less typical for pulmonary edema.            LABS:   Results for orders placed or performed during the hospital encounter of 06/23/22   CBC with Auto Differential   Result Value Ref Range    WBC 7.1 4.0 - 11.0 K/uL    RBC 3.23 (L) 4.00 - 5.20 M/uL    Hemoglobin 9.4 (L) 12.0 - 16.0 g/dL    Hematocrit 28.2 (L) 36.0 - 48.0 %    MCV 87.6 80.0 - 100.0 fL    MCH 29.2 26.0 - 34.0 pg    MCHC 33.3 31.0 - 36.0 g/dL    RDW 15.8 (H) 12.4 - 15.4 %    Platelets 462 041 - 828 K/uL    MPV 9.7 5.0 - 10.5 fL    Neutrophils % 70.5 %    Lymphocytes % 15.7 %    Monocytes % 12.3 %    Eosinophils % 0.7 %    Basophils % 0.8 %    Neutrophils Absolute 5.0 1.7 - 7.7 K/uL    Lymphocytes Absolute 1.1 1.0 - 5.1 K/uL    Monocytes Absolute 0.9 0.0 - 1.3 K/uL    Eosinophils Absolute 0.1 0.0 - 0.6 K/uL    Basophils Absolute 0.1 0.0 - 0.2 K/uL   Comprehensive Metabolic Panel w/ Reflex to MG   Result Value Ref Range    Sodium 133 (L) 136 - 145 mmol/L    Potassium reflex Magnesium 4.3 3.5 - 5.1 mmol/L    Chloride 92 (L) 99 - 110 mmol/L    CO2 27 21 - 32 mmol/L    Anion Gap 14 3 - 16    Glucose 138 (H) 70 - 99 mg/dL    BUN 65 (H) 7 - 20 mg/dL    CREATININE 2.9 (H) 0.6 - 1.2 mg/dL    GFR Non-African American 15 (A) >60    GFR  19 (A) >60    Calcium 9.3 8.3 - 10.6 mg/dL    Total Protein 6.3 (L) 6.4 - 8.2 g/dL    Albumin 3.8 3.4 - 5.0 g/dL    Albumin/Globulin Ratio 1.5 1.1 - 2.2    Total Bilirubin 0.5 0.0 - 1.0 mg/dL    Alkaline Phosphatase 65 40 - 129 U/L    ALT 36 10 - 40 U/L    AST 37 15 - 37 U/L   Troponin   Result Value Ref Range    Troponin 0.01 <0.01 ng/mL   Brain Natriuretic Peptide   Result Value Ref Range    Pro-BNP 11,182 (H) 0 - 449 pg/mL   EKG 12 Lead   Result Value Ref Range    Ventricular Rate 109 BPM    QRS Duration 122 ms    Q-T Interval 358 ms    QTc Calculation (Bazett) 482 ms    R Axis 225 degrees    T Axis 110 degrees    Diagnosis       EKG performed in ER and to be interpreted by ER physician. Confirmed by MD, ER ([id number]),  Hank Erwin ([phone]) on 6/23/2022 6:49:04 PM       ED BEDSIDE ULTRASOUND:  No results found. RECENT VITALS:  BP: (!) 100/58, Temp: 97.6 °F (36.4 °C),Heart Rate: (!) 102, Resp: 18, SpO2: 99 %     Procedures     None    ED Course     Nursing Notes, Past Medical Hx, Past Surgical Hx, Social Hx, Allergies, and FamilyHx were reviewed. The patient was giventhe following medications:  Orders Placed This Encounter   Medications    furosemide (LASIX) injection 40 mg       CONSULTS:  None    MEDICAL DECISION MAKING / ASSESSMENT / Saima Brandi is a 80 y.o. female with a past medical history of AGUILA, atrial fibrillation on Eliquis, CVA status post ILR placement, hypertension, CKD stage IV, heart failure with reduced ejection fraction who presents with a chief complaint of bilateral leg swelling and abnormal labs. With bilateral lower extremity swelling on physical exam patient is believed to be in heart failure exacerbation despite adequate sodium control and being compliant with home Lasix. Patient has a chest x-ray that shows bilateral airspace disease that could represent pneumonia, although patient does not complain of any shortness of breath or fevers. She has a creatinine elevated to 2.9 with a baseline creatinine of 2.1. She is a proBNP is elevated to over 11,000 with a history of chronically elevated proBNP values. She will require hospitalization for management of her AGUILA on CKD as well as her heart failure exacerbation not adequately controlled on home medications.

## 2024-12-18 ENCOUNTER — HOSPITAL ENCOUNTER (OUTPATIENT)
Age: 88
Setting detail: SPECIMEN
Discharge: HOME | End: 2024-12-18
Payer: MEDICARE

## 2024-12-18 LAB
BASOPHILS # BLD: 0 K/UL (ref 0–0.2)
BASOPHILS NFR BLD: 1.3 %
BILIRUB UR QL STRIP.AUTO: NEGATIVE
CLARITY UR: CLEAR
COLOR UR: YELLOW
DEPRECATED RDW RBC AUTO: 15.9 % (ref 12.4–15.4)
EOSINOPHIL # BLD: 0.1 K/UL (ref 0–0.6)
EOSINOPHIL NFR BLD: 2.8 %
GLUCOSE UR STRIP.AUTO-MCNC: NEGATIVE MG/DL
HCT VFR BLD AUTO: 33.9 % (ref 36–48)
HGB BLD-MCNC: 11.3 G/DL (ref 12–16)
HGB UR QL STRIP.AUTO: NEGATIVE
KETONES UR STRIP.AUTO-MCNC: NEGATIVE MG/DL
LEUKOCYTE ESTERASE UR QL STRIP.AUTO: NEGATIVE
LYMPHOCYTES # BLD: 1.4 K/UL (ref 1–5.1)
LYMPHOCYTES NFR BLD: 38.4 %
MCH RBC QN AUTO: 30.6 PG (ref 26–34)
MCHC RBC AUTO-ENTMCNC: 33.4 G/DL (ref 31–36)
MCV RBC AUTO: 91.5 FL (ref 80–100)
MONOCYTES # BLD: 0.4 K/UL (ref 0–1.3)
MONOCYTES NFR BLD: 11.3 %
NEUTROPHILS # BLD: 1.7 K/UL (ref 1.7–7.7)
NEUTROPHILS NFR BLD: 46.2 %
NITRITE UR QL STRIP.AUTO: NEGATIVE
PH UR STRIP.AUTO: 6.5 [PH] (ref 5–8)
PLATELET # BLD AUTO: 188 K/UL (ref 135–450)
PMV BLD AUTO: 11.2 FL (ref 5–10.5)
PROT UR STRIP.AUTO-MCNC: NEGATIVE MG/DL
RBC # BLD AUTO: 3.7 M/UL (ref 4–5.2)
REASON FOR REJECTION: NORMAL
REJECTED TEST: NORMAL
SP GR UR STRIP.AUTO: 1.01 (ref 1–1.03)
UA DIPSTICK W REFLEX MICRO PNL UR: NORMAL
URN SPEC COLLECT METH UR: NORMAL
UROBILINOGEN UR STRIP-ACNC: 0.2 E.U./DL
WBC # BLD AUTO: 3.7 K/UL (ref 4–11)

## 2024-12-18 PROCEDURE — 81003 URINALYSIS AUTO W/O SCOPE: CPT

## 2024-12-18 PROCEDURE — 87086 URINE CULTURE/COLONY COUNT: CPT

## 2024-12-18 PROCEDURE — 36415 COLL VENOUS BLD VENIPUNCTURE: CPT

## 2024-12-18 PROCEDURE — 85025 COMPLETE CBC W/AUTO DIFF WBC: CPT

## 2024-12-19 LAB — BACTERIA UR CULT: NORMAL

## 2024-12-31 ASSESSMENT — PATIENT HEALTH QUESTIONNAIRE - PHQ9
SUM OF ALL RESPONSES TO PHQ9 QUESTIONS 1 & 2: 2
SUM OF ALL RESPONSES TO PHQ9 QUESTIONS 1 & 2: 2
1. LITTLE INTEREST OR PLEASURE IN DOING THINGS: SEVERAL DAYS
SUM OF ALL RESPONSES TO PHQ QUESTIONS 1-9: 2
2. FEELING DOWN, DEPRESSED OR HOPELESS: SEVERAL DAYS
SUM OF ALL RESPONSES TO PHQ QUESTIONS 1-9: 2
2. FEELING DOWN, DEPRESSED OR HOPELESS: SEVERAL DAYS
SUM OF ALL RESPONSES TO PHQ QUESTIONS 1-9: 2
SUM OF ALL RESPONSES TO PHQ QUESTIONS 1-9: 2
1. LITTLE INTEREST OR PLEASURE IN DOING THINGS: SEVERAL DAYS

## 2025-01-03 ENCOUNTER — OFFICE VISIT (OUTPATIENT)
Dept: FAMILY MEDICINE CLINIC | Age: 89
End: 2025-01-03

## 2025-01-03 VITALS
BODY MASS INDEX: 19.71 KG/M2 | SYSTOLIC BLOOD PRESSURE: 134 MMHG | HEART RATE: 81 BPM | HEIGHT: 62 IN | DIASTOLIC BLOOD PRESSURE: 68 MMHG

## 2025-01-03 DIAGNOSIS — E55.9 VITAMIN D DEFICIENCY: ICD-10-CM

## 2025-01-03 DIAGNOSIS — S06.5XAA TRAUMATIC SUBDURAL HEMORRHAGE WITH LOSS OF CONSCIOUSNESS STATUS UNKNOWN, INITIAL ENCOUNTER: ICD-10-CM

## 2025-01-03 DIAGNOSIS — I10 ESSENTIAL HYPERTENSION: Primary | ICD-10-CM

## 2025-01-03 DIAGNOSIS — E78.2 MIXED HYPERLIPIDEMIA: ICD-10-CM

## 2025-01-03 DIAGNOSIS — R41.82 ALTERED MENTAL STATUS, UNSPECIFIED ALTERED MENTAL STATUS TYPE: ICD-10-CM

## 2025-01-03 DIAGNOSIS — Z91.81 AT HIGH RISK FOR FALLS: ICD-10-CM

## 2025-01-03 DIAGNOSIS — N18.4 CHRONIC KIDNEY DISEASE, STAGE 4 (SEVERE) (HCC): ICD-10-CM

## 2025-01-03 DIAGNOSIS — Z23 NEED FOR INFLUENZA VACCINATION: ICD-10-CM

## 2025-01-03 DIAGNOSIS — I48.0 PAROXYSMAL ATRIAL FIBRILLATION (HCC): ICD-10-CM

## 2025-01-03 LAB
ALBUMIN SERPL-MCNC: 3.6 G/DL (ref 3.4–5)
ALBUMIN/GLOB SERPL: 1.6 {RATIO} (ref 1.1–2.2)
ALP SERPL-CCNC: 132 U/L (ref 40–129)
ALT SERPL-CCNC: 19 U/L (ref 10–40)
ANION GAP SERPL CALCULATED.3IONS-SCNC: 10 MMOL/L (ref 3–16)
AST SERPL-CCNC: 32 U/L (ref 15–37)
BASOPHILS # BLD: 0.1 K/UL (ref 0–0.2)
BASOPHILS NFR BLD: 1 %
BILIRUB SERPL-MCNC: 0.6 MG/DL (ref 0–1)
BILIRUBIN, POC: NEGATIVE
BLOOD URINE, POC: NEGATIVE
BUN SERPL-MCNC: 16 MG/DL (ref 7–20)
CALCIUM SERPL-MCNC: 9.3 MG/DL (ref 8.3–10.6)
CHLORIDE SERPL-SCNC: 102 MMOL/L (ref 99–110)
CLARITY, POC: NORMAL
CO2 SERPL-SCNC: 26 MMOL/L (ref 21–32)
COLOR, POC: YELLOW
CREAT SERPL-MCNC: 1.3 MG/DL (ref 0.6–1.2)
DEPRECATED RDW RBC AUTO: 15.5 % (ref 12.4–15.4)
EOSINOPHIL # BLD: 0.1 K/UL (ref 0–0.6)
EOSINOPHIL NFR BLD: 1.7 %
GFR SERPLBLD CREATININE-BSD FMLA CKD-EPI: 39 ML/MIN/{1.73_M2}
GLUCOSE SERPL-MCNC: 90 MG/DL (ref 70–99)
GLUCOSE URINE, POC: NEGATIVE MG/DL
HCT VFR BLD AUTO: 37.1 % (ref 36–48)
HGB BLD-MCNC: 12.3 G/DL (ref 12–16)
KETONES, POC: NEGATIVE MG/DL
LEUKOCYTE EST, POC: NORMAL
LYMPHOCYTES # BLD: 1.5 K/UL (ref 1–5.1)
LYMPHOCYTES NFR BLD: 25.5 %
MCH RBC QN AUTO: 30.1 PG (ref 26–34)
MCHC RBC AUTO-ENTMCNC: 33.2 G/DL (ref 31–36)
MCV RBC AUTO: 90.6 FL (ref 80–100)
MONOCYTES # BLD: 0.4 K/UL (ref 0–1.3)
MONOCYTES NFR BLD: 6.7 %
NEUTROPHILS # BLD: 3.9 K/UL (ref 1.7–7.7)
NEUTROPHILS NFR BLD: 65.1 %
NITRITE, POC: NEGATIVE
PH, POC: 6.5
PLATELET # BLD AUTO: 183 K/UL (ref 135–450)
PMV BLD AUTO: 11 FL (ref 5–10.5)
POTASSIUM SERPL-SCNC: 4.6 MMOL/L (ref 3.5–5.1)
PROT SERPL-MCNC: 5.8 G/DL (ref 6.4–8.2)
PROTEIN, POC: NEGATIVE MG/DL
RBC # BLD AUTO: 4.1 M/UL (ref 4–5.2)
SODIUM SERPL-SCNC: 138 MMOL/L (ref 136–145)
SPECIFIC GRAVITY, POC: 1.01
UROBILINOGEN, POC: 0.2 MG/DL
WBC # BLD AUTO: 6 K/UL (ref 4–11)

## 2025-01-03 RX ORDER — ONDANSETRON 4 MG/1
4 TABLET, ORALLY DISINTEGRATING ORAL EVERY 8 HOURS PRN
Qty: 30 TABLET | Refills: 1 | Status: SHIPPED | OUTPATIENT
Start: 2025-01-03

## 2025-01-03 ASSESSMENT — ENCOUNTER SYMPTOMS: BLOOD IN STOOL: 0

## 2025-01-03 NOTE — PROGRESS NOTES
Naty Dykes is a 89 y.o. female    Chief Complaint   Patient presents with    Other     Altered personality- not eating or drinking much, having forgetfulness  Fell and hit head with bleeding but it wasn't caught right away took a couple weeks before going to hospital   Home nurse was wanting more intensive blood work    Follow-up       HPI:    Hypertension  This is a chronic problem. The current episode started more than 1 year ago. The problem is unchanged. The problem is controlled. Past treatments include angiotensin blockers, diuretics and beta blockers. The current treatment provides significant improvement. There are no compliance problems.    Hyperlipidemia  This is a chronic problem. The current episode started more than 1 year ago. The problem is controlled. Recent lipid tests were reviewed and are low. Current antihyperlipidemic treatment includes statins. The current treatment provides significant improvement of lipids. There are no compliance problems.  Risk factors for coronary artery disease include hypertension.     Patient is here for altered personality.  This has been going on for a couple of weeks.  She is not eating or drinking much and having forgetfulness.  2 months ago, she had a subdural hematoma that has decreased.  Her last urine was normal.  The family is present in the room and feels symptoms got worse after her fall and head injury.    ROS:    Review of Systems   Constitutional:  Positive for fatigue.   Gastrointestinal:  Negative for blood in stool.   Psychiatric/Behavioral:  Positive for sleep disturbance.        /68 (Site: Right Upper Arm, Position: Sitting, Cuff Size: Medium Adult)   Pulse 81   Ht 1.575 m (5' 2.01\")   BMI 19.71 kg/m²     Physical Exam:    Physical Exam  Constitutional:       General: She is not in acute distress.     Appearance: Normal appearance. She is well-developed and normal weight. She is not ill-appearing, toxic-appearing or diaphoretic.

## 2025-01-04 LAB
25(OH)D3 SERPL-MCNC: 76.1 NG/ML
FOLATE SERPL-MCNC: 6.45 NG/ML (ref 4.78–24.2)
VIT B12 SERPL-MCNC: >4000 PG/ML (ref 211–911)

## 2025-01-05 LAB — BACTERIA UR CULT: NORMAL

## 2025-01-13 ENCOUNTER — TELEPHONE (OUTPATIENT)
Dept: CARDIOLOGY CLINIC | Age: 89
End: 2025-01-13

## 2025-01-13 NOTE — TELEPHONE ENCOUNTER
M for Tatianna.   Called and spoke with pt's daughter. Reports LE swelling that has been going on for several days. She took Lasix 20 mg once, 3 days ago. Denies SOB. Does not weigh herself as their scale is broken. Advised to take the Lasix 20 mg daily for the next 3 days, elevate legs, wear compression stockings, and eat low salt diet. They will call if swelling doesn't improve after 3 days of daily Lasix.

## 2025-01-13 NOTE — TELEPHONE ENCOUNTER
Tatianna from [facility] called stating patient has swelling in both legs, patient is prescribed Lasix but only takes as needed. Tatianna would like to know next steps / possible medication change. Can reach Tatianna at [phone]

## 2025-02-05 RX ORDER — FUROSEMIDE 20 MG/1
20 TABLET ORAL DAILY PRN
Qty: 90 TABLET | Refills: 1 | Status: SHIPPED | OUTPATIENT
Start: 2025-02-05

## 2025-02-05 NOTE — TELEPHONE ENCOUNTER
Requested Prescriptions      No prescriptions requested or ordered in this encounter            Checked Correct Pharmacy: Yes    Any changes since last refill? No       Last OV: 11/15/2024 Provider: TASH    Next OV: 3/6/2025 Provider: TASH    Last Labs:   BMP:   Lab Results   Component Value Date/Time     01/03/2025 02:19 PM    K 4.6 01/03/2025 02:19 PM    K 4.2 11/01/2024 06:57 AM     01/03/2025 02:19 PM    CO2 26 01/03/2025 02:19 PM    BUN 16 01/03/2025 02:19 PM    CREATININE 1.3 01/03/2025 02:19 PM    GLUCOSE 90 01/03/2025 02:19 PM    CALCIUM 9.3 01/03/2025 02:19 PM    LABGLOM 39 01/03/2025 02:19 PM    LABGLOM 36 03/03/2024 11:24 AM

## 2025-02-28 ENCOUNTER — TELEPHONE (OUTPATIENT)
Dept: FAMILY MEDICINE CLINIC | Age: 89
End: 2025-02-28

## 2025-02-28 NOTE — TELEPHONE ENCOUNTER
Brie with [facility] called to let us know that the patient has had change in mental status, falls, sleeping more 20 hours a day, fell 3 times one day and 2 times the other day.  She has a goose egg on her right side of her forehead.  Patient rouses easily.  Patient's family and home health nurse wants to get a urine sample checked for a UTI.  Can we get an order for UAC&S.    Patient fell and received the goose egg yesterday.  Patient is on aspirin 81 mg.  Chronic history of brain bleed.  Patient refuses hospital.  Nurse advised the patient to go to the ED, Provider advised patient to the ED to be evaluated.  Patient continues to refuse.  Home health nurse and family stated that they would try to convince to go to the ED for evaluation.

## 2025-02-28 NOTE — TELEPHONE ENCOUNTER
Yes, I agree 100% with an ED evaluation.  There is not much more we would do in the outpatient setting.

## 2025-03-03 ENCOUNTER — APPOINTMENT (OUTPATIENT)
Dept: CT IMAGING | Age: 89
End: 2025-03-03
Payer: MEDICARE

## 2025-03-03 ENCOUNTER — HOSPITAL ENCOUNTER (EMERGENCY)
Age: 89
Discharge: HOME | End: 2025-03-03
Attending: STUDENT IN AN ORGANIZED HEALTH CARE EDUCATION/TRAINING PROGRAM
Payer: MEDICARE

## 2025-03-03 VITALS
OXYGEN SATURATION: 95 % | TEMPERATURE: 98.4 F | DIASTOLIC BLOOD PRESSURE: 83 MMHG | WEIGHT: 102.8 LBS | SYSTOLIC BLOOD PRESSURE: 141 MMHG | RESPIRATION RATE: 19 BRPM | HEART RATE: 84 BPM | HEIGHT: 63 IN | BODY MASS INDEX: 18.21 KG/M2

## 2025-03-03 DIAGNOSIS — E87.6 HYPOKALEMIA: ICD-10-CM
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
## 2025-03-03 LAB
ALBUMIN SERPL-MCNC: 2.9 G/DL (ref 3.4–5)
ALBUMIN/GLOB SERPL: 1 {RATIO} (ref 1.1–2.2)
ALP SERPL-CCNC: 173 U/L (ref 40–129)
ALT SERPL-CCNC: 36 U/L (ref 10–40)
ANION GAP SERPL CALCULATED.3IONS-SCNC: 11 MMOL/L (ref 3–16)
AST SERPL-CCNC: 51 U/L (ref 15–37)
BASOPHILS # BLD: 0.1 K/UL (ref 0–0.2)
BASOPHILS NFR BLD: 1.1 %
BILIRUB SERPL-MCNC: 0.7 MG/DL (ref 0–1)
BILIRUB UR QL STRIP.AUTO: NEGATIVE
BUN SERPL-MCNC: 21 MG/DL (ref 7–20)
CALCIUM SERPL-MCNC: 8.5 MG/DL (ref 8.3–10.6)
CHLORIDE SERPL-SCNC: 98 MMOL/L (ref 99–110)
CLARITY UR: CLEAR
CO2 SERPL-SCNC: 32 MMOL/L (ref 21–32)
COLOR UR: YELLOW
CREAT SERPL-MCNC: 1.3 MG/DL (ref 0.6–1.2)
DEPRECATED RDW RBC AUTO: 16.8 % (ref 12.4–15.4)
EKG ATRIAL RATE: 64 BPM
EKG DIAGNOSIS: NORMAL
EKG P AXIS: -6 DEGREES
EKG P-R INTERVAL: 168 MS
EKG Q-T INTERVAL: 498 MS
EKG QRS DURATION: 158 MS
EKG QTC CALCULATION (BAZETT): 513 MS
EKG R AXIS: -32 DEGREES
EKG T AXIS: 122 DEGREES
EKG VENTRICULAR RATE: 64 BPM
EOSINOPHIL # BLD: 0.1 K/UL (ref 0–0.6)
EOSINOPHIL NFR BLD: 0.7 %
FLUAV RNA RESP QL NAA+PROBE: NOT DETECTED
FLUBV RNA RESP QL NAA+PROBE: NOT DETECTED
GFR SERPLBLD CREATININE-BSD FMLA CKD-EPI: 39 ML/MIN/{1.73_M2}
GLUCOSE SERPL-MCNC: 77 MG/DL (ref 70–99)
GLUCOSE UR STRIP.AUTO-MCNC: NEGATIVE MG/DL
HCT VFR BLD AUTO: 34 % (ref 36–48)
HGB BLD-MCNC: 11.4 G/DL (ref 12–16)
HGB UR QL STRIP.AUTO: NEGATIVE
KETONES UR STRIP.AUTO-MCNC: NEGATIVE MG/DL
LEUKOCYTE ESTERASE UR QL STRIP.AUTO: NEGATIVE
LYMPHOCYTES # BLD: 1.4 K/UL (ref 1–5.1)
LYMPHOCYTES NFR BLD: 16.9 %
MAGNESIUM SERPL-MCNC: 1.84 MG/DL (ref 1.8–2.4)
MCH RBC QN AUTO: 30.3 PG (ref 26–34)
MCHC RBC AUTO-ENTMCNC: 33.5 G/DL (ref 31–36)
MCV RBC AUTO: 90.4 FL (ref 80–100)
MONOCYTES # BLD: 0.7 K/UL (ref 0–1.3)
MONOCYTES NFR BLD: 8.3 %
NEUTROPHILS # BLD: 5.9 K/UL (ref 1.7–7.7)
NEUTROPHILS NFR BLD: 73 %
NITRITE UR QL STRIP.AUTO: NEGATIVE
PH UR STRIP.AUTO: 7 [PH] (ref 5–8)
PLATELET # BLD AUTO: 308 K/UL (ref 135–450)
PMV BLD AUTO: 9.7 FL (ref 5–10.5)
POTASSIUM SERPL-SCNC: 2.7 MMOL/L (ref 3.5–5.1)
PROT SERPL-MCNC: 5.7 G/DL (ref 6.4–8.2)
PROT UR STRIP.AUTO-MCNC: NEGATIVE MG/DL
RBC # BLD AUTO: 3.77 M/UL (ref 4–5.2)
SARS-COV-2 RNA RESP QL NAA+PROBE: NOT DETECTED
SODIUM SERPL-SCNC: 141 MMOL/L (ref 136–145)
SP GR UR STRIP.AUTO: 1.01 (ref 1–1.03)
TROPONIN, HIGH SENSITIVITY: 78 NG/L (ref 0–14)
TROPONIN, HIGH SENSITIVITY: 84 NG/L (ref 0–14)
UA COMPLETE W REFLEX CULTURE PNL UR: NORMAL
UA DIPSTICK W REFLEX MICRO PNL UR: NORMAL
URN SPEC COLLECT METH UR: NORMAL
UROBILINOGEN UR STRIP-ACNC: 0.2 E.U./DL
WBC # BLD AUTO: 8 K/UL (ref 4–11)

## 2025-03-03 PROCEDURE — 96366 THER/PROPH/DIAG IV INF ADDON: CPT

## 2025-03-03 PROCEDURE — 80053 COMPREHEN METABOLIC PANEL: CPT

## 2025-03-03 PROCEDURE — 81003 URINALYSIS AUTO W/O SCOPE: CPT

## 2025-03-03 PROCEDURE — 87636 SARSCOV2 & INF A&B AMP PRB: CPT

## 2025-03-03 PROCEDURE — 6370000000 HC RX 637 (ALT 250 FOR IP): Performed by: STUDENT IN AN ORGANIZED HEALTH CARE EDUCATION/TRAINING PROGRAM

## 2025-03-03 PROCEDURE — 87040 BLOOD CULTURE FOR BACTERIA: CPT

## 2025-03-03 PROCEDURE — 99284 EMERGENCY DEPT VISIT MOD MDM: CPT

## 2025-03-03 PROCEDURE — 85025 COMPLETE CBC W/AUTO DIFF WBC: CPT

## 2025-03-03 PROCEDURE — 96365 THER/PROPH/DIAG IV INF INIT: CPT

## 2025-03-03 PROCEDURE — 83735 ASSAY OF MAGNESIUM: CPT

## 2025-03-03 PROCEDURE — 84484 ASSAY OF TROPONIN QUANT: CPT

## 2025-03-03 PROCEDURE — 6360000002 HC RX W HCPCS: Performed by: STUDENT IN AN ORGANIZED HEALTH CARE EDUCATION/TRAINING PROGRAM

## 2025-03-03 PROCEDURE — 93005 ELECTROCARDIOGRAM TRACING: CPT | Performed by: STUDENT IN AN ORGANIZED HEALTH CARE EDUCATION/TRAINING PROGRAM

## 2025-03-03 PROCEDURE — 70450 CT HEAD/BRAIN W/O DYE: CPT

## 2025-03-03 RX ORDER — MAGNESIUM SULFATE IN WATER 40 MG/ML
2000 INJECTION, SOLUTION INTRAVENOUS ONCE
Status: COMPLETED | OUTPATIENT
Start: 2025-03-03 | End: 2025-03-03

## 2025-03-03 RX ORDER — NITROGLYCERIN 0.4 MG/1
0.4 TABLET SUBLINGUAL EVERY 5 MIN PRN
Status: DISCONTINUED | OUTPATIENT
Start: 2025-03-03 | End: 2025-03-03 | Stop reason: HOSPADM

## 2025-03-03 RX ADMIN — MAGNESIUM SULFATE HEPTAHYDRATE 2000 MG: 40 INJECTION, SOLUTION INTRAVENOUS at 18:21

## 2025-03-03 RX ADMIN — POTASSIUM BICARBONATE 40 MEQ: 782 TABLET, EFFERVESCENT ORAL at 18:22

## 2025-03-03 ASSESSMENT — PAIN DESCRIPTION - LOCATION: LOCATION: HEAD

## 2025-03-03 ASSESSMENT — LIFESTYLE VARIABLES
HOW OFTEN DO YOU HAVE A DRINK CONTAINING ALCOHOL: NEVER
HOW MANY STANDARD DRINKS CONTAINING ALCOHOL DO YOU HAVE ON A TYPICAL DAY: PATIENT DOES NOT DRINK

## 2025-03-03 ASSESSMENT — PAIN - FUNCTIONAL ASSESSMENT: PAIN_FUNCTIONAL_ASSESSMENT: 0-10

## 2025-03-03 ASSESSMENT — PAIN SCALES - GENERAL: PAINLEVEL_OUTOF10: 2

## 2025-03-03 ASSESSMENT — PAIN DESCRIPTION - DESCRIPTORS: DESCRIPTORS: ACHING

## 2025-03-03 NOTE — ED PROVIDER NOTES
(CHOLECALCIFEROL) 25 MCG (1000 UT) TABS TABLET    TAKE 1 TABLET (1,000 IU) BY MOUTH DAILY IN THE MORNING WITH FOOD PREFERABLY WITH A MEAL       Allergies     She is allergic to benazepril hcl, feldene [piroxicam], hytrin [terazosin hcl], iv contrast [iodides], acetaminophen, celebrex [celecoxib], cephalexin, hydrochlorothiazide, iodinated casein, monopril [fosinopril sodium], protonix [pantoprazole sodium], quinapril hcl, ultracet [tramadol-acetaminophen], and ziac [bisoprolol-hydrochlorothiazide].    Physical Exam     INITIAL VITALS: BP: 126/72, Temp: 98.4 °F (36.9 °C), Pulse: 66, Respirations: 22, SpO2: 94 %   Physical Exam  Vitals reviewed.   Constitutional:       Appearance: Normal appearance.   HENT:      Head:      Comments: Healing scalp contusion  Eyes:      Pupils: Pupils are equal, round, and reactive to light.   Cardiovascular:      Rate and Rhythm: Normal rate.   Pulmonary:      Effort: Pulmonary effort is normal.   Abdominal:      Palpations: Abdomen is soft.   Musculoskeletal:         General: Normal range of motion.      Cervical back: Normal range of motion.   Skin:     General: Skin is warm and dry.   Neurological:      Mental Status: Mental status is at baseline.   Psychiatric:         Mood and Affect: Mood normal.                    Saran Evans MD  03/03/25 [id number]

## 2025-03-04 ENCOUNTER — TELEPHONE (OUTPATIENT)
Dept: FAMILY MEDICINE CLINIC | Age: 89
End: 2025-03-04

## 2025-03-04 NOTE — TELEPHONE ENCOUNTER
ED Follow Up Call/ Schedule appt   ED: [facility]  Reason: Fall  Date: 3/3/25    Appt scheduled:       Comments:     Spoke with patient's daughter Ela - ok per HIPAA- Ela is very upset stating she has never met a doctor of her mothers that care about her enough to diagnosis her with dementia so she can get help with caring for her mother. Daughter is going to try and get mother on palliative care at this time.     Daughter lEa will call the office back if her mother needs a follow up appointment.

## 2025-03-06 ENCOUNTER — TELEPHONE (OUTPATIENT)
Dept: FAMILY MEDICINE CLINIC | Age: 89
End: 2025-03-06

## 2025-03-06 NOTE — TELEPHONE ENCOUNTER
PARAG Watkins [phone]    Home health nurse, June, called the office stating that the patient had fallen out of bed while sleeping on 3/4/25. Patient was seen on 3/3/25 for a fall as well.   Vitals were good today.

## 2025-03-07 LAB — BACTERIA BLD CULT: NORMAL

## 2025-03-10 ENCOUNTER — TELEPHONE (OUTPATIENT)
Dept: FAMILY MEDICINE CLINIC | Age: 89
End: 2025-03-10

## 2025-03-10 NOTE — TELEPHONE ENCOUNTER
Received phone call from Ava with [facility] in regards to       She has a deteriorationg pressure sore on her coccyx.     She is asking is they can use medahoney on the wound.     Please advise    Ava- [phone]

## 2025-03-10 NOTE — TELEPHONE ENCOUNTER
LVM for Ava asking to return call regarding state of wound, if intact or if there is skin loss or any sign of infection?    Informed of providers note.

## 2025-03-10 NOTE — TELEPHONE ENCOUNTER
I have never used that for any wounds.  But according to research it is okay if the wound is mild to moderate.  However overall try to keep the wound clean and dry.

## 2025-03-17 ENCOUNTER — TELEPHONE (OUTPATIENT)
Dept: FAMILY MEDICINE CLINIC | Age: 89
End: 2025-03-17

## 2025-03-17 ENCOUNTER — TELEPHONE (OUTPATIENT)
Dept: CARDIOLOGY CLINIC | Age: 89
End: 2025-03-17

## 2025-03-17 DIAGNOSIS — Z91.81 AT HIGH RISK FOR FALLS: ICD-10-CM

## 2025-03-17 DIAGNOSIS — R41.82 ALTERED MENTAL STATUS, UNSPECIFIED ALTERED MENTAL STATUS TYPE: Primary | ICD-10-CM

## 2025-03-17 NOTE — TELEPHONE ENCOUNTER
Received phone call from Sheyla with [facility] in regards to     She has a decline in health over the weekend    Patient is not eating or drinking- little sips here in there not a regular full meal in about a week    She has only urinated 1x every 36 hours.     She can't get out to make it to dr valerie Calvorra- [phone]    Spoke with Dr. Shahid he stated that he does not recommend Ed evaluation with her frail and age and patients wishes to not go to the ED and to be comfortable and at home.     Dr. Shahid placed a referral for [facility] palliative care for EOL comfort care ETC    Sheyla advised and provided with this information to give to srinivasa patients daughter.     Advised to have srinivasa call us if anything additional is needed.     Spoke with richard   I have faxed the referral over to [phone]  Contact # for scheduling purposes    They are asking to make this referral urgent so they can schedule the patient within 24-48 hours.

## 2025-03-17 NOTE — TELEPHONE ENCOUNTER
Pt's daughter was contacted to confirm upcoming appointments. Pt's daughter (Ela) stated that pt will not be continuing care right now as they are searching for palliative care.

## 2025-03-20 ENCOUNTER — CARE COORDINATION (OUTPATIENT)
Dept: CARE COORDINATION | Age: 89
End: 2025-03-20

## 2025-03-20 NOTE — CARE COORDINATION
[facility] placed call to patient daughter Ela Villa, verified on HIPAA. Ela said that [facility] was started yesterday. [facility] provided condolences to Ela and asked if there was anything else that was needed. Ela said not at this time.

## 2025-03-27 DIAGNOSIS — K21.9 GASTROESOPHAGEAL REFLUX DISEASE, UNSPECIFIED WHETHER ESOPHAGITIS PRESENT: ICD-10-CM

## 2025-03-27 RX ORDER — FAMOTIDINE 20 MG/1
TABLET, FILM COATED ORAL
Qty: 60 TABLET | Refills: 3 | Status: SHIPPED | OUTPATIENT
Start: 2025-03-27

## 2025-03-27 NOTE — TELEPHONE ENCOUNTER
Refill Request     CONFIRM preferred pharmacy with the patient.    If Mail Order Rx - Pend for 90 day refill.      Last Seen: Last Seen Department: 1/3/2025  Last Seen by PCP: 1/3/2025    Last Written: 06/10/2024 60 tab 3 refills     If no future appointment scheduled:  Review the last OV with PCP and review information for follow-up visit,  Route STAFF MESSAGE with patient name to the  Pool for scheduling with the following information:            -  Timing of next visit           -  Visit type ie Physical, OV, etc           -  Diagnoses/Reason ie. COPD, HTN - Do not use MEDICATION, Follow-up or CHECK UP - Give reason for visit      Next Appointment:   No future appointments.    Message sent to  to schedule appt with patient?  NO      Requested Prescriptions     Pending Prescriptions Disp Refills    famotidine (PEPCID) 20 MG tablet 60 tablet 3     Sig: TAKE 1 TABLET BY MOUTH ONCE DAILY (AM)

## (undated) DEVICE — Device

## (undated) DEVICE — SYSTEM SKIN CLSR 22CM DERMBND PRINEO

## (undated) DEVICE — MANIFOLD SURG NEPTUNE WST MGMT

## (undated) DEVICE — 3M™ COBAN™ NL STERILE NON-LATEX SELF-ADHERENT WRAP, 2084S, 4 IN X 5 YD (10 CM X 4,5 M), 18 ROLLS/CASE: Brand: 3M™ COBAN™

## (undated) DEVICE — PADDING CAST N ADH 12X6 IN CRIMPED FINISH 100% COTTON WBRLII

## (undated) DEVICE — SOLUTION IV IRRIG 500ML 0.9% SODIUM CHL 2F7123

## (undated) DEVICE — SMARTGOWN SURGICAL GOWN, XL: Brand: CONVERTORS

## (undated) DEVICE — SUTURE VCRL SZ 0 L27IN ABSRB UD L36MM CT-1 1/2 CIR J260H

## (undated) DEVICE — CUFF RESTRN WR OR ANK BLU FOAM AD

## (undated) DEVICE — CANNULA NSL 13FT TUBE AD ETCO2 DIV SAMP M

## (undated) DEVICE — GLOVE ORANGE PI 7 1/2   MSG9075

## (undated) DEVICE — CHLORAPREP 26ML ORANGE

## (undated) DEVICE — SURGICAL PROCEDURE PACK EYE CLERMONT

## (undated) DEVICE — 1010 S-DRAPE TOWEL DRAPE 10/BX: Brand: STERI-DRAPE™

## (undated) DEVICE — SOLUTION IV IRRIG WATER 1000ML POUR BRL 2F7114

## (undated) DEVICE — SKIN AFFIX SURG ADHESIVE 72/CS 0.55ML: Brand: MEDLINE

## (undated) DEVICE — STAPLER SKIN 35 WIDE COUNT

## (undated) DEVICE — SUTURE ABSORBABLE BRAIDED 2-0 CT-1 27 IN UD VICRYL J259H

## (undated) DEVICE — GUIDEWIRE ORTH L400MM DIA3.2MM FOR TFN

## (undated) DEVICE — DRAPE,REIN 53X77,STERILE: Brand: MEDLINE

## (undated) DEVICE — DRAPE C ARM UNIV W41XL74IN CLR PLAS XR VELC CLSR POLY STRP

## (undated) DEVICE — BIT DRL L500MM DIA6X9MM CANN STP L QUIK CPL FOR DH DC TFN

## (undated) DEVICE — MICROSURGICAL INSTRUMENT ANTERIOR CHAMBER CANNULA 30GA: Brand: ALCON

## (undated) DEVICE — CIRCUIT ANES L72IN 3L BACT AND VIR FLTR EL CONN SGL LIMB

## (undated) DEVICE — MAT 40INX72IN TRK FLR OB

## (undated) DEVICE — DRESSING FOAM W4XL10IN SIL RECT ADH WTRPRF FLM BK W/ BORD

## (undated) DEVICE — 3M™ STERI-DRAPE™  ISOLATION DRAPE WITH INCISE FILM AND POUCH 1017: Brand: STERI-DRAPE™

## (undated) DEVICE — MAJOR SET UP PK

## (undated) DEVICE — BIT DRL L330MM DIA4.2MM CALIB 100MM 3 FLUT QUIK CPL

## (undated) DEVICE — DRESSING FOAM W3XL3IN GENTLE SIL FACE BORD ADH PD SUP ABSRB